# Patient Record
Sex: MALE | Race: WHITE | NOT HISPANIC OR LATINO | URBAN - METROPOLITAN AREA
[De-identification: names, ages, dates, MRNs, and addresses within clinical notes are randomized per-mention and may not be internally consistent; named-entity substitution may affect disease eponyms.]

---

## 2017-11-22 ENCOUNTER — OUTPATIENT (OUTPATIENT)
Dept: OUTPATIENT SERVICES | Facility: HOSPITAL | Age: 72
LOS: 1 days | Discharge: HOME | End: 2017-11-22

## 2017-11-22 DIAGNOSIS — I25.10 ATHEROSCLEROTIC HEART DISEASE OF NATIVE CORONARY ARTERY WITHOUT ANGINA PECTORIS: ICD-10-CM

## 2017-11-22 DIAGNOSIS — J96.00 ACUTE RESPIRATORY FAILURE, UNSPECIFIED WHETHER WITH HYPOXIA OR HYPERCAPNIA: ICD-10-CM

## 2017-11-22 DIAGNOSIS — E11.9 TYPE 2 DIABETES MELLITUS WITHOUT COMPLICATIONS: ICD-10-CM

## 2017-11-22 DIAGNOSIS — R78.89 FINDING OF OTHER SPECIFIED SUBSTANCES, NOT NORMALLY FOUND IN BLOOD: ICD-10-CM

## 2018-01-19 ENCOUNTER — OUTPATIENT (OUTPATIENT)
Dept: OUTPATIENT SERVICES | Facility: HOSPITAL | Age: 73
LOS: 1 days | Discharge: HOME | End: 2018-01-19

## 2018-01-19 DIAGNOSIS — J96.00 ACUTE RESPIRATORY FAILURE, UNSPECIFIED WHETHER WITH HYPOXIA OR HYPERCAPNIA: ICD-10-CM

## 2018-01-19 DIAGNOSIS — I25.10 ATHEROSCLEROTIC HEART DISEASE OF NATIVE CORONARY ARTERY WITHOUT ANGINA PECTORIS: ICD-10-CM

## 2018-01-19 DIAGNOSIS — R40.0 SOMNOLENCE: ICD-10-CM

## 2018-01-31 DIAGNOSIS — E61.1 IRON DEFICIENCY: ICD-10-CM

## 2018-09-12 ENCOUNTER — OUTPATIENT (OUTPATIENT)
Dept: OUTPATIENT SERVICES | Facility: HOSPITAL | Age: 73
LOS: 1 days | Discharge: HOME | End: 2018-09-12

## 2018-09-12 DIAGNOSIS — I25.10 ATHEROSCLEROTIC HEART DISEASE OF NATIVE CORONARY ARTERY WITHOUT ANGINA PECTORIS: ICD-10-CM

## 2018-10-03 ENCOUNTER — APPOINTMENT (OUTPATIENT)
Dept: CARDIOLOGY | Facility: CLINIC | Age: 73
End: 2018-10-03

## 2018-10-03 VITALS
BODY MASS INDEX: 37.8 KG/M2 | HEIGHT: 71 IN | DIASTOLIC BLOOD PRESSURE: 74 MMHG | SYSTOLIC BLOOD PRESSURE: 132 MMHG | OXYGEN SATURATION: 94 % | WEIGHT: 270 LBS | HEART RATE: 61 BPM

## 2018-10-03 DIAGNOSIS — Z82.49 FAMILY HISTORY OF ISCHEMIC HEART DISEASE AND OTHER DISEASES OF THE CIRCULATORY SYSTEM: ICD-10-CM

## 2018-10-03 DIAGNOSIS — Z78.9 OTHER SPECIFIED HEALTH STATUS: ICD-10-CM

## 2018-10-03 RX ORDER — FUROSEMIDE 40 MG/1
40 TABLET ORAL TWICE DAILY
Refills: 0 | Status: DISCONTINUED | COMMUNITY
End: 2018-10-03

## 2018-10-03 RX ORDER — ASPIRIN 81 MG
81 TABLET, DELAYED RELEASE (ENTERIC COATED) ORAL
Refills: 0 | Status: DISCONTINUED | COMMUNITY
End: 2018-10-03

## 2018-10-19 ENCOUNTER — OUTPATIENT (OUTPATIENT)
Dept: OUTPATIENT SERVICES | Facility: HOSPITAL | Age: 73
LOS: 1 days | Discharge: HOME | End: 2018-10-19

## 2018-10-19 VITALS
DIASTOLIC BLOOD PRESSURE: 62 MMHG | WEIGHT: 270.51 LBS | HEART RATE: 71 BPM | TEMPERATURE: 97 F | OXYGEN SATURATION: 98 % | SYSTOLIC BLOOD PRESSURE: 136 MMHG | RESPIRATION RATE: 17 BRPM | HEIGHT: 74 IN

## 2018-10-19 DIAGNOSIS — Z01.818 ENCOUNTER FOR OTHER PREPROCEDURAL EXAMINATION: ICD-10-CM

## 2018-10-19 DIAGNOSIS — Z90.10 ACQUIRED ABSENCE OF UNSPECIFIED BREAST AND NIPPLE: Chronic | ICD-10-CM

## 2018-10-19 DIAGNOSIS — I48.91 UNSPECIFIED ATRIAL FIBRILLATION: ICD-10-CM

## 2018-10-19 DIAGNOSIS — Z95.1 PRESENCE OF AORTOCORONARY BYPASS GRAFT: Chronic | ICD-10-CM

## 2018-10-19 LAB
ALBUMIN SERPL ELPH-MCNC: 4.5 G/DL — SIGNIFICANT CHANGE UP (ref 3.5–5.2)
ALP SERPL-CCNC: 73 U/L — SIGNIFICANT CHANGE UP (ref 30–115)
ALT FLD-CCNC: 15 U/L — SIGNIFICANT CHANGE UP (ref 0–41)
ANION GAP SERPL CALC-SCNC: 17 MMOL/L — HIGH (ref 7–14)
APTT BLD: 33.8 SEC — SIGNIFICANT CHANGE UP (ref 27–39.2)
AST SERPL-CCNC: 16 U/L — SIGNIFICANT CHANGE UP (ref 0–41)
BILIRUB SERPL-MCNC: 0.7 MG/DL — SIGNIFICANT CHANGE UP (ref 0.2–1.2)
BUN SERPL-MCNC: 27 MG/DL — HIGH (ref 10–20)
CALCIUM SERPL-MCNC: 9.1 MG/DL — SIGNIFICANT CHANGE UP (ref 8.5–10.1)
CHLORIDE SERPL-SCNC: 100 MMOL/L — SIGNIFICANT CHANGE UP (ref 98–110)
CO2 SERPL-SCNC: 24 MMOL/L — SIGNIFICANT CHANGE UP (ref 17–32)
CREAT SERPL-MCNC: 1.2 MG/DL — SIGNIFICANT CHANGE UP (ref 0.7–1.5)
GLUCOSE SERPL-MCNC: 116 MG/DL — HIGH (ref 70–99)
HCT VFR BLD CALC: 39.6 % — LOW (ref 42–52)
HGB BLD-MCNC: 13 G/DL — LOW (ref 14–18)
INR BLD: 1.03 RATIO — SIGNIFICANT CHANGE UP (ref 0.65–1.3)
MCHC RBC-ENTMCNC: 28.4 PG — SIGNIFICANT CHANGE UP (ref 27–31)
MCHC RBC-ENTMCNC: 32.8 G/DL — SIGNIFICANT CHANGE UP (ref 32–37)
MCV RBC AUTO: 86.5 FL — SIGNIFICANT CHANGE UP (ref 80–94)
MRSA PCR RESULT.: NEGATIVE — SIGNIFICANT CHANGE UP
NRBC # BLD: 0 /100 WBCS — SIGNIFICANT CHANGE UP (ref 0–0)
PLATELET # BLD AUTO: 256 K/UL — SIGNIFICANT CHANGE UP (ref 130–400)
POTASSIUM SERPL-MCNC: 5 MMOL/L — SIGNIFICANT CHANGE UP (ref 3.5–5)
POTASSIUM SERPL-SCNC: 5 MMOL/L — SIGNIFICANT CHANGE UP (ref 3.5–5)
PROT SERPL-MCNC: 7.8 G/DL — SIGNIFICANT CHANGE UP (ref 6–8)
PROTHROM AB SERPL-ACNC: 11.8 SEC — SIGNIFICANT CHANGE UP (ref 9.95–12.87)
RBC # BLD: 4.58 M/UL — LOW (ref 4.7–6.1)
RBC # FLD: 14 % — SIGNIFICANT CHANGE UP (ref 11.5–14.5)
SODIUM SERPL-SCNC: 141 MMOL/L — SIGNIFICANT CHANGE UP (ref 135–146)
WBC # BLD: 9.02 K/UL — SIGNIFICANT CHANGE UP (ref 4.8–10.8)
WBC # FLD AUTO: 9.02 K/UL — SIGNIFICANT CHANGE UP (ref 4.8–10.8)

## 2018-10-19 NOTE — H&P PST ADULT - FAMILY HISTORY
Father  Still living? Unknown  Family history of heart disease, Age at diagnosis: Age Unknown     Mother  Still living? Unknown  Family history of heart disease, Age at diagnosis: Age Unknown     Sibling  Still living? Unknown  Family history of lung cancer, Age at diagnosis: Age Unknown

## 2018-10-19 NOTE — H&P PST ADULT - PMH
Arrhythmia    Breast cancer  2001  CHF (congestive heart failure)    Heart attack    HTN (hypertension)    Hypercholesteremia    Obesity    Osteoarthritis

## 2018-10-19 NOTE — H&P PST ADULT - HISTORY OF PRESENT ILLNESS
72 year old male here for bi-v pacemaker after having ekg found patient abnormal and needs pacemaker..  fos= 1  denies chest pain sob palp  denies recent uri oe uti

## 2018-10-19 NOTE — H&P PST ADULT - NSANTHOSAYNRD_GEN_A_CORE
No. AUGUST screening performed.  STOP BANG Legend: 0-2 = LOW Risk; 3-4 = INTERMEDIATE Risk; 5-8 = HIGH Risk/see august tool

## 2018-10-22 ENCOUNTER — OUTPATIENT (OUTPATIENT)
Dept: OUTPATIENT SERVICES | Facility: HOSPITAL | Age: 73
LOS: 1 days | Discharge: HOME | End: 2018-10-22

## 2018-10-22 DIAGNOSIS — Z95.1 PRESENCE OF AORTOCORONARY BYPASS GRAFT: Chronic | ICD-10-CM

## 2018-10-22 DIAGNOSIS — Z90.10 ACQUIRED ABSENCE OF UNSPECIFIED BREAST AND NIPPLE: Chronic | ICD-10-CM

## 2018-10-22 DIAGNOSIS — Z01.818 ENCOUNTER FOR OTHER PREPROCEDURAL EXAMINATION: ICD-10-CM

## 2018-10-22 PROBLEM — I21.9 ACUTE MYOCARDIAL INFARCTION, UNSPECIFIED: Chronic | Status: ACTIVE | Noted: 2018-10-19

## 2018-10-22 PROBLEM — C50.919 MALIGNANT NEOPLASM OF UNSPECIFIED SITE OF UNSPECIFIED FEMALE BREAST: Chronic | Status: ACTIVE | Noted: 2018-10-19

## 2018-10-22 PROBLEM — M19.90 UNSPECIFIED OSTEOARTHRITIS, UNSPECIFIED SITE: Chronic | Status: ACTIVE | Noted: 2018-10-19

## 2018-10-22 PROBLEM — I10 ESSENTIAL (PRIMARY) HYPERTENSION: Chronic | Status: ACTIVE | Noted: 2018-10-19

## 2018-10-22 LAB
APPEARANCE UR: CLEAR — SIGNIFICANT CHANGE UP
BILIRUB UR-MCNC: NEGATIVE — SIGNIFICANT CHANGE UP
COLOR SPEC: YELLOW — SIGNIFICANT CHANGE UP
DIFF PNL FLD: NEGATIVE — SIGNIFICANT CHANGE UP
GLUCOSE UR QL: NEGATIVE — SIGNIFICANT CHANGE UP
KETONES UR-MCNC: NEGATIVE — SIGNIFICANT CHANGE UP
LEUKOCYTE ESTERASE UR-ACNC: NEGATIVE — SIGNIFICANT CHANGE UP
NITRITE UR-MCNC: NEGATIVE — SIGNIFICANT CHANGE UP
PH UR: 6 — SIGNIFICANT CHANGE UP (ref 5–8)
PROT UR-MCNC: NEGATIVE — SIGNIFICANT CHANGE UP
SP GR SPEC: 1.01 — SIGNIFICANT CHANGE UP (ref 1.01–1.03)
UROBILINOGEN FLD QL: 0.2 — SIGNIFICANT CHANGE UP (ref 0.2–0.2)

## 2018-10-23 DIAGNOSIS — I48.91 UNSPECIFIED ATRIAL FIBRILLATION: ICD-10-CM

## 2018-10-24 LAB
-  AMIKACIN: SIGNIFICANT CHANGE UP
-  AMOXICILLIN/CLAVULANIC ACID: SIGNIFICANT CHANGE UP
-  AMPICILLIN/SULBACTAM: SIGNIFICANT CHANGE UP
-  AMPICILLIN: SIGNIFICANT CHANGE UP
-  AZTREONAM: SIGNIFICANT CHANGE UP
-  CEFAZOLIN: SIGNIFICANT CHANGE UP
-  CEFEPIME: SIGNIFICANT CHANGE UP
-  CEFOXITIN: SIGNIFICANT CHANGE UP
-  CEFTRIAXONE: SIGNIFICANT CHANGE UP
-  CIPROFLOXACIN: SIGNIFICANT CHANGE UP
-  ERTAPENEM: SIGNIFICANT CHANGE UP
-  GENTAMICIN: SIGNIFICANT CHANGE UP
-  LEVOFLOXACIN: SIGNIFICANT CHANGE UP
-  MEROPENEM: SIGNIFICANT CHANGE UP
-  NITROFURANTOIN: SIGNIFICANT CHANGE UP
-  PIPERACILLIN/TAZOBACTAM: SIGNIFICANT CHANGE UP
-  TOBRAMYCIN: SIGNIFICANT CHANGE UP
-  TRIMETHOPRIM/SULFAMETHOXAZOLE: SIGNIFICANT CHANGE UP
CULTURE RESULTS: SIGNIFICANT CHANGE UP
METHOD TYPE: SIGNIFICANT CHANGE UP
ORGANISM # SPEC MICROSCOPIC CNT: SIGNIFICANT CHANGE UP
ORGANISM # SPEC MICROSCOPIC CNT: SIGNIFICANT CHANGE UP
SPECIMEN SOURCE: SIGNIFICANT CHANGE UP

## 2018-10-25 ENCOUNTER — CLINICAL ADVICE (OUTPATIENT)
Age: 73
End: 2018-10-25

## 2018-10-30 ENCOUNTER — OUTPATIENT (OUTPATIENT)
Dept: OUTPATIENT SERVICES | Facility: HOSPITAL | Age: 73
LOS: 1 days | Discharge: HOME | End: 2018-10-30

## 2018-10-30 DIAGNOSIS — Z90.10 ACQUIRED ABSENCE OF UNSPECIFIED BREAST AND NIPPLE: Chronic | ICD-10-CM

## 2018-10-30 DIAGNOSIS — Z95.1 PRESENCE OF AORTOCORONARY BYPASS GRAFT: Chronic | ICD-10-CM

## 2018-10-30 DIAGNOSIS — Z01.818 ENCOUNTER FOR OTHER PREPROCEDURAL EXAMINATION: ICD-10-CM

## 2018-10-30 LAB
APPEARANCE UR: CLEAR — SIGNIFICANT CHANGE UP
BILIRUB UR-MCNC: NEGATIVE — SIGNIFICANT CHANGE UP
COLOR SPEC: YELLOW — SIGNIFICANT CHANGE UP
DIFF PNL FLD: NEGATIVE — SIGNIFICANT CHANGE UP
GLUCOSE UR QL: NEGATIVE MG/DL — SIGNIFICANT CHANGE UP
KETONES UR-MCNC: NEGATIVE — SIGNIFICANT CHANGE UP
LEUKOCYTE ESTERASE UR-ACNC: NEGATIVE — SIGNIFICANT CHANGE UP
NITRITE UR-MCNC: NEGATIVE — SIGNIFICANT CHANGE UP
PH UR: 6 — SIGNIFICANT CHANGE UP (ref 5–8)
PROT UR-MCNC: NEGATIVE MG/DL — SIGNIFICANT CHANGE UP
SP GR SPEC: 1.01 — SIGNIFICANT CHANGE UP (ref 1.01–1.03)
UROBILINOGEN FLD QL: 0.2 MG/DL — SIGNIFICANT CHANGE UP (ref 0.2–0.2)

## 2018-10-31 DIAGNOSIS — I48.91 UNSPECIFIED ATRIAL FIBRILLATION: ICD-10-CM

## 2018-10-31 LAB
CULTURE RESULTS: NO GROWTH — SIGNIFICANT CHANGE UP
SPECIMEN SOURCE: SIGNIFICANT CHANGE UP

## 2018-11-02 ENCOUNTER — INPATIENT (INPATIENT)
Facility: HOSPITAL | Age: 73
LOS: 0 days | Discharge: HOME | End: 2018-11-03
Attending: STUDENT IN AN ORGANIZED HEALTH CARE EDUCATION/TRAINING PROGRAM | Admitting: STUDENT IN AN ORGANIZED HEALTH CARE EDUCATION/TRAINING PROGRAM

## 2018-11-02 VITALS — WEIGHT: 268.96 LBS

## 2018-11-02 DIAGNOSIS — I48.91 UNSPECIFIED ATRIAL FIBRILLATION: ICD-10-CM

## 2018-11-02 DIAGNOSIS — Z95.1 PRESENCE OF AORTOCORONARY BYPASS GRAFT: Chronic | ICD-10-CM

## 2018-11-02 DIAGNOSIS — I50.9 HEART FAILURE, UNSPECIFIED: ICD-10-CM

## 2018-11-02 DIAGNOSIS — Z90.10 ACQUIRED ABSENCE OF UNSPECIFIED BREAST AND NIPPLE: Chronic | ICD-10-CM

## 2018-11-02 RX ORDER — VANCOMYCIN HCL 1 G
1000 VIAL (EA) INTRAVENOUS ONCE
Qty: 0 | Refills: 0 | Status: COMPLETED | OUTPATIENT
Start: 2018-11-02 | End: 2018-11-02

## 2018-11-02 RX ORDER — LOSARTAN POTASSIUM 100 MG/1
100 TABLET, FILM COATED ORAL DAILY
Qty: 0 | Refills: 0 | Status: DISCONTINUED | OUTPATIENT
Start: 2018-11-02 | End: 2018-11-03

## 2018-11-02 RX ORDER — ASPIRIN/CALCIUM CARB/MAGNESIUM 324 MG
81 TABLET ORAL DAILY
Qty: 0 | Refills: 0 | Status: DISCONTINUED | OUTPATIENT
Start: 2018-11-02 | End: 2018-11-03

## 2018-11-02 RX ORDER — METOPROLOL TARTRATE 50 MG
50 TABLET ORAL DAILY
Qty: 0 | Refills: 0 | Status: DISCONTINUED | OUTPATIENT
Start: 2018-11-02 | End: 2018-11-03

## 2018-11-02 RX ORDER — VANCOMYCIN HCL 1 G
1000 VIAL (EA) INTRAVENOUS EVERY 12 HOURS
Qty: 0 | Refills: 0 | Status: COMPLETED | OUTPATIENT
Start: 2018-11-02 | End: 2018-11-03

## 2018-11-02 RX ORDER — AMLODIPINE BESYLATE 2.5 MG/1
5 TABLET ORAL DAILY
Qty: 0 | Refills: 0 | Status: DISCONTINUED | OUTPATIENT
Start: 2018-11-02 | End: 2018-11-03

## 2018-11-02 RX ORDER — ATORVASTATIN CALCIUM 80 MG/1
20 TABLET, FILM COATED ORAL AT BEDTIME
Qty: 0 | Refills: 0 | Status: DISCONTINUED | OUTPATIENT
Start: 2018-11-02 | End: 2018-11-03

## 2018-11-02 RX ORDER — FUROSEMIDE 40 MG
20 TABLET ORAL DAILY
Qty: 0 | Refills: 0 | Status: DISCONTINUED | OUTPATIENT
Start: 2018-11-02 | End: 2018-11-03

## 2018-11-02 RX ORDER — AMIODARONE HYDROCHLORIDE 400 MG/1
200 TABLET ORAL DAILY
Qty: 0 | Refills: 0 | Status: DISCONTINUED | OUTPATIENT
Start: 2018-11-02 | End: 2018-11-03

## 2018-11-02 RX ORDER — SPIRONOLACTONE 25 MG/1
25 TABLET, FILM COATED ORAL DAILY
Qty: 0 | Refills: 0 | Status: DISCONTINUED | OUTPATIENT
Start: 2018-11-02 | End: 2018-11-03

## 2018-11-02 RX ORDER — LISINOPRIL 2.5 MG/1
40 TABLET ORAL DAILY
Qty: 0 | Refills: 0 | Status: DISCONTINUED | OUTPATIENT
Start: 2018-11-02 | End: 2018-11-03

## 2018-11-02 RX ORDER — VANCOMYCIN HCL 1 G
1750 VIAL (EA) INTRAVENOUS ONCE
Qty: 0 | Refills: 0 | Status: COMPLETED | OUTPATIENT
Start: 2018-11-02 | End: 2018-11-02

## 2018-11-02 RX ADMIN — ATORVASTATIN CALCIUM 20 MILLIGRAM(S): 80 TABLET, FILM COATED ORAL at 21:24

## 2018-11-02 RX ADMIN — Medication 250 MILLIGRAM(S): at 08:05

## 2018-11-02 RX ADMIN — Medication 250 MILLIGRAM(S): at 17:36

## 2018-11-02 RX ADMIN — Medication 250 MILLIGRAM(S): at 08:06

## 2018-11-02 NOTE — CHART NOTE - NSCHARTNOTEFT_GEN_A_CORE
Cardiac Electrophysiology Procedure Report    Date of procedure	2018  EP Attending	Rebeca Ojeda MD  Anesthesiologist	Jt Ron MD  Referring Physician	Georgi Corea MD  Procedure	Dual Chamber Pacemaker Implant      Indication: 73 yo M with history of CAD s/p CABG, 1st deg AV block, LBBB, and SSS referred for BiV pacemaker implant.     EQUIPMENT IMPLANTED AND BASELINE PARAMETERS  Pulse Generator   : [employer] 	   Model Number: [id number]  Serial Number:	[id number]        Atrial Lead  :    [employer] 	  Model Number: [id number]/52  Serial Number:	[id number]  Location: 	Right atrial appendage  Sensin.3 mV  Impedance:	540 Ohms  Threshold:	 1.0V at 0.5ms      Right Ventricular Lead  : [employer] 	  Model Number:	[id number]/58  Serial Number:	[id number]  Location	RV apical septum  Sensin.7 mV  Impedance:	 540 Ohms  Threshold:	0.7 V at 0.5ms    			  DESCRIPTION OF PROCEDURE  The patient was brought to the procedure room in a nonsedated and fasting state, having received preoperative antibiotics. Informed, written consent was obtained prior to the procedure. Conscious sedation was administered by the anesthesiologist. Blood pressure, oxygenation and level of comfort were stable throughout. The left shoulder region was cleaned and prepped with serial applications of Chlorhexidine solution. Patient was then covered from head to toe with sterile drapes in the usual manner.    The left deltopectoral groove region was defined; 20 cc of premixed 50/50 lidocaine/sensorcaine solution was administered. A 3.5 cm incision was made along the left deltopectoral groove / parallel to and 2 cm below the left clavicle. The incision was extended down to the level of the anterior pectoralis fascia using electrocautery and blunt dissection.     In the groove, the cephalic vein was identified, isolated, and controlled with proximal and distal 2.0 TiCron ties. Between the ties, a venotomy incision was made; through the venotomy, two guidewires were advanced through an 18G angiocath to the inferior vena cava under fluoroscopic guidance.      Over the first guidewire, a 7 Fr sheath was advanced into the cephalic vein.  Through this sheath, using a combination of straight and curved stylets, the right ventricular lead was passed across the tricuspid annulus, advanced into the right ventricular outflow tract, and then pulled down against the interventricular septum fluoroscopic guidance. A site was found in the RV apical septum with adequate pacing threshold, sensing, and impedance without diaphragmatic stimulation; the lead's screw was extended. The lead's position and adequate slack were confirmed in [place]ian and ARANGO projections.  The introducer sheath was split and removed. The lead's position and adequate slack were confirmed in [place]ian and ARANGO projections.     Next a 6 Fr sheath was advanced over the second guidewire into the cephalic vein under fluoroscopic guidance.  The dilator and guidewire were removed and, through this sheath, the atrial pacing lead was advanced into the heart under fluoroscopic guidance.  Next, using a J curved stylet the atrial lead was placed in the right atrial appendage.  Once adequate pacing and sensing threshold parameters were obtained, the fixation screw was extended and the sheath was split and removed. The lead position was confirmed in the [place] and ARANGO projections.  There was no evidence of diaphragmatic pacing at maximal output.     Adequate slack was placed into both the atrial and ventricular leads. The sewing sleeves were positioned at the site of entry into the vein, and the leads were secured to the pre-pectoral fascia with two 0 silk sutures tied around each sewing sleeve respectively.     Next, using the modified Seldinger technique, the left axillary vein was accessed once with a micropuncture needle using fluoroscopic guidance.  Fluoroscopy was used to confirm the capability to position the guide wire in the inferior vena cava, thus confirming venous access.  A 10 Fr introducer sheath was placed into the vein. Into this, a [employer] guiding sheath with a Supra CS catheter was inserted. The combination was followed to the right atrium with the catheter leading. Several different guiding sheaths were used but despite this, the CS was unable to be cannulated despite extensive mapping of the right atrium and periannular region.     The plane between the prepectoral fascia and the pectoralis muscle was exposed to create a pocket for the pulse generator.  Next, the pocket was inspected for bleeding, and electrocautery applied where appropriate. The wound was irrigated with copious amounts of antibiotic solution. The lead was wiped clean and then connected to the pulse generator. The leads and pulse generator were coiled into the pocket.      The wound margins were approximated well, without any tension or overlap. The wound was closed using an interrupted layer of 2-0 absorbable Vicryl suture for the deep fascial layer. This was followed by a continuous layer of 3-0 absorbable suture. The skin layer was approximated with Dermabond. Steri-strips were applied over this and a dry, sterile dressing was placed over this. The patient was returned to a hospital room in stable condition. Needle count was performed and found to be consistent at the end of the procedure       COMPLICATIONS:  The patient tolerated the procedure well. There were no immediate complications.       CONCLUSIONS:  Successful implantation of Dual Chamber Implantable Cardioverter Pacemaker.    EBL: 30    FLUORO: 53.5 min/880 mGy      _______________________________  Rebeca Ojeda MD  Cardiac Electrophysiology

## 2018-11-02 NOTE — PROGRESS NOTE ADULT - SUBJECTIVE AND OBJECTIVE BOX
Electrophysiology Brief Post-Op Note    I have personally seen and examined the patient.  I agree with the history and physical which I have reviewed and noted any changes below.  11-02-18 @ 08:00 AM    PRE-OP DIAGNOSIS:  CAD, LBBB, SSS, dCHF    POST-OP DIAGNOSIS: CAD, LBBB, SSS, dCHF    PROCEDURE: Pacemaker Implant    Physician: Rebeca Ojeda MD  Assistant: None    ESTIMATED BLOOD LOSS:   30 mL    ANESTHESIA TYPE:  [  ]General Anesthesia  [ x ] Sedation  [ x ] Local/Regional    CONDITION  [  ] Critical  [  ] Serious  [  ]Fair  [ x ]Good      SPECIMENS REMOVED (IF APPLICABLE): None    IMPLANTS (IF APPLICABLE): DC PPM     FINDINGS  PLAN OF CARE  - Vancomycin 1g IV q12 h  - Chest X-ray PA now and PA/Lat tomorrow am  - Device interrogation tomorrow in am  - ECG in AM  - Labs in AM (CBC, Chem 7)  - DC all heparin produces and lovenox  - No anticoagulation unless recommended by EP attending    Rebeca Ojeda MD   Electrophysiology Attending

## 2018-11-03 ENCOUNTER — TRANSCRIPTION ENCOUNTER (OUTPATIENT)
Age: 73
End: 2018-11-03

## 2018-11-03 VITALS — WEIGHT: 262.79 LBS

## 2018-11-03 RX ORDER — ACETAMINOPHEN 500 MG
650 TABLET ORAL EVERY 6 HOURS
Qty: 0 | Refills: 0 | Status: DISCONTINUED | OUTPATIENT
Start: 2018-11-03 | End: 2018-11-03

## 2018-11-03 RX ORDER — CEPHALEXIN 500 MG
1 CAPSULE ORAL
Qty: 10 | Refills: 0
Start: 2018-11-03 | End: 2018-11-07

## 2018-11-03 RX ADMIN — LOSARTAN POTASSIUM 100 MILLIGRAM(S): 100 TABLET, FILM COATED ORAL at 06:04

## 2018-11-03 RX ADMIN — Medication 650 MILLIGRAM(S): at 16:26

## 2018-11-03 RX ADMIN — AMIODARONE HYDROCHLORIDE 200 MILLIGRAM(S): 400 TABLET ORAL at 06:04

## 2018-11-03 RX ADMIN — SPIRONOLACTONE 25 MILLIGRAM(S): 25 TABLET, FILM COATED ORAL at 06:04

## 2018-11-03 RX ADMIN — Medication 250 MILLIGRAM(S): at 06:03

## 2018-11-03 RX ADMIN — Medication 20 MILLIGRAM(S): at 06:04

## 2018-11-03 RX ADMIN — Medication 50 MILLIGRAM(S): at 06:07

## 2018-11-03 RX ADMIN — AMLODIPINE BESYLATE 5 MILLIGRAM(S): 2.5 TABLET ORAL at 06:04

## 2018-11-03 RX ADMIN — Medication 650 MILLIGRAM(S): at 11:20

## 2018-11-03 RX ADMIN — LISINOPRIL 40 MILLIGRAM(S): 2.5 TABLET ORAL at 06:04

## 2018-11-03 NOTE — PROGRESS NOTE ADULT - SUBJECTIVE AND OBJECTIVE BOX
POST DDD PACE MAKER ST WHITNEY   WELL FUNCTIONING LEADS  PROGRAMMED TO DDD AT 60 /MIN FOR SUNCHRONY POST DDD PACE MAKER ST WHITNEY   WELL FUNCTIONING LEADS  INCISION NO SWELLING OR HEMATOMA OR REDNESS  PROGRAMMED TO DDD AT 60 /MIN FOR SYNCHRONY

## 2018-11-03 NOTE — DISCHARGE NOTE ADULT - CARE PROVIDER_API CALL
Rebeca Ojeda), Medicine  Physicians  [address] [place] [address]  Phone: [phone]  Fax: [phone]

## 2018-11-03 NOTE — DISCHARGE NOTE ADULT - PLAN OF CARE
complete recovery s/p pacemaker please complete antibiotic course and follow up with dr. jung, monitor for symptoms of chest pain, palpitations, shortness of breath and report to ED.

## 2018-11-03 NOTE — DISCHARGE NOTE ADULT - HOSPITAL COURSE
73 yo F presents fo pacemaker placement, hospital course uneventful. 73 yo F presents fo pacemaker placement, hospital course uneventful. BiV PPM attempted but unable to place CS lead, so patient received DC PPM.

## 2018-11-03 NOTE — DISCHARGE NOTE ADULT - PATIENT PORTAL LINK FT
You can access the [facility], offered by [facility], by registering with the following website: [email or web address]

## 2018-11-03 NOTE — DISCHARGE NOTE ADULT - MEDICATION SUMMARY - MEDICATIONS TO TAKE
I will START or STAY ON the medications listed below when I get home from the hospital:    spironolactone 25 mg oral tablet  -- 1 tab(s) by mouth once a day  -- Indication: For Heart failure    Ecotrin  -- 81  orally  -- Indication: For Heart failure    quinapril 40 mg oral tablet  -- 1 tab(s) by mouth once a day  -- Indication: For Heart failure    losartan 100 mg oral tablet  -- 1 tab(s) by mouth once a day  -- Indication: For Heart failure    amiodarone 200 mg oral tablet  -- 1 tab(s) by mouth once a day  -- Indication: For Heart failure    atorvastatin 20 mg oral tablet  -- 1 tab(s) by mouth once a day  -- Indication: For Heart failure    Metoprolol Tartrate 50 mg oral tablet  -- 1 tab(s) by mouth once a day  -- Indication: For Atrial fibrillation    amLODIPine 5 mg oral tablet  -- 1 tab(s) by mouth once a day  -- Indication: For Atrial fibrillation    furosemide 20 mg oral tablet  -- 1 tab(s) by mouth once a day  -- Indication: For Heart failure I will START or STAY ON the medications listed below when I get home from the hospital:    spironolactone 25 mg oral tablet  -- 1 tab(s) by mouth once a day  -- Indication: For Heart failure    Ecotrin  -- 81  orally  -- Indication: For Heart failure    quinapril 40 mg oral tablet  -- 1 tab(s) by mouth once a day  -- Indication: For Heart failure    losartan 100 mg oral tablet  -- 1 tab(s) by mouth once a day  -- Indication: For Heart failure    amiodarone 200 mg oral tablet  -- 1 tab(s) by mouth once a day  -- Indication: For Heart failure    atorvastatin 20 mg oral tablet  -- 1 tab(s) by mouth once a day  -- Indication: For Heart failure    Metoprolol Tartrate 50 mg oral tablet  -- 1 tab(s) by mouth once a day  -- Indication: For Atrial fibrillation    amLODIPine 5 mg oral tablet  -- 1 tab(s) by mouth once a day  -- Indication: For Atrial fibrillation    Keflex 500 mg oral capsule  -- 1 cap(s) by mouth 2 times a day   -- Finish all this medication unless otherwise directed by prescriber.    -- Indication: For s/p pacemaker    furosemide 20 mg oral tablet  -- 1 tab(s) by mouth once a day  -- Indication: For Heart failure

## 2018-11-03 NOTE — DISCHARGE NOTE ADULT - CARE PLAN
Principal Discharge DX:	Diastolic congestive heart failure, unspecified HF chronicity  Goal:	complete recovery s/p pacemaker  Assessment and plan of treatment:	please complete antibiotic course and follow up with dr. jung, monitor for symptoms of chest pain, palpitations, shortness of breath and report to ED.

## 2018-11-05 DIAGNOSIS — Z02.9 ENCOUNTER FOR ADMINISTRATIVE EXAMINATIONS, UNSPECIFIED: ICD-10-CM

## 2018-11-13 DIAGNOSIS — Z95.1 PRESENCE OF AORTOCORONARY BYPASS GRAFT: ICD-10-CM

## 2018-11-13 DIAGNOSIS — E66.01 MORBID (SEVERE) OBESITY DUE TO EXCESS CALORIES: ICD-10-CM

## 2018-11-13 DIAGNOSIS — I44.7 LEFT BUNDLE-BRANCH BLOCK, UNSPECIFIED: ICD-10-CM

## 2018-11-13 DIAGNOSIS — I44.0 ATRIOVENTRICULAR BLOCK, FIRST DEGREE: ICD-10-CM

## 2018-11-13 DIAGNOSIS — I50.32 CHRONIC DIASTOLIC (CONGESTIVE) HEART FAILURE: ICD-10-CM

## 2018-11-13 DIAGNOSIS — I49.5 SICK SINUS SYNDROME: ICD-10-CM

## 2018-11-13 DIAGNOSIS — Z85.3 PERSONAL HISTORY OF MALIGNANT NEOPLASM OF BREAST: ICD-10-CM

## 2018-11-13 DIAGNOSIS — I25.10 ATHEROSCLEROTIC HEART DISEASE OF NATIVE CORONARY ARTERY WITHOUT ANGINA PECTORIS: ICD-10-CM

## 2018-11-13 DIAGNOSIS — I11.0 HYPERTENSIVE HEART DISEASE WITH HEART FAILURE: ICD-10-CM

## 2018-11-13 DIAGNOSIS — E78.00 PURE HYPERCHOLESTEROLEMIA, UNSPECIFIED: ICD-10-CM

## 2018-11-22 ENCOUNTER — EMERGENCY (EMERGENCY)
Facility: HOSPITAL | Age: 73
LOS: 0 days | Discharge: HOME | End: 2018-11-22
Attending: EMERGENCY MEDICINE | Admitting: EMERGENCY MEDICINE

## 2018-11-22 VITALS
SYSTOLIC BLOOD PRESSURE: 173 MMHG | DIASTOLIC BLOOD PRESSURE: 81 MMHG | HEART RATE: 78 BPM | TEMPERATURE: 96 F | RESPIRATION RATE: 20 BRPM | OXYGEN SATURATION: 97 %

## 2018-11-22 DIAGNOSIS — I25.10 ATHEROSCLEROTIC HEART DISEASE OF NATIVE CORONARY ARTERY WITHOUT ANGINA PECTORIS: ICD-10-CM

## 2018-11-22 DIAGNOSIS — I50.9 HEART FAILURE, UNSPECIFIED: ICD-10-CM

## 2018-11-22 DIAGNOSIS — E78.5 HYPERLIPIDEMIA, UNSPECIFIED: ICD-10-CM
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
## 2018-11-22 DIAGNOSIS — S81.812A LACERATION WITHOUT FOREIGN BODY, LEFT LOWER LEG, INITIAL ENCOUNTER: ICD-10-CM

## 2018-11-22 DIAGNOSIS — I11.0 HYPERTENSIVE HEART DISEASE WITH HEART FAILURE: ICD-10-CM

## 2018-11-22 DIAGNOSIS — Z79.899 OTHER LONG TERM (CURRENT) DRUG THERAPY: ICD-10-CM

## 2018-11-22 DIAGNOSIS — E78.00 PURE HYPERCHOLESTEROLEMIA, UNSPECIFIED: ICD-10-CM

## 2018-11-22 DIAGNOSIS — Z95.1 PRESENCE OF AORTOCORONARY BYPASS GRAFT: Chronic | ICD-10-CM
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
## 2018-11-22 DIAGNOSIS — Z90.10 ACQUIRED ABSENCE OF UNSPECIFIED BREAST AND NIPPLE: Chronic | ICD-10-CM

## 2018-11-22 DIAGNOSIS — Z23 ENCOUNTER FOR IMMUNIZATION: ICD-10-CM
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
## 2018-11-22 RX ORDER — TETANUS TOXOID, REDUCED DIPHTHERIA TOXOID AND ACELLULAR PERTUSSIS VACCINE, ADSORBED 5; 2.5; 8; 8; 2.5 [IU]/.5ML; [IU]/.5ML; UG/.5ML; UG/.5ML; UG/.5ML
0.5 SUSPENSION INTRAMUSCULAR ONCE
Qty: 0 | Refills: 0 | Status: COMPLETED | OUTPATIENT
Start: 2018-11-22 | End: 2018-11-22

## 2018-11-22 RX ADMIN — TETANUS TOXOID, REDUCED DIPHTHERIA TOXOID AND ACELLULAR PERTUSSIS VACCINE, ADSORBED 0.5 MILLILITER(S): 5; 2.5; 8; 8; 2.5 SUSPENSION INTRAMUSCULAR at 08:52

## 2018-11-22 NOTE — ED PROVIDER NOTE - PROGRESS NOTE DETAILS
Attendin70 y/o M with PMH of HTN, dyslipidemia on daily aspirin, comes in s/p laceration of the L distal leg on a car door. Pt had bleeding and came in for evaluation. No CP or SOB. No N/V/D. No LOC. Pt is ambulatory. On exam: CONSTITUTIONAL: Well-developed; well-nourished; in no acute distress. Sitting up and providing appropriate history and physical examination. SKIN: skin exam is warm and dry, no acute rash. HEAD: Normocephalic; atraumatic. EXT: Normal ROM. No clubbing, cyanosis or edema. Dp and Pt Pulses intact. Cap refill less than 3 seconds. (+) Distal L lateral leg laceration above the ankle 3 cm non-pulsatile, does not appear to be arterial. Laceration was controlled with direct pressure. Laceration is doing well. NEURO: CN 2-12 intact, normal finger to nose, normal romberg, stable gait, no sensory or motor deficits, Alert, oriented, grossly unremarkable. No Focal deficits. GCS 15. NIH 0. PSYCH: Cooperative, appropriate.

## 2018-11-22 NOTE — ED ADULT NURSE NOTE - ASSOCIATED SYMPTOMS
pt presents with laceration to the left lower ext. bleeding controlled at this time. pt has positive pedal pulse.

## 2018-11-22 NOTE — ED PROVIDER NOTE - PHYSICAL EXAMINATION
CONSTITUTIONAL: Well-developed; well-nourished; in no acute distress.   SKIN: 4 cm laceration over L lateral shin.   HEAD: Normocephalic; atraumatic.  EYES: normal sclera and conjunctiva   ENT: No nasal discharge; airway clear.  NECK: Supple; non tender.  EXT: Normal ROM.  No clubbing, cyanosis or edema. No point tenderness over extremity.   LYMPH: No acute cervical adenopathy.  NEURO: Alert, oriented, grossly unremarkable  PSYCH: Cooperative, appropriate.

## 2018-11-22 NOTE — ED PROVIDER NOTE - NS ED ROS FT
Eyes:  No visual changes, eye pain or discharge.  ENMT:  No hearing changes, pain, discharge or infections. No neck pain or stiffness.  Cardiac:  No chest pain, SOB or edema.  Respiratory:  No cough or respiratory distress.   MS:  No myalgia, muscle weakness, joint pain or back pain.  Neuro:  No headache or weakness.  No LOC.  Skin:  Laceration LLE.   Endocrine: No history of thyroid disease or diabetes.

## 2018-11-22 NOTE — ED ADULT TRIAGE NOTE - CHIEF COMPLAINT QUOTE
laceration on left lower leg, pt was getting out of the car and got his leg cut by the car door today, pt is on aspirin and had a pacemaker placed 2 weeks ago

## 2018-11-22 NOTE — ED PROVIDER NOTE - OBJECTIVE STATEMENT
72 y m pmh arrhythmia, chf, cad, htn, hpl pw laceration. Getting down from truck and scraped his LLE on the corner of the door. Sustained laceration to L lateral shin. Unsure of last time he received tetanus shot. Denies fall, traumatic injury, head trauma, cp, sob, abd pain, n/v, pain to extremity.

## 2018-11-22 NOTE — ED ADULT NURSE NOTE - NSIMPLEMENTINTERV_GEN_ALL_ED
Implemented All Fall with Harm Risk Interventions:  [place] to call system. Call bell, personal items and telephone within reach. Instruct patient to call for assistance. Room bathroom lighting operational. Non-slip footwear when patient is off stretcher. Physically safe environment: no spills, clutter or unnecessary equipment. Stretcher in lowest position, wheels locked, appropriate side rails in place. Provide visual cue, wrist band, yellow gown, etc. Monitor gait and stability. Monitor for mental status changes and reorient to person, place, and time. Review medications for side effects contributing to fall risk. Reinforce activity limits and safety measures with patient and family. Provide visual clues: red socks.

## 2018-11-22 NOTE — ED PROVIDER NOTE - NSFOLLOWUPINSTRUCTIONS_ED_ALL_ED_FT

## 2018-11-22 NOTE — ED PROVIDER NOTE - MEDICAL DECISION MAKING DETAILS
I personally evaluated the patient. I reviewed the Resident’s or Physician Assistant’s note (as assigned above), and agree with the findings and plan except as documented in my note. Laceration repaired. I have fully discussed the medical management and delivery of care with the patient. I have discussed any available labs, imaging and treatment options with the patient. Patient confirms understanding and has been given detailed return precautions. Patient instructed to return to the ED should symptoms persist or worsen. Patient has demonstrated capacity and has verbalized understanding. Patient is well appearing upon discharge.

## 2018-12-05 ENCOUNTER — APPOINTMENT (OUTPATIENT)
Dept: CARDIOLOGY | Facility: CLINIC | Age: 73
End: 2018-12-05

## 2018-12-05 VITALS
HEART RATE: 66 BPM | SYSTOLIC BLOOD PRESSURE: 143 MMHG | BODY MASS INDEX: 38.08 KG/M2 | DIASTOLIC BLOOD PRESSURE: 70 MMHG | WEIGHT: 273 LBS

## 2018-12-05 RX ORDER — SERTRALINE 25 MG/1
25 TABLET, FILM COATED ORAL
Refills: 0 | Status: DISCONTINUED | COMMUNITY
End: 2018-12-05

## 2018-12-05 RX ORDER — AMOXICILLIN AND CLAVULANATE POTASSIUM 500; 125 MG/1; MG/1
500-125 TABLET, FILM COATED ORAL
Qty: 10 | Refills: 0 | Status: COMPLETED | COMMUNITY
Start: 2018-10-25 | End: 2018-12-05

## 2018-12-05 RX ORDER — ASPIRIN 325 MG/1
325 TABLET, FILM COATED ORAL
Refills: 0 | Status: COMPLETED | COMMUNITY
End: 2018-12-05

## 2018-12-05 RX ORDER — METOLAZONE 5 MG/1
5 TABLET ORAL
Refills: 0 | Status: COMPLETED | COMMUNITY
End: 2018-12-05

## 2018-12-05 RX ORDER — QUINAPRIL HYDROCHLORIDE 40 MG/1
40 TABLET, FILM COATED ORAL DAILY
Refills: 0 | Status: COMPLETED | COMMUNITY
End: 2018-12-05

## 2018-12-06 NOTE — PHYSICAL EXAM
[General Appearance - Well Developed] : well developed [Normal Appearance] : normal appearance [Well Groomed] : well groomed [General Appearance - Well Nourished] : well nourished [No Deformities] : no deformities [General Appearance - In No Acute Distress] : no acute distress [FreeTextEntry1] : Overweight [Heart Rate And Rhythm] : heart rate and rhythm were normal [Heart Sounds] : normal S1 and S2 [Murmurs] : no murmurs present [Edema] : no peripheral edema present [] : no respiratory distress [Respiration, Rhythm And Depth] : normal respiratory rhythm and effort [Exaggerated Use Of Accessory Muscles For Inspiration] : no accessory muscle use [Auscultation Breath Sounds / Voice Sounds] : lungs were clear to auscultation bilaterally [Left Infraclavicular] : left infraclavicular area [Clean] : clean [Dry] : dry [Healing Well] : healing well [Well-Healed] : well-healed [Bleeding] : no active bleeding [Erythema] : not erythematous [Warm] : not warm [Tender] : not tender [Bowel Sounds] : normal bowel sounds [Abdomen Soft] : soft [Abdomen Tenderness] : non-tender [Nail Clubbing] : no clubbing of the fingernails [Cyanosis, Localized] : no localized cyanosis

## 2018-12-06 NOTE — PROCEDURE
[No] : not [NSR] : normal sinus rhythm [Pacemaker] : pacemaker [DDD] : DDD [Voltage: ___ volts] : Voltage was [unfilled] volts [Magnet Rate: ___ Ppm] : magnet rate was [unfilled] Ppm [Longevity: ___ months] : The estimated remaining battery life is [unfilled] months [Threshold Testing Performed] : Threshold testing was performed [Lead Imp:  ___ohms] : lead impedance was [unfilled] ohms [Sensing Amplitude ___mv] : sensing amplitude was [unfilled] mv [___V @] : [unfilled] V [___ ms] : [unfilled] ms [Programmed for Longevity] : output reprogrammed for improved battery longevity [de-identified] : [employer]  [de-identified] : [id number] [de-identified] : [id number] [de-identified] : 11/2/2018 [de-identified] : 60 [de-identified] : Atrial output decreased to 2.0V @0.40ms,  [de-identified] : \par A-paced 70%\par V- paced 67%\par

## 2018-12-06 NOTE — ASSESSMENT
[FreeTextEntry1] : 73 yo M with a history of AFib on aspirin, SSS s/p DC PPM (unable to cannulate CS). He is V-pacing 67% of the time but denies any signs or symptoms of heart failure. If he continues to pace and develops heart failure symptoms, he may require an epicardial LV lead. We will re-evaluate him in the future. \par \par I have spoken with Dr. Corea personally and the patient is not a good candidate for anticoagulation due to bleeding. In the meantime, he should continue his present medications and have routine follow up of TSH, LFTs and PFTs while on amiodarone. \par \par I have also advised the patient to go to the nearest emergency room if he experiences any chest pain, dyspnea, syncope, or has any other compelling symptoms.\par

## 2018-12-07 ENCOUNTER — TRANSCRIPTION ENCOUNTER (OUTPATIENT)
Age: 73
End: 2018-12-07

## 2019-02-27 ENCOUNTER — EMERGENCY (EMERGENCY)
Facility: HOSPITAL | Age: 74
LOS: 0 days | Discharge: HOME | End: 2019-02-28
Attending: EMERGENCY MEDICINE | Admitting: INTERNAL MEDICINE

## 2019-02-27 VITALS
OXYGEN SATURATION: 99 % | RESPIRATION RATE: 18 BRPM | WEIGHT: 259.93 LBS | HEART RATE: 79 BPM | DIASTOLIC BLOOD PRESSURE: 81 MMHG | SYSTOLIC BLOOD PRESSURE: 185 MMHG | TEMPERATURE: 98 F

## 2019-02-27 DIAGNOSIS — Z90.10 ACQUIRED ABSENCE OF UNSPECIFIED BREAST AND NIPPLE: Chronic | ICD-10-CM

## 2019-02-27 DIAGNOSIS — Z95.1 PRESENCE OF AORTOCORONARY BYPASS GRAFT: ICD-10-CM
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
## 2019-02-27 DIAGNOSIS — I50.9 HEART FAILURE, UNSPECIFIED: ICD-10-CM

## 2019-02-27 DIAGNOSIS — S20.212A CONTUSION OF LEFT FRONT WALL OF THORAX, INITIAL ENCOUNTER: ICD-10-CM

## 2019-02-27 DIAGNOSIS — I11.0 HYPERTENSIVE HEART DISEASE WITH HEART FAILURE: ICD-10-CM
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
## 2019-02-27 DIAGNOSIS — I25.10 ATHEROSCLEROTIC HEART DISEASE OF NATIVE CORONARY ARTERY WITHOUT ANGINA PECTORIS: ICD-10-CM

## 2019-02-27 DIAGNOSIS — Z95.1 PRESENCE OF AORTOCORONARY BYPASS GRAFT: Chronic | ICD-10-CM
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
## 2019-02-27 NOTE — ED ADULT TRIAGE NOTE - CHIEF COMPLAINT QUOTE
pt sts " I checked my blood pressure at home and it was high. For the past couple of days my face has been very red and my neck hurts"

## 2019-02-28 LAB
ALBUMIN SERPL ELPH-MCNC: 4.2 G/DL — SIGNIFICANT CHANGE UP (ref 3.5–5.2)
ALP SERPL-CCNC: 66 U/L — SIGNIFICANT CHANGE UP (ref 30–115)
ALT FLD-CCNC: 16 U/L — SIGNIFICANT CHANGE UP (ref 0–41)
ANION GAP SERPL CALC-SCNC: 15 MMOL/L — HIGH (ref 7–14)
AST SERPL-CCNC: 18 U/L — SIGNIFICANT CHANGE UP (ref 0–41)
BASOPHILS # BLD AUTO: 0.09 K/UL — SIGNIFICANT CHANGE UP (ref 0–0.2)
BASOPHILS NFR BLD AUTO: 0.8 % — SIGNIFICANT CHANGE UP (ref 0–1)
BILIRUB SERPL-MCNC: 0.6 MG/DL — SIGNIFICANT CHANGE UP (ref 0.2–1.2)
BUN SERPL-MCNC: 25 MG/DL — HIGH (ref 10–20)
CALCIUM SERPL-MCNC: 8.9 MG/DL — SIGNIFICANT CHANGE UP (ref 8.5–10.1)
CHLORIDE SERPL-SCNC: 100 MMOL/L — SIGNIFICANT CHANGE UP (ref 98–110)
CO2 SERPL-SCNC: 23 MMOL/L — SIGNIFICANT CHANGE UP (ref 17–32)
CREAT SERPL-MCNC: 1.1 MG/DL — SIGNIFICANT CHANGE UP (ref 0.7–1.5)
EOSINOPHIL # BLD AUTO: 0.27 K/UL — SIGNIFICANT CHANGE UP (ref 0–0.7)
EOSINOPHIL NFR BLD AUTO: 2.3 % — SIGNIFICANT CHANGE UP (ref 0–8)
GLUCOSE SERPL-MCNC: 132 MG/DL — HIGH (ref 70–99)
HCT VFR BLD CALC: 39.5 % — LOW (ref 42–52)
HGB BLD-MCNC: 13.2 G/DL — LOW (ref 14–18)
IMM GRANULOCYTES NFR BLD AUTO: 0.4 % — HIGH (ref 0.1–0.3)
LYMPHOCYTES # BLD AUTO: 1.53 K/UL — SIGNIFICANT CHANGE UP (ref 1.2–3.4)
LYMPHOCYTES # BLD AUTO: 13 % — LOW (ref 20.5–51.1)
MCHC RBC-ENTMCNC: 29.2 PG — SIGNIFICANT CHANGE UP (ref 27–31)
MCHC RBC-ENTMCNC: 33.4 G/DL — SIGNIFICANT CHANGE UP (ref 32–37)
MCV RBC AUTO: 87.4 FL — SIGNIFICANT CHANGE UP (ref 80–94)
MONOCYTES # BLD AUTO: 1 K/UL — HIGH (ref 0.1–0.6)
MONOCYTES NFR BLD AUTO: 8.5 % — SIGNIFICANT CHANGE UP (ref 1.7–9.3)
NEUTROPHILS # BLD AUTO: 8.8 K/UL — HIGH (ref 1.4–6.5)
NEUTROPHILS NFR BLD AUTO: 75 % — SIGNIFICANT CHANGE UP (ref 42.2–75.2)
NRBC # BLD: 0 /100 WBCS — SIGNIFICANT CHANGE UP (ref 0–0)
PLATELET # BLD AUTO: 241 K/UL — SIGNIFICANT CHANGE UP (ref 130–400)
POTASSIUM SERPL-MCNC: 4.7 MMOL/L — SIGNIFICANT CHANGE UP (ref 3.5–5)
POTASSIUM SERPL-SCNC: 4.7 MMOL/L — SIGNIFICANT CHANGE UP (ref 3.5–5)
PROT SERPL-MCNC: 7.1 G/DL — SIGNIFICANT CHANGE UP (ref 6–8)
RBC # BLD: 4.52 M/UL — LOW (ref 4.7–6.1)
RBC # FLD: 13.2 % — SIGNIFICANT CHANGE UP (ref 11.5–14.5)
SODIUM SERPL-SCNC: 138 MMOL/L — SIGNIFICANT CHANGE UP (ref 135–146)
TROPONIN T SERPL-MCNC: <0.01 NG/ML — SIGNIFICANT CHANGE UP
WBC # BLD: 11.74 K/UL — HIGH (ref 4.8–10.8)
WBC # FLD AUTO: 11.74 K/UL — HIGH (ref 4.8–10.8)

## 2019-02-28 NOTE — ED PROVIDER NOTE - NSFOLLOWUPINSTRUCTIONS_ED_ALL_ED_FT
Fall Prevention in the Home, Adult  Falls can cause injuries and can affect people from all age groups. There are many simple things that you can do to make your home safe and to help prevent falls. Ask for help when making these changes, if needed.    What actions can I take to prevent falls?  General instructions     Use good lighting in all rooms. Replace any light bulbs that burn out.  Turn on lights if it is dark. Use night-lights.  Place frequently used items in easy-to-reach places. Lower the shelves around your home if necessary.  Set up furniture so that there are clear paths around it. Avoid moving your furniture around.  Remove throw rugs and other tripping hazards from the floor.  Avoid walking on wet floors.  Fix any uneven floor surfaces.  Add color or contrast paint or tape to grab bars and handrails in your home. Place contrasting color strips on the first and last steps of stairways.  When you use a stepladder, make sure that it is completely opened and that the sides are firmly locked. Have someone hold the ladder while you are using it. Do not climb a closed stepladder.  Be aware of any and all pets.  What can I do in the bathroom?     Keep the floor dry. Immediately clean up any water that spills onto the floor.  Remove soap buildup in the tub or shower on a regular basis.  Use non-skid mats or decals on the floor of the tub or shower.  Attach bath mats securely with double-sided, non-slip rug tape.  If you need to sit down while you are in the shower, use a plastic, non-slip stool.  Image ImageInstall grab bars by the toilet and in the tub and shower. Do not use towel bars as grab bars.  What can I do in the bedroom?     Make sure that a bedside light is easy to reach.  Do not use oversized bedding that drapes onto the floor.  Have a firm chair that has side arms to use for getting dressed.  What can I do in the kitchen?     Clean up any spills right away.  If you need to reach for something above you, use a sturdy step stool that has a grab bar.  Keep electrical cables out of the way.  Do not use floor polish or wax that makes floors slippery. If you must use wax, make sure that it is non-skid floor wax.  What can I do in the stairways?     Do not leave any items on the stairs.  Make sure that you have a light switch at the top of the stairs and the bottom of the stairs. Have them installed if you do not have them.  Make sure that there are handrails on both sides of the stairs. Fix handrails that are broken or loose. Make sure that handrails are as long as the stairways.  Install non-slip stair treads on all stairs in your home.  Avoid having throw rugs at the top or bottom of stairways, or secure the rugs with carpet tape to prevent them from moving.  Choose a carpet design that does not hide the edge of steps on the stairway.  Check any carpeting to make sure that it is firmly attached to the stairs. Fix any carpet that is loose or worn.  What can I do on the outside of my home?     Use bright outdoor lighting.  Regularly repair the edges of walkways and driveways and fix any cracks.  Remove high doorway thresholds.  Trim any shrubbery on the main path into your home.  Regularly check that handrails are securely fastened and in good repair. Both sides of any steps should have handrails.  Install guardrails along the edges of any raised decks or porches.  Clear walkways of debris and clutter, including tools and rocks.  Have leaves, snow, and ice cleared regularly.  Use sand or salt on walkways during winter months.  In the garage, clean up any spills right away, including grease or oil spills.  What other actions can I take?     Wear closed-toe shoes that fit well and support your feet. Wear shoes that have rubber soles or low heels.  Use mobility aids as needed, such as canes, walkers, scooters, and crutches.  Review your medicines with your health care provider. Some medicines can cause dizziness or changes in blood pressure, which increase your risk of falling.  Talk with your health care provider about other ways that you can decrease your risk of falls. This may include working with a physical therapist or  to improve your strength, balance, and endurance.    Where to find more information  Centers for Disease Control and Prevention, [place]: https://www.cdc.gov  National [place] on Aging: https://ty4fjjn.aleshia.nih.gov  Contact a health care provider if:  You are afraid of falling at home.  You feel weak, drowsy, or dizzy at home.  You fall at home.  Summary  There are many simple things that you can do to make your home safe and to help prevent falls.  Ways to make your home safe include removing tripping hazards and installing grab bars in the bathroom.  Ask for help when making these changes in your home.

## 2019-02-28 NOTE — ED PROVIDER NOTE - ATTENDING CONTRIBUTION TO CARE
I personally evaluated the patient. I reviewed the Resident’s or Physician Assistant’s note (as assigned above), and agree with the findings and plan except as documented in my note.  Pt with hx of HTN, CAD s/p CABG, pacemaker placement, breast ca s/p mastectomy presents with complaints of falling. Symptoms are intermittent and pt reports falling 1 wk ago when he slipped on ice and again 2 days ago when he was in his dark basement and his feet caught on something. Normally does not ambulate with walker but started for the past 2 days "to prevent further falls". Denies LOC, CP, SOB. Presented with left upper arm pain since fall and facial flushing. VS reviewed, pt well appearing, NAD and talking in full sentences. Head ncat, neck supple, no JVD, no midline ttp, normal s1s2 without any murmurs, Lungs CTAB with normal work of breathing. abd +BS, s/nd/nt, pelvis stable, extremities with + ttp of left upper arm. Distal pulses intact with normal cap refill, AAO x 3, GCS 15, neuro exam grossly normal. No acute skin wounds or rashes. imaging and reassess.

## 2019-02-28 NOTE — ED PROVIDER NOTE - NS ED ROS FT
Constitutional: No fever, chills, night sweats  Eyes:  No visual changes, eye pain or discharge.  ENMT:  No hearing changes, pain, no sore throat or runny nose, no difficulty swallowing  Cardiac:  No chest pain, SOB or edema. No chest pain with exertion.  Respiratory:  No cough or respiratory distress. No hemoptysis. No history of asthma or RAD.  GI:  No nausea, vomiting, diarrhea or abdominal pain.  :  No dysuria, frequency or burning.  MS:  No myalgia, muscle weakness, joint pain or back pain.  Neuro:  No headache or weakness.  No LOC.  Skin:  No skin rash.   Endocrine: No history of thyroid disease or diabetes.

## 2019-02-28 NOTE — ED PROVIDER NOTE - PROGRESS NOTE DETAILS
Pending CT chest CT chest negative, XR negative, CTH negative. Will d/c w/ f/u to PMD. Pt given strict return precautions including worsening s/s, fever, chills, inability to tolerate po, abdominal pain, n/v/d, syncopeprescynope. Pt understands

## 2019-02-28 NOTE — ED PROVIDER NOTE - CLINICAL SUMMARY MEDICAL DECISION MAKING FREE TEXT BOX
Pt with complaints of falls and hot flashes to the face. No acute findings on lab and images. Will d/c pt with PMD f/up.

## 2019-02-28 NOTE — ED ADULT NURSE NOTE - OBJECTIVE STATEMENT
came in c/o blood pressure elevation at home also c/o edness yo the face and multiple falls in last week . Extensive cardiac history noted ,on blood thinners . Patient Alert,oriented

## 2019-02-28 NOTE — ED PROVIDER NOTE - OBJECTIVE STATEMENT
72 y/o male h/o CAD, CABG, HTN, pacemaker , breast cancer s/p mastectomy presents to the ED s/p 2 falls in the past week and facial plethora. Pt states that he had 2 mechanical falls in the past week and hurt his left shoulder and ribs. he notes bruising on left lateral chest wall. Denies syncope/presyncpe or dizziness, gait instability, weakness, CP, SOB, abdominal pain, fever/chills/night sweats. He states that for the past couple of days his face has been red.

## 2019-02-28 NOTE — ED PROVIDER NOTE - PHYSICAL EXAMINATION
Constitutional: WNWD. NAD.   Head: Atraumatic.  Eyes: PERRLA. EOMI without discomfort.   ENT: No nasal discharge. Mucous membranes moist.  Neck: Supple. Painless ROM.  Cardiovascular: Regular rhythm. Regular rate. Normal S1 and S2. No murmurs. 2+ pulses in all extremities.   Pulmonary: Normal respiratory rate and effort. Lungs clear to auscultation bilaterally. No wheezing, rales, or rhonchi. Bilateral, equal lung expansion.   Abdominal: Soft. Nondistended. Nontender. No rebound or guarding.   Extremities: ROM intact in left shoulder without pain. strength 5/5 in affected extremity.  Skin: Left chest wall ecchymosis.   Neuro: AAOx3. No focal neurological deficits.  Psych: Normal mood. Normal affect.

## 2019-02-28 NOTE — ED PROVIDER NOTE - NS ED MD DISPO DISCHARGE CCDA
left ureteroscopy holmium laser , lithotripsy, stone extraction , stent placement
Patient/Caregiver provided printed discharge information.

## 2019-04-03 ENCOUNTER — APPOINTMENT (OUTPATIENT)
Dept: CARDIOLOGY | Facility: CLINIC | Age: 74
End: 2019-04-03
Payer: MEDICARE

## 2019-04-03 VITALS
SYSTOLIC BLOOD PRESSURE: 126 MMHG | DIASTOLIC BLOOD PRESSURE: 68 MMHG | HEART RATE: 65 BPM | BODY MASS INDEX: 37.38 KG/M2 | WEIGHT: 268 LBS

## 2019-04-03 DIAGNOSIS — N63.0 UNSPECIFIED LUMP IN UNSPECIFIED BREAST: ICD-10-CM

## 2019-04-03 PROCEDURE — 99214 OFFICE O/P EST MOD 30 MIN: CPT

## 2019-04-03 PROCEDURE — 93000 ELECTROCARDIOGRAM COMPLETE: CPT | Mod: 59

## 2019-04-03 PROCEDURE — 93280 PM DEVICE PROGR EVAL DUAL: CPT

## 2019-04-04 PROBLEM — N63.0 BREAST MASS IN MALE: Status: ACTIVE | Noted: 2019-04-04

## 2019-04-04 NOTE — DISCUSSION/SUMMARY
[FreeTextEntry1] : 74 yo M with a history of AFib on aspirin, SSS s/p DC PPM (unable to cannulate CS). He is V-pacing 65% of the time but he denies any signs or symptoms of heart failure. If he continues to pace and develops heart failure symptoms, he may require an epicardial LV lead. We will re-evaluate him in the future. \par \par I have spoken with Dr. Corea in the past and he believes the patient is not a good candidate for anticoagulation due to bleeding. In the meantime, he should continue his present medications and have routine follow up of TSH, LFTs and PFTs while on amiodarone. I have provided the patient with a referral for pulmonary for PFTs and sleep apnea. I have also given him a script for TSH. In addition, the patient has an ophthalmologist and I have advised him to follow up with the ophthalmologist since he is on Amiodarone. AFib burden has been 0% since Dec 5th, 2018. I have discussed all of these recommendations with the patient's son.\par \par I have also advised the patient to go to the nearest emergency room if he experiences any chest pain, dyspnea, syncope, or has any other compelling symptoms.\par  \par Follow up in 9 months since the patient is enrolled in [employer] (remote monitoring).

## 2019-04-04 NOTE — PHYSICAL EXAM
[General Appearance - Well Developed] : well developed [Normal Appearance] : normal appearance [Well Groomed] : well groomed [General Appearance - Well Nourished] : well nourished [No Deformities] : no deformities [General Appearance - In No Acute Distress] : no acute distress [Heart Rate And Rhythm] : heart rate and rhythm were normal [Heart Sounds] : normal S1 and S2 [Murmurs] : no murmurs present [Edema] : no peripheral edema present [] : no respiratory distress [Exaggerated Use Of Accessory Muscles For Inspiration] : no accessory muscle use [Auscultation Breath Sounds / Voice Sounds] : lungs were clear to auscultation bilaterally [Left Infraclavicular] : left infraclavicular area [Clean] : clean [Dry] : dry [Well-Healed] : well-healed [Bowel Sounds] : normal bowel sounds [Abdomen Soft] : soft [Abdomen Tenderness] : non-tender [Nail Clubbing] : no clubbing of the fingernails [Cyanosis, Localized] : no localized cyanosis [Erythema] : not erythematous [Warm] : not warm [Tender] : not tender

## 2019-04-04 NOTE — HISTORY OF PRESENT ILLNESS
[de-identified] : Dr. Corea\par \par 72 yo M with history of SSS s/p DC PPM, CAD s/p CABG, LBBB. A BiV PPM was attempted but despite multiple catheters and sheaths, the CS was unable to be cannulated. The patient denies any cardiovascular complaints including chest pain, dyspnea, dizziness, lightheadedness, presyncope or syncope. He denies any dyspnea with exertion and states he is able to ambulate and get around without any difficulty. \par \par Of note, son informed me that the patient was recently diagnosed with a mass on his left breast. He is being worked up for this now.

## 2019-04-04 NOTE — PROCEDURE
[No] : not [NSR] : normal sinus rhythm [Pacemaker] : pacemaker [DDD] : DDD [Voltage: ___ volts] : Voltage was [unfilled] volts [Magnet Rate: ___ Ppm] : magnet rate was [unfilled] Ppm [Longevity: ___ months] : The estimated remaining battery life is [unfilled] months [Threshold Testing Performed] : Threshold testing was performed [Lead Imp:  ___ohms] : lead impedance was [unfilled] ohms [Sensing Amplitude ___mv] : sensing amplitude was [unfilled] mv [___V @] : [unfilled] V [___ ms] : [unfilled] ms [Auto Capture "On"] : auto capture was switched "On" [Programmed for Longevity] : output reprogrammed for improved battery longevity [Counters Reset] : the counters were reset [Apace-Vpace ___ %] : Apace-Vpace [unfilled]% [de-identified] : [facility] [de-identified] : [id number] [de-identified] : [id number] [de-identified] : 11/2/2018 [de-identified] :  [de-identified] : no episodes\par 69% A paced\par 65% V paced

## 2019-04-05 ENCOUNTER — OUTPATIENT (OUTPATIENT)
Dept: OUTPATIENT SERVICES | Facility: HOSPITAL | Age: 74
LOS: 1 days | Discharge: HOME | End: 2019-04-05

## 2019-04-05 DIAGNOSIS — C50.921 MALIGNANT NEOPLASM OF UNSPECIFIED SITE OF RIGHT MALE BREAST: ICD-10-CM

## 2019-04-05 DIAGNOSIS — Z95.1 PRESENCE OF AORTOCORONARY BYPASS GRAFT: Chronic | ICD-10-CM

## 2019-04-05 DIAGNOSIS — Z90.10 ACQUIRED ABSENCE OF UNSPECIFIED BREAST AND NIPPLE: Chronic | ICD-10-CM

## 2019-05-30 ENCOUNTER — APPOINTMENT (OUTPATIENT)
Dept: CARDIOLOGY | Facility: CLINIC | Age: 74
End: 2019-05-30
Payer: MEDICARE

## 2019-05-30 PROCEDURE — 93294 REM INTERROG EVL PM/LDLS PM: CPT

## 2019-05-30 PROCEDURE — 93296 REM INTERROG EVL PM/IDS: CPT

## 2019-06-03 ENCOUNTER — OUTPATIENT (OUTPATIENT)
Dept: OUTPATIENT SERVICES | Facility: HOSPITAL | Age: 74
LOS: 1 days | Discharge: HOME | End: 2019-06-03

## 2019-06-03 DIAGNOSIS — Z90.10 ACQUIRED ABSENCE OF UNSPECIFIED BREAST AND NIPPLE: Chronic | ICD-10-CM

## 2019-06-03 DIAGNOSIS — C50.921 MALIGNANT NEOPLASM OF UNSPECIFIED SITE OF RIGHT MALE BREAST: ICD-10-CM

## 2019-06-03 DIAGNOSIS — Z95.1 PRESENCE OF AORTOCORONARY BYPASS GRAFT: Chronic | ICD-10-CM

## 2019-08-30 ENCOUNTER — APPOINTMENT (OUTPATIENT)
Dept: CARDIOLOGY | Facility: CLINIC | Age: 74
End: 2019-08-30
Payer: MEDICARE

## 2019-08-30 PROCEDURE — 93296 REM INTERROG EVL PM/IDS: CPT

## 2019-08-30 PROCEDURE — 93294 REM INTERROG EVL PM/LDLS PM: CPT

## 2019-09-02 ENCOUNTER — TRANSCRIPTION ENCOUNTER (OUTPATIENT)
Age: 74
End: 2019-09-02

## 2019-09-12 ENCOUNTER — EMERGENCY (EMERGENCY)
Facility: HOSPITAL | Age: 74
LOS: 0 days | Discharge: HOME | End: 2019-09-12
Attending: EMERGENCY MEDICINE | Admitting: EMERGENCY MEDICINE
Payer: MEDICARE

## 2019-09-12 VITALS
TEMPERATURE: 97 F | SYSTOLIC BLOOD PRESSURE: 182 MMHG | HEART RATE: 66 BPM | DIASTOLIC BLOOD PRESSURE: 77 MMHG | RESPIRATION RATE: 18 BRPM | OXYGEN SATURATION: 96 %

## 2019-09-12 DIAGNOSIS — Z90.10 ACQUIRED ABSENCE OF UNSPECIFIED BREAST AND NIPPLE: Chronic | ICD-10-CM

## 2019-09-12 DIAGNOSIS — M25.552 PAIN IN LEFT HIP: ICD-10-CM
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
## 2019-09-12 DIAGNOSIS — Z95.1 PRESENCE OF AORTOCORONARY BYPASS GRAFT: Chronic | ICD-10-CM
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
## 2019-09-12 DIAGNOSIS — R60.0 LOCALIZED EDEMA: ICD-10-CM

## 2019-09-12 DIAGNOSIS — M25.559 PAIN IN UNSPECIFIED HIP: ICD-10-CM
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
## 2019-09-12 PROCEDURE — 99284 EMERGENCY DEPT VISIT MOD MDM: CPT

## 2019-09-12 PROCEDURE — 73502 X-RAY EXAM HIP UNI 2-3 VIEWS: CPT | Mod: 26,LT

## 2019-09-12 PROCEDURE — 93970 EXTREMITY STUDY: CPT | Mod: 26

## 2019-09-12 RX ORDER — METHOCARBAMOL 500 MG/1
1500 TABLET, FILM COATED ORAL ONCE
Refills: 0 | Status: COMPLETED | OUTPATIENT
Start: 2019-09-12 | End: 2019-09-12

## 2019-09-12 RX ORDER — METHOCARBAMOL 500 MG/1
2 TABLET, FILM COATED ORAL
Qty: 12 | Refills: 0
Start: 2019-09-12 | End: 2019-09-13

## 2019-09-12 RX ORDER — DEXAMETHASONE 0.5 MG/5ML
10 ELIXIR ORAL ONCE
Refills: 0 | Status: COMPLETED | OUTPATIENT
Start: 2019-09-12 | End: 2019-09-12

## 2019-09-12 RX ORDER — ACETAMINOPHEN 500 MG
975 TABLET ORAL ONCE
Refills: 0 | Status: COMPLETED | OUTPATIENT
Start: 2019-09-12 | End: 2019-09-12

## 2019-09-12 RX ADMIN — Medication 10 MILLIGRAM(S): at 16:43

## 2019-09-12 RX ADMIN — METHOCARBAMOL 1500 MILLIGRAM(S): 500 TABLET, FILM COATED ORAL at 16:49

## 2019-09-12 RX ADMIN — Medication 975 MILLIGRAM(S): at 16:42

## 2019-09-12 NOTE — ED PROVIDER NOTE - PROVIDER TOKENS
PROVIDER:[TOKEN:[[id number]],FOLLOWUP:[1-3 Days]],PROVIDER:[TOKEN:[[id number]],FOLLOWUP:[1-3 Days]] PROVIDER:[TOKEN:[[id number]],FOLLOWUP:[1-3 Days]],PROVIDER:[TOKEN:[[id number]],FOLLOWUP:[1-3 Days]],PROVIDER:[TOKEN:[[id number]],FOLLOWUP:[1-3 Days]]

## 2019-09-12 NOTE — ED PROVIDER NOTE - PROGRESS NOTE DETAILS
Patient refusing duplex at this time states he has an appointment on Monday with vascular surgery Dr. Winkler. ALINA Hanna: Pt examined.  ambulatory in Ed.  good distal pulses.  pt comfortable with d/c.  f/u with vascular, ortho and pmd.  Discussed results with pt.  All questions were answered and return precautions discussed.  Pt is asx and comfortable at this time.  Unremarkable re-exam.  No further concerns at this time from pt.  Will follow up with PMD.  Pt understands and agrees with tx plan.

## 2019-09-12 NOTE — ED ADULT NURSE NOTE - OBJECTIVE STATEMENT
pt presents to ER with left hip pain. pt states he fell in february. denies any other symptoms. alert and in no distress.

## 2019-09-12 NOTE — ED ADULT TRIAGE NOTE - CHIEF COMPLAINT QUOTE
left leg and hip pain since 2 weeks on and off patient states he fell in February states its painful to walk

## 2019-09-12 NOTE — ED PROVIDER NOTE - ATTENDING CONTRIBUTION TO CARE
I personally evaluated the patient. I reviewed the Resident’s or Physician Assistant’s note (as assigned above), and agree with the findings and plan except as documented in my note.    73 male here for left hip pain after a recent fall, minimal relief with weak opiate Rx by PMD.  Exacerbated by weather changes and came to ED, admits to limited ambulation due to pain as well as leg swelling which is chronic for him.    PMH: multiple medical problems noted    PE: male in no distress. EXT: LLE swelling noted with NVI distally. Tenderness at left hip worse with ROM.  CV: pulses intact. SKIN: normal. CHEST: CTA bilateral    Impression: hip pain    Plan: imaging supportive care and reevaluation

## 2019-09-12 NOTE — ED PROVIDER NOTE - NSFOLLOWUPINSTRUCTIONS_ED_ALL_ED_FT
Hip Pain    WHAT YOU NEED TO KNOW:    What causes hip pain? Hip pain can be caused by a number of conditions, such as bursitis, arthritis, or muscle or tendon strain. You may have swelling in the fluid-filled sacs that protect your muscles and tendons. Hip pain can also be caused by a lower back problem. Hip pain may be caused by trauma, playing sports, or running. Pain may start in your hip and go to your thigh, buttock, or groin.Hip and Pelvis         How can I manage hip pain? You may need x-rays to make sure there are no broken bones that need to be treated.     Rest your injured hip so that it can heal. You may need to avoid putting any weight on your hip for at least 48 hours. Return to normal activities as directed.      Ice the injury for 20 minutes every 4 hours, or as directed. Use an ice pack, or put crushed ice in a plastic bag. Cover it with a towel to protect your skin. Ice helps prevent tissue damage and decreases swelling and pain.      Elevate your injured hip above the level of your heart as often as you can. This will help decrease swelling and pain. If possible, prop your hip and leg on pillows or blankets to keep the area elevated comfortably.       NSAIDs, such as ibuprofen, help decrease swelling, pain, and fever. This medicine is available with or without a doctor's order. NSAIDs can cause stomach bleeding or kidney problems in certain people. If you take blood thinner medicine, always ask your healthcare provider if NSAIDs are safe for you. Always read the medicine label and follow directions.      Maintain a healthy weight. Extra body weight can cause pressure and pain in your hip, knee, and ankle joints. Ask your healthcare provider how much you should weigh. Ask him or her to help you create a weight loss plan if you are overweight.      Use assistive devices as directed. You may need to use a cane or crutches. Assistive devices help decrease pain and pressure on your hip when you walk. Ask your healthcare provider for more information about assistive devices and how to use them correctly.    When should I seek immediate care?     Your pain gets worse.      You have numbness in your leg or toes.      You cannot put any weight on or move your hip.    When should I contact my healthcare provider?     You have a fever.      Your pain does not decrease, even after treatment.      You have questions or concerns about your condition or care.    CARE AGREEMENT:    You have the right to help plan your care. Learn about your health condition and how it may be treated. Discuss treatment options with your healthcare providers to decide what care you want to receive. You always have the right to refuse treatment. Hip Pain    WHAT YOU NEED TO KNOW:    What causes hip pain? Hip pain can be caused by a number of conditions, such as bursitis, arthritis, or muscle or tendon strain. You may have swelling in the fluid-filled sacs that protect your muscles and tendons. Hip pain can also be caused by a lower back problem. Hip pain may be caused by trauma, playing sports, or running. Pain may start in your hip and go to your thigh, buttock, or groin.Hip and Pelvis         How can I manage hip pain? You may need x-rays to make sure there are no broken bones that need to be treated.     Rest your injured hip so that it can heal. You may need to avoid putting any weight on your hip for at least 48 hours. Return to normal activities as directed.      Ice the injury for 20 minutes every 4 hours, or as directed. Use an ice pack, or put crushed ice in a plastic bag. Cover it with a towel to protect your skin. Ice helps prevent tissue damage and decreases swelling and pain.      Elevate your injured hip above the level of your heart as often as you can. This will help decrease swelling and pain. If possible, prop your hip and leg on pillows or blankets to keep the area elevated comfortably.       NSAIDs, such as ibuprofen, help decrease swelling, pain, and fever. This medicine is available with or without a doctor's order. NSAIDs can cause stomach bleeding or kidney problems in certain people. If you take blood thinner medicine, always ask your healthcare provider if NSAIDs are safe for you. Always read the medicine label and follow directions.      Maintain a healthy weight. Extra body weight can cause pressure and pain in your hip, knee, and ankle joints. Ask your healthcare provider how much you should weigh. Ask him or her to help you create a weight loss plan if you are overweight.      Use assistive devices as directed. You may need to use a cane or crutches. Assistive devices help decrease pain and pressure on your hip when you walk. Ask your healthcare provider for more information about assistive devices and how to use them correctly.    When should I seek immediate care?     Your pain gets worse.      You have numbness in your leg or toes.      You cannot put any weight on or move your hip.    When should I contact my healthcare provider?     You have a fever.      Your pain does not decrease, even after treatment.      You have questions or concerns about your condition or care.    CARE AGREEMENT:    You have the right to help plan your care. Learn about your health condition and how it may be treated. Discuss treatment options with your healthcare providers to decide what care you want to receive. You always have the right to refuse treatment.    Follow up with your primary medical doctor in 1-2 days

## 2019-09-12 NOTE — ED PROVIDER NOTE - CARE PROVIDERS DIRECT ADDRESSES
,DirectAddress_Unknown,[email or web address] ,DirectAddress_Unknown,[email or web address],DirectAddress_Unknown

## 2019-09-12 NOTE — ED PROVIDER NOTE - CLINICAL SUMMARY MEDICAL DECISION MAKING FREE TEXT BOX
73 male here for acute on chronic hip pain, had imaging and reevaluation, will discharge with return and follow up instructions.

## 2019-09-12 NOTE — ED PROVIDER NOTE - PATIENT PORTAL LINK FT
You can access the FollowMyHealth Patient Portal offered by [facility] by registering at the following website: [email or web address]. By joining [employer]’s FollowMyHealth portal, you will also be able to view your health information using other applications (apps) compatible with our system. You can access the FollowMyHealth Patient Portal offered by [facility] by registering at the following website: [email or web address]. By joining [employer]’s FollowMyHealth portal, you will also be able to view your health information using other applications (apps) compatible with our system.

## 2019-09-12 NOTE — ED PROVIDER NOTE - CARE PROVIDER_API CALL
Hoang Winkler)  Vascular Surgery  [address] [place] [address]  Phone: [phone]  Fax: [phone]  Follow Up Time: 1-3 Days    Jt Flores)  Orthopaedic Surgery  [address] [place] [address]  Phone: [phone]  Fax: [phone]  Follow Up Time: 1-3 Days Hoang Winkler)  Vascular Surgery  [address] [place] [address]  Phone: [phone]  Fax: [phone]  Follow Up Time: 1-3 Days    Jt Flores)  Orthopaedic Surgery  [address] [place] [address]  Phone: [phone]  Fax: [phone]  Follow Up Time: 1-3 Days    Trevon Champagne)  Medicine  [address] [place] [id number]  Phone: [phone]  Fax: [phone]  Follow Up Time: 1-3 Days

## 2019-09-12 NOTE — ED PROVIDER NOTE - NS ED ROS FT
Review of Systems:  	•	CONSTITUTIONAL - no fever, no diaphoresis, no chills  	•	SKIN - no rash  	•	HEMATOLOGIC - no bleeding, no bruising  	•	EYES - no eye pain, no blurry vision  	•	ENT - no congestion  	•	RESPIRATORY - no shortness of breath, no cough  	•	CARDIAC - no chest pain, no palpitations  	•	GI - no abd pain, no nausea, no vomiting, no diarrhea, no constipation  	•	GENITO-URINARY - no dysuria; no hematuria, no increased urinary frequency  	•	MUSCULOSKELETAL - +hip pain, +left leg lump, no redness  	•	NEUROLOGIC - no weakness, no headache, no paresthesias, no LOC  	All other ROS are negative except as documented in HPI.

## 2019-12-02 ENCOUNTER — APPOINTMENT (OUTPATIENT)
Dept: CARDIOLOGY | Facility: CLINIC | Age: 74
End: 2019-12-02
Payer: MEDICARE

## 2019-12-02 PROCEDURE — 93296 REM INTERROG EVL PM/IDS: CPT

## 2019-12-02 PROCEDURE — 93294 REM INTERROG EVL PM/LDLS PM: CPT

## 2019-12-30 ENCOUNTER — TRANSCRIPTION ENCOUNTER (OUTPATIENT)
Age: 74
End: 2019-12-30

## 2020-01-08 ENCOUNTER — APPOINTMENT (OUTPATIENT)
Dept: CARDIOLOGY | Facility: CLINIC | Age: 75
End: 2020-01-08
Payer: MEDICARE

## 2020-01-08 VITALS
HEIGHT: 74 IN | SYSTOLIC BLOOD PRESSURE: 170 MMHG | BODY MASS INDEX: 34.65 KG/M2 | WEIGHT: 270 LBS | HEART RATE: 62 BPM | DIASTOLIC BLOOD PRESSURE: 71 MMHG

## 2020-01-08 VITALS — DIASTOLIC BLOOD PRESSURE: 66 MMHG | SYSTOLIC BLOOD PRESSURE: 150 MMHG

## 2020-01-08 PROCEDURE — 93000 ELECTROCARDIOGRAM COMPLETE: CPT | Mod: 59

## 2020-01-08 PROCEDURE — 93280 PM DEVICE PROGR EVAL DUAL: CPT

## 2020-01-08 PROCEDURE — 99213 OFFICE O/P EST LOW 20 MIN: CPT

## 2020-01-08 NOTE — HISTORY OF PRESENT ILLNESS
[de-identified] : \par Cardiologist: Dr. Corea\par \par 75 yo M with history of SSS s/p DC PPM, CAD s/p CABG, LBBB. BiV PPM was attempted but despite multiple catheters and sheaths, CS was unable to be cannulated. The patient denies any cardiovascular complaints including chest pain, dyspnea, dizziness, lightheadedness, presyncope or syncope. He denies any dyspnea with exertion and states he is able to ambulate and get around without any difficulty. He walks around the supermarket and states he has no symptoms when ambulating. \par

## 2020-01-08 NOTE — PHYSICAL EXAM
[General Appearance - Well Developed] : well developed [Normal Appearance] : normal appearance [Well Groomed] : well groomed [No Deformities] : no deformities [General Appearance - Well Nourished] : well nourished [General Appearance - In No Acute Distress] : no acute distress [Heart Rate And Rhythm] : heart rate and rhythm were normal [Heart Sounds] : normal S1 and S2 [Murmurs] : no murmurs present [FreeTextEntry1] : LLE>RLE [] : no respiratory distress [Respiration, Rhythm And Depth] : normal respiratory rhythm and effort [Exaggerated Use Of Accessory Muscles For Inspiration] : no accessory muscle use [Auscultation Breath Sounds / Voice Sounds] : lungs were clear to auscultation bilaterally [Clean] : clean [Left Infraclavicular] : left infraclavicular area [Dry] : dry [Erythema] : not erythematous [Well-Healed] : well-healed [Tender] : not tender [Bowel Sounds] : normal bowel sounds [Abdomen Soft] : soft [Nail Clubbing] : no clubbing of the fingernails [Cyanosis, Localized] : no localized cyanosis

## 2020-01-08 NOTE — ASSESSMENT
[FreeTextEntry1] : 75 yo M with history of AFib on aspirin (not felt to be a good candidate for anticoagulation by his cardiologist due to bleeding) and SSS s/p DC PPM (unable to cannulate CS). He is V-pacing 72% of the time but denies any signs or symptoms of heart failure and is quite active. If he continues to pace and develops heart failure symptoms, he may require an epicardial LV lead. I have ordered a 2D echo and I have asked him to let me know if he develops any clinical signs/symptoms of heart failure. \par \par Of note, patient's BP was elevated today even upon recheck. I have advised him to adhere to a 2g Na diet and to be more careful with consuming salty meals. He states he had pizza just two days ago, which may contribute to his higher BP in the office today. \par \par In the meantime, he is on Amio for paroxysmal AFib. AF burden since Dec 5th 2018 is 0%. I have ordered TSH, LFTs and PFTs (provided a pulm referral) and asked him to be evaluated for sleep apnea. He has an appointment with ophthalmology Mon. I have discussed all of these recommendations with the patient's son.\par \par I have also advised the patient to go to the nearest emergency room if he experiences any chest pain, dyspnea, syncope, or has any other compelling symptoms.\par  \par Follow up in 6-9 months since the patient is enrolled in remote monitoring and transmitting appropriately. \par

## 2020-01-08 NOTE — PROCEDURE
[No] : not [2nd Degree Block] : second degree block [Pacemaker] : pacemaker [DDD] : DDD [Voltage: ___ volts] : Voltage was [unfilled] volts [Longevity: ___ months] : The estimated remaining battery life is [unfilled] months [Normal] : The battery status is normal. [Threshold Testing Performed] : Threshold testing was performed [Lead Imp:  ___ohms] : lead impedance was [unfilled] ohms [Sensing Amplitude ___mv] : sensing amplitude was [unfilled] mv [___V @] : [unfilled] V [___ ms] : [unfilled] ms [Counters Reset] : the counters were reset [Asense-Vsense ___ %] : Asense-Vsense [unfilled]% [Asense-Vpace ___ %] : Asense-Vpace [unfilled]% [Apace-Vsense ___ %] : Apace-Vsense [unfilled]% [Apace-Vpace ___ %] : Apace-Vpace [unfilled]% [Programmed for Longevity] : output reprogrammed for improved battery longevity [de-identified] : [employer] [de-identified] : A-sensed, V-paced [de-identified] : [id number] [de-identified] : [id number] [de-identified] : 11/2/2018 [de-identified] : 60/130 [de-identified] : No episodes recorded. Normal function of device.

## 2020-01-08 NOTE — REASON FOR VISIT
[Follow-up Device Check] : follow-up device check visit [___ Device Check] : [unfilled] device check [Other ___] : [unfilled] [Other: _____] : [unfilled]

## 2020-01-09 LAB
ALBUMIN SERPL ELPH-MCNC: 4.6 G/DL
ALP BLD-CCNC: 69 U/L
ALT SERPL-CCNC: 23 U/L
ANION GAP SERPL CALC-SCNC: 16 MMOL/L
AST SERPL-CCNC: 23 U/L
BASOPHILS # BLD AUTO: 0.08 K/UL
BASOPHILS NFR BLD AUTO: 0.7 %
BILIRUB SERPL-MCNC: 0.5 MG/DL
BUN SERPL-MCNC: 19 MG/DL
CALCIUM SERPL-MCNC: 9.2 MG/DL
CHLORIDE SERPL-SCNC: 99 MMOL/L
CO2 SERPL-SCNC: 22 MMOL/L
CREAT SERPL-MCNC: 1 MG/DL
EOSINOPHIL # BLD AUTO: 0.32 K/UL
EOSINOPHIL NFR BLD AUTO: 3 %
GLUCOSE SERPL-MCNC: 98 MG/DL
HCT VFR BLD CALC: 43.9 %
HGB BLD-MCNC: 13.8 G/DL
IMM GRANULOCYTES NFR BLD AUTO: 0.4 %
LYMPHOCYTES # BLD AUTO: 2.12 K/UL
LYMPHOCYTES NFR BLD AUTO: 19.8 %
MAN DIFF?: NORMAL
MCHC RBC-ENTMCNC: 29.5 PG
MCHC RBC-ENTMCNC: 31.4 G/DL
MCV RBC AUTO: 93.8 FL
MONOCYTES # BLD AUTO: 1.03 K/UL
MONOCYTES NFR BLD AUTO: 9.6 %
NEUTROPHILS # BLD AUTO: 7.13 K/UL
NEUTROPHILS NFR BLD AUTO: 66.5 %
PLATELET # BLD AUTO: 272 K/UL
POTASSIUM SERPL-SCNC: 4.8 MMOL/L
PROT SERPL-MCNC: 7.6 G/DL
RBC # BLD: 4.68 M/UL
RBC # FLD: 13.6 %
SODIUM SERPL-SCNC: 137 MMOL/L
TSH SERPL-ACNC: 1.63 UIU/ML
WBC # FLD AUTO: 10.72 K/UL

## 2020-03-23 NOTE — ED PROVIDER NOTE - REFUSAL OF SERVICE
patient/guardian displays adequate decision making... as evidenced by abnormal swallow evaluation, dysphagia

## 2020-04-08 ENCOUNTER — APPOINTMENT (OUTPATIENT)
Dept: CARDIOLOGY | Facility: CLINIC | Age: 75
End: 2020-04-08
Payer: MEDICARE

## 2020-04-08 PROCEDURE — 93296 REM INTERROG EVL PM/IDS: CPT

## 2020-04-08 PROCEDURE — 93294 REM INTERROG EVL PM/LDLS PM: CPT

## 2020-07-08 ENCOUNTER — APPOINTMENT (OUTPATIENT)
Dept: CARDIOLOGY | Facility: CLINIC | Age: 75
End: 2020-07-08
Payer: MEDICARE

## 2020-07-08 PROCEDURE — 93294 REM INTERROG EVL PM/LDLS PM: CPT

## 2020-07-08 PROCEDURE — 93296 REM INTERROG EVL PM/IDS: CPT

## 2020-07-13 ENCOUNTER — EMERGENCY (EMERGENCY)
Facility: HOSPITAL | Age: 75
LOS: 0 days | Discharge: HOME | End: 2020-07-13
Attending: EMERGENCY MEDICINE | Admitting: EMERGENCY MEDICINE
Payer: MEDICARE

## 2020-07-13 VITALS
HEART RATE: 79 BPM | DIASTOLIC BLOOD PRESSURE: 88 MMHG | RESPIRATION RATE: 18 BRPM | SYSTOLIC BLOOD PRESSURE: 182 MMHG | OXYGEN SATURATION: 97 % | TEMPERATURE: 98 F

## 2020-07-13 DIAGNOSIS — Z90.10 ACQUIRED ABSENCE OF UNSPECIFIED BREAST AND NIPPLE: Chronic | ICD-10-CM

## 2020-07-13 DIAGNOSIS — R51 HEADACHE: ICD-10-CM

## 2020-07-13 DIAGNOSIS — Z95.1 PRESENCE OF AORTOCORONARY BYPASS GRAFT: ICD-10-CM

## 2020-07-13 DIAGNOSIS — I25.10 ATHEROSCLEROTIC HEART DISEASE OF NATIVE CORONARY ARTERY WITHOUT ANGINA PECTORIS: ICD-10-CM

## 2020-07-13 DIAGNOSIS — I50.9 HEART FAILURE, UNSPECIFIED: ICD-10-CM

## 2020-07-13 DIAGNOSIS — I11.0 HYPERTENSIVE HEART DISEASE WITH HEART FAILURE: ICD-10-CM

## 2020-07-13 DIAGNOSIS — E78.5 HYPERLIPIDEMIA, UNSPECIFIED: ICD-10-CM

## 2020-07-13 DIAGNOSIS — Z87.891 PERSONAL HISTORY OF NICOTINE DEPENDENCE: ICD-10-CM

## 2020-07-13 DIAGNOSIS — Z95.1 PRESENCE OF AORTOCORONARY BYPASS GRAFT: Chronic | ICD-10-CM

## 2020-07-13 LAB
ALBUMIN SERPL ELPH-MCNC: 4.5 G/DL — SIGNIFICANT CHANGE UP (ref 3.5–5.2)
ALP SERPL-CCNC: 54 U/L — SIGNIFICANT CHANGE UP (ref 30–115)
ALT FLD-CCNC: 23 U/L — SIGNIFICANT CHANGE UP (ref 0–41)
ANION GAP SERPL CALC-SCNC: 9 MMOL/L — SIGNIFICANT CHANGE UP (ref 7–14)
AST SERPL-CCNC: 24 U/L — SIGNIFICANT CHANGE UP (ref 0–41)
BASOPHILS # BLD AUTO: 0.09 K/UL — SIGNIFICANT CHANGE UP (ref 0–0.2)
BASOPHILS NFR BLD AUTO: 1 % — SIGNIFICANT CHANGE UP (ref 0–1)
BILIRUB SERPL-MCNC: 0.6 MG/DL — SIGNIFICANT CHANGE UP (ref 0.2–1.2)
BUN SERPL-MCNC: 19 MG/DL — SIGNIFICANT CHANGE UP (ref 10–20)
CALCIUM SERPL-MCNC: 9.2 MG/DL — SIGNIFICANT CHANGE UP (ref 8.5–10.1)
CHLORIDE SERPL-SCNC: 102 MMOL/L — SIGNIFICANT CHANGE UP (ref 98–110)
CO2 SERPL-SCNC: 28 MMOL/L — SIGNIFICANT CHANGE UP (ref 17–32)
CREAT SERPL-MCNC: 0.9 MG/DL — SIGNIFICANT CHANGE UP (ref 0.7–1.5)
EOSINOPHIL # BLD AUTO: 0.19 K/UL — SIGNIFICANT CHANGE UP (ref 0–0.7)
EOSINOPHIL NFR BLD AUTO: 2.1 % — SIGNIFICANT CHANGE UP (ref 0–8)
GLUCOSE SERPL-MCNC: 120 MG/DL — HIGH (ref 70–99)
HCT VFR BLD CALC: 41.4 % — LOW (ref 42–52)
HGB BLD-MCNC: 13.7 G/DL — LOW (ref 14–18)
IMM GRANULOCYTES NFR BLD AUTO: 0.2 % — SIGNIFICANT CHANGE UP (ref 0.1–0.3)
LYMPHOCYTES # BLD AUTO: 1.1 K/UL — LOW (ref 1.2–3.4)
LYMPHOCYTES # BLD AUTO: 12.1 % — LOW (ref 20.5–51.1)
MCHC RBC-ENTMCNC: 30.3 PG — SIGNIFICANT CHANGE UP (ref 27–31)
MCHC RBC-ENTMCNC: 33.1 G/DL — SIGNIFICANT CHANGE UP (ref 32–37)
MCV RBC AUTO: 91.6 FL — SIGNIFICANT CHANGE UP (ref 80–94)
MONOCYTES # BLD AUTO: 0.86 K/UL — HIGH (ref 0.1–0.6)
MONOCYTES NFR BLD AUTO: 9.5 % — HIGH (ref 1.7–9.3)
NEUTROPHILS # BLD AUTO: 6.82 K/UL — HIGH (ref 1.4–6.5)
NEUTROPHILS NFR BLD AUTO: 75.1 % — SIGNIFICANT CHANGE UP (ref 42.2–75.2)
NRBC # BLD: 0 /100 WBCS — SIGNIFICANT CHANGE UP (ref 0–0)
PLATELET # BLD AUTO: 232 K/UL — SIGNIFICANT CHANGE UP (ref 130–400)
POTASSIUM SERPL-MCNC: 4.8 MMOL/L — SIGNIFICANT CHANGE UP (ref 3.5–5)
POTASSIUM SERPL-SCNC: 4.8 MMOL/L — SIGNIFICANT CHANGE UP (ref 3.5–5)
PROT SERPL-MCNC: 7.1 G/DL — SIGNIFICANT CHANGE UP (ref 6–8)
RBC # BLD: 4.52 M/UL — LOW (ref 4.7–6.1)
RBC # FLD: 13.3 % — SIGNIFICANT CHANGE UP (ref 11.5–14.5)
SODIUM SERPL-SCNC: 139 MMOL/L — SIGNIFICANT CHANGE UP (ref 135–146)
WBC # BLD: 9.08 K/UL — SIGNIFICANT CHANGE UP (ref 4.8–10.8)
WBC # FLD AUTO: 9.08 K/UL — SIGNIFICANT CHANGE UP (ref 4.8–10.8)

## 2020-07-13 PROCEDURE — 70450 CT HEAD/BRAIN W/O DYE: CPT | Mod: 26,59

## 2020-07-13 PROCEDURE — 99284 EMERGENCY DEPT VISIT MOD MDM: CPT

## 2020-07-13 PROCEDURE — 70496 CT ANGIOGRAPHY HEAD: CPT | Mod: 26

## 2020-07-13 PROCEDURE — 70498 CT ANGIOGRAPHY NECK: CPT | Mod: 26

## 2020-07-13 RX ORDER — METOCLOPRAMIDE HCL 10 MG
10 TABLET ORAL ONCE
Refills: 0 | Status: COMPLETED | OUTPATIENT
Start: 2020-07-13 | End: 2020-07-13

## 2020-07-13 RX ORDER — ACETAMINOPHEN 500 MG
650 TABLET ORAL ONCE
Refills: 0 | Status: COMPLETED | OUTPATIENT
Start: 2020-07-13 | End: 2020-07-13

## 2020-07-13 RX ADMIN — Medication 650 MILLIGRAM(S): at 15:18

## 2020-07-13 RX ADMIN — Medication 10 MILLIGRAM(S): at 15:18

## 2020-07-13 NOTE — ED PROVIDER NOTE - PROGRESS NOTE DETAILS
JOVANY: Patient reports improvement in headache after reglan and zofran. Results discussed with patient, printed copies given. Patient agreeable and verbalizes understanding of plan of care, f/u, and return precautions. BH: chronic h/a, no acute findings on head CT, no neuro deficits, well appearing, CTA shows 50-70% stenosis on left, will refer to vascular, will refer to neuro for h/a, results d/w pt, amenable to plan. The patient was given detailed return precautions and advised to return to the emergency department if any new symptoms developed, symptoms worsened or for any concerns. The patient was offered the opportunity to ask questions and verbalized that they understand the diagnosis and discharge instructions.

## 2020-07-13 NOTE — ED ADULT TRIAGE NOTE - CHIEF COMPLAINT QUOTE
Patient sent in by PMD for concerning CT findings, patient not aware of result. c/o intermittent head aches x 1 week

## 2020-07-13 NOTE — ED PROVIDER NOTE - PATIENT PORTAL LINK FT
You can access the FollowMyHealth Patient Portal offered by [facility] by registering at the following website: [email or web address]. By joining [facility]’s FollowMyHealth portal, you will also be able to view your health information using other applications (apps) compatible with our system.

## 2020-07-13 NOTE — ED PROVIDER NOTE - NS ED ROS FT
CONSTITUTIONAL: (-) fevers, (-) chills  EYES: (-) vision changes, (-) blurry vision, (-) double vision, (-) photophobia, (-) eye pain  ENT: (-) congestion, (-) rhinorrhea  NECK: (-) neck pain, (-) neck stiffness  CARDIO: (-) chest pain, (-) palpitations  PULM: (-) cough, (-) sputum, (-) chest tightness, (-) shortness of breath  GI: (-) nausea, (-) vomiting, (-) abdominal pain  : (-) dysuria, (-) hematuria, (-) frequency, (-) flank pain  HEME: see HPI  MSK: (-) back pain, (-) myalgias, (-) gait difficulty  SKIN: (-) rashes, (-) pallor  NEURO: (+) headache, (-) head injury, (-) LOC, (-) dizziness, (-) lightheadedness,  (-) weakness, (-) paresthesias, (-) numbness, (-) syncope, (-) speech changes, (-) facial droop, (-) confusion, (-) seizures    *all other systems negative except as documented above and in the HPI*

## 2020-07-13 NOTE — ED PROVIDER NOTE - OBJECTIVE STATEMENT
74 year old male w hx of HTN, CAD s/p CABG, CHF w PPM, on ASA and plavix presents to the ED for evaluation of intermittent, left-sided, non-radiating sharp headaches x 2 months. 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 Patient's wife called his PMD for persistent headaches, prompting ED eval today. Denies head injury/trauma, vision changes, speech/gait difficulty, numbness paresthesias, weakness, neck pain/stiffness, photophobia, dizziness, n/v, chest pain, shortness of breath.

## 2020-07-13 NOTE — ED ADULT NURSE NOTE - NSIMPLEMENTINTERV_GEN_ALL_ED
Implemented All Fall Risk Interventions:  [place] to call system. Call bell, personal items and telephone within reach. Instruct patient to call for assistance. Room bathroom lighting operational. Non-slip footwear when patient is off stretcher. Physically safe environment: no spills, clutter or unnecessary equipment. Stretcher in lowest position, wheels locked, appropriate side rails in place. Provide visual cue, wrist band, yellow gown, etc. Monitor gait and stability. Monitor for mental status changes and reorient to person, place, and time. Review medications for side effects contributing to fall risk. Reinforce activity limits and safety measures with patient and family.

## 2020-07-13 NOTE — ED PROVIDER NOTE - PROVIDER TOKENS
PROVIDER:[TOKEN:[[id number]],FOLLOWUP:[1-3 Days]],PROVIDER:[TOKEN:[[id number]],FOLLOWUP:[1-3 Days]]

## 2020-07-13 NOTE — ED PROVIDER NOTE - ATTENDING CONTRIBUTION TO CARE
75 y/o male h/o HTN, CHF s/p PPM, HLD, CAD s/p CABG, now c/o h/a x approx 2 months, sx are located on the left side of his head, denies radiation, intermittent, lasting minutes, denies modifying factors, denies recent trauma, paresthesias, focal weakness, fevers, chills, neck stiffness, visual disturbances or pain, facial swelling, dental pain or other associated complaints at present. The pt had a recent non-con CT which showed ? infarct and was referred to the ED.    Old chart reviewed.  I have reviewed and agree with the nursing note, except as documented in my note.    VSS, awake, alert, non-toxic appearing, in NAD, no scalp tenderness or pulsatile vasculature, PERRL / EOMI, no conjunctival injection, oropharynx clear and w/o signs dental space infection, no JVD or bruit, no skin rash or lesions, chest CTAB, no w/r/r, +S1/S2, RRR, no m/r/g, abdomen soft, NT, ND, +BS, no peripheral edema, AO x 3, CN II-XII intact, coordination and gait normal, clear speech.

## 2020-07-13 NOTE — ED PROVIDER NOTE - CARE PROVIDER_API CALL
Bailey Mclean  NEUROLOGY  [address] [place] [address]  Phone: [phone]  Fax: [phone]  Follow Up Time: 1-3 Days    Jett Chavez  SURGERY  [address] [place] [address]  Phone: [phone]  Fax: [phone]  Follow Up Time: 1-3 Days

## 2020-07-13 NOTE — ED PROVIDER NOTE - CARE PROVIDERS DIRECT ADDRESSES
[email or web address]

## 2020-07-13 NOTE — ED PROVIDER NOTE - PHYSICAL EXAMINATION
VITALS:  I have reviewed the initial vital signs.  GENERAL: Well-developed, well-nourished, in no acute distress. Nontoxic.  HEENT: Sclera clear. No conjunctival pallor or injection. EOMI, PERRLA. Mucous membranes moist.  NECK: supple w FROM. No nuchal rigidity or meningismus.  CARDIO: RRR, nl S1 and S2. No murmurs, rubs, or gallops.  PULM: Normal effort. CTA b/l without wheezes, rales, or rhonchi.  MSK: Normal, steady gait.   SKIN: Warm, dry. Capillary refill <2 seconds. No pallor. No rash.  NEURO: A&Ox3. Speech clear. CN II-XII intact. 5/5 strength to upper and lower extremities b/l. Sensation intact and equal throughout. No pronator drift. Finger to nose intact. Normal heel to shin.   PSYCH: Calm and cooperative.

## 2020-10-07 ENCOUNTER — NON-APPOINTMENT (OUTPATIENT)
Age: 75
End: 2020-10-07

## 2020-10-07 ENCOUNTER — APPOINTMENT (OUTPATIENT)
Dept: CARDIOLOGY | Facility: CLINIC | Age: 75
End: 2020-10-07
Payer: MEDICARE

## 2020-10-07 VITALS — DIASTOLIC BLOOD PRESSURE: 66 MMHG | SYSTOLIC BLOOD PRESSURE: 140 MMHG

## 2020-10-07 VITALS
HEART RATE: 66 BPM | DIASTOLIC BLOOD PRESSURE: 74 MMHG | BODY MASS INDEX: 34.65 KG/M2 | HEIGHT: 74 IN | TEMPERATURE: 98.3 F | SYSTOLIC BLOOD PRESSURE: 166 MMHG | WEIGHT: 270 LBS

## 2020-10-07 PROCEDURE — 93280 PM DEVICE PROGR EVAL DUAL: CPT

## 2020-10-07 PROCEDURE — 99214 OFFICE O/P EST MOD 30 MIN: CPT

## 2020-10-07 PROCEDURE — 93000 ELECTROCARDIOGRAM COMPLETE: CPT | Mod: 59

## 2020-10-07 NOTE — PHYSICAL EXAM
[General Appearance - Well Developed] : well developed [Normal Appearance] : normal appearance [Well Groomed] : well groomed [General Appearance - Well Nourished] : well nourished [No Deformities] : no deformities [General Appearance - In No Acute Distress] : no acute distress [Heart Rate And Rhythm] : heart rate and rhythm were normal [Heart Sounds] : normal S1 and S2 [Murmurs] : no murmurs present [Edema] : no peripheral edema present [] : no respiratory distress [Respiration, Rhythm And Depth] : normal respiratory rhythm and effort [Exaggerated Use Of Accessory Muscles For Inspiration] : no accessory muscle use [Auscultation Breath Sounds / Voice Sounds] : lungs were clear to auscultation bilaterally [Left Infraclavicular] : left infraclavicular area [Well-Healed] : well-healed [Abdomen Soft] : soft [Nail Clubbing] : no clubbing of the fingernails [Cyanosis, Localized] : no localized cyanosis

## 2020-10-07 NOTE — HISTORY OF PRESENT ILLNESS
[de-identified] : \par Cardiologist: Dr. Corea\par \par 73 yo M with history of SSS s/p DC PPM, CAD s/p CABG, LBBB. BiV PPM was attempted but despite multiple catheters and sheaths, CS was unable to be cannulated. The patient denies any cardiovascular complaints including chest pain, dyspnea, dizziness, lightheadedness, presyncope or syncope. \par \par Only complaint is arthritic pain. He walks around the supermarket and states he has no symptoms when ambulating. \par

## 2020-10-07 NOTE — PROCEDURE
[No] : not [2nd Degree Block] : second degree block [Pacemaker] : pacemaker [DDD] : DDD [Voltage: ___ volts] : Voltage was [unfilled] volts [Longevity: ___ months] : The estimated remaining battery life is [unfilled] months [Normal] : The battery status is normal. [Threshold Testing Performed] : Threshold testing was performed [Lead Imp:  ___ohms] : lead impedance was [unfilled] ohms [Sensing Amplitude ___mv] : sensing amplitude was [unfilled] mv [___V @] : [unfilled] V [___ ms] : [unfilled] ms [Programmed for Longevity] : output reprogrammed for improved battery longevity [Counters Reset] : the counters were reset [Asense-Vsense ___ %] : Asense-Vsense [unfilled]% [Asense-Vpace ___ %] : Asense-Vpace [unfilled]% [Apace-Vsense ___ %] : Apace-Vsense [unfilled]% [Apace-Vpace ___ %] : Apace-Vpace [unfilled]% [See Device Printout] : See device printout [de-identified] : [facility] [de-identified] : [id number] [de-identified] : [id number] [de-identified] : 11/2/2018 [de-identified] : 60/130 [de-identified] : No episodes recorded. \par Normal function of device.

## 2020-10-08 ENCOUNTER — APPOINTMENT (OUTPATIENT)
Dept: CARDIOLOGY | Facility: CLINIC | Age: 75
End: 2020-10-08
Payer: MEDICARE

## 2020-10-08 PROCEDURE — 93296 REM INTERROG EVL PM/IDS: CPT

## 2020-10-08 PROCEDURE — 93294 REM INTERROG EVL PM/LDLS PM: CPT

## 2020-11-20 ENCOUNTER — TRANSCRIPTION ENCOUNTER (OUTPATIENT)
Age: 75
End: 2020-11-20

## 2021-01-05 NOTE — ED PROCEDURE NOTE - CPROC ED LACERATION CLEANSED1
Subjective:      Kasia Vaughan is a 20 y.o. female who presents with Headache (Fatigue, headache, nausea ongoing 1 week)            Congestion, fatigue, headache, nausea for 1 week.  No known exposure to Covid.  Denies cough, shortness of breath, chest pain.  Patient denies possible pregnancy.  No loss of taste or smell.  No history of asthma or pneumonia.  Eating and drinking normal.    URI   This is a new problem. The current episode started in the past 7 days. The problem has been unchanged. There has been no fever. The fever has been present for less than 1 day. Associated symptoms include congestion, headaches, nausea and rhinorrhea. Pertinent negatives include no abdominal pain, chest pain, coughing, diarrhea, dysuria, ear pain, joint pain, sinus pain, sore throat, swollen glands, vomiting or wheezing. She has tried nothing for the symptoms. The treatment provided no relief.       PMH:  has no past medical history on file.  MEDS:   Current Outpatient Medications:   •  ondansetron (ZOFRAN) 4 MG Tab tablet, Take 1 Tab by mouth every four hours as needed for Nausea/Vomiting for up to 5 days., Disp: 20 Tab, Rfl: 0  ALLERGIES: No Known Allergies  SURGHX: No past surgical history on file.  SOCHX:    FH: family history is not on file.    Review of Systems   Constitutional: Positive for chills and malaise/fatigue. Negative for fever.   HENT: Positive for congestion and rhinorrhea. Negative for ear pain, sinus pain and sore throat.    Respiratory: Negative for cough, shortness of breath and wheezing.    Cardiovascular: Negative.  Negative for chest pain.   Gastrointestinal: Positive for nausea. Negative for abdominal pain, diarrhea and vomiting.   Genitourinary: Negative for dysuria.   Musculoskeletal: Positive for myalgias. Negative for joint pain.   Neurological: Positive for headaches. Negative for dizziness.     Medications, Allergies, and current problem list reviewed today in Epic     Objective:     /58    "Pulse 68   Temp 36.6 °C (97.9 °F)   Resp 12   Ht 1.638 m (5' 4.5\")   Wt 60.8 kg (134 lb)   SpO2 98%   BMI 22.65 kg/m²      Physical Exam  Vitals signs and nursing note reviewed.   Constitutional:       General: She is not in acute distress.     Appearance: She is well-developed. She is not ill-appearing, toxic-appearing or diaphoretic.   HENT:      Head: Normocephalic and atraumatic.      Right Ear: Tympanic membrane, ear canal and external ear normal.      Left Ear: Tympanic membrane, ear canal and external ear normal.      Nose: Nose normal. No congestion or rhinorrhea.      Mouth/Throat:      Mouth: Mucous membranes are moist.      Pharynx: No oropharyngeal exudate or posterior oropharyngeal erythema.   Eyes:      General:         Right eye: No discharge.         Left eye: No discharge.      Conjunctiva/sclera: Conjunctivae normal.   Neck:      Musculoskeletal: Normal range of motion and neck supple.   Cardiovascular:      Rate and Rhythm: Normal rate and regular rhythm.      Pulses: Normal pulses.      Heart sounds: Normal heart sounds.   Pulmonary:      Effort: Pulmonary effort is normal. No respiratory distress.      Breath sounds: Normal breath sounds. No wheezing, rhonchi or rales.   Musculoskeletal: Normal range of motion.         General: No swelling or tenderness.      Right lower leg: No edema.      Left lower leg: No edema.   Lymphadenopathy:      Cervical: No cervical adenopathy.   Skin:     General: Skin is warm and dry.   Neurological:      Mental Status: She is alert and oriented to person, place, and time.   Psychiatric:         Mood and Affect: Mood normal.         Behavior: Behavior normal.         Thought Content: Thought content normal.         Judgment: Judgment normal.                 Assessment/Plan:         1. Suspected COVID-19 virus infection  COVID/SARS COV-2 PCR    ondansetron (ZOFRAN) 4 MG Tab tablet   2. Nausea  ondansetron (ZOFRAN) 4 MG Tab tablet     1 week history of " congestion, fatigue, headache, nausea.  No fever, sore throat, chest pain, leg swelling, shortness of breath.  No known exposure to Covid.  Some nausea but eating and drinking normal without vomiting or diarrhea.  PO2 98% vital signs normal.  Lungs clear bilaterally without wheezes rhonchi or rales.  Patient declines potential pregnancy and pregnancy testing today.  Covid testing initiated.  Quarantine per CDC guidelines.  OTC meds and conservative measures as discussed    Return to clinic or go to ED if symptoms worsen or persist. Indications for ED discussed at length. Patient/Parent/Guardian voices understanding. Follow-up with your primary care provider in 3-5 days. Red flag symptoms discussed. All side effects of medication discussed including allergic response, GI upset, tendon injury, rash, sedation etc.    Please note that this dictation was created using voice recognition software. I have made every reasonable attempt to correct obvious errors, but I expect that there are errors of grammar and possibly content that I did not discover before finalizing the note.     cleansed

## 2021-01-08 ENCOUNTER — APPOINTMENT (OUTPATIENT)
Dept: CARDIOLOGY | Facility: CLINIC | Age: 76
End: 2021-01-08
Payer: MEDICARE

## 2021-01-08 PROCEDURE — 93296 REM INTERROG EVL PM/IDS: CPT

## 2021-01-08 PROCEDURE — 93294 REM INTERROG EVL PM/LDLS PM: CPT

## 2021-04-01 ENCOUNTER — APPOINTMENT (OUTPATIENT)
Dept: CARDIOLOGY | Facility: CLINIC | Age: 76
End: 2021-04-01
Payer: MEDICARE

## 2021-04-01 PROCEDURE — 93306 TTE W/DOPPLER COMPLETE: CPT

## 2021-04-07 ENCOUNTER — APPOINTMENT (OUTPATIENT)
Dept: CARDIOLOGY | Facility: CLINIC | Age: 76
End: 2021-04-07
Payer: MEDICARE

## 2021-04-07 VITALS
HEART RATE: 64 BPM | TEMPERATURE: 97.9 F | BODY MASS INDEX: 34.65 KG/M2 | DIASTOLIC BLOOD PRESSURE: 70 MMHG | RESPIRATION RATE: 20 BRPM | OXYGEN SATURATION: 96 % | WEIGHT: 270 LBS | HEIGHT: 74 IN | SYSTOLIC BLOOD PRESSURE: 147 MMHG

## 2021-04-07 DIAGNOSIS — I25.10 ATHEROSCLEROTIC HEART DISEASE OF NATIVE CORONARY ARTERY W/OUT ANGINA PECTORIS: ICD-10-CM

## 2021-04-07 PROCEDURE — 99214 OFFICE O/P EST MOD 30 MIN: CPT

## 2021-04-07 PROCEDURE — 93280 PM DEVICE PROGR EVAL DUAL: CPT

## 2021-04-07 PROCEDURE — 93000 ELECTROCARDIOGRAM COMPLETE: CPT | Mod: 59

## 2021-04-07 RX ORDER — AMIODARONE HYDROCHLORIDE 200 MG/1
200 TABLET ORAL
Refills: 0 | Status: DISCONTINUED | COMMUNITY
End: 2021-04-07

## 2021-04-07 RX ORDER — METOPROLOL TARTRATE 50 MG/1
50 TABLET ORAL TWICE DAILY
Refills: 0 | Status: DISCONTINUED | COMMUNITY
End: 2021-04-07

## 2021-04-07 NOTE — PROCEDURE
Subjective:    Pt seen and examined at bedside.  Received Haldol/Risperdol/Ativan as per team.  Sleeping when entered room, awakes to verbal and tactile stimuli.  Wife reports he is more calm this morning.    MEDICATIONS  (STANDING):  aspirin  chewable 81 milliGRAM(s) Oral daily  atorvastatin 80 milliGRAM(s) Oral at bedtime  chlorhexidine 0.12% Liquid 15 milliLiter(s) Swish and Spit two times a day  dexamethasone  Injectable 4 milliGRAM(s) IV Push two times a day  dextrose 5%. 1000 milliLiter(s) (50 mL/Hr) IV Continuous <Continuous>  dextrose 50% Injectable 12.5 Gram(s) IV Push once  dextrose 50% Injectable 25 Gram(s) IV Push once  dextrose 50% Injectable 25 Gram(s) IV Push once    DOBUTamine Infusion 5 MICROgram(s)/kG/Min (13.965 mL/Hr) IV Continuous <Continuous>  heparin  Infusion. 1500 Unit(s)/Hr (15 mL/Hr) IV Continuous <Continuous>  influenza   Vaccine 0.5 milliLiter(s) IntraMuscular once  insulin lispro (HumaLOG) corrective regimen sliding scale   SubCutaneous every 6 hours  lactobacillus acidophilus and bulgaricus Chewable 1 Tablet(s) Chew daily  pantoprazole  Injectable 40 milliGRAM(s) IV Push daily  piperacillin/tazobactam IVPB. 3.375 Gram(s) IV Intermittent every 8 hours  ticagrelor 90 milliGRAM(s) Oral two times a day  vancomycin  IVPB 750 milliGRAM(s) IV Intermittent every 12 hours    MEDICATIONS  (PRN):  ALBUTerol/ipratropium for Nebulization 3 milliLiter(s) Nebulizer every 6 hours PRN Shortness of Breath and/or Wheezing  dextrose Gel 1 Dose(s) Oral once PRN Blood Glucose LESS THAN 70 milliGRAM(s)/deciliter  glucagon  Injectable 1 milliGRAM(s) IntraMuscular once PRN Glucose LESS THAN 70 milligrams/deciliter  heparin  Injectable 7500 Unit(s) IV Push every 6 hours PRN For aPTT less than 40  heparin  Injectable 3500 Unit(s) IV Push every 6 hours PRN For aPTT between 40 - 57      Allergies  No Known Allergies      Objective:   Vital Signs Last 24 Hrs  T(C): 36.3 (26 Oct 2017 08:00), Max: 36.7 (26 Oct 2017 00:00)  T(F): 97.4 (26 Oct 2017 08:00), Max: 98.1 (26 Oct 2017 00:00)  HR: 108 (26 Oct 2017 10:00) (80 - 110)  BP: 118/62 (26 Oct 2017 10:00) (86/53 - 123/53)  BP(mean): 67 (26 Oct 2017 10:00) (58 - 112)  RR: 22 (26 Oct 2017 10:00) (20 - 35)  SpO2: 100% (26 Oct 2017 10:00) (85% - 100%)    Exam:   awake, alert, oriented to year, speech fluent, impaired attention, does not follow simple commands  R ptosis, EOMI, L pupil 2mm and R 3mm minimally reactive,  moves all 4 limbs symmetrically anti-gravity.  +action myoclonus    Other:  CBC Full  -  ( 26 Oct 2017 04:20 )  WBC Count : 21.9 K/uL  Hemoglobin : 8.4 g/dL  Hematocrit : 24.2 %  Platelet Count - Automated : 267 K/uL  Mean Cell Volume : 101.0 fl  Mean Cell Hemoglobin : 34.9 pg  Mean Cell Hemoglobin Concentration : 34.5 gm/dL  Auto Neutrophil # : 19.5 K/uL  Auto Lymphocyte # : 0.8 K/uL  Auto Monocyte # : 1.6 K/uL  Auto Eosinophil # : 0.0 K/uL  Auto Basophil # : 0.0 K/uL  Auto Neutrophil % : 79.0 %  Auto Lymphocyte % : 5.0 %  Auto Monocyte % : 11.0 %  Auto Eosinophil % : 1.0 %  Auto Basophil % : x    10-26    138  |  100  |  43<H>  ----------------------------<  124<H>  5.0   |  23  |  2.45<H>    Ca    8.6      26 Oct 2017 04:20  Phos  2.9     10-26  Mg     2.6     10-26    TPro  6.2  /  Alb  2.8<L>  /  TBili  1.4<H>  /  DBili  x   /  AST  44<H>  /  ALT  64<H>  /  AlkPhos  86  10-26    10-26    138  |  100  |  43<H>  ----------------------------<  124<H>  5.0   |  23  |  2.45<H>    Ca    8.6      26 Oct 2017 04:20  Phos  2.9     10-26  Mg     2.6     10-26    TPro  6.2  /  Alb  2.8<L>  /  TBili  1.4<H>  /  DBili  x   /  AST  44<H>  /  ALT  64<H>  /  AlkPhos  86  10-26    LIVER FUNCTIONS - ( 26 Oct 2017 04:20 )  Alb: 2.8 g/dL / Pro: 6.2 g/dL / ALK PHOS: 86 U/L / ALT: 64 U/L RC / AST: 44 U/L / GGT: x             Imaging:     Routine EEG:   Intermittent generalized slowing [No] : not [Pacemaker] : pacemaker [DDD] : DDD [Voltage: ___ volts] : Voltage was [unfilled] volts [Magnet Rate: ___ Ppm] : magnet rate was [unfilled] Ppm [Longevity: ___ months] : The estimated remaining battery life is [unfilled] months [Normal] : The battery status is normal. [Threshold Testing Performed] : Threshold testing was performed [Lead Imp:  ___ohms] : lead impedance was [unfilled] ohms [Sensing Amplitude ___mv] : sensing amplitude was [unfilled] mv [___V @] : [unfilled] V [___ ms] : [unfilled] ms [Counters Reset] : the counters were reset [See Device Printout] : See device printout [2nd Degree Block] : second degree block [Programmed for Longevity] : output reprogrammed for improved battery longevity [de-identified] : HUANG [de-identified] : [id number] [de-identified] : [id number] [de-identified] : 11- [de-identified] : 60 [de-identified] : AP: 78%\par : 78%\par AT/AF 0%\par Pt being remotely monitored and is transmitting.\par Normal DDD Pacemaker function\par

## 2021-04-07 NOTE — ASSESSMENT
[FreeTextEntry1] : 74 yo M with history of AFib on aspirin and Amio, SSS s/p DC PPM (unable to cannulate CS). \par \par SSS s/p DC PPM\par - Unable to cannulate CS at time of device implant. \par - Normal functioning PPM. Patient is RV pacing 78% of the time but denies any signs or symptoms of heart failure and is quite active. We discussed the option of epicardial LV lead if he continues to pace > 40% and develops heart failure symptoms. At this time, he feels well and is very active. No current indication for epicardial LV lead\par - Remote monitor is set up and patient is transmitting.\par \par Paroxysmal AFib \par - Cont aspirin (pt not felt to be a good candidate for anticoagulation by his cardiologist due to bleeding)\par - Decrease Amio to 100mg. Pulm referral provided again and emphasized since pt is on Amio. He sees an ophtho regularly. Recent labs with normal TFTs and LFTs. \par \par Mild cardiomyopathy\par - No signs of clinical heart failure. Cont GDMT\par \par HTN\par - BP was elevated today but he admits to dietary noncompliance.\par - I have advised him to adhere to a 2g Na diet and to be more careful with consuming salty meals. \par \par I have also advised the patient to go to the nearest emergency room if he experiences any chest pain, dyspnea, syncope, or has any other compelling symptoms.\par  \par Follow up in 6 months.\par

## 2021-04-07 NOTE — HISTORY OF PRESENT ILLNESS
[de-identified] : \par Cardiologist: Dr. Corea\par Ophtho: pt saw recently\par Pulm: pt does not have one\par \par 74 yo M with history of SSS s/p DC PPM, CAD s/p CABG, LBBB. BiV PPM was attempted but despite multiple catheters and sheaths, CS was unable to be cannulated. He denies any cardiovascular complaints including chest pain, dyspnea, dizziness, lightheadedness, presyncope or syncope. \par \par Only complaint is arthritic pain. Patient denies dyspnea with exertion. He can walk on flat ground and up the stairs without dyspnea.

## 2021-04-07 NOTE — PHYSICAL EXAM
[General Appearance - Well Developed] : well developed [Normal Appearance] : normal appearance [Well Groomed] : well groomed [General Appearance - Well Nourished] : well nourished [No Deformities] : no deformities [General Appearance - In No Acute Distress] : no acute distress [Heart Rate And Rhythm] : heart rate and rhythm were normal [Heart Sounds] : normal S1 and S2 [Murmurs] : no murmurs present [Edema] : no peripheral edema present [] : no respiratory distress [Respiration, Rhythm And Depth] : normal respiratory rhythm and effort [Exaggerated Use Of Accessory Muscles For Inspiration] : no accessory muscle use [Auscultation Breath Sounds / Voice Sounds] : lungs were clear to auscultation bilaterally [Left Infraclavicular] : left infraclavicular area [Clean] : clean [Dry] : dry [Well-Healed] : well-healed [Erythema] : not erythematous [Warm] : not warm [Tender] : not tender [Abdomen Soft] : soft [Nail Clubbing] : no clubbing of the fingernails

## 2021-04-21 ENCOUNTER — TRANSCRIPTION ENCOUNTER (OUTPATIENT)
Age: 76
End: 2021-04-21

## 2021-07-08 ENCOUNTER — NON-APPOINTMENT (OUTPATIENT)
Age: 76
End: 2021-07-08

## 2021-07-08 ENCOUNTER — APPOINTMENT (OUTPATIENT)
Dept: CARDIOLOGY | Facility: CLINIC | Age: 76
End: 2021-07-08
Payer: MEDICARE

## 2021-07-08 ENCOUNTER — INPATIENT (INPATIENT)
Facility: HOSPITAL | Age: 76
LOS: 5 days | Discharge: HOME | End: 2021-07-14
Attending: INTERNAL MEDICINE | Admitting: INTERNAL MEDICINE
Payer: MEDICARE

## 2021-07-08 VITALS
OXYGEN SATURATION: 98 % | DIASTOLIC BLOOD PRESSURE: 76 MMHG | HEART RATE: 97 BPM | HEIGHT: 74 IN | RESPIRATION RATE: 18 BRPM | TEMPERATURE: 102 F | SYSTOLIC BLOOD PRESSURE: 159 MMHG

## 2021-07-08 DIAGNOSIS — Z95.1 PRESENCE OF AORTOCORONARY BYPASS GRAFT: Chronic | ICD-10-CM

## 2021-07-08 DIAGNOSIS — Z90.10 ACQUIRED ABSENCE OF UNSPECIFIED BREAST AND NIPPLE: Chronic | ICD-10-CM

## 2021-07-08 LAB
ALBUMIN SERPL ELPH-MCNC: 4.1 G/DL — SIGNIFICANT CHANGE UP (ref 3.5–5.2)
ALP SERPL-CCNC: 138 U/L — HIGH (ref 30–115)
ALT FLD-CCNC: 157 U/L — HIGH (ref 0–41)
ANION GAP SERPL CALC-SCNC: 7 MMOL/L — SIGNIFICANT CHANGE UP (ref 7–14)
AST SERPL-CCNC: 271 U/L — HIGH (ref 0–41)
BASOPHILS # BLD AUTO: 0.11 K/UL — SIGNIFICANT CHANGE UP (ref 0–0.2)
BASOPHILS NFR BLD AUTO: 0.9 % — SIGNIFICANT CHANGE UP (ref 0–1)
BILIRUB SERPL-MCNC: 2.1 MG/DL — HIGH (ref 0.2–1.2)
BUN SERPL-MCNC: 14 MG/DL — SIGNIFICANT CHANGE UP (ref 10–20)
CALCIUM SERPL-MCNC: 8.8 MG/DL — SIGNIFICANT CHANGE UP (ref 8.5–10.1)
CHLORIDE SERPL-SCNC: 99 MMOL/L — SIGNIFICANT CHANGE UP (ref 98–110)
CO2 SERPL-SCNC: 27 MMOL/L — SIGNIFICANT CHANGE UP (ref 17–32)
CREAT SERPL-MCNC: 0.9 MG/DL — SIGNIFICANT CHANGE UP (ref 0.7–1.5)
EOSINOPHIL # BLD AUTO: 0.1 K/UL — SIGNIFICANT CHANGE UP (ref 0–0.7)
EOSINOPHIL NFR BLD AUTO: 0.8 % — SIGNIFICANT CHANGE UP (ref 0–8)
GIANT PLATELETS BLD QL SMEAR: PRESENT — SIGNIFICANT CHANGE UP
GLUCOSE SERPL-MCNC: 134 MG/DL — HIGH (ref 70–99)
HCT VFR BLD CALC: 39.2 % — LOW (ref 42–52)
HGB BLD-MCNC: 12.8 G/DL — LOW (ref 14–18)
LACTATE SERPL-SCNC: 2.7 MMOL/L — HIGH (ref 0.7–2)
LYMPHOCYTES # BLD AUTO: 0.11 K/UL — LOW (ref 1.2–3.4)
LYMPHOCYTES # BLD AUTO: 0.9 % — LOW (ref 20.5–51.1)
MANUAL SMEAR VERIFICATION: SIGNIFICANT CHANGE UP
MCHC RBC-ENTMCNC: 29.2 PG — SIGNIFICANT CHANGE UP (ref 27–31)
MCHC RBC-ENTMCNC: 32.7 G/DL — SIGNIFICANT CHANGE UP (ref 32–37)
MCV RBC AUTO: 89.5 FL — SIGNIFICANT CHANGE UP (ref 80–94)
MONOCYTES # BLD AUTO: 0.75 K/UL — HIGH (ref 0.1–0.6)
MONOCYTES NFR BLD AUTO: 6.1 % — SIGNIFICANT CHANGE UP (ref 1.7–9.3)
NEUTROPHILS # BLD AUTO: 11.28 K/UL — HIGH (ref 1.4–6.5)
NEUTROPHILS NFR BLD AUTO: 91.3 % — HIGH (ref 42.2–75.2)
PLAT MORPH BLD: NORMAL — SIGNIFICANT CHANGE UP
PLATELET # BLD AUTO: 203 K/UL — SIGNIFICANT CHANGE UP (ref 130–400)
POIKILOCYTOSIS BLD QL AUTO: SLIGHT — SIGNIFICANT CHANGE UP
POLYCHROMASIA BLD QL SMEAR: SLIGHT — SIGNIFICANT CHANGE UP
POTASSIUM SERPL-MCNC: 4 MMOL/L — SIGNIFICANT CHANGE UP (ref 3.5–5)
POTASSIUM SERPL-SCNC: 4 MMOL/L — SIGNIFICANT CHANGE UP (ref 3.5–5)
PROT SERPL-MCNC: 6.5 G/DL — SIGNIFICANT CHANGE UP (ref 6–8)
RBC # BLD: 4.38 M/UL — LOW (ref 4.7–6.1)
RBC # FLD: 14.1 % — SIGNIFICANT CHANGE UP (ref 11.5–14.5)
RBC BLD AUTO: ABNORMAL
SODIUM SERPL-SCNC: 133 MMOL/L — LOW (ref 135–146)
TROPONIN T SERPL-MCNC: <0.01 NG/ML — SIGNIFICANT CHANGE UP
WBC # BLD: 12.35 K/UL — HIGH (ref 4.8–10.8)
WBC # FLD AUTO: 12.35 K/UL — HIGH (ref 4.8–10.8)

## 2021-07-08 PROCEDURE — 71045 X-RAY EXAM CHEST 1 VIEW: CPT | Mod: 26

## 2021-07-08 PROCEDURE — 74177 CT ABD & PELVIS W/CONTRAST: CPT | Mod: 26,MA

## 2021-07-08 PROCEDURE — 99283 EMERGENCY DEPT VISIT LOW MDM: CPT

## 2021-07-08 PROCEDURE — 99285 EMERGENCY DEPT VISIT HI MDM: CPT

## 2021-07-08 PROCEDURE — 93294 REM INTERROG EVL PM/LDLS PM: CPT

## 2021-07-08 PROCEDURE — 93010 ELECTROCARDIOGRAM REPORT: CPT

## 2021-07-08 PROCEDURE — 76705 ECHO EXAM OF ABDOMEN: CPT | Mod: 26

## 2021-07-08 PROCEDURE — 93296 REM INTERROG EVL PM/IDS: CPT

## 2021-07-08 RX ORDER — VANCOMYCIN HCL 1 G
1000 VIAL (EA) INTRAVENOUS ONCE
Refills: 0 | Status: COMPLETED | OUTPATIENT
Start: 2021-07-08 | End: 2021-07-08

## 2021-07-08 RX ORDER — SODIUM CHLORIDE 9 MG/ML
500 INJECTION, SOLUTION INTRAVENOUS ONCE
Refills: 0 | Status: COMPLETED | OUTPATIENT
Start: 2021-07-08 | End: 2021-07-08

## 2021-07-08 RX ORDER — ACETAMINOPHEN 500 MG
975 TABLET ORAL ONCE
Refills: 0 | Status: COMPLETED | OUTPATIENT
Start: 2021-07-08 | End: 2021-07-08

## 2021-07-08 RX ORDER — CEFEPIME 1 G/1
2000 INJECTION, POWDER, FOR SOLUTION INTRAMUSCULAR; INTRAVENOUS ONCE
Refills: 0 | Status: COMPLETED | OUTPATIENT
Start: 2021-07-08 | End: 2021-07-08

## 2021-07-08 RX ADMIN — SODIUM CHLORIDE 500 MILLILITER(S): 9 INJECTION, SOLUTION INTRAVENOUS at 20:18

## 2021-07-08 RX ADMIN — Medication 250 MILLIGRAM(S): at 20:17

## 2021-07-08 RX ADMIN — CEFEPIME 100 MILLIGRAM(S): 1 INJECTION, POWDER, FOR SOLUTION INTRAMUSCULAR; INTRAVENOUS at 20:18

## 2021-07-08 RX ADMIN — Medication 975 MILLIGRAM(S): at 18:19

## 2021-07-08 NOTE — ED ADULT NURSE NOTE - OBJECTIVE STATEMENT
Pt c/o fever associated with chills. Pt states fever begun today. Denies any N/V/D or coughing at this time

## 2021-07-08 NOTE — ED ADULT TRIAGE NOTE - ARRIVAL FROM
· HPI Objective Statement: 75 yo m hx dementia here for wandering. pt lives in [place] and somehow made his way up. pt states "I took the bus or a train." no other complaints. daughters state they don't contact father often and was concerned he was homeless. they did not expect him and were not sure what to do with him at their home. family brought him to ed for eval and placement.
Home

## 2021-07-08 NOTE — ED PROVIDER NOTE - PHYSICAL EXAMINATION
CONST: Well appearing in NAD  EYES: PERRL, EOMI, Sclera and conjunctiva clear.   ENT: No nasal discharge. Pharyngeal erythema without exudates. Uvula midline.  NECK: Non-tender  CARD: Normal S1 S2; Normal rate and rhythm  RESP: Equal BS B/L, No wheezes, rhonchi or rales. No distress  GI: Soft, non-tender, non-distended. Soft, reducible, umbilical hernia  MS: Normal ROM in all extremities. No midline spinal tenderness. Tortuous varicosities overlying R knee. No knee tenderness bilaterally. No joint swelling or erythema.  SKIN: Warm, dry, no acute rashes.   NEURO: A&Ox3, No focal deficits. Strength 5/5 with no sensory deficits.

## 2021-07-08 NOTE — ED PROVIDER NOTE - NS ED ROS FT
CONST: see HPI  EYES: No pain, redness, drainage or visual changes.  ENT: No ear pain or discharge, nasal discharge or congestion. No sore throat  CARD: No chest pain, palpitations  RESP: No SOB, cough  GI: No abdominal pain, N/V/D  MS: chronic knee/hip pain  SKIN: No rashes  NEURO: No headache, dizziness, paresthesias or LOC

## 2021-07-08 NOTE — ED PROVIDER NOTE - CLINICAL SUMMARY MEDICAL DECISION MAKING FREE TEXT BOX
pt evaluated for fever and abdominal cramping, suspected cholecystitis, possible cholangitis, started on IV abx and evaluated by surgery who recommended admission to medicine for further workup and management

## 2021-07-08 NOTE — ED PROVIDER NOTE - PROGRESS NOTE DETAILS
Signed out by ALINA Mcintyre, followed ct and ultrasound.. Surgery evaluated pt, due to medical history will admit to medicine and continue ix axbx.

## 2021-07-08 NOTE — CONSULT NOTE ADULT - SUBJECTIVE AND OBJECTIVE BOX
GENERAL SURGERY CONSULT NOTE    Patient: WAGNER SEGURA , 75y ([birth year])Male   MRN: [id number]  Location: [facility] ED  Visit: 07-08-21 Emergency  Date: 07-08-21 @ 23:59    HPI: Patient is a 75M w/ PMH CABG 4 vessel, CHF, Pace Maker, OA, HTN, DLD presented to the ED with abdominal pain. Patient has had right upper quadrant pain with associated nausea/vomitting/chills. Patient had bowel movement in the past 24 hours which was normal and was able to tolerate PO intake in the AM.     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  2014    H/O mastectomy  right 2001        Home Medications:  amiodarone 200 mg oral tablet: 1 tab(s) orally once a day (02 Nov 2018 12:41)  amLODIPine 5 mg oral tablet: 1 tab(s) orally once a day (02 Nov 2018 12:41)  atorvastatin 20 mg oral tablet: 1 tab(s) orally once a day (02 Nov 2018 12:41)  Ecotrin: 81  orally (02 Nov 2018 12:41)  furosemide 20 mg oral tablet: 1 tab(s) orally once a day (02 Nov 2018 12:41)  losartan 100 mg oral tablet: 1 tab(s) orally once a day (02 Nov 2018 12:41)  Metoprolol Tartrate 50 mg oral tablet: 1 tab(s) orally once a day (02 Nov 2018 12:41)  quinapril 40 mg oral tablet: 1 tab(s) orally once a day (02 Nov 2018 12:41)  spironolactone 25 mg oral tablet: 1 tab(s) orally once a day (02 Nov 2018 12:41)        VITALS:  T(F): 98.4 (07-08-21 @ 22:55), Max: 101.8 (07-08-21 @ 16:47)  HR: 76 (07-08-21 @ 22:55) (76 - 97)  BP: 110/76 (07-08-21 @ 22:55) (110/76 - 159/76)  RR: 18 (07-08-21 @ 22:55) (18 - 18)  SpO2: 95% (07-08-21 @ 22:55) (95% - 98%)    PHYSICAL EXAM:  General: NAD, AAOx3, calm and cooperative  HEENT: NCAT, CHERI, EOMI, Trachea ML, Neck supple  Cardiac: RRR S1, S2, no Murmurs, rubs or gallops  Respiratory: CTAB, normal respiratory effort, breath sounds equal BL, no wheeze, rhonchi or crackles  Abdomen: Soft, non-distended, non-tender, no rebound, no guarding. +BS.  Rectal: Good tone, +stool, no blood, no sharri-anal masses/lesions, no fistulas, fissures, hemorrhoids  Musculoskeletal: Strength 5/5 BL UE/LE, ROM intact, compartments soft  Neuro: Sensation grossly intact and equal throughout, no focal deficits  Vascular: Pulses 2+ throughout, extremities well perfused  Skin: Warm/dry, normal color, no jaundice  Incision/wound: healing well, dressings in place, clean, dry and intact    MEDICATIONS  (STANDING):    MEDICATIONS  (PRN):      LAB/STUDIES:                        12.8   12.35 )-----------( 203      ( 08 Jul 2021 18:22 )             39.2     07-08    133<L>  |  99  |  14  ----------------------------<  134<H>  4.0   |  27  |  0.9    Ca    8.8      08 Jul 2021 18:22    TPro  6.5  /  Alb  4.1  /  TBili  2.1<H>  /  DBili  x   /  AST  271<H>  /  ALT  157<H>  /  AlkPhos  138<H>  07-08      LIVER FUNCTIONS - ( 08 Jul 2021 18:22 )  Alb: 4.1 g/dL / Pro: 6.5 g/dL / ALK PHOS: 138 U/L / ALT: 157 U/L / AST: 271 U/L / GGT: x             CARDIAC MARKERS ( 08 Jul 2021 18:22 )  x     / <0.01 ng/mL / x     / x     / x        IMAGING:  < from: CT Abdomen and Pelvis w/ IV Cont (07.08.21 @ 20:10) >  No CT evidence for acute intra-abdominal or pelvic pathology.  Cardiomegaly.  Cholelithiasis.  Nonobstructing left renal calculus.  Additional incidentalfindings as above.    < from: US Abdomen Upper Quadrant Right (07.08.21 @ 20:02) >  Liver: Within normal limits.  Bile ducts: Normal caliber. Common bile duct measures 7 mm.  Gallbladder: Cholelithiasis and gallbladder sludge. Increased gallbladder wall thickness, 6 mm. Negative sonographic Onofre's sign  Pancreas: Visualized portions are within normal limits.  Right kidney: 11.8 cm. No hydronephrosis.  Ascites: None.  IVC: Visualized portions are within normal limits.  Cholelithiasis and gallbladder sludge with increased gallbladder wall thickness. Negative sonographic Onofre's sign. If there is clinical concern for cholecystitis, consider HIDA scan for further  evaluation.    < end of copied text >     GENERAL SURGERY CONSULT NOTE    Patient: WAGNER SEGURA , 75y ([birth year])Male   MRN: [id number]  Location: [facility] ED  Visit: 07-08-21 Emergency  Date: 07-08-21 @ 23:59    HPI: Patient is a 75M w/ PMH CABG 4 vessel, CHF, Pace Maker, OA, HTN, DLD presented to the ED with abdominal pain x1 day. Patient has had right upper quadrant pain with associated chills. Never experienced before, no exacerbating/relieving fx. Last BM yesterday, reported as normal. Pt able to tolerate PO intake this AM. Pt denies CP, SOB, n/v/d, urinary symptoms.    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  2014    H/O mastectomy  right 2001        Home Medications:  amiodarone 200 mg oral tablet: 1 tab(s) orally once a day (02 Nov 2018 12:41)  amLODIPine 5 mg oral tablet: 1 tab(s) orally once a day (02 Nov 2018 12:41)  atorvastatin 20 mg oral tablet: 1 tab(s) orally once a day (02 Nov 2018 12:41)  Ecotrin: 81  orally (02 Nov 2018 12:41)  furosemide 20 mg oral tablet: 1 tab(s) orally once a day (02 Nov 2018 12:41)  losartan 100 mg oral tablet: 1 tab(s) orally once a day (02 Nov 2018 12:41)  Metoprolol Tartrate 50 mg oral tablet: 1 tab(s) orally once a day (02 Nov 2018 12:41)  quinapril 40 mg oral tablet: 1 tab(s) orally once a day (02 Nov 2018 12:41)  spironolactone 25 mg oral tablet: 1 tab(s) orally once a day (02 Nov 2018 12:41)        VITALS:  T(F): 98.4 (07-08-21 @ 22:55), Max: 101.8 (07-08-21 @ 16:47)  HR: 76 (07-08-21 @ 22:55) (76 - 97)  BP: 110/76 (07-08-21 @ 22:55) (110/76 - 159/76)  RR: 18 (07-08-21 @ 22:55) (18 - 18)  SpO2: 95% (07-08-21 @ 22:55) (95% - 98%)    PHYSICAL EXAM:  General: NAD, AAOx3, calm and cooperative  HEENT: NCAT, CHERI, EOMI, Trachea ML, Neck supple  Cardiac: RRR S1, S2, no Murmurs, rubs or gallops  Respiratory: CTAB, normal respiratory effort, breath sounds equal BL, no wheeze, rhonchi or crackles  Abdomen: Soft, non-distended, non-tender, no rebound, no guarding. +BS.  Musculoskeletal: Strength 5/5 BL UE/LE, ROM intact, compartments soft  Neuro: Sensation grossly intact and equal throughout, no focal deficits  Vascular: Pulses 2+ throughout, extremities well perfused  Skin: Warm/dry, normal color, no jaundice      MEDICATIONS  (STANDING):    MEDICATIONS  (PRN):      LAB/STUDIES:                        12.8   12.35 )-----------( 203      ( 08 Jul 2021 18:22 )             39.2     07-08    133<L>  |  99  |  14  ----------------------------<  134<H>  4.0   |  27  |  0.9    Ca    8.8      08 Jul 2021 18:22    TPro  6.5  /  Alb  4.1  /  TBili  2.1<H>  /  DBili  x   /  AST  271<H>  /  ALT  157<H>  /  AlkPhos  138<H>  07-08      LIVER FUNCTIONS - ( 08 Jul 2021 18:22 )  Alb: 4.1 g/dL / Pro: 6.5 g/dL / ALK PHOS: 138 U/L / ALT: 157 U/L / AST: 271 U/L / GGT: x             CARDIAC MARKERS ( 08 Jul 2021 18:22 )  x     / <0.01 ng/mL / x     / x     / x        IMAGING:  < from: CT Abdomen and Pelvis w/ IV Cont (07.08.21 @ 20:10) >  No CT evidence for acute intra-abdominal or pelvic pathology.  Cardiomegaly.  Cholelithiasis.  Nonobstructing left renal calculus.  Additional incidentalfindings as above.    < from: US Abdomen Upper Quadrant Right (07.08.21 @ 20:02) >  Liver: Within normal limits.  Bile ducts: Normal caliber. Common bile duct measures 7 mm.  Gallbladder: Cholelithiasis and gallbladder sludge. Increased gallbladder wall thickness, 6 mm. Negative sonographic Onofre's sign  Pancreas: Visualized portions are within normal limits.  Right kidney: 11.8 cm. No hydronephrosis.  Ascites: None.  IVC: Visualized portions are within normal limits.  Cholelithiasis and gallbladder sludge with increased gallbladder wall thickness. Negative sonographic Onofre's sign. If there is clinical concern for cholecystitis, consider HIDA scan for further  evaluation.    < end of copied text >

## 2021-07-08 NOTE — CONSULT NOTE ADULT - ATTENDING COMMENTS
75M w/ PMH CABG 4 vessel, CHF, Pace Maker, OA, HTN, DLD presented to the ED with abdominal pain. Patient has had right upper quadrant pain with associated nausea/vomitting/chills. Patient had bowel movement in the past 24 hours which was normal and was able to tolerate PO intake in the AM.     PLAN:  - Admit to medicine for management of CHF, CAD, cholangitis  - monitored setting  - Given fever, white count, Tbili 1.5, Nontender Exam: suspect ascending cholangitis  - Trend LFTs, Cautious IVF, Rehydration, continue with c/w broad spectrum abx, trend fevers,   - GI for ERCP

## 2021-07-08 NOTE — CONSULT NOTE ADULT - ASSESSMENT
ASSESSMENT:   Patient is a 75M w/ PMH CABG 4 vessel, CHF, Pace Maker, OA, HTN, DLD presented to the ED with abdominal pain. Patient has had right upper quadrant pain with associated nausea/vomitting/chills. Patient had bowel movement in the past 24 hours which was normal and was able to tolerate PO intake in the AM.       PLAN:  - Admit to medicine for management of CHF, CAD  - Given fever, white count, Tbili 1.5, Nontender Exam: suspect ascending cholangitis  - Trend LFTs, Cautious IVF, Rehydration, continue with c/w broad spectrum abx, trend fevers,   - GI for ERCP    Above plan discussed with Attending Surgeon Dr. Irizarry and Dr. Culver, patient, patient family, and Primary team  07-08-21 @ 23:59    CONSULT SPECTRA: [id number] ASSESSMENT:   Patient is a 75M w/ PMH CABG 4 vessel, CHF, Pace Maker, OA, HTN, DLD presented to the ED with abdominal pain. Patient has had right upper quadrant pain with associated nausea/vomitting/chills. Patient had bowel movement in the past 24 hours which was normal and was able to tolerate PO intake in the AM.     PLAN:  - Admit to medicine for management of CHF, CAD  - Given fever, white count, Tbili 1.5, Nontender Exam: suspect ascending cholangitis  - Trend LFTs, Cautious IVF, Rehydration, continue with c/w broad spectrum abx, trend fevers,   - GI for ERCP    Above plan discussed with Attending Surgeon Dr. Irizarry and Dr. Culver, patient, patient family, and Primary team  07-08-21 @ 23:59    CONSULT SPECTRA: [id number]    Senior Note  I have personally examined and evaluated the patient  I agree with the above plan and note, and I have edited where appropriate  CHF can likely account for GB wall thickening. RUQ nontender, negative Onofre, inconsistent w/ acute cholecystitis  Given RUQ pain on hx, fever, T. bili 1.5, consider cholangitis  GI c/s, rpt LFTs  Surgical Attending aware and agrees with plan

## 2021-07-08 NOTE — ED PROVIDER NOTE - OBJECTIVE STATEMENT
74yo male with CABG x4, CHF, PPM, breast CA s/p mastectomy (2001) umbilical hernia, OA of knees/hips, presents c/o rigors and fever PTA. Pt reports was out shopping earlier today and upon arriving home, experienced chills and fever, with upper abdominal discomfort. He reports wife gave him a "pill" for his stomach which completely relieved discomfort, but fever and chills persisted so she called EMS. Pt reports chronic dry cough and chronic knee pain but nothing new acutely. Denies SOB, CP, HA, dizziness, or any rashes. Denies urinary symptoms. Denies sick contacts. Received covid vaccine this year

## 2021-07-09 LAB
ALBUMIN SERPL ELPH-MCNC: 3.6 G/DL — SIGNIFICANT CHANGE UP (ref 3.5–5.2)
ALBUMIN SERPL ELPH-MCNC: 3.8 G/DL — SIGNIFICANT CHANGE UP (ref 3.5–5.2)
ALP SERPL-CCNC: 116 U/L — HIGH (ref 30–115)
ALP SERPL-CCNC: 116 U/L — HIGH (ref 30–115)
ALT FLD-CCNC: 199 U/L — HIGH (ref 0–41)
ALT FLD-CCNC: 200 U/L — HIGH (ref 0–41)
ANION GAP SERPL CALC-SCNC: 10 MMOL/L — SIGNIFICANT CHANGE UP (ref 7–14)
ANION GAP SERPL CALC-SCNC: 7 MMOL/L — SIGNIFICANT CHANGE UP (ref 7–14)
APPEARANCE UR: CLEAR — SIGNIFICANT CHANGE UP
AST SERPL-CCNC: 189 U/L — HIGH (ref 0–41)
AST SERPL-CCNC: 190 U/L — HIGH (ref 0–41)
BACTERIA # UR AUTO: NEGATIVE — SIGNIFICANT CHANGE UP
BASOPHILS # BLD AUTO: 0.04 K/UL — SIGNIFICANT CHANGE UP (ref 0–0.2)
BASOPHILS NFR BLD AUTO: 0.2 % — SIGNIFICANT CHANGE UP (ref 0–1)
BILIRUB DIRECT SERPL-MCNC: 3.1 MG/DL — HIGH (ref 0–0.2)
BILIRUB INDIRECT FLD-MCNC: 0.8 MG/DL — SIGNIFICANT CHANGE UP (ref 0.2–1.2)
BILIRUB SERPL-MCNC: 3.9 MG/DL — HIGH (ref 0.2–1.2)
BILIRUB SERPL-MCNC: 3.9 MG/DL — HIGH (ref 0.2–1.2)
BILIRUB UR-MCNC: ABNORMAL
BUN SERPL-MCNC: 14 MG/DL — SIGNIFICANT CHANGE UP (ref 10–20)
BUN SERPL-MCNC: 14 MG/DL — SIGNIFICANT CHANGE UP (ref 10–20)
CALCIUM SERPL-MCNC: 8.5 MG/DL — SIGNIFICANT CHANGE UP (ref 8.5–10.1)
CALCIUM SERPL-MCNC: 8.8 MG/DL — SIGNIFICANT CHANGE UP (ref 8.5–10.1)
CHLORIDE SERPL-SCNC: 102 MMOL/L — SIGNIFICANT CHANGE UP (ref 98–110)
CHLORIDE SERPL-SCNC: 103 MMOL/L — SIGNIFICANT CHANGE UP (ref 98–110)
CO2 SERPL-SCNC: 26 MMOL/L — SIGNIFICANT CHANGE UP (ref 17–32)
CO2 SERPL-SCNC: 26 MMOL/L — SIGNIFICANT CHANGE UP (ref 17–32)
COLOR SPEC: ABNORMAL
CREAT SERPL-MCNC: 0.9 MG/DL — SIGNIFICANT CHANGE UP (ref 0.7–1.5)
CREAT SERPL-MCNC: 0.9 MG/DL — SIGNIFICANT CHANGE UP (ref 0.7–1.5)
D DIMER BLD IA.RAPID-MCNC: 349 NG/ML DDU — HIGH (ref 0–230)
DIFF PNL FLD: NEGATIVE — SIGNIFICANT CHANGE UP
EOSINOPHIL # BLD AUTO: 0.01 K/UL — SIGNIFICANT CHANGE UP (ref 0–0.7)
EOSINOPHIL NFR BLD AUTO: 0.1 % — SIGNIFICANT CHANGE UP (ref 0–8)
EPI CELLS # UR: 2 /HPF — SIGNIFICANT CHANGE UP (ref 0–5)
GLUCOSE SERPL-MCNC: 106 MG/DL — HIGH (ref 70–99)
GLUCOSE SERPL-MCNC: 107 MG/DL — HIGH (ref 70–99)
GLUCOSE UR QL: NEGATIVE — SIGNIFICANT CHANGE UP
HCT VFR BLD CALC: 36.2 % — LOW (ref 42–52)
HGB BLD-MCNC: 12 G/DL — LOW (ref 14–18)
HYALINE CASTS # UR AUTO: 1 /LPF — SIGNIFICANT CHANGE UP (ref 0–7)
IMM GRANULOCYTES NFR BLD AUTO: 0.5 % — HIGH (ref 0.1–0.3)
KETONES UR-MCNC: NEGATIVE — SIGNIFICANT CHANGE UP
LACTATE SERPL-SCNC: 1 MMOL/L — SIGNIFICANT CHANGE UP (ref 0.7–2)
LACTATE SERPL-SCNC: 2.5 MMOL/L — HIGH (ref 0.7–2)
LEUKOCYTE ESTERASE UR-ACNC: NEGATIVE — SIGNIFICANT CHANGE UP
LYMPHOCYTES # BLD AUTO: 0.36 K/UL — LOW (ref 1.2–3.4)
LYMPHOCYTES # BLD AUTO: 2.1 % — LOW (ref 20.5–51.1)
MAGNESIUM SERPL-MCNC: 2.2 MG/DL — SIGNIFICANT CHANGE UP (ref 1.8–2.4)
MCHC RBC-ENTMCNC: 28.9 PG — SIGNIFICANT CHANGE UP (ref 27–31)
MCHC RBC-ENTMCNC: 33.1 G/DL — SIGNIFICANT CHANGE UP (ref 32–37)
MCV RBC AUTO: 87.2 FL — SIGNIFICANT CHANGE UP (ref 80–94)
MONOCYTES # BLD AUTO: 1.01 K/UL — HIGH (ref 0.1–0.6)
MONOCYTES NFR BLD AUTO: 5.9 % — SIGNIFICANT CHANGE UP (ref 1.7–9.3)
NEUTROPHILS # BLD AUTO: 15.51 K/UL — HIGH (ref 1.4–6.5)
NEUTROPHILS NFR BLD AUTO: 91.2 % — HIGH (ref 42.2–75.2)
NITRITE UR-MCNC: NEGATIVE — SIGNIFICANT CHANGE UP
NRBC # BLD: 0 /100 WBCS — SIGNIFICANT CHANGE UP (ref 0–0)
NT-PROBNP SERPL-SCNC: 3216 PG/ML — HIGH (ref 0–300)
PH UR: 7 — SIGNIFICANT CHANGE UP (ref 5–8)
PHOSPHATE SERPL-MCNC: 3.1 MG/DL — SIGNIFICANT CHANGE UP (ref 2.1–4.9)
PLATELET # BLD AUTO: 192 K/UL — SIGNIFICANT CHANGE UP (ref 130–400)
POTASSIUM SERPL-MCNC: 3.7 MMOL/L — SIGNIFICANT CHANGE UP (ref 3.5–5)
POTASSIUM SERPL-MCNC: 3.8 MMOL/L — SIGNIFICANT CHANGE UP (ref 3.5–5)
POTASSIUM SERPL-SCNC: 3.7 MMOL/L — SIGNIFICANT CHANGE UP (ref 3.5–5)
POTASSIUM SERPL-SCNC: 3.8 MMOL/L — SIGNIFICANT CHANGE UP (ref 3.5–5)
PROT SERPL-MCNC: 6 G/DL — SIGNIFICANT CHANGE UP (ref 6–8)
PROT SERPL-MCNC: 6 G/DL — SIGNIFICANT CHANGE UP (ref 6–8)
PROT UR-MCNC: ABNORMAL
RBC # BLD: 4.15 M/UL — LOW (ref 4.7–6.1)
RBC # FLD: 14.4 % — SIGNIFICANT CHANGE UP (ref 11.5–14.5)
RBC CASTS # UR COMP ASSIST: 2 /HPF — SIGNIFICANT CHANGE UP (ref 0–4)
SODIUM SERPL-SCNC: 136 MMOL/L — SIGNIFICANT CHANGE UP (ref 135–146)
SODIUM SERPL-SCNC: 138 MMOL/L — SIGNIFICANT CHANGE UP (ref 135–146)
SP GR SPEC: >1.05 (ref 1.01–1.03)
UROBILINOGEN FLD QL: ABNORMAL
WBC # BLD: 17.02 K/UL — HIGH (ref 4.8–10.8)
WBC # FLD AUTO: 17.02 K/UL — HIGH (ref 4.8–10.8)
WBC UR QL: 2 /HPF — SIGNIFICANT CHANGE UP (ref 0–5)

## 2021-07-09 PROCEDURE — 99222 1ST HOSP IP/OBS MODERATE 55: CPT

## 2021-07-09 PROCEDURE — 93306 TTE W/DOPPLER COMPLETE: CPT | Mod: 26

## 2021-07-09 PROCEDURE — 71045 X-RAY EXAM CHEST 1 VIEW: CPT | Mod: 26

## 2021-07-09 PROCEDURE — 93970 EXTREMITY STUDY: CPT | Mod: 26

## 2021-07-09 PROCEDURE — 93010 ELECTROCARDIOGRAM REPORT: CPT

## 2021-07-09 PROCEDURE — 99223 1ST HOSP IP/OBS HIGH 75: CPT

## 2021-07-09 RX ORDER — CEFAZOLIN SODIUM 1 G
1000 VIAL (EA) INJECTION EVERY 8 HOURS
Refills: 0 | Status: DISCONTINUED | OUTPATIENT
Start: 2021-07-09 | End: 2021-07-09

## 2021-07-09 RX ORDER — CEFEPIME 1 G/1
1000 INJECTION, POWDER, FOR SOLUTION INTRAMUSCULAR; INTRAVENOUS EVERY 12 HOURS
Refills: 0 | Status: DISCONTINUED | OUTPATIENT
Start: 2021-07-09 | End: 2021-07-10

## 2021-07-09 RX ORDER — METRONIDAZOLE 500 MG
500 TABLET ORAL EVERY 8 HOURS
Refills: 0 | Status: DISCONTINUED | OUTPATIENT
Start: 2021-07-09 | End: 2021-07-14

## 2021-07-09 RX ORDER — ENOXAPARIN SODIUM 100 MG/ML
40 INJECTION SUBCUTANEOUS DAILY
Refills: 0 | Status: DISCONTINUED | OUTPATIENT
Start: 2021-07-09 | End: 2021-07-14

## 2021-07-09 RX ORDER — AMLODIPINE BESYLATE 2.5 MG/1
5 TABLET ORAL DAILY
Refills: 0 | Status: DISCONTINUED | OUTPATIENT
Start: 2021-07-09 | End: 2021-07-10

## 2021-07-09 RX ORDER — METOPROLOL TARTRATE 50 MG
50 TABLET ORAL DAILY
Refills: 0 | Status: DISCONTINUED | OUTPATIENT
Start: 2021-07-09 | End: 2021-07-14

## 2021-07-09 RX ORDER — QUINAPRIL HYDROCHLORIDE 40 MG/1
1 TABLET, FILM COATED ORAL
Qty: 0 | Refills: 0 | DISCHARGE

## 2021-07-09 RX ORDER — AMIODARONE HYDROCHLORIDE 400 MG/1
200 TABLET ORAL DAILY
Refills: 0 | Status: DISCONTINUED | OUTPATIENT
Start: 2021-07-09 | End: 2021-07-14

## 2021-07-09 RX ADMIN — AMIODARONE HYDROCHLORIDE 200 MILLIGRAM(S): 400 TABLET ORAL at 06:04

## 2021-07-09 RX ADMIN — Medication 100 MILLIGRAM(S): at 17:25

## 2021-07-09 RX ADMIN — Medication 100 MILLIGRAM(S): at 06:04

## 2021-07-09 RX ADMIN — ENOXAPARIN SODIUM 40 MILLIGRAM(S): 100 INJECTION SUBCUTANEOUS at 12:03

## 2021-07-09 RX ADMIN — Medication 100 MILLIGRAM(S): at 06:03

## 2021-07-09 RX ADMIN — AMLODIPINE BESYLATE 5 MILLIGRAM(S): 2.5 TABLET ORAL at 06:04

## 2021-07-09 RX ADMIN — Medication 50 MILLIGRAM(S): at 04:34

## 2021-07-09 RX ADMIN — Medication 100 MILLIGRAM(S): at 21:35

## 2021-07-09 RX ADMIN — CEFEPIME 100 MILLIGRAM(S): 1 INJECTION, POWDER, FOR SOLUTION INTRAMUSCULAR; INTRAVENOUS at 17:25

## 2021-07-09 NOTE — CONSULT NOTE ADULT - ASSESSMENT
Patient is a 76 y/o gentleman with PMHx of CAD s/p CABG, 1st degree AV block, LBBB, SSS s/p BiV PPM in 2018, HTN, Breast Ca s/p mastectomy and presbycusis whom presented with complaints of epigastric abdominal pain. Patient notes pain occurred day prior to admission. Patient notes pain was sharri-umbilical, sharp, and sudden. Pain was improved fully by Zantac that his wife gave him. Patient since has not had any additional episodes of pain. Abdominal exam is unremarkable and no longer having pain, requesting to eat. Patient with a T jose of 3.9 with a CBD of 7mm. Patient has intact Gallbladder with some wall thickening. Patient with stable hemodynamics.    Abdominal Pain/ Cholelithiasis/ Elevated LFT  - Abdominal exam unremarkable  - On Cefazolin and Flagyl  - Obtain MRCP  - T bili 3.9, CBD 7mm, Gallbladder intact with Stones and some thickening- Surgery onboard  - Hemodynamically stable   - Will follow    Patient is a 76 y/o gentleman with PMHx of CAD s/p CABG, 1st degree AV block, LBBB, SSS s/p BiV PPM in 2018, HTN, Breast Ca s/p mastectomy and presbycusis whom presented with complaints of epigastric abdominal pain. Patient notes pain occurred day prior to admission. Patient notes pain was sharri-umbilical, sharp, and sudden. Pain was improved fully by Zantac that his wife gave him. Patient since has not had any additional episodes of pain. Abdominal exam is unremarkable and no longer having pain, requesting to eat. Patient with a T jose of 3.9 with a CBD of 7mm. Patient has intact Gallbladder with some wall thickening. Patient with stable hemodynamics.    Abdominal Pain/ Cholelithiasis/ Elevated LFT  - Abdominal exam unremarkable  - On Cefazolin and Flagyl  - Obtain MRCP (HAS A PPM), check if compatible w MRI, if not will perform EUS r/o CBD stone   - T bili 3.9, CBD 7mm, Gallbladder intact with Stones and some thickening- Surgery onboard  - Hemodynamically stable   - Will follow

## 2021-07-09 NOTE — CONSULT NOTE ADULT - SUBJECTIVE AND OBJECTIVE BOX
Patient is a 75y old  Male who presents with a chief complaint of Abdominal Pain (09 Jul 2021 01:55)      HPI:  HPI: 76 YO M w/PMHx of CAD s/p CABG, 1 deg AV block, LBBB, SSS s/p BiV PPM in 2018, HTN, Breast Ca s/p mastectomy and presbycusis presenting with complaints of epigastric abdominal pain, sharp in nature of several hours duration, pain onset suddenly, endorses a hx of intermittent abdominal pain after eating fatty and/or fried foods.     ED COURSE: Labs remarkable for WBC 12.3, bili 2.1, , ,  RUQ US remarkable for Cholelithiasis and gallbladder sludge with increased gallbladder wall thickness. Negative sonographic Onofre's sign. CT A/P reveals:  cholelithiasis. Trace gallbladder wall edema may be on the basis of periportal edema. No definite CT evidence for pericholecystic fat stranding. No biliary ductal dilatation. and a nonobstructing R renal calculus. Pt endorses darker urine of late, denies changes in stool color, denies noticing any yellowing of skin or eyes, denies any easy bruising or excessive bleeding of the gums when brushing his teeth.  (09 Jul 2021 01:55)      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  2014    H/O mastectomy  right 2001        SOCIAL HX:   Smoking                         ETOH                            Other    FAMILY HISTORY:  Family history of heart disease (Father, Mother)    Family history of lung cancer (Sibling)    :  No known cardiovacular family hisotry     ROS:  See HPI     Allergies    No Known Allergies    Intolerances          PHYSICAL EXAM    ICU Vital Signs Last 24 Hrs  T(C): 35.8 (09 Jul 2021 08:00), Max: 38.8 (08 Jul 2021 16:47)  T(F): 96.4 (09 Jul 2021 08:00), Max: 101.8 (08 Jul 2021 16:47)  HR: 60 (09 Jul 2021 08:00) (60 - 115)  BP: 128/59 (09 Jul 2021 08:00) (110/76 - 159/76)  BP(mean): 85 (09 Jul 2021 08:00) (80 - 85)  ABP: --  ABP(mean): --  RR: 18 (09 Jul 2021 08:00) (16 - 18)  SpO2: 98% (09 Jul 2021 05:24) (95% - 98%)      General: In NAD   HEENT:  CHERI              Lymphatic system: No cervical LN   Lungs: Bilateral BS  Cardiovascular: Regular  Gastrointestinal: Soft, Positive BS  Musculoskeletal: No clubbing.  Moves all extremities.  Full range of motion   Skin: Warm.  Intact  Neurological: No motor or sensory deficit         LABS:                          12.0   17.02 )-----------( 192      ( 09 Jul 2021 08:12 )             36.2                                               07-09    138  |  102  |  14  ----------------------------<  106<H>  3.8   |  26  |  0.9    Ca    8.5      09 Jul 2021 08:12  Phos  3.1     07-09  Mg     2.2     07-09    TPro  6.0  /  Alb  3.8  /  TBili  3.9<H>  /  DBili  3.1<H>  /  AST  189<H>  /  ALT  200<H>  /  AlkPhos  116<H>  07-09                                             Urinalysis Basic - ( 09 Jul 2021 00:00 )    Color: Silva / Appearance: Clear / SG: >1.050 / pH: x  Gluc: x / Ketone: Negative  / Bili: Small / Urobili: 6 mg/dL   Blood: x / Protein: 30 mg/dL / Nitrite: Negative   Leuk Esterase: Negative / RBC: 2 /HPF / WBC 2 /HPF   Sq Epi: x / Non Sq Epi: 2 /HPF / Bacteria: Negative        CARDIAC MARKERS ( 08 Jul 2021 18:22 )  x     / <0.01 ng/mL / x     / x     / x                                                LIVER FUNCTIONS - ( 09 Jul 2021 08:12 )  Alb: 3.8 g/dL / Pro: 6.0 g/dL / ALK PHOS: 116 U/L / ALT: 200 U/L / AST: 189 U/L / GGT: x                                                                                                                                       X-Rays                                                                                     ECHO    MEDICATIONS  (STANDING):  aMIOdarone    Tablet 200 milliGRAM(s) Oral daily  amLODIPine   Tablet 5 milliGRAM(s) Oral daily  ceFAZolin   IVPB 1000 milliGRAM(s) IV Intermittent every 8 hours  enoxaparin Injectable 40 milliGRAM(s) SubCutaneous daily  metoprolol tartrate 50 milliGRAM(s) Oral daily  metroNIDAZOLE  IVPB 500 milliGRAM(s) IV Intermittent every 8 hours    MEDICATIONS  (PRN):         Patient is a 75y old  Male who presents with a chief complaint of Abdominal Pain (09 Jul 2021 01:55)      HPI:  HPI: 76 YO M w/PMHx of CAD s/p CABG, 1 deg AV block, LBBB, SSS s/p BiV PPM in 2018, HTN, Breast Ca s/p mastectomy and presbycusis presenting with complaints of epigastric abdominal pain, sharp in nature of several hours duration, pain onset suddenly, endorses a hx of intermittent abdominal pain after eating fatty and/or fried foods.     ED COURSE: Labs remarkable for WBC 12.3, bili 2.1, , ,  RUQ US remarkable for Cholelithiasis and gallbladder sludge with increased gallbladder wall thickness. Negative sonographic Onofre's sign. CT A/P reveals:  cholelithiasis. Trace gallbladder wall edema may be on the basis of periportal edema. No definite CT evidence for pericholecystic fat stranding. No biliary ductal dilatation. and a nonobstructing R renal calculus. Pt endorses darker urine of late, denies changes in stool color, denies noticing any yellowing of skin or eyes, denies any easy bruising or excessive bleeding of the gums when brushing his teeth.      admitted to SDU, feels better no pain, want to eat, seen by SX      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  2014    H/O mastectomy  right 2001        SOCIAL HX:   Smoking -    FAMILY HISTORY:  Family history of heart disease (Father, Mother)    Family history of lung cancer (Sibling)    :  No known cardiovacular family hisotry     ROS:  See HPI     Allergies    No Known Allergies    Intolerances          PHYSICAL EXAM    ICU Vital Signs Last 24 Hrs  T(C): 35.8 (09 Jul 2021 08:00), Max: 38.8 (08 Jul 2021 16:47)  T(F): 96.4 (09 Jul 2021 08:00), Max: 101.8 (08 Jul 2021 16:47)  HR: 60 (09 Jul 2021 08:00) (60 - 115)  BP: 128/59 (09 Jul 2021 08:00) (110/76 - 159/76)  BP(mean): 85 (09 Jul 2021 08:00) (80 - 85)  RR: 18 (09 Jul 2021 08:00) (16 - 18)  SpO2: 98% (09 Jul 2021 05:24) (95% - 98%)      General: comfortable   HEENT:  CHERI              Lymphatic system: No cervical LN   Lungs: dec bs both bases  Cardiovascular: STELLA 3/6  Gastrointestinal: Soft, Positive BS  Musculoskeletal: No clubbing.  Moves all extremities.  Full range of motion   Skin: Warm.  Intact  Neurological: No motor or sensory deficit         LABS:                          12.0   17.02 )-----------( 192      ( 09 Jul 2021 08:12 )             36.2                                               07-09    138  |  102  |  14  ----------------------------<  106<H>  3.8   |  26  |  0.9    Ca    8.5      09 Jul 2021 08:12  Phos  3.1     07-09  Mg     2.2     07-09    TPro  6.0  /  Alb  3.8  /  TBili  3.9<H>  /  DBili  3.1<H>  /  AST  189<H>  /  ALT  200<H>  /  AlkPhos  116<H>  07-09                                             Urinalysis Basic - ( 09 Jul 2021 00:00 )    Color: Silva / Appearance: Clear / SG: >1.050 / pH: x  Gluc: x / Ketone: Negative  / Bili: Small / Urobili: 6 mg/dL   Blood: x / Protein: 30 mg/dL / Nitrite: Negative   Leuk Esterase: Negative / RBC: 2 /HPF / WBC 2 /HPF   Sq Epi: x / Non Sq Epi: 2 /HPF / Bacteria: Negative        CARDIAC MARKERS ( 08 Jul 2021 18:22 )  x     / <0.01 ng/mL / x     / x     / x                                                LIVER FUNCTIONS - ( 09 Jul 2021 08:12 )  Alb: 3.8 g/dL / Pro: 6.0 g/dL / ALK PHOS: 116 U/L / ALT: 200 U/L / AST: 189 U/L / GGT: x                                                                                                                                         MEDICATIONS  (STANDING):  aMIOdarone    Tablet 200 milliGRAM(s) Oral daily  amLODIPine   Tablet 5 milliGRAM(s) Oral daily  ceFAZolin   IVPB 1000 milliGRAM(s) IV Intermittent every 8 hours  enoxaparin Injectable 40 milliGRAM(s) SubCutaneous daily  metoprolol tartrate 50 milliGRAM(s) Oral daily  metroNIDAZOLE  IVPB 500 milliGRAM(s) IV Intermittent every 8 hours    MEDICATIONS  (PRN):

## 2021-07-09 NOTE — H&P ADULT - NSHPLABSRESULTS_GEN_ALL_CORE
Vital Signs Last 24 Hrs  T(C): 37.2 (09 Jul 2021 01:36), Max: 38.8 (08 Jul 2021 16:47)  T(F): 98.9 (09 Jul 2021 01:36), Max: 101.8 (08 Jul 2021 16:47)  HR: 63 (09 Jul 2021 01:36) (63 - 97)  BP: 117/56 (09 Jul 2021 01:36) (110/76 - 159/76)  BP(mean): 80 (09 Jul 2021 01:36) (80 - 80)  RR: 16 (09 Jul 2021 01:36) (16 - 18)  SpO2: 98% (09 Jul 2021 01:36) (95% - 98%)    LABS/RADIOLOGY RESULTS:                          12.8   12.35 )-----------( 203      ( 08 Jul 2021 18:22 )             39.2   07-08    133<L>  |  99  |  14  ----------------------------<  134<H>  4.0   |  27  |  0.9    Ca    8.8      08 Jul 2021 18:22    TPro  6.5  /  Alb  4.1  /  TBili  2.1<H>  /  DBili  x   /  AST  271<H>  /  ALT  157<H>  /  AlkPhos  138<H>  07-08    Lactate, Blood: 2.5 mmol/L (07-09 @ 01:00)  Lactate, Blood: 2.7 mmol/L (07-08 @ 18:22)    Troponin T, Serum: <0.01 ng/mL (07-08-21 @ 18:22)    Blood Cultures    Urinalysis Basic - ( 09 Jul 2021 00:00 )    Color: Silva / Appearance: Clear / SG: >1.050 / pH:   Gluc:  / Ketone: Negative  / Bili: Small / Urobili: 6 mg/dL   Blood:  / Protein: 30 mg/dL / Nitrite: Negative   Leuk Esterase: Negative / RBC: 2 /HPF / WBC 2 /HPF   Sq Epi:  / Non Sq Epi: 2 /HPF / Bacteria: Negative    < from: US Abdomen Upper Quadrant Right (07.08.21 @ 20:02) >    IMPRESSION:    Cholelithiasis and gallbladder sludge with increased gallbladder wall thickness. Negative sonographic Onofre's sign. If there is clinical concern for cholecystitis, consider HIDA scan for further  evaluation.    < end of copied text >    < from: CT Abdomen and Pelvis w/ IV Cont (07.08.21 @ 20:10) >    IMPRESSION:    No CT evidence for acute intra-abdominal or pelvic pathology.  Cardiomegaly.  Cholelithiasis.  Nonobstructing left renal calculus.  Additional incidentalfindings as above.    < end of copied text >

## 2021-07-09 NOTE — PHYSICAL THERAPY INITIAL EVALUATION ADULT - GENERAL OBSERVATIONS, REHAB EVAL
Pt received supine in bed with HOB >30. Pt awake, pleasant, and cooperative. Pt tolerated session well with no signs or symptoms. Pt left as received in No apparent distress.

## 2021-07-09 NOTE — H&P ADULT - HISTORY OF PRESENT ILLNESS
[facility]  Psychiatric Progress Note      Impression:   This is a 31 year old yo male with a history of schizoaffective disorder, TBI, and polysubstance abuse.    Steven is initially sitting propped up in bed looking out the door, though quickly lays back down when I enter the room. He does not open his eyes or acknowledge me when I speak with him. Discussed that I would be filing a petition for commitment given his minimal participation in assessment and his refusal of medications. He has not been eating much off of his meal trays, though does eat other snacks and fluids if they are left on his bedside table. Will add I&O to track his intake until he is eating more. Labs had been unremarkable upon admission and he has had some fluid and food intake and is voiding. Will schedule Ativan 1 mg three times a day and see if there is any improvement in symptoms after he takes this. He refused to take the Prolixin last night. May need to consider Sarah petition as well unless he becomes compliant with taking it, then would likely only need a substitute decision maker. He is moving around in bed, though has not spoken with any staff since his admission.       Educated regarding medication indications, risks, benefits, side effects, contraindications and possible interactions. Verbally expressed understanding.        Diagnoses:   Schizoaffective disorder- depressed type (by history)  R/o substance induced psychosis  ADHD, unspecified  History of TBI  History of polysubstance abuse (amphetamines most recent)      Attestation:  Patient has been seen and evaluated by me,  Christelle Contreras, SONIDO          Interim History:   The patient's care was discussed with the treatment team and chart notes were reviewed.    BEHAVIORAL TEAM DISCUSSION    Progress: Limited. Has had some intake today, I&O added. Minimally responsive to staff. Refused Prolixin last night.    Continued Stay Criteria/Rationale: Catatonic symptoms,  adding scheduled Ativan. Petition for commitment filed.    Medical/Physical: no acute, poor intake- monitoring I&O  Precautions:   Falls precaution?: No  Behavioral Orders   Procedures     Code 1 - Restrict to Unit     Elopement precautions     Routine Programming     As clinically indicated     Status 15     Every 15 minutes.       Plan:   Schedule Ativan 1 mg TID for catatonic symptoms  Continue Prolixin 5 mg at bedtime  Continue Gabapentin 300 mg TID  Petition for commitment filed  Will likely need Sarah or substitute decision maker depending on his med compliance    Rationale for change in precautions or plan:   Catatonic symptoms, not speaking with staff and intake poor      Participants: Christelle Contreras CNP, nursing, OT, SW        Medications:     Current Facility-Administered Medications Ordered in Epic   Medication Dose Route Frequency Last Rate Last Admin     acetaminophen (TYLENOL) tablet 650 mg  650 mg Oral Q4H PRN         alum & mag hydroxide-simethicone (MAALOX) suspension 30 mL  30 mL Oral Q4H PRN         benztropine (COGENTIN) tablet 0.5 mg  0.5 mg Oral BID PRN         fluPHENAZine (PROLIXIN) tablet 5 mg  5 mg Oral At Bedtime         gabapentin (NEURONTIN) capsule 300 mg  300 mg Oral TID         hydrOXYzine (ATARAX) tablet 25-50 mg  25-50 mg Oral Q4H PRN         LORazepam (ATIVAN) tablet 1 mg  1 mg Oral Q6H PRN        Or     LORazepam (ATIVAN) injection 1 mg  1 mg Intramuscular Q6H PRN         LORazepam (ATIVAN) tablet 1 mg  1 mg Oral TID         melatonin tablet 3 mg  3 mg Oral At Bedtime PRN         nicotine (NICORETTE) gum 2 mg  2 mg Buccal Q1H PRN         OLANZapine zydis (zyPREXA) ODT tab 10 mg  10 mg Oral TID PRN        Or     OLANZapine (zyPREXA) injection 10 mg  10 mg Intramuscular TID PRN         propranolol (INDERAL) tablet 10 mg  10 mg Oral BID PRN         No current [facility]-ordered outpatient medications on file.            10 point ROS- uncooperative with assessment       Allergies:   No  "Known Allergies         Psychiatric Examination:   /59   Pulse 57   Temp 98.1  F (36.7  C) (Temporal)   Resp 16   Ht 1.905 m (6' 3\")   Wt 96.5 kg (212 lb 12.8 oz)   SpO2 96%   BMI 26.60 kg/m    Weight is 212 lbs 12.8 oz  Body mass index is 26.6 kg/m .    Appearance:  dressed in hospital scrubs, appeared as age stated and slightly unkempt  Attitude:  uncooperative  Eye Contact:  None, keeps eyes closed  Mood:  Unable to assess. Pt not participating in assessment  Affect:  Unable to assess. Pt not participating in assessment  Speech:  nonverbal  Psychomotor Behavior:  no evidence of tardive dyskinesia, dystonia, or tics  Thought Process:  Unable to assess. Pt not participating in assessment  Associations:  Unable to assess. Pt not participating in assessment  Thought Content:  Unable to assess. Pt not participating in assessment  Insight:  Unable to assess. Pt not participating in assessment  Judgment:  Unable to assess. Pt not participating in assessment  Oriented to:  Unable to assess. Pt not participating in assessment  Attention Span and Concentration:  Unable to assess. Pt not participating in assessment  Recent and Remote Memory:  Unable to assess. Pt not participating in assessment  Fund of Knowledge: Unable to assess. Pt not participating in assessment  Muscle Strength and Tone: normal  Gait and Station: Normal           Labs:     Results for orders placed or performed during the hospital encounter of 03/07/21 (from the past 24 hour(s))   Glucose by meter   Result Value Ref Range    Glucose 76 70 - 99 mg/dL   Glucose by meter   Result Value Ref Range    Glucose 80 70 - 99 mg/dL     " 74 YO M w/PMHx of CAD s/p CABG, 1 deg AV block, LBBB, SSS s/p BiV PPM in 2018, HTN, Breast Ca s/p mastectomy presenting with complaints of epigastric abdominal pain, sharp in nature of several hours duration, pain onset suddenly,  HPI: 76 YO M w/PMHx of CAD s/p CABG, 1 deg AV block, LBBB, SSS s/p BiV PPM in 2018, HTN, Breast Ca s/p mastectomy and presbycusis presenting with complaints of epigastric abdominal pain, sharp in nature of several hours duration, pain onset suddenly, endorses a hx of intermittent abdominal pain after eating fatty and/or fried foods.     ED COURSE: Labs remarkable for WBC 12.3, bili 2.1, , ,  RUQ US remarkable for Cholelithiasis and gallbladder sludge with increased gallbladder wall thickness. Negative sonographic Onofre's sign. CT A/P reveals:  cholelithiasis. Trace gallbladder wall edema may be on the basis of periportal edema. No definite CT evidence for pericholecystic fat stranding. No biliary ductal dilatation. and a nonobstructing R renal calculus. Pt endorses darker urine of late, denies changes in stool color, denies noticing any yellowing of skin or eyes, denies any easy bruising or excessive bleeding of the gums when brushing his teeth.

## 2021-07-09 NOTE — H&P ADULT - ASSESSMENT
ASSESSMENT:    IMPRESSION:  # Possible Cholangitis   # Cholecystitis     PLAN:     ASSESSMENT:  76 YO M w/PMHx of CAD s/p CABG, 1 deg AV block, LBBB, SSS s/p BiV PPM in 2018, HTN, Breast Ca s/p mastectomy and presbycusis admitted to SDU for management of acute cholangitis, surgery following, advanced GI eval pending.     IMPRESSION:  # Moderate Acute Cholangitis   # Cholecystitis   # HFrEF  # HTN  # CAD s/p CABG  # h/o LBBB, 1degAVB, SSS s/p PPM  # HLD    PLAN:  # Moderate Acute Cholangitis vs. Cholecystitis vs. Both  - CT A/P + RUQ US as above, s/p surgery eval  - Trend LFT's  - Cefazolin + Flagyl  - Holding statin for transaminitis   - Advanced GI Consulted for ERCP    # CAD s/p CABG  # h/o LBBB, 1degAVB, SSS s/p PPM  # HLD  # HFpEF  - c/w metoprolol + ASA  - hold statin  - 2D Echo  - pro-BNP  - c/w Amiodarone  - Keep I=O    # Chronic Essential HTN  - c/w Amlodipine    # LLE swelling and Varicosities  - VA Duplex LE Bilat    # CODE: Full   # DVT PPX: Lovenox  # GI PPX not indicated  # DIET: NPO  # DISPO: SDU

## 2021-07-09 NOTE — CONSULT NOTE ADULT - ASSESSMENT
IMPRESSION:    Sepsis present on admission  Probable acute cholangitis  HO HFrEF s/p AICD      PLAN:    CNS: no sedation    HEENT: oral care    PULMONARY: HOB >45.     CARDIOVASCULAR: keep i=o. avoid volume overlaod    GI: GI prophylaxis.  NPO for now. fu GI. surgery; no intervention. trend LFTS    RENAL: fu lytes,     INFECTIOUS DISEASE: continuw antibx. follow up cultures.    HEMATOLOGICAL:  DVT prophylaxis.    ENDOCRINE:  Follow up FS.  Insulin protocol if needed.    MUSCULOSKELETAL: bedrest         IMPRESSION:    Sepsis present on admission  Doubt acute cholangitis  HO HFrEF s/p AICD      PLAN:    CNS: no sedation    HEENT: oral care    PULMONARY: HOB >45.     CARDIOVASCULAR: keep i=o. avoid volume overlaod    GI: GI prophylaxis.  NPO for now. fu GI. surgery; no intervention. trend LFTS    RENAL: fu lytes,     INFECTIOUS DISEASE: continue abx, pancx ( cefepime/ flagyl)    HEMATOLOGICAL:  DVT prophylaxis.    ENDOCRINE:  Follow up FS.  Insulin protocol if needed.    MUSCULOSKELETAL: bedrest    OOB to chair

## 2021-07-09 NOTE — CONSULT NOTE ADULT - ATTENDING COMMENTS
Patient seen ans examined during rounds 7/9/21 with GI PA, Advanced fellow and GI fellow , appeared overall stable, no cholangitis, assessment and plan above

## 2021-07-09 NOTE — CONSULT NOTE ADULT - SUBJECTIVE AND OBJECTIVE BOX
Patient is a 76 y/o gentleman with PMHx of CAD s/p CABG, 1st degree AV block, LBBB, SSS s/p BiV PPM in 2018, HTN, Breast Ca s/p mastectomy and presbycusis whom presented with complaints of epigastric abdominal pain.        PAST MEDICAL & SURGICAL HISTORY:  Arrhythmia  Hypercholesteremia  HTN (hypertension)  Obesity  CHF (congestive heart failure)  Heart attack  Breast cancer 2001  Osteoarthritis  S/P CABG x 5 -2014          MEDICATIONS  (STANDING):  aMIOdarone    Tablet 200 milliGRAM(s) Oral daily  amLODIPine   Tablet 5 milliGRAM(s) Oral daily  ceFAZolin   IVPB 1000 milliGRAM(s) IV Intermittent every 8 hours  enoxaparin Injectable 40 milliGRAM(s) SubCutaneous daily  metoprolol tartrate 50 milliGRAM(s) Oral daily  metroNIDAZOLE  IVPB 500 milliGRAM(s) IV Intermittent every 8 hours        Allergies  No Known Allergies      Review of Systems  General:  Denies Fatigue, Denies Fever, Denies Weakness ,Denies Weight Loss   HEENT: Denies Trouble Swallowing ,Denies  Sore Throat , Denies Change in hearing/vision/speech ,Denies Dizziness    Cardio: Denies  Chest Pain , Palpitations    Respiratory: Denies worsening of SOB, Denies Cough  Abdomen: See detailed HPI  Neuro: Denies Headache Denies Dizziness, Denies Paresthesias  MSK: Denies pain in Bones/Joints/Muscles   Psych: Patient denies depression, denies suicidal or homicidal ideations  Integ: Patient Denies rash, or new skin lesions         Vital Signs  T(F): 96.4 (09 Jul 2021 08:00), Max: 101.8 (08 Jul 2021 16:47)  HR: 60 (09 Jul 2021 08:00) (60 - 115)  BP: 128/59 (09 Jul 2021 08:00) (110/76 - 159/76)  BP(mean): 85 (09 Jul 2021 08:00) (80 - 85)  RR: 18 (09 Jul 2021 08:00) (16 - 18)  SpO2: 98% (09 Jul 2021 05:24) (95% - 98%)  Physical Exam  Gen: NAD  HEENT: NC/AT, Mucosal Membranes  Cardio: S1/S2 No S3/S4, Regular  Resp: CTA B/L  Abdomen: Soft, ND/NT  Neuro: AAOx3, Cranial Nerve II-XII intact   Extremities: FROM x 4      LABS:                        12.0   17.02 )-----------( 192      ( 09 Jul 2021 08:12 )             36.2     07-09    138  |  102  |  14  ----------------------------<  106<H>  3.8   |  26  |  0.9    Ca    8.5      09 Jul 2021 08:12  Phos  3.1     07-09  Mg     2.2     07-09    TPro  6.0  /  Alb  3.8  /  TBili  3.9<H>  /  DBili  3.1<H>  /  AST  189<H>  /  ALT  200<H>  /  AlkPhos  116<H>  07-09      Lactate, Blood: 1.0 mmol/L (07-09-21 @ 08:12)  Lactate, Blood: 2.5 mmol/L (07-09-21 @ 01:00)  Lactate, Blood: 2.7 mmol/L (07-08-21 @ 18:22)        RADIOLOGY & ADDITIONAL STUDIES:  CT Abdomen and Pelvis w/ IV Cont 07.08.21   IMPRESSION:    No CT evidence for acute intra-abdominal or pelvic pathology.  Cardiomegaly.  Cholelithiasis.  Nonobstructing left renal calculus.  Additional incidentalfindings as above.    US Abdomen Upper Quadrant Right 07.08.21   IMPRESSION:    Cholelithiasis and gallbladder sludge with increased gallbladder wall thickness. Negative sonographic Onofre's sign. If there is clinical concern for cholecystitis, consider HIDA scan for further  evaluation.  CBD 7mm          Patient is a 76 y/o gentleman with PMHx of CAD s/p CABG, 1st degree AV block, LBBB, SSS s/p BiV PPM in 2018, HTN, Breast Ca s/p mastectomy and presbycusis whom presented with complaints of epigastric abdominal pain. Patient notes pain occurred day prior to admission. Patient notes pain was sharri-umbilical, sharp, and sudden. Pain was improved fully by Zantac that his wife gave him. Patient since has not had any additional episodes of pain.         PAST MEDICAL & SURGICAL HISTORY:  Arrhythmia  Hypercholesteremia  HTN (hypertension)  Obesity  CHF (congestive heart failure)  Heart attack  Breast cancer 2001  Osteoarthritis  S/P CABG x 5 -2014          MEDICATIONS  (STANDING):  aMIOdarone    Tablet 200 milliGRAM(s) Oral daily  amLODIPine   Tablet 5 milliGRAM(s) Oral daily  ceFAZolin   IVPB 1000 milliGRAM(s) IV Intermittent every 8 hours  enoxaparin Injectable 40 milliGRAM(s) SubCutaneous daily  metoprolol tartrate 50 milliGRAM(s) Oral daily  metroNIDAZOLE  IVPB 500 milliGRAM(s) IV Intermittent every 8 hours        Allergies  No Known Allergies      Review of Systems  General:  Denies Fatigue, Denies Fever, Denies Weakness ,Denies Weight Loss   HEENT: Denies Trouble Swallowing ,Denies  Sore Throat , Denies Change in hearing/vision/speech ,Denies Dizziness    Cardio: Denies  Chest Pain , Palpitations    Respiratory: Denies worsening of SOB, Denies Cough  Abdomen: See detailed HPI  Neuro: Denies Headache Denies Dizziness, Denies Paresthesias  MSK: Denies pain in Bones/Joints/Muscles   Psych: Patient denies depression, denies suicidal or homicidal ideations  Integ: Patient Denies rash, or new skin lesions         Vital Signs  T(F): 96.4 (09 Jul 2021 08:00), Max: 101.8 (08 Jul 2021 16:47)  HR: 60 (09 Jul 2021 08:00) (60 - 115)  BP: 128/59 (09 Jul 2021 08:00) (110/76 - 159/76)  BP(mean): 85 (09 Jul 2021 08:00) (80 - 85)  RR: 18 (09 Jul 2021 08:00) (16 - 18)  SpO2: 98% (09 Jul 2021 05:24) (95% - 98%)  Physical Exam  Gen: NAD  HEENT: NC/AT, Mucosal Membranes  Cardio: S1/S2 No S3/S4, Regular  Resp: CTA B/L  Abdomen: Soft, ND/NT  Neuro: AAOx3, Cranial Nerve II-XII intact   Extremities: FROM x 4      LABS:                        12.0   17.02 )-----------( 192      ( 09 Jul 2021 08:12 )             36.2     07-09    138  |  102  |  14  ----------------------------<  106<H>  3.8   |  26  |  0.9    Ca    8.5      09 Jul 2021 08:12  Phos  3.1     07-09  Mg     2.2     07-09    TPro  6.0  /  Alb  3.8  /  TBili  3.9<H>  /  DBili  3.1<H>  /  AST  189<H>  /  ALT  200<H>  /  AlkPhos  116<H>  07-09      Lactate, Blood: 1.0 mmol/L (07-09-21 @ 08:12)  Lactate, Blood: 2.5 mmol/L (07-09-21 @ 01:00)  Lactate, Blood: 2.7 mmol/L (07-08-21 @ 18:22)        RADIOLOGY & ADDITIONAL STUDIES:  CT Abdomen and Pelvis w/ IV Cont 07.08.21   IMPRESSION:    No CT evidence for acute intra-abdominal or pelvic pathology.  Cardiomegaly.  Cholelithiasis.  Nonobstructing left renal calculus.  Additional incidentalfindings as above.    US Abdomen Upper Quadrant Right 07.08.21   IMPRESSION:    Cholelithiasis and gallbladder sludge with increased gallbladder wall thickness. Negative sonographic Onofre's sign. If there is clinical concern for cholecystitis, consider HIDA scan for further  evaluation.  CBD 7mm

## 2021-07-09 NOTE — ED ADULT NURSE REASSESSMENT NOTE - NS ED NURSE REASSESS COMMENT FT1
pt noted to be tachycardic and in an arrythmia on cardiac monitor. pt denies CP/SOB. VSS. MD [id number] called and came ot bedside. EKG done. Per MD metoprolol 50mg given early. will continue to monitor pt.

## 2021-07-09 NOTE — PROGRESS NOTE ADULT - SUBJECTIVE AND OBJECTIVE BOX
`GENERAL SURGERY PROGRESS NOTE    Patient: WAGNER SEGURA , 75y ([birth year])Male   MRN: [id number]  Location: [facility] 0[facility] A  Visit: 07-09-21 Inpatient  Date: 07-09-21 @ 16:58    Hospital Day #:2    Procedure/Dx/Injuries: Cholangitis    Events of past 24 hours: CTAP yesterday which was unremarkable for acute pathology, US  showing stones and sludge. LFTs increasing.  Seen by GI who recommend MRI which is planned for today.  Patient reports pain resolved, is hungry and asking for food, no complaints at this time.    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  2014    H/O mastectomy  right 2001        Vitals:   T(F): 96.4 (07-09-21 @ 08:00), Max: 100 (07-08-21 @ 20:20)  HR: 60 (07-09-21 @ 08:00)  BP: 128/59 (07-09-21 @ 08:00)  RR: 18 (07-09-21 @ 08:00)  SpO2: 98% (07-09-21 @ 05:24)      Diet, DASH/TLC:   Sodium & Cholesterol Restricted      Fluids:     I & O's:    PHYSICAL EXAM:  General: NAD    Cardiac: RRR  Respiratory: Unlabored breathing at rest  Abdomen: Soft, non-distended, non-tender  Skin: Warm/dry, normal color, no jaundice    MEDICATIONS  (STANDING):  aMIOdarone    Tablet 200 milliGRAM(s) Oral daily  amLODIPine   Tablet 5 milliGRAM(s) Oral daily  cefepime   IVPB 1000 milliGRAM(s) IV Intermittent every 12 hours  enoxaparin Injectable 40 milliGRAM(s) SubCutaneous daily  metoprolol tartrate 50 milliGRAM(s) Oral daily  metroNIDAZOLE  IVPB 500 milliGRAM(s) IV Intermittent every 8 hours    MEDICATIONS  (PRN):      DVT PROPHYLAXIS: enoxaparin Injectable 40 milliGRAM(s) SubCutaneous daily    GI PROPHYLAXIS:   ANTICOAGULATION:   ANTIBIOTICS:  cefepime   IVPB 1000 milliGRAM(s)  metroNIDAZOLE  IVPB 500 milliGRAM(s)            LAB/STUDIES:  Labs:  CAPILLARY BLOOD GLUCOSE                              12.0   17.02 )-----------( 192      ( 09 Jul 2021 08:12 )             36.2       Auto Neutrophil %: 91.2 % (07-09-21 @ 08:12)  Auto Immature Granulocyte %: 0.5 % (07-09-21 @ 08:12)  Auto Neutrophil %: 91.3 % (07-08-21 @ 18:22)    07-09    138  |  102  |  14  ----------------------------<  106<H>  3.8   |  26  |  0.9      Calcium, Total Serum: 8.5 mg/dL (07-09-21 @ 08:12)      LFTs:             6.0  | 3.9  | 189      ------------------[116     ( 09 Jul 2021 08:12 )  3.8  | 3.1  | 200         Lipase:x      Amylase:x         Lactate, Blood: 1.0 mmol/L (07-09-21 @ 08:12)  Lactate, Blood: 2.5 mmol/L (07-09-21 @ 01:00)  Lactate, Blood: 2.7 mmol/L (07-08-21 @ 18:22)      Coags:    CARDIAC MARKERS ( 08 Jul 2021 18:22 )  x     / <0.01 ng/mL / x     / x     / x          Serum Pro-Brain Natriuretic Peptide: 3216 pg/mL (07-09-21 @ 08:12)      Urinalysis Basic - ( 09 Jul 2021 00:00 )    Color: Silva / Appearance: Clear / SG: >1.050 / pH: x  Gluc: x / Ketone: Negative  / Bili: Small / Urobili: 6 mg/dL   Blood: x / Protein: 30 mg/dL / Nitrite: Negative   Leuk Esterase: Negative / RBC: 2 /HPF / WBC 2 /HPF   Sq Epi: x / Non Sq Epi: 2 /HPF / Bacteria: Negative    IMAGING:  < from: US Abdomen Upper Quadrant Right (07.08.21 @ 20:02) >    IMPRESSION:    Cholelithiasis and gallbladder sludge with increased gallbladder wall thickness. Negative sonographic Onofre's sign. If there is clinical concern for cholecystitis, consider HIDA scan for further  evaluation.    < end of copied text >  < from: CT Abdomen and Pelvis w/ IV Cont (07.08.21 @ 20:10) >    IMPRESSION:    No CT evidence for acute intra-abdominal or pelvic pathology.  Cardiomegaly.  Cholelithiasis.  Nonobstructing left renal calculus.  Additional incidentalfindings as above.    < end of copied text >  < from: VA Duplex Lower Ext Vein Scan, Bilat (07.09.21 @ 09:04) >    IMPRESSION:  No evidence of deep venous thrombosis in either lower extremity.      < end of copied text >        ASSESSMENT:  75y M w/ PMHx of CAD s/p CABG, 1 deg AV block, LBBB, SSS s/p BiV PPM in 2018, HTN, Breast Ca s/p mastectomy, currently being evaluated for acute ascending cholangitis.    PLAN:  -MRCP  -FU GI recs  -NPO, IVF  -Trend LFT  -IV abx (cefepime, flagyl)  -Rest of care per medicine, surgery to follow      TAP Team Spectra: [phone]

## 2021-07-09 NOTE — CONSULT NOTE ADULT - ATTENDING COMMENTS
events noted, sepsis present on admission, increase LFT, CT AP reviewed, IV ABX, pancx, GI eval, SDU

## 2021-07-09 NOTE — PHYSICAL THERAPY INITIAL EVALUATION ADULT - ASSISTIVE DEVICE FOR TRANSFER: SIT/STAND, REHAB EVAL
BP looked great in vascular clinic last month.  Recheck in 6 months.    Haim Galaviz III, MD, FAAFP  [facility]  10/22/20 4:20 PM     rolling walker

## 2021-07-09 NOTE — H&P ADULT - NSHPPHYSICALEXAM_GEN_ALL_CORE
GEN: NAD  HEENT: NCAT, PERRLA, EOMI, +hard of hearing, No icterus, no pallor  CV/CHEST: L chest PPM noted, midline sternotomy scar noted, RRR  PULM: CTAB  ABD: Obese, non tender to palpation x 4 Q's, + freely reducible umbilical hernia noted, neg christy's sign.   EXT: bilateral LE +2 pitting edema and stasis dermatitis, + varicosities b/l  NEURO: axOx3 , hard of hearing

## 2021-07-10 LAB
ALBUMIN SERPL ELPH-MCNC: 3.7 G/DL — SIGNIFICANT CHANGE UP (ref 3.5–5.2)
ALP SERPL-CCNC: 122 U/L — HIGH (ref 30–115)
ALT FLD-CCNC: 134 U/L — HIGH (ref 0–41)
ANION GAP SERPL CALC-SCNC: 10 MMOL/L — SIGNIFICANT CHANGE UP (ref 7–14)
AST SERPL-CCNC: 101 U/L — HIGH (ref 0–41)
BASOPHILS # BLD AUTO: 0.04 K/UL — SIGNIFICANT CHANGE UP (ref 0–0.2)
BASOPHILS NFR BLD AUTO: 0.4 % — SIGNIFICANT CHANGE UP (ref 0–1)
BILIRUB DIRECT SERPL-MCNC: 2.2 MG/DL — HIGH (ref 0–0.2)
BILIRUB INDIRECT FLD-MCNC: 0.7 MG/DL — SIGNIFICANT CHANGE UP (ref 0.2–1.2)
BILIRUB SERPL-MCNC: 2.9 MG/DL — HIGH (ref 0.2–1.2)
BUN SERPL-MCNC: 13 MG/DL — SIGNIFICANT CHANGE UP (ref 10–20)
CALCIUM SERPL-MCNC: 8.5 MG/DL — SIGNIFICANT CHANGE UP (ref 8.5–10.1)
CHLORIDE SERPL-SCNC: 101 MMOL/L — SIGNIFICANT CHANGE UP (ref 98–110)
CO2 SERPL-SCNC: 25 MMOL/L — SIGNIFICANT CHANGE UP (ref 17–32)
COVID-19 SPIKE DOMAIN AB INTERP: POSITIVE
COVID-19 SPIKE DOMAIN ANTIBODY RESULT: >250 U/ML — HIGH
CREAT SERPL-MCNC: 0.8 MG/DL — SIGNIFICANT CHANGE UP (ref 0.7–1.5)
CULTURE RESULTS: NO GROWTH — SIGNIFICANT CHANGE UP
EOSINOPHIL # BLD AUTO: 0.12 K/UL — SIGNIFICANT CHANGE UP (ref 0–0.7)
EOSINOPHIL NFR BLD AUTO: 1.2 % — SIGNIFICANT CHANGE UP (ref 0–8)
GLUCOSE SERPL-MCNC: 106 MG/DL — HIGH (ref 70–99)
HCT VFR BLD CALC: 37.4 % — LOW (ref 42–52)
HGB BLD-MCNC: 12.3 G/DL — LOW (ref 14–18)
IMM GRANULOCYTES NFR BLD AUTO: 0.3 % — SIGNIFICANT CHANGE UP (ref 0.1–0.3)
LYMPHOCYTES # BLD AUTO: 0.7 K/UL — LOW (ref 1.2–3.4)
LYMPHOCYTES # BLD AUTO: 7.2 % — LOW (ref 20.5–51.1)
MAGNESIUM SERPL-MCNC: 2.4 MG/DL — SIGNIFICANT CHANGE UP (ref 1.8–2.4)
MCHC RBC-ENTMCNC: 29.3 PG — SIGNIFICANT CHANGE UP (ref 27–31)
MCHC RBC-ENTMCNC: 32.9 G/DL — SIGNIFICANT CHANGE UP (ref 32–37)
MCV RBC AUTO: 89 FL — SIGNIFICANT CHANGE UP (ref 80–94)
MONOCYTES # BLD AUTO: 0.79 K/UL — HIGH (ref 0.1–0.6)
MONOCYTES NFR BLD AUTO: 8.2 % — SIGNIFICANT CHANGE UP (ref 1.7–9.3)
NEUTROPHILS # BLD AUTO: 7.99 K/UL — HIGH (ref 1.4–6.5)
NEUTROPHILS NFR BLD AUTO: 82.7 % — HIGH (ref 42.2–75.2)
NRBC # BLD: 0 /100 WBCS — SIGNIFICANT CHANGE UP (ref 0–0)
PLATELET # BLD AUTO: 189 K/UL — SIGNIFICANT CHANGE UP (ref 130–400)
POTASSIUM SERPL-MCNC: 3.7 MMOL/L — SIGNIFICANT CHANGE UP (ref 3.5–5)
POTASSIUM SERPL-SCNC: 3.7 MMOL/L — SIGNIFICANT CHANGE UP (ref 3.5–5)
PROT SERPL-MCNC: 5.9 G/DL — LOW (ref 6–8)
RBC # BLD: 4.2 M/UL — LOW (ref 4.7–6.1)
RBC # FLD: 14.5 % — SIGNIFICANT CHANGE UP (ref 11.5–14.5)
SARS-COV-2 IGG+IGM SERPL QL IA: >250 U/ML — HIGH
SARS-COV-2 IGG+IGM SERPL QL IA: POSITIVE
SODIUM SERPL-SCNC: 136 MMOL/L — SIGNIFICANT CHANGE UP (ref 135–146)
SPECIMEN SOURCE: SIGNIFICANT CHANGE UP
WBC # BLD: 9.67 K/UL — SIGNIFICANT CHANGE UP (ref 4.8–10.8)
WBC # FLD AUTO: 9.67 K/UL — SIGNIFICANT CHANGE UP (ref 4.8–10.8)

## 2021-07-10 PROCEDURE — 99231 SBSQ HOSP IP/OBS SF/LOW 25: CPT

## 2021-07-10 PROCEDURE — 99232 SBSQ HOSP IP/OBS MODERATE 35: CPT

## 2021-07-10 RX ORDER — CEFEPIME 1 G/1
INJECTION, POWDER, FOR SOLUTION INTRAMUSCULAR; INTRAVENOUS
Refills: 0 | Status: DISCONTINUED | OUTPATIENT
Start: 2021-07-10 | End: 2021-07-14

## 2021-07-10 RX ORDER — CEFEPIME 1 G/1
2000 INJECTION, POWDER, FOR SOLUTION INTRAMUSCULAR; INTRAVENOUS ONCE
Refills: 0 | Status: COMPLETED | OUTPATIENT
Start: 2021-07-10 | End: 2021-07-10

## 2021-07-10 RX ORDER — CEFEPIME 1 G/1
2000 INJECTION, POWDER, FOR SOLUTION INTRAMUSCULAR; INTRAVENOUS EVERY 8 HOURS
Refills: 0 | Status: DISCONTINUED | OUTPATIENT
Start: 2021-07-10 | End: 2021-07-14

## 2021-07-10 RX ADMIN — Medication 100 MILLIGRAM(S): at 05:03

## 2021-07-10 RX ADMIN — Medication 100 MILLIGRAM(S): at 22:11

## 2021-07-10 RX ADMIN — AMIODARONE HYDROCHLORIDE 200 MILLIGRAM(S): 400 TABLET ORAL at 05:02

## 2021-07-10 RX ADMIN — CEFEPIME 100 MILLIGRAM(S): 1 INJECTION, POWDER, FOR SOLUTION INTRAMUSCULAR; INTRAVENOUS at 05:02

## 2021-07-10 RX ADMIN — AMLODIPINE BESYLATE 5 MILLIGRAM(S): 2.5 TABLET ORAL at 05:02

## 2021-07-10 RX ADMIN — CEFEPIME 100 MILLIGRAM(S): 1 INJECTION, POWDER, FOR SOLUTION INTRAMUSCULAR; INTRAVENOUS at 22:12

## 2021-07-10 RX ADMIN — CEFEPIME 100 MILLIGRAM(S): 1 INJECTION, POWDER, FOR SOLUTION INTRAMUSCULAR; INTRAVENOUS at 14:54

## 2021-07-10 RX ADMIN — Medication 50 MILLIGRAM(S): at 05:02

## 2021-07-10 RX ADMIN — Medication 100 MILLIGRAM(S): at 14:42

## 2021-07-10 RX ADMIN — ENOXAPARIN SODIUM 40 MILLIGRAM(S): 100 INJECTION SUBCUTANEOUS at 11:17

## 2021-07-10 NOTE — PROGRESS NOTE ADULT - SUBJECTIVE AND OBJECTIVE BOX
OVERNIGHT EVENTS: events noted, no abd pain, want to eat, GI./ Sx reviewed for MRCP    Vital Signs Last 24 Hrs  T(C): 36.9 (10 Jul 2021 04:57), Max: 36.9 (10 Jul 2021 04:57)  T(F): 98.4 (10 Jul 2021 04:57), Max: 98.4 (10 Jul 2021 04:57)  HR: 64 (10 Jul 2021 04:57) (60 - 95)  BP: 138/63 (10 Jul 2021 04:57) (128/59 - 139/63)  BP(mean): 91 (10 Jul 2021 04:57) (85 - 91)  RR: 18 (10 Jul 2021 04:57) (18 - 18)  SpO2: 97% (10 Jul 2021 04:57) (97% - 97%)    PHYSICAL EXAMINATION:    GENERAL: comfortable    HEENT: Head is normocephalic and atraumatic.     NECK: Supple.    LUNGS: dec bs both abes    HEART: Regular rate and rhythm without murmur.    ABDOMEN: Soft, nontender, and nondistended.      EXTREMITIES: Without any cyanosis, clubbing, rash, lesions or edema.    NEUROLOGIC: Grossly intact.    SKIN: No ulceration or induration present.      LABS:                        12.0   17.02 )-----------( 192      ( 09 Jul 2021 08:12 )             36.2     07-09    138  |  102  |  14  ----------------------------<  106<H>  3.8   |  26  |  0.9    Ca    8.5      09 Jul 2021 08:12  Phos  3.1     07-09  Mg     2.2     07-09    TPro  6.0  /  Alb  3.8  /  TBili  3.9<H>  /  DBili  3.1<H>  /  AST  189<H>  /  ALT  200<H>  /  AlkPhos  116<H>  07-09      Urinalysis Basic - ( 09 Jul 2021 00:00 )    Color: Silva / Appearance: Clear / SG: >1.050 / pH: x  Gluc: x / Ketone: Negative  / Bili: Small / Urobili: 6 mg/dL   Blood: x / Protein: 30 mg/dL / Nitrite: Negative   Leuk Esterase: Negative / RBC: 2 /HPF / WBC 2 /HPF   Sq Epi: x / Non Sq Epi: 2 /HPF / Bacteria: Negative        CARDIAC MARKERS ( 08 Jul 2021 18:22 )  x     / <0.01 ng/mL / x     / x     / x          D-Dimer Assay, Quantitative: 349 ng/mL DDU (07-09-21 @ 08:12)    Serum Pro-Brain Natriuretic Peptide: 3216 pg/mL (07-09-21 @ 08:12)    Lactate, Blood: 1.0 mmol/L (07-09-21 @ 08:12)          07-09-21 @ 07:01  -  07-10-21 @ 07:00  --------------------------------------------------------  IN: 250 mL / OUT: 0 mL / NET: 250 mL        MICROBIOLOGY:      MEDICATIONS  (STANDING):  aMIOdarone    Tablet 200 milliGRAM(s) Oral daily  amLODIPine   Tablet 5 milliGRAM(s) Oral daily  cefepime   IVPB 1000 milliGRAM(s) IV Intermittent every 12 hours  enoxaparin Injectable 40 milliGRAM(s) SubCutaneous daily  metoprolol tartrate 50 milliGRAM(s) Oral daily  metroNIDAZOLE  IVPB 500 milliGRAM(s) IV Intermittent every 8 hours    MEDICATIONS  (PRN):      RADIOLOGY & ADDITIONAL STUDIES:

## 2021-07-10 NOTE — PROGRESS NOTE ADULT - ASSESSMENT
IMPRESSION:    Sepsis present on admission  Doubt acute cholangitis  HO HFrEF s/p AICD      PLAN:    CNS: no sedation    HEENT: oral care    PULMONARY: HOB >45.     CARDIOVASCULAR: keep i=o. avoid volume overlaod    GI: GI prophylaxis.  po if cleared by GI, trend LFT    RENAL: fu lytes,     INFECTIOUS DISEASE: continue abx, pancx     HEMATOLOGICAL:  DVT prophylaxis.    ENDOCRINE:  Follow up FS.  Insulin protocol if needed.    MUSCULOSKELETAL: bedrest    OOB to chair    FLOOR

## 2021-07-10 NOTE — PROGRESS NOTE ADULT - SUBJECTIVE AND OBJECTIVE BOX
TRAUMA SURGERY PROGRESS NOTE    Patient: WAGNER SEGURA , 75y ([birth year])Male   MRN: [id number]  Location: [facility] 0[address] A  Visit: 07-09-21 Inpatient  Date: 07-10-21 @ 16:36    Hospital Day #: 3      Procedure/Dx/Injuries: Ascending Cholangitis     Events of past 24 hours: MRCP to be done. No acute events overnight.     PAST MEDICAL & SURGICAL HISTORY: Ascending Cholangitis   Arrhythmia    Hypercholesteremia    HTN (hypertension)    Obesity    CHF (congestive heart failure)    Heart attack    Breast cancer  2001    Osteoarthritis    S/P CABG x 5  2014    H/O mastectomy  right 2001        Vitals:   T(F): 98.4 (07-10-21 @ 08:40), Max: 98.4 (07-10-21 @ 04:57)  HR: 60 (07-10-21 @ 08:40)  BP: 139/65 (07-10-21 @ 08:40)  RR: 18 (07-10-21 @ 08:40)  SpO2: 94% (07-10-21 @ 08:40)      Diet, DASH/TLC:   Sodium & Cholesterol Restricted      Fluids:     I & O's:    07-09-21 @ 07:01  -  07-10-21 @ 07:00  --------------------------------------------------------  IN:    IV PiggyBack: 250 mL  Total IN: 250 mL    OUT:  Total OUT: 0 mL    Total NET: 250 mL        Bowel Movement: : [] YES [] NO  Flatus: : [] YES [] NO    PHYSICAL EXAM:  General: NAD  HEENT: Normocephalic, atraumatic, EOMI, PEERLA. no scalp lacerations   Neck: Soft, midline trachea. no c-spine tenderness  Chest: No chest wall tenderness, no subcutaneous emphysema   Cardiac: S1, S2, RRR  Respiratory: Bilateral breath sounds, clear and equal bilaterally  Abdomen: Soft, non-distended, non-tender, no rebound, no guarding.  Groin: Normal appearing, pelvis stable   Ext:  Moving b/l upper and lower extremities. Palpable Radial b/l UE, b/l DP palpable in LE.   Back: No T/L/S spine tenderness, No palpable runoff/stepoff/deformity  Rectal: No matthew blood, JACKY with good tone    MEDICATIONS  (STANDING):  aMIOdarone    Tablet 200 milliGRAM(s) Oral daily  cefepime   IVPB      cefepime   IVPB 2000 milliGRAM(s) IV Intermittent every 8 hours  enoxaparin Injectable 40 milliGRAM(s) SubCutaneous daily  metoprolol tartrate 50 milliGRAM(s) Oral daily  metroNIDAZOLE  IVPB 500 milliGRAM(s) IV Intermittent every 8 hours    MEDICATIONS  (PRN):      DVT PROPHYLAXIS: SCDs, enoxaparin Injectable 40 milliGRAM(s) SubCutaneous daily    GI PROPHYLAXIS:   ANTICOAGULATION:   ANTIBIOTICS: cefepime   IVPB    cefepime   IVPB 2000 milliGRAM(s)  metroNIDAZOLE  IVPB 500 milliGRAM(s)            LAB/STUDIES:  Labs:  CAPILLARY BLOOD GLUCOSE                              12.3   9.67  )-----------( 189      ( 10 Jul 2021 08:29 )             37.4       Auto Neutrophil %: 82.7 % (07-10-21 @ 08:29)  Auto Immature Granulocyte %: 0.3 % (07-10-21 @ 08:29)    07-10    136  |  101  |  13  ----------------------------<  106<H>  3.7   |  25  |  0.8      Calcium, Total Serum: 8.5 mg/dL (07-10-21 @ 08:29)      LFTs:             5.9  | 2.9  | 101      ------------------[122     ( 10 Jul 2021 08:29 )  3.7  | 2.2  | 134         Lipase:x      Amylase:x         Lactate, Blood: 1.0 mmol/L (07-09-21 @ 08:12)  Lactate, Blood: 2.5 mmol/L (07-09-21 @ 01:00)  Lactate, Blood: 2.7 mmol/L (07-08-21 @ 18:22)      Coags:    CARDIAC MARKERS ( 08 Jul 2021 18:22 )  x     / <0.01 ng/mL / x     / x     / x          Serum Pro-Brain Natriuretic Peptide: 3216 pg/mL (07-09-21 @ 08:12)      Urinalysis Basic - ( 09 Jul 2021 00:00 )    Color: Silva / Appearance: Clear / SG: >1.050 / pH: x  Gluc: x / Ketone: Negative  / Bili: Small / Urobili: 6 mg/dL   Blood: x / Protein: 30 mg/dL / Nitrite: Negative   Leuk Esterase: Negative / RBC: 2 /HPF / WBC 2 /HPF   Sq Epi: x / Non Sq Epi: 2 /HPF / Bacteria: Negative        Culture - Blood (collected 09 Jul 2021 08:12)  Source: .Blood None  Preliminary Report (10 Jul 2021 12:01):    No growth to date.    Culture - Urine (collected 09 Jul 2021 00:00)  Source: .Urine Clean Catch (Midstream)  Final Report (10 Jul 2021 08:11):    No growth      ACCESS DEVICES:  [x ] Peripheral IV

## 2021-07-10 NOTE — PROGRESS NOTE ADULT - ASSESSMENT
ASSESSMENT:  75y Male  admitted for Ascending Cholangitis. Patient is stable and awaiting MRCP.     PLAN:   - MRCP pending- will discuss with medicine about calling rep to put pacemaker in MRI mode.   - monitor LFT's   - Follow GI recs      Lines/Tubes: PIV    TRAUMA SPECTRA: [id number]

## 2021-07-10 NOTE — CHART NOTE - NSCHARTNOTEFT_GEN_A_CORE
75 yr M w CAD s/p CABG, 1 deg AV block, LBBB, SSS s/p BiV PPM in 2018, HTN, Breast Ca s/p mastectomy and presbycusis presenting with complaints of epigastric abdominal pain,   admitted with diagnosis of sepsis, r/u ascending cholangitis    in ER he had fever 101.8F, juandice TB 3.7, leukocytosis 12, elevated AST, ALT and ALP  on US +ve Cholelithiasis and gallbladder wall thickness, Negative Onofre's sign.   CT abd: Trace gallbladder wall edema, CBD 7mm, No biliary ductal dilatation.     pt was admitted to SDU, started IVF, cefepime and flagyl, advance GI requested MRCP, pt fever resolved, WBC resolved, LFTs trended down,   pt refused to go for MRCP.    Assessments and plans:    #sepsis presents on admission (resolved) due to GS disease likely cholecystitis, need to r/u cholangitis  - c/w cefepime and flagyl, IVF, Po diet as tolerated  - pt needs MRCP to r/u stones in biliary ducts   - will need CCY prior to discharge   - if become unstable, broaden abx to Meropenem, call IR/advance GI  - pt refused to go to MRCP    # 75 yr M w CAD s/p CABG, 1 deg AV block, LBBB, SSS s/p BiV PPM in 2018, HTN, Breast Ca s/p mastectomy and presbycusis presenting with complaints of epigastric abdominal pain,   admitted with diagnosis of sepsis, r/u ascending cholangitis    in ER he had fever 101.8F, juandice TB 3.7, leukocytosis 12, elevated AST, ALT and ALP  on US +ve Cholelithiasis and gallbladder wall thickness, Negative Onofre's sign.   CT abd: Trace gallbladder wall edema, CBD 7mm, No biliary ductal dilatation.     pt was admitted to SDU, started IVF, cefepime and flagyl, advance GI requested MRCP, pt fever resolved, WBC resolved, LFTs trended down,   pt refused to go for MRCP.    Assessments and plans:    #sepsis presents on admission (resolved) due to GS disease likely cholecystitis, need to r/u cholangitis  - c/w cefepime and flagyl, IVF, Po diet as tolerated  - pt needs MRCP to r/u stones in biliary ducts   - will need CCY prior to discharge   - if become unstable, broaden abx to Meropenem, call IR/advance GI  - pt refused to go to MRCP    ## CAD s/p CABG  # h/o LBBB, 1degAVB, SSS s/p PPM  # HLD  # HFpEF  - c/w metoprolol + ASA  - hold statin for elevated LFTs  - 2D Echo  - c/w Amiodarone  - Keep I=O    # Chronic Essential HTN  - hold Amlodipine given sepsis    # LLE swelling and Varicosities  - VA Duplex LE Bilat    # CODE: Full   # DVT PPX: Lovenox  # GI PPX not indicated  # DIET: DASH

## 2021-07-11 LAB
ALBUMIN SERPL ELPH-MCNC: 3.8 G/DL — SIGNIFICANT CHANGE UP (ref 3.5–5.2)
ALP SERPL-CCNC: 119 U/L — HIGH (ref 30–115)
ALT FLD-CCNC: 92 U/L — HIGH (ref 0–41)
ANION GAP SERPL CALC-SCNC: 13 MMOL/L — SIGNIFICANT CHANGE UP (ref 7–14)
APPEARANCE UR: CLEAR — SIGNIFICANT CHANGE UP
AST SERPL-CCNC: 52 U/L — HIGH (ref 0–41)
BACTERIA # UR AUTO: NEGATIVE — SIGNIFICANT CHANGE UP
BASOPHILS # BLD AUTO: 0.03 K/UL — SIGNIFICANT CHANGE UP (ref 0–0.2)
BASOPHILS NFR BLD AUTO: 0.4 % — SIGNIFICANT CHANGE UP (ref 0–1)
BILIRUB SERPL-MCNC: 1.4 MG/DL — HIGH (ref 0.2–1.2)
BILIRUB UR-MCNC: NEGATIVE — SIGNIFICANT CHANGE UP
BUN SERPL-MCNC: 10 MG/DL — SIGNIFICANT CHANGE UP (ref 10–20)
CALCIUM SERPL-MCNC: 8.4 MG/DL — LOW (ref 8.5–10.1)
CHLORIDE SERPL-SCNC: 101 MMOL/L — SIGNIFICANT CHANGE UP (ref 98–110)
CO2 SERPL-SCNC: 22 MMOL/L — SIGNIFICANT CHANGE UP (ref 17–32)
COLOR SPEC: YELLOW — SIGNIFICANT CHANGE UP
CREAT SERPL-MCNC: 0.7 MG/DL — SIGNIFICANT CHANGE UP (ref 0.7–1.5)
DIFF PNL FLD: NEGATIVE — SIGNIFICANT CHANGE UP
EOSINOPHIL # BLD AUTO: 0.14 K/UL — SIGNIFICANT CHANGE UP (ref 0–0.7)
EOSINOPHIL NFR BLD AUTO: 1.7 % — SIGNIFICANT CHANGE UP (ref 0–8)
EPI CELLS # UR: 0 /HPF — SIGNIFICANT CHANGE UP (ref 0–5)
GLUCOSE SERPL-MCNC: 104 MG/DL — HIGH (ref 70–99)
GLUCOSE UR QL: NEGATIVE — SIGNIFICANT CHANGE UP
HAV IGM SER-ACNC: SIGNIFICANT CHANGE UP
HBV CORE IGM SER-ACNC: SIGNIFICANT CHANGE UP
HBV SURFACE AG SER-ACNC: SIGNIFICANT CHANGE UP
HCT VFR BLD CALC: 37.5 % — LOW (ref 42–52)
HCV AB S/CO SERPL IA: 0.19 S/CO — SIGNIFICANT CHANGE UP (ref 0–0.99)
HCV AB SERPL-IMP: SIGNIFICANT CHANGE UP
HGB BLD-MCNC: 12.2 G/DL — LOW (ref 14–18)
HYALINE CASTS # UR AUTO: 1 /LPF — SIGNIFICANT CHANGE UP (ref 0–7)
IMM GRANULOCYTES NFR BLD AUTO: 0.5 % — HIGH (ref 0.1–0.3)
KETONES UR-MCNC: SIGNIFICANT CHANGE UP
LEUKOCYTE ESTERASE UR-ACNC: NEGATIVE — SIGNIFICANT CHANGE UP
LYMPHOCYTES # BLD AUTO: 1.15 K/UL — LOW (ref 1.2–3.4)
LYMPHOCYTES # BLD AUTO: 13.8 % — LOW (ref 20.5–51.1)
MAGNESIUM SERPL-MCNC: 2.4 MG/DL — SIGNIFICANT CHANGE UP (ref 1.8–2.4)
MCHC RBC-ENTMCNC: 28.9 PG — SIGNIFICANT CHANGE UP (ref 27–31)
MCHC RBC-ENTMCNC: 32.5 G/DL — SIGNIFICANT CHANGE UP (ref 32–37)
MCV RBC AUTO: 88.9 FL — SIGNIFICANT CHANGE UP (ref 80–94)
MONOCYTES # BLD AUTO: 0.81 K/UL — HIGH (ref 0.1–0.6)
MONOCYTES NFR BLD AUTO: 9.7 % — HIGH (ref 1.7–9.3)
NEUTROPHILS # BLD AUTO: 6.16 K/UL — SIGNIFICANT CHANGE UP (ref 1.4–6.5)
NEUTROPHILS NFR BLD AUTO: 73.9 % — SIGNIFICANT CHANGE UP (ref 42.2–75.2)
NITRITE UR-MCNC: NEGATIVE — SIGNIFICANT CHANGE UP
NRBC # BLD: 0 /100 WBCS — SIGNIFICANT CHANGE UP (ref 0–0)
PH UR: 6 — SIGNIFICANT CHANGE UP (ref 5–8)
PLATELET # BLD AUTO: 194 K/UL — SIGNIFICANT CHANGE UP (ref 130–400)
POTASSIUM SERPL-MCNC: 4 MMOL/L — SIGNIFICANT CHANGE UP (ref 3.5–5)
POTASSIUM SERPL-SCNC: 4 MMOL/L — SIGNIFICANT CHANGE UP (ref 3.5–5)
PROT SERPL-MCNC: 6 G/DL — SIGNIFICANT CHANGE UP (ref 6–8)
PROT UR-MCNC: ABNORMAL
RBC # BLD: 4.22 M/UL — LOW (ref 4.7–6.1)
RBC # FLD: 14.3 % — SIGNIFICANT CHANGE UP (ref 11.5–14.5)
RBC CASTS # UR COMP ASSIST: 1 /HPF — SIGNIFICANT CHANGE UP (ref 0–4)
SODIUM SERPL-SCNC: 136 MMOL/L — SIGNIFICANT CHANGE UP (ref 135–146)
SP GR SPEC: 1.02 — SIGNIFICANT CHANGE UP (ref 1.01–1.03)
UROBILINOGEN FLD QL: ABNORMAL
WBC # BLD: 8.33 K/UL — SIGNIFICANT CHANGE UP (ref 4.8–10.8)
WBC # FLD AUTO: 8.33 K/UL — SIGNIFICANT CHANGE UP (ref 4.8–10.8)
WBC UR QL: 1 /HPF — SIGNIFICANT CHANGE UP (ref 0–5)

## 2021-07-11 PROCEDURE — 99233 SBSQ HOSP IP/OBS HIGH 50: CPT

## 2021-07-11 PROCEDURE — 99232 SBSQ HOSP IP/OBS MODERATE 35: CPT

## 2021-07-11 RX ADMIN — AMIODARONE HYDROCHLORIDE 200 MILLIGRAM(S): 400 TABLET ORAL at 05:17

## 2021-07-11 RX ADMIN — Medication 100 MILLIGRAM(S): at 05:17

## 2021-07-11 RX ADMIN — CEFEPIME 100 MILLIGRAM(S): 1 INJECTION, POWDER, FOR SOLUTION INTRAMUSCULAR; INTRAVENOUS at 22:37

## 2021-07-11 RX ADMIN — ENOXAPARIN SODIUM 40 MILLIGRAM(S): 100 INJECTION SUBCUTANEOUS at 12:04

## 2021-07-11 RX ADMIN — Medication 100 MILLIGRAM(S): at 21:38

## 2021-07-11 RX ADMIN — Medication 50 MILLIGRAM(S): at 05:17

## 2021-07-11 RX ADMIN — CEFEPIME 100 MILLIGRAM(S): 1 INJECTION, POWDER, FOR SOLUTION INTRAMUSCULAR; INTRAVENOUS at 15:09

## 2021-07-11 RX ADMIN — Medication 100 MILLIGRAM(S): at 15:28

## 2021-07-11 RX ADMIN — CEFEPIME 100 MILLIGRAM(S): 1 INJECTION, POWDER, FOR SOLUTION INTRAMUSCULAR; INTRAVENOUS at 05:17

## 2021-07-11 NOTE — PROGRESS NOTE ADULT - SUBJECTIVE AND OBJECTIVE BOX
TRAUMA SURGERY PROGRESS NOTE    Patient: WAGNER SEGURA , 75y ([birth year])Male   MRN: [id number]  Location: [address]  Visit: 07-09-21 Inpatient  Date: 07-11-21 @ 03:14    Hospital Day #: 4    Procedure/Dx/Injuries: Ascending cholangitis     Events of past 24 hours: Patient due for MRCP. Were waiting on rep to see if pacemaker is compatible with MRI. Patient is now refusing a MRCP. Monitoring LFT's. Downgraded to floor from Vent unit.     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  2014    H/O mastectomy  right 2001        Vitals:   T(F): 98.8 (07-11-21 @ 00:00), Max: 98.8 (07-11-21 @ 00:00)  HR: 83 (07-11-21 @ 00:00)  BP: 149/65 (07-11-21 @ 00:00)  RR: 19 (07-11-21 @ 00:00)  SpO2: 94% (07-11-21 @ 01:08)      Diet, DASH/TLC:   Sodium & Cholesterol Restricted      Fluids:     I & O's:    07-09-21 @ 07:01  -  07-10-21 @ 07:00  --------------------------------------------------------  IN:    IV PiggyBack: 250 mL  Total IN: 250 mL    OUT:  Total OUT: 0 mL    Total NET: 250 mL    PHYSICAL EXAM:  General: NAD  HEENT: Normocephalic, atraumatic, EOMI, PEERLA. no scalp lacerations   Neck: Soft, midline trachea. no c-spine tenderness  Chest: No chest wall tenderness, no subcutaneous emphysema   Cardiac: S1, S2, RRR  Respiratory: Bilateral breath sounds, clear and equal bilaterally  Abdomen: Soft, non-distended, non-tender, no rebound, no guarding.  Groin: Normal appearing, pelvis stable   Ext:  Moving b/l upper and lower extremities. Palpable Radial b/l UE, b/l DP palpable in LE.       MEDICATIONS  (STANDING):  aMIOdarone    Tablet 200 milliGRAM(s) Oral daily  cefepime   IVPB      cefepime   IVPB 2000 milliGRAM(s) IV Intermittent every 8 hours  enoxaparin Injectable 40 milliGRAM(s) SubCutaneous daily  metoprolol tartrate 50 milliGRAM(s) Oral daily  metroNIDAZOLE  IVPB 500 milliGRAM(s) IV Intermittent every 8 hours    MEDICATIONS  (PRN):      DVT PROPHYLAXIS: SCDs, enoxaparin Injectable 40 milliGRAM(s) SubCutaneous daily    GI PROPHYLAXIS:   ANTICOAGULATION:   ANTIBIOTICS: cefepime   IVPB    cefepime   IVPB 2000 milliGRAM(s)  metroNIDAZOLE  IVPB 500 milliGRAM(s)            LAB/STUDIES:  Labs:  CAPILLARY BLOOD GLUCOSE                              12.3   9.67  )-----------( 189      ( 10 Jul 2021 08:29 )             37.4       Auto Neutrophil %: 82.7 % (07-10-21 @ 08:29)  Auto Immature Granulocyte %: 0.3 % (07-10-21 @ 08:29)    07-10    136  |  101  |  13  ----------------------------<  106<H>  3.7   |  25  |  0.8      Calcium, Total Serum: 8.5 mg/dL (07-10-21 @ 08:29)      LFTs:             5.9  | 2.9  | 101      ------------------[122     ( 10 Jul 2021 08:29 )  3.7  | 2.2  | 134         Lipase:x      Amylase:x         Lactate, Blood: 1.0 mmol/L (07-09-21 @ 08:12)  Lactate, Blood: 2.5 mmol/L (07-09-21 @ 01:00)  Lactate, Blood: 2.7 mmol/L (07-08-21 @ 18:22)      Coags:        Serum Pro-Brain Natriuretic Peptide: 3216 pg/mL (07-09-21 @ 08:12)          Culture - Blood (collected 09 Jul 2021 08:12)  Source: .Blood None  Preliminary Report (10 Jul 2021 12:01):    No growth to date.    Culture - Urine (collected 09 Jul 2021 00:00)  Source: .Urine Clean Catch (Midstream)  Final Report (10 Jul 2021 08:11):    No growth        ACCESS DEVICES:  [x ] Peripheral IV

## 2021-07-11 NOTE — PROGRESS NOTE ADULT - ASSESSMENT
74 YO M w/PMHx of CAD s/p CABG, 1 deg AV block, LBBB, SSS s/p BiV PPM in 2018, HTN, Breast Ca s/p mastectomy and presbycusis presenting with complaints of epigastric abdominal pain, sharp in nature of several hours duration, pain onset suddenly, endorses a hx of intermittent abdominal pain after eating fatty and/or fried foods.     #  Acute Cholangitis -- improving LFTS-- patient could not get MRCP due to PPM-- has cholelithiasis  USG gall bladder< from: US Abdomen Upper Quadrant Right (07.08.21 @ 20:02) >  Cholelithiasis and gallbladder sludge with increased gallbladder wall thickness. Negative sonographic Onofre's sign. If there is clinical concern for cholecystitis, consider HIDA scan for further  evaluation.    on cefepime and flagyl  statin is held  GI to suggest duration of ABX treatment    # CAD s/p CABG on aspirin, metoprolol and Statin which is held currently    #  chr diastolic and systolic CHF-- grade 2 diastolic and EF 49%  -  c/w metoprolol + ASA  -  c/w Amiodarone  -takes lasix 20mg at home    # Chronic Essential HTN  - c/w Amlodipine    # LLE swelling and Varicosities  - VA Duplex LE Bilat-- no DVT

## 2021-07-11 NOTE — PROGRESS NOTE ADULT - ASSESSMENT
ASSESSMENT:    75y M w/ PMHx of CAD s/p CABG, 1 deg AV block, LBBB, SSS s/p BiV PPM in 2018, HTN, Breast Ca s/p mastectomy, currently being evaluated for acute ascending cholangitis.    PLAN:   - MRCP  - Trend LFT's   - NPO   - IV ABx     Lines/Tubes: PIV    TRAUMA SPECTRA: [id number]

## 2021-07-11 NOTE — PROGRESS NOTE ADULT - SUBJECTIVE AND OBJECTIVE BOX
SUBJECTIVE:    Patient is a 75y old Male who presents with a chief complaint of Abdominal Pain (11 Jul 2021 03:14)    Currently admitted to medicine with the primary diagnosis of Cholangitis       Today is hospital day 2d.     PAST MEDICAL & SURGICAL HISTORY  Arrhythmia    Hypercholesteremia    HTN (hypertension)    Obesity    CHF (congestive heart failure)    Heart attack    Breast cancer  2001    Osteoarthritis    S/P CABG x 5  2014    H/O mastectomy  right 2001      ALLERGIES:  No Known Allergies    MEDICATIONS:  STANDING MEDICATIONS  aMIOdarone    Tablet 200 milliGRAM(s) Oral daily  cefepime   IVPB      cefepime   IVPB 2000 milliGRAM(s) IV Intermittent every 8 hours  enoxaparin Injectable 40 milliGRAM(s) SubCutaneous daily  metoprolol tartrate 50 milliGRAM(s) Oral daily  metroNIDAZOLE  IVPB 500 milliGRAM(s) IV Intermittent every 8 hours    PRN MEDICATIONS    VITALS:   T(F): 98.2  HR: 60  BP: 156/72  RR: 18  SpO2: 95%    LABS:                        12.2   8.33  )-----------( 194      ( 11 Jul 2021 05:43 )             37.5     07-11    136  |  101  |  10  ----------------------------<  104<H>  4.0   |  22  |  0.7    Ca    8.4<L>      11 Jul 2021 05:43  Mg     2.4     07-11    TPro  6.0  /  Alb  3.8  /  TBili  1.4<H>  /  DBili  x   /  AST  52<H>  /  ALT  92<H>  /  AlkPhos  119<H>  07-11              Culture - Blood (collected 09 Jul 2021 08:12)  Source: .Blood None  Preliminary Report (10 Jul 2021 12:01):    No growth to date.    Culture - Urine (collected 09 Jul 2021 00:00)  Source: .Urine Clean Catch (Midstream)  Final Report (10 Jul 2021 08:11):    No growth          RADIOLOGY:    PHYSICAL EXAM:  GEN: No acute distress  LUNGS: Clear to auscultation bilaterally   HEART: S1/S2 present. RRR.   ABD/ GI: Soft, non-tender, non-distended. Bowel sounds present  EXT: NC/NC/NE/2+PP/MILLS  NEURO: AAOX3

## 2021-07-12 LAB
ALBUMIN SERPL ELPH-MCNC: 3.6 G/DL — SIGNIFICANT CHANGE UP (ref 3.5–5.2)
ALP SERPL-CCNC: 142 U/L — HIGH (ref 30–115)
ALT FLD-CCNC: 79 U/L — HIGH (ref 0–41)
ANION GAP SERPL CALC-SCNC: 9 MMOL/L — SIGNIFICANT CHANGE UP (ref 7–14)
AST SERPL-CCNC: 53 U/L — HIGH (ref 0–41)
BASOPHILS # BLD AUTO: 0.05 K/UL — SIGNIFICANT CHANGE UP (ref 0–0.2)
BASOPHILS NFR BLD AUTO: 0.8 % — SIGNIFICANT CHANGE UP (ref 0–1)
BILIRUB SERPL-MCNC: 1.6 MG/DL — HIGH (ref 0.2–1.2)
BUN SERPL-MCNC: 7 MG/DL — LOW (ref 10–20)
CALCIUM SERPL-MCNC: 8.4 MG/DL — LOW (ref 8.5–10.1)
CHLORIDE SERPL-SCNC: 103 MMOL/L — SIGNIFICANT CHANGE UP (ref 98–110)
CO2 SERPL-SCNC: 23 MMOL/L — SIGNIFICANT CHANGE UP (ref 17–32)
CREAT SERPL-MCNC: 0.6 MG/DL — LOW (ref 0.7–1.5)
EOSINOPHIL # BLD AUTO: 0.21 K/UL — SIGNIFICANT CHANGE UP (ref 0–0.7)
EOSINOPHIL NFR BLD AUTO: 3.4 % — SIGNIFICANT CHANGE UP (ref 0–8)
GLUCOSE SERPL-MCNC: 107 MG/DL — HIGH (ref 70–99)
HCT VFR BLD CALC: 37.7 % — LOW (ref 42–52)
HGB BLD-MCNC: 12.5 G/DL — LOW (ref 14–18)
IMM GRANULOCYTES NFR BLD AUTO: 0.3 % — SIGNIFICANT CHANGE UP (ref 0.1–0.3)
LYMPHOCYTES # BLD AUTO: 1.21 K/UL — SIGNIFICANT CHANGE UP (ref 1.2–3.4)
LYMPHOCYTES # BLD AUTO: 19.9 % — LOW (ref 20.5–51.1)
MAGNESIUM SERPL-MCNC: 2.3 MG/DL — SIGNIFICANT CHANGE UP (ref 1.8–2.4)
MCHC RBC-ENTMCNC: 29 PG — SIGNIFICANT CHANGE UP (ref 27–31)
MCHC RBC-ENTMCNC: 33.2 G/DL — SIGNIFICANT CHANGE UP (ref 32–37)
MCV RBC AUTO: 87.5 FL — SIGNIFICANT CHANGE UP (ref 80–94)
MONOCYTES # BLD AUTO: 0.56 K/UL — SIGNIFICANT CHANGE UP (ref 0.1–0.6)
MONOCYTES NFR BLD AUTO: 9.2 % — SIGNIFICANT CHANGE UP (ref 1.7–9.3)
NEUTROPHILS # BLD AUTO: 4.04 K/UL — SIGNIFICANT CHANGE UP (ref 1.4–6.5)
NEUTROPHILS NFR BLD AUTO: 66.4 % — SIGNIFICANT CHANGE UP (ref 42.2–75.2)
NRBC # BLD: 0 /100 WBCS — SIGNIFICANT CHANGE UP (ref 0–0)
PLATELET # BLD AUTO: 212 K/UL — SIGNIFICANT CHANGE UP (ref 130–400)
POTASSIUM SERPL-MCNC: 3.9 MMOL/L — SIGNIFICANT CHANGE UP (ref 3.5–5)
POTASSIUM SERPL-SCNC: 3.9 MMOL/L — SIGNIFICANT CHANGE UP (ref 3.5–5)
PROT SERPL-MCNC: 6.1 G/DL — SIGNIFICANT CHANGE UP (ref 6–8)
RBC # BLD: 4.31 M/UL — LOW (ref 4.7–6.1)
RBC # FLD: 14.3 % — SIGNIFICANT CHANGE UP (ref 11.5–14.5)
SODIUM SERPL-SCNC: 135 MMOL/L — SIGNIFICANT CHANGE UP (ref 135–146)
WBC # BLD: 6.09 K/UL — SIGNIFICANT CHANGE UP (ref 4.8–10.8)
WBC # FLD AUTO: 6.09 K/UL — SIGNIFICANT CHANGE UP (ref 4.8–10.8)

## 2021-07-12 PROCEDURE — 99233 SBSQ HOSP IP/OBS HIGH 50: CPT

## 2021-07-12 PROCEDURE — 99232 SBSQ HOSP IP/OBS MODERATE 35: CPT

## 2021-07-12 RX ORDER — AMLODIPINE BESYLATE 2.5 MG/1
5 TABLET ORAL DAILY
Refills: 0 | Status: DISCONTINUED | OUTPATIENT
Start: 2021-07-12 | End: 2021-07-14

## 2021-07-12 RX ADMIN — Medication 50 MILLIGRAM(S): at 05:34

## 2021-07-12 RX ADMIN — Medication 100 MILLIGRAM(S): at 22:19

## 2021-07-12 RX ADMIN — AMIODARONE HYDROCHLORIDE 200 MILLIGRAM(S): 400 TABLET ORAL at 05:34

## 2021-07-12 RX ADMIN — ENOXAPARIN SODIUM 40 MILLIGRAM(S): 100 INJECTION SUBCUTANEOUS at 13:00

## 2021-07-12 RX ADMIN — CEFEPIME 100 MILLIGRAM(S): 1 INJECTION, POWDER, FOR SOLUTION INTRAMUSCULAR; INTRAVENOUS at 22:19

## 2021-07-12 RX ADMIN — CEFEPIME 100 MILLIGRAM(S): 1 INJECTION, POWDER, FOR SOLUTION INTRAMUSCULAR; INTRAVENOUS at 13:00

## 2021-07-12 RX ADMIN — AMLODIPINE BESYLATE 5 MILLIGRAM(S): 2.5 TABLET ORAL at 16:40

## 2021-07-12 RX ADMIN — Medication 100 MILLIGRAM(S): at 13:00

## 2021-07-12 RX ADMIN — CEFEPIME 100 MILLIGRAM(S): 1 INJECTION, POWDER, FOR SOLUTION INTRAMUSCULAR; INTRAVENOUS at 05:34

## 2021-07-12 RX ADMIN — Medication 100 MILLIGRAM(S): at 05:34

## 2021-07-12 NOTE — PROGRESS NOTE ADULT - SUBJECTIVE AND OBJECTIVE BOX
SUBJECTIVE:    Patient is a 75y old Male who presents with a chief complaint of Abdominal Pain (2021 11:59)    Currently admitted to medicine with the primary diagnosis of Cholangitis       Today is hospital day 3d.     PAST MEDICAL & SURGICAL HISTORY  Arrhythmia    Hypercholesteremia    HTN (hypertension)    Obesity    CHF (congestive heart failure)    Heart attack    Breast cancer      Osteoarthritis    S/P CABG x 5      H/O mastectomy  right 2001      ALLERGIES:  No Known Allergies    MEDICATIONS:  STANDING MEDICATIONS  aMIOdarone    Tablet 200 milliGRAM(s) Oral daily  cefepime   IVPB      cefepime   IVPB 2000 milliGRAM(s) IV Intermittent every 8 hours  enoxaparin Injectable 40 milliGRAM(s) SubCutaneous daily  metoprolol tartrate 50 milliGRAM(s) Oral daily  metroNIDAZOLE  IVPB 500 milliGRAM(s) IV Intermittent every 8 hours    PRN MEDICATIONS    VITALS:   T(F): 97.6  HR: 60  BP: 168/78  RR: 18  SpO2: --    LABS:                        12.5   6.09  )-----------( 212      ( 2021 04:30 )             37.7     07    135  |  103  |  7<L>  ----------------------------<  107<H>  3.9   |  23  |  0.6<L>    Ca    8.4<L>      2021 04:30  Mg     2.3         TPro  6.1  /  Alb  3.6  /  TBili  1.6<H>  /  DBili  x   /  AST  53<H>  /  ALT  79<H>  /  AlkPhos  142<H>        Urinalysis Basic - ( 2021 16:23 )    Color: Yellow / Appearance: Clear / S.024 / pH: x  Gluc: x / Ketone: Trace  / Bili: Negative / Urobili: 3 mg/dL   Blood: x / Protein: 30 mg/dL / Nitrite: Negative   Leuk Esterase: Negative / RBC: 1 /HPF / WBC 1 /HPF   Sq Epi: x / Non Sq Epi: 0 /HPF / Bacteria: Negative                RADIOLOGY:    PHYSICAL EXAM:  GEN: No acute distress  LUNGS: Clear to auscultation bilaterally   HEART: S1/S2 present. RRR.   ABD/ GI: Soft, non-tender, non-distended. Bowel sounds present  EXT: NC/NC/NE/2+PP/MILLS  NEURO: AAOX3

## 2021-07-12 NOTE — PROGRESS NOTE ADULT - SUBJECTIVE AND OBJECTIVE BOX
`TRAUMA SURGERY PROGRESS NOTE    Patient: WAGNER SEGURA , 75y ([birth year])Male   MRN: [id number]  Location: [address] 019 [address]  Visit: 21 Inpatient  Date: 21 @ 07:52    Hospital Day #:4    Dx: Ascending Cholangitis.    Events of past 24 hours: No acute events.    PAST MEDICAL & SURGICAL HISTORY:  Arrhythmia    Hypercholesteremia    HTN (hypertension)    Obesity    CHF (congestive heart failure)    Heart attack    Breast cancer      Osteoarthritis    S/P CABG x 5      H/O mastectomy  right         Vitals:   T(F): 97.6 (21 @ 23:58), Max: 98.2 (21 @ 08:02)  HR: 67 (21 @ 04:00)  BP: 141/65 (21 @ 04:00)  RR: 18 (21 @ 23:58)  SpO2: --      Diet, DASH/TLC:   Sodium & Cholesterol Restricted,       Fluids:     I & O's:    21 @ 07:01  -  21 @ 07:00  --------------------------------------------------------  IN:  Total IN: 0 mL    OUT:    Voided (mL): 750 mL  Total OUT: 750 mL    Total NET: -750 mL        Bowel Movement: : [] YES [x] NO  Flatus: : [x] YES [] NO    PHYSICAL EXAM:  General: NAD  HEENT: Normocephalic, atraumatic, EOMI,  no scalp lacerations   Neck: Soft, midline trachea. no c-spine tenderness  Chest: No chest wall tenderness, no subcutaneous emphysema   Cardiac: S1, S2, RRR  Respiratory: Equal chest movements  Abdomen: Soft, non-distended, non-tender, no rebound, no guarding.  Groin: Normal appearing, pelvis stable   Ext:  Moving b/l upper and lower extremities. Palpable Radial b/l UE, b/l DP palpable in LE.   Back: No T/L/S spine tenderness, No palpable runoff/stepoff/deformity      MEDICATIONS  (STANDING):  aMIOdarone    Tablet 200 milliGRAM(s) Oral daily  cefepime   IVPB      cefepime   IVPB 2000 milliGRAM(s) IV Intermittent every 8 hours  enoxaparin Injectable 40 milliGRAM(s) SubCutaneous daily  metoprolol tartrate 50 milliGRAM(s) Oral daily  metroNIDAZOLE  IVPB 500 milliGRAM(s) IV Intermittent every 8 hours    MEDICATIONS  (PRN):      DVT PROPHYLAXIS: SCDs, enoxaparin Injectable 40 milliGRAM(s) SubCutaneous daily    GI PROPHYLAXIS:   ANTICOAGULATION:   ANTIBIOTICS: cefepime   IVPB    cefepime   IVPB 2000 milliGRAM(s)  metroNIDAZOLE  IVPB 500 milliGRAM(s)            LAB/STUDIES:  Labs:  CAPILLARY BLOOD GLUCOSE                              12.2   8.33  )-----------( 194      ( 2021 05:43 )             37.5             136  |  101  |  10  ----------------------------<  104<H>  4.0   |  22  |  0.7          LFTs:             6.0  | 1.4  | 52       ------------------[119     ( 2021 05:43 )  3.8  | x    | 92          Lipase:x      Amylase:x         Lactate, Blood: 1.0 mmol/L (21 @ 08:12)      Coags:        Serum Pro-Brain Natriuretic Peptide: 3216 pg/mL (21 @ 08:12)      Urinalysis Basic - ( 2021 16:23 )    Color: Yellow / Appearance: Clear / S.024 / pH: x  Gluc: x / Ketone: Trace  / Bili: Negative / Urobili: 3 mg/dL   Blood: x / Protein: 30 mg/dL / Nitrite: Negative   Leuk Esterase: Negative / RBC: 1 /HPF / WBC 1 /HPF   Sq Epi: x / Non Sq Epi: 0 /HPF / Bacteria: Negative        Culture - Blood (collected 2021 08:12)  Source: .Blood None  Preliminary Report (10 Jul 2021 12:01):    No growth to date.              IMAGING:      ACCESS DEVICES:  [ ] Peripheral IV  [ ] Central Venous Line	[ ] R	[ ] L	[ ] IJ	[ ] Fem	[ ] SC	Placed:   [ ] Arterial Line		[ ] R	[ ] L	[ ] Fem	[ ] Rad	[ ] Ax	Placed:   [ ] PICC:					[ ] Mediport  [ ] Urinary Catheter,  Date Placed:   [ ] Chest tube: [ ] Right, [ ] Left  [ ] TAMIR/Omar Drains    ASSESSMENT:  75y Male  w/ PMHx of *** seen as (Code Trauma / Trauma Alert / Trauma Consult) s/p ****** with complaint of *** , external signs of trauma include *** . Trauma assessment in ED with the following injuries identifed: *******     PLAN:   -   -  -  -    Lines/Tubes: PIV, Midline, Central Line, A-Line, Chest tubes, Omar/TAMIR drains, Jenkins Catheter.    TRAUMA SPECTRA: [id number] `TRAUMA SURGERY PROGRESS NOTE    Patient: WAGNER SEGURA , 75y ([birth year])Male   MRN: [id number]  Location: [address]19 [address]  Visit: 21 Inpatient  Date: 21 @ 07:52    Hospital Day #:4    Dx: Ascending Cholangitis.    Events of past 24 hours: No acute events.    PAST MEDICAL & SURGICAL HISTORY:  Arrhythmia    Hypercholesteremia    HTN (hypertension)    Obesity    CHF (congestive heart failure)    Heart attack    Breast cancer      Osteoarthritis    S/P CABG x 5      H/O mastectomy  right         Vitals:   T(F): 97.6 (21 @ 23:58), Max: 98.2 (21 @ 08:02)  HR: 67 (21 @ 04:00)  BP: 141/65 (21 @ 04:00)  RR: 18 (21 @ 23:58)  SpO2: --      Diet, DASH/TLC:   Sodium & Cholesterol Restricted,       Fluids:     I & O's:    21 @ 07:01  -  21 @ 07:00  --------------------------------------------------------  IN:  Total IN: 0 mL    OUT:    Voided (mL): 750 mL  Total OUT: 750 mL    Total NET: -750 mL        Bowel Movement: : [] YES [x] NO  Flatus: : [x] YES [] NO    PHYSICAL EXAM:  General: NAD  HEENT: Normocephalic, atraumatic, EOMI,  no scalp lacerations   Neck: Soft, midline trachea. no c-spine tenderness  Chest: No chest wall tenderness, no subcutaneous emphysema   Cardiac: S1, S2, RRR  Respiratory: Equal chest movements  Abdomen: Soft, non-distended, non-tender, no rebound, no guarding.  Groin: Normal appearing, pelvis stable   Ext:  Moving b/l upper and lower extremities. Palpable Radial b/l UE, b/l DP palpable in LE.   Back: No T/L/S spine tenderness, No palpable runoff/stepoff/deformity      MEDICATIONS  (STANDING):  aMIOdarone    Tablet 200 milliGRAM(s) Oral daily  cefepime   IVPB      cefepime   IVPB 2000 milliGRAM(s) IV Intermittent every 8 hours  enoxaparin Injectable 40 milliGRAM(s) SubCutaneous daily  metoprolol tartrate 50 milliGRAM(s) Oral daily  metroNIDAZOLE  IVPB 500 milliGRAM(s) IV Intermittent every 8 hours    MEDICATIONS  (PRN):      DVT PROPHYLAXIS: SCDs, enoxaparin Injectable 40 milliGRAM(s) SubCutaneous daily    GI PROPHYLAXIS:   ANTICOAGULATION:   ANTIBIOTICS: cefepime   IVPB    cefepime   IVPB 2000 milliGRAM(s)  metroNIDAZOLE  IVPB 500 milliGRAM(s)            LAB/STUDIES:  Labs:  CAPILLARY BLOOD GLUCOSE                              12.2   8.33  )-----------( 194      ( 2021 05:43 )             37.5             136  |  101  |  10  ----------------------------<  104<H>  4.0   |  22  |  0.7          LFTs:             6.0  | 1.4  | 52       ------------------[119     ( 2021 05:43 )  3.8  | x    | 92          Lipase:x      Amylase:x         Lactate, Blood: 1.0 mmol/L (21 @ 08:12)      Coags:        Serum Pro-Brain Natriuretic Peptide: 3216 pg/mL (21 @ 08:12)      Urinalysis Basic - ( 2021 16:23 )    Color: Yellow / Appearance: Clear / S.024 / pH: x  Gluc: x / Ketone: Trace  / Bili: Negative / Urobili: 3 mg/dL   Blood: x / Protein: 30 mg/dL / Nitrite: Negative   Leuk Esterase: Negative / RBC: 1 /HPF / WBC 1 /HPF   Sq Epi: x / Non Sq Epi: 0 /HPF / Bacteria: Negative        Culture - Blood (collected 2021 08:12)  Source: .Blood None  Preliminary Report (10 Jul 2021 12:01):    No growth to date.        ACCESS DEVICES:  [x] Peripheral IV  [ ] Central Venous Line	[ ] R	[ ] L	[ ] IJ	[ ] Fem	[ ] SC	Placed:   [ ] Arterial Line		[ ] R	[ ] L	[ ] Fem	[ ] Rad	[ ] Ax	Placed:   [ ] PICC:					[ ] Mediport  [ ] Urinary Catheter,  Date Placed:   [ ] Chest tube: [ ] Right, [ ] Left  [ ] TAMIR/Omar Drains    ASSESSMENT:  HPI: 76 YO M w/PMHx of CAD s/p CABG, 1 deg AV block, LBBB, SSS s/p BiV PPM in 2018, HTN, Breast Ca s/p mastectomy and presbycusis presenting with complaints of epigastric abdominal pain, sharp in nature of several hours duration, pain onset suddenly, endorses a hx of intermittent abdominal pain after eating fatty and/or fried foods.     ED COURSE: Labs remarkable for WBC 12.3, bili 2.1, , ,  RUQ US remarkable for Cholelithiasis and gallbladder sludge with increased gallbladder wall thickness. Negative sonographic Onofre's sign. CT A/P reveals:  cholelithiasis. Trace gallbladder wall edema may be on the basis of periportal edema. No definite CT evidence for pericholecystic fat stranding. No biliary ductal dilatation. and a nonobstructing R renal calculus. Pt endorses darker urine of late, denies changes in stool color, denies noticing any yellowing of skin or eyes, denies any easy bruising or excessive bleeding of the gums when brushing his teeth. PT is stable. Tolerated diet. NPO after midnight. GI should perform ERCP.      PLAN:   - FU GI for ERCP.  - FU diet  -DVT Prophilaxis  -    Lines/Tubes: PIV.    TRAUMA SPECTRA: [id number]

## 2021-07-12 NOTE — PROGRESS NOTE ADULT - ASSESSMENT
74 y/o male with PMHx of CAD s/p CABG, 1 deg AV block, LBBB, SSS s/p BiV PPM in 2018, HTN, Breast Ca s/p mastectomy and presbycusis presenting with complaints of epigastric abdominal pain, sharp in nature of several hours duration, pain onset suddenly, endorses a hx of intermittent abdominal pain after eating fatty and/or fried foods.     #  Acute Cholangitis and Cholecystitis  - USG gall bladder< from: US Abdomen Upper Quadrant Right (07.08.21 @ 20:02) >    Cholelithiasis and gallbladder sludge with increased gallbladder wall thickness. Negative sonographic Onofre's sign. If there is clinical concern for cholecystitis, consider HIDA scan for further    evaluation.  - C/w cefepime (Day 2) and flagyl (Day 3)  - Continue holding statin  - Contacted [facility] for pacemaker compatibility with MRI; pacemaker is compatible  - MRCP was reordered - pt now NPO for MRCP today 7/12/2021  - Per [facility] - pending MRCP results, will determine duration of ABX treatment    # CAD s/p CABG on aspirin, metoprolol and Statin which is held currently  # CHF diastolic and systolic - grade 2 diastolic and EF 49%  -  C/w metoprolol + ASA  -  C/w Amiodarone    # Chronic Essential HTN  - C/w Amlodipine    # HLD  - Statin is currently held     # LLE swelling and Varicosities  - VA Duplex LE Bilat showed no DVT    # CODE: Full   # DVT PPX: Lovenox  # GI PPX not indicated  # DIET: DASH.     76 y/o male with PMHx of CAD s/p CABG, 1 deg AV block, LBBB, SSS s/p BiV PPM in 2018, HTN, Breast Ca s/p mastectomy and presbycusis presenting with complaints of epigastric abdominal pain, sharp in nature of several hours duration, pain onset suddenly, endorses a hx of intermittent abdominal pain after eating fatty and/or fried foods.     #  Acute Cholangitis and Cholecystitis - Improving   - Pt presented with sharp epigastric pain, worse after meals with fatty foods/fried foods; sepsis present on admission  - US URQ showed cholelithiasis, gallbladder sludge with increase gallbladder wall thickness - concerning for acute cholecystitis    - C/w cefepime (Day 2) and flagyl (Day 3)  - Continue holding statin  - Contacted [facility] for pacemaker compatibility with MRI; pacemaker is compatible  - MRCP was reordered     --> MRCP will be done at 2PM, 7/13/2021, with [facility] representative present   - Per [facility] - pending MRCP results, will determine duration of ABX treatment    # CAD s/p CABG on aspirin, metoprolol and Statin which is held currently  # CHF diastolic and systolic - grade 2 diastolic and EF 49%  -  C/w metoprolol + ASA  -  C/w Amiodarone    # Chronic Essential HTN  - C/w Amlodipine    # HLD  - Statin is currently held     # LLE swelling and Varicosities  - VA Duplex LE Bilat showed no DVT    # CODE: Full   # DVT PPX: Lovenox  # GI PPX not indicated  # DIET: DASH.

## 2021-07-12 NOTE — PROGRESS NOTE ADULT - SUBJECTIVE AND OBJECTIVE BOX
Patient is a 76 y/o gentleman with PMHx of CAD s/p CABG, 1st degree AV block, LBBB, SSS s/p BiV PPM in 2018, HTN, Breast Ca s/p mastectomy and presbycusis whom presented with complaints of epigastric abdominal pain. Patient has remained without pain, is tolerating diet, and has been downgraded to medicine floor. He is awaiting MRCP today.         PAST MEDICAL & SURGICAL HISTORY:  Arrhythmia  Hypercholesteremia  HTN (hypertension)  Obesity  CHF (congestive heart failure)  Heart attack  Breast cancer 2001  Osteoarthritis  S/P CABG x 5 -2014        MEDICATIONS  (STANDING):  aMIOdarone    Tablet 200 milliGRAM(s) Oral daily  amLODIPine   Tablet 5 milliGRAM(s) Oral daily  ceFAZolin   IVPB 1000 milliGRAM(s) IV Intermittent every 8 hours  enoxaparin Injectable 40 milliGRAM(s) SubCutaneous daily  metoprolol tartrate 50 milliGRAM(s) Oral daily  metroNIDAZOLE  IVPB 500 milliGRAM(s) IV Intermittent every 8 hours        Allergies  No Known Allergies      Review of Systems  General:  Denies Fatigue, Denies Fever, Denies Weakness ,Denies Weight Loss   HEENT: Denies Trouble Swallowing ,Denies  Sore Throat , Denies Change in hearing/vision/speech ,Denies Dizziness    Cardio: Denies  Chest Pain , Palpitations    Respiratory: Denies worsening of SOB, Denies Cough  Abdomen: See detailed HPI  Neuro: Denies Headache Denies Dizziness, Denies Paresthesias  MSK: Denies pain in Bones/Joints/Muscles   Psych: Patient denies depression, denies suicidal or homicidal ideations  Integ: Patient Denies rash, or new skin lesions         Vital Signs   T(F): 97.6 (12 Jul 2021 08:00), Max: 97.6 (11 Jul 2021 23:58)  HR: 60 (12 Jul 2021 08:00) (60 - 67)  BP: 168/78 (12 Jul 2021 08:00) (136/61 - 168/78)  RR: 18 (12 Jul 2021 08:00) (18 - 18)  Physical Exam  Gen: NAD  HEENT: NC/AT, Mucosal Membranes  Cardio: S1/S2 No S3/S4, Regular  Resp: CTA B/L  Abdomen: Soft, ND/NT  Neuro: AAOx3  Extremities: FROM x 4      LABS:                        12.5   6.09  )-----------( 212      ( 12 Jul 2021 04:30 )             37.7     07-12    135  |  103  |  7<L>  ----------------------------<  107<H>  3.9   |  23  |  0.6<L>    Ca    8.4<L>      12 Jul 2021 04:30  Mg     2.3     07-12    TPro  6.1  /  Alb  3.6  /  TBili  1.6<H>  /  DBili  x   /  AST  53<H>  /  ALT  79<H>  /  AlkPhos  142<H>  07-12      RADIOLOGY & ADDITIONAL STUDIES:  CT Abdomen and Pelvis w/ IV Cont 07.08.21   IMPRESSION:    No CT evidence for acute intra-abdominal or pelvic pathology.  Cardiomegaly.  Cholelithiasis.  Nonobstructing left renal calculus.  Additional incidentalfindings as above.    US Abdomen Upper Quadrant Right 07.08.21   IMPRESSION:    Cholelithiasis and gallbladder sludge with increased gallbladder wall thickness. Negative sonographic Onofre's sign. If there is clinical concern for cholecystitis, consider HIDA scan for further  evaluation.  CBD 7mm

## 2021-07-12 NOTE — PROGRESS NOTE ADULT - ASSESSMENT
ASSESSMENT:  HPI: 74 YO M w/PMHx of CAD s/p CABG, 1 deg AV block, LBBB, SSS s/p BiV PPM in 2018, HTN, Breast Ca s/p mastectomy and presbycusis presenting with complaints of epigastric abdominal pain, sharp in nature of several hours duration, pain onset suddenly, endorses a hx of intermittent abdominal pain after eating fatty and/or fried foods.     ED COURSE: Labs remarkable for WBC 12.3, bili 2.1, , ,  RUQ US remarkable for Cholelithiasis and gallbladder sludge with increased gallbladder wall thickness. Negative sonographic Onofre's sign. CT A/P reveals:  cholelithiasis. Trace gallbladder wall edema may be on the basis of periportal edema. No definite CT evidence for pericholecystic fat stranding. No biliary ductal dilatation. and a nonobstructing R renal calculus. Pt endorses darker urine of late, denies changes in stool color, denies noticing any yellowing of skin or eyes, denies any easy bruising or excessive bleeding of the gums when brushing his teeth. PT is stable. Tolerated diet. NPO after midnight. GI should perform ERCP.      PLAN:   - FU GI for ERCP.  - FU diet  -DVT Prophilaxis  -    Lines/Tubes: PIV.    TRAUMA SPECTRA: [id number]

## 2021-07-12 NOTE — PROGRESS NOTE ADULT - ASSESSMENT
Patient is a 76 y/o gentleman with PMHx of CAD s/p CABG, 1st degree AV block, LBBB, SSS s/p BiV PPM in 2018, HTN, Breast Ca s/p mastectomy and presbycusis whom presented with complaints of epigastric abdominal pain. Patient notes pain occurred day prior to admission. Patient notes pain was sharri-umbilical, sharp, and sudden. Pain was improved fully by Zantac that his wife gave him. Patient since has not had any additional episodes of pain. Abdominal exam is unremarkable and no longer having pain. Patient tolerating diet and with improvement of LFT. MRCP pending today.     Abdominal Pain/ Cholelithiasis/ Elevated LFT  - Abdominal exam unremarkable  - On Cefazolin and Flagyl  - LFT improved  - MRCP today  - Will follow

## 2021-07-12 NOTE — PROGRESS NOTE ADULT - ASSESSMENT
76 YO M w/PMHx of CAD s/p CABG, 1 deg AV block, LBBB, SSS s/p BiV PPM in 2018, HTN, Breast Ca s/p mastectomy and presbycusis presenting with complaints of epigastric abdominal pain, sharp in nature of several hours duration, pain onset suddenly, endorses a hx of intermittent abdominal pain after eating fatty and/or fried foods.     #  Acute Cholangitis -- improving LFTS-- patient could not get MRCP due to PPM-- has cholelithiasis  PPM compatiblity discussed with MRI and paln is to get it in AM.    USG gall bladder< from: US Abdomen Upper Quadrant Right (07.08.21 @ 20:02) >  Cholelithiasis and gallbladder sludge with increased gallbladder wall thickness. Negative sonographic Onofre's sign. If there is clinical concern for cholecystitis, consider HIDA scan for further  evaluation.    on cefepime and flagyl  statin is held  GI to suggest duration of ABX treatment    # CAD s/p CABG on aspirin, metoprolol and Statin which is held currently    #  chr diastolic and systolic CHF-- grade 2 diastolic and EF 49%  -  c/w metoprolol + ASA  -  c/w Amiodarone  -takes lasix 20mg at home    # Chronic Essential HTN  - c/w Amlodipine 5mg is added    # LLE swelling and Varicosities  - VA Duplex LE Bilat-- no DVT      will get MRCP in AM.

## 2021-07-13 LAB
ALBUMIN SERPL ELPH-MCNC: 3.6 G/DL — SIGNIFICANT CHANGE UP (ref 3.5–5.2)
ALP SERPL-CCNC: 114 U/L — SIGNIFICANT CHANGE UP (ref 30–115)
ALT FLD-CCNC: 74 U/L — HIGH (ref 0–41)
ANION GAP SERPL CALC-SCNC: 8 MMOL/L — SIGNIFICANT CHANGE UP (ref 7–14)
AST SERPL-CCNC: 69 U/L — HIGH (ref 0–41)
BASOPHILS # BLD AUTO: 0.05 K/UL — SIGNIFICANT CHANGE UP (ref 0–0.2)
BASOPHILS NFR BLD AUTO: 0.8 % — SIGNIFICANT CHANGE UP (ref 0–1)
BILIRUB SERPL-MCNC: 1 MG/DL — SIGNIFICANT CHANGE UP (ref 0.2–1.2)
BUN SERPL-MCNC: 7 MG/DL — LOW (ref 10–20)
CALCIUM SERPL-MCNC: 8.4 MG/DL — LOW (ref 8.5–10.1)
CHLORIDE SERPL-SCNC: 103 MMOL/L — SIGNIFICANT CHANGE UP (ref 98–110)
CO2 SERPL-SCNC: 26 MMOL/L — SIGNIFICANT CHANGE UP (ref 17–32)
CREAT SERPL-MCNC: 0.6 MG/DL — LOW (ref 0.7–1.5)
CULTURE RESULTS: NO GROWTH — SIGNIFICANT CHANGE UP
EOSINOPHIL # BLD AUTO: 0.24 K/UL — SIGNIFICANT CHANGE UP (ref 0–0.7)
EOSINOPHIL NFR BLD AUTO: 3.7 % — SIGNIFICANT CHANGE UP (ref 0–8)
GLUCOSE SERPL-MCNC: 118 MG/DL — HIGH (ref 70–99)
HCT VFR BLD CALC: 37.2 % — LOW (ref 42–52)
HGB BLD-MCNC: 12.2 G/DL — LOW (ref 14–18)
IMM GRANULOCYTES NFR BLD AUTO: 0.5 % — HIGH (ref 0.1–0.3)
LYMPHOCYTES # BLD AUTO: 1.32 K/UL — SIGNIFICANT CHANGE UP (ref 1.2–3.4)
LYMPHOCYTES # BLD AUTO: 20.2 % — LOW (ref 20.5–51.1)
MAGNESIUM SERPL-MCNC: 2.3 MG/DL — SIGNIFICANT CHANGE UP (ref 1.8–2.4)
MCHC RBC-ENTMCNC: 28.8 PG — SIGNIFICANT CHANGE UP (ref 27–31)
MCHC RBC-ENTMCNC: 32.8 G/DL — SIGNIFICANT CHANGE UP (ref 32–37)
MCV RBC AUTO: 87.9 FL — SIGNIFICANT CHANGE UP (ref 80–94)
MONOCYTES # BLD AUTO: 0.66 K/UL — HIGH (ref 0.1–0.6)
MONOCYTES NFR BLD AUTO: 10.1 % — HIGH (ref 1.7–9.3)
NEUTROPHILS # BLD AUTO: 4.22 K/UL — SIGNIFICANT CHANGE UP (ref 1.4–6.5)
NEUTROPHILS NFR BLD AUTO: 64.7 % — SIGNIFICANT CHANGE UP (ref 42.2–75.2)
NRBC # BLD: 0 /100 WBCS — SIGNIFICANT CHANGE UP (ref 0–0)
PLATELET # BLD AUTO: 212 K/UL — SIGNIFICANT CHANGE UP (ref 130–400)
POTASSIUM SERPL-MCNC: 4 MMOL/L — SIGNIFICANT CHANGE UP (ref 3.5–5)
POTASSIUM SERPL-SCNC: 4 MMOL/L — SIGNIFICANT CHANGE UP (ref 3.5–5)
PROT SERPL-MCNC: 5.7 G/DL — LOW (ref 6–8)
RBC # BLD: 4.23 M/UL — LOW (ref 4.7–6.1)
RBC # FLD: 14.3 % — SIGNIFICANT CHANGE UP (ref 11.5–14.5)
SODIUM SERPL-SCNC: 137 MMOL/L — SIGNIFICANT CHANGE UP (ref 135–146)
SPECIMEN SOURCE: SIGNIFICANT CHANGE UP
WBC # BLD: 6.52 K/UL — SIGNIFICANT CHANGE UP (ref 4.8–10.8)
WBC # FLD AUTO: 6.52 K/UL — SIGNIFICANT CHANGE UP (ref 4.8–10.8)

## 2021-07-13 PROCEDURE — 99232 SBSQ HOSP IP/OBS MODERATE 35: CPT

## 2021-07-13 PROCEDURE — 99233 SBSQ HOSP IP/OBS HIGH 50: CPT

## 2021-07-13 RX ORDER — POLYETHYLENE GLYCOL 3350 17 G/17G
17 POWDER, FOR SOLUTION ORAL ONCE
Refills: 0 | Status: COMPLETED | OUTPATIENT
Start: 2021-07-13 | End: 2021-07-13

## 2021-07-13 RX ORDER — FUROSEMIDE 40 MG
20 TABLET ORAL DAILY
Refills: 0 | Status: DISCONTINUED | OUTPATIENT
Start: 2021-07-13 | End: 2021-07-14

## 2021-07-13 RX ADMIN — AMLODIPINE BESYLATE 5 MILLIGRAM(S): 2.5 TABLET ORAL at 05:27

## 2021-07-13 RX ADMIN — Medication 20 MILLIGRAM(S): at 17:35

## 2021-07-13 RX ADMIN — POLYETHYLENE GLYCOL 3350 17 GRAM(S): 17 POWDER, FOR SOLUTION ORAL at 11:47

## 2021-07-13 RX ADMIN — Medication 100 MILLIGRAM(S): at 15:08

## 2021-07-13 RX ADMIN — Medication 100 MILLIGRAM(S): at 21:14

## 2021-07-13 RX ADMIN — CEFEPIME 100 MILLIGRAM(S): 1 INJECTION, POWDER, FOR SOLUTION INTRAMUSCULAR; INTRAVENOUS at 21:14

## 2021-07-13 RX ADMIN — CEFEPIME 100 MILLIGRAM(S): 1 INJECTION, POWDER, FOR SOLUTION INTRAMUSCULAR; INTRAVENOUS at 05:28

## 2021-07-13 RX ADMIN — CEFEPIME 100 MILLIGRAM(S): 1 INJECTION, POWDER, FOR SOLUTION INTRAMUSCULAR; INTRAVENOUS at 15:08

## 2021-07-13 RX ADMIN — Medication 50 MILLIGRAM(S): at 05:27

## 2021-07-13 RX ADMIN — ENOXAPARIN SODIUM 40 MILLIGRAM(S): 100 INJECTION SUBCUTANEOUS at 11:47

## 2021-07-13 RX ADMIN — Medication 100 MILLIGRAM(S): at 05:28

## 2021-07-13 RX ADMIN — AMIODARONE HYDROCHLORIDE 200 MILLIGRAM(S): 400 TABLET ORAL at 05:27

## 2021-07-13 NOTE — PROGRESS NOTE ADULT - ASSESSMENT
Patient is a 76 y/o gentleman with PMHx of CAD s/p CABG, 1st degree AV block, LBBB, SSS s/p BiV PPM in 2018, HTN, Breast Ca s/p mastectomy and presbycusis whom presented with complaints of epigastric abdominal pain. Patient has remained without pain, is tolerating diet, and has been downgraded to medicine floor. He went for MRCP but could not tolerate being in machine so procedure canceled. He feels well and at this point would like to go home. Likely passed stone.       Abdominal Pain/ Cholelithiasis/ Elevated LFT  - Abdominal exam unremarkable  - On Cefazolin and Flagyl, would discharge on Cipro and flagyl for two week course in total   - LFT improved  - Could not tolerate MRCP  - Offered EUS but wanted to go home  - Follow up at [facility] ( Tuesday Afternoons) within the next 2-3 weeks

## 2021-07-13 NOTE — PROGRESS NOTE ADULT - ASSESSMENT
76 y/o male with PMHx of CAD s/p CABG, 1 deg AV block, LBBB, SSS s/p BiV PPM in 2018, HTN, Breast Ca s/p mastectomy and presbycusis presenting with complaints of epigastric abdominal pain, sharp in nature of several hours duration, pain onset suddenly, endorses a hx of intermittent abdominal pain after eating fatty and/or fried foods.     #  Acute Cholangitis and Cholecystitis - Improving   - Pt presented with sharp epigastric pain, worse after meals with fatty foods/fried foods; sepsis present on admission  - US URQ showed cholelithiasis, gallbladder sludge with increase gallbladder wall thickness - concerning for acute cholecystitis    - C/w cefepime (Day 3) and flagyl (Day 4)  - Continue holding statin  - Per [facility] - pending MRCP results, will determine duration of ABX treatment  - MRCP to be done at 2PM today, 7/13, with [facility]  present     --> f/u results   - F/u with GI after MRCP results    # CAD s/p CABG on aspirin, metoprolol and Statin (which is held currently)  # CHF diastolic and systolic - grade 2 diastolic and EF 49%  -  C/w metoprolol + ASA  -  C/w Amiodarone    # Chronic Essential HTN  - C/w Amlodipine    # HLD  - Statin is currently held     # LLE swelling and Varicosities  - VA Duplex LE Bilat showed no DVT    # CODE: Full   # DVT PPX: Lovenox  # GI PPX not indicated  # DIET: NPO since midnight for MRCP   76 y/o male with PMHx of CAD s/p CABG, 1 deg AV block, LBBB, SSS s/p BiV PPM in 2018, HTN, Breast Ca s/p mastectomy and presbycusis presenting with complaints of epigastric abdominal pain, sharp in nature of several hours duration, pain onset suddenly, endorses a hx of intermittent abdominal pain after eating fatty and/or fried foods.     #  Acute Cholangitis and Cholecystitis - Improving   - Pt presented with sharp epigastric pain, worse after meals with fatty foods/fried foods; sepsis present on admission  - US URQ showed cholelithiasis, gallbladder sludge with increase gallbladder wall thickness - concerning for acute cholecystitis    - C/w cefepime (Day 3) and flagyl (Day 4)  - Continue holding statin  - Per [facility] - pending MRCP results, will determine duration of ABX treatment  - MRCP to be done at 2PM today, 7/13, with [facility]  present     --> f/u results   - F/u with GI after MRCP results    # CAD s/p CABG on aspirin, metoprolol and Statin (which is held currently)  # CHF diastolic and systolic - grade 2 diastolic and EF 49%  -  C/w metoprolol + ASA  -  C/w Amiodarone    # Chronic Essential HTN  - C/w Amlodipine    # HLD  - Statin is currently held     # LLE swelling and Varicosities  - VA Duplex LE Bilat showed no DVT    # CODE: Full   # DVT PPX: Lovenox  # GI PPX not indicated  # DIET: NPO since midnight for MRCP    Senior resident addendum:  - Patient was brought to MRI for MRCP, but refused imaging after being placed in tube stating he "doesn't fit". Patient know to be claustrophobic but refused medication to proceed with imaging  - Notified [facility] fellow ([phone])  - Will need to determine if EUS/ERCP is still warranted at this time    Edited and signed by Stuart Sweet DO, PGY-3

## 2021-07-13 NOTE — PROGRESS NOTE ADULT - SUBJECTIVE AND OBJECTIVE BOX
Patient is a 76 y/o gentleman with PMHx of CAD s/p CABG, 1st degree AV block, LBBB, SSS s/p BiV PPM in 2018, HTN, Breast Ca s/p mastectomy and presbycusis whom presented with complaints of epigastric abdominal pain. Patient has remained without pain, is tolerating diet, and has been downgraded to medicine floor. He went for MRCP but could not tolerate being in machine so procedure canceled. He feels well and at this point would like to go home.         PAST MEDICAL & SURGICAL HISTORY:  Arrhythmia  Hypercholesteremia  HTN (hypertension)  Obesity  CHF (congestive heart failure)  Heart attack  Breast cancer 2001  Osteoarthritis  S/P CABG x 5 -2014        MEDICATIONS  (STANDING):  aMIOdarone    Tablet 200 milliGRAM(s) Oral daily  amLODIPine   Tablet 5 milliGRAM(s) Oral daily  ceFAZolin   IVPB 1000 milliGRAM(s) IV Intermittent every 8 hours  enoxaparin Injectable 40 milliGRAM(s) SubCutaneous daily  metoprolol tartrate 50 milliGRAM(s) Oral daily  metroNIDAZOLE  IVPB 500 milliGRAM(s) IV Intermittent every 8 hours        Allergies  No Known Allergies      Review of Systems  General:  Denies Fatigue, Denies Fever, Denies Weakness ,Denies Weight Loss   HEENT: Denies Trouble Swallowing ,Denies  Sore Throat , Denies Change in hearing/vision/speech ,Denies Dizziness    Cardio: Denies  Chest Pain , Palpitations    Respiratory: Denies worsening of SOB, Denies Cough  Abdomen: See detailed HPI  Neuro: Denies Headache Denies Dizziness, Denies Paresthesias  MSK: Denies pain in Bones/Joints/Muscles   Psych: Patient denies depression, denies suicidal or homicidal ideations  Integ: Patient Denies rash, or new skin lesions         Vital Signs   T(F): 97.6 (13 Jul 2021 16:05), Max: 98 (13 Jul 2021 00:00)  HR: 66 (13 Jul 2021 16:05) (60 - 66)  BP: 133/64 (13 Jul 2021 16:05) (133/64 - 170/75)  RR: 18 (13 Jul 2021 16:05) (18 - 18)  Physical Exam  Gen: NAD  HEENT: NC/AT, Mucosal Membranes  Cardio: S1/S2, Regular  Resp: CTA B/L  Abdomen: Soft, ND/NT  Neuro: AAOx3  Extremities: FROM x 4      LABS:                        12.2   6.52  )-----------( 212      ( 13 Jul 2021 06:46 )             37.2     07-13    137  |  103  |  7<L>  ----------------------------<  118<H>  4.0   |  26  |  0.6<L>    Ca    8.4<L>      13 Jul 2021 06:46  Mg     2.3     07-13    TPro  5.7<L>  /  Alb  3.6  /  TBili  1.0  /  DBili  x   /  AST  69<H>  /  ALT  74<H>  /  AlkPhos  114  07-13      RADIOLOGY & ADDITIONAL STUDIES:  CT Abdomen and Pelvis w/ IV Cont 07.08.21   IMPRESSION:    No CT evidence for acute intra-abdominal or pelvic pathology.  Cardiomegaly.  Cholelithiasis.  Nonobstructing left renal calculus.  Additional incidentalfindings as above.    US Abdomen Upper Quadrant Right 07.08.21   IMPRESSION:    Cholelithiasis and gallbladder sludge with increased gallbladder wall thickness. Negative sonographic Onofre's sign. If there is clinical concern for cholecystitis, consider HIDA scan for further  evaluation.  CBD 7mm

## 2021-07-13 NOTE — PROGRESS NOTE ADULT - SUBJECTIVE AND OBJECTIVE BOX
SUBJECTIVE:    Patient is a 75y old Male who presents with a chief complaint of Abdominal Pain (2021 10:09)    Currently admitted to medicine with the primary diagnosis of Cholangitis    Today is hospital day 4. This morning he is resting comfortably in bed and reports no new issues or overnight events.     PAST MEDICAL & SURGICAL HISTORY  Arrhythmia    Hypercholesteremia    HTN (hypertension)    Obesity    CHF (congestive heart failure)    Heart attack    Breast cancer      Osteoarthritis    S/P CABG x 5      H/O mastectomy  right 2001      SOCIAL HISTORY:  Negative for smoking/alcohol/drug use.     ALLERGIES:  No Known Allergies    MEDICATIONS:  STANDING MEDICATIONS  aMIOdarone    Tablet 200 milliGRAM(s) Oral daily  amLODIPine   Tablet 5 milliGRAM(s) Oral daily  cefepime   IVPB      cefepime   IVPB 2000 milliGRAM(s) IV Intermittent every 8 hours  enoxaparin Injectable 40 milliGRAM(s) SubCutaneous daily  metoprolol tartrate 50 milliGRAM(s) Oral daily  metroNIDAZOLE  IVPB 500 milliGRAM(s) IV Intermittent every 8 hours    PRN MEDICATIONS    VITALS:   T(F): 97.1  HR: 60  BP: 163/74  RR: 18  SpO2: --    LABS:                        12.2   6.52  )-----------( 212      ( 2021 06:46 )             37.2     07-13    137  |  103  |  7<L>  ----------------------------<  118<H>  4.0   |  26  |  0.6<L>    Ca    8.4<L>      2021 06:46  Mg     2.3     07-13    TPro  5.7<L>  /  Alb  3.6  /  TBili  1.0  /  DBili  x   /  AST  69<H>  /  ALT  74<H>  /  AlkPhos  114  07-13      Urinalysis Basic - ( 2021 16:23 )    Color: Yellow / Appearance: Clear / S.024 / pH: x  Gluc: x / Ketone: Trace  / Bili: Negative / Urobili: 3 mg/dL   Blood: x / Protein: 30 mg/dL / Nitrite: Negative   Leuk Esterase: Negative / RBC: 1 /HPF / WBC 1 /HPF   Sq Epi: x / Non Sq Epi: 0 /HPF / Bacteria: Negative            Culture - Urine (collected 2021 16:23)  Source: .Urine Clean Catch (Midstream)  Final Report (2021 07:59):    No growth    RADIOLOGY:  < from: CT Abdomen and Pelvis w/ IV Cont (21 @ 20:10) >  IMPRESSION:    No CT evidence for acute intra-abdominal or pelvic pathology.  Cardiomegaly.  Cholelithiasis.  Nonobstructing left renal calculus.  Additional incidentalfindings as above.    --- End of Report ---    < end of copied text >  < from: US Abdomen Upper Quadrant Right (21 @ 20:02) >  IMPRESSION:    Cholelithiasis and gallbladder sludge with increased gallbladder wall thickness. Negative sonographic Onofre's sign. If there is clinical concern for cholecystitis, consider HIDA scan for further  evaluation    < end of copied text >    PHYSICAL EXAM:  GENERAL: Patient is not in acute distress   HEAD:  Atraumatic, Normocephalic  EYES: EOMI, PERRLA, conjunctiva and sclera clear  ENMT: No tonsillar erythema, exudates, or enlargement; Moist mucous membranes, Good dentition, No lesions  NECK: Supple, No JVD, Normal thyroid, no lymphadenopathy   NERVOUS SYSTEM:  Alert & Oriented X3, Good concentration; Motor Strength 5/5 B/L upper and lower extremities; DTRs 2+ intact and symmetric  CHEST/LUNG: Clear to auscultation bilaterally; No rales, rhonchi, wheezing, or rubs  HEART: Regular rate and rhythm; No murmurs, rubs, or gallops  ABDOMEN: Soft, Nontender, Nondistended; Bowel sounds present; negative Onofre sign  EXTREMITIES:  Lower extremity edema: +2 on the right and +4 on the left; mild-moderate hyperpigmentation and discoloration on the distal, lower extremities     IV Access: YES  NG tube present: NO  PEG tube present: NO  Jenkins catheter present: NO

## 2021-07-13 NOTE — PROGRESS NOTE ADULT - ATTENDING COMMENTS
75y M w/ PMHx of CAD s/p CABG, 1 deg AV block, LBBB, SSS s/p BiV PPM in 2018, HTN, Breast Ca s/p mastectomy, currently being evaluated for acute ascending cholangitis.  abd soft nt nd   PLAN:  -MRCP  -FU GI recs  -NPO, IVF  -Trend LFT tbili decreasing  -IV abx (cefepime, flagyl)  -Rest of care per medicine, surgery to follow
Agree with the above, awaiting MRCP
Patient seen and examined on rounds 6/13/21, assessment and plan above

## 2021-07-13 NOTE — PROGRESS NOTE ADULT - ASSESSMENT
ASSESSMENT:  75y Male  74 YO M w/PMHx of CAD s/p CABG, 1 deg AV block, LBBB, SSS s/p BiV PPM in 2018, HTN, Breast Ca s/p mastectomy and presbycusis presenting with complaints of epigastric abdominal pain, sharp in nature of several hours duration, pain onset suddenly, endorses a hx of intermittent abdominal pain after eating fatty and/or fried foods.     PLAN:   - FU MRCP after 2 pm.  - FU GI  -FU LFT's  -DVT prophylaxis  - Ambulate  - Incentive spirometry     Lines/Tubes: PIV.    TRAUMA SPECTRA: [id number]

## 2021-07-13 NOTE — PROGRESS NOTE ADULT - SUBJECTIVE AND OBJECTIVE BOX
SUBJECTIVE:    Patient is a 75y old Male who presents with a chief complaint of Abdominal Pain (2021 13:37)    Currently admitted to medicine with the primary diagnosis of Cholangitis       Today is hospital day 4d.     PAST MEDICAL & SURGICAL HISTORY  Arrhythmia    Hypercholesteremia    HTN (hypertension)    Obesity    CHF (congestive heart failure)    Heart attack    Breast cancer      Osteoarthritis    S/P CABG x 5      H/O mastectomy  right 2001      ALLERGIES:  No Known Allergies    MEDICATIONS:  STANDING MEDICATIONS  aMIOdarone    Tablet 200 milliGRAM(s) Oral daily  amLODIPine   Tablet 5 milliGRAM(s) Oral daily  cefepime   IVPB      cefepime   IVPB 2000 milliGRAM(s) IV Intermittent every 8 hours  enoxaparin Injectable 40 milliGRAM(s) SubCutaneous daily  metoprolol tartrate 50 milliGRAM(s) Oral daily  metroNIDAZOLE  IVPB 500 milliGRAM(s) IV Intermittent every 8 hours    PRN MEDICATIONS    VITALS:   T(F): 97.1  HR: 60  BP: 163/74  RR: 18  SpO2: --    LABS:                        12.2   6.52  )-----------( 212      ( 2021 06:46 )             37.2     07-13    137  |  103  |  7<L>  ----------------------------<  118<H>  4.0   |  26  |  0.6<L>    Ca    8.4<L>      2021 06:46  Mg     2.3     07-13    TPro  5.7<L>  /  Alb  3.6  /  TBili  1.0  /  DBili  x   /  AST  69<H>  /  ALT  74<H>  /  AlkPhos  114  07-13      Urinalysis Basic - ( 2021 16:23 )    Color: Yellow / Appearance: Clear / S.024 / pH: x  Gluc: x / Ketone: Trace  / Bili: Negative / Urobili: 3 mg/dL   Blood: x / Protein: 30 mg/dL / Nitrite: Negative   Leuk Esterase: Negative / RBC: 1 /HPF / WBC 1 /HPF   Sq Epi: x / Non Sq Epi: 0 /HPF / Bacteria: Negative            Culture - Urine (collected 2021 16:23)  Source: .Urine Clean Catch (Midstream)  Final Report (2021 07:59):    No growth          RADIOLOGY:    PHYSICAL EXAM:  GEN: No acute distress  LUNGS: Clear to auscultation bilaterally   HEART: S1/S2 present. RRR.   ABD/ GI: Soft, non-tender, -distended. Bowel sounds present  EXT: NC/NC/NE/2+PP/MILLS  NEURO: AAOX3

## 2021-07-13 NOTE — PROGRESS NOTE ADULT - SUBJECTIVE AND OBJECTIVE BOX
`TRAUMA SURGERY PROGRESS NOTE    Patient: WAGNER SEGURA , 75y ([birth year])Male   MRN: [id number]  Location: [address]  Visit: 21 Inpatient  Date: 21 @ 10:09    Hospital Day #:4      Dx: Ascending Cholangitis    Events of past 24 hours: No acute events.    PAST MEDICAL & SURGICAL HISTORY:  Arrhythmia    Hypercholesteremia    HTN (hypertension)    Obesity    CHF (congestive heart failure)    Heart attack    Breast cancer      Osteoarthritis    S/P CABG x 5      H/O mastectomy  right         Vitals:   T(F): 97.1 (21 @ 08:00), Max: 98 (21 @ 00:00)  HR: 60 (21 @ 08:00)  BP: 163/74 (21 @ 08:00)  RR: 18 (21 @ 08:00)  SpO2: --      Diet, NPO after Midnight:      NPO Start Date: 2021,   NPO Start Time: 23:59  Except Medications  Diet, DASH/TLC:   Sodium & Cholesterol Restricted      Fluids:     I & O's:      Bowel Movement: : [] YES [x] NO  Flatus: : [x] YES [] NO    PHYSICAL EXAM:  General: NAD  HEENT: Normocephalic, atraumatic, EOMI, no scalp lacerations   Neck: Soft, midline trachea. no c-spine tenderness  Chest: Equal chest motions  Cardiac: S1, S2, RRR  Respiratory: Bilateral breath sounds, clear and equal bilaterally  Abdomen: Soft, non-distended, non-tender, no rebound, no guarding.  Groin: Normal appearing, pelvis stable   Ext:  Moving b/l upper and lower extremities. Palpable Radial b/l UE, b/l DP palpable in LE.     Rectal: No matthew blood, JACKY with good tone    MEDICATIONS  (STANDING):  aMIOdarone    Tablet 200 milliGRAM(s) Oral daily  amLODIPine   Tablet 5 milliGRAM(s) Oral daily  cefepime   IVPB      cefepime   IVPB 2000 milliGRAM(s) IV Intermittent every 8 hours  enoxaparin Injectable 40 milliGRAM(s) SubCutaneous daily  metoprolol tartrate 50 milliGRAM(s) Oral daily  metroNIDAZOLE  IVPB 500 milliGRAM(s) IV Intermittent every 8 hours  polyethylene glycol 3350 17 Gram(s) Oral once    MEDICATIONS  (PRN):      DVT PROPHYLAXIS: SCDs, enoxaparin Injectable 40 milliGRAM(s) SubCutaneous daily    GI PROPHYLAXIS:   ANTICOAGULATION:   ANTIBIOTICS: cefepime   IVPB    cefepime   IVPB 2000 milliGRAM(s)  metroNIDAZOLE  IVPB 500 milliGRAM(s)            LAB/STUDIES:  Labs:  CAPILLARY BLOOD GLUCOSE                              12.2   6.52  )-----------( 212      ( 2021 06:46 )             37.2       Auto Immature Granulocyte %: 0.5 % (21 @ 06:46)  Auto Neutrophil %: 64.7 % (21 @ 06:46)        137  |  103  |  7<L>  ----------------------------<  118<H>  4.0   |  26  |  0.6<L>      Calcium, Total Serum: 8.4 mg/dL (21 @ 06:46)      LFTs:             5.7  | 1.0  | 69       ------------------[114     ( 2021 06:46 )  3.6  | x    | 74          Lipase:x      Amylase:x             Coags:        Serum Pro-Brain Natriuretic Peptide: 3216 pg/mL (21 @ 08:12)      Urinalysis Basic - ( 2021 16:23 )    Color: Yellow / Appearance: Clear / S.024 / pH: x  Gluc: x / Ketone: Trace  / Bili: Negative / Urobili: 3 mg/dL   Blood: x / Protein: 30 mg/dL / Nitrite: Negative   Leuk Esterase: Negative / RBC: 1 /HPF / WBC 1 /HPF   Sq Epi: x / Non Sq Epi: 0 /HPF / Bacteria: Negative        Culture - Urine (collected 2021 16:23)  Source: .Urine Clean Catch (Midstream)  Final Report (2021 07:59):    No growth              IMAGING:      ACCESS DEVICES:  [x] Peripheral IV  [ ] Central Venous Line	[ ] R	[ ] L	[ ] IJ	[ ] Fem	[ ] SC	Placed:   [ ] Arterial Line		[ ] R	[ ] L	[ ] Fem	[ ] Rad	[ ] Ax	Placed:   [ ] PICC:					[ ] Mediport  [ ] Urinary Catheter,  Date Placed:   [ ] Chest tube: [ ] Right, [ ] Left  [ ] TAMIR/Omar Drains    ASSESSMENT:  75y Male  76 YO M w/PMHx of CAD s/p CABG, 1 deg AV block, LBBB, SSS s/p BiV PPM in 2018, HTN, Breast Ca s/p mastectomy and presbycusis presenting with complaints of epigastric abdominal pain, sharp in nature of several hours duration, pain onset suddenly, endorses a hx of intermittent abdominal pain after eating fatty and/or fried foods.     PLAN:   - FU MRCP after 2 pm.  - FU GI  -FU LFT's  -DVT prophylaxis  - Ambulate  - Incentive spirometry     Lines/Tubes: PIV.    TRAUMA SPECTRA: [id number]

## 2021-07-13 NOTE — PROGRESS NOTE ADULT - ASSESSMENT
76 YO M w/PMHx of CAD s/p CABG, 1 deg AV block, LBBB, SSS s/p BiV PPM in 2018, HTN, Breast Ca s/p mastectomy and presbycusis presenting with complaints of epigastric abdominal pain, sharp in nature of several hours duration, pain onset suddenly, endorses a hx of intermittent abdominal pain after eating fatty and/or fried foods.     #  Acute Cholangitis -- improving LFTS-- patient could not get MRCP due to PPM but then attempted today but patient became anxious and claustrophobic-- has cholelithiasis    USG gall bladder< from: US Abdomen Upper Quadrant Right (07.08.21 @ 20:02) >  Cholelithiasis and gallbladder sludge with increased gallbladder wall thickness. Negative sonographic Onofre's sign. If there is clinical concern for cholecystitis, consider HIDA scan for further  evaluation.    on cefepime and flagyl  statin is held  GI to suggest duration of ABX treatment    # CAD s/p CABG on aspirin, metoprolol and Statin which is held currently    #  chr diastolic and systolic CHF-- grade 2 diastolic and EF 49%  -  c/w metoprolol + ASA  -  c/w Amiodarone  -takes lasix 20mg at home-- can restart here    # Chronic Essential HTN  - c/w Amlodipine 5mg is added  laxis restarted  he is very anxious after MRI as he got claustrophobic     # LLE swelling and Varicosities  - VA Duplex LE Bilat-- no DVT      DC plan after discussion with GI likely AM as MRCP cannot be attempted again.

## 2021-07-14 ENCOUNTER — TRANSCRIPTION ENCOUNTER (OUTPATIENT)
Age: 76
End: 2021-07-14

## 2021-07-14 VITALS
RESPIRATION RATE: 18 BRPM | DIASTOLIC BLOOD PRESSURE: 76 MMHG | HEART RATE: 60 BPM | TEMPERATURE: 98 F | SYSTOLIC BLOOD PRESSURE: 175 MMHG

## 2021-07-14 LAB
ALBUMIN SERPL ELPH-MCNC: 3.7 G/DL — SIGNIFICANT CHANGE UP (ref 3.5–5.2)
ALP SERPL-CCNC: 114 U/L — SIGNIFICANT CHANGE UP (ref 30–115)
ALT FLD-CCNC: 102 U/L — HIGH (ref 0–41)
ANION GAP SERPL CALC-SCNC: 8 MMOL/L — SIGNIFICANT CHANGE UP (ref 7–14)
APTT BLD: 31.5 SEC — SIGNIFICANT CHANGE UP (ref 27–39.2)
AST SERPL-CCNC: 121 U/L — HIGH (ref 0–41)
BASOPHILS # BLD AUTO: 0.08 K/UL — SIGNIFICANT CHANGE UP (ref 0–0.2)
BASOPHILS NFR BLD AUTO: 1 % — SIGNIFICANT CHANGE UP (ref 0–1)
BILIRUB DIRECT SERPL-MCNC: 0.4 MG/DL — HIGH (ref 0–0.2)
BILIRUB INDIRECT FLD-MCNC: 0.6 MG/DL — SIGNIFICANT CHANGE UP (ref 0.2–1.2)
BILIRUB SERPL-MCNC: 1 MG/DL — SIGNIFICANT CHANGE UP (ref 0.2–1.2)
BUN SERPL-MCNC: 8 MG/DL — LOW (ref 10–20)
CALCIUM SERPL-MCNC: 8.5 MG/DL — SIGNIFICANT CHANGE UP (ref 8.5–10.1)
CHLORIDE SERPL-SCNC: 101 MMOL/L — SIGNIFICANT CHANGE UP (ref 98–110)
CO2 SERPL-SCNC: 27 MMOL/L — SIGNIFICANT CHANGE UP (ref 17–32)
CREAT SERPL-MCNC: 0.7 MG/DL — SIGNIFICANT CHANGE UP (ref 0.7–1.5)
CULTURE RESULTS: SIGNIFICANT CHANGE UP
EOSINOPHIL # BLD AUTO: 0.26 K/UL — SIGNIFICANT CHANGE UP (ref 0–0.7)
EOSINOPHIL NFR BLD AUTO: 3.2 % — SIGNIFICANT CHANGE UP (ref 0–8)
GLUCOSE SERPL-MCNC: 105 MG/DL — HIGH (ref 70–99)
HCT VFR BLD CALC: 39.2 % — LOW (ref 42–52)
HGB BLD-MCNC: 13 G/DL — LOW (ref 14–18)
IMM GRANULOCYTES NFR BLD AUTO: 0.9 % — HIGH (ref 0.1–0.3)
INR BLD: 1.11 RATIO — SIGNIFICANT CHANGE UP (ref 0.65–1.3)
LYMPHOCYTES # BLD AUTO: 1.66 K/UL — SIGNIFICANT CHANGE UP (ref 1.2–3.4)
LYMPHOCYTES # BLD AUTO: 20.5 % — SIGNIFICANT CHANGE UP (ref 20.5–51.1)
MCHC RBC-ENTMCNC: 29.3 PG — SIGNIFICANT CHANGE UP (ref 27–31)
MCHC RBC-ENTMCNC: 33.2 G/DL — SIGNIFICANT CHANGE UP (ref 32–37)
MCV RBC AUTO: 88.3 FL — SIGNIFICANT CHANGE UP (ref 80–94)
MONOCYTES # BLD AUTO: 0.75 K/UL — HIGH (ref 0.1–0.6)
MONOCYTES NFR BLD AUTO: 9.3 % — SIGNIFICANT CHANGE UP (ref 1.7–9.3)
NEUTROPHILS # BLD AUTO: 5.28 K/UL — SIGNIFICANT CHANGE UP (ref 1.4–6.5)
NEUTROPHILS NFR BLD AUTO: 65.1 % — SIGNIFICANT CHANGE UP (ref 42.2–75.2)
NRBC # BLD: 0 /100 WBCS — SIGNIFICANT CHANGE UP (ref 0–0)
PLATELET # BLD AUTO: 232 K/UL — SIGNIFICANT CHANGE UP (ref 130–400)
POTASSIUM SERPL-MCNC: 4.5 MMOL/L — SIGNIFICANT CHANGE UP (ref 3.5–5)
POTASSIUM SERPL-SCNC: 4.5 MMOL/L — SIGNIFICANT CHANGE UP (ref 3.5–5)
PROT SERPL-MCNC: 6.1 G/DL — SIGNIFICANT CHANGE UP (ref 6–8)
PROTHROM AB SERPL-ACNC: 12.8 SEC — SIGNIFICANT CHANGE UP (ref 9.95–12.87)
RBC # BLD: 4.44 M/UL — LOW (ref 4.7–6.1)
RBC # FLD: 14.3 % — SIGNIFICANT CHANGE UP (ref 11.5–14.5)
SODIUM SERPL-SCNC: 136 MMOL/L — SIGNIFICANT CHANGE UP (ref 135–146)
SPECIMEN SOURCE: SIGNIFICANT CHANGE UP
WBC # BLD: 8.1 K/UL — SIGNIFICANT CHANGE UP (ref 4.8–10.8)
WBC # FLD AUTO: 8.1 K/UL — SIGNIFICANT CHANGE UP (ref 4.8–10.8)

## 2021-07-14 RX ORDER — CIPROFLOXACIN LACTATE 400MG/40ML
1 VIAL (ML) INTRAVENOUS
Qty: 14 | Refills: 0
Start: 2021-07-14 | End: 2021-07-20

## 2021-07-14 RX ORDER — ATORVASTATIN CALCIUM 80 MG/1
1 TABLET, FILM COATED ORAL
Qty: 0 | Refills: 0 | DISCHARGE

## 2021-07-14 RX ORDER — METRONIDAZOLE 500 MG
1 TABLET ORAL
Qty: 21 | Refills: 0
Start: 2021-07-14 | End: 2021-07-20

## 2021-07-14 RX ADMIN — CEFEPIME 100 MILLIGRAM(S): 1 INJECTION, POWDER, FOR SOLUTION INTRAMUSCULAR; INTRAVENOUS at 05:26

## 2021-07-14 RX ADMIN — Medication 50 MILLIGRAM(S): at 05:26

## 2021-07-14 RX ADMIN — AMLODIPINE BESYLATE 5 MILLIGRAM(S): 2.5 TABLET ORAL at 05:26

## 2021-07-14 RX ADMIN — AMIODARONE HYDROCHLORIDE 200 MILLIGRAM(S): 400 TABLET ORAL at 05:26

## 2021-07-14 RX ADMIN — Medication 100 MILLIGRAM(S): at 05:25

## 2021-07-14 RX ADMIN — Medication 20 MILLIGRAM(S): at 05:26

## 2021-07-14 NOTE — PROGRESS NOTE ADULT - ASSESSMENT
74 y/o male with PMHx of CAD s/p CABG, 1 deg AV block, LBBB, SSS s/p BiV PPM in 2018, HTN, Breast Ca s/p mastectomy and presbycusis presenting with complaints of epigastric abdominal pain, sharp in nature of several hours duration, pain onset suddenly, endorses a hx of intermittent abdominal pain after eating fatty and/or fried foods.     #  Acute Cholangitis and Cholecystitis - Improving   - Pt presented with sharp epigastric pain, worse after meals with fatty foods/fried foods; sepsis present on admission  - US URQ showed cholelithiasis, gallbladder sludge with increase gallbladder wall thickness - concerning for acute cholecystitis    - Patient refused MRCP yesterday (7/13) due to claustrophobia. [facility] offered EUS, however, patient refused and wants to go home  - Per [facility]:    --> Patient stable for discharge    --> Discontinue cefepime    --> Continue with flagyl and add Ciprofloxacin for 14 days     --> F/u with [facility] in [facility]      # CAD s/p CABG on aspirin, metoprolol and Statin (which is held currently)  # CHF diastolic and systolic - grade 2 diastolic and EF 49%  -  C/w metoprolol + ASA  -  C/w Amiodarone    # Chronic Essential HTN  - C/w Amlodipine    # HLD  - Statin is currently held due to transaminitis    - Discussed with patient to follow up with PCP post discharge to possibly restart statin following resolution of transaminitis    # LLE swelling and Varicosities  - VA Duplex LE Bilat showed no DVT    # CODE: Full   # DVT PPX: Lovenox  # GI PPX not indicated  # DIET: NPO since midnight for MRCP

## 2021-07-14 NOTE — PROGRESS NOTE ADULT - ASSESSMENT
ASSESSMENT:  76 YO M w/PMHx of CAD s/p CABG, 1 deg AV block, LBBB, SSS s/p BiV PPM in 2018, HTN, Breast Ca s/p mastectomy and presbycusis presenting with complaints of epigastric abdominal pain, sharp in nature of several hours duration, pain onset suddenly, endorses a hx of intermittent abdominal pain after eating fatty and/or fried foods.   PLAN:   - FU MRCP results  - DVT prophilaxis  - Cont. Incentive spirometry  -FU labs    Lines/Tubes: PIV    TRAUMA SPECTRA: [id number]   ASSESSMENT:  74 YO M w/PMHx of CAD s/p CABG, 1 deg AV block, LBBB, SSS s/p BiV PPM in 2018, HTN, Breast Ca s/p mastectomy and presbycusis presenting with complaints of epigastric abdominal pain, sharp in nature of several hours duration, pain onset suddenly, endorses a hx of intermittent abdominal pain after eating fatty and/or fried foods. Patient does not need any acute surgical intervention at this point. Please recall surgery if needed.     PLAN:   - Pt refused MRCP on 7/14.   - Possible EUS outpatient per GI  - No surgical intervention at this point. Please Recall surgery if needed.     Lines/Tubes: PIV    TRAUMA SPECTRA: [id number]

## 2021-07-14 NOTE — DISCHARGE NOTE NURSING/CASE MANAGEMENT/SOCIAL WORK - PATIENT PORTAL LINK FT
You can access the FollowMyHealth Patient Portal offered by [facility] by registering at the following website: [email or web address]. By joining [employer]’s FollowMyHealth portal, you will also be able to view your health information using other applications (apps) compatible with our system.

## 2021-07-14 NOTE — PROGRESS NOTE ADULT - SUBJECTIVE AND OBJECTIVE BOX
SUBJECTIVE:    Patient is a 75y old Male who presents with a chief complaint of Abdominal Pain (14 Jul 2021 02:13)    Currently admitted to medicine with the primary diagnosis of Cholangitis    Today is hospital day 5d. This morning he is resting comfortably in bed and reports no new issues or overnight events.     PAST MEDICAL & SURGICAL HISTORY  Arrhythmia    Hypercholesteremia    HTN (hypertension)    Obesity    CHF (congestive heart failure)    Heart attack    Breast cancer  2001    Osteoarthritis    S/P CABG x 5  2014    H/O mastectomy  right 2001      SOCIAL HISTORY:  Negative for smoking/alcohol/drug use.     ALLERGIES:  No Known Allergies    MEDICATIONS:  STANDING MEDICATIONS  aMIOdarone    Tablet 200 milliGRAM(s) Oral daily  amLODIPine   Tablet 5 milliGRAM(s) Oral daily  cefepime   IVPB      cefepime   IVPB 2000 milliGRAM(s) IV Intermittent every 8 hours  enoxaparin Injectable 40 milliGRAM(s) SubCutaneous daily  furosemide    Tablet 20 milliGRAM(s) Oral daily  metoprolol tartrate 50 milliGRAM(s) Oral daily  metroNIDAZOLE  IVPB 500 milliGRAM(s) IV Intermittent every 8 hours    PRN MEDICATIONS    VITALS:   T(F): 97.5  HR: 60  BP: 175/76  RR: 18  SpO2: --    LABS:                        13.0   8.10  )-----------( 232      ( 14 Jul 2021 07:50 )             39.2     07-14    136  |  101  |  8<L>  ----------------------------<  105<H>  4.5   |  27  |  0.7    Ca    8.5      14 Jul 2021 07:50  Mg     2.3     07-13    TPro  6.1  /  Alb  3.7  /  TBili  1.0  /  DBili  0.4<H>  /  AST  121<H>  /  ALT  102<H>  /  AlkPhos  114  07-14    PT/INR - ( 14 Jul 2021 07:50 )   PT: 12.80 sec;   INR: 1.11 ratio         PTT - ( 14 Jul 2021 07:50 )  PTT:31.5 sec    Culture - Urine (collected 11 Jul 2021 16:23)  Source: .Urine Clean Catch (Midstream)  Final Report (13 Jul 2021 07:59):    No growth    RADIOLOGY:  < from: CT Abdomen and Pelvis w/ IV Cont (07.08.21 @ 20:10) >  IMPRESSION:    No CT evidence for acute intra-abdominal or pelvic pathology.  Cardiomegaly.  Cholelithiasis.  Nonobstructing left renal calculus.  Additional incidentalfindings as above.    --- End of Report ---    < end of copied text >    PHYSICAL EXAM:  GENERAL: Patient is not in acute distress.   HEAD:  Atraumatic, Normocephalic  EYES: EOMI, PERRLA, conjunctiva and sclera clear  ENMT: No tonsillar erythema, exudates, or enlargement; Moist mucous membranes, No lesions  NECK: Supple, No JVD, Normal thyroid  NERVOUS SYSTEM:  Alert & Oriented X3, Good concentration  CHEST/LUNG: Clear to percussion bilaterally; No rales, rhonchi, wheezing, or rubs  HEART: Regular rate and rhythm; No murmurs, rubs, or gallops  ABDOMEN: Soft, Nontender, Nondistended; Bowel sounds present  EXTREMITIES:  Lower extremity edema: +2 on the right and +4 on the left; mild-moderate hyperpigmentation and discoloration on the distal, lower extremities       IV Access: YES  NG tube present: NO  PEG tube present: NO  Jenkins catheter present: NO

## 2021-07-14 NOTE — DISCHARGE NOTE PROVIDER - INSTRUCTIONS
Please follow a low fat diet until evaluated by your primary doctor.  Eat more vegetables and fruits.  Swap refined grains for whole grains.  Choose fat-free or low-fat dairy products.  Choose lean protein sources like fish, poultry and beans.  Cook with vegetable oils.  Limit your intake of foods high in added sugars, like soda and candy.

## 2021-07-14 NOTE — DISCHARGE NOTE PROVIDER - PROVIDER TOKENS
PROVIDER:[TOKEN:[[id number]],FOLLOWUP:[1-3 days],ESTABLISHEDPATIENT:[T]],PROVIDER:[TOKEN:[[id number]],FOLLOWUP:[2 weeks]]

## 2021-07-14 NOTE — PROGRESS NOTE ADULT - SUBJECTIVE AND OBJECTIVE BOX
`TRAUMA SURGERY PROGRESS NOTE    Patient: WAGNER SEGURA , 75y ([birth year])Male   MRN: [id number]  Location: [address]  Visit: 07-09-21 Inpatient  Date: 07-14-21 @ 02:14    Hospital Day #:4    Dx: Ascending cholangitis    Events of past 24 hours: no acute events.    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  2014    H/O mastectomy  right 2001        Vitals:   T(F): 98 (07-14-21 @ 00:47), Max: 98 (07-14-21 @ 00:47)  HR: 62 (07-14-21 @ 00:47)  BP: 165/77 (07-14-21 @ 00:47)  RR: 18 (07-14-21 @ 00:47)  SpO2: --      Diet, NPO after Midnight:      NPO Start Date: 12-Jul-2021,   NPO Start Time: 23:59  Except Medications  Diet, DASH/TLC:   Sodium & Cholesterol Restricted      Fluids:     I & O's:      Bowel Movement: : [x] YES [] NO  Flatus: : [x] YES [] NO    PHYSICAL EXAM:  General: NAD  HEENT: Normocephalic, atraumatic, EOMI, no scalp lacerations   Neck: Soft, midline trachea. no c-spine tenderness  Chest: Eqaul chest movements Cardiac: S1, S2, RRR  Respiratory: Bilateral breath sounds, clear and equal bilaterally  Abdomen: Soft, non-distended, non-tender, no rebound, no guarding.  Groin: Normal appearing, pelvis stable   Ext:  Moving b/l upper and lower extremities. Palpable Radial b/l UE, b/l DP palpable in LE.   Back: No T/L/S spine tenderness, No palpable runoff/stepoff/deformity      MEDICATIONS  (STANDING):  aMIOdarone    Tablet 200 milliGRAM(s) Oral daily  amLODIPine   Tablet 5 milliGRAM(s) Oral daily  cefepime   IVPB      cefepime   IVPB 2000 milliGRAM(s) IV Intermittent every 8 hours  enoxaparin Injectable 40 milliGRAM(s) SubCutaneous daily  furosemide    Tablet 20 milliGRAM(s) Oral daily  metoprolol tartrate 50 milliGRAM(s) Oral daily  metroNIDAZOLE  IVPB 500 milliGRAM(s) IV Intermittent every 8 hours    MEDICATIONS  (PRN):      DVT PROPHYLAXIS: SCDs, enoxaparin Injectable 40 milliGRAM(s) SubCutaneous daily    GI PROPHYLAXIS:   ANTICOAGULATION:   ANTIBIOTICS: cefepime   IVPB    cefepime   IVPB 2000 milliGRAM(s)  metroNIDAZOLE  IVPB 500 milliGRAM(s)            LAB/STUDIES:  Labs:  CAPILLARY BLOOD GLUCOSE                              12.2   6.52  )-----------( 212      ( 13 Jul 2021 06:46 )             37.2       Auto Immature Granulocyte %: 0.5 % (07-13-21 @ 06:46)  Auto Neutrophil %: 64.7 % (07-13-21 @ 06:46)    07-13    137  |  103  |  7<L>  ----------------------------<  118<H>  4.0   |  26  |  0.6<L>      Calcium, Total Serum: 8.4 mg/dL (07-13-21 @ 06:46)      LFTs:             5.7  | 1.0  | 69       ------------------[114     ( 13 Jul 2021 06:46 )  3.6  | x    | 74          Lipase:x      Amylase:x             Coags:        Serum Pro-Brain Natriuretic Peptide: 3216 pg/mL (07-09-21 @ 08:12)          Culture - Urine (collected 11 Jul 2021 16:23)  Source: .Urine Clean Catch (Midstream)  Final Report (13 Jul 2021 07:59):    No growth            ACCESS DEVICES:  [x] Peripheral IV  [ ] Central Venous Line	[ ] R	[ ] L	[ ] IJ	[ ] Fem	[ ] SC	Placed:   [ ] Arterial Line		[ ] R	[ ] L	[ ] Fem	[ ] Rad	[ ] Ax	Placed:   [ ] PICC:					[ ] Mediport  [ ] Urinary Catheter,  Date Placed:   [ ] Chest tube: [ ] Right, [ ] Left  [ ] TAMIR/Omar Drains    ASSESSMENT:  76 YO M w/PMHx of CAD s/p CABG, 1 deg AV block, LBBB, SSS s/p BiV PPM in 2018, HTN, Breast Ca s/p mastectomy and presbycusis presenting with complaints of epigastric abdominal pain, sharp in nature of several hours duration, pain onset suddenly, endorses a hx of intermittent abdominal pain after eating fatty and/or fried foods.   PLAN:   - FU MRCP results  - DVT prophilaxis  - Cont. Incentive spirometry  -FU labs    Lines/Tubes: PIV    TRAUMA SPECTRA: [id number] `TRAUMA SURGERY PROGRESS NOTE    Patient: WAGNER SEGURA , 75y ([birth year])Male   MRN: [id number]  Location: [address]  Visit: 07-09-21 Inpatient  Date: 07-14-21 @ 02:14    Hospital Day #:4    Dx: Ascending cholangitis    Events of past 24 hours: no acute events.    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  2014    H/O mastectomy  right 2001        Vitals:   T(F): 98 (07-14-21 @ 00:47), Max: 98 (07-14-21 @ 00:47)  HR: 62 (07-14-21 @ 00:47)  BP: 165/77 (07-14-21 @ 00:47)  RR: 18 (07-14-21 @ 00:47)  SpO2: --      Diet, NPO after Midnight:      NPO Start Date: 12-Jul-2021,   NPO Start Time: 23:59  Except Medications  Diet, DASH/TLC:   Sodium & Cholesterol Restricted      Fluids:     I & O's:      Bowel Movement: : [x] YES [] NO  Flatus: : [x] YES [] NO    PHYSICAL EXAM:  General: NAD  HEENT: Normocephalic, atraumatic, EOMI, no scalp lacerations   Neck: Soft, midline trachea. no c-spine tenderness  Chest: Eqaul chest movements Cardiac: S1, S2, RRR  Respiratory: Bilateral breath sounds, clear and equal bilaterally  Abdomen: Soft, non-distended, non-tender, no rebound, no guarding.  Groin: Normal appearing, pelvis stable   Ext:  Moving b/l upper and lower extremities. Palpable Radial b/l UE, b/l DP palpable in LE.   Back: No T/L/S spine tenderness, No palpable runoff/stepoff/deformity      MEDICATIONS  (STANDING):  aMIOdarone    Tablet 200 milliGRAM(s) Oral daily  amLODIPine   Tablet 5 milliGRAM(s) Oral daily  cefepime   IVPB      cefepime   IVPB 2000 milliGRAM(s) IV Intermittent every 8 hours  enoxaparin Injectable 40 milliGRAM(s) SubCutaneous daily  furosemide    Tablet 20 milliGRAM(s) Oral daily  metoprolol tartrate 50 milliGRAM(s) Oral daily  metroNIDAZOLE  IVPB 500 milliGRAM(s) IV Intermittent every 8 hours    MEDICATIONS  (PRN):      DVT PROPHYLAXIS: SCDs, enoxaparin Injectable 40 milliGRAM(s) SubCutaneous daily    GI PROPHYLAXIS:   ANTICOAGULATION:   ANTIBIOTICS: cefepime   IVPB    cefepime   IVPB 2000 milliGRAM(s)  metroNIDAZOLE  IVPB 500 milliGRAM(s)            LAB/STUDIES:  Labs:  CAPILLARY BLOOD GLUCOSE                              12.2   6.52  )-----------( 212      ( 13 Jul 2021 06:46 )             37.2       Auto Immature Granulocyte %: 0.5 % (07-13-21 @ 06:46)  Auto Neutrophil %: 64.7 % (07-13-21 @ 06:46)    07-13    137  |  103  |  7<L>  ----------------------------<  118<H>  4.0   |  26  |  0.6<L>      Calcium, Total Serum: 8.4 mg/dL (07-13-21 @ 06:46)      LFTs:             5.7  | 1.0  | 69       ------------------[114     ( 13 Jul 2021 06:46 )  3.6  | x    | 74          Lipase:x      Amylase:x             Coags:        Serum Pro-Brain Natriuretic Peptide: 3216 pg/mL (07-09-21 @ 08:12)          Culture - Urine (collected 11 Jul 2021 16:23)  Source: .Urine Clean Catch (Midstream)  Final Report (13 Jul 2021 07:59):    No growth            ACCESS DEVICES:  [x] Peripheral IV  [ ] Central Venous Line	[ ] R	[ ] L	[ ] IJ	[ ] Fem	[ ] SC	Placed:   [ ] Arterial Line		[ ] R	[ ] L	[ ] Fem	[ ] Rad	[ ] Ax	Placed:   [ ] PICC:					[ ] Mediport  [ ] Urinary Catheter,  Date Placed:   [ ] Chest tube: [ ] Right, [ ] Left  [ ] TAMIR/Omar Drains

## 2021-07-14 NOTE — PROGRESS NOTE ADULT - NSICDXPILOT_GEN_ALL_CORE
[place]

## 2021-07-14 NOTE — DISCHARGE NOTE NURSING/CASE MANAGEMENT/SOCIAL WORK - NSDCVIVACCINE_GEN_ALL_CORE_FT
Tdap; 22-Nov-2018 08:52; Benito Lovell); [employer]; [id number] (Exp. Date: 23-Oct-2020); IntraMuscular; Deltoid Right.; 0.5 milliLiter(s); VIS (VIS Published: 09-May-2013, VIS Presented: 22-Nov-2018);

## 2021-07-14 NOTE — DISCHARGE NOTE PROVIDER - HOSPITAL COURSE
Patient is a 74yo male with PMH of CAD s/p CABG, first degree AV block, LBBB, SSS s/p BiV PPM in 2018, HTN, breast cancer s/p mastectomy and presbycusis who presented to the hospital complaining of sharp epigastric pain. CT abdomen/pelvis and CMP showed evidence of moderate acute cholangitis. The patient was admitted to step down unit for closer monitoring. Patient was started on IV antibiotics. He was evaluated by the advanced GI team that recommended MRCP with possible ERCP for treatment. However, patient refused MRCP due to claustrophobia and refused ERCP when he felt better and decided he did not want any other procedures. Bilirubin has since returned to normal, LFTs are trending down, and the patient is asymptomatic with a benign exam. Per advanced GI, patient can be discharged home with close follow up.    Patient is medically stable and ready for discharge. He will be discharged on PO ciprofloxacin and metronidazole to complete a 14-day course. He will follow up with his PCP to repeat LFTs in 1-2 weeks to confirm resolution. His statin was held this admission and will continue to be held until his transaminitis is fully resolved. Patient is a 74yo male with PMH of CAD s/p CABG, first degree AV block, LBBB, SSS s/p BiV PPM in 2018, HTN, breast cancer s/p mastectomy and presbycusis who presented to the hospital complaining of sharp epigastric pain. CT abdomen/pelvis and CMP showed evidence of moderate acute cholangitis. The patient was admitted to step down unit for closer monitoring. Patient was started on IV antibiotics. He was evaluated by the advanced GI team that recommended MRCP with possible ERCP for treatment. However, patient refused MRCP due to claustrophobia and refused ERCP when he felt better and decided he did not want any other procedures. Bilirubin has since returned to normal, LFTs are trending down, and the patient is asymptomatic with a benign exam. Per advanced GI, patient can be discharged home with close follow up.    Patient is medically stable and ready for discharge. He will be discharged on PO ciprofloxacin and metronidazole to complete a 14-day course. He will follow up with his PCP to repeat LFTs in 1-2 weeks to confirm resolution. His statin was held this admission and will continue to be held until his transaminitis is fully resolved.    Attending Addendum:  patient seen and examined earlier today and discussed with resident.  patient has no pain.  could not have MRI secondary to claustrophobia.  refused sedation.  no further pain. no fever. no jaundice. on p/e slcera clear.  abd. soft nontender.  seen by GI who recommended ERCP.  patietn declined.  patient medically stable for DC on oral antibiotics.  f/u with PMD and repeat LFT's in 2 weeks.  medication reconciliation performed with resident.

## 2021-07-14 NOTE — DISCHARGE NOTE PROVIDER - NSDCMRMEDTOKEN_GEN_ALL_CORE_FT
amiodarone 200 mg oral tablet: 1 tab(s) orally once a day  amLODIPine 5 mg oral tablet: 1 tab(s) orally once a day  ciprofloxacin 500 mg oral tablet: 1 tab(s) orally 2 times a day   Ecotrin: 81  orally  Flagyl 500 mg oral tablet: 1 tab(s) orally 3 times a day  furosemide 20 mg oral tablet: 1 tab(s) orally once a day  losartan 100 mg oral tablet: 1 tab(s) orally once a day  Metoprolol Tartrate 50 mg oral tablet: 1 tab(s) orally once a day  spironolactone 25 mg oral tablet: 1 tab(s) orally once a day

## 2021-07-14 NOTE — DISCHARGE NOTE PROVIDER - NSDCCPCAREPLAN_GEN_ALL_CORE_FT
PRINCIPAL DISCHARGE DIAGNOSIS  Diagnosis: Cholangitis  Assessment and Plan of Treatment: Cholangitis is inflammation of the group of tubes (ducts) that carry digestive juices from the liver, gallbladder, and pancreas to the small intestine. This group of ducts is called the biliary tract. Cholangitis can cause fever, abdominal pain, and yellowish discoloration of the skin, the whites of the eyes, and mucous membranes (jaundice). Cholangitis can get worse very quickly and cause infection throughout the body (sepsis). It is important to diagnose and treat cholangitis as soon as possible.  This condition is usually caused by a blockage (obstruction) in the biliary tract. The most common causes of obstruction are stone formation in the biliary tract or damage to the biliary tract from a previous surgical or diagnostic procedure. Other causes include cysts or tumors in the biliary tract, as well as being born with a narrow biliary tract. When the flow of digestive juices is blocked, bacteria that normally live in the intestine can grow and spread inside the biliary tract.  Signs and symptoms of cholangitis include: chills, fevers, tiredness, nausea, dark-colored urine, sarah-colored stools, confusion, itchiness, and yellow eyes.  Cholangitis is diagnosed based on your symptoms, physical exam, medical history, blood tests, and imaging (ultrasound, CT scan, or MRI). A procedure called ERCP is also done to check the biliary tract for possible causes of cholangitis.   Cholangitis is usually treated at the hospital by receiving fluids, nutrition, and IV antibiotics. An ERCP or other surgical procedure can be used to open and drain the biliary duct. You refused MRCP and ERCP during this admission. Please take your antibiotics as prescribed. Please follow up with advanced GI in 2-3 weeks at the [facility] clinic. Please follow up with your primary doctor in 1-3 days after being discharged to repeat liver function tests.      SECONDARY DISCHARGE DIAGNOSES  Diagnosis: High cholesterol  Assessment and Plan of Treatment: Cholangitis causes your liver function to worsen. Your home medication atorvastatin for cholesterol was held during this hospital stay, because it is cleared by the liver. Please follow up with your primary doctor to confirm that your liver function has returned to normal before restarting this medication.

## 2021-07-14 NOTE — PROGRESS NOTE ADULT - REASON FOR ADMISSION
Abdominal Pain

## 2021-07-14 NOTE — DISCHARGE NOTE PROVIDER - CARE PROVIDER_API CALL
Trevon Champagne [facility]  [address]  [place] [address]  Phone: [phone]  Fax: [phone]  Established Patient  Follow Up Time: 1-3 days    Luis Manuel Cortez)  Gastroenterology; Internal Medicine  [address] [place] [id number]  Phone: [phone]  Fax: [phone]  Follow Up Time: 2 weeks

## 2021-07-14 NOTE — PROGRESS NOTE ADULT - PROVIDER SPECIALTY LIST ADULT
Pulmonology
Surgery
Internal Medicine
Trauma Surgery
Gastroenterology
Hospitalist
Hospitalist
Internal Medicine
Internal Medicine
Trauma Surgery
Gastroenterology
Hospitalist
Trauma Surgery

## 2021-07-21 DIAGNOSIS — Z90.10 ACQUIRED ABSENCE OF UNSPECIFIED BREAST AND NIPPLE: ICD-10-CM

## 2021-07-21 DIAGNOSIS — Z95.0 PRESENCE OF CARDIAC PACEMAKER: ICD-10-CM

## 2021-07-21 DIAGNOSIS — Z85.3 PERSONAL HISTORY OF MALIGNANT NEOPLASM OF BREAST: ICD-10-CM

## 2021-07-21 DIAGNOSIS — M17.0 BILATERAL PRIMARY OSTEOARTHRITIS OF KNEE: ICD-10-CM

## 2021-07-21 DIAGNOSIS — I83.90 ASYMPTOMATIC VARICOSE VEINS OF UNSPECIFIED LOWER EXTREMITY: ICD-10-CM

## 2021-07-21 DIAGNOSIS — E66.9 OBESITY, UNSPECIFIED: ICD-10-CM

## 2021-07-21 DIAGNOSIS — E78.00 PURE HYPERCHOLESTEROLEMIA, UNSPECIFIED: ICD-10-CM

## 2021-07-21 DIAGNOSIS — I11.0 HYPERTENSIVE HEART DISEASE WITH HEART FAILURE: ICD-10-CM

## 2021-07-21 DIAGNOSIS — K83.09 OTHER CHOLANGITIS: ICD-10-CM

## 2021-07-21 DIAGNOSIS — F40.240 CLAUSTROPHOBIA: ICD-10-CM

## 2021-07-21 DIAGNOSIS — N20.0 CALCULUS OF KIDNEY: ICD-10-CM

## 2021-07-21 DIAGNOSIS — I25.2 OLD MYOCARDIAL INFARCTION: ICD-10-CM

## 2021-07-21 DIAGNOSIS — A41.9 SEPSIS, UNSPECIFIED ORGANISM: ICD-10-CM

## 2021-07-21 DIAGNOSIS — K80.00 CALCULUS OF GALLBLADDER WITH ACUTE CHOLECYSTITIS WITHOUT OBSTRUCTION: ICD-10-CM

## 2021-07-21 DIAGNOSIS — I50.42 CHRONIC COMBINED SYSTOLIC (CONGESTIVE) AND DIASTOLIC (CONGESTIVE) HEART FAILURE: ICD-10-CM

## 2021-07-21 DIAGNOSIS — Z95.1 PRESENCE OF AORTOCORONARY BYPASS GRAFT: ICD-10-CM

## 2021-07-21 DIAGNOSIS — I25.10 ATHEROSCLEROTIC HEART DISEASE OF NATIVE CORONARY ARTERY WITHOUT ANGINA PECTORIS: ICD-10-CM

## 2021-07-21 DIAGNOSIS — K42.9 UMBILICAL HERNIA WITHOUT OBSTRUCTION OR GANGRENE: ICD-10-CM

## 2021-07-21 DIAGNOSIS — M16.0 BILATERAL PRIMARY OSTEOARTHRITIS OF HIP: ICD-10-CM

## 2021-07-21 DIAGNOSIS — Z53.20 PROCEDURE AND TREATMENT NOT CARRIED OUT BECAUSE OF PATIENT'S DECISION FOR UNSPECIFIED REASONS: ICD-10-CM

## 2021-09-07 ENCOUNTER — TRANSCRIPTION ENCOUNTER (OUTPATIENT)
Age: 76
End: 2021-09-07

## 2021-10-07 ENCOUNTER — APPOINTMENT (OUTPATIENT)
Dept: CARDIOLOGY | Facility: CLINIC | Age: 76
End: 2021-10-07
Payer: MEDICARE

## 2021-10-07 ENCOUNTER — NON-APPOINTMENT (OUTPATIENT)
Age: 76
End: 2021-10-07

## 2021-10-07 PROCEDURE — 93294 REM INTERROG EVL PM/LDLS PM: CPT

## 2021-10-07 PROCEDURE — 93296 REM INTERROG EVL PM/IDS: CPT

## 2021-10-23 ENCOUNTER — EMERGENCY (EMERGENCY)
Facility: HOSPITAL | Age: 76
LOS: 0 days | Discharge: HOME | End: 2021-10-23
Attending: EMERGENCY MEDICINE | Admitting: EMERGENCY MEDICINE
Payer: MEDICARE

## 2021-10-23 ENCOUNTER — TRANSCRIPTION ENCOUNTER (OUTPATIENT)
Age: 76
End: 2021-10-23

## 2021-10-23 VITALS
TEMPERATURE: 98 F | SYSTOLIC BLOOD PRESSURE: 156 MMHG | RESPIRATION RATE: 16 BRPM | WEIGHT: 229.94 LBS | OXYGEN SATURATION: 97 % | HEART RATE: 70 BPM | DIASTOLIC BLOOD PRESSURE: 70 MMHG | HEIGHT: 74 IN

## 2021-10-23 DIAGNOSIS — Z90.10 ACQUIRED ABSENCE OF UNSPECIFIED BREAST AND NIPPLE: Chronic | ICD-10-CM

## 2021-10-23 DIAGNOSIS — I50.9 HEART FAILURE, UNSPECIFIED: ICD-10-CM

## 2021-10-23 DIAGNOSIS — I25.10 ATHEROSCLEROTIC HEART DISEASE OF NATIVE CORONARY ARTERY WITHOUT ANGINA PECTORIS: ICD-10-CM

## 2021-10-23 DIAGNOSIS — M79.605 PAIN IN LEFT LEG: ICD-10-CM

## 2021-10-23 DIAGNOSIS — R60.0 LOCALIZED EDEMA: ICD-10-CM

## 2021-10-23 DIAGNOSIS — Z85.3 PERSONAL HISTORY OF MALIGNANT NEOPLASM OF BREAST: ICD-10-CM

## 2021-10-23 DIAGNOSIS — M79.89 OTHER SPECIFIED SOFT TISSUE DISORDERS: ICD-10-CM

## 2021-10-23 DIAGNOSIS — Z87.39 PERSONAL HISTORY OF OTHER DISEASES OF THE MUSCULOSKELETAL SYSTEM AND CONNECTIVE TISSUE: ICD-10-CM

## 2021-10-23 DIAGNOSIS — Z95.5 PRESENCE OF CORONARY ANGIOPLASTY IMPLANT AND GRAFT: ICD-10-CM

## 2021-10-23 DIAGNOSIS — E78.00 PURE HYPERCHOLESTEROLEMIA, UNSPECIFIED: ICD-10-CM

## 2021-10-23 DIAGNOSIS — Z95.1 PRESENCE OF AORTOCORONARY BYPASS GRAFT: Chronic | ICD-10-CM

## 2021-10-23 DIAGNOSIS — E66.9 OBESITY, UNSPECIFIED: ICD-10-CM

## 2021-10-23 DIAGNOSIS — I11.0 HYPERTENSIVE HEART DISEASE WITH HEART FAILURE: ICD-10-CM

## 2021-10-23 PROCEDURE — 93970 EXTREMITY STUDY: CPT | Mod: 26

## 2021-10-23 PROCEDURE — 99284 EMERGENCY DEPT VISIT MOD MDM: CPT

## 2021-10-23 NOTE — ED PROVIDER NOTE - NSICDXFAMILYHX_GEN_ALL_CORE_FT
FAMILY HISTORY:  Father  Still living? Unknown  Family history of heart disease, Age at diagnosis: Age Unknown    Mother  Still living? Unknown  Family history of heart disease, Age at diagnosis: Age Unknown    Sibling  Still living? Unknown  Family history of lung cancer, Age at diagnosis: Age Unknown

## 2021-10-23 NOTE — ED PROVIDER NOTE - OBJECTIVE STATEMENT
Pt is a 75 male with PMH HTN, HLD, CHF, OA, Breast CA, CAD presents to ED with complaints of Leg swelling. Pt states over past few days noted to have L leg swelling and pain. Pt states pain is mild, located in calf, non radiating with no alleviating factors worse with walking. Pt denies any trauma. Denies hx of DVT. Pt denies any fever, chills, bodyaches, chest pain, sob, abdominal pain.

## 2021-10-23 NOTE — ED ADULT NURSE NOTE - NSICDXPASTMEDICALHX_GEN_ALL_CORE_FT
PAST MEDICAL HISTORY:  Arrhythmia     Breast cancer 2001    CHF (congestive heart failure)     Heart attack     HTN (hypertension)     Hypercholesteremia     Obesity     Osteoarthritis

## 2021-10-23 NOTE — ED PROVIDER NOTE - CARE PROVIDERS DIRECT ADDRESSES
[email or web address]

## 2021-10-23 NOTE — ED PROVIDER NOTE - CLINICAL SUMMARY MEDICAL DECISION MAKING FREE TEXT BOX
doppler prelim neg, advised low salt diet, take flomax daily, compression socks, elevate legs, f/u pmd/cards 1 week strict return precautions provide

## 2021-10-23 NOTE — ED PROVIDER NOTE - PATIENT PORTAL LINK FT
You can access the FollowMyHealth Patient Portal offered by [facility] by registering at the following website: [email or web address]. By joining [employer]’s FollowMyHealth portal, you will also be able to view your health information using other applications (apps) compatible with our system.

## 2021-10-23 NOTE — ED ADULT NURSE NOTE - NSICDXFAMILYHX_GEN_ALL_CORE_FT
FAMILY HISTORY:  Father  Still living? Unknown  Family history of heart disease, Age at diagnosis: Age Unknown    Mother  Still living? Unknown  Family history of heart disease, Age at diagnosis: Age Unknown    Sibling  Still living? Unknown  Family history of lung cancer, Age at diagnosis: Age Unknown    
normal...

## 2021-10-23 NOTE — ED PROVIDER NOTE - PHYSICAL EXAMINATION
Physical Exam    Vital Signs: I have reviewed the initial vital signs.  Constitutional: well-nourished, appears stated age, no acute distress  Eyes: Conjunctiva pink, Sclera clear, PERRLA, EOMI.  Musculoskeletal: supple neck, no lower extremity edema, no midline tenderness. L lower extremity swelling, Brawny pigmentation, no ulcer noted, TTP of L calf. no dyana tenderness. distal pulses present. no sensory def   Integumentary: warm, dry, no rash  Neurologic: awake, alert, cranial nerves II-XII grossly intact, extremities’ motor and sensory functions grossly intact  Psychiatric: appropriate mood, appropriate affect

## 2021-10-23 NOTE — ED PROVIDER NOTE - NSFOLLOWUPINSTRUCTIONS_ED_ALL_ED_FT
Follow up with your pmd, vascular surgeon and cardiology    WHAT YOU NEED TO KNOW:    Leg edema is swelling caused by fluid buildup. Your legs may swell if you sit or stand for long periods of time, are pregnant, or are injured. Swelling may also occur if you have heart failure or circulation problems. This means that your heart does not pump blood through your body as it should.    DISCHARGE INSTRUCTIONS:    Self-care:     Elevate your legs: Raise your legs above the level of your heart as often as you can. This will help decrease swelling and pain. Prop your legs on pillows or blankets to keep them elevated comfortably.      Wear pressure stockings: These tight stockings put pressure on your legs to promote blood flow and prevent blood clots. Wear the stockings during the day. Do not wear them while you sleep.      Apply heat: Heat helps decrease pain and swelling. Apply heat on the area for 20 to 30 minutes every 2 hours for as many days as directed.       Stay active: Do not stand or sit for long periods of time. Ask your healthcare provider about the best exercise plan for you.      Eat healthy foods: Healthy foods include fruits, vegetables, whole-grain breads, low-fat dairy products, beans, lean meats, and fish. Ask if you need to be on a special diet. Limit salt. Salt will make your body hold even more fluid.    Follow up with your healthcare provider as directed: Write down your questions so you remember to ask them during your visits.     Contact your healthcare provider if:     You have a fever or feel more tired than usual.      The veins in your legs look larger than usual. They may look full or bulging.      Your legs itch or feel heavy.      You have red or white areas or sores on your legs. The skin may also appear dimpled or have indentations.      You are gaining weight.      You have trouble moving your ankles.      The swelling does not go away, or other parts of your body swell.      You have questions or concerns about your condition or care.    Return to the emergency department if:     You cannot walk.      You feel faint or confused.       Your skin turns blue or gray.      Your leg feels warm, tender, and painful. It may be swollen and red.      You have chest pain or trouble breathing that is worse when you lie down.      You suddenly feel lightheaded and have trouble breathing.      You have new and sudden chest pain. You may have more pain when you take deep breaths or cough. You may also cough up blood.

## 2021-10-23 NOTE — ED PROVIDER NOTE - CARE PROVIDER_API CALL
Luciano Shell)  Surgery; Vascular Surgery  [address] [place] [address]  Phone: [phone]  Fax: [phone]  Follow Up Time: 1-3 Days    Natalio Benjamin)  Cardiovascular Disease; Internal Medicine; Interventional Cardiology  [address] [place] [address]  Phone: [phone]  Fax: [phone]  Follow Up Time: 1-3 Days

## 2021-10-23 NOTE — ED PROVIDER NOTE - ATTENDING CONTRIBUTION TO CARE
75M PMH CAD CABG CHF PCM breast ca, htn hl, p/w 2 days of LLE edema. no trauma. no numbness, weakness. no redness, pain, warmth. no cp, sob, georges. pt seen at [facility] and sent to ED r/o DVT.     on exam, AFVSS, well pb nad, ncat, eomi, perrla, mmm, LCTAB, RRR nl s1s2, aaox3, no focal deficits, +LLE pitting edema, soft compartments, no erythema/warmth, FROM, 5/5 motor, SILT, 2+ dp pulse, no calf ttp,     a/p; LLE edema, will get LE doppler r/o DVT.  no sign of cellulitis, CHF. advised close f/u w cards/pmd and diet modification

## 2021-10-23 NOTE — ED PROVIDER NOTE - PROVIDER TOKENS
PROVIDER:[TOKEN:[[id number]],FOLLOWUP:[1-3 Days]],PROVIDER:[TOKEN:[[id number]],FOLLOWUP:[1-3 Days]]

## 2021-10-23 NOTE — ED ADULT TRIAGE NOTE - CHIEF COMPLAINT QUOTE
Pt sent in from urgent care for left calf swelling & slight tenderness to touch since yesterday, r/o DVT.

## 2021-10-27 ENCOUNTER — APPOINTMENT (OUTPATIENT)
Dept: CARDIOLOGY | Facility: CLINIC | Age: 76
End: 2021-10-27
Payer: MEDICARE

## 2021-10-27 VITALS
SYSTOLIC BLOOD PRESSURE: 118 MMHG | DIASTOLIC BLOOD PRESSURE: 65 MMHG | BODY MASS INDEX: 34.65 KG/M2 | HEIGHT: 74 IN | TEMPERATURE: 97.9 F | HEART RATE: 60 BPM | WEIGHT: 270 LBS

## 2021-10-27 PROCEDURE — 99215 OFFICE O/P EST HI 40 MIN: CPT

## 2021-10-27 PROCEDURE — 93280 PM DEVICE PROGR EVAL DUAL: CPT

## 2021-10-27 PROCEDURE — 93000 ELECTROCARDIOGRAM COMPLETE: CPT | Mod: 59

## 2021-10-27 NOTE — DISCUSSION/SUMMARY
[Pacemaker Function Normal] : normal pacemaker function [FreeTextEntry1] : We discussed the risks/benefits/alternatives, nature of procedure, and follow up care after Watchman is implanted. We discussed the risks including but not limited to bleeding, hematoma, injury to vessels and heart, perforation, tamponade, pneumothorax, infection, embolization, malfunction, and rare risks of stroke/heart attack/death. Patient expressed understanding of the discussion. \par \par Patient understands that they will need to take ASA and Warfarin or ASA and Eliquis for 6 weeks. They will then need a OTIS at 45 days post op to assess for leaks. I answered all questions in detail and patient wishes to discuss Watchman with his cardiologist before deciding.

## 2021-10-27 NOTE — PHYSICAL EXAM
[General Appearance - Well Developed] : well developed [Normal Appearance] : normal appearance [Well Groomed] : well groomed [General Appearance - Well Nourished] : well nourished [No Deformities] : no deformities [General Appearance - In No Acute Distress] : no acute distress [Heart Rate And Rhythm] : heart rate and rhythm were normal [Heart Sounds] : normal S1 and S2 [Murmurs] : no murmurs present [Edema] : no peripheral edema present [] : no respiratory distress [Respiration, Rhythm And Depth] : normal respiratory rhythm and effort [Exaggerated Use Of Accessory Muscles For Inspiration] : no accessory muscle use [Auscultation Breath Sounds / Voice Sounds] : lungs were clear to auscultation bilaterally [Left Infraclavicular] : left infraclavicular area [Clean] : clean [Dry] : dry [Well-Healed] : well-healed [Abdomen Soft] : soft [Nail Clubbing] : no clubbing of the fingernails [Erythema] : not erythematous [Warm] : not warm [Tender] : not tender

## 2021-10-27 NOTE — ASSESSMENT
[FreeTextEntry1] : 76 yo M with history of AFib on aspirin and Amio, SSS s/p DC PPM (unable to cannulate CS). \par \par # SSS s/p DC PPM\par - Unable to cannulate CS at time of device implant. \par - Normal functioning PPM. Patient is RV pacing 77% of the time and had recent heart failure. We discussed the option of epicardial LV lead but pt does not want any procedures at this time and he states he will be more mindful of salt intake. \par - Remote monitor is set up and patient is transmitting.\par \par # Paroxysmal AFib \par - Cont aspirin (pt not felt to be a good candidate for anticoagulation by his cardiologist due to bleeding). Discussed option of Watchman device. I have reached out to his cardiologist to see if pt may be able to be anticoagulated briefly for Watchman device. \par - Cont Amio to 100mg. Recent labs from hospital in July showed elevated LFTs but pt had cholelithiasis. Repeat labs from Sept show Hg 13.7 and normal Cr and LFTs. Check TFTs. Pt is seeing ophtho regularly. Advised pt to see pulm for PFTs. Referral provided.\par \par # Mild cardiomyopathy\par - Cont BB and Losartan\par - Consider Entresto\par \par # HTN\par - BP well controlled\par - I have advised him to adhere to a 2g Na diet and to be more careful with consuming salty meals. \par \par I have also advised the patient to go to the nearest emergency room if he experiences any chest pain, dyspnea, syncope, or has any other compelling symptoms.\par  \par Follow up in 3 months.\par

## 2021-10-27 NOTE — PROCEDURE
[No] : not [Pacemaker] : pacemaker [DDD] : DDD [Voltage: ___ volts] : Voltage was [unfilled] volts [Magnet Rate: ___ Ppm] : magnet rate was [unfilled] Ppm [Normal] : The battery status is normal. [Lead Imp:  ___ohms] : lead impedance was [unfilled] ohms [Sensing Amplitude ___mv] : sensing amplitude was [unfilled] mv [___V @] : [unfilled] V [___ ms] : [unfilled] ms [See Device Printout] : See device printout [Programmed for Longevity] : output reprogrammed for improved battery longevity [de-identified] : [employer] [de-identified] : [id number] [de-identified] : [id number] [de-identified] : 11/2/2018 [de-identified] : 60/130 [de-identified] : 10.0 years [de-identified] : A-paced 75%; V-paced 77%\par No new episodes.

## 2021-10-27 NOTE — HISTORY OF PRESENT ILLNESS
[de-identified] : \par Cardiologist: Dr. Corea\par Ophtho: Dr. GOMEZ ([facility])\par Pulm:won't see one\par \par 74 yo M with history of SSS s/p DC PPM, CAD s/p CABG, LBBB. BiV PPM was attempted but despite multiple catheters and sheaths, CS was unable to be cannulated. He denies any cardiovascular complaints including chest pain, dyspnea, dizziness, lightheadedness, presyncope or syncope. \par \par Pt admitted from 7/9-7/14 for abdominal pain. He was recommended MRCP with possible ERCP for treatment. However, patient refused MRCP due to claustrophobia and refused ERCP when he felt better and decided he did not want any other procedures. \par \par Pt was recently in the ER last week 10/23 (no admission) for lower extremity edema due to excess salt intake (ate pickles and ham).  Patient denies dyspnea with exertion. He can walk on flat ground and up the stairs without dyspnea.

## 2021-11-10 NOTE — PATIENT PROFILE ADULT - LIVING ENVIRONMENT
Labs.   Xray today.    If you feel chest pain or shortness of breath or pain down left arm reoccurs or develops or worsens, needs to go to ER or UC but also follow up with PCP.    We will be in touch after I review your results.    In the meatntime, stay well hydrated. 3liters of water a day.  Limit/avoid dairy & sugar.     Please keep us posted on how you are.  It has been a pleasure caring for you. If you have questions, please feel free to contact us.   Take care,   Lisa Clark, NP Gastroenterology   #[phone]  With Dr. Vaughn Castellanos  I am typically in the office on Mondays, Wednesdays & Thursdays.   Although, someone from our staff is always available to help you.        no

## 2022-01-06 ENCOUNTER — APPOINTMENT (OUTPATIENT)
Dept: CARDIOLOGY | Facility: CLINIC | Age: 77
End: 2022-01-06
Payer: MEDICARE

## 2022-01-06 ENCOUNTER — NON-APPOINTMENT (OUTPATIENT)
Age: 77
End: 2022-01-06

## 2022-01-06 PROCEDURE — 93296 REM INTERROG EVL PM/IDS: CPT

## 2022-01-06 PROCEDURE — 93294 REM INTERROG EVL PM/LDLS PM: CPT

## 2022-04-07 ENCOUNTER — NON-APPOINTMENT (OUTPATIENT)
Age: 77
End: 2022-04-07

## 2022-04-07 ENCOUNTER — APPOINTMENT (OUTPATIENT)
Dept: CARDIOLOGY | Facility: CLINIC | Age: 77
End: 2022-04-07
Payer: MEDICARE

## 2022-04-07 PROCEDURE — 93294 REM INTERROG EVL PM/LDLS PM: CPT

## 2022-04-07 PROCEDURE — 93296 REM INTERROG EVL PM/IDS: CPT

## 2022-05-25 ENCOUNTER — APPOINTMENT (OUTPATIENT)
Dept: CARDIOLOGY | Facility: CLINIC | Age: 77
End: 2022-05-25
Payer: MEDICARE

## 2022-05-25 VITALS
BODY MASS INDEX: 30.8 KG/M2 | SYSTOLIC BLOOD PRESSURE: 168 MMHG | DIASTOLIC BLOOD PRESSURE: 70 MMHG | HEIGHT: 74 IN | HEART RATE: 72 BPM | WEIGHT: 240 LBS | TEMPERATURE: 98 F | RESPIRATION RATE: 16 BRPM

## 2022-05-25 PROCEDURE — 93280 PM DEVICE PROGR EVAL DUAL: CPT

## 2022-05-25 PROCEDURE — 99215 OFFICE O/P EST HI 40 MIN: CPT

## 2022-05-25 PROCEDURE — 93000 ELECTROCARDIOGRAM COMPLETE: CPT | Mod: 59

## 2022-06-08 ENCOUNTER — APPOINTMENT (OUTPATIENT)
Dept: CARDIOTHORACIC SURGERY | Facility: CLINIC | Age: 77
End: 2022-06-08
Payer: MEDICARE

## 2022-06-08 VITALS
WEIGHT: 240 LBS | DIASTOLIC BLOOD PRESSURE: 72 MMHG | RESPIRATION RATE: 16 BRPM | BODY MASS INDEX: 30.8 KG/M2 | SYSTOLIC BLOOD PRESSURE: 171 MMHG | HEART RATE: 64 BPM | OXYGEN SATURATION: 95 % | TEMPERATURE: 98.7 F | HEIGHT: 74 IN

## 2022-06-08 PROCEDURE — 99204 OFFICE O/P NEW MOD 45 MIN: CPT

## 2022-06-08 RX ORDER — SPIRONOLACTONE 25 MG/1
25 TABLET ORAL DAILY
Qty: 30 | Refills: 3 | Status: COMPLETED | COMMUNITY
End: 2022-06-08

## 2022-06-08 NOTE — ASSESSMENT
[FreeTextEntry1] : Mr. Blanton is a 75 y/o male that arrives today for evaluation for Epicardial wire placement. PMH of CAD s/p CABG, first degree AV block, LBBB, SSS s/p BiV PPM in 2018, HTN, breast cancer s/p mastectomy and presbycusis . He arrives today for evaluation. Denies any symptoms.\par \par Breast Cancer being monitored by Dr. Hannon, seen 5/22 with no change in current status.\par Plan for upgrade to Bi-V \par I Valarie Polanco, [facility] am acting as the scribe for Dr. Danielle\par \par CTS Attending\par pt referred for upgrade of DDD PPM to CRT-P, LVEF 41%\par pt has SSS aith 80% RV pacing, and meets indication for biventricular pacing\par LV lead placement at implant in 2018 failed, and pt now referred for epicardial lead.\par pt and wife made aware of the need for surgery, with attempt at trans-venous implant, possible left thoracotomy, which is re-op, since Dr. Aranda performed MICSx5 through the left breast.\par \par I explained the procedure in detail, including risks, benefits and alternatives, and the patient agreed to proceed with surgery by signing informed consent forms.\par Surgery will be scheduled and will proceed as soon as presurgical testing is completed.\par Stop plavix one week prior to surgery\par \par -FMR\par I personally performed the services described in the documentation, reviewed the documentation recorded by the scribe in my presence and it accurately and completely records my words and actions.\par

## 2022-06-08 NOTE — ASSESSMENT
[FreeTextEntry1] : Mr. Blanton is a 77 y/o male that arrives today for evaluation for Epicardial wire placement. PMH of CAD s/p CABG, first degree AV block, LBBB, SSS s/p BiV PPM in 2018, HTN, breast cancer s/p mastectomy and presbycusis . He arrives today for evaluation. Denies any symptoms.\par \par Breast Cancer being monitored by Dr. Hannon, seen 5/22 with no change in current status.\par Plan for upgrade to Bi-V \par I Valarie Polanco, [facility] am acting as the scribe for Dr. Danielle\par \par CTS Attending\par pt referred for upgrade of DDD PPM to CRT-P, LVEF 41%\par pt has SSS aith 80% RV pacing, and meets indication for biventricular pacing\par LV lead placement at implant in 2018 failed, and pt now referred for epicardial lead.\par pt and wife made aware of the need for surgery, with attempt at trans-venous implant, possible left thoracotomy, which is re-op, since Dr. Aranda performed MICSx5 through the left breast.\par \par I explained the procedure in detail, including risks, benefits and alternatives, and the patient agreed to proceed with surgery by signing informed consent forms.\par Surgery will be scheduled and will proceed as soon as presurgical testing is completed.\par Stop plavix one week prior to surgery\par \par -FMR\par I personally performed the services described in the documentation, reviewed the documentation recorded by the scribe in my presence and it accurately and completely records my words and actions.\par

## 2022-06-08 NOTE — HISTORY OF PRESENT ILLNESS
[FreeTextEntry1] : Mr. Blanton is a 75 y/o male that arrives today for evaluation for Epicardial wire placement. PMH of CAD s/p CABG, first degree AV block, LBBB, SSS s/p BiV PPM in 2018, HTN, breast cancer s/p mastectomy and presbycusis . He arrives today for evaluation. Denies any symptoms.

## 2022-06-08 NOTE — PHYSICAL EXAM
[General Appearance - Alert] : alert [General Appearance - In No Acute Distress] : in no acute distress [Sclera] : the sclera and conjunctiva were normal [PERRL With Normal Accommodation] : pupils were equal in size, round, and reactive to light [Extraocular Movements] : extraocular movements were intact [Outer Ear] : the ears and nose were normal in appearance [Oropharynx] : the oropharynx was normal [Neck Appearance] : the appearance of the neck was normal [Neck Cervical Mass (___cm)] : no neck mass was observed [Jugular Venous Distention Increased] : there was no jugular-venous distention [Thyroid Diffuse Enlargement] : the thyroid was not enlarged [Thyroid Nodule] : there were no palpable thyroid nodules [Bowel Sounds] : normal bowel sounds [Abdomen Soft] : soft [Abdomen Tenderness] : non-tender [Abdomen Mass (___ Cm)] : no abdominal mass palpated [Abnormal Walk] : normal gait [Nail Clubbing] : no clubbing  or cyanosis of the fingernails [Musculoskeletal - Swelling] : no joint swelling seen [Motor Tone] : muscle strength and tone were normal [Skin Color & Pigmentation] : normal skin color and pigmentation [Skin Turgor] : normal skin turgor [] : no rash [Deep Tendon Reflexes (DTR)] : deep tendon reflexes were 2+ and symmetric [Sensation] : the sensory exam was normal to light touch and pinprick [No Focal Deficits] : no focal deficits [Oriented To Time, Place, And Person] : oriented to person, place, and time [Impaired Insight] : insight and judgment were intact [Affect] : the affect was normal

## 2022-06-08 NOTE — HISTORY OF PRESENT ILLNESS
[FreeTextEntry1] : Mr. Blanton is a 77 y/o male that arrives today for evaluation for Epicardial wire placement. PMH of CAD s/p CABG, first degree AV block, LBBB, SSS s/p BiV PPM in 2018, HTN, breast cancer s/p mastectomy and presbycusis . He arrives today for evaluation. Denies any symptoms.

## 2022-06-17 ENCOUNTER — OUTPATIENT (OUTPATIENT)
Dept: OUTPATIENT SERVICES | Facility: HOSPITAL | Age: 77
LOS: 1 days | Discharge: HOME | End: 2022-06-17
Payer: MEDICARE

## 2022-06-17 ENCOUNTER — RESULT REVIEW (OUTPATIENT)
Age: 77
End: 2022-06-17

## 2022-06-17 VITALS
WEIGHT: 240.08 LBS | OXYGEN SATURATION: 98 % | HEART RATE: 64 BPM | TEMPERATURE: 98 F | RESPIRATION RATE: 16 BRPM | SYSTOLIC BLOOD PRESSURE: 170 MMHG | DIASTOLIC BLOOD PRESSURE: 76 MMHG | HEIGHT: 75 IN

## 2022-06-17 DIAGNOSIS — Z90.10 ACQUIRED ABSENCE OF UNSPECIFIED BREAST AND NIPPLE: Chronic | ICD-10-CM

## 2022-06-17 DIAGNOSIS — I49.5 SICK SINUS SYNDROME: ICD-10-CM

## 2022-06-17 DIAGNOSIS — Z95.0 PRESENCE OF CARDIAC PACEMAKER: Chronic | ICD-10-CM

## 2022-06-17 DIAGNOSIS — Z01.818 ENCOUNTER FOR OTHER PREPROCEDURAL EXAMINATION: ICD-10-CM

## 2022-06-17 DIAGNOSIS — Z95.1 PRESENCE OF AORTOCORONARY BYPASS GRAFT: Chronic | ICD-10-CM

## 2022-06-17 LAB
ALBUMIN SERPL ELPH-MCNC: 4.7 G/DL — SIGNIFICANT CHANGE UP (ref 3.5–5.2)
ALP SERPL-CCNC: 72 U/L — SIGNIFICANT CHANGE UP (ref 30–115)
ALT FLD-CCNC: 12 U/L — SIGNIFICANT CHANGE UP (ref 0–41)
ANION GAP SERPL CALC-SCNC: 11 MMOL/L — SIGNIFICANT CHANGE UP (ref 7–14)
APPEARANCE UR: CLEAR — SIGNIFICANT CHANGE UP
APTT BLD: 32.2 SEC — SIGNIFICANT CHANGE UP (ref 27–39.2)
AST SERPL-CCNC: 17 U/L — SIGNIFICANT CHANGE UP (ref 0–41)
BASOPHILS # BLD AUTO: 0.09 K/UL — SIGNIFICANT CHANGE UP (ref 0–0.2)
BASOPHILS NFR BLD AUTO: 1.2 % — HIGH (ref 0–1)
BILIRUB SERPL-MCNC: 0.8 MG/DL — SIGNIFICANT CHANGE UP (ref 0.2–1.2)
BILIRUB UR-MCNC: NEGATIVE — SIGNIFICANT CHANGE UP
BLD GP AB SCN SERPL QL: SIGNIFICANT CHANGE UP
BUN SERPL-MCNC: 17 MG/DL — SIGNIFICANT CHANGE UP (ref 10–20)
CALCIUM SERPL-MCNC: 9.2 MG/DL — SIGNIFICANT CHANGE UP (ref 8.5–10.1)
CHLORIDE SERPL-SCNC: 101 MMOL/L — SIGNIFICANT CHANGE UP (ref 98–110)
CO2 SERPL-SCNC: 25 MMOL/L — SIGNIFICANT CHANGE UP (ref 17–32)
COLOR SPEC: YELLOW — SIGNIFICANT CHANGE UP
CREAT SERPL-MCNC: 0.8 MG/DL — SIGNIFICANT CHANGE UP (ref 0.7–1.5)
DIFF PNL FLD: NEGATIVE — SIGNIFICANT CHANGE UP
EGFR: 91 ML/MIN/1.73M2 — SIGNIFICANT CHANGE UP
EOSINOPHIL # BLD AUTO: 0.4 K/UL — SIGNIFICANT CHANGE UP (ref 0–0.7)
EOSINOPHIL NFR BLD AUTO: 5.4 % — SIGNIFICANT CHANGE UP (ref 0–8)
GLUCOSE SERPL-MCNC: 106 MG/DL — HIGH (ref 70–99)
GLUCOSE UR QL: NEGATIVE — SIGNIFICANT CHANGE UP
HCT VFR BLD CALC: 42.5 % — SIGNIFICANT CHANGE UP (ref 42–52)
HGB BLD-MCNC: 13.4 G/DL — LOW (ref 14–18)
IMM GRANULOCYTES NFR BLD AUTO: 0.4 % — HIGH (ref 0.1–0.3)
INR BLD: 1.1 RATIO — SIGNIFICANT CHANGE UP (ref 0.65–1.3)
KETONES UR-MCNC: NEGATIVE — SIGNIFICANT CHANGE UP
LEUKOCYTE ESTERASE UR-ACNC: NEGATIVE — SIGNIFICANT CHANGE UP
LYMPHOCYTES # BLD AUTO: 1.53 K/UL — SIGNIFICANT CHANGE UP (ref 1.2–3.4)
LYMPHOCYTES # BLD AUTO: 20.6 % — SIGNIFICANT CHANGE UP (ref 20.5–51.1)
MCHC RBC-ENTMCNC: 28.9 PG — SIGNIFICANT CHANGE UP (ref 27–31)
MCHC RBC-ENTMCNC: 31.5 G/DL — LOW (ref 32–37)
MCV RBC AUTO: 91.6 FL — SIGNIFICANT CHANGE UP (ref 80–94)
MONOCYTES # BLD AUTO: 0.65 K/UL — HIGH (ref 0.1–0.6)
MONOCYTES NFR BLD AUTO: 8.8 % — SIGNIFICANT CHANGE UP (ref 1.7–9.3)
MRSA PCR RESULT.: NEGATIVE — SIGNIFICANT CHANGE UP
NEUTROPHILS # BLD AUTO: 4.72 K/UL — SIGNIFICANT CHANGE UP (ref 1.4–6.5)
NEUTROPHILS NFR BLD AUTO: 63.6 % — SIGNIFICANT CHANGE UP (ref 42.2–75.2)
NITRITE UR-MCNC: NEGATIVE — SIGNIFICANT CHANGE UP
NRBC # BLD: 0 /100 WBCS — SIGNIFICANT CHANGE UP (ref 0–0)
PH UR: 6 — SIGNIFICANT CHANGE UP (ref 5–8)
PLATELET # BLD AUTO: 232 K/UL — SIGNIFICANT CHANGE UP (ref 130–400)
POTASSIUM SERPL-MCNC: 4.8 MMOL/L — SIGNIFICANT CHANGE UP (ref 3.5–5)
POTASSIUM SERPL-SCNC: 4.8 MMOL/L — SIGNIFICANT CHANGE UP (ref 3.5–5)
PROT SERPL-MCNC: 7.3 G/DL — SIGNIFICANT CHANGE UP (ref 6–8)
PROT UR-MCNC: SIGNIFICANT CHANGE UP
PROTHROM AB SERPL-ACNC: 12.6 SEC — SIGNIFICANT CHANGE UP (ref 9.95–12.87)
RBC # BLD: 4.64 M/UL — LOW (ref 4.7–6.1)
RBC # FLD: 13.3 % — SIGNIFICANT CHANGE UP (ref 11.5–14.5)
SODIUM SERPL-SCNC: 137 MMOL/L — SIGNIFICANT CHANGE UP (ref 135–146)
SP GR SPEC: 1.02 — SIGNIFICANT CHANGE UP (ref 1.01–1.03)
UROBILINOGEN FLD QL: SIGNIFICANT CHANGE UP
WBC # BLD: 7.42 K/UL — SIGNIFICANT CHANGE UP (ref 4.8–10.8)
WBC # FLD AUTO: 7.42 K/UL — SIGNIFICANT CHANGE UP (ref 4.8–10.8)

## 2022-06-17 PROCEDURE — 71046 X-RAY EXAM CHEST 2 VIEWS: CPT | Mod: 26

## 2022-06-17 PROCEDURE — 93010 ELECTROCARDIOGRAM REPORT: CPT

## 2022-06-17 RX ORDER — FUROSEMIDE 40 MG
1 TABLET ORAL
Qty: 0 | Refills: 0 | DISCHARGE

## 2022-06-17 RX ORDER — METOPROLOL TARTRATE 50 MG
1 TABLET ORAL
Qty: 0 | Refills: 0 | DISCHARGE

## 2022-06-17 RX ORDER — SPIRONOLACTONE 25 MG/1
1 TABLET, FILM COATED ORAL
Qty: 0 | Refills: 0 | DISCHARGE

## 2022-06-17 NOTE — H&P PST ADULT - ADDITIONAL PE
B/L LE severe B/L pedal oedema -LT worse than RT, with olinda/bluish discoloration-multiple tortuous veins, kyphosis

## 2022-06-17 NOTE — H&P PST ADULT - NSICDXPASTMEDICALHX_GEN_ALL_CORE_FT
PAST MEDICAL HISTORY:  Arrhythmia     Breast cancer 2001    CAD (coronary artery disease)     CHF (congestive heart failure)     Heart attack     HTN (hypertension)     Hypercholesteremia     Obesity     Osteoarthritis

## 2022-06-17 NOTE — H&P PST ADULT - HISTORY OF PRESENT ILLNESS
76YR old male with PPM for SSS per EMR 2018 BiV PPM was attempted but despite multiple catheters and sheaths but was unsuccessful now scheduled for UPGRADE OF PACEMAKER TO BIVENTRICULAR DEVICE POSSIBLE LEFT THORACOTOMY. Denies COVID S/S. Recd 2 doses vaccine. Verbalized understanding of COVID prevention measures. Exercise aundrea 1-2 flat blocks slowly with cane.  Anesthesia Alert  YES--Difficult Airway  NO--History of neck surgery or radiation  YES--Limited ROM of neck  NO--History of Malignant hyperthermia  NO--Personal or family history of Pseudocholinesterase deficiency  NO--Prior Anesthesia Complication  NO--Latex Allergy  NO--Loose teeth  NO--History of Rheumatoid Arthritis  NO--AUGUST  No Bleeding risk  NO--Other_____

## 2022-06-21 ENCOUNTER — LABORATORY RESULT (OUTPATIENT)
Age: 77
End: 2022-06-21

## 2022-06-23 VITALS — WEIGHT: 240.08 LBS | HEIGHT: 75 IN

## 2022-06-23 VITALS — HEIGHT: 74.8 IN | WEIGHT: 238.1 LBS
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
## 2022-06-23 NOTE — PRE-ANESTHESIA EVALUATION ADULT - NSRADCARDRESULTSFT_GEN_ALL_CORE
TTE 7/9/21  Summary:   1. Normal global left ventricular systolic function.   2. Mildly increased LV wall thickness.   3. Spectral Doppler shows pseudonormal pattern of left ventricular myocardial filling (Grade II diastolic dysfunction).   4. Mildly enlarged left atrium.   5. Normal right atrial size.   6. Mild mitral valve regurgitation.   7. Severe mitral annular calcification.   8. Mild tricuspid regurgitation.

## 2022-06-24 ENCOUNTER — APPOINTMENT (OUTPATIENT)
Dept: CARDIOTHORACIC SURGERY | Facility: HOSPITAL | Age: 77
End: 2022-06-24

## 2022-06-24 ENCOUNTER — INPATIENT (INPATIENT)
Facility: HOSPITAL | Age: 77
LOS: 0 days | Discharge: HOME | End: 2022-06-25
Attending: THORACIC SURGERY (CARDIOTHORACIC VASCULAR SURGERY) | Admitting: THORACIC SURGERY (CARDIOTHORACIC VASCULAR SURGERY)
Payer: MEDICARE

## 2022-06-24 ENCOUNTER — TRANSCRIPTION ENCOUNTER (OUTPATIENT)
Age: 77
End: 2022-06-24

## 2022-06-24 DIAGNOSIS — Z95.0 PRESENCE OF CARDIAC PACEMAKER: Chronic | ICD-10-CM

## 2022-06-24 DIAGNOSIS — Z95.1 PRESENCE OF AORTOCORONARY BYPASS GRAFT: Chronic | ICD-10-CM

## 2022-06-24 DIAGNOSIS — Z90.10 ACQUIRED ABSENCE OF UNSPECIFIED BREAST AND NIPPLE: Chronic | ICD-10-CM

## 2022-06-24 PROCEDURE — 33229 REMV&REPLC PM GEN MULT LEADS: CPT

## 2022-06-24 PROCEDURE — 33225 L VENTRIC PACING LEAD ADD-ON: CPT

## 2022-06-24 PROCEDURE — 71045 X-RAY EXAM CHEST 1 VIEW: CPT | Mod: 26

## 2022-06-24 PROCEDURE — 99222 1ST HOSP IP/OBS MODERATE 55: CPT

## 2022-06-24 RX ORDER — CHLORHEXIDINE GLUCONATE 213 G/1000ML
5 SOLUTION TOPICAL
Refills: 0 | Status: DISCONTINUED | OUTPATIENT
Start: 2022-06-24 | End: 2022-06-25

## 2022-06-24 RX ORDER — ATORVASTATIN CALCIUM 80 MG/1
20 TABLET, FILM COATED ORAL AT BEDTIME
Refills: 0 | Status: DISCONTINUED | OUTPATIENT
Start: 2022-06-24 | End: 2022-06-25

## 2022-06-24 RX ORDER — METOPROLOL TARTRATE 50 MG
50 TABLET ORAL
Refills: 0 | Status: DISCONTINUED | OUTPATIENT
Start: 2022-06-24 | End: 2022-06-25

## 2022-06-24 RX ORDER — CHLORHEXIDINE GLUCONATE 213 G/1000ML
1 SOLUTION TOPICAL
Refills: 0 | Status: DISCONTINUED | OUTPATIENT
Start: 2022-06-24 | End: 2022-06-25

## 2022-06-24 RX ORDER — ASPIRIN/CALCIUM CARB/MAGNESIUM 324 MG
81 TABLET ORAL DAILY
Refills: 0 | Status: DISCONTINUED | OUTPATIENT
Start: 2022-06-25 | End: 2022-06-25

## 2022-06-24 RX ORDER — ACETAMINOPHEN 500 MG
650 TABLET ORAL EVERY 6 HOURS
Refills: 0 | Status: DISCONTINUED | OUTPATIENT
Start: 2022-06-24 | End: 2022-06-25

## 2022-06-24 RX ORDER — ACETAMINOPHEN 500 MG
1000 TABLET ORAL ONCE
Refills: 0 | Status: DISCONTINUED | OUTPATIENT
Start: 2022-06-24 | End: 2022-06-25

## 2022-06-24 RX ORDER — MEPERIDINE HYDROCHLORIDE 50 MG/ML
25 INJECTION INTRAMUSCULAR; INTRAVENOUS; SUBCUTANEOUS ONCE
Refills: 0 | Status: DISCONTINUED | OUTPATIENT
Start: 2022-06-24 | End: 2022-06-25

## 2022-06-24 RX ORDER — INFLUENZA VIRUS VACCINE 15; 15; 15; 15 UG/.5ML; UG/.5ML; UG/.5ML; UG/.5ML
0.7 SUSPENSION INTRAMUSCULAR ONCE
Refills: 0 | Status: DISCONTINUED | OUTPATIENT
Start: 2022-06-24 | End: 2022-06-25

## 2022-06-24 RX ORDER — CLOPIDOGREL BISULFATE 75 MG/1
75 TABLET, FILM COATED ORAL DAILY
Refills: 0 | Status: DISCONTINUED | OUTPATIENT
Start: 2022-06-25 | End: 2022-06-25

## 2022-06-24 RX ORDER — SODIUM CHLORIDE 9 MG/ML
1000 INJECTION INTRAMUSCULAR; INTRAVENOUS; SUBCUTANEOUS
Refills: 0 | Status: DISCONTINUED | OUTPATIENT
Start: 2022-06-24 | End: 2022-06-25

## 2022-06-24 RX ORDER — CEFAZOLIN SODIUM 1 G
1000 VIAL (EA) INJECTION EVERY 8 HOURS
Refills: 0 | Status: COMPLETED | OUTPATIENT
Start: 2022-06-24 | End: 2022-06-25

## 2022-06-24 RX ORDER — AMIODARONE HYDROCHLORIDE 400 MG/1
200 TABLET ORAL DAILY
Refills: 0 | Status: DISCONTINUED | OUTPATIENT
Start: 2022-06-25 | End: 2022-06-25

## 2022-06-24 RX ORDER — LOSARTAN POTASSIUM 100 MG/1
100 TABLET, FILM COATED ORAL DAILY
Refills: 0 | Status: DISCONTINUED | OUTPATIENT
Start: 2022-06-25 | End: 2022-06-25

## 2022-06-24 RX ORDER — FUROSEMIDE 40 MG
20 TABLET ORAL
Refills: 0 | Status: DISCONTINUED | OUTPATIENT
Start: 2022-06-24 | End: 2022-06-25

## 2022-06-24 RX ORDER — OXYCODONE HYDROCHLORIDE 5 MG/1
5 TABLET ORAL EVERY 4 HOURS
Refills: 0 | Status: DISCONTINUED | OUTPATIENT
Start: 2022-06-24 | End: 2022-06-25

## 2022-06-24 RX ORDER — AMLODIPINE BESYLATE 2.5 MG/1
5 TABLET ORAL DAILY
Refills: 0 | Status: DISCONTINUED | OUTPATIENT
Start: 2022-06-24 | End: 2022-06-25

## 2022-06-24 RX ADMIN — Medication 100 MILLIGRAM(S): at 15:52

## 2022-06-24 RX ADMIN — Medication 50 MILLIGRAM(S): at 18:01

## 2022-06-24 RX ADMIN — Medication 100 MILLIGRAM(S): at 23:55

## 2022-06-24 RX ADMIN — AMLODIPINE BESYLATE 5 MILLIGRAM(S): 2.5 TABLET ORAL at 12:12

## 2022-06-24 RX ADMIN — Medication 20 MILLIGRAM(S): at 15:52

## 2022-06-24 RX ADMIN — ATORVASTATIN CALCIUM 20 MILLIGRAM(S): 80 TABLET, FILM COATED ORAL at 22:38

## 2022-06-24 RX ADMIN — Medication 20 MILLIGRAM(S): at 12:13

## 2022-06-24 RX ADMIN — CHLORHEXIDINE GLUCONATE 5 MILLILITER(S): 213 SOLUTION TOPICAL at 18:01

## 2022-06-24 NOTE — ADDENDUM
[FreeTextEntry1] : 6/1 Pt and wife calling to say they are interested in upgrading device as pt is feeling fatigued all the time. Will set up appointment with Dr. Danielle to discuss LV lead addition.

## 2022-06-24 NOTE — CONSULT NOTE ADULT - ASSESSMENT
EP: Dr Ojeda    Assessment: 75 yo M PMH significant for MI, CAD, s/p CABG, breast CA s/p mastectomy, paroxsymal afib, SSS, and diastolic heart failure admitted for upgrade of DC PPM to BiV PPM for LBBB. Patient POD#1 and feeling well    Impression:  SSS sp DC PPM  LBBB sp Upgrade to BiV PPM ([facility] [employer])  PAF  CAD sp CABG  HFpEF  Breast CA sp Mastectomy    Plan:  - - CXR completed  - Interrogation completed, numbers within appropriate range  - No anticoagulation 48 hours postop  - Continue all other home medications  - Start Keflex 500mg BID x 5 days  - Do not lift L arm above shoulder height or lift > 5 lbs for 6 weeks  - Wound care as per CT surgery  - Follow up with Dr Ojeda in one month.

## 2022-06-24 NOTE — DISCHARGE NOTE PROVIDER - PROVIDER TOKENS
PROVIDER:[TOKEN:[[id number]],FOLLOWUP:[1 week]],PROVIDER:[TOKEN:[[id number]],FOLLOWUP:[1 month]],PROVIDER:[TOKEN:[[id number]],FOLLOWUP:[1 month]] PROVIDER:[TOKEN:[[id number]],FOLLOWUP:[1 week]],PROVIDER:[TOKEN:[[id number]],FOLLOWUP:[1 month]],PROVIDER:[TOKEN:[[id number]],FOLLOWUP:[1 month]],PROVIDER:[TOKEN:[[id number]]]

## 2022-06-24 NOTE — BRIEF OPERATIVE NOTE - NSICDXBRIEFPOSTOP_GEN_ALL_CORE_FT
POST-OP DIAGNOSIS:  Complete left bundle branch block (LBBB) 24-Jun-2022 09:58:55  Michael Danielle

## 2022-06-24 NOTE — DISCHARGE NOTE PROVIDER - NSDCFUADDAPPT_GEN_ALL_CORE_FT
please call for appointment with dr durant next wednesday, 7/29 and please follow up with dr jung in one month    please follow up with pmd and with cardiology as scheduled

## 2022-06-24 NOTE — PROCEDURE
[No] : not [Pacemaker] : pacemaker [DDD] : DDD [Voltage: ___ volts] : Voltage was [unfilled] volts [Magnet Rate: ___ Ppm] : magnet rate was [unfilled] Ppm [Longevity: ___ months] : The estimated remaining battery life is [unfilled] months [Lead Imp:  ___ohms] : lead impedance was [unfilled] ohms [Sensing Amplitude ___mv] : sensing amplitude was [unfilled] mv [___V @] : [unfilled] V [___ ms] : [unfilled] ms [Programmed for Longevity] : output reprogrammed for improved battery longevity [See Device Printout] : See device printout [de-identified] : [employer] [de-identified] : [id number] [de-identified] : [id number] [de-identified] : 11/02/2018 [de-identified] : 62 [de-identified] : A-paced 76%\par V-paced 80% \par No new episodes to report.\par Patient is transmitting to [facility].

## 2022-06-24 NOTE — CONSULT NOTE ADULT - SUBJECTIVE AND OBJECTIVE BOX
Patient is a 76y old  Male who presents with a chief complaint of Complete left bundle branch block (24 Jun 2022 10:52)    HPI: Patient is a 76 YR old male with PPM for SSS. In 2018 BiV PPM was attempted but despite multiple catheters and sheaths was unsuccessful. now scheduled for upgrade of DC PPM to BiV PPM for complete LBBB. PMH significant for MI, CAD, s/p CABG, breast CA s/p mastectomy, paroxsymal afib, SSS, and diastolic heart failure. On 6/25, he electively underwent Replacement, biventricular pulse generator and left ventricular electrode lead, Implantation of biventricular permanent pacemaker as upgrade to existing pacemaker. Patient feeling well, denies any CP, palpitations, dizziness or SOB.    PAST MEDICAL & SURGICAL HISTORY:  Arrhythmia      Hypercholesteremia      HTN (hypertension)      Obesity      CHF (congestive heart failure)      Heart attack      Breast cancer  2001      Osteoarthritis      CAD (coronary artery disease)      S/P CABG x 5  2014      H/O mastectomy  right 2001      Pacemaker      PREVIOUS DIAGNOSTIC TESTING:      ECHO  FINDINGS:  < from: TTE Echo Complete w/ Contrast w/ Doppler (07.09.21 @ 08:14) >  Summary:   1. Normal global left ventricular systolic function.   2. Mildly increased LV wall thickness.   3. Spectral Doppler shows pseudonormal pattern of left ventricular myocardial filling (Grade II diastolic dysfunction).   4. Mildly enlarged left atrium.   5. Normal right atrial size.   6. Mild mitral valve regurgitation.   7. Severe mitral annular calcification.   8. Mild tricuspid regurgitation.    < end of copied text >    STRESS  FINDINGS:    CATHETERIZATION  FINDINGS:    ELECTROPHYSIOLOGY STUDY  FINDINGS:    CAROTID ULTRASOUND:  FINDINGS    VENOUS DUPLEX SCAN:  FINDINGS:    CHEST CT PULMONARY ANGIO with IV Contrast:  FINDINGS:    MEDICATIONS  (STANDING):  acetaminophen   IVPB .. 1000 milliGRAM(s) IV Intermittent once  amLODIPine   Tablet 5 milliGRAM(s) Oral daily  atorvastatin 20 milliGRAM(s) Oral at bedtime  ceFAZolin   IVPB 1000 milliGRAM(s) IV Intermittent every 8 hours  chlorhexidine 0.12% Liquid 5 milliLiter(s) Oral Mucosa two times a day  chlorhexidine 4% Liquid 1 Application(s) Topical <User Schedule>  furosemide    Tablet 20 milliGRAM(s) Oral two times a day  influenza  Vaccine (HIGH DOSE) 0.7 milliLiter(s) IntraMuscular once  meperidine     Injectable 25 milliGRAM(s) IV Push once  metoprolol tartrate 50 milliGRAM(s) Oral two times a day  sodium chloride 0.9%. 1000 milliLiter(s) (10 mL/Hr) IV Continuous <Continuous>    MEDICATIONS  (PRN):  acetaminophen     Tablet .. 650 milliGRAM(s) Oral every 6 hours PRN Temp greater or equal to 38C (100.4F), Mild Pain (1 - 3)  oxyCODONE    IR 5 milliGRAM(s) Oral every 4 hours PRN Severe Pain (7 - 10)      FAMILY HISTORY:  Family history of heart disease (Father, Mother)    Family history of lung cancer (Sibling)    SOCIAL HISTORY: No smoking, ETOH or illicit drug use    Past Surgical History:  S/P CABG x 5  2014  H/O mastectomy  right 2001    Allergies:  No Known Allergies      REVIEW OF SYSTEMS:  CONSTITUTIONAL: No fever, weight loss, chills, shakes, or fatigue  RESPIRATORY: No cough, wheezing, hemoptysis, or shortness of breath  CARDIOVASCULAR: No chest pain, dyspnea, palpitations, dizziness, syncope, paroxysmal nocturnal dyspnea, orthopnea, or arm or leg swelling  GASTROINTESTINAL: No abdominal  or epigastric pain, nausea, vomiting, hematemesis, diarrhea, constipation, melena or bright red blood.  NEUROLOGICAL: No headaches, memory loss, slurred speech, limb weakness, loss of strength, numbness, or tremors  MUSCULOSKELETAL: No joint pain or swelling, muscle, back, or extremity pain      Vital Signs Last 24 Hrs  T(C): 35.6 (24 Jun 2022 09:50), Max: 36.1 (24 Jun 2022 07:00)  T(F): 96 (24 Jun 2022 09:50), Max: 96.9 (24 Jun 2022 07:00)  HR: 67 (24 Jun 2022 11:00) (60 - 67)  BP: 163/77 (24 Jun 2022 11:00) (146/67 - 178/84)  BP(mean): 110 (24 Jun 2022 11:00) (96 - 110)  RR: 17 (24 Jun 2022 11:00) (7 - 20)  SpO2: 98% (24 Jun 2022 11:00) (96% - 100%)    PHYSICAL EXAM:  GENERAL: In no apparent distress, well nourished, and hydrated.  NECK: Supple, No JVD   HEART: Regular rate and rhythm; No murmurs, rubs, or gallops.  PULMONARY: Clear to auscultation and perfusion.  No rales, wheezing, or rhonchi bilaterally.  EXTREMITIES:  2+ Peripheral Pulses, no LE edema BL  SKIN: Dressing over L chest wall, no hematoma  NEUROLOGICAL: Grossly nonfocal      INTERPRETATION OF TELEMETRY:    ECG:  < from: 12 Lead ECG (06.17.22 @ 07:58) >  Ventricular Rate 64 BPM    Atrial Rate 64 BPM    P-R Interval 416 ms    QRS Duration 180 ms    Q-T Interval 486 ms    QTC Calculation(Bazett) 501 ms    R Axis -50 degrees    T Axis 175 degrees    Diagnosis Line Sinus rhythm with 1st degree A-V block  Left axis deviation  Left bundle branch block  Abnormal ECG    Confirmed by YVONNE TIRADO MD ([id number]) on 6/17/2022 1:09:33 PM    < end of copied text >      I&O's Detail      LABS:                  BNP  I&O's Detail    Daily Height in cm: 190.5 (24 Jun 2022 07:00)    Daily     RADIOLOGY & ADDITIONAL STUDIES:

## 2022-06-24 NOTE — DISCHARGE NOTE PROVIDER - NSDCCPTREATMENT_GEN_ALL_CORE_FT
PRINCIPAL PROCEDURE  Procedure: Implantation of biventricular permanent pacemaker as upgrade to existing pacemaker  Findings and Treatment:       SECONDARY PROCEDURE  Procedure: Replacement, biventricular pulse generator and left ventricular electrode lead  Findings and Treatment:

## 2022-06-24 NOTE — DISCHARGE NOTE PROVIDER - NSDCFUADDINST_GEN_ALL_CORE_FT
Activities/Restrictions  1.	Do not – drive, lift, pull or push anything over 10 pounds for 4 weeks.  2.	May shower, keep clear dressing over wound on, if water gets under clear dressing you may remove it. Leave tapes in place. No baths or swimming for one months.   3.	Do not lift left arm above shoulder for 4 weeks  4.	Do progressive walking exercises every day, gradually increasing to 15 to 20 minutes/day, five days a week and incentive spirometer 10 times every hour while awake  5.	DO NOT DRIVE OR CONSUME ALCOHOL WHILE TAKING PAIN MEDICATION.  6.         Complete your dose of antibiotics  Contact your Physician promptly if:  1.	 Signs of wound infection, such as increasing redness, swelling, pain or drainage from incision.  2.	Progressive shortness of breath or increasing difficulty with breathing when lying down.  3.	Excessive nausea, vomiting, diarrhea or coughing.  4.	Increase swelling of arms   5.	Chest pain that spreads to arms, jaw or back or sudden development of numbness, weakness, difficulty speaking or facial droop – Call 911.  6.	Diabetics who are unable to keep finger stick glucose under 150 for three consecutive readings.  Instructions:

## 2022-06-24 NOTE — BRIEF OPERATIVE NOTE - NSICDXBRIEFPREOP_GEN_ALL_CORE_FT
PRE-OP DIAGNOSIS:  Complete left bundle branch block (LBBB) 24-Jun-2022 09:55:07  Michael Danielle

## 2022-06-24 NOTE — BRIEF OPERATIVE NOTE - NSICDXBRIEFPROCEDURE_GEN_ALL_CORE_FT
PROCEDURES:  Replacement, biventricular pulse generator and left ventricular electrode lead 24-Jun-2022 09:53:27  Michael Danielle  Implantation of biventricular permanent pacemaker as upgrade to existing pacemaker 24-Jun-2022 09:54:22  Michael Danielle

## 2022-06-24 NOTE — DISCHARGE NOTE PROVIDER - NSDCCPCAREPLAN_GEN_ALL_CORE_FT
PRINCIPAL DISCHARGE DIAGNOSIS  Diagnosis: Complete left bundle branch block  Assessment and Plan of Treatment:

## 2022-06-24 NOTE — ASSESSMENT
[FreeTextEntry1] : 77 yo M with history of AFib on aspirin and Amio, SSS s/p DC PPM (unable to cannulate CS). \par \par # SSS s/p DC PPM (unable to cannulate CS at time of device implant) \par - Normal functioning PPM. RV pacing 80% of the time. No recent heart failure. We discussed the option of epicardial LV lead but pt does not want any procedures at this time and he states he will be more mindful of salt intake but loves salt. \par - Remote monitor is set up and patient is transmitting.\par \par # Paroxysmal AFib \par - 0% AT/AF burden\par - Cont aspirin (pt not felt to be a good candidate for anticoagulation by his cardiologist due to bleeding). Discussed option of Watchman device. Not interested in procedures at this time. \par - Cont Amio to 100mg. \par - Need to obtain recent labs. \par - Pulm referral provided. Pt sees ophtho\par \par # Mild cardiomyopathy\par - Cont BB and Losartan\par - Consider Entresto. Advised pt to ask cardiologist.\par \par # HTN\par - BP elevated. Pt admits to loving salt. \par - Cont Amlodipine, Losartan, Spironolactone and Metoprolol\par - I have advised him to adhere to a 2g Na diet and to be more careful with consuming salty meals. \par \par I have also advised the patient to go to the nearest emergency room if he experiences any chest pain, dyspnea, syncope, or has any other compelling symptoms.\par  \par Follow up in 4-6 months.

## 2022-06-24 NOTE — DISCHARGE NOTE PROVIDER - NSDCFUSCHEDAPPT_GEN_ALL_CORE_FT
Guzman Duque  [facility]  PULMMED [address]  Scheduled Appointment: 07/01/2022    [facility]  Cardio [address]  Scheduled Appointment: 08/26/2022     Guzman Duque  [facility]  PULMMED [address]  Scheduled Appointment: 07/01/2022    Rebeca Ojeda  [facility]  CARDIOLOGY [address]  Scheduled Appointment: 07/27/2022    [facility]  Cardio [address]  Scheduled Appointment: 08/26/2022

## 2022-06-24 NOTE — PHYSICAL EXAM
[General Appearance - Well Developed] : well developed [Normal Appearance] : normal appearance [Well Groomed] : well groomed [General Appearance - Well Nourished] : well nourished [No Deformities] : no deformities [General Appearance - In No Acute Distress] : no acute distress [Heart Rate And Rhythm] : heart rate and rhythm were normal [Heart Sounds] : normal S1 and S2 [Murmurs] : no murmurs present [] : no respiratory distress [Respiration, Rhythm And Depth] : normal respiratory rhythm and effort [Exaggerated Use Of Accessory Muscles For Inspiration] : no accessory muscle use [Auscultation Breath Sounds / Voice Sounds] : lungs were clear to auscultation bilaterally [Left Infraclavicular] : left infraclavicular area [Clean] : clean [Dry] : dry [Well-Healed] : well-healed [Abdomen Soft] : soft [Nail Clubbing] : no clubbing of the fingernails [Erythema] : not erythematous [Warm] : not warm [Tender] : not tender [FreeTextEntry1] : + LE edema

## 2022-06-24 NOTE — DISCHARGE NOTE PROVIDER - NSDCMRMEDTOKEN_GEN_ALL_CORE_FT
amiodarone 200 mg oral tablet: 1 tab(s) orally once a day  amLODIPine 5 mg oral tablet: 1 tab(s) orally once a day  atorvastatin 20 mg oral tablet: 1 tab(s) orally once a day  clopidogrel 75 mg oral tablet: 1 tab(s) orally once a day  Ecotrin: 81  orally  furosemide 20 mg oral tablet: 1 tab(s) orally 2 times a day  losartan 100 mg oral tablet: 1 tab(s) orally once a day  Metoprolol Tartrate 50 mg oral tablet: 1 tab(s) orally 2 times a day   acetaminophen 325 mg oral tablet: 2 tab(s) orally every 6 hours, As needed, Temp greater or equal to 38C (100.4F), Mild Pain (1 - 3)  amiodarone 200 mg oral tablet: 1 tab(s) orally once a day  amLODIPine 5 mg oral tablet: 1 tab(s) orally once a day  aspirin 81 mg oral delayed release tablet: 1 tab(s) orally once a day  atorvastatin 20 mg oral tablet: 1 tab(s) orally once a day  cephalexin 500 mg oral tablet: 1 tab(s) orally 2 times a day   clopidogrel 75 mg oral tablet: 1 tab(s) orally once a day  furosemide 20 mg oral tablet: 1 tab(s) orally 2 times a day  losartan 100 mg oral tablet: 1 tab(s) orally once a day  metoprolol tartrate 50 mg oral tablet: 1 tab(s) orally 2 times a day

## 2022-06-24 NOTE — DISCHARGE NOTE PROVIDER - CARE PROVIDERS DIRECT ADDRESSES
,[email or web address]

## 2022-06-24 NOTE — DISCHARGE NOTE PROVIDER - CARE PROVIDER_API CALL
Michael Danielle)  Surgery; Thoracic and Cardiac Surgery  [address], [address]  [place] [address]  Phone: [phone]  Fax: [phone]  Follow Up Time: 1 week    Rebeca Ojeda)  Cardiovascular Disease; Internal Medicine  [address]  [place], [place] [address]  Phone: [phone]  Fax: [phone]  Follow Up Time: 1 month    Trevon Champagne [address]  [place] [address]  Phone: [phone]  Fax: [phone]  Follow Up Time: 1 month   Michael Danielle)  Surgery; Thoracic and Cardiac Surgery  [address], [address]  [place] [address]  Phone: [phone]  Fax: [phone]  Follow Up Time: 1 week    Rebeca Ojeda)  Cardiovascular Disease; Internal Medicine  [address]  [place], [place] [address]  Phone: [phone]  Fax: [phone]  Follow Up Time: 1 month    Trevon Champagne   MEDICINE  [address]  [place] [address]  Phone: [phone]  Fax: [phone]  Follow Up Time: 1 month    Carlos Handy)  Cardiology; Interventional Cardiology  [address], [address]  [place] [address]  Phone: [phone]  Fax: [phone]  Follow Up Time:

## 2022-06-24 NOTE — PACU DISCHARGE NOTE - COMMENTS
S/p BI-V. Transported to CTU on monitor with O2. HD stable. Sign out given.    /72  R 16  SPO2 100  P 60  T 98.4

## 2022-06-24 NOTE — PATIENT PROFILE ADULT - FALL HARM RISK - HARM RISK INTERVENTIONS
Assistance with ambulation/Assistance OOB with selected safe patient handling equipment/Communicate Risk of Fall with Harm to all staff/Discuss with provider need for PT consult/Monitor gait and stability/Provide patient with walking aids - walker, cane, crutches/Reinforce activity limits and safety measures with patient and family/Sit up slowly, dangle for a short time, stand at bedside before walking/Tailored Fall Risk Interventions/Use of alarms - bed, chair and/or voice tab/Visual Cue: Yellow wristband and red socks/Bed in lowest position, wheels locked, appropriate side rails in place/Call bell, personal items and telephone in reach/Instruct patient to call for assistance before getting out of bed or chair/Non-slip footwear when patient is out of bed/[place] to call system/Physically safe environment - no spills, clutter or unnecessary equipment/Purposeful Proactive Rounding/Room/bathroom lighting operational, light cord in reach

## 2022-06-24 NOTE — HISTORY OF PRESENT ILLNESS
[de-identified] : \par Cardiologist: Dr. Handy\par Ophtho: Dr. GOMEZ ([facility])\par Pulm: referral provided\par \par 75 yo M with history of SSS s/p DC PPM, CAD s/p CABG, LBBB. BiV PPM was attempted but despite multiple catheters and sheaths, CS was unable to be cannulated. He denies any cardiovascular complaints including chest pain, dyspnea, dizziness, lightheadedness, presyncope or syncope. \par \par Pt admitted 7/9-7/14 for abdominal pain --> recommended MRCP with possible ERCP but patient refused due to claustrophobia and refused ERCP bc he felt better and did not want any other procedures. \par \par Pt was in the ER 10/23 (no admission) for lower extremity edema due to excess salt intake (ate pickles and ham).  Patient denies dyspnea with exertion. He can walk on flat ground and up the stairs without dyspnea.  He presents for routine follow up of his device.

## 2022-06-24 NOTE — CARDIOLOGY SUMMARY
[de-identified] : 5/25/2022 A-sensed, V-Paced (HR 72 bpm [de-identified] : 9/1/21 EF 46%. Severe LAE. \par 4/1/21 EF 49%. Mild cLVH. Mild-mod MR. Mild TR.\par 1/14/20: EF 45-54% Mod Grade 2 DD. Mod eccentric MR. Mild TR.\par 8/20/18, Mod thickened mitral valve with mod restricted opening. Mild MAC. Mod MR. Mod LAE. Grade 1 DD. LVEF 55-75%.

## 2022-06-25 ENCOUNTER — TRANSCRIPTION ENCOUNTER (OUTPATIENT)
Age: 77
End: 2022-06-25

## 2022-06-25 VITALS
HEART RATE: 60 BPM | OXYGEN SATURATION: 98 % | SYSTOLIC BLOOD PRESSURE: 127 MMHG | DIASTOLIC BLOOD PRESSURE: 61 MMHG | RESPIRATION RATE: 18 BRPM

## 2022-06-25 LAB
ANION GAP SERPL CALC-SCNC: 10 MMOL/L — SIGNIFICANT CHANGE UP (ref 7–14)
BUN SERPL-MCNC: 9 MG/DL — LOW (ref 10–20)
CALCIUM SERPL-MCNC: 8.2 MG/DL — LOW (ref 8.5–10.1)
CHLORIDE SERPL-SCNC: 103 MMOL/L — SIGNIFICANT CHANGE UP (ref 98–110)
CO2 SERPL-SCNC: 22 MMOL/L — SIGNIFICANT CHANGE UP (ref 17–32)
CREAT SERPL-MCNC: 0.7 MG/DL — SIGNIFICANT CHANGE UP (ref 0.7–1.5)
EGFR: 95 ML/MIN/1.73M2 — SIGNIFICANT CHANGE UP
GLUCOSE SERPL-MCNC: 99 MG/DL — SIGNIFICANT CHANGE UP (ref 70–99)
POTASSIUM SERPL-MCNC: 4.5 MMOL/L — SIGNIFICANT CHANGE UP (ref 3.5–5)
POTASSIUM SERPL-SCNC: 4.5 MMOL/L — SIGNIFICANT CHANGE UP (ref 3.5–5)
SODIUM SERPL-SCNC: 135 MMOL/L — SIGNIFICANT CHANGE UP (ref 135–146)

## 2022-06-25 PROCEDURE — 71046 X-RAY EXAM CHEST 2 VIEWS: CPT | Mod: 26

## 2022-06-25 PROCEDURE — 93010 ELECTROCARDIOGRAM REPORT: CPT

## 2022-06-25 PROCEDURE — 99233 SBSQ HOSP IP/OBS HIGH 50: CPT

## 2022-06-25 PROCEDURE — 93281 PM DEVICE PROGR EVAL MULTI: CPT | Mod: 26

## 2022-06-25 RX ORDER — METOPROLOL TARTRATE 50 MG
1 TABLET ORAL
Qty: 0 | Refills: 0 | DISCHARGE

## 2022-06-25 RX ORDER — AMIODARONE HYDROCHLORIDE 400 MG/1
1 TABLET ORAL
Qty: 0 | Refills: 0 | DISCHARGE
Start: 2022-06-25

## 2022-06-25 RX ORDER — AMLODIPINE BESYLATE 2.5 MG/1
1 TABLET ORAL
Qty: 0 | Refills: 0 | DISCHARGE

## 2022-06-25 RX ORDER — ASPIRIN/CALCIUM CARB/MAGNESIUM 324 MG
1 TABLET ORAL
Qty: 0 | Refills: 0 | DISCHARGE
Start: 2022-06-25

## 2022-06-25 RX ORDER — AMIODARONE HYDROCHLORIDE 400 MG/1
1 TABLET ORAL
Qty: 0 | Refills: 0 | DISCHARGE

## 2022-06-25 RX ORDER — METOPROLOL TARTRATE 50 MG
1 TABLET ORAL
Qty: 0 | Refills: 0 | DISCHARGE
Start: 2022-06-25

## 2022-06-25 RX ORDER — FUROSEMIDE 40 MG
1 TABLET ORAL
Qty: 0 | Refills: 0 | DISCHARGE

## 2022-06-25 RX ORDER — FUROSEMIDE 40 MG
1 TABLET ORAL
Qty: 0 | Refills: 0 | DISCHARGE
Start: 2022-06-25

## 2022-06-25 RX ORDER — CEPHALEXIN 500 MG
1 CAPSULE ORAL
Qty: 10 | Refills: 0
Start: 2022-06-25 | End: 2022-07-04

## 2022-06-25 RX ORDER — AMLODIPINE BESYLATE 2.5 MG/1
1 TABLET ORAL
Qty: 0 | Refills: 0 | DISCHARGE
Start: 2022-06-25

## 2022-06-25 RX ORDER — ASPIRIN/CALCIUM CARB/MAGNESIUM 324 MG
81 TABLET ORAL
Qty: 0 | Refills: 0 | DISCHARGE

## 2022-06-25 RX ORDER — CLOPIDOGREL BISULFATE 75 MG/1
1 TABLET, FILM COATED ORAL
Qty: 0 | Refills: 0 | DISCHARGE
Start: 2022-06-25

## 2022-06-25 RX ORDER — CLOPIDOGREL BISULFATE 75 MG/1
1 TABLET, FILM COATED ORAL
Qty: 0 | Refills: 0 | DISCHARGE

## 2022-06-25 RX ORDER — ACETAMINOPHEN 500 MG
2 TABLET ORAL
Qty: 0 | Refills: 0 | DISCHARGE
Start: 2022-06-25

## 2022-06-25 RX ADMIN — CLOPIDOGREL BISULFATE 75 MILLIGRAM(S): 75 TABLET, FILM COATED ORAL at 11:19

## 2022-06-25 RX ADMIN — CHLORHEXIDINE GLUCONATE 1 APPLICATION(S): 213 SOLUTION TOPICAL at 05:13

## 2022-06-25 RX ADMIN — CHLORHEXIDINE GLUCONATE 5 MILLILITER(S): 213 SOLUTION TOPICAL at 05:13

## 2022-06-25 RX ADMIN — Medication 100 MILLIGRAM(S): at 11:19

## 2022-06-25 RX ADMIN — Medication 50 MILLIGRAM(S): at 05:11

## 2022-06-25 RX ADMIN — Medication 81 MILLIGRAM(S): at 11:19

## 2022-06-25 RX ADMIN — Medication 20 MILLIGRAM(S): at 13:20

## 2022-06-25 RX ADMIN — LOSARTAN POTASSIUM 100 MILLIGRAM(S): 100 TABLET, FILM COATED ORAL at 05:30

## 2022-06-25 RX ADMIN — AMIODARONE HYDROCHLORIDE 200 MILLIGRAM(S): 400 TABLET ORAL at 05:11

## 2022-06-25 RX ADMIN — Medication 20 MILLIGRAM(S): at 05:11

## 2022-06-25 RX ADMIN — AMLODIPINE BESYLATE 5 MILLIGRAM(S): 2.5 TABLET ORAL at 05:11

## 2022-06-25 NOTE — PHYSICAL THERAPY INITIAL EVALUATION ADULT - PLANNED THERAPY INTERVENTIONS, PT EVAL
Stair Training: Goal: Pt will ascend/descend 7 steps with supervision using one rail/balance training/bed mobility training/gait training/transfer training

## 2022-06-25 NOTE — PROGRESS NOTE ADULT - SUBJECTIVE AND OBJECTIVE BOX
INTERVAL HPI/OVERNIGHT EVENTS:    Patient s/p upgrade to  BiV- PPM implant  No event over night. Pt without complains    MEDICATIONS  (STANDING):  acetaminophen   IVPB .. 1000 milliGRAM(s) IV Intermittent once  aMIOdarone    Tablet 200 milliGRAM(s) Oral daily  amLODIPine   Tablet 5 milliGRAM(s) Oral daily  aspirin enteric coated 81 milliGRAM(s) Oral daily  atorvastatin 20 milliGRAM(s) Oral at bedtime  ceFAZolin   IVPB 1000 milliGRAM(s) IV Intermittent every 8 hours  chlorhexidine 0.12% Liquid 5 milliLiter(s) Oral Mucosa two times a day  chlorhexidine 4% Liquid 1 Application(s) Topical <User Schedule>  clopidogrel Tablet 75 milliGRAM(s) Oral daily  furosemide    Tablet 20 milliGRAM(s) Oral two times a day  influenza  Vaccine (HIGH DOSE) 0.7 milliLiter(s) IntraMuscular once  losartan 100 milliGRAM(s) Oral daily  meperidine     Injectable 25 milliGRAM(s) IV Push once  metoprolol tartrate 50 milliGRAM(s) Oral two times a day    MEDICATIONS  (PRN):  acetaminophen     Tablet .. 650 milliGRAM(s) Oral every 6 hours PRN Temp greater or equal to 38C (100.4F), Mild Pain (1 - 3)  oxyCODONE    IR 5 milliGRAM(s) Oral every 4 hours PRN Severe Pain (7 - 10)      Allergies    No Known Allergies    Intolerances          Vital Signs Last 24 Hrs  T(C): 36.4 (25 Jun 2022 08:00), Max: 36.5 (24 Jun 2022 16:00)  T(F): 97.5 (25 Jun 2022 08:00), Max: 97.7 (24 Jun 2022 16:00)  HR: 60 (25 Jun 2022 09:00) (59 - 72)  BP: 138/66 (25 Jun 2022 09:00) (108/55 - 162/77)  BP(mean): 95 (25 Jun 2022 09:00) (78 - 110)  RR: 18 (25 Jun 2022 09:00) (9 - 20)  SpO2: 97% (25 Jun 2022 09:00) (94% - 99%)    GENERAL: In no apparent distress, well nourished, and hydrated.  HEART: Regular rate and rhythm; No murmurs, rubs, or gallops.  	Wound healing well; No hematoma; no bleeding  PULMONARY: Clear to auscultation and perfusion.  No rales, wheezing, or rhonchi bilaterally.  ABDOMEN: Soft, Nontender, Nondistended; Bowel sounds present  EXTREMITIES:  2+ Peripheral Pulses, No clubbing, cyanosis, or edema  NEUROLOGICAL: Grossly nonfocal    12 Lead ECG:   Ventricular Rate 60 BPM    Atrial Rate 28 BPM    P-R Interval 142 ms    QRS Duration 158 ms    Q-T Interval 490 ms    QTC Calculation(Bazett) 490 ms    R Axis -65 degrees    T Axis 77 degrees    Diagnosis Line AV dual-paced rhythm  Abnormal ECG    Confirmed by Yony Barrera ([id number]) on 6/25/2022 9:08:52 AM (06-25-22 @ 07:13)      RADIOLOGY & ADDITIONAL TESTS:  < from: Xray Chest 2 Views PA/Lat (06.25.22 @ 09:14) >  Findings:    Support devices: Left ICD and coronary sinus/lateral cardiac vein pacing   lead in place..    Cardiac/mediastinum/hilum: Cardiomegaly, thoracic aortic calcification..    Lung parenchyma/Pleura: Within normal limits.    Skeleton/soft tissues: Stable.    Impression:    No evidence of acute cardiopulmonary disease    < end of copied text >      
OPERATIVE PROCEDURE(s):      upgrade to dual chamber ppm          POD # 1    SURGEON(s): bhargav    SUBJECTIVE ASSESSMENT: no complaints    Vital Signs Last 24 Hrs  T(C): 36.4 (25 Jun 2022 08:00), Max: 36.5 (24 Jun 2022 16:00)  T(F): 97.5 (25 Jun 2022 08:00), Max: 97.7 (24 Jun 2022 16:00)  HR: 60 (25 Jun 2022 09:00) (59 - 72)  BP: 138/66 (25 Jun 2022 09:00) (108/55 - 162/77)  BP(mean): 95 (25 Jun 2022 09:00) (78 - 110)  RR: 18 (25 Jun 2022 09:00) (9 - 20)  SpO2: 97% (25 Jun 2022 09:00) (94% - 99%)  06-24-22 @ 07:01  -  06-25-22 @ 07:00  --------------------------------------------------------  IN: 720 mL / OUT: 1450 mL / NET: -730 mL        Physical Exam  General: alert and oriented x 3  Chest: cta bl  CVS: s1s2  Abd: pos bs soft nt  GI/ :  Ext: no sig edema  left anterior chest incision- dressed with occlusive dressing in place; no hematoma    LABS:    COUMADIN:   [ ] YES [x ] NO      06-25    135  |  103  |  9<L>  ----------------------------<  99  4.5   |  22  |  0.7    Ca    8.2<L>      25 Jun 2022 04:34    MEDICATIONS  (STANDING):  acetaminophen   IVPB .. 1000 milliGRAM(s) IV Intermittent once  aMIOdarone    Tablet 200 milliGRAM(s) Oral daily  amLODIPine   Tablet 5 milliGRAM(s) Oral daily  aspirin enteric coated 81 milliGRAM(s) Oral daily  atorvastatin 20 milliGRAM(s) Oral at bedtime  ceFAZolin   IVPB 1000 milliGRAM(s) IV Intermittent every 8 hours  chlorhexidine 0.12% Liquid 5 milliLiter(s) Oral Mucosa two times a day  chlorhexidine 4% Liquid 1 Application(s) Topical <User Schedule>  clopidogrel Tablet 75 milliGRAM(s) Oral daily  furosemide    Tablet 20 milliGRAM(s) Oral two times a day  influenza  Vaccine (HIGH DOSE) 0.7 milliLiter(s) IntraMuscular once  losartan 100 milliGRAM(s) Oral daily  meperidine     Injectable 25 milliGRAM(s) IV Push once  metoprolol tartrate 50 milliGRAM(s) Oral two times a day    MEDICATIONS  (PRN):  acetaminophen     Tablet .. 650 milliGRAM(s) Oral every 6 hours PRN Temp greater or equal to 38C (100.4F), Mild Pain (1 - 3)  oxyCODONE    IR 5 milliGRAM(s) Oral every 4 hours PRN Severe Pain (7 - 10)      Allergies    No Known Allergies    Intolerances        Ambulation/Activity Status:  amb well with pt    RADIOLOGY & ADDITIONAL TESTS:  ACC: [id number] EXAM:  XR CHEST PA LAT 2V                          PROCEDURE DATE:  06/25/2022      INTERPRETATION:  Clinical History / Reason for exam: Postop    Comparison : Chest radiograph June 24, 2022.    Technique/Positioning: PA and lateral.    Findings:    Support devices: Left ICD and coronary sinus/lateral cardiac vein pacing   lead in place..    Cardiac/mediastinum/hilum: Cardiomegaly, thoracic aortic calcification..    Lung parenchyma/Pleura: Within normal limits.    Skeleton/soft tissues: Stable.    Impression:    No evidence of acute cardiopulmonary disease    --- End of Report ---    Assessment/Plan: Patient is a 75 yo male s/p ppm upgrade to dual chamber ppm pod # 1 whose device has been successfully interrogated   cont present tx as per ct surgeon  pt was advised to refrain from any strenuous activity to prevent lead fracture  dvt/ gi ppx  htn- cont norvasc, losartan, and lopressor  cont asa and plavix for stroke prevention and for cad  pt can be d/c'ed home today and follow up with dr durant next week on Wed       Social Service Disposition:

## 2022-06-25 NOTE — PHYSICAL THERAPY INITIAL EVALUATION ADULT - GAIT DEVIATIONS NOTED, PT EVAL
trunk flexion/decreased carly/decreased velocity of limb motion/decreased step length/decreased stride length

## 2022-06-25 NOTE — PROGRESS NOTE ADULT - ASSESSMENT
A/P   Patient s/p upgrade BiV- PPM implant    - CXR as above   - Device interrogation done, reviewed by attending  - Keflex 500 mg PO q12 h x 5 days    - FU in the EP office for wound check with __Dr Ojeda _7/27 @[address] [place] [address], [place] [address]  [phone]     No Heavy lifting >5 lbs. Do not raise your arm above shoulder level for 4-6 weeks.   No driving for 2weeks    Wound and dressing instructions per Dr Danielle team

## 2022-06-25 NOTE — PHYSICAL THERAPY INITIAL EVALUATION ADULT - GENERAL OBSERVATIONS, REHAB EVAL
pt. encountered OOB in chair in NAD,  + tele,  +IV lock, off tele order  in chart for stairs. [phone]

## 2022-06-25 NOTE — DISCHARGE NOTE NURSING/CASE MANAGEMENT/SOCIAL WORK - NSDCVIVACCINE_GEN_ALL_CORE_FT
Tdap; 22-Nov-2018 08:52; Benito Lovell); [employer]; [id number] (Exp. Date: 23-Oct-2020); IntraMuscular; Deltoid Right.; 0.5 milliLiter(s); VIS (VIS Published: 09-May-2013, VIS Presented: 22-Nov-2018);

## 2022-06-25 NOTE — PHYSICAL THERAPY INITIAL EVALUATION ADULT - DID THE PATIENT HAVE SURGERY?
Replacement, biventricular pulse generator and left ventricular electrode lead. / Implantation of biventricular permanent pacemaker as upgrade to existing pacemaker/yes

## 2022-06-25 NOTE — PHYSICAL THERAPY INITIAL EVALUATION ADULT - ADDITIONAL COMMENTS
pt reports using a cane at home. He requested to use walker for ambulation during evaluation. He has a rolling walker at home

## 2022-06-25 NOTE — PHYSICAL THERAPY INITIAL EVALUATION ADULT - LIVES WITH, PROFILE
in a house with 7 steps to enter and one flight to bedrooms. pt reports he can stay on one level in home/spouse

## 2022-06-25 NOTE — DISCHARGE NOTE NURSING/CASE MANAGEMENT/SOCIAL WORK - NSDCPEFALRISK_GEN_ALL_CORE
For information on Fall & Injury Prevention, visit: [email or web address] OR  [email or web address] OR  https://www.cdc.gov/steadi/patient.html

## 2022-06-25 NOTE — PHYSICAL THERAPY INITIAL EVALUATION ADULT - GAIT TRAINING, PT EVAL
Goal: pt will ambulate with least restrictive assistive device 150 feet independent by discharge to facilitate return to PLOF.

## 2022-06-25 NOTE — DISCHARGE NOTE NURSING/CASE MANAGEMENT/SOCIAL WORK - PATIENT PORTAL LINK FT
You can access the FollowMyHealth Patient Portal offered by [facility] by registering at the following website: [email or web address]. By joining [facility]’s FollowMyHealth portal, you will also be able to view your health information using other applications (apps) compatible with our system.

## 2022-06-28 ENCOUNTER — NON-APPOINTMENT (OUTPATIENT)
Age: 77
End: 2022-06-28

## 2022-07-01 ENCOUNTER — APPOINTMENT (OUTPATIENT)
Age: 77
End: 2022-07-01

## 2022-07-01 VITALS
RESPIRATION RATE: 14 BRPM | BODY MASS INDEX: 31.57 KG/M2 | DIASTOLIC BLOOD PRESSURE: 68 MMHG | SYSTOLIC BLOOD PRESSURE: 124 MMHG | HEIGHT: 74 IN | WEIGHT: 246 LBS | OXYGEN SATURATION: 96 % | HEART RATE: 61 BPM

## 2022-07-01 DIAGNOSIS — Z85.3 PERSONAL HISTORY OF MALIGNANT NEOPLASM OF BREAST: ICD-10-CM

## 2022-07-01 DIAGNOSIS — Z87.440 PERSONAL HISTORY OF URINARY (TRACT) INFECTIONS: ICD-10-CM

## 2022-07-01 DIAGNOSIS — I25.2 OLD MYOCARDIAL INFARCTION: ICD-10-CM

## 2022-07-01 PROBLEM — I25.10 ATHEROSCLEROTIC HEART DISEASE OF NATIVE CORONARY ARTERY WITHOUT ANGINA PECTORIS: Chronic | Status: ACTIVE | Noted: 2022-06-17

## 2022-07-01 PROCEDURE — 99204 OFFICE O/P NEW MOD 45 MIN: CPT

## 2022-07-01 PROCEDURE — 99214 OFFICE O/P EST MOD 30 MIN: CPT

## 2022-07-01 NOTE — ASSESSMENT
[FreeTextEntry1] : 76 y.o. male w/ PMHx of Afib, on amiodarone, presents for initial evaluation for sleep apnea, as well as for PFT's due to being on amiodarone.Patient admits to daytime sleepiness and snoring when sleeping in bed, nowadays is sleeping in recliner with no more snoring. \par \par #) Sleep concern\par -home sleep apnea study to be planned\par \par #) On amiodarone therapy\par -PFT's to be planned

## 2022-07-01 NOTE — REASON FOR VISIT
[Initial] : an initial visit [Sleep Apnea] : sleep apnea [Cough] : cough [Shortness of Breath] : shortness of breath

## 2022-07-01 NOTE — HISTORY OF PRESENT ILLNESS
[TextBox_4] : 76 y.o. male w/ PMHx of Afib, on amiodarone, presents for initial evaluation for sleep apnea, as well as for PFT's due to being on amiodarone.Patient admits to daytime sleepiness and snoring when sleeping in bed, nowadays is sleeping in recliner with no more snoring. \par wife at bedside, intermittent cough, SOB on exertion

## 2022-07-06 ENCOUNTER — APPOINTMENT (OUTPATIENT)
Dept: CARDIOTHORACIC SURGERY | Facility: CLINIC | Age: 77
End: 2022-07-06

## 2022-07-06 VITALS
TEMPERATURE: 98.2 F | DIASTOLIC BLOOD PRESSURE: 71 MMHG | HEIGHT: 74 IN | OXYGEN SATURATION: 94 % | RESPIRATION RATE: 14 BRPM | SYSTOLIC BLOOD PRESSURE: 152 MMHG | HEART RATE: 61 BPM | BODY MASS INDEX: 30.8 KG/M2 | WEIGHT: 240 LBS

## 2022-07-06 PROCEDURE — 99024 POSTOP FOLLOW-UP VISIT: CPT

## 2022-07-07 DIAGNOSIS — Z45.018 ENCOUNTER FOR ADJUSTMENT AND MANAGEMENT OF OTHER PART OF CARDIAC PACEMAKER: ICD-10-CM

## 2022-07-07 DIAGNOSIS — E66.9 OBESITY, UNSPECIFIED: ICD-10-CM

## 2022-07-07 DIAGNOSIS — I25.2 OLD MYOCARDIAL INFARCTION: ICD-10-CM

## 2022-07-07 DIAGNOSIS — I44.7 LEFT BUNDLE-BRANCH BLOCK, UNSPECIFIED: ICD-10-CM

## 2022-07-07 DIAGNOSIS — I25.10 ATHEROSCLEROTIC HEART DISEASE OF NATIVE CORONARY ARTERY WITHOUT ANGINA PECTORIS: ICD-10-CM

## 2022-07-07 DIAGNOSIS — Z95.1 PRESENCE OF AORTOCORONARY BYPASS GRAFT: ICD-10-CM

## 2022-07-07 DIAGNOSIS — I50.32 CHRONIC DIASTOLIC (CONGESTIVE) HEART FAILURE: ICD-10-CM

## 2022-07-07 DIAGNOSIS — Z90.11 ACQUIRED ABSENCE OF RIGHT BREAST AND NIPPLE: ICD-10-CM

## 2022-07-07 DIAGNOSIS — I49.5 SICK SINUS SYNDROME: ICD-10-CM

## 2022-07-07 DIAGNOSIS — I48.0 PAROXYSMAL ATRIAL FIBRILLATION: ICD-10-CM

## 2022-07-07 DIAGNOSIS — I11.0 HYPERTENSIVE HEART DISEASE WITH HEART FAILURE: ICD-10-CM

## 2022-07-07 DIAGNOSIS — M19.90 UNSPECIFIED OSTEOARTHRITIS, UNSPECIFIED SITE: ICD-10-CM

## 2022-07-07 DIAGNOSIS — Z85.3 PERSONAL HISTORY OF MALIGNANT NEOPLASM OF BREAST: ICD-10-CM

## 2022-07-27 ENCOUNTER — APPOINTMENT (OUTPATIENT)
Dept: CARDIOLOGY | Facility: CLINIC | Age: 77
End: 2022-07-27

## 2022-07-27 VITALS
HEART RATE: 60 BPM | WEIGHT: 242.25 LBS | BODY MASS INDEX: 31.09 KG/M2 | HEIGHT: 74 IN | DIASTOLIC BLOOD PRESSURE: 72 MMHG | TEMPERATURE: 97.3 F | SYSTOLIC BLOOD PRESSURE: 119 MMHG

## 2022-07-27 DIAGNOSIS — Z45.018 ENCOUNTER FOR ADJUSTMENT AND MANAGEMENT OF OTHER PART OF CARDIAC PACEMAKER: ICD-10-CM

## 2022-07-27 PROCEDURE — 93000 ELECTROCARDIOGRAM COMPLETE: CPT | Mod: 59

## 2022-07-27 PROCEDURE — 93281 PM DEVICE PROGR EVAL MULTI: CPT

## 2022-07-27 PROCEDURE — 99214 OFFICE O/P EST MOD 30 MIN: CPT

## 2022-07-27 RX ORDER — CEPHALEXIN 500 MG/1
500 CAPSULE ORAL
Refills: 0 | Status: COMPLETED | COMMUNITY
End: 2022-07-27

## 2022-07-27 RX ORDER — GLUCOSAMINE SULFATE 500 MG
CAPSULE ORAL 3 TIMES DAILY
Refills: 0 | Status: COMPLETED | COMMUNITY
End: 2022-07-27

## 2022-07-27 NOTE — ASSESSMENT
[FreeTextEntry1] : 75 yo M with history of AFib on aspirin and Amio, SSS s/p DC PPM with recent BiV PPM upgrade. \par \par # SSS s/p DC PPM with cardiomyopathy s/p BiV PPM upgrade\par - I interrogated and reprogrammed his device as described in procedure. His wound is healing properly, with no signs of inflammation or bleeding. I discussed with patient post-operative care in great details. I answered all questions to their satisfaction. Patient was pleased with the result of their procedure. \par - Remote monitor is set up and patient is transmitting.\par \par # Paroxysmal AFib \par - 0% AT/AF burden\par - Cont aspirin (pt not felt to be a good candidate for anticoagulation by his cardiologist due to bleeding). Discussed option of Watchman device. Not interested in procedures at this time. \par - Cont Amio to 100mg. Patient is taking 1/2 of 200mg tablets. Recent LFTs from June reviewed and WNL.\par - Pulm referral provided at last visit. Pt sees ophtho\par \par # Mild cardiomyopathy\par - Cont BB and Losartan\par - Consider Entresto. \par - Repeat echo in 2 mo (script provided and advised pt to schedule with Dr. Handy)\par \par # HTN\par - BP well controlled\par - Cont Amlodipine, Losartan, and Metoprolol\par - I have advised him to adhere to a 2g Na diet and to be careful with consuming salty meals. \par \par I have also advised the patient to go to the nearest emergency room if he experiences any chest pain, dyspnea, syncope, or has any other compelling symptoms.\par  \par Follow up in 6-9 months with NP.

## 2022-07-27 NOTE — CARDIOLOGY SUMMARY
[de-identified] : 7/27/2022 A-paced, BiV paced (HR 60 bpm),  msec [de-identified] : 9/1/21 EF 46%. Severe LAE. \par 4/1/21 EF 49%. Mild cLVH. Mild-mod MR. Mild TR.\par 1/14/20: EF 45-54% Mod Grade 2 DD. Mod eccentric MR. Mild TR.\par 8/20/18, Mod thickened mitral valve with mod restricted opening. Mild MAC. Mod MR. Mod LAE. Grade 1 DD. LVEF 55-75%.  [de-identified] : CRT-P (Dee) 7/27/2022

## 2022-07-27 NOTE — HISTORY OF PRESENT ILLNESS
[de-identified] : \par Cardiologist: Dr. Handy\par Ophtho: Dr. GOMEZ ([facility])\par Pulm: referral provided\par \par 75 yo M with history of SSS s/p DC PPM, CAD s/p CABG, LBBB. BiV PPM was attempted but despite multiple catheters and sheaths, CS was unable to be cannulated at that time. Pt admitted 7/9-7/14 for abdominal pain --> recommended MRCP with possible ERCP but patient refused due to claustrophobia and refused ERCP bc he felt better and did not want any other procedures. \par \par Pt was in the ER 10/23 (no admission) for lower extremity edema due to excess salt intake (ate pickles and ham).  He can walk on flat ground and up the stairs without dyspnea.\par \par He is here for routine follow up after recent BiV PPM upgrade with Dr. Danielle (6/24/22). He states he feels much better since device upgrade. Dyspnea improved. Denies chest pain, palpitations, dizziness, lightheadedness, presyncope or syncope.

## 2022-07-27 NOTE — PROCEDURE
[Sinus Bradycardia] : sinus bradycardia [CRT-P] : Cardiac resynchronization therapy pacemaker [DDD] : DDD [Voltage: ___ volts] : Voltage was [unfilled] volts [Magnet Rate: ___ Ppm] : magnet rate was [unfilled] Ppm [Longevity: ___ months] : The estimated remaining battery life is [unfilled] months [Sensing Amplitude ___mv] : sensing amplitude was [unfilled] mv [Lead Imp:  ___ohms] : lead impedance was [unfilled] ohms [___V @] : [unfilled] V [___ ms] : [unfilled] ms [Programmed for Longevity] : output reprogrammed for improved battery longevity [Apace-Vpace ___ %] : Apace-Vpace [unfilled]% [See Device Printout] : See device printout [de-identified] : [facility] [de-identified] : 5120 [de-identified] : [id number] [de-identified] : 06/24/2022 [de-identified] : 60 [de-identified] : Rate response turned on [de-identified] : No new episodes to report.\par [employer] stable.\par Patient is transmitting to [facility].

## 2022-07-27 NOTE — PHYSICAL EXAM
[General Appearance - Well Developed] : well developed [Normal Appearance] : normal appearance [Well Groomed] : well groomed [General Appearance - Well Nourished] : well nourished [No Deformities] : no deformities [General Appearance - In No Acute Distress] : no acute distress [Heart Rate And Rhythm] : heart rate and rhythm were normal [Heart Sounds] : normal S1 and S2 [Murmurs] : no murmurs present [] : no respiratory distress [Respiration, Rhythm And Depth] : normal respiratory rhythm and effort [Exaggerated Use Of Accessory Muscles For Inspiration] : no accessory muscle use [Auscultation Breath Sounds / Voice Sounds] : lungs were clear to auscultation bilaterally [Left Infraclavicular] : left infraclavicular area [Clean] : clean [Dry] : dry [Healing Well] : healing well [Erythema] : not erythematous [Warm] : not warm [Tender] : not tender [FreeTextEntry2] : steri-strips c/d/i; removed.  [Abdomen Soft] : soft [Nail Clubbing] : no clubbing of the fingernails

## 2022-08-16 ENCOUNTER — APPOINTMENT (OUTPATIENT)
Dept: SLEEP CENTER | Facility: HOSPITAL | Age: 77
End: 2022-08-16

## 2022-08-16 ENCOUNTER — OUTPATIENT (OUTPATIENT)
Dept: OUTPATIENT SERVICES | Facility: HOSPITAL | Age: 77
LOS: 1 days | Discharge: HOME | End: 2022-08-16

## 2022-08-16 DIAGNOSIS — Z95.0 PRESENCE OF CARDIAC PACEMAKER: Chronic | ICD-10-CM

## 2022-08-16 DIAGNOSIS — Z95.1 PRESENCE OF AORTOCORONARY BYPASS GRAFT: Chronic | ICD-10-CM

## 2022-08-16 DIAGNOSIS — Z90.10 ACQUIRED ABSENCE OF UNSPECIFIED BREAST AND NIPPLE: Chronic | ICD-10-CM

## 2022-08-16 PROCEDURE — 95800 SLP STDY UNATTENDED: CPT | Mod: 26

## 2022-08-17 DIAGNOSIS — G47.33 OBSTRUCTIVE SLEEP APNEA (ADULT) (PEDIATRIC): ICD-10-CM

## 2022-08-31 ENCOUNTER — APPOINTMENT (OUTPATIENT)
Age: 77
End: 2022-08-31

## 2022-08-31 DIAGNOSIS — Z76.89 PERSONS ENCOUNTERING HEALTH SERVICES IN OTHER SPECIFIED CIRCUMSTANCES: ICD-10-CM

## 2022-08-31 PROCEDURE — 99442: CPT | Mod: 95

## 2022-08-31 NOTE — REASON FOR VISIT
[Home] : at home, [unfilled] , at the time of the visit. [Medical Office: ([place] [facility])___] : at the medical office located in  [Verbal consent obtained from patient] : the patient, [unfilled]

## 2022-09-28 ENCOUNTER — OUTPATIENT (OUTPATIENT)
Dept: OUTPATIENT SERVICES | Facility: HOSPITAL | Age: 77
LOS: 1 days | Discharge: HOME | End: 2022-09-28

## 2022-09-28 DIAGNOSIS — R06.9 UNSPECIFIED ABNORMALITIES OF BREATHING: ICD-10-CM

## 2022-09-28 DIAGNOSIS — Z95.0 PRESENCE OF CARDIAC PACEMAKER: Chronic | ICD-10-CM

## 2022-09-28 DIAGNOSIS — Z95.1 PRESENCE OF AORTOCORONARY BYPASS GRAFT: Chronic | ICD-10-CM

## 2022-09-28 DIAGNOSIS — Z90.10 ACQUIRED ABSENCE OF UNSPECIFIED BREAST AND NIPPLE: Chronic | ICD-10-CM

## 2022-09-28 PROCEDURE — 94729 DIFFUSING CAPACITY: CPT | Mod: 26

## 2022-09-28 PROCEDURE — 94060 EVALUATION OF WHEEZING: CPT | Mod: 26

## 2022-09-28 PROCEDURE — 94727 GAS DIL/WSHOT DETER LNG VOL: CPT | Mod: 26

## 2022-10-16 ENCOUNTER — NON-APPOINTMENT (OUTPATIENT)
Age: 77
End: 2022-10-16

## 2022-10-27 ENCOUNTER — NON-APPOINTMENT (OUTPATIENT)
Age: 77
End: 2022-10-27

## 2022-10-27 ENCOUNTER — APPOINTMENT (OUTPATIENT)
Dept: CARDIOLOGY | Facility: CLINIC | Age: 77
End: 2022-10-27

## 2022-10-27 PROCEDURE — 93296 REM INTERROG EVL PM/IDS: CPT

## 2022-10-27 PROCEDURE — 93294 REM INTERROG EVL PM/LDLS PM: CPT

## 2022-11-01 ENCOUNTER — APPOINTMENT (OUTPATIENT)
Dept: ORTHOPEDIC SURGERY | Facility: CLINIC | Age: 77
End: 2022-11-01

## 2022-11-01 PROCEDURE — 20610 DRAIN/INJ JOINT/BURSA W/O US: CPT | Mod: 50

## 2022-11-01 PROCEDURE — 99213 OFFICE O/P EST LOW 20 MIN: CPT | Mod: 25

## 2022-11-01 NOTE — PHYSICAL EXAM
[Bilateral] : knee bilaterally [NL (0)] : extension 0 degrees [] : ambulation with cane [FreeTextEntry3] :  There are variscosities noted on the anterior aspect of the right knee.  No erythema or ecchymosis of either knee. [FreeTextEntry8] :   Right knee medial joint tenderness to palpation, mild medial joint tenderness to palpation of the left knee.  No tenderness to palpation over the lateral joint line of either knee.  Mild tenderness to palpation over the anterior aspect of the right knee.  No tenderness to palpation over the anterior aspect of the left knee. [TWNoteComboBox7] : flexion 120 degrees

## 2022-11-01 NOTE — DISCUSSION/SUMMARY
[de-identified] :   Today I recommend a cortisone injection for each knee.  The patient agreed.  Both knees were injected with 2 cc lidocaine, 1 cc dexamethasone.  He tolerated the procedure well.  The puncture sites were covered with a Band-Aid.  He will call me in a month to let me know how he is feeling, if he is still having pain in each knee I would recommend viscous injections.\par \par Supervising physician:  Dr. Spaulding

## 2022-11-01 NOTE — HISTORY OF PRESENT ILLNESS
[de-identified] : The patient is a 76-year-old male here for subsequent re-evaluation of bilateral knee osteoarthritis.  The right knee troubles more than the left knee.  He had a Synvisc-One injection in each knee in September 2021 which provided him with some relief.  The pain in each knee has returned recently.

## 2022-11-01 NOTE — PROCEDURE
[Large Joint Injection] : Large joint injection [Bilateral] : bilaterally of the [Knee] : knee [___ cc    1%] : Lidocaine ~Vcc of 1%  [___ cc    4mg] : Dexamethasone (Decadron) ~Vcc of 4 mg  [Risks, benefits, alternatives discussed / Verbal consent obtained] : the risks benefits, and alternatives have been discussed, and verbal consent was obtained

## 2022-11-12 NOTE — ED ADULT NURSE NOTE - PMH
76 year old female found laying on stretcher complaining of weakness x1.  pt is currently under care of oncologist, awaiting diagnosis.  pt's daughter states she had a temperature T .  pt is acting normal to self as per daughter.
Arrhythmia    Breast cancer  2001  CHF (congestive heart failure)    Heart attack    HTN (hypertension)    Hypercholesteremia    Obesity    Osteoarthritis

## 2022-11-29 ENCOUNTER — NON-APPOINTMENT (OUTPATIENT)
Age: 77
End: 2022-11-29

## 2022-12-12 ENCOUNTER — NON-APPOINTMENT (OUTPATIENT)
Age: 77
End: 2022-12-12

## 2022-12-16 ENCOUNTER — INPATIENT (INPATIENT)
Facility: HOSPITAL | Age: 77
LOS: 2 days | Discharge: HOME | End: 2022-12-19
Attending: INTERNAL MEDICINE | Admitting: INTERNAL MEDICINE
Payer: MEDICARE

## 2022-12-16 VITALS
HEART RATE: 64 BPM | SYSTOLIC BLOOD PRESSURE: 168 MMHG | OXYGEN SATURATION: 98 % | TEMPERATURE: 98 F | DIASTOLIC BLOOD PRESSURE: 72 MMHG | RESPIRATION RATE: 20 BRPM

## 2022-12-16 DIAGNOSIS — Z95.1 PRESENCE OF AORTOCORONARY BYPASS GRAFT: Chronic | ICD-10-CM

## 2022-12-16 DIAGNOSIS — Z95.0 PRESENCE OF CARDIAC PACEMAKER: Chronic | ICD-10-CM

## 2022-12-16 DIAGNOSIS — Z90.10 ACQUIRED ABSENCE OF UNSPECIFIED BREAST AND NIPPLE: Chronic | ICD-10-CM

## 2022-12-16 LAB
ALBUMIN SERPL ELPH-MCNC: 4.4 G/DL — SIGNIFICANT CHANGE UP (ref 3.5–5.2)
ALP SERPL-CCNC: 111 U/L — SIGNIFICANT CHANGE UP (ref 30–115)
ALT FLD-CCNC: 110 U/L — HIGH (ref 0–41)
ANION GAP SERPL CALC-SCNC: 9 MMOL/L — SIGNIFICANT CHANGE UP (ref 7–14)
APPEARANCE UR: CLEAR — SIGNIFICANT CHANGE UP
AST SERPL-CCNC: 136 U/L — HIGH (ref 0–41)
BACTERIA # UR AUTO: NEGATIVE — SIGNIFICANT CHANGE UP
BASOPHILS # BLD AUTO: 0 K/UL — SIGNIFICANT CHANGE UP (ref 0–0.2)
BASOPHILS NFR BLD AUTO: 0 % — SIGNIFICANT CHANGE UP (ref 0–1)
BILIRUB SERPL-MCNC: 3.6 MG/DL — HIGH (ref 0.2–1.2)
BILIRUB UR-MCNC: ABNORMAL
BUN SERPL-MCNC: 15 MG/DL — SIGNIFICANT CHANGE UP (ref 10–20)
CALCIUM SERPL-MCNC: 8.5 MG/DL — SIGNIFICANT CHANGE UP (ref 8.4–10.4)
CHLORIDE SERPL-SCNC: 98 MMOL/L — SIGNIFICANT CHANGE UP (ref 98–110)
CO2 SERPL-SCNC: 26 MMOL/L — SIGNIFICANT CHANGE UP (ref 17–32)
COLOR SPEC: ABNORMAL
CREAT SERPL-MCNC: 0.8 MG/DL — SIGNIFICANT CHANGE UP (ref 0.7–1.5)
DIFF PNL FLD: NEGATIVE — SIGNIFICANT CHANGE UP
EGFR: 91 ML/MIN/1.73M2 — SIGNIFICANT CHANGE UP
EOSINOPHIL # BLD AUTO: 0 K/UL — SIGNIFICANT CHANGE UP (ref 0–0.7)
EOSINOPHIL NFR BLD AUTO: 0 % — SIGNIFICANT CHANGE UP (ref 0–8)
EPI CELLS # UR: 1 /HPF — SIGNIFICANT CHANGE UP (ref 0–5)
GIANT PLATELETS BLD QL SMEAR: PRESENT — SIGNIFICANT CHANGE UP
GLUCOSE SERPL-MCNC: 115 MG/DL — HIGH (ref 70–99)
GLUCOSE UR QL: NEGATIVE — SIGNIFICANT CHANGE UP
HCT VFR BLD CALC: 39.7 % — LOW (ref 42–52)
HGB BLD-MCNC: 13 G/DL — LOW (ref 14–18)
HYALINE CASTS # UR AUTO: 1 /LPF — SIGNIFICANT CHANGE UP (ref 0–7)
KETONES UR-MCNC: ABNORMAL
LACTATE SERPL-SCNC: 1.3 MMOL/L — SIGNIFICANT CHANGE UP (ref 0.7–2)
LEUKOCYTE ESTERASE UR-ACNC: NEGATIVE — SIGNIFICANT CHANGE UP
LIDOCAIN IGE QN: 2265 U/L — HIGH (ref 7–60)
LYMPHOCYTES # BLD AUTO: 0.11 K/UL — LOW (ref 1.2–3.4)
LYMPHOCYTES # BLD AUTO: 0.8 % — LOW (ref 20.5–51.1)
MANUAL SMEAR VERIFICATION: SIGNIFICANT CHANGE UP
MCHC RBC-ENTMCNC: 29.2 PG — SIGNIFICANT CHANGE UP (ref 27–31)
MCHC RBC-ENTMCNC: 32.7 G/DL — SIGNIFICANT CHANGE UP (ref 32–37)
MCV RBC AUTO: 89.2 FL — SIGNIFICANT CHANGE UP (ref 80–94)
MONOCYTES # BLD AUTO: 0.85 K/UL — HIGH (ref 0.1–0.6)
MONOCYTES NFR BLD AUTO: 6.1 % — SIGNIFICANT CHANGE UP (ref 1.7–9.3)
NEUTROPHILS # BLD AUTO: 12.91 K/UL — HIGH (ref 1.4–6.5)
NEUTROPHILS NFR BLD AUTO: 92.2 % — HIGH (ref 42.2–75.2)
NITRITE UR-MCNC: NEGATIVE — SIGNIFICANT CHANGE UP
PH UR: 6 — SIGNIFICANT CHANGE UP (ref 5–8)
PLAT MORPH BLD: NORMAL — SIGNIFICANT CHANGE UP
PLATELET # BLD AUTO: 186 K/UL — SIGNIFICANT CHANGE UP (ref 130–400)
POIKILOCYTOSIS BLD QL AUTO: SIGNIFICANT CHANGE UP
POLYCHROMASIA BLD QL SMEAR: SLIGHT — SIGNIFICANT CHANGE UP
POTASSIUM SERPL-MCNC: 4 MMOL/L — SIGNIFICANT CHANGE UP (ref 3.5–5)
POTASSIUM SERPL-SCNC: 4 MMOL/L — SIGNIFICANT CHANGE UP (ref 3.5–5)
PROT SERPL-MCNC: 6.7 G/DL — SIGNIFICANT CHANGE UP (ref 6–8)
PROT UR-MCNC: SIGNIFICANT CHANGE UP
RBC # BLD: 4.45 M/UL — LOW (ref 4.7–6.1)
RBC # FLD: 14.1 % — SIGNIFICANT CHANGE UP (ref 11.5–14.5)
RBC BLD AUTO: ABNORMAL
RBC CASTS # UR COMP ASSIST: 4 /HPF — SIGNIFICANT CHANGE UP (ref 0–4)
SODIUM SERPL-SCNC: 133 MMOL/L — LOW (ref 135–146)
SP GR SPEC: >1.05 (ref 1.01–1.03)
TROPONIN T SERPL-MCNC: <0.01 NG/ML — SIGNIFICANT CHANGE UP
UROBILINOGEN FLD QL: ABNORMAL
VARIANT LYMPHS # BLD: 0.9 % — SIGNIFICANT CHANGE UP (ref 0–5)
WBC # BLD: 14 K/UL — HIGH (ref 4.8–10.8)
WBC # FLD AUTO: 14 K/UL — HIGH (ref 4.8–10.8)
WBC UR QL: 2 /HPF — SIGNIFICANT CHANGE UP (ref 0–5)

## 2022-12-16 PROCEDURE — 71045 X-RAY EXAM CHEST 1 VIEW: CPT | Mod: 26

## 2022-12-16 PROCEDURE — 76705 ECHO EXAM OF ABDOMEN: CPT | Mod: 26

## 2022-12-16 PROCEDURE — 99285 EMERGENCY DEPT VISIT HI MDM: CPT | Mod: FS,CS

## 2022-12-16 PROCEDURE — 74177 CT ABD & PELVIS W/CONTRAST: CPT | Mod: 26,MA

## 2022-12-16 PROCEDURE — 93010 ELECTROCARDIOGRAM REPORT: CPT | Mod: 76

## 2022-12-16 PROCEDURE — 93010 ELECTROCARDIOGRAM REPORT: CPT

## 2022-12-16 RX ORDER — PIPERACILLIN AND TAZOBACTAM 4; .5 G/20ML; G/20ML
3.38 INJECTION, POWDER, LYOPHILIZED, FOR SOLUTION INTRAVENOUS ONCE
Refills: 0 | Status: COMPLETED | OUTPATIENT
Start: 2022-12-16 | End: 2022-12-16

## 2022-12-16 RX ORDER — SODIUM CHLORIDE 9 MG/ML
1000 INJECTION INTRAMUSCULAR; INTRAVENOUS; SUBCUTANEOUS ONCE
Refills: 0 | Status: COMPLETED | OUTPATIENT
Start: 2022-12-16 | End: 2022-12-16

## 2022-12-16 RX ORDER — MORPHINE SULFATE 50 MG/1
4 CAPSULE, EXTENDED RELEASE ORAL ONCE
Refills: 0 | Status: DISCONTINUED | OUTPATIENT
Start: 2022-12-16 | End: 2022-12-16

## 2022-12-16 RX ORDER — PIPERACILLIN AND TAZOBACTAM 4; .5 G/20ML; G/20ML
3.38 INJECTION, POWDER, LYOPHILIZED, FOR SOLUTION INTRAVENOUS ONCE
Refills: 0 | Status: DISCONTINUED | OUTPATIENT
Start: 2022-12-16 | End: 2022-12-16

## 2022-12-16 RX ADMIN — SODIUM CHLORIDE 1000 MILLILITER(S): 9 INJECTION INTRAMUSCULAR; INTRAVENOUS; SUBCUTANEOUS at 22:17

## 2022-12-16 RX ADMIN — PIPERACILLIN AND TAZOBACTAM 25 GRAM(S): 4; .5 INJECTION, POWDER, LYOPHILIZED, FOR SOLUTION INTRAVENOUS at 22:16

## 2022-12-16 RX ADMIN — MORPHINE SULFATE 4 MILLIGRAM(S): 50 CAPSULE, EXTENDED RELEASE ORAL at 20:40

## 2022-12-16 RX ADMIN — SODIUM CHLORIDE 1000 MILLILITER(S): 9 INJECTION INTRAMUSCULAR; INTRAVENOUS; SUBCUTANEOUS at 22:47

## 2022-12-16 RX ADMIN — SODIUM CHLORIDE 1000 MILLILITER(S): 9 INJECTION INTRAMUSCULAR; INTRAVENOUS; SUBCUTANEOUS at 19:49

## 2022-12-16 RX ADMIN — MORPHINE SULFATE 4 MILLIGRAM(S): 50 CAPSULE, EXTENDED RELEASE ORAL at 20:10

## 2022-12-16 NOTE — ED PROVIDER NOTE - NS ED ATTENDING STATEMENT MOD
This was a shared visit with the PAULINE. I reviewed and verified the documentation and independently performed the documented:

## 2022-12-16 NOTE — ED PROVIDER NOTE - OBJECTIVE STATEMENT
77 yo male with a pmh of 75 yo male with a pmh of HTN, HLD, CAD s/p CABG on plavix, CHF, and breast CA s/p R mastectomy presents c/o upper abdominal pain that started this afternoon. pt describes the pain as sharp in nature with no radiation and has noted chills. pt denies any other symptoms including n/v/d, headache, recent illness/travel, cough, chest pain, or SOB.

## 2022-12-16 NOTE — ED PROVIDER NOTE - CLINICAL SUMMARY MEDICAL DECISION MAKING FREE TEXT BOX
77-year-old male presents to the ED for epigastric discomfort.  Vitals reviewed by me noted to be wnl.  Prior history of acute cholangitis.  Concern for biliary pathology.  So we obtained labs as well as CT imaging.  Labs reveal leukocytosis–14,000, elevated total bilirubin, elevated LFTs.  Lipase at 2265.  Moderate ketones noted in the urine.  CT abdomen pelvis consistent with edematous pancreatitis.  Right upper quadrant ultrasound is limited.  ED Chest X-Ray reviewed by me which did not reveal a ptx, infiltrate, or effusion.  EKG noted to have a wide QRS rhythm.  History of pacemaker, previous EKGs noted to have paced rhythm.  GI consulted.  Currently no acute intervention, n.p.o., fluids.  Pain control.  Admitted to medicine.

## 2022-12-16 NOTE — ED ADULT NURSE NOTE - CHIEF COMPLAINT QUOTE
Patient bibems a+ox3 co abdominal pain today with SOB- denies n/v/d, fever. As per pt same pain he experienced in august when he had gallbladder infection

## 2022-12-16 NOTE — ED PROVIDER NOTE - NS ED ROS FT
Constitutional: (+) chills  Eyes/ENT: (-) visual changes   Cardiovascular: (-) chest pain, (-) syncope  Respiratory: (-) cough, (-) shortness of breath  Gastrointestinal: (-) vomiting, (-) diarrhea  Genitourinary: (-) dysuria, (-) hesitancy, (-) frequency   Musculoskeletal: (-) neck pain, (-) back pain, (-) joint pain  Integumentary: (-) rash, (-) edema  Neurological: (-) headache, (-) altered mental status  Allergic/Immunologic: (-) pruritus

## 2022-12-16 NOTE — ED ADULT NURSE NOTE - OBJECTIVE STATEMENT
pt presents co abdominal/epigastric pain radiating to L lower back x 1 week. Pt endorses feeling like gallbladder pain that he had over the summer. Pt aaox4, speaking in clear and complete sentences. PIV #20 L ac placed.

## 2022-12-16 NOTE — ED ADULT TRIAGE NOTE - CHIEF COMPLAINT QUOTE
Patient bibems a+ox3 co abdominal pain today with SOB- denies n/v/d, fever. As per pt same pain he experienced in august when e had gallbladder infection Patient bibems a+ox3 co abdominal pain today with SOB- denies n/v/d, fever. As per pt same pain he experienced in august when he had gallbladder infection

## 2022-12-17 LAB
ALBUMIN SERPL ELPH-MCNC: 3.7 G/DL — SIGNIFICANT CHANGE UP (ref 3.5–5.2)
ALP SERPL-CCNC: 100 U/L — SIGNIFICANT CHANGE UP (ref 30–115)
ALT FLD-CCNC: 99 U/L — HIGH (ref 0–41)
ANION GAP SERPL CALC-SCNC: 14 MMOL/L — SIGNIFICANT CHANGE UP (ref 7–14)
AST SERPL-CCNC: 95 U/L — HIGH (ref 0–41)
BASOPHILS # BLD AUTO: 0.03 K/UL — SIGNIFICANT CHANGE UP (ref 0–0.2)
BASOPHILS NFR BLD AUTO: 0.3 % — SIGNIFICANT CHANGE UP (ref 0–1)
BILIRUB SERPL-MCNC: 2.6 MG/DL — HIGH (ref 0.2–1.2)
BUN SERPL-MCNC: 13 MG/DL — SIGNIFICANT CHANGE UP (ref 10–20)
CALCIUM SERPL-MCNC: 7.7 MG/DL — LOW (ref 8.4–10.5)
CHLORIDE SERPL-SCNC: 104 MMOL/L — SIGNIFICANT CHANGE UP (ref 98–110)
CO2 SERPL-SCNC: 21 MMOL/L — SIGNIFICANT CHANGE UP (ref 17–32)
CREAT SERPL-MCNC: 0.7 MG/DL — SIGNIFICANT CHANGE UP (ref 0.7–1.5)
EGFR: 95 ML/MIN/1.73M2 — SIGNIFICANT CHANGE UP
EOSINOPHIL # BLD AUTO: 0.01 K/UL — SIGNIFICANT CHANGE UP (ref 0–0.7)
EOSINOPHIL NFR BLD AUTO: 0.1 % — SIGNIFICANT CHANGE UP (ref 0–8)
ETHANOL SERPL-MCNC: <10 MG/DL — SIGNIFICANT CHANGE UP
GLUCOSE SERPL-MCNC: 104 MG/DL — HIGH (ref 70–99)
HCT VFR BLD CALC: 35.8 % — LOW (ref 42–52)
HGB BLD-MCNC: 12.1 G/DL — LOW (ref 14–18)
IMM GRANULOCYTES NFR BLD AUTO: 0.4 % — HIGH (ref 0.1–0.3)
LYMPHOCYTES # BLD AUTO: 0.79 K/UL — LOW (ref 1.2–3.4)
LYMPHOCYTES # BLD AUTO: 7 % — LOW (ref 20.5–51.1)
MAGNESIUM SERPL-MCNC: 1.9 MG/DL — SIGNIFICANT CHANGE UP (ref 1.8–2.4)
MCHC RBC-ENTMCNC: 29.9 PG — SIGNIFICANT CHANGE UP (ref 27–31)
MCHC RBC-ENTMCNC: 33.8 G/DL — SIGNIFICANT CHANGE UP (ref 32–37)
MCV RBC AUTO: 88.4 FL — SIGNIFICANT CHANGE UP (ref 80–94)
MONOCYTES # BLD AUTO: 1.17 K/UL — HIGH (ref 0.1–0.6)
MONOCYTES NFR BLD AUTO: 10.3 % — HIGH (ref 1.7–9.3)
NEUTROPHILS # BLD AUTO: 9.29 K/UL — HIGH (ref 1.4–6.5)
NEUTROPHILS NFR BLD AUTO: 81.9 % — HIGH (ref 42.2–75.2)
NRBC # BLD: 0 /100 WBCS — SIGNIFICANT CHANGE UP (ref 0–0)
PLATELET # BLD AUTO: 150 K/UL — SIGNIFICANT CHANGE UP (ref 130–400)
POTASSIUM SERPL-MCNC: 3.6 MMOL/L — SIGNIFICANT CHANGE UP (ref 3.5–5)
POTASSIUM SERPL-SCNC: 3.6 MMOL/L — SIGNIFICANT CHANGE UP (ref 3.5–5)
PROT SERPL-MCNC: 5.7 G/DL — LOW (ref 6–8)
RBC # BLD: 4.05 M/UL — LOW (ref 4.7–6.1)
RBC # FLD: 14.3 % — SIGNIFICANT CHANGE UP (ref 11.5–14.5)
SARS-COV-2 RNA SPEC QL NAA+PROBE: DETECTED
SARS-COV-2 RNA SPEC QL NAA+PROBE: SIGNIFICANT CHANGE UP
SODIUM SERPL-SCNC: 139 MMOL/L — SIGNIFICANT CHANGE UP (ref 135–146)
TRIGL SERPL-MCNC: 45 MG/DL — SIGNIFICANT CHANGE UP
WBC # BLD: 11.34 K/UL — HIGH (ref 4.8–10.8)
WBC # FLD AUTO: 11.34 K/UL — HIGH (ref 4.8–10.8)

## 2022-12-17 PROCEDURE — 99223 1ST HOSP IP/OBS HIGH 75: CPT

## 2022-12-17 RX ORDER — LOSARTAN POTASSIUM 100 MG/1
100 TABLET, FILM COATED ORAL DAILY
Refills: 0 | Status: DISCONTINUED | OUTPATIENT
Start: 2022-12-17 | End: 2022-12-19

## 2022-12-17 RX ORDER — SODIUM CHLORIDE 9 MG/ML
1000 INJECTION, SOLUTION INTRAVENOUS
Refills: 0 | Status: DISCONTINUED | OUTPATIENT
Start: 2022-12-17 | End: 2022-12-19

## 2022-12-17 RX ORDER — MORPHINE SULFATE 50 MG/1
2 CAPSULE, EXTENDED RELEASE ORAL EVERY 4 HOURS
Refills: 0 | Status: DISCONTINUED | OUTPATIENT
Start: 2022-12-17 | End: 2022-12-19

## 2022-12-17 RX ORDER — ATORVASTATIN CALCIUM 80 MG/1
20 TABLET, FILM COATED ORAL AT BEDTIME
Refills: 0 | Status: DISCONTINUED | OUTPATIENT
Start: 2022-12-17 | End: 2022-12-17

## 2022-12-17 RX ORDER — AMLODIPINE BESYLATE 2.5 MG/1
5 TABLET ORAL DAILY
Refills: 0 | Status: DISCONTINUED | OUTPATIENT
Start: 2022-12-17 | End: 2022-12-19

## 2022-12-17 RX ORDER — AMIODARONE HYDROCHLORIDE 400 MG/1
200 TABLET ORAL DAILY
Refills: 0 | Status: DISCONTINUED | OUTPATIENT
Start: 2022-12-17 | End: 2022-12-19

## 2022-12-17 RX ORDER — ENOXAPARIN SODIUM 100 MG/ML
40 INJECTION SUBCUTANEOUS EVERY 24 HOURS
Refills: 0 | Status: DISCONTINUED | OUTPATIENT
Start: 2022-12-17 | End: 2022-12-19

## 2022-12-17 RX ORDER — CLOPIDOGREL BISULFATE 75 MG/1
75 TABLET, FILM COATED ORAL DAILY
Refills: 0 | Status: DISCONTINUED | OUTPATIENT
Start: 2022-12-17 | End: 2022-12-19

## 2022-12-17 RX ORDER — SODIUM CHLORIDE 9 MG/ML
1000 INJECTION, SOLUTION INTRAVENOUS
Refills: 0 | Status: DISCONTINUED | OUTPATIENT
Start: 2022-12-17 | End: 2022-12-17

## 2022-12-17 RX ORDER — METOPROLOL TARTRATE 50 MG
50 TABLET ORAL
Refills: 0 | Status: DISCONTINUED | OUTPATIENT
Start: 2022-12-17 | End: 2022-12-19

## 2022-12-17 RX ORDER — PANTOPRAZOLE SODIUM 20 MG/1
40 TABLET, DELAYED RELEASE ORAL
Refills: 0 | Status: DISCONTINUED | OUTPATIENT
Start: 2022-12-17 | End: 2022-12-19

## 2022-12-17 RX ORDER — ONDANSETRON 8 MG/1
4 TABLET, FILM COATED ORAL EVERY 8 HOURS
Refills: 0 | Status: DISCONTINUED | OUTPATIENT
Start: 2022-12-17 | End: 2022-12-19

## 2022-12-17 RX ADMIN — Medication 50 MILLIGRAM(S): at 19:07

## 2022-12-17 RX ADMIN — SODIUM CHLORIDE 150 MILLILITER(S): 9 INJECTION, SOLUTION INTRAVENOUS at 03:53

## 2022-12-17 RX ADMIN — Medication 50 MILLIGRAM(S): at 06:10

## 2022-12-17 RX ADMIN — AMLODIPINE BESYLATE 5 MILLIGRAM(S): 2.5 TABLET ORAL at 06:10

## 2022-12-17 RX ADMIN — LOSARTAN POTASSIUM 100 MILLIGRAM(S): 100 TABLET, FILM COATED ORAL at 06:11

## 2022-12-17 RX ADMIN — CLOPIDOGREL BISULFATE 75 MILLIGRAM(S): 75 TABLET, FILM COATED ORAL at 12:07

## 2022-12-17 RX ADMIN — AMIODARONE HYDROCHLORIDE 200 MILLIGRAM(S): 400 TABLET ORAL at 06:10

## 2022-12-17 NOTE — H&P ADULT - NSHPPHYSICALEXAM_GEN_ALL_CORE
T(C): 36.6 (12-16-22 @ 20:56), Max: 36.7 (12-16-22 @ 17:47)  HR: 60 (12-16-22 @ 22:13) (60 - 70)  BP: 152/67 (12-16-22 @ 22:13) (152/67 - 168/72)  RR: 22 (12-16-22 @ 22:13) (19 - 22)  SpO2: 99% (12-16-22 @ 22:13) (98% - 99%)    PHYSICAL EXAM:  GENERAL: patient appears well, no acute distress, appropriate, pleasant  EYES: sclera clear, no exudates  ENMT: oropharynx clear without erythema, no exudates, moist mucous membranes  NECK: supple, soft, no thyromegaly noted  LUNGS: good air entry bilaterally, clear to auscultation, symmetric breath sounds, no wheezing or rhonchi appreciated  HEART: soft S1/S2, regular rate and rhythm, no murmurs noted, trace bilateral LE edema (L>R)  GASTROINTESTINAL: mild tenderness to palpation, nondistended, normoactive bowel sounds, no palpable masses  INTEGUMENT: good skin turgor, no lesions noted  MUSCULOSKELETAL: no clubbing or cyanosis, no obvious deformity  NEUROLOGIC: awake, alert, oriented x3, good muscle tone in 4 extremities, no obvious sensory deficits  PSYCHIATRIC: mood is good, affect is congruent, linear and logical thought process  HEME/LYMPH: no palpable supraclavicular nodules, no obvious ecchymosis or petechiae

## 2022-12-17 NOTE — CONSULT NOTE ADULT - ASSESSMENT
ASSESSMENT  Pt is a 77 yo male with a PMHx of HTN, HLD, CAD s/p CABG on plavix, CHF, and breast CA s/p R mastectomy presents c/o upper abdominal pain that started this afternoon.      IMPRESSION  #Acute Gallstone Pancreatitis  #Elevated LFTs   - US Abdomen Upper Quadrant Right (12.16.22 @ 21:03): IMPRESSION: Limited exam. Gallstones are present however evaluation for  gallbladder inflammation is very limited. If cholecystitis is clinically  suspected a nuclear medicine HIDA scan is suggested  -  CT Abdomen and Pelvis w/ IV Cont (12.16.22 @ 21:39):  Findings compatible with acute interstitial edematous pancreatitis; no  evidence of acute fluid collection. Cholelithiasis.    #COVID, asymptomatic  #CAD s/p CABG  #Breast cancer s/p right mastectomy   #Abx allergy: NKDA    RECOMMENDATIONS  - appears to be asymptomatic from COVID standpoint -- monitor for symptoms   - no clear evidence of cholecystitis -- off antibiotics for now -- if spikes fevers or worsening WBC, can start Zosyn 3.375g q 8 hours   - supportive care for pancreatitis     Please call or message on [employer] Teams if with any questions.  Spectra [phone]

## 2022-12-17 NOTE — CONSULT NOTE ADULT - SUBJECTIVE AND OBJECTIVE BOX
Gastroenterology Consultation:    Patient is a 76y old  Male who presents with a chief complaint of acute pancreatitis (17 Dec 2022 01:41)        Admitted on: 12-16-22      HPI:  Pt is a 77 yo male with a PMHx of HTN, HLD, CAD s/p CABG on plavix, CHF, and breast CA s/p R mastectomy presents c/o upper abdominal pain that started this afternoon. Pt describes pain as achy, sudden in onset, in epigastric region, no alleviating or improving factors, not related with food, nonradiating, 8/10 at its worse. Denied having a similar pain like this in the past. He denies any fevers, chills, nausea, vomiting, ches pain, palpiations, SOB, diarrhea, constipation, dysuria, urinary frequency, numbness, tingling or weakness. He drinks 2 beers a day and occasionally drinks hard liquor socially, never more than 4-5 drinks in one sitting. He smokes cigars, otherwise no other substance use.      In the ED:  · BP Systolic	168 mm Hg  · BP Diastolic	72 mm Hg  · Heart Rate	64 /min  · Respiration Rate (breaths/min)	20 /min  · Temp (F)	98 Degrees F  · Temp (C)	36.7 Degrees C  · Temp site	oral  · SpO2 (%)	98 %  · O2 Delivery/Oxygen Delivery Method	nasal cannula  · Oxygen Therapy Flow (L/min)	3 L/min    Labs notable for: WBC 14k, bilirubin 3.6, AST//110, lipase 2265, troponin negative  RUQ U/S: Gallstones are present however evaluation for gallbladder inflammation is very limited. If cholecystitis is clinically suspected a nuclear medicine HIDA scan is suggested.  CT abdomen/pelvis: 1. Findings compatible with acute interstitial edematous pancreatitis; no evidence of acute fluid collection. 2. Cholelithiasis. (17 Dec 2022 01:41)        Prior EGD:    Prior Colonoscopy:      PAST MEDICAL & SURGICAL HISTORY:  Arrhythmia      Hypercholesteremia      HTN (hypertension)      Obesity      CHF (congestive heart failure)      Heart attack      Breast cancer  2001      Osteoarthritis      CAD (coronary artery disease)      S/P CABG x 5  2014      H/O mastectomy  right 2001      Pacemaker            FAMILY HISTORY:  Family history of heart disease (Father, Mother)    Family history of lung cancer (Sibling)        Social History:  Tobacco:  Alcohol:  Drugs:    Home Medications:  amiodarone 200 mg oral tablet: 1 tab(s) orally once a day (17 Dec 2022 02:44)  amLODIPine 5 mg oral tablet: 1 tab(s) orally once a day (17 Dec 2022 02:44)  atorvastatin 20 mg oral tablet: 1 tab(s) orally once a day (17 Dec 2022 02:44)  clopidogrel 75 mg oral tablet: 1 tab(s) orally once a day (17 Dec 2022 02:44)  furosemide 20 mg oral tablet: 1 tab(s) orally 2 times a day (17 Dec 2022 02:44)  losartan 100 mg oral tablet: 1 tab(s) orally once a day (17 Dec 2022 02:44)  metoprolol tartrate 50 mg oral tablet: 1 tab(s) orally 2 times a day (17 Dec 2022 02:44)        MEDICATIONS  (STANDING):  aMIOdarone    Tablet 200 milliGRAM(s) Oral daily  amLODIPine   Tablet 5 milliGRAM(s) Oral daily  clopidogrel Tablet 75 milliGRAM(s) Oral daily  enoxaparin Injectable 40 milliGRAM(s) SubCutaneous every 24 hours  lactated ringers. 1000 milliLiter(s) (150 mL/Hr) IV Continuous <Continuous>  losartan 100 milliGRAM(s) Oral daily  metoprolol tartrate 50 milliGRAM(s) Oral two times a day  pantoprazole    Tablet 40 milliGRAM(s) Oral before breakfast    MEDICATIONS  (PRN):  morphine  - Injectable 2 milliGRAM(s) IV Push every 4 hours PRN Severe Pain (7 - 10)  ondansetron  IVPB 4 milliGRAM(s) IV Intermittent every 8 hours PRN Nausea and/or Vomiting      Allergies  No Known Allergies      Review of Systems:   Constitutional:  No Fever, No Chills  ENT/Mouth:  No Hearing Changes,  No Difficulty Swallowing  Eyes:  No Eye Pain, No Vision Changes  Cardiovascular:  No Chest Pain, No Palpitations  Respiratory:  No Cough, No Dyspnea  Gastrointestinal:  As described in HPI  Musculoskeletal:  No Joint Swelling, No Back Pain  Skin:  No Skin Lesions, No Jaundice  Neuro:  No Syncope, No Dizziness  Heme/Lymph:  No Bruising, No Bleeding.          Physical Examination:  T(C): 36.6 (12-16-22 @ 20:56), Max: 36.7 (12-16-22 @ 17:47)  HR: 60 (12-17-22 @ 06:12) (60 - 70)  BP: 155/67 (12-17-22 @ 06:12) (152/67 - 168/72)  RR: 18 (12-17-22 @ 06:12) (18 - 22)  SpO2: 99% (12-17-22 @ 06:12) (98% - 99%)          GENERAL: AAOx3, no acute distress.  HEAD:  Atraumatic, Normocephalic  EYES: conjunctiva and sclera clear  NECK: Supple, no JVD or thyromegaly  CHEST/LUNG: Clear to auscultation bilaterally; No wheeze, rhonchi, or rales  HEART: Regular rate and rhythm; normal S1, S2, No murmurs.  ABDOMEN: Soft, nontender, nondistended; Bowel sounds present  NEUROLOGY: No asterixis or tremor.   SKIN: Intact, no jaundice        Data:                        12.1   11.34 )-----------( 150      ( 17 Dec 2022 05:40 )             35.8     Hgb Trend:  12.1  12-17-22 @ 05:40  13.0  12-16-22 @ 19:49      12-17    139  |  104  |  13  ----------------------------<  104<H>  3.6   |  21  |  0.7    Ca    7.7<L>      17 Dec 2022 05:40  Mg     1.9     12-17    TPro  5.7<L>  /  Alb  3.7  /  TBili  2.6<H>  /  DBili  x   /  AST  95<H>  /  ALT  99<H>  /  AlkPhos  100  12-17    Liver panel trend:  TBili 2.6   /   AST 95   /   ALT 99   /   AlkP 100   /   Tptn 5.7   /   Alb 3.7    /   DBili --      12-17  TBili 3.6   /      /      /   AlkP 111   /   Tptn 6.7   /   Alb 4.4    /   DBili --      12-16              Radiology:  CT Abdomen and Pelvis w/ IV Cont:   ACC: [id number] EXAM:  CT ABDOMEN AND PELVIS IC                          PROCEDURE DATE:  12/16/2022          INTERPRETATION:  CLINICAL STATEMENT: Abdominal pain.    TECHNIQUE: Contiguous axial CT images were obtained from the lower chest   to the pubic symphysis with IV contrast administration..  Oral contrast   was not administered.  Reformatted images in the coronal and sagittal   planes were acquired.    Comparison made with CT abdomen and pelvis 7/8/2021.    FINDINGS:    LOWER CHEST: Cardiomegaly. Cardiac pacing device.    HEPATOBILIARY: Cholelithiasis. Liver unremarkable. No biliary dilation.    SPLEEN: Unremarkable.    PANCREAS: Diffuse peripancreatic inflammatory stranding, without evidence   of acute fluid collection. Homogeneous pancreatic parenchymal   enhancement. Patent splenic vein.    ADRENAL GLANDS: Unremarkable.    KIDNEYS: No hydronephrosis. Bilateral renal cysts and subcentimeter   hypodensities, too small to characterize. Nonobstructing left renal   calculus.    ABDOMINOPELVIC NODES: Unremarkable.    PELVIC ORGANS: Unremarkable.    PERITONEUM/MESENTERY/BOWEL: Unremarkable.    BONES/SOFT TISSUES: Degenerative change of spine. Unchanged gluteal   subcutaneous probable sebaceous cyst measuring up to 6 cm. Right lateral   thigh intramuscular lipoma, unchanged.    OTHER: Vascular calcifications. Fat-containing umbilical hernia. Small   bilateral fat-containing inguinal hernias.      IMPRESSION:    1. Findings compatible with acute interstitial edematous pancreatitis; no   evidence of acute fluid collection.    2. Cholelithiasis.    --- End of Report ---            RANCHO SNIDER MD; Attending Radiologist  This document has been electronically signed. Dec 16 2022 10:12PM (12-16-22 @ 21:39)    US Abdomen Upper Quadrant Right:   ACC: [id number] EXAM:  US ABDOMEN RT UPR QUADRANT                          PROCEDURE DATE:  12/16/2022          INTERPRETATION:  CLINICAL INFORMATION: Right upper quadrant abdominal   tenderness    COMPARISON: 7/8/2021    TECHNIQUE: Sonography of theright upper quadrant.    FINDINGS:  This is a limited exam because of the patient's body habitus.  The liver pancreas and right kidney is not well visualized.  There are gallstones within the gallbladder with a minimally thickened   gallbladder wall and a negative sonographic Onofre sign. The common bile   duct is normal in size.  Grossly no ascites is seen.    IMPRESSION: Limited exam. Gallstones are present however evaluation for   gallbladder inflammation is very limited. If cholecystitis is clinically   suspected a nuclear medicine HIDA scan is suggested      --- End of Report ---            STEPHAN MOTLEY MD; Attending Radiologist  This document has been electronically signed. Dec 16 2022  9:23PM (12-16-22 @ 21:03)     Gastroenterology Consultation:    Patient is a 76y old  Male who presents with a chief complaint of acute pancreatitis (17 Dec 2022 01:41)        Admitted on: 12-16-22      HPI:  Pt is a 75 yo male with a PMHx of HTN, HLD, CAD s/p CABG on plavix, CHF, and breast CA s/p R mastectomy presents c/o upper abdominal pain. Pt describes pain as achy, sudden in onset, in epigastric region, no alleviating or improving factors, not related with food, nonradiating, 8/10 at its worse. Denied having a similar pain like this in the past. He denies any fevers, chills, nausea, vomiting, ches pain, palpiations, SOB, diarrhea, constipation, dysuria, urinary frequency, numbness, tingling or weakness. He drinks 2 beers a day and occasionally drinks hard liquor socially, never more than 4-5 drinks in one sitting. He smokes cigars, otherwise no other substance use.      Labs notable for: WBC 14k, bilirubin 3.6, AST//110, lipase 2265, troponin negative  RUQ U/S: Gallstones are present however evaluation for gallbladder inflammation is very limited. If cholecystitis is clinically suspected a nuclear medicine HIDA scan is suggested.  CT abdomen/pelvis: 1. Findings compatible with acute interstitial edematous pancreatitis; no evidence of acute fluid collection. 2. Cholelithiasis.    GI is called for evaluation         Prior EGD: none on chart    Prior Colonoscopy: none on chart       PAST MEDICAL & SURGICAL HISTORY:  Arrhythmia      Hypercholesteremia      HTN (hypertension)      Obesity      CHF (congestive heart failure)      Heart attack      Breast cancer  2001      Osteoarthritis      CAD (coronary artery disease)      S/P CABG x 5  2014      H/O mastectomy  right 2001      Pacemaker            FAMILY HISTORY:  Family history of heart disease (Father, Mother)    Family history of lung cancer (Sibling)        Social History:  Tobacco: -ve  Alcohol: -ve  Drugs: -ve    Home Medications:  amiodarone 200 mg oral tablet: 1 tab(s) orally once a day (17 Dec 2022 02:44)  amLODIPine 5 mg oral tablet: 1 tab(s) orally once a day (17 Dec 2022 02:44)  atorvastatin 20 mg oral tablet: 1 tab(s) orally once a day (17 Dec 2022 02:44)  clopidogrel 75 mg oral tablet: 1 tab(s) orally once a day (17 Dec 2022 02:44)  furosemide 20 mg oral tablet: 1 tab(s) orally 2 times a day (17 Dec 2022 02:44)  losartan 100 mg oral tablet: 1 tab(s) orally once a day (17 Dec 2022 02:44)  metoprolol tartrate 50 mg oral tablet: 1 tab(s) orally 2 times a day (17 Dec 2022 02:44)        MEDICATIONS  (STANDING):  aMIOdarone    Tablet 200 milliGRAM(s) Oral daily  amLODIPine   Tablet 5 milliGRAM(s) Oral daily  clopidogrel Tablet 75 milliGRAM(s) Oral daily  enoxaparin Injectable 40 milliGRAM(s) SubCutaneous every 24 hours  lactated ringers. 1000 milliLiter(s) (150 mL/Hr) IV Continuous <Continuous>  losartan 100 milliGRAM(s) Oral daily  metoprolol tartrate 50 milliGRAM(s) Oral two times a day  pantoprazole    Tablet 40 milliGRAM(s) Oral before breakfast    MEDICATIONS  (PRN):  morphine  - Injectable 2 milliGRAM(s) IV Push every 4 hours PRN Severe Pain (7 - 10)  ondansetron  IVPB 4 milliGRAM(s) IV Intermittent every 8 hours PRN Nausea and/or Vomiting      Allergies  No Known Allergies      Review of Systems:   Constitutional:  No Fever, No Chills  ENT/Mouth:  No Hearing Changes,  No Difficulty Swallowing  Eyes:  No Eye Pain, No Vision Changes  Cardiovascular:  No Chest Pain, No Palpitations  Respiratory:  No Cough, No Dyspnea  Gastrointestinal:  As described in HPI  Musculoskeletal:  No Joint Swelling, No Back Pain  Skin:  No Skin Lesions, No Jaundice  Neuro:  No Syncope, No Dizziness  Heme/Lymph:  No Bruising, No Bleeding.          Physical Examination:  T(C): 36.6 (12-16-22 @ 20:56), Max: 36.7 (12-16-22 @ 17:47)  HR: 60 (12-17-22 @ 06:12) (60 - 70)  BP: 155/67 (12-17-22 @ 06:12) (152/67 - 168/72)  RR: 18 (12-17-22 @ 06:12) (18 - 22)  SpO2: 99% (12-17-22 @ 06:12) (98% - 99%)          GENERAL: AAOx3, no acute distress.  HEAD:  Atraumatic, Normocephalic  EYES: conjunctiva and sclera clear  NECK: Supple, no JVD or thyromegaly  CHEST/LUNG: Clear to auscultation bilaterally; No wheeze, rhonchi, or rales  HEART: Regular rate and rhythm; normal S1, S2, No murmurs.  ABDOMEN: Soft, nontender, nondistended; Bowel sounds present  NEUROLOGY: No asterixis or tremor.   SKIN: Intact, no jaundice        Data:                        12.1   11.34 )-----------( 150      ( 17 Dec 2022 05:40 )             35.8     Hgb Trend:  12.1  12-17-22 @ 05:40  13.0  12-16-22 @ 19:49      12-17    139  |  104  |  13  ----------------------------<  104<H>  3.6   |  21  |  0.7    Ca    7.7<L>      17 Dec 2022 05:40  Mg     1.9     12-17    TPro  5.7<L>  /  Alb  3.7  /  TBili  2.6<H>  /  DBili  x   /  AST  95<H>  /  ALT  99<H>  /  AlkPhos  100  12-17    Liver panel trend:  TBili 2.6   /   AST 95   /   ALT 99   /   AlkP 100   /   Tptn 5.7   /   Alb 3.7    /   DBili --      12-17  TBili 3.6   /      /      /   AlkP 111   /   Tptn 6.7   /   Alb 4.4    /   DBili --      12-16              Radiology:  CT Abdomen and Pelvis w/ IV Cont:   ACC: [id number] EXAM:  CT ABDOMEN AND PELVIS IC                          PROCEDURE DATE:  12/16/2022          INTERPRETATION:  CLINICAL STATEMENT: Abdominal pain.    TECHNIQUE: Contiguous axial CT images were obtained from the lower chest   to the pubic symphysis with IV contrast administration..  Oral contrast   was not administered.  Reformatted images in the coronal and sagittal   planes were acquired.    Comparison made with CT abdomen and pelvis 7/8/2021.    FINDINGS:    LOWER CHEST: Cardiomegaly. Cardiac pacing device.    HEPATOBILIARY: Cholelithiasis. Liver unremarkable. No biliary dilation.    SPLEEN: Unremarkable.    PANCREAS: Diffuse peripancreatic inflammatory stranding, without evidence   of acute fluid collection. Homogeneous pancreatic parenchymal   enhancement. Patent splenic vein.    ADRENAL GLANDS: Unremarkable.    KIDNEYS: No hydronephrosis. Bilateral renal cysts and subcentimeter   hypodensities, too small to characterize. Nonobstructing left renal   calculus.    ABDOMINOPELVIC NODES: Unremarkable.    PELVIC ORGANS: Unremarkable.    PERITONEUM/MESENTERY/BOWEL: Unremarkable.    BONES/SOFT TISSUES: Degenerative change of spine. Unchanged gluteal   subcutaneous probable sebaceous cyst measuring up to 6 cm. Right lateral   thigh intramuscular lipoma, unchanged.    OTHER: Vascular calcifications. Fat-containing umbilical hernia. Small   bilateral fat-containing inguinal hernias.      IMPRESSION:    1. Findings compatible with acute interstitial edematous pancreatitis; no   evidence of acute fluid collection.    2. Cholelithiasis.    --- End of Report ---            RANCHO SNIDER MD; Attending Radiologist  This document has been electronically signed. Dec 16 2022 10:12PM (12-16-22 @ 21:39)    US Abdomen Upper Quadrant Right:   ACC: [id number] EXAM:  US ABDOMEN RT UPR QUADRANT                          PROCEDURE DATE:  12/16/2022          INTERPRETATION:  CLINICAL INFORMATION: Right upper quadrant abdominal   tenderness    COMPARISON: 7/8/2021    TECHNIQUE: Sonography of theright upper quadrant.    FINDINGS:  This is a limited exam because of the patient's body habitus.  The liver pancreas and right kidney is not well visualized.  There are gallstones within the gallbladder with a minimally thickened   gallbladder wall and a negative sonographic Onofre sign. The common bile   duct is normal in size.  Grossly no ascites is seen.    IMPRESSION: Limited exam. Gallstones are present however evaluation for   gallbladder inflammation is very limited. If cholecystitis is clinically   suspected a nuclear medicine HIDA scan is suggested      --- End of Report ---            STEPHAN MOTLEY MD; Attending Radiologist  This document has been electronically signed. Dec 16 2022  9:23PM (12-16-22 @ 21:03)

## 2022-12-17 NOTE — H&P ADULT - NSHPLABSRESULTS_GEN_ALL_CORE
13.0   14.00 )-----------( 186      ( 16 Dec 2022 19:49 )             39.7       12-16    133<L>  |  98  |  15  ----------------------------<  115<H>  4.0   |  26  |  0.8    Ca    8.5      16 Dec 2022 19:49    TPro  6.7  /  Alb  4.4  /  TBili  3.6<H>  /  DBili  x   /  AST  136<H>  /  ALT  110<H>  /  AlkPhos  111  12-16              Urinalysis Basic - ( 16 Dec 2022 22:16 )    Color: Silva / Appearance: Clear / SG: >1.050 / pH: x  Gluc: x / Ketone: Moderate  / Bili: Small / Urobili: 6 mg/dL   Blood: x / Protein: Trace / Nitrite: Negative   Leuk Esterase: Negative / RBC: 4 /HPF / WBC 2 /HPF   Sq Epi: x / Non Sq Epi: 1 /HPF / Bacteria: Negative            Lactate Trend  12-16 @ 19:49 Lactate:1.3       CARDIAC MARKERS ( 16 Dec 2022 19:49 )  x     / <0.01 ng/mL / x     / x     / x            CAPILLARY BLOOD GLUCOSE            < from: CT Abdomen and Pelvis w/ IV Cont (12.16.22 @ 21:39) >      IMPRESSION:    1. Findings compatible with acute interstitial edematous pancreatitis; no   evidence of acute fluid collection.    2. Cholelithiasis.    --- End of Report ---      < end of copied text >    < from: US Abdomen Upper Quadrant Right (12.16.22 @ 21:03) >    IMPRESSION: Limited exam. Gallstones are present however evaluation for   gallbladder inflammation is very limited. If cholecystitis is clinically   suspected a nuclear medicine HIDA scan is suggested      --- End of Report ---    < end of copied text >

## 2022-12-17 NOTE — CONSULT NOTE ADULT - SUBJECTIVE AND OBJECTIVE BOX
WAGNER SEGURA  76y, Male  Allergy: No Known Allergies      CHIEF COMPLAINT: acute pancreatitis (17 Dec 2022 09:34)      LOS  1d    HPI:  Pt is a 75 yo male with a PMHx of HTN, HLD, CAD s/p CABG on plavix, CHF, and breast CA s/p R mastectomy presents c/o upper abdominal pain that started this afternoon. Pt describes pain as achy, sudden in onset, in epigastric region, no alleviating or improving factors, not related with food, nonradiating, 8/10 at its worse. Denied having a similar pain like this in the past. He denies any fevers, chills, nausea, vomiting, ches pain, palpiations, SOB, diarrhea, constipation, dysuria, urinary frequency, numbness, tingling or weakness. He drinks 2 beers a day and occasionally drinks hard liquor socially, never more than 4-5 drinks in one sitting. He smokes cigars, otherwise no other substance use.      In the ED:  · BP Systolic	168 mm Hg  · BP Diastolic	72 mm Hg  · Heart Rate	64 /min  · Respiration Rate (breaths/min)	20 /min  · Temp (F)	98 Degrees F  · Temp (C)	36.7 Degrees C  · Temp site	oral  · SpO2 (%)	98 %  · O2 Delivery/Oxygen Delivery Method	nasal cannula  · Oxygen Therapy Flow (L/min)	3 L/min    Labs notable for: WBC 14k, bilirubin 3.6, AST//110, lipase 2265, troponin negative  RUQ U/S: Gallstones are present however evaluation for gallbladder inflammation is very limited. If cholecystitis is clinically suspected a nuclear medicine HIDA scan is suggested.  CT abdomen/pelvis: 1. Findings compatible with acute interstitial edematous pancreatitis; no evidence of acute fluid collection. 2. Cholelithiasis. (17 Dec 2022 01:41)      INFECTIOUS DISEASE HISTORY:    PAST MEDICAL & SURGICAL HISTORY:  Arrhythmia      Hypercholesteremia      HTN (hypertension)      Obesity      CHF (congestive heart failure)      Heart attack      Breast cancer  2001      Osteoarthritis      CAD (coronary artery disease)      S/P CABG x 5  2014      H/O mastectomy  right 2001      Pacemaker          FAMILY HISTORY  Family history of heart disease (Father, Mother)    Family history of lung cancer (Sibling)        SOCIAL HISTORY  Social History:        ROS  General: Denies rigors, nightsweats  HEENT: Denies headache, rhinorrhea, sore throat, eye pain  CV: Denies CP, palpitations  PULM: Denies wheezing, hemoptysis  GI: Denies hematemesis, hematochezia, melena  : Denies discharge, hematuria  MSK: Denies arthralgias, myalgias  SKIN: Denies rash, lesions  NEURO: Denies paresthesias, weakness  PSYCH: Denies depression, anxiety    VITALS:  T(F): 97.8, Max: 98 (12-16-22 @ 17:47)  HR: 60  BP: 155/67  RR: 18Vital Signs Last 24 Hrs  T(C): 36.6 (16 Dec 2022 20:56), Max: 36.7 (16 Dec 2022 17:47)  T(F): 97.8 (16 Dec 2022 20:56), Max: 98 (16 Dec 2022 17:47)  HR: 60 (17 Dec 2022 06:12) (60 - 70)  BP: 155/67 (17 Dec 2022 06:12) (152/67 - 168/72)  BP(mean): --  RR: 18 (17 Dec 2022 06:12) (18 - 22)  SpO2: 99% (17 Dec 2022 06:12) (98% - 99%)    Parameters below as of 17 Dec 2022 06:12  Patient On (Oxygen Delivery Method): nasal cannula  O2 Flow (L/min): 2      PHYSICAL EXAM:  Gen: NAD, resting in bed  HEENT: Normocephalic, atraumatic  Neck: supple, no lymphadenopathy  CV: Regular rate & regular rhythm  Lungs: decreased BS at bases, no fremitus  Abdomen: Soft, BS present  Ext: Warm, well perfused  Neuro: non focal, awake  Skin: no rash, no erythema  Lines: no phlebitis    TESTS & MEASUREMENTS:                        12.1   11.34 )-----------( 150      ( 17 Dec 2022 05:40 )             35.8     12-17    139  |  104  |  13  ----------------------------<  104<H>  3.6   |  21  |  0.7    Ca    7.7<L>      17 Dec 2022 05:40  Mg     1.9     12-17    TPro  5.7<L>  /  Alb  3.7  /  TBili  2.6<H>  /  DBili  x   /  AST  95<H>  /  ALT  99<H>  /  AlkPhos  100  12-17      LIVER FUNCTIONS - ( 17 Dec 2022 05:40 )  Alb: 3.7 g/dL / Pro: 5.7 g/dL / ALK PHOS: 100 U/L / ALT: 99 U/L / AST: 95 U/L / GGT: x           Urinalysis Basic - ( 16 Dec 2022 22:16 )    Color: Silva / Appearance: Clear / SG: >1.050 / pH: x  Gluc: x / Ketone: Moderate  / Bili: Small / Urobili: 6 mg/dL   Blood: x / Protein: Trace / Nitrite: Negative   Leuk Esterase: Negative / RBC: 4 /HPF / WBC 2 /HPF   Sq Epi: x / Non Sq Epi: 1 /HPF / Bacteria: Negative        Culture - Urine (collected 06-17-22 @ 00:00)  Source: Clean Catch Clean Catch (Midstream)  Final Report (06-19-22 @ 17:37):    50,000 - 99,000 CFU/mL Proteus mirabilis    <10,000 CFU/ml Normal Urogenital james present  Organism: Proteus mirabilis (06-19-22 @ 17:37)  Organism: Proteus mirabilis (06-19-22 @ 17:37)      -  Amikacin: S <=16      -  Amoxicillin/Clavulanic Acid: S <=8/4      -  Ampicillin: S <=8 These ampicillin results predict results for amoxicillin      -  Ampicillin/Sulbactam: S <=4/2 Enterobacter, Klebsiella aerogenes, Citrobacter, and Serratia may develop resistance during prolonged therapy (3-4 days)      -  Aztreonam: S <=4      -  Cefazolin: S <=2 (MIC_CL_COM_ENTERIC_CEFAZU) For uncomplicated UTI with K. pneumoniae, E. coli, or P. mirablis: ANGELINE <=16 is sensitive and ANGELINE >=32 is resistant. This also predicts results for oral agents cefaclor, cefdinir, cefpodoxime, cefprozil, cefuroxime axetil, cephalexin and locarbef for uncomplicated UTI. Note that some isolates may be susceptible to these agents while testing resistant to cefazolin.      -  Cefepime: S <=2      -  Cefoxitin: S <=8      -  Ceftriaxone: S <=1 Enterobacter, Klebsiella aerogenes, Citrobacter, and Serratia may develop resistance during prolonged therapy      -  Ciprofloxacin: S <=0.25      -  Ertapenem: S <=0.5      -  Gentamicin: S <=2      -  Levofloxacin: S <=0.5      -  Meropenem: S <=1      -  Nitrofurantoin: R >64 Should not be used to treat pyelonephritis      -  Piperacillin/Tazobactam: S <=8      -  Tobramycin: S <=2      -  Trimethoprim/Sulfamethoxazole: S <=0.5/9.5      Method Type: ANGELINE        Lactate, Blood: 1.3 mmol/L (12-16-22 @ 19:49)      INFECTIOUS DISEASES TESTING  COVID-19 PCR: Detected (12-17-22 @ 00:33)  MRSA PCR Result.: Negative (06-17-22 @ 00:00)      RADIOLOGY & ADDITIONAL TESTS:  I have personally reviewed the last Chest xray  CXR  Xray Chest 1 View- PORTABLE-Urgent:   ACC: [id number] EXAM:  XR CHEST PORTABLE URGENT 1V                          PROCEDURE DATE:  12/16/2022          INTERPRETATION:  Clinical History / Reason for exam: Shortness of breath.    Comparison : Chest radiograph 6/25/2022.    Technique/Positioning: Single frontal chest x-ray obtained.    Findings:    Support devices: Left pacemaker.    Cardiac/mediastinum/hilum: Unchanged enlarged cardiac silhouette.    Lung parenchyma/Pleura: Within normal limits.    Skeleton/soft tissues: Unchanged.    Impression:    No radiographic evidence of acute cardiopulmonary disease.        --- End of Report ---            RANCHO SNIDER MD; Attending Radiologist  This document has been electronically signed. Dec 17 2022  4:41AM (12-16-22 @ 20:10)      CT  CT Abdomen and Pelvis w/ IV Cont:   ACC: [id number] EXAM:  CT ABDOMEN AND PELVIS IC                          PROCEDURE DATE:  12/16/2022          INTERPRETATION:  CLINICAL STATEMENT: Abdominal pain.    TECHNIQUE: Contiguous axial CT images were obtained from the lower chest   to the pubic symphysis with IV contrast administration..  Oral contrast   was not administered.  Reformatted images in the coronal and sagittal   planes were acquired.    Comparison made with CT abdomen and pelvis 7/8/2021.    FINDINGS:    LOWER CHEST: Cardiomegaly. Cardiac pacing device.    HEPATOBILIARY: Cholelithiasis. Liver unremarkable. No biliary dilation.    SPLEEN: Unremarkable.    PANCREAS: Diffuse peripancreatic inflammatory stranding, without evidence   of acute fluid collection. Homogeneous pancreatic parenchymal   enhancement. Patent splenic vein.    ADRENAL GLANDS: Unremarkable.    KIDNEYS: No hydronephrosis. Bilateral renal cysts and subcentimeter   hypodensities, too small to characterize. Nonobstructing left renal   calculus.    ABDOMINOPELVIC NODES: Unremarkable.    PELVIC ORGANS: Unremarkable.    PERITONEUM/MESENTERY/BOWEL: Unremarkable.    BONES/SOFT TISSUES: Degenerative change of spine. Unchanged gluteal   subcutaneous probable sebaceous cyst measuring up to 6 cm. Right lateral   thigh intramuscular lipoma, unchanged.    OTHER: Vascular calcifications. Fat-containing umbilical hernia. Small   bilateral fat-containing inguinal hernias.      IMPRESSION:    1. Findings compatible with acute interstitial edematous pancreatitis; no   evidence of acute fluid collection.    2. Cholelithiasis.    --- End of Report ---            RANCHO SNIDER MD; Attending Radiologist  This document has been electronically signed. Dec 16 2022 10:12PM (12-16-22 @ 21:39)      CARDIOLOGY TESTING  12 Lead ECG:   Ventricular Rate 65 BPM    QRS Duration 158 ms    Q-T Interval 456 ms    QTC Calculation(Bazett) 474 ms    R Axis 132 degrees    T Axis -58 degrees    Diagnosis Line Wide QRS rhythm , probable ventricular pacing  Non-specific intra-ventricular conduction block  Abnormal ECG    Confirmed by Yanick Altamirano ([id number]) on 12/16/2022 6:32:35 PM (12-16-22 @ 17:58)      MEDICATIONS  aMIOdarone    Tablet 200 Oral daily  amLODIPine   Tablet 5 Oral daily  clopidogrel Tablet 75 Oral daily  enoxaparin Injectable 40 SubCutaneous every 24 hours  lactated ringers. 1000 IV Continuous <Continuous>  losartan 100 Oral daily  metoprolol tartrate 50 Oral two times a day  pantoprazole    Tablet 40 Oral before breakfast      Weight  Weight (kg): 108.9 (06-24-22 @ 07:00)    ANTIBIOTICS:      ALLERGIES:  No Known Allergies      ASSESSMENT  76y M admitted with ACUTE PANCREATITIS      Arrhythmia    Hypercholesteremia    HTN (hypertension)    Obesity    CHF (congestive heart failure)    Heart attack    Breast cancer    Osteoarthritis    CAD (coronary artery disease)        IMPRESSION  #  #Severe Sepsis on admission T<96.8F, T>101F, Pulse>90, Resp Rate>20, WBC>12, wbc<4, Bands>10%, lactic acidosis, metabolic encephalopathy, metabolic acidosis, metabolic alkalosis, LONI due to suspected Gram negative pneumonia, aspiration pneumonia, pyelonephritis, cystitis, cellulitis, bacteremia  Pt has an acute illness which poses threat to bodily function   #Lactic acidosis  #Hyponatremia   #Obesity   #DM   #Abx allergy:       RECOMMENDATIONS  - F/u blood cultures, urine culture, wound culture  - Continue  - Continue  - Please check vanc trough 30 min prior to the 4th dose     Spectra [id number]     WAGNER SEGURA  76y, Male  Allergy: No Known Allergies      CHIEF COMPLAINT: acute pancreatitis (17 Dec 2022 09:34)      LOS  1d    HPI:  Pt is a 77 yo male with a PMHx of HTN, HLD, CAD s/p CABG on plavix, CHF, and breast CA s/p R mastectomy presents c/o upper abdominal pain that started this afternoon. Pt describes pain as achy, sudden in onset, in epigastric region, no alleviating or improving factors, not related with food, nonradiating, 8/10 at its worse. Denied having a similar pain like this in the past. He denies any fevers, chills, nausea, vomiting, ches pain, palpiations, SOB, diarrhea, constipation, dysuria, urinary frequency, numbness, tingling or weakness. He drinks 2 beers a day and occasionally drinks hard liquor socially, never more than 4-5 drinks in one sitting. He smokes cigars, otherwise no other substance use.      In the ED:  · BP Systolic	168 mm Hg  · BP Diastolic	72 mm Hg  · Heart Rate	64 /min  · Respiration Rate (breaths/min)	20 /min  · Temp (F)	98 Degrees F  · Temp (C)	36.7 Degrees C  · Temp site	oral  · SpO2 (%)	98 %  · O2 Delivery/Oxygen Delivery Method	nasal cannula  · Oxygen Therapy Flow (L/min)	3 L/min    Labs notable for: WBC 14k, bilirubin 3.6, AST//110, lipase 2265, troponin negative  RUQ U/S: Gallstones are present however evaluation for gallbladder inflammation is very limited. If cholecystitis is clinically suspected a nuclear medicine HIDA scan is suggested.  CT abdomen/pelvis: 1. Findings compatible with acute interstitial edematous pancreatitis; no evidence of acute fluid collection. 2. Cholelithiasis. (17 Dec 2022 01:41)      INFECTIOUS DISEASE HISTORY:  History as above.  Presents with abdominal pain.   Found to have pancreatitis.   RUQ US with possible cholecystitis with HIDA scan recommended.   CT Abd/pelvis with pancreatitis and cholelithiasis   Found to be COVID positive   No sick contacts. Denies any rhinorrhea, coughing, mylagias.     PAST MEDICAL & SURGICAL HISTORY:  Arrhythmia      Hypercholesteremia      HTN (hypertension)      Obesity      CHF (congestive heart failure)      Heart attack      Breast cancer  2001      Osteoarthritis      CAD (coronary artery disease)      S/P CABG x 5  2014      H/O mastectomy  right 2001      Pacemaker          FAMILY HISTORY  Family history of heart disease (Father, Mother)    Family history of lung cancer (Sibling)        SOCIAL HISTORY  Social History:  Denies eoth use since 1990s      ROS  General: Denies rigors, nightsweats  HEENT: Denies headache, rhinorrhea, sore throat, eye pain  CV: Denies CP, palpitations  PULM: Denies wheezing, hemoptysis  GI: Denies hematemesis, hematochezia, melena  : Denies discharge, hematuria  MSK: Denies arthralgias, myalgias  SKIN: Denies rash, lesions  NEURO: Denies paresthesias, weakness  PSYCH: Denies depression, anxiety    VITALS:  T(F): 97.8, Max: 98 (12-16-22 @ 17:47)  HR: 60  BP: 155/67  RR: 18Vital Signs Last 24 Hrs  T(C): 36.6 (16 Dec 2022 20:56), Max: 36.7 (16 Dec 2022 17:47)  T(F): 97.8 (16 Dec 2022 20:56), Max: 98 (16 Dec 2022 17:47)  HR: 60 (17 Dec 2022 06:12) (60 - 70)  BP: 155/67 (17 Dec 2022 06:12) (152/67 - 168/72)  BP(mean): --  RR: 18 (17 Dec 2022 06:12) (18 - 22)  SpO2: 99% (17 Dec 2022 06:12) (98% - 99%)    Parameters below as of 17 Dec 2022 06:12  Patient On (Oxygen Delivery Method): nasal cannula  O2 Flow (L/min): 2      PHYSICAL EXAM:  Gen: NAD, resting in bed  HEENT: Normocephalic, atraumatic  Neck: supple, no lymphadenopathy  CV: Regular rate & regular rhythm  Lungs: decreased BS at bases, no fremitus  Abdomen: Soft, BS present  Ext: Warm, well perfused  Neuro: non focal, awake  Skin: no rash, no erythema  Lines: no phlebitis    TESTS & MEASUREMENTS:                        12.1   11.34 )-----------( 150      ( 17 Dec 2022 05:40 )             35.8     12-17    139  |  104  |  13  ----------------------------<  104<H>  3.6   |  21  |  0.7    Ca    7.7<L>      17 Dec 2022 05:40  Mg     1.9     12-17    TPro  5.7<L>  /  Alb  3.7  /  TBili  2.6<H>  /  DBili  x   /  AST  95<H>  /  ALT  99<H>  /  AlkPhos  100  12-17      LIVER FUNCTIONS - ( 17 Dec 2022 05:40 )  Alb: 3.7 g/dL / Pro: 5.7 g/dL / ALK PHOS: 100 U/L / ALT: 99 U/L / AST: 95 U/L / GGT: x           Urinalysis Basic - ( 16 Dec 2022 22:16 )    Color: Silva / Appearance: Clear / SG: >1.050 / pH: x  Gluc: x / Ketone: Moderate  / Bili: Small / Urobili: 6 mg/dL   Blood: x / Protein: Trace / Nitrite: Negative   Leuk Esterase: Negative / RBC: 4 /HPF / WBC 2 /HPF   Sq Epi: x / Non Sq Epi: 1 /HPF / Bacteria: Negative        Culture - Urine (collected 06-17-22 @ 00:00)  Source: Clean Catch Clean Catch (Midstream)  Final Report (06-19-22 @ 17:37):    50,000 - 99,000 CFU/mL Proteus mirabilis    <10,000 CFU/ml Normal Urogenital james present  Organism: Proteus mirabilis (06-19-22 @ 17:37)  Organism: Proteus mirabilis (06-19-22 @ 17:37)      -  Amikacin: S <=16      -  Amoxicillin/Clavulanic Acid: S <=8/4      -  Ampicillin: S <=8 These ampicillin results predict results for amoxicillin      -  Ampicillin/Sulbactam: S <=4/2 Enterobacter, Klebsiella aerogenes, Citrobacter, and Serratia may develop resistance during prolonged therapy (3-4 days)      -  Aztreonam: S <=4      -  Cefazolin: S <=2 (MIC_CL_COM_ENTERIC_CEFAZU) For uncomplicated UTI with K. pneumoniae, E. coli, or P. mirablis: ANGELINE <=16 is sensitive and ANGELINE >=32 is resistant. This also predicts results for oral agents cefaclor, cefdinir, cefpodoxime, cefprozil, cefuroxime axetil, cephalexin and locarbef for uncomplicated UTI. Note that some isolates may be susceptible to these agents while testing resistant to cefazolin.      -  Cefepime: S <=2      -  Cefoxitin: S <=8      -  Ceftriaxone: S <=1 Enterobacter, Klebsiella aerogenes, Citrobacter, and Serratia may develop resistance during prolonged therapy      -  Ciprofloxacin: S <=0.25      -  Ertapenem: S <=0.5      -  Gentamicin: S <=2      -  Levofloxacin: S <=0.5      -  Meropenem: S <=1      -  Nitrofurantoin: R >64 Should not be used to treat pyelonephritis      -  Piperacillin/Tazobactam: S <=8      -  Tobramycin: S <=2      -  Trimethoprim/Sulfamethoxazole: S <=0.5/9.5      Method Type: ANGELINE        Lactate, Blood: 1.3 mmol/L (12-16-22 @ 19:49)      INFECTIOUS DISEASES TESTING  COVID-19 PCR: Detected (12-17-22 @ 00:33)  MRSA PCR Result.: Negative (06-17-22 @ 00:00)      RADIOLOGY & ADDITIONAL TESTS:  I have personally reviewed the last Chest xray  CXR  Xray Chest 1 View- PORTABLE-Urgent:   ACC: [id number] EXAM:  XR CHEST PORTABLE URGENT 1V                          PROCEDURE DATE:  12/16/2022          INTERPRETATION:  Clinical History / Reason for exam: Shortness of breath.    Comparison : Chest radiograph 6/25/2022.    Technique/Positioning: Single frontal chest x-ray obtained.    Findings:    Support devices: Left pacemaker.    Cardiac/mediastinum/hilum: Unchanged enlarged cardiac silhouette.    Lung parenchyma/Pleura: Within normal limits.    Skeleton/soft tissues: Unchanged.    Impression:    No radiographic evidence of acute cardiopulmonary disease.        --- End of Report ---            RANCHO SNIDER MD; Attending Radiologist  This document has been electronically signed. Dec 17 2022  4:41AM (12-16-22 @ 20:10)      CT  CT Abdomen and Pelvis w/ IV Cont:   ACC: [id number] EXAM:  CT ABDOMEN AND PELVIS IC                          PROCEDURE DATE:  12/16/2022          INTERPRETATION:  CLINICAL STATEMENT: Abdominal pain.    TECHNIQUE: Contiguous axial CT images were obtained from the lower chest   to the pubic symphysis with IV contrast administration..  Oral contrast   was not administered.  Reformatted images in the coronal and sagittal   planes were acquired.    Comparison made with CT abdomen and pelvis 7/8/2021.    FINDINGS:    LOWER CHEST: Cardiomegaly. Cardiac pacing device.    HEPATOBILIARY: Cholelithiasis. Liver unremarkable. No biliary dilation.    SPLEEN: Unremarkable.    PANCREAS: Diffuse peripancreatic inflammatory stranding, without evidence   of acute fluid collection. Homogeneous pancreatic parenchymal   enhancement. Patent splenic vein.    ADRENAL GLANDS: Unremarkable.    KIDNEYS: No hydronephrosis. Bilateral renal cysts and subcentimeter   hypodensities, too small to characterize. Nonobstructing left renal   calculus.    ABDOMINOPELVIC NODES: Unremarkable.    PELVIC ORGANS: Unremarkable.    PERITONEUM/MESENTERY/BOWEL: Unremarkable.    BONES/SOFT TISSUES: Degenerative change of spine. Unchanged gluteal   subcutaneous probable sebaceous cyst measuring up to 6 cm. Right lateral   thigh intramuscular lipoma, unchanged.    OTHER: Vascular calcifications. Fat-containing umbilical hernia. Small   bilateral fat-containing inguinal hernias.      IMPRESSION:    1. Findings compatible with acute interstitial edematous pancreatitis; no   evidence of acute fluid collection.    2. Cholelithiasis.    --- End of Report ---            RANCHO SNIDER MD; Attending Radiologist  This document has been electronically signed. Dec 16 2022 10:12PM (12-16-22 @ 21:39)      CARDIOLOGY TESTING  12 Lead ECG:   Ventricular Rate 65 BPM    QRS Duration 158 ms    Q-T Interval 456 ms    QTC Calculation(Bazett) 474 ms    R Axis 132 degrees    T Axis -58 degrees    Diagnosis Line Wide QRS rhythm , probable ventricular pacing  Non-specific intra-ventricular conduction block  Abnormal ECG    Confirmed by Yanick Altamirano ([id number]) on 12/16/2022 6:32:35 PM (12-16-22 @ 17:58)      MEDICATIONS  aMIOdarone    Tablet 200 Oral daily  amLODIPine   Tablet 5 Oral daily  clopidogrel Tablet 75 Oral daily  enoxaparin Injectable 40 SubCutaneous every 24 hours  lactated ringers. 1000 IV Continuous <Continuous>  losartan 100 Oral daily  metoprolol tartrate 50 Oral two times a day  pantoprazole    Tablet 40 Oral before breakfast      Weight  Weight (kg): 108.9 (06-24-22 @ 07:00)    ANTIBIOTICS:      ALLERGIES:  No Known Allergies

## 2022-12-17 NOTE — H&P ADULT - ATTENDING COMMENTS
HPI:  Pt is a 75 yo male with a PMHx of HTN, HLD, CAD s/p CABG on plavix, CHF, and breast CA s/p R mastectomy presents c/o upper abdominal pain that started this afternoon. pt describes the pain as sharp in nature with no radiation and has noted chills. pt denies any other symptoms including n/v/d, headache, recent illness/travel, cough, chest pain, or SOB.    In the ED:  · BP Systolic	168 mm Hg  · BP Diastolic	72 mm Hg  · Heart Rate	64 /min  · Respiration Rate (breaths/min)	20 /min  · Temp (F)	98 Degrees F  · Temp (C)	36.7 Degrees C  · Temp site	oral  · SpO2 (%)	98 %  · O2 Delivery/Oxygen Delivery Method	nasal cannula  · Oxygen Therapy Flow (L/min)	3 L/min    Labs notable for: WBC 14k, bilirubin 3.6, AST//110, lipase 2265, troponin negative  RUQ U/S: Gallstones are present however evaluation for gallbladder inflammation is very limited. If cholecystitis is clinically suspected a nuclear medicine HIDA scan is suggested.  CT abdomen/pelvis: 1. Findings compatible with acute interstitial edematous pancreatitis; no evidence of acute fluid collection. 2. Cholelithiasis. (17 Dec 2022 01:41)    REVIEW OF SYSTEMS: see cc/HPI   CONSTITUTIONAL: No weakness, fevers or chills  EYES/ENT: No visual changes;  No vertigo or throat pain   NECK: No pain or stiffness  RESPIRATORY: No cough, wheezing, hemoptysis; No shortness of breath  CARDIOVASCULAR: No chest pain or palpitations  GASTROINTESTINAL: No abdominal or epigastric pain. No nausea, vomiting, or hematemesis; No diarrhea or constipation. No melena or hematochezia.  GENITOURINARY: No dysuria, frequency or hematuria  NEUROLOGICAL: No numbness or weakness  SKIN: No itching, rashes    Physical Exam:   General: WN/WD NAD  Neurology: A&Ox3, nonfocal, follows commands  Eyes: PERRLA/ EOMI  ENT/Neck: Neck supple, trachea midline, No JVD  Respiratory: CTA B/L, No wheezing, rales, rhonchi  CV: Normal rate regular rhythm, S1S2, no murmurs, rubs or gallops  Abdominal: Soft, NT, ND +BS,   Extremities: No edema, + peripheral pulses  Skin: No Rashes, Hematoma, Ecchymosis  Incisions:   Tubes:    A/p    NOTE INCOMPLETE HPI:  Pt is a 75 yo male with a PMHx of HTN, HLD, CAD s/p CABG on plavix, CHF, and breast CA s/p R mastectomy presents c/o upper abdominal pain that started this afternoon. pt describes the pain as sharp in nature with no radiation and has noted chills. pt denies any other symptoms including n/v/d, headache, recent illness/travel, cough, chest pain, or SOB.    In the ED:  · BP Systolic	168 mm Hg  · BP Diastolic	72 mm Hg  · Heart Rate	64 /min  · Respiration Rate (breaths/min)	20 /min  · Temp (F)	98 Degrees F  · Temp (C)	36.7 Degrees C  · Temp site	oral  · SpO2 (%)	98 %  · O2 Delivery/Oxygen Delivery Method	nasal cannula  · Oxygen Therapy Flow (L/min)	3 L/min    Labs notable for: WBC 14k, bilirubin 3.6, AST//110, lipase 2265, troponin negative  RUQ U/S: Gallstones are present however evaluation for gallbladder inflammation is very limited. If cholecystitis is clinically suspected a nuclear medicine HIDA scan is suggested.  CT abdomen/pelvis: 1. Findings compatible with acute interstitial edematous pancreatitis; no evidence of acute fluid collection. 2. Cholelithiasis. (17 Dec 2022 01:41)    REVIEW OF SYSTEMS: see cc/HPI   CONSTITUTIONAL: No weakness, fevers or chills  EYES/ENT: No visual changes;  No vertigo or throat pain   NECK: No pain or stiffness  RESPIRATORY: No cough, wheezing, hemoptysis; No shortness of breath  CARDIOVASCULAR: No chest pain or palpitations  GASTROINTESTINAL: No abdominal or epigastric pain. No nausea, vomiting, or hematemesis; No diarrhea or constipation. No melena or hematochezia.  GENITOURINARY: No dysuria, frequency or hematuria  NEUROLOGICAL: No numbness or weakness  SKIN: No itching, rashes    Physical Exam:   General: WN/WD NAD  Neurology: A&Ox3, nonfocal, follows commands  Eyes: PERRLA/ EOMI  ENT/Neck: Neck supple, trachea midline, No JVD  Respiratory: CTA B/L, No wheezing, rales, rhonchi  CV: Normal rate regular rhythm, S1S2, no murmurs, rubs or gallops  Abdominal: Soft, ND +BS, tender in the epigastric area   Extremities: No edema, + peripheral pulses  Skin: No Rashes, Hematoma, Ecchymosis  MSK- tender on the R chest wall -s/p mastectomy    A/p  Acute pancreatitis   Cholelithiasis   -NPO and IV fluids   -serial Lipase / CBC / CMP  -PRN pain Rx   -GI eval     COVID-19 infection - incidental/ asymptomatic   -isolation   -ID recs    H/o CAD/ CABG   H/o HFpEF   HTN/Dyslipidemia   -c/w home Rx     H/o R breast cancer s/p mastectomy   -PRN pain Rx   -Outpatient Oncology follow up    DVT prophylaxis

## 2022-12-17 NOTE — CONSULT NOTE ADULT - ASSESSMENT
77 yo male with a PMHx of HTN, HLD, CAD s/p CABG on plavix, CHF, and breast CA s/p R mastectomy presents c/o upper abdominal pain. gi is called for evaluation    # Abdominal pain most likely 2/2 acute gall stone pancreatitis:  - Hemodynamically stable and no evidence of Sepsis  - lipase 2265  - LFTs: bilirubin 3.6, AST//110  - CT A/P IV contrast: Findings compatible with acute interstitial edematous pancreatitis; no evidence of acute fluid collection. 2. Cholelithiasis.  - Triglyceride level 45  - Calcium 8.5    Rec:  - Keep NPO  - Start lactated ringers at 250-300cc/hr  - monitor for signs of over load given history of heart failure   - Monitor urine output daily - Target Urine output: 0.5cc/kg/hr  - Monitor BUN and HCT BID - Target BUN <20 and HCT <44 and adjust fluids accordingly  - Monitor pain and adjust pain medication accordingly  - Please obtain CTAP w/ IV contrast if clinical deterioration in 48-72 hours  - Please obtain surgery eval for CCY   75 yo male with a PMHx of HTN, HLD, CAD s/p CABG on plavix, CHF, and breast CA s/p R mastectomy presents c/o upper abdominal pain. gi is called for evaluation    # Abdominal pain most likely 2/2 acute gall stone pancreatitis:  - Hemodynamically stable and no evidence of Sepsis  - lipase 2265  - LFTs: bilirubin 3.6, AST//110  - CT A/P IV contrast: Findings compatible with acute interstitial edematous pancreatitis; no evidence of acute fluid collection. 2. Cholelithiasis.  - Triglyceride level 45  - Calcium 8.5    Rec:  - start clear liquid diet - low fat -   - Start lactated ringers at 250-300cc/hr  - monitor for signs of over load given history of heart failure   - Monitor urine output daily - Target Urine output: 0.5cc/kg/hr  - Monitor BUN and HCT BID - Target BUN <20 and HCT <44 and adjust fluids accordingly  - Monitor pain and adjust pain medication accordingly  - Please obtain CTAP w/ IV contrast if clinical deterioration in 48-72 hours  - Please obtain surgery eval for CCY    discussed with patient and wife at the bedside    75 yo male with a PMHx of HTN, HLD, CAD s/p CABG on plavix, CHF, and breast CA s/p R mastectomy presents c/o upper abdominal pain. gi is called for evaluation    # Abdominal pain most likely 2/2 acute gallstone pancreatitis:  - Hemodynamically stable and no evidence of Sepsis  - lipase 2265  #transaminitis  bilirubin 3.6, AST//110, trending down  #Cholelithiasis   - CT A/P IV contrast: Findings compatible with acute interstitial edematous pancreatitis; no evidence of acute fluid collection. 2. Cholelithiasis.  - Triglyceride level 45  - Calcium 8.5    Rec:  - start clear liquid diet   - Start lactated ringers at 250-300cc/hr  - monitor for signs of over load given history of heart failure   - Monitor urine output daily - Target Urine output: 0.5cc/kg/hr  - Monitor BUN and HCT BID - Target BUN <20 and HCT <44 and adjust fluids accordingly  - Monitor pain and adjust pain medication accordingly  - Please obtain surgery eval for CCY  - Trend LFTs     discussed with patient and wife at the bedside

## 2022-12-17 NOTE — H&P ADULT - ASSESSMENT
Pt is a 77 yo male with a PMHx of HTN, HLD, CAD s/p CABG on plavix, CHF, and breast CA s/p R mastectomy presents c/o upper abdominal pain that started this afternoon. pt describes the pain as sharp in nature with no radiation and has noted chills. pt denies any other symptoms including n/v/d, headache, recent illness/travel, cough, chest pain, or SOB.    Labs notable for: WBC 14k, bilirubin 3.6, AST//110, lipase 2265, troponin negative  RUQ U/S: Gallstones are present however evaluation for gallbladder inflammation is very limited. If cholecystitis is clinically suspected a nuclear medicine HIDA scan is suggested.  CT abdomen/pelvis: 1. Findings compatible with acute interstitial edematous pancreatitis; no evidence of acute fluid collection. 2. Cholelithiasis.    #acute pancreatitis      #CAD s/p CABG on plavix  #HFpEF  #HTN  #HLD  #breast CA s/p R mastectomy   Pt is a 77 yo male with a PMHx of HTN, HLD, CAD s/p CABG on plavix, CHF, and breast CA s/p R mastectomy presents c/o upper abdominal pain that started this afternoon.     #acute pancreatitis  * likely has gallstone pancreatitis given elevated LFTs/bilirubin, gallstones on imaging. no signs of acute cholecystitis or cholangitis at the moment  -CT A/P with IV contrast: CT abdomen/pelvis: 1. Findings compatible with acute interstitial edematous pancreatitis; no evidence of acute fluid collection. 2. Cholelithiasis.  -RUQ U/S: Gallstones are present however evaluation for gallbladder inflammation is very limited. If cholecystitis is clinically suspected a nuclear medicine HIDA scan is suggested.  -Lipase 2k  -check TG level, alcohol level  -LR @150cc/hr  -continue Zofran for Nausea  -Monitor LFTs   -Pain control PRN  -NPO, advance as tolerated to Low fat, dairy free regular diet, resume feeds once N/V controlled   -Monitor urine output daily - Target Urine output: 0.5cc/kg/hr and adjust fluids accordingly  -Please obtain CTAP w/ IV contrast if clinical deterioration in 48-72 hours  -no signs of cholecystitis or cholangitis at the moment, would monitor off abx  -GI consult for possible MRCP    #COVID  - airborne isolation  - document O2 sat on RA, start decadron if desats  - albuterol prn  - ID consult    #CAD s/p CABG on plavix  #HFpEF  #HTN  #HLD  - continue with home meds except lasix    #breast CA s/p R mastectomy  - OP Follow up    #Misc:  #DVT PPX: lovenox  #GI PPX: protonix  #Diet: NPO  #Activity: aat  #Dispo: admit to medicine

## 2022-12-17 NOTE — ASSESSMENT
[FreeTextEntry1] : 73 yo M with history of AFib on aspirin (not felt to be a good candidate for anticoagulation by his cardiologist due to bleeding) and Amio, SSS s/p DC PPM (unable to cannulate CS). He is RV-pacing 75% of the time but denies any signs or symptoms of heart failure and is quite active. We discussed the option of epicardial LV lead if he continues to pace > 40% and develops heart failure symptoms. At this time, he feels well and is very active. \par \par BP was elevated today but he admits to dietary noncompliance. I have advised him to adhere to a 2g Na diet and to be more careful with consuming salty meals. \par \par In the meantime, he is on Amio for paroxysmal AFib. AF burden since Dec 5th 2018 is 0%. He had normal labs in Jan. I have ordered repeat labs for him to do prior to his next visit. I also provided a pulm referral again. \par \par I have also advised the patient to go to the nearest emergency room if he experiences any chest pain, dyspnea, syncope, or has any other compelling symptoms.\par  \par Follow up in 6 months since the patient is enrolled in remote monitoring and transmitting appropriately. \par 
Initial (On Arrival)

## 2022-12-17 NOTE — H&P ADULT - NSHPREVIEWOFSYSTEMS_GEN_ALL_CORE

## 2022-12-17 NOTE — H&P ADULT - HISTORY OF PRESENT ILLNESS
Pt is a 75 yo male with a PMHx of HTN, HLD, CAD s/p CABG on plavix, CHF, and breast CA s/p R mastectomy presents c/o upper abdominal pain that started this afternoon. pt describes the pain as sharp in nature with no radiation and has noted chills. pt denies any other symptoms including n/v/d, headache, recent illness/travel, cough, chest pain, or SOB.    In the ED:  · BP Systolic	168 mm Hg  · BP Diastolic	72 mm Hg  · Heart Rate	64 /min  · Respiration Rate (breaths/min)	20 /min  · Temp (F)	98 Degrees F  · Temp (C)	36.7 Degrees C  · Temp site	oral  · SpO2 (%)	98 %  · O2 Delivery/Oxygen Delivery Method	nasal cannula  · Oxygen Therapy Flow (L/min)	3 L/min    Labs notable for: WBC 14k, bilirubin 3.6, AST//110, lipase 2265, troponin negative  RUQ U/S: Gallstones are present however evaluation for gallbladder inflammation is very limited. If cholecystitis is clinically suspected a nuclear medicine HIDA scan is suggested.  CT abdomen/pelvis: 1. Findings compatible with acute interstitial edematous pancreatitis; no evidence of acute fluid collection. 2. Cholelithiasis. Pt is a 75 yo male with a PMHx of HTN, HLD, CAD s/p CABG on plavix, CHF, and breast CA s/p R mastectomy presents c/o upper abdominal pain that started this afternoon. Pt describes pain as achy, sudden in onset, in epigastric region, no alleviating or improving factors, not related with food, nonradiating, 8/10 at its worse. Denied having a similar pain like this in the past. He denies any fevers, chills, nausea, vomiting, ches pain, palpiations, SOB, diarrhea, constipation, dysuria, urinary frequency, numbness, tingling or weakness. He drinks 2 beers a day and occasionally drinks hard liquor socially, never more than 4-5 drinks in one sitting. He smokes cigars, otherwise no other substance use.      In the ED:  · BP Systolic	168 mm Hg  · BP Diastolic	72 mm Hg  · Heart Rate	64 /min  · Respiration Rate (breaths/min)	20 /min  · Temp (F)	98 Degrees F  · Temp (C)	36.7 Degrees C  · Temp site	oral  · SpO2 (%)	98 %  · O2 Delivery/Oxygen Delivery Method	nasal cannula  · Oxygen Therapy Flow (L/min)	3 L/min    Labs notable for: WBC 14k, bilirubin 3.6, AST//110, lipase 2265, troponin negative  RUQ U/S: Gallstones are present however evaluation for gallbladder inflammation is very limited. If cholecystitis is clinically suspected a nuclear medicine HIDA scan is suggested.  CT abdomen/pelvis: 1. Findings compatible with acute interstitial edematous pancreatitis; no evidence of acute fluid collection. 2. Cholelithiasis.

## 2022-12-18 LAB
ALBUMIN SERPL ELPH-MCNC: 3.6 G/DL — SIGNIFICANT CHANGE UP (ref 3.5–5.2)
ALP SERPL-CCNC: 99 U/L — SIGNIFICANT CHANGE UP (ref 30–115)
ALT FLD-CCNC: 67 U/L — HIGH (ref 0–41)
ANION GAP SERPL CALC-SCNC: 11 MMOL/L — SIGNIFICANT CHANGE UP (ref 7–14)
AST SERPL-CCNC: 46 U/L — HIGH (ref 0–41)
BASOPHILS # BLD AUTO: 0.03 K/UL — SIGNIFICANT CHANGE UP (ref 0–0.2)
BASOPHILS NFR BLD AUTO: 0.3 % — SIGNIFICANT CHANGE UP (ref 0–1)
BILIRUB DIRECT SERPL-MCNC: 0.6 MG/DL — HIGH (ref 0–0.3)
BILIRUB INDIRECT FLD-MCNC: 0.8 MG/DL — SIGNIFICANT CHANGE UP (ref 0.2–1.2)
BILIRUB SERPL-MCNC: 1.4 MG/DL — HIGH (ref 0.2–1.2)
BUN SERPL-MCNC: 11 MG/DL — SIGNIFICANT CHANGE UP (ref 10–20)
CALCIUM SERPL-MCNC: 8.1 MG/DL — LOW (ref 8.4–10.5)
CHLORIDE SERPL-SCNC: 103 MMOL/L — SIGNIFICANT CHANGE UP (ref 98–110)
CO2 SERPL-SCNC: 22 MMOL/L — SIGNIFICANT CHANGE UP (ref 17–32)
CREAT SERPL-MCNC: 0.6 MG/DL — LOW (ref 0.7–1.5)
EGFR: 99 ML/MIN/1.73M2 — SIGNIFICANT CHANGE UP
EOSINOPHIL # BLD AUTO: 0.08 K/UL — SIGNIFICANT CHANGE UP (ref 0–0.7)
EOSINOPHIL NFR BLD AUTO: 0.9 % — SIGNIFICANT CHANGE UP (ref 0–8)
GLUCOSE SERPL-MCNC: 78 MG/DL — SIGNIFICANT CHANGE UP (ref 70–99)
HCT VFR BLD CALC: 34.6 % — LOW (ref 42–52)
HGB BLD-MCNC: 12.2 G/DL — LOW (ref 14–18)
IMM GRANULOCYTES NFR BLD AUTO: 0.3 % — SIGNIFICANT CHANGE UP (ref 0.1–0.3)
LYMPHOCYTES # BLD AUTO: 1.03 K/UL — LOW (ref 1.2–3.4)
LYMPHOCYTES # BLD AUTO: 11.8 % — LOW (ref 20.5–51.1)
MAGNESIUM SERPL-MCNC: 2.2 MG/DL — SIGNIFICANT CHANGE UP (ref 1.8–2.4)
MCHC RBC-ENTMCNC: 30.6 PG — SIGNIFICANT CHANGE UP (ref 27–31)
MCHC RBC-ENTMCNC: 35.3 G/DL — SIGNIFICANT CHANGE UP (ref 32–37)
MCV RBC AUTO: 86.7 FL — SIGNIFICANT CHANGE UP (ref 80–94)
MONOCYTES # BLD AUTO: 0.7 K/UL — HIGH (ref 0.1–0.6)
MONOCYTES NFR BLD AUTO: 8 % — SIGNIFICANT CHANGE UP (ref 1.7–9.3)
NEUTROPHILS # BLD AUTO: 6.88 K/UL — HIGH (ref 1.4–6.5)
NEUTROPHILS NFR BLD AUTO: 78.7 % — HIGH (ref 42.2–75.2)
NRBC # BLD: 0 /100 WBCS — SIGNIFICANT CHANGE UP (ref 0–0)
PLATELET # BLD AUTO: 142 K/UL — SIGNIFICANT CHANGE UP (ref 130–400)
POTASSIUM SERPL-MCNC: 3.5 MMOL/L — SIGNIFICANT CHANGE UP (ref 3.5–5)
POTASSIUM SERPL-SCNC: 3.5 MMOL/L — SIGNIFICANT CHANGE UP (ref 3.5–5)
PROT SERPL-MCNC: 5.7 G/DL — LOW (ref 6–8)
RBC # BLD: 3.99 M/UL — LOW (ref 4.7–6.1)
RBC # FLD: 14.2 % — SIGNIFICANT CHANGE UP (ref 11.5–14.5)
SODIUM SERPL-SCNC: 136 MMOL/L — SIGNIFICANT CHANGE UP (ref 135–146)
WBC # BLD: 8.75 K/UL — SIGNIFICANT CHANGE UP (ref 4.8–10.8)
WBC # FLD AUTO: 8.75 K/UL — SIGNIFICANT CHANGE UP (ref 4.8–10.8)

## 2022-12-18 PROCEDURE — 99233 SBSQ HOSP IP/OBS HIGH 50: CPT

## 2022-12-18 PROCEDURE — 99221 1ST HOSP IP/OBS SF/LOW 40: CPT

## 2022-12-18 RX ORDER — POLYETHYLENE GLYCOL 3350 17 G/17G
17 POWDER, FOR SOLUTION ORAL
Refills: 0 | Status: DISCONTINUED | OUTPATIENT
Start: 2022-12-18 | End: 2022-12-19

## 2022-12-18 RX ORDER — INFLUENZA VIRUS VACCINE 15; 15; 15; 15 UG/.5ML; UG/.5ML; UG/.5ML; UG/.5ML
0.7 SUSPENSION INTRAMUSCULAR ONCE
Refills: 0 | Status: DISCONTINUED | OUTPATIENT
Start: 2022-12-18 | End: 2022-12-19

## 2022-12-18 RX ADMIN — SODIUM CHLORIDE 200 MILLILITER(S): 9 INJECTION, SOLUTION INTRAVENOUS at 05:42

## 2022-12-18 RX ADMIN — ENOXAPARIN SODIUM 40 MILLIGRAM(S): 100 INJECTION SUBCUTANEOUS at 05:42

## 2022-12-18 RX ADMIN — Medication 50 MILLIGRAM(S): at 18:26

## 2022-12-18 RX ADMIN — AMLODIPINE BESYLATE 5 MILLIGRAM(S): 2.5 TABLET ORAL at 05:43

## 2022-12-18 RX ADMIN — CLOPIDOGREL BISULFATE 75 MILLIGRAM(S): 75 TABLET, FILM COATED ORAL at 12:58

## 2022-12-18 RX ADMIN — LOSARTAN POTASSIUM 100 MILLIGRAM(S): 100 TABLET, FILM COATED ORAL at 05:43

## 2022-12-18 RX ADMIN — PANTOPRAZOLE SODIUM 40 MILLIGRAM(S): 20 TABLET, DELAYED RELEASE ORAL at 05:43

## 2022-12-18 RX ADMIN — Medication 50 MILLIGRAM(S): at 05:43

## 2022-12-18 RX ADMIN — AMIODARONE HYDROCHLORIDE 200 MILLIGRAM(S): 400 TABLET ORAL at 05:42

## 2022-12-18 NOTE — PROGRESS NOTE ADULT - ASSESSMENT
77 yo male with a PMHx of HTN, HLD, CAD s/p CABG on plavix, CHF, and breast CA s/p R mastectomy presents c/o upper abdominal pain. gi is called for evaluation    # Abdominal pain most likely 2/2 acute gallstone pancreatitis: improving   #transaminitis  bilirubin 3.6, AST//110, trending down  #Cholelithiasis   - Hemodynamically stable and no evidence of Sepsis  - lipase 2265  - CT A/P IV contrast: Findings compatible with acute interstitial edematous pancreatitis; no evidence of acute fluid collection. 2. Cholelithiasis.  - Triglyceride level 45  - Calcium 8.5    Rec:  - can advance to low fat diet   - Monitor pain and adjust pain medication accordingly  - surgery is following for CCY  - Trend LFTs   - if worsening of clinical status or fever repeat CT A/P and recall GI    recall PRN  77 yo male with a PMHx of HTN, HLD, CAD s/p CABG on plavix, CHF, and breast CA s/p R mastectomy presents c/o upper abdominal pain. gi is called for evaluation    # Abdominal pain most likely 2/2 acute gallstone pancreatitis: improving   #transaminitis  bilirubin 3.6, AST//110, trending down  #Cholelithiasis   - Hemodynamically stable and no evidence of Sepsis  - lipase 2265  - CT A/P IV contrast: Findings compatible with acute interstitial edematous pancreatitis; no evidence of acute fluid collection. 2. Cholelithiasis.  - Triglyceride level 45  - Calcium 8.5    Rec:  - can advance to low fat diet   - Monitor pain and adjust pain medication accordingly  - surgery is following for CCY  - Trend LFTs   - if worsening of clinical status or fever repeat CT A/P and recall GI  -outpatient MRI pancreatic protocol    recall PRN

## 2022-12-18 NOTE — PROGRESS NOTE ADULT - SUBJECTIVE AND OBJECTIVE BOX
Patient is a 77y old  Male who presents with a chief complaint of acute pancreatitis (18 Dec 2022 16:07)      Patient seen and examined at bedside.  Patient denies any chest pain, shortness of breath, denies any abd pain    ALLERGIES:  No Known Allergies    MEDICATIONS:  aMIOdarone    Tablet 200 milliGRAM(s) Oral daily  amLODIPine   Tablet 5 milliGRAM(s) Oral daily  clopidogrel Tablet 75 milliGRAM(s) Oral daily  enoxaparin Injectable 40 milliGRAM(s) SubCutaneous every 24 hours  influenza  Vaccine (HIGH DOSE) 0.7 milliLiter(s) IntraMuscular once  lactated ringers. 1000 milliLiter(s) IV Continuous <Continuous>  losartan 100 milliGRAM(s) Oral daily  metoprolol tartrate 50 milliGRAM(s) Oral two times a day  morphine  - Injectable 2 milliGRAM(s) IV Push every 4 hours PRN  ondansetron  IVPB 4 milliGRAM(s) IV Intermittent every 8 hours PRN  pantoprazole    Tablet 40 milliGRAM(s) Oral before breakfast  polyethylene glycol 3350 17 Gram(s) Oral two times a day    Vital Signs Last 24 Hrs  T(F): 97.8 (18 Dec 2022 13:30), Max: 98.9 (17 Dec 2022 17:51)  HR: 68 (18 Dec 2022 13:30) (60 - 81)  BP: 159/70 (18 Dec 2022 13:30) (141/67 - 159/70)  RR: 20 (18 Dec 2022 13:30) (18 - 20)  SpO2: 95% (18 Dec 2022 13:30) (95% - 100%)  I&O's Summary      PHYSICAL EXAM:  General: NAD, A/O x 3  ENT: MMM  Neck: Supple, No JVD  Lungs: diminished air entry, no crackles   Cardio: RRR, S1/S2, 2/6 systolic murmur   Abdomen: Soft, Nontender, Nondistended; Bowel sounds present  Extremities: No cyanosis, No edema    LABS:                        12.2   8.75  )-----------( 142      ( 18 Dec 2022 07:27 )             34.6     12-18    136  |  103  |  11  ----------------------------<  78  3.5   |  22  |  0.6    Ca    8.1      18 Dec 2022 07:27  Mg     2.2     12-18    TPro  5.7  /  Alb  3.6  /  TBili  1.4  /  DBili  0.6  /  AST  46  /  ALT  67  /  AlkPhos  99  12-18        Lactate, Blood: 1.3 mmol/L (12-16 @ 19:49)        12-17 Chol -- LDL -- HDL -- Trig 45 mg/dL                  Urinalysis Basic - ( 16 Dec 2022 22:16 )    Color: Silva / Appearance: Clear / SG: >1.050 / pH: x  Gluc: x / Ketone: Moderate  / Bili: Small / Urobili: 6 mg/dL   Blood: x / Protein: Trace / Nitrite: Negative   Leuk Esterase: Negative / RBC: 4 /HPF / WBC 2 /HPF   Sq Epi: x / Non Sq Epi: 1 /HPF / Bacteria: Negative        COVID-19 PCR: NotDetec (12-17-22 @ 15:11)  COVID-19 PCR: Detected (12-17-22 @ 00:33)      RADIOLOGY & ADDITIONAL TESTS:    Care Discussed with Consultants/Other Providers: 
Gastroenterology progress note:     Patient is a 77y old  Male who presents with a chief complaint of acute pancreatitis (18 Dec 2022 10:24)    Admitted on: 12-16-22    We are following the patient for: pancreatitis      Interval History:    No acute events overnight.   - Diet - tolerating clear liquid   denies abdominal pain       PAST MEDICAL & SURGICAL HISTORY:  Arrhythmia      Hypercholesteremia      HTN (hypertension)      Obesity      CHF (congestive heart failure)      Heart attack      Breast cancer  2001      Osteoarthritis      CAD (coronary artery disease)      S/P CABG x 5  2014      H/O mastectomy  right 2001      Pacemaker          MEDICATIONS  (STANDING):  aMIOdarone    Tablet 200 milliGRAM(s) Oral daily  amLODIPine   Tablet 5 milliGRAM(s) Oral daily  clopidogrel Tablet 75 milliGRAM(s) Oral daily  enoxaparin Injectable 40 milliGRAM(s) SubCutaneous every 24 hours  influenza  Vaccine (HIGH DOSE) 0.7 milliLiter(s) IntraMuscular once  lactated ringers. 1000 milliLiter(s) (125 mL/Hr) IV Continuous <Continuous>  losartan 100 milliGRAM(s) Oral daily  metoprolol tartrate 50 milliGRAM(s) Oral two times a day  pantoprazole    Tablet 40 milliGRAM(s) Oral before breakfast  polyethylene glycol 3350 17 Gram(s) Oral two times a day    MEDICATIONS  (PRN):  morphine  - Injectable 2 milliGRAM(s) IV Push every 4 hours PRN Severe Pain (7 - 10)  ondansetron  IVPB 4 milliGRAM(s) IV Intermittent every 8 hours PRN Nausea and/or Vomiting      Allergies  No Known Allergies      Review of Systems:   Cardiovascular:  No Chest Pain, No Palpitations  Respiratory:  No Cough, No Dyspnea  Gastrointestinal:  As described in HPI  Skin:  No Skin Lesions, No Jaundice  Neuro:  No Syncope, No Dizziness    Physical Examination:  T(C): 36.6 (12-18-22 @ 13:30), Max: 37.2 (12-17-22 @ 17:51)  HR: 68 (12-18-22 @ 13:30) (60 - 81)  BP: 159/70 (12-18-22 @ 13:30) (141/67 - 163/71)  RR: 20 (12-18-22 @ 13:30) (18 - 20)  SpO2: 95% (12-18-22 @ 13:30) (95% - 100%)        GENERAL: AAOx3, no acute distress.  HEAD:  Atraumatic, Normocephalic  EYES: conjunctiva and sclera clear  NECK: Supple, no JVD or thyromegaly  CHEST/LUNG: Clear to auscultation bilaterally; No wheeze, rhonchi, or rales  HEART: Regular rate and rhythm; normal S1, S2, No murmurs.  ABDOMEN: Soft, nontender, nondistended; Bowel sounds present  NEUROLOGY: No asterixis or tremor.   SKIN: Intact, no jaundice     Data:                        12.2   8.75  )-----------( 142      ( 18 Dec 2022 07:27 )             34.6     Hgb trend:  12.2  12-18-22 @ 07:27  12.1  12-17-22 @ 05:40  13.0  12-16-22 @ 19:49      12-18    136  |  103  |  11  ----------------------------<  78  3.5   |  22  |  0.6<L>    Ca    8.1<L>      18 Dec 2022 07:27  Mg     2.2     12-18    TPro  5.7<L>  /  Alb  3.6  /  TBili  1.4<H>  /  DBili  0.6<H>  /  AST  46<H>  /  ALT  67<H>  /  AlkPhos  99  12-18    Liver panel trend:  TBili 1.4   /   AST 46   /   ALT 67   /   AlkP 99   /   Tptn 5.7   /   Alb 3.6    /   DBili 0.6      12-18  TBili 2.6   /   AST 95   /   ALT 99   /   AlkP 100   /   Tptn 5.7   /   Alb 3.7    /   DBili --      12-17  TBili 3.6   /      /      /   AlkP 111   /   Tptn 6.7   /   Alb 4.4    /   DBili --      12-16             Radiology:    US Abdomen Upper Quadrant Right:   ACC: [id number] EXAM:  US ABDOMEN RT UPR QUADRANT                          PROCEDURE DATE:  12/16/2022          INTERPRETATION:  CLINICAL INFORMATION: Right upper quadrant abdominal   tenderness    COMPARISON: 7/8/2021    TECHNIQUE: Sonography of theright upper quadrant.    FINDINGS:  This is a limited exam because of the patient's body habitus.  The liver pancreas and right kidney is not well visualized.  There are gallstones within the gallbladder with a minimally thickened   gallbladder wall and a negative sonographic Onofre sign. The common bile   duct is normal in size.  Grossly no ascites is seen.    IMPRESSION: Limited exam. Gallstones are present however evaluation for   gallbladder inflammation is very limited. If cholecystitis is clinically   suspected a nuclear medicine HIDA scan is suggested      --- End of Report ---            STEPHAN MOTLEY MD; Attending Radiologist  This document has been electronically signed. Dec 16 2022  9:23PM (12-16-22 @ 21:03)

## 2022-12-18 NOTE — PROGRESS NOTE ADULT - ASSESSMENT
Pt is a 77 yo male with a PMHx of HTN, HLD, CAD s/p CABG on plavix, CHF, and breast CA s/p R mastectomy presents c/o upper abdominal pain that started this afternoon.     #acute pancreatitis 2/2 gallstone  -LFTs trending down  -pt tolerating clear liquids with no pain  -IVF reduced to 125cc/hr from 200cc  -GI ok with trial of low fat meal  -surgery consult appreciated   -f/u as an outpatient with Dr. Yeager, [phone], [address] [place] [address] [address] [place] [address]      #COVID  - airborne isolation  - pt feels first covid swab was an error, 2nd was negative, 3rd in progress  - family wants pt out of covid room, 12/18 discussed with ES Schmitz - no single rm avaible  - document O2 sat on RA, start decadron if desats  - albuterol prn  - ID consult appreciated     #CAD s/p CABG on plavix  #HFpEF  #HTN  #HLD  - continue with home meds except lasix    #breast CA s/p R mastectomy  - OP Follow up    #Misc:  #DVT PPX: lovenox  #GI PPX: protonix  #Diet: NPO  #Activity: aat  #Dispo: acute  #Pending: 3rd covid swab, trend LFTs, tolerance of food

## 2022-12-18 NOTE — CONSULT NOTE ADULT - ATTENDING COMMENTS
ACS Attending Note Attestation    Patient is examined and evaluated at the bedside with the residents/PAs. Treatment plan discussed with the team, nurses, and consulting physicians and consulting teams. Medications, radiological studies and all other relevant studies reviewed. I reviewed the resident/PA note and agreed with above assessment and plan with following additions and corrections.    WAGNER SEGURA Patient is a 77y old  Male who presents with a chief complaint of acute pancreatitis. At present time patient declining surgery      Allergies  No Known Allergies  Intolerances    PAST MEDICAL & SURGICAL HISTORY:  Arrhythmia  Hypercholesteremia  HTN (hypertension)  Obesity  CHF (congestive heart failure)  Heart attack  Breast cancer 2001  Osteoarthritis  CAD (coronary artery disease)  S/P CABG x 5 2014  H/O mastectomy right 2001  Pacemaker    Vital Signs Last 24 Hrs  T(C): 36.5 (18 Dec 2022 19:27), Max: 36.8 (18 Dec 2022 04:53)  T(F): 97.7 (18 Dec 2022 19:27), Max: 98.2 (18 Dec 2022 04:53)  HR: 65 (18 Dec 2022 19:27) (65 - 81)  BP: 148/68 (18 Dec 2022 19:27) (141/67 - 159/70)  BP(mean): --  RR: 18 (18 Dec 2022 19:27) (18 - 20)  SpO2: 98% (18 Dec 2022 19:27) (95% - 98%)    Parameters below as of 18 Dec 2022 04:53  Patient On (Oxygen Delivery Method): room air                        12.2   8.75  )-----------( 142      ( 18 Dec 2022 07:27 )             34.6     12-18    136  |  103  |  11  ----------------------------<  78  3.5   |  22  |  0.6<L>    Ca    8.1<L>      18 Dec 2022 07:27  Mg     2.2     12-18    TPro  5.7<L>  /  Alb  3.6  /  TBili  1.4<H>  /  DBili  0.6<H>  /  AST  46<H>  /  ALT  67<H>  /  AlkPhos  99  12-18      Diagnosis:  acute pancreatitis    Plan:	  - patient can follow as outpatient   - supportive care  - GI/DVT prophylaxis  - pain management  - follow up consults  - repeat studies as needed    Kamryn Yeager MD, FACS  Trauma/ACS/Surcical Critical care Attending
I edited the note
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
## 2022-12-18 NOTE — CONSULT NOTE ADULT - SUBJECTIVE AND OBJECTIVE BOX
GENERAL SURGERY CONSULT NOTE    Patient: WAGNER SEGURA , 77y ([birth year])Male   MRN: [id number]  Location: [address]  Visit: 12-16-22 Inpatient  Date: 12-18-22 @ 10:26    HPI:  Pt is a 75 yo male with a PMHx of HTN, HLD, CAD s/p CABG on plavix, CHF, and breast CA s/p R mastectomy presented 2 days ago to [facility] ED c/o epigastric abdominal pain. Pain started the day of arrival, was achy, 9/10, non-radiating, with no worsening or alleviating factors. Pt was found to have gallstone pancreatitis with a bilirubin of 3.6 and lipase of 2232; he was admitted to the medical service and managed conservatively with fluid resuscitation. His labs have since been downtrending. GI was consulted and recommended a surgical evaluation. Pt seen and examined. Of note, he was seen by the surgical service back in July of 2021 for cholangitis; at that time he refused MRCP and surgery. Pt seen and examined. Currently without complaint. Tolerating clear liquids. Denies fevers, chills, CP, SOB, nausea, vomiting, abdominal pain, dysuria, or hematuria. Pt states he doesn't want surgery at this time, prefers to follow up with his doctors for his extensive cardiac hx prior to proceeding.     In the ED:  · BP Systolic	168 mm Hg  · BP Diastolic	72 mm Hg  · Heart Rate	64 /min  · Respiration Rate (breaths/min)	20 /min  · Temp (F)	98 Degrees F  · Temp (C)	36.7 Degrees C  · Temp site	oral  · SpO2 (%)	98 %  · O2 Delivery/Oxygen Delivery Method	nasal cannula  · Oxygen Therapy Flow (L/min)	3 L/min    Labs notable for: WBC 14k, bilirubin 3.6, AST//110, lipase 2265, troponin negative  RUQ U/S: Gallstones are present however evaluation for gallbladder inflammation is very limited. If cholecystitis is clinically suspected a nuclear medicine HIDA scan is suggested.  CT abdomen/pelvis: 1. Findings compatible with acute interstitial edematous pancreatitis; no evidence of acute fluid collection. 2. Cholelithiasis. (17 Dec 2022 01:41)    ROS: 10 systems reviewed. All negative except those noted in the HPI    PAST MEDICAL & SURGICAL HISTORY:  Arrhythmia  Hypercholesteremia  HTN (hypertension)  Obesity  CHF (congestive heart failure)  Heart attack  Breast cancer- 2001  Osteoarthritis  CAD (coronary artery disease)  S/P CABG x 5- 2014  H/O mastectomy- right 2001  Pacemaker    Home Medications:  amiodarone 200 mg oral tablet: 1 tab(s) orally once a day (17 Dec 2022 02:44)  amLODIPine 5 mg oral tablet: 1 tab(s) orally once a day (17 Dec 2022 02:44)  atorvastatin 20 mg oral tablet: 1 tab(s) orally once a day (17 Dec 2022 02:44)  clopidogrel 75 mg oral tablet: 1 tab(s) orally once a day (17 Dec 2022 02:44)  furosemide 20 mg oral tablet: 1 tab(s) orally 2 times a day (17 Dec 2022 02:44)  losartan 100 mg oral tablet: 1 tab(s) orally once a day (17 Dec 2022 02:44)  metoprolol tartrate 50 mg oral tablet: 1 tab(s) orally 2 times a day (17 Dec 2022 02:44)    VITALS:  T(F): 98.2 (12-18-22 @ 04:53), Max: 98.9 (12-17-22 @ 17:51)  HR: 81 (12-18-22 @ 04:53) (60 - 81)  BP: 141/67 (12-18-22 @ 04:53) (141/67 - 163/71)  RR: 19 (12-18-22 @ 04:53) (18 - 19)  SpO2: 95% (12-18-22 @ 04:53) (95% - 100%)    PHYSICAL EXAM:  General: NAD, AAOx3, calm and cooperative  HEENT: NCAT, Trachea ML, Neck supple  Cardiac: RRR  Respiratory: normal respiratory effort, breath sounds equal BL, no wheeze, rhonchi or crackles  Abdomen: Soft, non-distended, non-tender, no rebound, no guarding. +BS. Reducible and non-tender umbilical hernia.   Musculoskeletal: Strength 5/5 BL UE/LE, ROM intact, compartments soft  Neuro: Sensation grossly intact and equal throughout, no focal deficits  Vascular: Pulses 2+ throughout, extremities well perfused  Skin: Warm/dry, normal color, no jaundice    MEDICATIONS  (STANDING):  aMIOdarone    Tablet 200 milliGRAM(s) Oral daily  amLODIPine   Tablet 5 milliGRAM(s) Oral daily  clopidogrel Tablet 75 milliGRAM(s) Oral daily  enoxaparin Injectable 40 milliGRAM(s) SubCutaneous every 24 hours  influenza  Vaccine (HIGH DOSE) 0.7 milliLiter(s) IntraMuscular once  lactated ringers. 1000 milliLiter(s) (200 mL/Hr) IV Continuous <Continuous>  losartan 100 milliGRAM(s) Oral daily  metoprolol tartrate 50 milliGRAM(s) Oral two times a day  pantoprazole    Tablet 40 milliGRAM(s) Oral before breakfast  polyethylene glycol 3350 17 Gram(s) Oral two times a day    MEDICATIONS  (PRN):  morphine  - Injectable 2 milliGRAM(s) IV Push every 4 hours PRN Severe Pain (7 - 10)  ondansetron  IVPB 4 milliGRAM(s) IV Intermittent every 8 hours PRN Nausea and/or Vomiting    LAB/STUDIES:                        12.2   8.75  )-----------( 142      ( 18 Dec 2022 07:27 )             34.6     12-18    136  |  103  |  11  ----------------------------<  78  3.5   |  22  |  0.6<L>    Ca    8.1<L>      18 Dec 2022 07:27  Mg     2.2     12-18    TPro  5.7<L>  /  Alb  3.6  /  TBili  1.4<H>  /  DBili  0.6<H>  /  AST  46<H>  /  ALT  67<H>  /  AlkPhos  99  12-18    LIVER FUNCTIONS - ( 18 Dec 2022 07:27 )  Alb: 3.6 g/dL / Pro: 5.7 g/dL / ALK PHOS: 99 U/L / ALT: 67 U/L / AST: 46 U/L / GGT: x           Urinalysis Basic - ( 16 Dec 2022 22:16 )    Color: Silva / Appearance: Clear / SG: >1.050 / pH: x  Gluc: x / Ketone: Moderate  / Bili: Small / Urobili: 6 mg/dL   Blood: x / Protein: Trace / Nitrite: Negative   Leuk Esterase: Negative / RBC: 4 /HPF / WBC 2 /HPF   Sq Epi: x / Non Sq Epi: 1 /HPF / Bacteria: Negative      CARDIAC MARKERS ( 16 Dec 2022 19:49 )  x     / <0.01 ng/mL / x     / x     / x        IMAGING:  RUQ U/S 12/16/22:  < from: US Abdomen Upper Quadrant Right (12.16.22 @ 21:03) >  IMPRESSION: Limited exam. Gallstones are present however evaluation for   gallbladder inflammation is very limited. If cholecystitis is clinically   suspected a nuclear medicine HIDA scan is suggested    --- End of Report ---    STEHPAN MOTLEY MD; Attending Radiologist  This document has been electronically signed. Dec 16 2022  9:23PM    < end of copied text >    CT A/P w/IV contrast 12/16/22:  < from: CT Abdomen and Pelvis w/ IV Cont (12.16.22 @ 21:39) >  IMPRESSION:    1. Findings compatible with acute interstitial edematous pancreatitis; no   evidence of acute fluid collection.    2. Cholelithiasis.    --- End of Report ---    RANCHO SNIDER MD; Attending Radiologist  This document has been electronically signed. Dec 16 2022 10:12PM    < end of copied text >

## 2022-12-18 NOTE — PATIENT PROFILE ADULT - FALL HARM RISK - UNIVERSAL INTERVENTIONS
Bed in lowest position, wheels locked, appropriate side rails in place/Call bell, personal items and telephone in reach/Instruct patient to call for assistance before getting out of bed or chair/Non-slip footwear when patient is out of bed/[place] to call system/Physically safe environment - no spills, clutter or unnecessary equipment/Purposeful Proactive Rounding/Room/bathroom lighting operational, light cord in reach

## 2022-12-19 ENCOUNTER — TRANSCRIPTION ENCOUNTER (OUTPATIENT)
Age: 77
End: 2022-12-19

## 2022-12-19 VITALS
DIASTOLIC BLOOD PRESSURE: 66 MMHG | OXYGEN SATURATION: 97 % | HEART RATE: 84 BPM | SYSTOLIC BLOOD PRESSURE: 133 MMHG | TEMPERATURE: 97 F | RESPIRATION RATE: 18 BRPM

## 2022-12-19 LAB
ALBUMIN SERPL ELPH-MCNC: 3.8 G/DL — SIGNIFICANT CHANGE UP (ref 3.5–5.2)
ALP SERPL-CCNC: 94 U/L — SIGNIFICANT CHANGE UP (ref 30–115)
ALT FLD-CCNC: 49 U/L — HIGH (ref 0–41)
ANION GAP SERPL CALC-SCNC: 11 MMOL/L — SIGNIFICANT CHANGE UP (ref 7–14)
AST SERPL-CCNC: 26 U/L — SIGNIFICANT CHANGE UP (ref 0–41)
BASOPHILS # BLD AUTO: 0.05 K/UL — SIGNIFICANT CHANGE UP (ref 0–0.2)
BASOPHILS NFR BLD AUTO: 0.6 % — SIGNIFICANT CHANGE UP (ref 0–1)
BILIRUB DIRECT SERPL-MCNC: 0.5 MG/DL — HIGH (ref 0–0.3)
BILIRUB INDIRECT FLD-MCNC: 0.8 MG/DL — SIGNIFICANT CHANGE UP (ref 0.2–1.2)
BILIRUB SERPL-MCNC: 1.3 MG/DL — HIGH (ref 0.2–1.2)
BUN SERPL-MCNC: 10 MG/DL — SIGNIFICANT CHANGE UP (ref 10–20)
CALCIUM SERPL-MCNC: 8.4 MG/DL — SIGNIFICANT CHANGE UP (ref 8.4–10.5)
CHLORIDE SERPL-SCNC: 104 MMOL/L — SIGNIFICANT CHANGE UP (ref 98–110)
CO2 SERPL-SCNC: 23 MMOL/L — SIGNIFICANT CHANGE UP (ref 17–32)
CREAT SERPL-MCNC: 0.6 MG/DL — LOW (ref 0.7–1.5)
CULTURE RESULTS: SIGNIFICANT CHANGE UP
EGFR: 99 ML/MIN/1.73M2 — SIGNIFICANT CHANGE UP
EOSINOPHIL # BLD AUTO: 0.12 K/UL — SIGNIFICANT CHANGE UP (ref 0–0.7)
EOSINOPHIL NFR BLD AUTO: 1.5 % — SIGNIFICANT CHANGE UP (ref 0–8)
GLUCOSE SERPL-MCNC: 87 MG/DL — SIGNIFICANT CHANGE UP (ref 70–99)
HCT VFR BLD CALC: 35.9 % — LOW (ref 42–52)
HGB BLD-MCNC: 12.1 G/DL — LOW (ref 14–18)
IMM GRANULOCYTES NFR BLD AUTO: 0.3 % — SIGNIFICANT CHANGE UP (ref 0.1–0.3)
LYMPHOCYTES # BLD AUTO: 1.17 K/UL — LOW (ref 1.2–3.4)
LYMPHOCYTES # BLD AUTO: 14.8 % — LOW (ref 20.5–51.1)
MAGNESIUM SERPL-MCNC: 2.3 MG/DL — SIGNIFICANT CHANGE UP (ref 1.8–2.4)
MCHC RBC-ENTMCNC: 29.8 PG — SIGNIFICANT CHANGE UP (ref 27–31)
MCHC RBC-ENTMCNC: 33.7 G/DL — SIGNIFICANT CHANGE UP (ref 32–37)
MCV RBC AUTO: 88.4 FL — SIGNIFICANT CHANGE UP (ref 80–94)
MONOCYTES # BLD AUTO: 0.62 K/UL — HIGH (ref 0.1–0.6)
MONOCYTES NFR BLD AUTO: 7.8 % — SIGNIFICANT CHANGE UP (ref 1.7–9.3)
NEUTROPHILS # BLD AUTO: 5.93 K/UL — SIGNIFICANT CHANGE UP (ref 1.4–6.5)
NEUTROPHILS NFR BLD AUTO: 75 % — SIGNIFICANT CHANGE UP (ref 42.2–75.2)
NRBC # BLD: 0 /100 WBCS — SIGNIFICANT CHANGE UP (ref 0–0)
PLATELET # BLD AUTO: 159 K/UL — SIGNIFICANT CHANGE UP (ref 130–400)
POTASSIUM SERPL-MCNC: 3.3 MMOL/L — LOW (ref 3.5–5)
POTASSIUM SERPL-SCNC: 3.3 MMOL/L — LOW (ref 3.5–5)
PROT SERPL-MCNC: 5.8 G/DL — LOW (ref 6–8)
RBC # BLD: 4.06 M/UL — LOW (ref 4.7–6.1)
RBC # FLD: 13.8 % — SIGNIFICANT CHANGE UP (ref 11.5–14.5)
SARS-COV-2 RNA SPEC QL NAA+PROBE: SIGNIFICANT CHANGE UP
SODIUM SERPL-SCNC: 138 MMOL/L — SIGNIFICANT CHANGE UP (ref 135–146)
SPECIMEN SOURCE: SIGNIFICANT CHANGE UP
WBC # BLD: 7.91 K/UL — SIGNIFICANT CHANGE UP (ref 4.8–10.8)
WBC # FLD AUTO: 7.91 K/UL — SIGNIFICANT CHANGE UP (ref 4.8–10.8)

## 2022-12-19 PROCEDURE — 99239 HOSP IP/OBS DSCHRG MGMT >30: CPT

## 2022-12-19 RX ORDER — AMLODIPINE BESYLATE 2.5 MG/1
5 TABLET ORAL ONCE
Refills: 0 | Status: DISCONTINUED | OUTPATIENT
Start: 2022-12-19 | End: 2022-12-19

## 2022-12-19 RX ORDER — POTASSIUM CHLORIDE 20 MEQ
40 PACKET (EA) ORAL ONCE
Refills: 0 | Status: COMPLETED | OUTPATIENT
Start: 2022-12-19 | End: 2022-12-19

## 2022-12-19 RX ORDER — AMLODIPINE BESYLATE 2.5 MG/1
10 TABLET ORAL DAILY
Refills: 0 | Status: DISCONTINUED | OUTPATIENT
Start: 2022-12-20 | End: 2022-12-19

## 2022-12-19 RX ADMIN — PANTOPRAZOLE SODIUM 40 MILLIGRAM(S): 20 TABLET, DELAYED RELEASE ORAL at 05:37

## 2022-12-19 RX ADMIN — AMIODARONE HYDROCHLORIDE 200 MILLIGRAM(S): 400 TABLET ORAL at 05:38

## 2022-12-19 RX ADMIN — CLOPIDOGREL BISULFATE 75 MILLIGRAM(S): 75 TABLET, FILM COATED ORAL at 11:06

## 2022-12-19 RX ADMIN — LOSARTAN POTASSIUM 100 MILLIGRAM(S): 100 TABLET, FILM COATED ORAL at 05:37

## 2022-12-19 RX ADMIN — POLYETHYLENE GLYCOL 3350 17 GRAM(S): 17 POWDER, FOR SOLUTION ORAL at 05:38

## 2022-12-19 RX ADMIN — AMLODIPINE BESYLATE 5 MILLIGRAM(S): 2.5 TABLET ORAL at 05:37

## 2022-12-19 RX ADMIN — Medication 50 MILLIGRAM(S): at 05:37

## 2022-12-19 RX ADMIN — ENOXAPARIN SODIUM 40 MILLIGRAM(S): 100 INJECTION SUBCUTANEOUS at 05:38

## 2022-12-19 RX ADMIN — Medication 40 MILLIEQUIVALENT(S): at 14:32

## 2022-12-19 NOTE — DISCHARGE NOTE PROVIDER - NSDCQMPCI_CARD_ALL_CORE
DATE: 09/26/19    SURGEON: Delta Wheat MD     ASSISTANT: BRANDY Serrano     PREOPERATIVE DIAGNOSIS: Morbid obesity      POSTOPERATIVE DIAGNOSIS: Morbid obesity      PROCEDURE PERFORMED   1. Laparoscopic sleeve gastrectomy   2. Intraoperative endoscopy     ESTIMATED BLOOD LOSS: 5 cc    Specimens: Stomach    Complications: None     INDICATION FOR OPERATION: 22 year old female who presented to clinic for evaluation for a longstanding history of morbid obesity. She has exhausted multiple attempts of medical weight loss and is interested in durable control of her excess weight loss and associated medical problems. After undergoing dietary and psychological eval as well as preop counseling, she was deemed an appropriate candidate for laparoscopic sleeve gastrectomy. After explaining the risks and benefits of the operation including but not limited to bleeding, infection, anastomotic leak, DVT, PE and death, she understood the risks and alternatives to surgery and signed the informed consent and wished to proceed.      DESCRIPTION OF OPERATION: She was taken to the operating room and placed in the supine position. A timeout was called to identify the patient and she  was given 2 g of Ancef within 30 minutes of incision. SCDs were placed bilaterally and continued throughout the case. After induction of general anesthesia, the abdomen was prepped and draped widely. A 5 mm left upper quadrant incision was created using a knife. Veress needle inserted, confirmed position in peritoneal cavity using saline drop test. Abdomen insufflated CO2 pneumoperitoneum of 15 psi. A 5 trocar and the laparoscope were then introduced. Next, one 5mm port, one 10mm port and a 15 were placed in the upper abdomen. A Judy liver retractor was inserted to retract the left lateral segment of the liver. Dissection began by dividing the gastrocolic ligament 6 cm proximal to the pylorus. The omentum was divided along the greater  curve of the stomach until we reached the short gastrics. The short gastrics were taken with the Ligasure. The angle of His was then mobilized off the diaphragm. We then divided posterior attachments of the stomach to the pancreas with the Ligasure. A 40-Bulgarian bougie was then inserted in the mouth and advanced into the distal stomach. The gastric sleeve was created starting 6 cm proximal to the pylorus, taking the first bite obliquely using an EndoGIA black. The next several staple lines were taken with EndoGIA black and purple loads with seam guard, approximating the bougie along the lesser curvature of the stomach. We then completed the division of the stomach at the angle of His with an EndoGIA purple load. Next, intraoperative endoscopy was performed. The gastric tube was patent. There was no evidence of intraluminal bleeding. There was no bubbling under saline irrigation to suggest a leak. Powdered surgicis was placed on the staple line. The Judy was then removed. The specimen was removed through the 15 trocar and passed from the field. The 15 trocar fascia was then closed with interrupted #1 Vicryl in a Ayden-Ivis. The wounds widely irrigated, closed with 4-0 Vicryl. Sterile dressings were applied. Sponge, needle counts were correct. No immediate complications. The patient was awakened from general anesthesia, taken to recovery room in stable condition.      I certify that the assistant surgeon services were medically necessary. A qualified resident was not available to perform the services. The assistant was present during the entire case and helped with bedside procedure.     No

## 2022-12-19 NOTE — DISCHARGE NOTE NURSING/CASE MANAGEMENT/SOCIAL WORK - PATIENT PORTAL LINK FT
You can access the FollowMyHealth Patient Portal offered by [facility] by registering at the following website: [email or web address]. By joining [facility]’s FollowMyHealth portal, you will also be able to view your health information using other applications (apps) compatible with our system.

## 2022-12-19 NOTE — DISCHARGE NOTE PROVIDER - CARE PROVIDER_API CALL
multiple gestation Trevon Champagne [facility]  [address] [address]  Phone: [phone]  Fax: [phone]  Follow Up Time:    Trevon Champagne [facility]  [address] [facility] [address]  [place] [address]  Phone: [phone]  Fax: [phone]  Follow Up Time:     Kamryn Yeager)  Surgery; Surgical Critical Care  [address] [place] [address]  Phone: [phone]  Fax: [phone]  Follow Up Time:     Enrique Zaldivar)  Residency  Medicine  [address] [place] [address]  Phone: [phone]  Fax: [phone]  Follow Up Time:

## 2022-12-19 NOTE — DISCHARGE NOTE NURSING/CASE MANAGEMENT/SOCIAL WORK - NSDCVIVACCINE_GEN_ALL_CORE_FT
Tdap; 22-Nov-2018 08:52; Benito Lovell); [employer] Paste[employer]; [id number] (Exp. Date: 23-Oct-2020); IntraMuscular; Deltoid Right.; 0.5 milliLiter(s); VIS (VIS Published: 09-May-2013, VIS Presented: 22-Nov-2018);

## 2022-12-19 NOTE — DISCHARGE NOTE PROVIDER - NSDCMRMEDTOKEN_GEN_ALL_CORE_FT
amiodarone 200 mg oral tablet: 1 tab(s) orally once a day  amLODIPine 5 mg oral tablet: 1 tab(s) orally once a day  atorvastatin 20 mg oral tablet: 1 tab(s) orally once a day  clopidogrel 75 mg oral tablet: 1 tab(s) orally once a day  furosemide 20 mg oral tablet: 1 tab(s) orally 2 times a day  losartan 100 mg oral tablet: 1 tab(s) orally once a day  metoprolol tartrate 50 mg oral tablet: 1 tab(s) orally 2 times a day

## 2022-12-19 NOTE — DISCHARGE NOTE PROVIDER - ATTENDING DISCHARGE PHYSICAL EXAMINATION:
Vital Signs Last 24 Hrs  T(F): 98.7 (19 Dec 2022 12:55), Max: 98.7 (19 Dec 2022 12:55)  HR: 60 (19 Dec 2022 12:55) (60 - 65)  BP: 162/76 (19 Dec 2022 12:55) (148/68 - 170/73)  RR: 18 (19 Dec 2022 12:55) (18 - 18)  SpO2: 97% (19 Dec 2022 05:00) (97% - 98%)    PHYSICAL EXAM:  GENERAL: NAD, well-groomed, well-developed  HEAD:  Atraumatic, Normocephalic  EYES: EOMI, conjunctiva and sclera clear  ENMT: Moist mucous membranes, Good dentition, no thrush  NECK: Supple, No JVD  CHEST/LUNG: Clear to auscultation bilaterally, good air entry, non-labored breathing  HEART: RRR; S1/S2, 2/6 systolic murmur   ABDOMEN: Soft, Nontender, Nondistended; Bowel sounds present  VASCULAR: Normal pulses, Normal capillary refill  EXTREMITIES: No calf tenderness, No cyanosis, No edema  LYMPH: Normal; No lymphadenopathy noted  SKIN: Warm, Intact  PSYCH: Normal mood, Normal affect  NERVOUS SYSTEM:  A/O x3, Good concentration; CN 2-12 intact, No focal deficits

## 2022-12-19 NOTE — DISCHARGE NOTE PROVIDER - NSDCFUSCHEDAPPT_GEN_ALL_CORE_FT
Ela Amezcua  [facility]  CARDIOLOGY [address] [facility]  Scheduled Appointment: 01/05/2023    [facility]  CARDIOLOGY [address] [facility]  Scheduled Appointment: 01/27/2023

## 2022-12-19 NOTE — DISCHARGE NOTE NURSING/CASE MANAGEMENT/SOCIAL WORK - NSDCPEFALRISK_GEN_ALL_CORE
For information on Fall & Injury Prevention, visit: [email or web address] OR  [email or web address] OR  https://www.cdc.gov/steadi/patient.html

## 2022-12-19 NOTE — DISCHARGE NOTE PROVIDER - HOSPITAL COURSE
Pt is a 75 yo male with a PMHx of HTN, HLD, CAD s/p CABG on plavix, CHF, and breast CA s/p R mastectomy presents c/o upper abdominal pain that started this afternoon. Pt describes pain as achy, sudden in onset, in epigastric region, no alleviating or improving factors, not related with food, nonradiating, 8/10 at its worse. Denied having a similar pain like this in the past. He denies any fevers, chills, nausea, vomiting, ches pain, palpiations, SOB, diarrhea, constipation, dysuria, urinary frequency, numbness, tingling or weakness. He drinks 2 beers a day and occasionally drinks hard liquor socially, never more than 4-5 drinks in one sitting. He smokes cigars, otherwise no other substance use.      ... Pt is a 75 yo male with a PMHx of HTN, HLD, CAD s/p CABG on plavix, CHF, and breast CA s/p R mastectomy presents  to hospital on December 16 2022 with c/o upper abdominal pain that in the afternoon. Pt described pain as achy, sudden in onset, in epigastric region, no alleviating or improving factors, not related with food, nonradiating, 8/10 at its worse. Denied having a similar pain like this in the past. He denied any fevers, chills, nausea, vomiting, ches pain, palpiations, SOB, diarrhea, constipation, dysuria, urinary frequency, numbness, tingling or weakness. He drinks 2 beers a day and occasionally drinks hard liquor socially, never more than 4-5 drinks in one sitting. He smokes cigars, otherwise no other substance use. On admission, Labs with Lipase 2265, T bili 3.6, indirect 0.8, rest of LFTs wnl, TG 45. Alcohol level <10. CT A/P with findings compatible with acute interstitial edematous pancreatitis; no evidence of acute fluid collection and cholelithiasis. RUQ US limited due to body habitus. Patient treated with IV fluids and kept NPO for acute pancreatitis likely 2/2 to gallstone. Evaluated by surgery, plan for outpatient CCY. Evaluated by mayelin RODRIGUEZ to advance diet. Patient currently tolerating PO diet with no pain.     Stable for discharge medically.        Pt is a 75 yo male with a PMHx of HTN, HLD, CAD s/p CABG on plavix, CHF, and breast CA s/p R mastectomy presents  to hospital on December 16 2022 with c/o upper abdominal pain that in the afternoon. Pt described pain as achy, sudden in onset, in epigastric region, no alleviating or improving factors, not related with food, nonradiating, 8/10 at its worse. Denied having a similar pain like this in the past. He denied any fevers, chills, nausea, vomiting, ches pain, palpiations, SOB, diarrhea, constipation, dysuria, urinary frequency, numbness, tingling or weakness. He drinks 2 beers a day and occasionally drinks hard liquor socially, never more than 4-5 drinks in one sitting. He smokes cigars, otherwise no other substance use. On admission, Labs with Lipase 2265, T bili 3.6, indirect 0.8, rest of LFTs wnl, TG 45. Alcohol level <10. CT A/P with findings compatible with acute interstitial edematous pancreatitis; no evidence of acute fluid collection and cholelithiasis. RUQ US limited due to body habitus. Patient treated with IV fluids and kept NPO for acute pancreatitis likely 2/2 to gallstone. Evaluated by surgery, plan for outpatient CCY. Evaluated by mayelin RODRIGUEZ to advance diet. Patient currently tolerating PO diet with no pain. Patient was incidentally Covid positive, asymptomatic on RA with negative chest Xray. Repeat test was negative.     Stable for discharge medically.

## 2022-12-19 NOTE — DISCHARGE NOTE PROVIDER - NSDCCPCAREPLAN_GEN_ALL_CORE_FT
PRINCIPAL DISCHARGE DIAGNOSIS  Diagnosis: Acute pancreatitis  Assessment and Plan of Treatment: Pancreatitis  Pancreatitis is a condition in which the pancreas becomes irritated and swollen (inflammation). The pancreas is a gland that is located behind the stomach. It produces enzymes that help to digest food. Most acute attacks last a couple of days and can cause serious problems and even be life threatening. The most common causes are alcohol abuse and gallstones. Symptoms include pain in the upper abdomen that may radiate to the back, nausea, and vomiting. Diagnosis is made with a medical history and physical exam as well as additional diagnostic testing. At home, eat smaller, more frequent meals and avoid alcohol and fatty foods. Drink enough fluid to keep your urine clear or pale yellow.   SEEK IMMEDIATE MEDICAL CARE IF YOU HAVE ANY OF THE FOLLOWING SYMPTOMS: inability to keep fluids down, increasing pain, yellowing of your skin or eyes, or lightheadedness/dizziness.  Please follow up with the surgery team as you will eventually need your gallbladder removed. You have gallstones that are likely the cause of the pancreatitis.   Please follow up with the gastroentereologist and your primary care doctor.  We recommend to continue to follow a low fat diet.

## 2022-12-20 ENCOUNTER — NON-APPOINTMENT (OUTPATIENT)
Age: 77
End: 2022-12-20

## 2022-12-25 DIAGNOSIS — K85.90 ACUTE PANCREATITIS WITHOUT NECROSIS OR INFECTION, UNSPECIFIED: ICD-10-CM

## 2022-12-25 DIAGNOSIS — I50.30 UNSPECIFIED DIASTOLIC (CONGESTIVE) HEART FAILURE: ICD-10-CM

## 2022-12-25 DIAGNOSIS — K85.10 BILIARY ACUTE PANCREATITIS WITHOUT NECROSIS OR INFECTION: ICD-10-CM

## 2022-12-25 DIAGNOSIS — E78.5 HYPERLIPIDEMIA, UNSPECIFIED: ICD-10-CM

## 2022-12-25 DIAGNOSIS — I25.10 ATHEROSCLEROTIC HEART DISEASE OF NATIVE CORONARY ARTERY WITHOUT ANGINA PECTORIS: ICD-10-CM

## 2022-12-25 DIAGNOSIS — Z95.1 PRESENCE OF AORTOCORONARY BYPASS GRAFT: ICD-10-CM

## 2022-12-25 DIAGNOSIS — I11.0 HYPERTENSIVE HEART DISEASE WITH HEART FAILURE: ICD-10-CM

## 2023-01-05 ENCOUNTER — APPOINTMENT (OUTPATIENT)
Dept: ELECTROPHYSIOLOGY | Facility: CLINIC | Age: 78
End: 2023-01-05
Payer: MEDICARE

## 2023-01-05 VITALS
TEMPERATURE: 97.1 F | WEIGHT: 250 LBS | SYSTOLIC BLOOD PRESSURE: 140 MMHG | HEIGHT: 74 IN | RESPIRATION RATE: 16 BRPM | BODY MASS INDEX: 32.08 KG/M2 | HEART RATE: 60 BPM | DIASTOLIC BLOOD PRESSURE: 86 MMHG

## 2023-01-05 PROCEDURE — 93290 INTERROG DEV EVAL ICPMS IP: CPT

## 2023-01-05 PROCEDURE — 99214 OFFICE O/P EST MOD 30 MIN: CPT

## 2023-01-05 PROCEDURE — 93000 ELECTROCARDIOGRAM COMPLETE: CPT | Mod: 59

## 2023-01-05 PROCEDURE — 93281 PM DEVICE PROGR EVAL MULTI: CPT

## 2023-01-05 RX ORDER — SULFAMETHOXAZOLE AND TRIMETHOPRIM 800; 160 MG/1; MG/1
800-160 TABLET ORAL
Qty: 14 | Refills: 0 | Status: COMPLETED | COMMUNITY
Start: 2022-10-17 | End: 2023-01-05

## 2023-01-05 RX ORDER — METOPROLOL TARTRATE 50 MG/1
50 TABLET, FILM COATED ORAL
Qty: 270 | Refills: 2 | Status: COMPLETED | COMMUNITY
Start: 2021-04-07 | End: 2023-01-05

## 2023-01-05 RX ORDER — AMIODARONE HYDROCHLORIDE 100 MG/1
100 TABLET ORAL DAILY
Qty: 90 | Refills: 3 | Status: DISCONTINUED | COMMUNITY
Start: 2021-04-07 | End: 2023-01-05

## 2023-01-05 NOTE — ASSESSMENT
[FreeTextEntry1] : 78 yo M with history of AFib on aspirin and Amio, SSS s/p DC PPM with recent BiV PPM upgrade. \par \par # SSS s/p DC PPM with cardiomyopathy s/p BiV PPM upgrade\par - I interrogated and reprogrammed his device as described in procedure. His wound is healing properly, with no signs of inflammation or bleeding. I discussed with patient post-operative care in great details. I answered all questions to their satisfaction. Patient was pleased with the result of their procedure. \par - Remote monitor is set up and patient is transmitting.\par - Persistent AF since 12/20/2022 with no symptoms per patient. 1 brief NSVT\par \par # Paroxysmal AFib \par - 37% AT/AF burden, not on OAC. No h/o of bleeding, but patient is prone to being clumsy and falling. \par - Cont aspirin, plavix. Discussed option of Watchman or Amulet device. Discusseed procedure and post operative care. Informational pamphlets given to patient and son. Will follow up in a week after they discuss with family.\par - Discontinue amiodarone since patient in AF. Was taking 100mg daily. \par \par # Mild cardiomyopathy\par - Consider Entresto. \par - Repeat echo with cardio\par \par # HTN\par - BP acceptable\par - Patient admits to poor diet\par - Cont Amlodipine, Losartan, and Metoprolol\par \par I have also advised the patient to go to the nearest emergency room if he experiences any chest pain, dyspnea, syncope, or has any other compelling symptoms.\par  \par Follow up with Dr Ojeda/cyril COMBS pending LAAO decision

## 2023-01-05 NOTE — PROCEDURE
[Sinus Bradycardia] : sinus bradycardia [See Device Printout] : See device printout [CRT-P] : Cardiac resynchronization therapy pacemaker [DDD] : DDD [Voltage: ___ volts] : Voltage was [unfilled] volts [Magnet Rate: ___ Ppm] : magnet rate was [unfilled] Ppm [Longevity: ___ months] : The estimated remaining battery life is [unfilled] months [Sensing Amplitude ___mv] : sensing amplitude was [unfilled] mv [Lead Imp:  ___ohms] : lead impedance was [unfilled] ohms [___V @] : [unfilled] V [___ ms] : [unfilled] ms [None] : none [Apace-Vpace ___ %] : Apace-Vpace [unfilled]% [de-identified] : [facility] [de-identified] : 5062 [de-identified] : [id number] [de-identified] : 06/24/2022 [de-identified] : 59 [de-identified] : 7.6 years [de-identified] : AF burden alert turned OFF [de-identified] : In AF since 12/20\par 1 brief NSVT 6 beats @ 178 bpm\par CorVue stable.\par Patient is transmitting to [facility].

## 2023-01-05 NOTE — HISTORY OF PRESENT ILLNESS
[de-identified] : \par Cardiologist: Dr. Handy\par Ophtho: Dr. GOMEZ ([facility])\par Cardio: Dr. Mondragon\par \par 76 yo M with history of SSS s/p DC PPM, CAD s/p CABG, LBBB. DC PPM with upgrade to BiV PPM.\par \par He is here for follow up after transmission of AF on remote. He has no cardiac complaints. Was in the hospital 12/2022 with pancreatitis. Is supposed to follow up for cholecystectomy.

## 2023-01-05 NOTE — CARDIOLOGY SUMMARY
[de-identified] : 1/5/2023: AF, BIV-paced 60 bpm\par 7/27/2022 A-paced, BiV paced (HR 60 bpm),  msec [de-identified] : 9/1/21 EF 46%. Severe LAE. \par 4/1/21 EF 49%. Mild cLVH. Mild-mod MR. Mild TR.\par 1/14/20: EF 45-54% Mod Grade 2 DD. Mod eccentric MR. Mild TR.\par 8/20/18, Mod thickened mitral valve with mod restricted opening. Mild MAC. Mod MR. Mod LAE. Grade 1 DD. LVEF 55-75%.  [de-identified] : CRT-P (Dee) 7/27/2022

## 2023-01-05 NOTE — PHYSICAL EXAM
[General Appearance - Well Developed] : well developed [Normal Appearance] : normal appearance [Well Groomed] : well groomed [General Appearance - Well Nourished] : well nourished [No Deformities] : no deformities [General Appearance - In No Acute Distress] : no acute distress [Heart Rate And Rhythm] : heart rate and rhythm were normal [Heart Sounds] : normal S1 and S2 [Murmurs] : no murmurs present [] : no respiratory distress [Respiration, Rhythm And Depth] : normal respiratory rhythm and effort [Exaggerated Use Of Accessory Muscles For Inspiration] : no accessory muscle use [Auscultation Breath Sounds / Voice Sounds] : lungs were clear to auscultation bilaterally [Left Infraclavicular] : left infraclavicular area [Clean] : clean [Dry] : dry [Abdomen Soft] : soft [Well-Healed] : well-healed [Nail Clubbing] : no clubbing of the fingernails [Cyanosis, Localized] : no localized cyanosis [Erythema] : not erythematous [Warm] : not warm [Tender] : not tender

## 2023-01-20 ENCOUNTER — APPOINTMENT (OUTPATIENT)
Dept: ELECTROPHYSIOLOGY | Facility: CLINIC | Age: 78
End: 2023-01-20
Payer: MEDICARE

## 2023-01-20 VITALS
WEIGHT: 234 LBS | BODY MASS INDEX: 30.03 KG/M2 | RESPIRATION RATE: 16 BRPM | TEMPERATURE: 97.1 F | HEART RATE: 60 BPM | DIASTOLIC BLOOD PRESSURE: 76 MMHG | HEIGHT: 74 IN | SYSTOLIC BLOOD PRESSURE: 150 MMHG

## 2023-01-20 PROCEDURE — 93284 PRGRMG EVAL IMPLANTABLE DFB: CPT

## 2023-01-20 PROCEDURE — 99215 OFFICE O/P EST HI 40 MIN: CPT | Mod: 57

## 2023-02-15 ENCOUNTER — RESULT REVIEW (OUTPATIENT)
Age: 78
End: 2023-02-15

## 2023-02-15 ENCOUNTER — OUTPATIENT (OUTPATIENT)
Dept: OUTPATIENT SERVICES | Facility: HOSPITAL | Age: 78
LOS: 1 days | End: 2023-02-15
Payer: MEDICARE

## 2023-02-15 DIAGNOSIS — Z95.0 PRESENCE OF CARDIAC PACEMAKER: Chronic | ICD-10-CM

## 2023-02-15 DIAGNOSIS — Z95.1 PRESENCE OF AORTOCORONARY BYPASS GRAFT: Chronic | ICD-10-CM

## 2023-02-15 DIAGNOSIS — Z90.10 ACQUIRED ABSENCE OF UNSPECIFIED BREAST AND NIPPLE: Chronic | ICD-10-CM

## 2023-02-15 DIAGNOSIS — Z00.8 ENCOUNTER FOR OTHER GENERAL EXAMINATION: ICD-10-CM

## 2023-02-15 DIAGNOSIS — I48.91 UNSPECIFIED ATRIAL FIBRILLATION: ICD-10-CM

## 2023-02-15 PROCEDURE — 75572 CT HRT W/3D IMAGE: CPT

## 2023-02-15 PROCEDURE — 75572 CT HRT W/3D IMAGE: CPT | Mod: 26,MH

## 2023-02-16 DIAGNOSIS — I48.91 UNSPECIFIED ATRIAL FIBRILLATION: ICD-10-CM

## 2023-02-19 NOTE — ASSESSMENT
[FreeTextEntry1] : Persistent AF\par - Patient is now in persistent atrial fibrillation for the past few months. \par - CHADSVASC of 4 with history of significant bleeding in the past. \par - I have spoken to patient about different options which included starting anticoagulation or proceeding with left atrial appendage closure device. Patent does not want to take anticoagulation at this time as he is afraid bleeding again. \par - I discussed the risks and benefits of implanting a left atrial appendage closure device, specifically Watchman. Patient would like to proceed. \par - I have discussed different treatment options with the patient including other anticoagulation medication. I have explained the risks and benefits of the procedure to the patient. I have explained to the patient the patient will require to be on warfarin for 45 days after implant and OTIS will be repeated. If there is no leak patient will remain on aspirin and Plavix for next month, and then ASA only. There is approximately 1-2% chance of any major cardiovascular complication to occur. Complications include, but are not limited to infection, bleeding, damage to the vessels, hole in the heart, stroke, death and heart attack. The patient understands the risk and would like to proceed with the procedure.  Materials were provided to the patient. Patient indicated that all of his questions were answered to his satisfaction and verbalized understanding.\par Patients CHADVASC Score is 4.   \par Patients HASBLED score is 3.\par (Hypertension, Abnormal Renal/Liver Function, Stroke, Bleeding History or Predisposition, Labile INR, >65, Antiplatelet agents, NSAID, Drugs/Alcohol)\par Dr. Handy              recommends and agrees with implant of watchman\par \par \par - Continue Metoprolol 15 mg Q12 for rate control. \par - Continue Amiodarone 200 mg daily, will most likely consider discontinuing Amiodarone after appendage closure device. \par \par Sick Sinus Syndrome \par - Normal functioning dual chamber pacemaker.\par \par HTN \par - Patient's pressure is well-controlled. \par - Continue Amlodipine 5 mg QD. \par \par

## 2023-02-19 NOTE — HISTORY OF PRESENT ILLNESS
[de-identified] : \par Cardiologist: Dr. Handy\par Ophtho: Dr. GOMEZ ([facility])\par Pulm: referral provided\par \par 75 yo M with history of SSS s/p DC PPM, CAD s/p CABG, LBBB. BiV PPM was attempted but despite multiple catheters and sheaths, CS was unable to be cannulated at that time. Pt admitted 7/9-7/14 for abdominal pain --> recommended MRCP with possible ERCP but patient refused due to claustrophobia and refused ERCP bc he felt better and did not want any other procedures. \par \par Pt was in the ER 10/23 (no admission) for lower extremity edema due to excess salt intake (ate pickles and ham).  He can walk on flat ground and up the stairs without dyspnea.\par \par He is here for routine follow up after recent BiV PPM upgrade with Dr. Danielle (6/24/22). He states he feels much better since device upgrade. Dyspnea improved. Denies chest pain, palpitations, dizziness, lightheadedness, presyncope or syncope. \par \par \par Patient returns to the office today after persistent atrial fibrillation was found on his loop recorder. Patient comes for further evaluation and discussion concerning anticoagulation or left appendage closure device. Patient had significant bleeding in the past and is unable to take anticoagulation at this time.\par \par Patient previously had a discussion with Dr. Ojeda about the Watchman procedure. However, patient wanted to think about it. \par \par

## 2023-02-19 NOTE — CARDIOLOGY SUMMARY
[de-identified] : 7/27/2022 A-paced, BiV paced (HR 60 bpm),  msec [de-identified] : 9/1/21 EF 46%. Severe LAE. \par 4/1/21 EF 49%. Mild cLVH. Mild-mod MR. Mild TR.\par 1/14/20: EF 45-54% Mod Grade 2 DD. Mod eccentric MR. Mild TR.\par 8/20/18, Mod thickened mitral valve with mod restricted opening. Mild MAC. Mod MR. Mod LAE. Grade 1 DD. LVEF 55-75%.  [de-identified] : CRT-P (Dee) 7/27/2022

## 2023-02-19 NOTE — ADDENDUM
[FreeTextEntry1] : IChelsea assisted in documentation on 01/23/2023   acting as a scribe for Dr. Leonid Montiel.\par

## 2023-02-19 NOTE — PHYSICAL EXAM
[General Appearance - Well Developed] : well developed [Normal Appearance] : normal appearance [Well Groomed] : well groomed [General Appearance - Well Nourished] : well nourished [No Deformities] : no deformities [General Appearance - In No Acute Distress] : no acute distress [Heart Rate And Rhythm] : heart rate and rhythm were normal [Heart Sounds] : normal S1 and S2 [Murmurs] : no murmurs present [] : no respiratory distress [Respiration, Rhythm And Depth] : normal respiratory rhythm and effort [Exaggerated Use Of Accessory Muscles For Inspiration] : no accessory muscle use [Auscultation Breath Sounds / Voice Sounds] : lungs were clear to auscultation bilaterally [Left Infraclavicular] : left infraclavicular area [Clean] : clean [Dry] : dry [Healing Well] : healing well [Abdomen Soft] : soft [Erythema] : not erythematous [Warm] : not warm [Tender] : not tender [FreeTextEntry2] : steri-strips c/d/i; removed.

## 2023-02-19 NOTE — PROCEDURE
[Sinus Bradycardia] : sinus bradycardia [See Device Printout] : See device printout [CRT-P] : Cardiac resynchronization therapy pacemaker [DDDR] : DDDR [Voltage: ___ volts] : Voltage was [unfilled] volts [Magnet Rate: ___ Ppm] : magnet rate was [unfilled] Ppm [Longevity: ___ months] : The estimated remaining battery life is [unfilled] months [Sensing Amplitude ___mv] : sensing amplitude was [unfilled] mv [Lead Imp:  ___ohms] : lead impedance was [unfilled] ohms [___V @] : [unfilled] V [___ ms] : [unfilled] ms [None] : none [See Scanned Paceart Report] : See scanned paceart report [Programmed for Longevity] : output reprogrammed for improved battery longevity [de-identified] : [employer] [de-identified] : [id number] [de-identified] : [id number] [de-identified] : 06/24/2022 [de-identified] : 60/130 [de-identified] : AP:3.1\par BP:>99%\par CorVue stable.\par Patient is transmitting to [facility].

## 2023-03-01 ENCOUNTER — OUTPATIENT (OUTPATIENT)
Dept: OUTPATIENT SERVICES | Facility: HOSPITAL | Age: 78
LOS: 1 days | End: 2023-03-01
Payer: MEDICARE

## 2023-03-01 VITALS
HEART RATE: 84 BPM | OXYGEN SATURATION: 96 % | RESPIRATION RATE: 14 BRPM | SYSTOLIC BLOOD PRESSURE: 121 MMHG | DIASTOLIC BLOOD PRESSURE: 65 MMHG | WEIGHT: 239.86 LBS | TEMPERATURE: 98 F | HEIGHT: 74 IN

## 2023-03-01 DIAGNOSIS — Z95.1 PRESENCE OF AORTOCORONARY BYPASS GRAFT: Chronic | ICD-10-CM

## 2023-03-01 DIAGNOSIS — Z01.818 ENCOUNTER FOR OTHER PREPROCEDURAL EXAMINATION: ICD-10-CM

## 2023-03-01 DIAGNOSIS — I48.0 PAROXYSMAL ATRIAL FIBRILLATION: ICD-10-CM

## 2023-03-01 DIAGNOSIS — Z95.0 PRESENCE OF CARDIAC PACEMAKER: Chronic | ICD-10-CM

## 2023-03-01 DIAGNOSIS — Z90.10 ACQUIRED ABSENCE OF UNSPECIFIED BREAST AND NIPPLE: Chronic | ICD-10-CM

## 2023-03-01 LAB
ALBUMIN SERPL ELPH-MCNC: 4.4 G/DL — SIGNIFICANT CHANGE UP (ref 3.5–5.2)
ALP SERPL-CCNC: 87 U/L — SIGNIFICANT CHANGE UP (ref 30–115)
ALT FLD-CCNC: 9 U/L — SIGNIFICANT CHANGE UP (ref 0–41)
ANION GAP SERPL CALC-SCNC: 14 MMOL/L — SIGNIFICANT CHANGE UP (ref 7–14)
APTT BLD: 35.4 SEC — SIGNIFICANT CHANGE UP (ref 27–39.2)
AST SERPL-CCNC: 16 U/L — SIGNIFICANT CHANGE UP (ref 0–41)
BASOPHILS # BLD AUTO: 0.08 K/UL — SIGNIFICANT CHANGE UP (ref 0–0.2)
BASOPHILS NFR BLD AUTO: 1 % — SIGNIFICANT CHANGE UP (ref 0–1)
BILIRUB SERPL-MCNC: 1 MG/DL — SIGNIFICANT CHANGE UP (ref 0.2–1.2)
BUN SERPL-MCNC: 13 MG/DL — SIGNIFICANT CHANGE UP (ref 10–20)
CALCIUM SERPL-MCNC: 9.2 MG/DL — SIGNIFICANT CHANGE UP (ref 8.4–10.5)
CHLORIDE SERPL-SCNC: 102 MMOL/L — SIGNIFICANT CHANGE UP (ref 98–110)
CO2 SERPL-SCNC: 21 MMOL/L — SIGNIFICANT CHANGE UP (ref 17–32)
CREAT SERPL-MCNC: 0.7 MG/DL — SIGNIFICANT CHANGE UP (ref 0.7–1.5)
EGFR: 94 ML/MIN/1.73M2 — SIGNIFICANT CHANGE UP
EOSINOPHIL # BLD AUTO: 0.19 K/UL — SIGNIFICANT CHANGE UP (ref 0–0.7)
EOSINOPHIL NFR BLD AUTO: 2.3 % — SIGNIFICANT CHANGE UP (ref 0–8)
GLUCOSE SERPL-MCNC: 110 MG/DL — HIGH (ref 70–99)
HCT VFR BLD CALC: 41.8 % — LOW (ref 42–52)
HGB BLD-MCNC: 13.8 G/DL — LOW (ref 14–18)
IMM GRANULOCYTES NFR BLD AUTO: 0.2 % — SIGNIFICANT CHANGE UP (ref 0.1–0.3)
INR BLD: 1.15 RATIO — SIGNIFICANT CHANGE UP (ref 0.65–1.3)
LYMPHOCYTES # BLD AUTO: 1.53 K/UL — SIGNIFICANT CHANGE UP (ref 1.2–3.4)
LYMPHOCYTES # BLD AUTO: 18.7 % — LOW (ref 20.5–51.1)
MCHC RBC-ENTMCNC: 28.9 PG — SIGNIFICANT CHANGE UP (ref 27–31)
MCHC RBC-ENTMCNC: 33 G/DL — SIGNIFICANT CHANGE UP (ref 32–37)
MCV RBC AUTO: 87.4 FL — SIGNIFICANT CHANGE UP (ref 80–94)
MONOCYTES # BLD AUTO: 0.62 K/UL — HIGH (ref 0.1–0.6)
MONOCYTES NFR BLD AUTO: 7.6 % — SIGNIFICANT CHANGE UP (ref 1.7–9.3)
NEUTROPHILS # BLD AUTO: 5.75 K/UL — SIGNIFICANT CHANGE UP (ref 1.4–6.5)
NEUTROPHILS NFR BLD AUTO: 70.2 % — SIGNIFICANT CHANGE UP (ref 42.2–75.2)
NRBC # BLD: 0 /100 WBCS — SIGNIFICANT CHANGE UP (ref 0–0)
PLATELET # BLD AUTO: 252 K/UL — SIGNIFICANT CHANGE UP (ref 130–400)
POTASSIUM SERPL-MCNC: 4.6 MMOL/L — SIGNIFICANT CHANGE UP (ref 3.5–5)
POTASSIUM SERPL-SCNC: 4.6 MMOL/L — SIGNIFICANT CHANGE UP (ref 3.5–5)
PROT SERPL-MCNC: 6.8 G/DL — SIGNIFICANT CHANGE UP (ref 6–8)
PROTHROM AB SERPL-ACNC: 13.2 SEC — HIGH (ref 9.95–12.87)
RBC # BLD: 4.78 M/UL — SIGNIFICANT CHANGE UP (ref 4.7–6.1)
RBC # FLD: 14.7 % — HIGH (ref 11.5–14.5)
SODIUM SERPL-SCNC: 137 MMOL/L — SIGNIFICANT CHANGE UP (ref 135–146)
WBC # BLD: 8.19 K/UL — SIGNIFICANT CHANGE UP (ref 4.8–10.8)
WBC # FLD AUTO: 8.19 K/UL — SIGNIFICANT CHANGE UP (ref 4.8–10.8)

## 2023-03-01 PROCEDURE — 80053 COMPREHEN METABOLIC PANEL: CPT

## 2023-03-01 PROCEDURE — 85610 PROTHROMBIN TIME: CPT

## 2023-03-01 PROCEDURE — 85730 THROMBOPLASTIN TIME PARTIAL: CPT

## 2023-03-01 PROCEDURE — 93010 ELECTROCARDIOGRAM REPORT: CPT

## 2023-03-01 PROCEDURE — 85025 COMPLETE CBC W/AUTO DIFF WBC: CPT

## 2023-03-01 PROCEDURE — 36415 COLL VENOUS BLD VENIPUNCTURE: CPT

## 2023-03-01 PROCEDURE — 99214 OFFICE O/P EST MOD 30 MIN: CPT | Mod: 25

## 2023-03-01 PROCEDURE — 93005 ELECTROCARDIOGRAM TRACING: CPT

## 2023-03-01 NOTE — H&P PST ADULT - HISTORY OF PRESENT ILLNESS
PT PRESENTS TO PAST WITH NO SOB, CP, PALPITATIONS, DYSURIA, UTI OR URI AT PRESENT.   PT ABLE TO WALK UP 2-3 FLIGHTS OF STEPS WITH NO SOB.  AS PER THE PT, THIS IS HIS/HER COMPLETE MEDICAL AND SURGICAL HX, INCLUDING MEDICATIONS PRESCRIBED AND OVER THE COUNTER  pt denies any covid s/s, YES 2020--  12/2022   tested positive in the past--PT IS AWARE OF DATE AND TIME OF COVID TESTING PRIOR TO DOP.  pt advised self quarantine till day of procedure  denies travel outside the [place] in the past 30 days  Anesthesia Alert  NO--Difficult Airway  NO--History of neck surgery or radiation  NO--Limited ROM of neck  NO--History of Malignant hyperthermia  NO--Personal or family history of Pseudocholinesterase deficiency  NO--Prior Anesthesia Complication  NO--Latex Allergy  NO--Loose teeth  NO--History of Rheumatoid Arthritis  NO--AUGUST  NO BLEEDING RISK  NO--Other_____

## 2023-03-01 NOTE — H&P PST ADULT - ADDITIONAL PE
AIRWAY CLASS--3  UPPER DENTURES-  PT ADVISED TO REMOVE DOP  BOTTOM TEETH- PT DENIES ANY LOOSE TEETH- MULTIPLE MISSING TEETH

## 2023-03-01 NOTE — H&P PST ADULT - NSICDXPASTMEDICALHX_GEN_ALL_CORE_FT
PAST MEDICAL HISTORY:  Arrhythmia     Atrial fibrillation     Breast cancer 2001    CAD (coronary artery disease)     CHF (congestive heart failure)     Heart attack     [place] (hard of hearing)     HTN (hypertension)     Hypercholesteremia     Obesity     Osteoarthritis

## 2023-03-02 DIAGNOSIS — Z01.818 ENCOUNTER FOR OTHER PREPROCEDURAL EXAMINATION: ICD-10-CM

## 2023-03-02 DIAGNOSIS — I48.0 PAROXYSMAL ATRIAL FIBRILLATION: ICD-10-CM

## 2023-03-03 ENCOUNTER — OUTPATIENT (OUTPATIENT)
Dept: OUTPATIENT SERVICES | Facility: HOSPITAL | Age: 78
LOS: 1 days | End: 2023-03-03
Payer: MEDICARE

## 2023-03-03 DIAGNOSIS — Z95.1 PRESENCE OF AORTOCORONARY BYPASS GRAFT: Chronic | ICD-10-CM

## 2023-03-03 DIAGNOSIS — Z02.9 ENCOUNTER FOR ADMINISTRATIVE EXAMINATIONS, UNSPECIFIED: ICD-10-CM

## 2023-03-03 DIAGNOSIS — Z95.0 PRESENCE OF CARDIAC PACEMAKER: Chronic | ICD-10-CM

## 2023-03-03 DIAGNOSIS — Z90.10 ACQUIRED ABSENCE OF UNSPECIFIED BREAST AND NIPPLE: Chronic | ICD-10-CM

## 2023-03-03 PROBLEM — H91.90 UNSPECIFIED HEARING LOSS, UNSPECIFIED EAR: Chronic | Status: ACTIVE | Noted: 2023-03-01

## 2023-03-03 LAB — MRSA PCR RESULT.: NEGATIVE — SIGNIFICANT CHANGE UP

## 2023-03-03 PROCEDURE — 87640 STAPH A DNA AMP PROBE: CPT

## 2023-03-03 PROCEDURE — 87641 MR-STAPH DNA AMP PROBE: CPT

## 2023-03-04 DIAGNOSIS — Z02.9 ENCOUNTER FOR ADMINISTRATIVE EXAMINATIONS, UNSPECIFIED: ICD-10-CM

## 2023-03-06 ENCOUNTER — OUTPATIENT (OUTPATIENT)
Dept: OUTPATIENT SERVICES | Facility: HOSPITAL | Age: 78
LOS: 1 days | End: 2023-03-06
Payer: MEDICARE

## 2023-03-06 DIAGNOSIS — Z95.0 PRESENCE OF CARDIAC PACEMAKER: Chronic | ICD-10-CM

## 2023-03-06 DIAGNOSIS — Z95.1 PRESENCE OF AORTOCORONARY BYPASS GRAFT: Chronic | ICD-10-CM

## 2023-03-06 DIAGNOSIS — Z90.10 ACQUIRED ABSENCE OF UNSPECIFIED BREAST AND NIPPLE: Chronic | ICD-10-CM

## 2023-03-06 DIAGNOSIS — Z02.9 ENCOUNTER FOR ADMINISTRATIVE EXAMINATIONS, UNSPECIFIED: ICD-10-CM

## 2023-03-06 LAB
APPEARANCE UR: CLEAR — SIGNIFICANT CHANGE UP
BILIRUB UR-MCNC: NEGATIVE — SIGNIFICANT CHANGE UP
COLOR SPEC: YELLOW — SIGNIFICANT CHANGE UP
DIFF PNL FLD: NEGATIVE — SIGNIFICANT CHANGE UP
GLUCOSE UR QL: NEGATIVE — SIGNIFICANT CHANGE UP
KETONES UR-MCNC: NEGATIVE — SIGNIFICANT CHANGE UP
LEUKOCYTE ESTERASE UR-ACNC: NEGATIVE — SIGNIFICANT CHANGE UP
NITRITE UR-MCNC: NEGATIVE — SIGNIFICANT CHANGE UP
PH UR: 6 — SIGNIFICANT CHANGE UP (ref 5–8)
PROT UR-MCNC: SIGNIFICANT CHANGE UP
SP GR SPEC: 1.02 — SIGNIFICANT CHANGE UP (ref 1.01–1.03)
UROBILINOGEN FLD QL: ABNORMAL

## 2023-03-06 PROCEDURE — 87186 SC STD MICRODIL/AGAR DIL: CPT

## 2023-03-06 PROCEDURE — 87086 URINE CULTURE/COLONY COUNT: CPT

## 2023-03-06 PROCEDURE — 81003 URINALYSIS AUTO W/O SCOPE: CPT

## 2023-03-06 PROCEDURE — 87077 CULTURE AEROBIC IDENTIFY: CPT

## 2023-03-07 DIAGNOSIS — Z02.9 ENCOUNTER FOR ADMINISTRATIVE EXAMINATIONS, UNSPECIFIED: ICD-10-CM

## 2023-03-08 DIAGNOSIS — N39.0 URINARY TRACT INFECTION, SITE NOT SPECIFIED: ICD-10-CM

## 2023-03-08 LAB
-  AMIKACIN: SIGNIFICANT CHANGE UP
-  AMOXICILLIN/CLAVULANIC ACID: SIGNIFICANT CHANGE UP
-  AMPICILLIN/SULBACTAM: SIGNIFICANT CHANGE UP
-  AMPICILLIN: SIGNIFICANT CHANGE UP
-  AZTREONAM: SIGNIFICANT CHANGE UP
-  CEFAZOLIN: SIGNIFICANT CHANGE UP
-  CEFEPIME: SIGNIFICANT CHANGE UP
-  CEFOXITIN: SIGNIFICANT CHANGE UP
-  CEFTRIAXONE: SIGNIFICANT CHANGE UP
-  CEFUROXIME: SIGNIFICANT CHANGE UP
-  CIPROFLOXACIN: SIGNIFICANT CHANGE UP
-  ERTAPENEM: SIGNIFICANT CHANGE UP
-  GENTAMICIN: SIGNIFICANT CHANGE UP
-  LEVOFLOXACIN: SIGNIFICANT CHANGE UP
-  MEROPENEM: SIGNIFICANT CHANGE UP
-  NITROFURANTOIN: SIGNIFICANT CHANGE UP
-  PIPERACILLIN/TAZOBACTAM: SIGNIFICANT CHANGE UP
-  TOBRAMYCIN: SIGNIFICANT CHANGE UP
-  TRIMETHOPRIM/SULFAMETHOXAZOLE: SIGNIFICANT CHANGE UP
CULTURE RESULTS: SIGNIFICANT CHANGE UP
METHOD TYPE: SIGNIFICANT CHANGE UP
ORGANISM # SPEC MICROSCOPIC CNT: SIGNIFICANT CHANGE UP
ORGANISM # SPEC MICROSCOPIC CNT: SIGNIFICANT CHANGE UP
SPECIMEN SOURCE: SIGNIFICANT CHANGE UP

## 2023-03-14 ENCOUNTER — OUTPATIENT (OUTPATIENT)
Dept: OUTPATIENT SERVICES | Facility: HOSPITAL | Age: 78
LOS: 1 days | End: 2023-03-14
Payer: MEDICARE

## 2023-03-14 ENCOUNTER — APPOINTMENT (OUTPATIENT)
Dept: ELECTROPHYSIOLOGY | Facility: HOSPITAL | Age: 78
End: 2023-03-14

## 2023-03-14 DIAGNOSIS — Z90.10 ACQUIRED ABSENCE OF UNSPECIFIED BREAST AND NIPPLE: Chronic | ICD-10-CM

## 2023-03-14 DIAGNOSIS — Z95.0 PRESENCE OF CARDIAC PACEMAKER: Chronic | ICD-10-CM

## 2023-03-14 DIAGNOSIS — Z95.1 PRESENCE OF AORTOCORONARY BYPASS GRAFT: Chronic | ICD-10-CM

## 2023-03-14 PROCEDURE — C9399: CPT

## 2023-03-14 PROCEDURE — 93318 ECHO TRANSESOPHAGEAL INTRAOP: CPT

## 2023-03-16 ENCOUNTER — APPOINTMENT (OUTPATIENT)
Dept: ELECTROPHYSIOLOGY | Facility: HOSPITAL | Age: 78
End: 2023-03-16

## 2023-03-22 ENCOUNTER — APPOINTMENT (OUTPATIENT)
Dept: CARDIOLOGY | Facility: CLINIC | Age: 78
End: 2023-03-22

## 2023-03-24 DIAGNOSIS — I48.0 PAROXYSMAL ATRIAL FIBRILLATION: ICD-10-CM

## 2023-03-25 DIAGNOSIS — I48.0 PAROXYSMAL ATRIAL FIBRILLATION: ICD-10-CM

## 2023-03-26 ENCOUNTER — INPATIENT (INPATIENT)
Facility: HOSPITAL | Age: 78
LOS: 9 days | Discharge: HOME | DRG: 444 | End: 2023-04-05
Attending: INTERNAL MEDICINE | Admitting: INTERNAL MEDICINE
Payer: MEDICARE

## 2023-03-26 VITALS
SYSTOLIC BLOOD PRESSURE: 113 MMHG | DIASTOLIC BLOOD PRESSURE: 56 MMHG | TEMPERATURE: 98 F | RESPIRATION RATE: 20 BRPM | HEART RATE: 64 BPM | HEIGHT: 71 IN | OXYGEN SATURATION: 99 %

## 2023-03-26 DIAGNOSIS — Z95.0 PRESENCE OF CARDIAC PACEMAKER: Chronic | ICD-10-CM

## 2023-03-26 DIAGNOSIS — Z90.10 ACQUIRED ABSENCE OF UNSPECIFIED BREAST AND NIPPLE: Chronic | ICD-10-CM

## 2023-03-26 DIAGNOSIS — Z95.1 PRESENCE OF AORTOCORONARY BYPASS GRAFT: Chronic | ICD-10-CM

## 2023-03-26 LAB
ALBUMIN SERPL ELPH-MCNC: 4.1 G/DL — SIGNIFICANT CHANGE UP (ref 3.5–5.2)
ALP SERPL-CCNC: 120 U/L — HIGH (ref 30–115)
ALT FLD-CCNC: 64 U/L — HIGH (ref 0–41)
ANION GAP SERPL CALC-SCNC: 10 MMOL/L — SIGNIFICANT CHANGE UP (ref 7–14)
APPEARANCE UR: CLEAR — SIGNIFICANT CHANGE UP
APTT BLD: 37.3 SEC — SIGNIFICANT CHANGE UP (ref 27–39.2)
AST SERPL-CCNC: 116 U/L — HIGH (ref 0–41)
BACTERIA # UR AUTO: NEGATIVE — SIGNIFICANT CHANGE UP
BASE EXCESS BLDV CALC-SCNC: -0.1 MMOL/L — SIGNIFICANT CHANGE UP (ref -2–3)
BASOPHILS # BLD AUTO: 0.05 K/UL — SIGNIFICANT CHANGE UP (ref 0–0.2)
BASOPHILS NFR BLD AUTO: 0.4 % — SIGNIFICANT CHANGE UP (ref 0–1)
BILIRUB SERPL-MCNC: 2 MG/DL — HIGH (ref 0.2–1.2)
BILIRUB UR-MCNC: ABNORMAL
BUN SERPL-MCNC: 15 MG/DL — SIGNIFICANT CHANGE UP (ref 10–20)
CA-I SERPL-SCNC: 1.21 MMOL/L — SIGNIFICANT CHANGE UP (ref 1.15–1.33)
CALCIUM SERPL-MCNC: 9 MG/DL — SIGNIFICANT CHANGE UP (ref 8.4–10.4)
CHLORIDE SERPL-SCNC: 101 MMOL/L — SIGNIFICANT CHANGE UP (ref 98–110)
CO2 SERPL-SCNC: 23 MMOL/L — SIGNIFICANT CHANGE UP (ref 17–32)
COLOR SPEC: ABNORMAL
CREAT SERPL-MCNC: 0.6 MG/DL — LOW (ref 0.7–1.5)
DIFF PNL FLD: NEGATIVE — SIGNIFICANT CHANGE UP
EGFR: 99 ML/MIN/1.73M2 — SIGNIFICANT CHANGE UP
EOSINOPHIL # BLD AUTO: 0.05 K/UL — SIGNIFICANT CHANGE UP (ref 0–0.7)
EOSINOPHIL NFR BLD AUTO: 0.4 % — SIGNIFICANT CHANGE UP (ref 0–8)
EPI CELLS # UR: 1 /HPF — SIGNIFICANT CHANGE UP (ref 0–5)
GAS PNL BLDV: 133 MMOL/L — LOW (ref 136–145)
GAS PNL BLDV: SIGNIFICANT CHANGE UP
GAS PNL BLDV: SIGNIFICANT CHANGE UP
GLUCOSE SERPL-MCNC: 127 MG/DL — HIGH (ref 70–99)
GLUCOSE UR QL: NEGATIVE — SIGNIFICANT CHANGE UP
HCO3 BLDV-SCNC: 27 MMOL/L — SIGNIFICANT CHANGE UP (ref 22–29)
HCT VFR BLD CALC: 40.8 % — LOW (ref 42–52)
HCT VFR BLDA CALC: 41 % — SIGNIFICANT CHANGE UP (ref 39–51)
HGB BLD CALC-MCNC: 13.7 G/DL — SIGNIFICANT CHANGE UP (ref 12.6–17.4)
HGB BLD-MCNC: 13.2 G/DL — LOW (ref 14–18)
HYALINE CASTS # UR AUTO: 1 /LPF — SIGNIFICANT CHANGE UP (ref 0–7)
IMM GRANULOCYTES NFR BLD AUTO: 0.3 % — SIGNIFICANT CHANGE UP (ref 0.1–0.3)
INR BLD: 1.35 RATIO — HIGH (ref 0.65–1.3)
KETONES UR-MCNC: SIGNIFICANT CHANGE UP
LACTATE BLDV-MCNC: 2.7 MMOL/L — HIGH (ref 0.5–2)
LACTATE SERPL-SCNC: 1.3 MMOL/L — SIGNIFICANT CHANGE UP (ref 0.7–2)
LEUKOCYTE ESTERASE UR-ACNC: NEGATIVE — SIGNIFICANT CHANGE UP
LIDOCAIN IGE QN: 40 U/L — SIGNIFICANT CHANGE UP (ref 7–60)
LYMPHOCYTES # BLD AUTO: 0.52 K/UL — LOW (ref 1.2–3.4)
LYMPHOCYTES # BLD AUTO: 4 % — LOW (ref 20.5–51.1)
MCHC RBC-ENTMCNC: 29.2 PG — SIGNIFICANT CHANGE UP (ref 27–31)
MCHC RBC-ENTMCNC: 32.4 G/DL — SIGNIFICANT CHANGE UP (ref 32–37)
MCV RBC AUTO: 90.3 FL — SIGNIFICANT CHANGE UP (ref 80–94)
MONOCYTES # BLD AUTO: 0.42 K/UL — SIGNIFICANT CHANGE UP (ref 0.1–0.6)
MONOCYTES NFR BLD AUTO: 3.3 % — SIGNIFICANT CHANGE UP (ref 1.7–9.3)
NEUTROPHILS # BLD AUTO: 11.79 K/UL — HIGH (ref 1.4–6.5)
NEUTROPHILS NFR BLD AUTO: 91.6 % — HIGH (ref 42.2–75.2)
NITRITE UR-MCNC: NEGATIVE — SIGNIFICANT CHANGE UP
NRBC # BLD: 0 /100 WBCS — SIGNIFICANT CHANGE UP (ref 0–0)
NT-PROBNP SERPL-SCNC: 881 PG/ML — HIGH (ref 0–300)
PCO2 BLDV: 52 MMHG — SIGNIFICANT CHANGE UP (ref 42–55)
PH BLDV: 7.32 — SIGNIFICANT CHANGE UP (ref 7.32–7.43)
PH UR: 6 — SIGNIFICANT CHANGE UP (ref 5–8)
PLATELET # BLD AUTO: 192 K/UL — SIGNIFICANT CHANGE UP (ref 130–400)
PO2 BLDV: 14 MMHG — SIGNIFICANT CHANGE UP
POTASSIUM BLDV-SCNC: 5.5 MMOL/L — HIGH (ref 3.5–5.1)
POTASSIUM SERPL-MCNC: 4.8 MMOL/L — SIGNIFICANT CHANGE UP (ref 3.5–5)
POTASSIUM SERPL-SCNC: 4.8 MMOL/L — SIGNIFICANT CHANGE UP (ref 3.5–5)
PROT SERPL-MCNC: 6.5 G/DL — SIGNIFICANT CHANGE UP (ref 6–8)
PROT UR-MCNC: ABNORMAL
PROTHROM AB SERPL-ACNC: 15.5 SEC — HIGH (ref 9.95–12.87)
RBC # BLD: 4.52 M/UL — LOW (ref 4.7–6.1)
RBC # FLD: 14.7 % — HIGH (ref 11.5–14.5)
RBC CASTS # UR COMP ASSIST: 13 /HPF — HIGH (ref 0–4)
SAO2 % BLDV: 16.5 % — SIGNIFICANT CHANGE UP
SARS-COV-2 RNA SPEC QL NAA+PROBE: SIGNIFICANT CHANGE UP
SODIUM SERPL-SCNC: 134 MMOL/L — LOW (ref 135–146)
SP GR SPEC: 1.03 — SIGNIFICANT CHANGE UP (ref 1.01–1.03)
TROPONIN T SERPL-MCNC: <0.01 NG/ML — SIGNIFICANT CHANGE UP
UROBILINOGEN FLD QL: ABNORMAL
WBC # BLD: 12.87 K/UL — HIGH (ref 4.8–10.8)
WBC # FLD AUTO: 12.87 K/UL — HIGH (ref 4.8–10.8)
WBC UR QL: 2 /HPF — SIGNIFICANT CHANGE UP (ref 0–5)

## 2023-03-26 PROCEDURE — 76705 ECHO EXAM OF ABDOMEN: CPT | Mod: 26

## 2023-03-26 PROCEDURE — 93010 ELECTROCARDIOGRAM REPORT: CPT | Mod: 77

## 2023-03-26 PROCEDURE — 99291 CRITICAL CARE FIRST HOUR: CPT | Mod: GC

## 2023-03-26 PROCEDURE — 74174 CTA ABD&PLVS W/CONTRAST: CPT | Mod: 26,MA

## 2023-03-26 PROCEDURE — 71045 X-RAY EXAM CHEST 1 VIEW: CPT | Mod: 26

## 2023-03-26 RX ORDER — CEFEPIME 1 G/1
1000 INJECTION, POWDER, FOR SOLUTION INTRAMUSCULAR; INTRAVENOUS ONCE
Refills: 0 | Status: COMPLETED | OUTPATIENT
Start: 2023-03-26 | End: 2023-03-26

## 2023-03-26 RX ORDER — METRONIDAZOLE 500 MG
500 TABLET ORAL ONCE
Refills: 0 | Status: COMPLETED | OUTPATIENT
Start: 2023-03-26 | End: 2023-03-26

## 2023-03-26 RX ORDER — SODIUM CHLORIDE 9 MG/ML
1000 INJECTION INTRAMUSCULAR; INTRAVENOUS; SUBCUTANEOUS ONCE
Refills: 0 | Status: DISCONTINUED | OUTPATIENT
Start: 2023-03-26 | End: 2023-03-26

## 2023-03-26 RX ORDER — ACETAMINOPHEN 500 MG
975 TABLET ORAL ONCE
Refills: 0 | Status: COMPLETED | OUTPATIENT
Start: 2023-03-26 | End: 2023-03-26

## 2023-03-26 RX ADMIN — CEFEPIME 100 MILLIGRAM(S): 1 INJECTION, POWDER, FOR SOLUTION INTRAMUSCULAR; INTRAVENOUS at 19:42

## 2023-03-26 RX ADMIN — Medication 100 MILLIGRAM(S): at 22:03

## 2023-03-26 RX ADMIN — Medication 975 MILLIGRAM(S): at 22:03

## 2023-03-26 NOTE — ED PROVIDER NOTE - PRIOR EKG STATUS
Different from previous ECG, discussed with cardiology fellow. No concern for acute or evolving MI at this time.

## 2023-03-26 NOTE — ED PROVIDER NOTE - ATTENDING CONTRIBUTION TO CARE
Patient is c/o abd pain, chest pain and epigastric abd pain, associated with nausea/vomiting since afternoon. No trauma.   Vitals reviewed.   Patient is awake, alert, answering questions appropriately, appears uncomfortable, but not in distress.  Lungs: CTA,   Abd: +BS, +generalized tenderness, ND, soft,   CNS: Awake, alert, o x 3,   A/P: Chest and abd pain,   labs, CT, US, reevaluation.   symptomatic treatment.

## 2023-03-26 NOTE — ED PROVIDER NOTE - PHYSICAL EXAMINATION
VITAL SIGNS: I have reviewed nursing notes and confirm.  CONSTITUTIONAL: ill-appearing obese male, non-toxic, NAD, shivering  SKIN: Warm dry, normal skin turgor  HEAD: NCAT  EYES: EOMI, PERRLA, no scleral icterus  ENT: Moist mucous membranes, normal pharynx with no erythema or exudates  NECK: Supple; non tender. Full ROM. No cervical LAD  CARD: RRR, no murmurs, rubs or gallops  RESP: clear to ausculation b/l.  No rales, rhonchi, or wheezing.  ABD: soft, + BS, RUQ tenderness, distended, no rebound or guarding. No CVA tenderness  EXT: Limited ROM in lower extremities at baseline, full ROM in bilateral UE, no bony tenderness, bilateral 2+ pitting pedal edema, no calf tenderness  NEURO: normal motor. normal sensory. CN II-XII intact. Cerebellar testing normal. Normal gait.  PSYCH: Cooperative, appropriate. AxOx4.

## 2023-03-26 NOTE — ED PROVIDER NOTE - PROGRESS NOTE DETAILS
Consulted surgery, will evaluate patient. I called radiology for CT/US reports. Discussed with surgery, will evaluate patient.

## 2023-03-26 NOTE — ED ADULT NURSE NOTE - NSIMPLEMENTINTERV_GEN_ALL_ED
Implemented All Fall with Harm Risk Interventions:  [place] to call system. Call bell, personal items and telephone within reach. Instruct patient to call for assistance. Room bathroom lighting operational. Non-slip footwear when patient is off stretcher. Physically safe environment: no spills, clutter or unnecessary equipment. Stretcher in lowest position, wheels locked, appropriate side rails in place. Provide visual cue, wrist band, yellow gown, etc. Monitor gait and stability. Monitor for mental status changes and reorient to person, place, and time. Review medications for side effects contributing to fall risk. Reinforce activity limits and safety measures with patient and family. Provide visual clues: red socks.

## 2023-03-26 NOTE — ED PROVIDER NOTE - WR ORDER NAME 1
OFFICE VISIT      Patient: Ernie Pena Date of Service: 2022   : 1952 MRN: [id number]     SUBJECTIVE:   HISTORY OF PRESENT ILLNESS:  HPI    Follow-up diabetes, coronary artery disease, CHF, A. fib.  Taking Lantus 40 units in the morning and 25 units at dinner.  Also taking metformin 1000 mg twice a day.  Has not been watching his diet or exercising.    Also complains of trigger finger involving little finger of the right hand.  Also complains of dry itchy rash on his shoulder blade on the right side.  PAST MEDICAL HISTORY:  Past Medical History:   Diagnosis Date   • Diabetes mellitus (CMS/HCC)    • Essential (primary) hypertension        MEDICATIONS:  Current Outpatient Medications   Medication Sig   • insulin glargine (Lantus SoloStar) 100 UNIT/ML pen-injector PRIME 2 UNITS BEFORE EACH DOSE. 40 UNITS IN AM, 25 UNITS IN PM   • potassium chloride (K-TAB) 20 MEQ ER tablet TAKE 1 TABLET BY MOUTH DAILY   • atorvastatin (LIPITOR) 80 MG tablet TAKE 1 TABLET BY MOUTH EVERY NIGHT AT BEDTIME   • Aspirin Low Dose 81 MG EC tablet TAKE 1 TABLET BY MOUTH EVERY DAY   • isosorbide mononitrate (IMDUR) 120 MG 24 hr tablet Take 120 mg by mouth daily.   • metoPROLOL succinate (TOPROL-XL) 50 MG 24 hr tablet Take 1 tablet by mouth daily.   • aspirin 81 MG chewable tablet CHEW 1 TABLET BY MOUTH EVERY DAY   • potassium chloride (K-TAB) 20 MEQ ER tablet TAKE 1 TABLET BY MOUTH EVERY DAY   • furosemide (LASIX) 40 MG tablet TAKE 1 TABLET BY MOUTH EVERY DAY   • metformin (GLUCOPHAGE) 1000 MG tablet Take 1 tablet by mouth 2 times daily (with meals).   • furosemide (LASIX) 40 MG tablet Take 1 tablet by mouth daily.   • AMIODarone (PACERONE) 200 MG tablet Take 200 mg by mouth daily.   • nitroGLYcerin (NITROSTAT) 0.4 MG sublingual tablet PLACE 1 TABLET UNDER THE TONGUE EVERY 5 MINUTES AS NEEDED FOR PAIN. MAX 3 DOSES THEN GO TO E.R.   • sacubitril-valsartan (Entresto) 24-26 MG per tablet TAKE 1 TABLET BY MOUTH TWICE A DAY   •  cyclobenzaprine (FLEXERIL) 10 MG tablet Take 1 tablet by mouth 3 times daily as needed for Muscle spasms.   • potassium chloride (KLOR-CON M) 20 MEQ maricel ER tablet Take 1 tablet by mouth daily.   • clindamycin (CLINDAGEL) 1 % gel    • Pradaxa 150 MG Cap    • HYDROcodone-acetaminophen (NORCO) 5-325 MG per tablet Take 1 tablet by mouth.   • insulin degludec (TRESIBA FLEXTOUCH) 200 UNIT/ML pen-injector    • atorvastatin (LIPITOR) 40 MG tablet TAKE 1 TABLET DAILY   • clotrimazole-betamethasone (LOTRISONE) 1-0.05 % cream Apply topically 2 times daily.   • KLOR-CON M20 20 MEQ CR tablet TAKE 1 TABLET DAILY   • ticagrelor (BRILINTA) 90 MG Tab Take by mouth 2 times daily.   • metformin (GLUCOPHAGE) 1000 MG tablet Take 1 tablet by mouth 2 times daily (with meals).   • Magnesium Oxide 400 MG Cap    • isosorbide mononitrate (IMDUR) 60 MG 24 hr tablet Take 1 tablet by mouth daily.   • insulin glargine (LANTUS) 100 UNIT/ML injectable solution    • insulin glargine (LANTUS SOLOSTAR) 100 UNIT/ML pen-injector      No current facility-administered medications for this visit.       ALLERGIES:  ALLERGIES:  No Known Allergies    PAST SURGICAL HISTORY:  Past Surgical History:   Procedure Laterality Date   • Bypass graft         FAMILY HISTORY:  History reviewed. No pertinent family history.    SOCIAL HISTORY:  Social History     Tobacco Use   • Smoking status: Never Smoker   • Smokeless tobacco: Never Used   Vaping Use   • Vaping Use: never used   Substance Use Topics   • Alcohol use: No   • Drug use: No       Review of Systems   Constitutional: Negative.    HENT: Negative.    Eyes: Negative.    Respiratory: Negative.    Cardiovascular: Negative.    Gastrointestinal: Negative.    Endocrine: Negative.    Genitourinary: Negative.    Musculoskeletal: Negative.    Skin: Negative.    Allergic/Immunologic: Negative.    Neurological: Negative.    Hematological: Negative.    Psychiatric/Behavioral: Negative.          OBJECTIVE:     Physical  Exam  Vitals reviewed.   Constitutional:       Appearance: He is well-developed.   HENT:      Head: Normocephalic.      Right Ear: External ear normal.      Left Ear: External ear normal.   Eyes:      Conjunctiva/sclera: Conjunctivae normal.      Pupils: Pupils are equal, round, and reactive to light.   Neck:      Thyroid: No thyromegaly.   Cardiovascular:      Rate and Rhythm: Normal rate and regular rhythm.      Heart sounds: Normal heart sounds. No murmur heard.      Pulmonary:      Breath sounds: Normal breath sounds.   Abdominal:      General: Bowel sounds are normal. There is no distension.      Palpations: Abdomen is soft. There is no mass.      Tenderness: There is no abdominal tenderness.   Musculoskeletal:         General: No tenderness. Normal range of motion.      Cervical back: Normal range of motion and neck supple.   Lymphadenopathy:      Cervical: No cervical adenopathy.   Neurological:      Mental Status: He is alert and oriented to person, place, and time.      Cranial Nerves: No cranial nerve deficit.      Deep Tendon Reflexes: Reflexes are normal and symmetric.   Psychiatric:         Mood and Affect: Mood normal.         Behavior: Behavior normal.         Thought Content: Thought content normal.       Skin–dryness.  Right hand–negative Dupuytren's contracture.  Range of motion normal.  Visit Vitals  BP (!) 140/72   Pulse 67   Temp 98.4 °F (36.9 °C)   Resp 18   Ht 5' 9\" (1.753 m)   Wt 95.1 kg (209 lb 12.3 oz)   SpO2 98%   BMI 30.98 kg/m²         Wt Readings from Last 1 Encounters:   01/06/22 95.1 kg (209 lb 12.3 oz)          DIAGNOSTIC STUDIES:   LAB RESULTS:      Hemoglobin A1c 7.5, was 7.3.      Assessment AND PLAN:   Assessment   Problem List Items Addressed This Visit        Cardiac and Vasculature    Chronic atrial fibrillation (CMS/HCC)    Atherosclerotic heart disease of native coronary artery with other forms of angina pectoris (CMS/HCC)    Chronic systolic (congestive) heart failure  (CMS/[facility]) - Primary       Endocrine and Metabolic    Type 2 diabetes mellitus without complication, with long-term current use of insulin (CMS/[facility])    Relevant Orders    GLYCOHEMOGLOBIN       Musculoskeletal and Injuries    Tenosynovitis       Skin    Eczema          No problem-specific Assessment & Plan notes found for this encounter.        Patient Instructions   Patient Education     4 Steps for Eating Healthier  Changing the way you eat can improve your health. It can lower your cholesterol and blood pressure, and help you stay at a healthy weight. Your diet doesn’t have to be bland and boring to be healthy. Just watch your calories and follow these steps:    Step 1. Eat fewer unhealthy fats  · Choose more fish and lean meats instead of fatty cuts of meat.  · Skip butter and lard, and use less margarine.  · Pass on foods that have palm, coconut, or hydrogenated oils.  · Eat fewer high-fat dairy foods like cheese, ice cream, and whole milk.  · Get a heart-healthy cookbook and try some low-fat recipes.  Step 2. Go light on salt  · Keep the saltshaker off the table.  · Limit high-salt ingredients, such as soy sauce, bouillon, and garlic salt.  · Instead of adding salt when cooking, season your food with herbs and flavorings. Try lemon, garlic, and onion, or salt-free herb seasonings.  · Limit convenience foods, such as boxed or canned foods and restaurant food.  · Read food labels and choose lower-sodium options.  Step 3. Limit sugar  · Pause before you add sugars to pancakes, cereal, coffee, or tea. This includes white and brown table sugar, syrup, honey, and molasses. Cut your usual amount by half.  · Use non-sugar sweeteners. Stevia, aspartame, and sucralose can satisfy a sweet tooth without adding calories.  · Swap out sugar-filled soda and other drinks. Buy sugar-free or low-calorie beverages. Remember water is always the best choice.  · Read labels and choose foods with less added sugar. Keep in mind that  dairy foods and foods with fruit will have some natural sugar.  · Cut the sugar in recipes by 1/3 to 1/2. Boost the flavor with extracts like almond, vanilla, or orange. Or add spices such as cinnamon or nutmeg.  Step 4. Eat more fiber  · Eat fresh fruits and vegetables every day.  · Boost your diet with whole grains. Go for oats, whole-grain rice, and bran.  · Add beans and lentils to your meals.  · Drink more water to match your fiber increase to help prevent constipation.  Date Last Reviewed: 6/1/2017  © 6442-1245 [employer]. [address] [place] [address], [place] [address]. All rights reserved. This information is not intended as a substitute for professional medical care. Always follow your healthcare professional's instructions.         Impression and plan–    1 diabetes– diet and activity emphasized.  Weight reduction.  Continue medications.  Recheck lab prior to next visit.  If still elevated may need to adjust insulin dose.    2 CHF– continue medications, salt restriction.  Follow-up with cardiology.    3 CAD–continue medications.  Follow-up with cardiology.  Call if problems.    4 A. fib–continue medications.  Follow-up with cardiology.  Call for problems.    5 eczema–skin care discussed.  Skin moisturizer.  Okay to use Lotrisone for steroid effect.  Patient already has medication at home.    6 tenosynovitis– stretching.  Call if symptoms worsen.  Will refer to hand surgery.      Return in about 3 months (around 4/6/2022).    Instructions provided as documented in the AVS.      The patient indicated understanding of the diagnosis and agreed with the plan of care.      Ravi Benitez MD  1/6/2022    Xray Chest 1 View-PORTABLE IMMEDIATE

## 2023-03-26 NOTE — ED PROVIDER NOTE - DIFFERENTIAL DIAGNOSIS
Cholecystitis, abscess, Pancreatitis, hepatic failure, obstructive uropathy, diverticulitis, colitis, abscess, bowel obstruction, bowel perforation. Electrolyte abnormalities, LONI, and GI bleeding.  Cardiac arrhythmia and ischemia. Differential Diagnosis

## 2023-03-26 NOTE — ED ADULT NURSE NOTE - OBJECTIVE STATEMENT
Pt from home with C/O lower abdomen pain RUQ pain through the back  from today in AM ,pt with HX- gall blander [id number] from previous admiration,pt denies fever C/O chills ,

## 2023-03-26 NOTE — ED PROVIDER NOTE - CARE PLAN
1 Principal Discharge DX:	Cholecystitis  Secondary Diagnosis:	Elevated brain natriuretic peptide (BNP) level  Secondary Diagnosis:	Abnormal EKG

## 2023-03-26 NOTE — ED PROVIDER NOTE - OBJECTIVE STATEMENT
77-year-old male with past medical history of HTN, HLD, CAD s/p CABG, A-fib on Eliquis, hard of hearing, osteoarthritis, breast cancer with resection in 2001, CHF who presents for chest pain/abdominal pain and right-sided pain for the past 4 hours.  Patient states that he felt a sharp pain on the right side of his abdomen today.  Not associated with food intake.  States he feels febrile, weak.  Denies shortness of breath, nausea, vomiting, diarrhea, numbness, syncope, urinary symptoms.  Patient was evaluated with OTIS by Dr. Montiel on 3/14 for pacemaker placement.

## 2023-03-26 NOTE — ED ADULT NURSE NOTE - HIV OFFER
Denies c/o @ present. Watching TV - will continue to monitor  
S/P ambulating to BR with minimal assist. Tolerated well  
Previously Declined (within the last year)

## 2023-03-26 NOTE — ED PROVIDER NOTE - CLINICAL SUMMARY MEDICAL DECISION MAKING FREE TEXT BOX
Laboratory results reviewed and discussed with patient. CBC shows WBC count of 12.8, Hb/Hct and platelet count are WNL. BMP shows electrolytes WNL and no LONI. Liver Function test are elevated.   EKG reviewed by me and shows pacemaker rhythm, no sign of STEMI noted.   Patient remained hemodynamically stable during the course of ED stay. Discussed with patient about the results of the diagnostic studies. Discussed with admitting physician and MAR, patient is admitted to Medicine for further evaluation and care.

## 2023-03-27 DIAGNOSIS — K81.9 CHOLECYSTITIS, UNSPECIFIED: ICD-10-CM

## 2023-03-27 LAB
ALBUMIN SERPL ELPH-MCNC: 3.7 G/DL — SIGNIFICANT CHANGE UP (ref 3.5–5.2)
ALP SERPL-CCNC: 129 U/L — HIGH (ref 30–115)
ALT FLD-CCNC: 279 U/L — HIGH (ref 0–41)
ANION GAP SERPL CALC-SCNC: 13 MMOL/L — SIGNIFICANT CHANGE UP (ref 7–14)
AST SERPL-CCNC: 341 U/L — HIGH (ref 0–41)
BASE EXCESS BLDV CALC-SCNC: -1.3 MMOL/L — SIGNIFICANT CHANGE UP (ref -2–3)
BILIRUB SERPL-MCNC: 3.9 MG/DL — HIGH (ref 0.2–1.2)
BLD GP AB SCN SERPL QL: SIGNIFICANT CHANGE UP
BUN SERPL-MCNC: 19 MG/DL — SIGNIFICANT CHANGE UP (ref 10–20)
CA-I SERPL-SCNC: 1.16 MMOL/L — SIGNIFICANT CHANGE UP (ref 1.15–1.33)
CALCIUM SERPL-MCNC: 8.3 MG/DL — LOW (ref 8.4–10.5)
CHLORIDE SERPL-SCNC: 107 MMOL/L — SIGNIFICANT CHANGE UP (ref 98–110)
CO2 SERPL-SCNC: 21 MMOL/L — SIGNIFICANT CHANGE UP (ref 17–32)
CREAT SERPL-MCNC: 0.9 MG/DL — SIGNIFICANT CHANGE UP (ref 0.7–1.5)
E COLI DNA BLD POS QL NAA+NON-PROBE: SIGNIFICANT CHANGE UP
EGFR: 87 ML/MIN/1.73M2 — SIGNIFICANT CHANGE UP
ETHANOL SERPL-MCNC: <10 MG/DL — SIGNIFICANT CHANGE UP
GAS PNL BLDV: 134 MMOL/L — LOW (ref 136–145)
GAS PNL BLDV: SIGNIFICANT CHANGE UP
GLUCOSE BLDC GLUCOMTR-MCNC: 100 MG/DL — HIGH (ref 70–99)
GLUCOSE SERPL-MCNC: 110 MG/DL — HIGH (ref 70–99)
GRAM STN FLD: SIGNIFICANT CHANGE UP
HCO3 BLDV-SCNC: 24 MMOL/L — SIGNIFICANT CHANGE UP (ref 22–29)
HCT VFR BLD CALC: 37.1 % — LOW (ref 42–52)
HCT VFR BLDA CALC: 39 % — SIGNIFICANT CHANGE UP (ref 39–51)
HGB BLD CALC-MCNC: 12.9 G/DL — SIGNIFICANT CHANGE UP (ref 12.6–17.4)
HGB BLD-MCNC: 11.8 G/DL — LOW (ref 14–18)
LACTATE BLDV-MCNC: 2.2 MMOL/L — HIGH (ref 0.5–2)
MAGNESIUM SERPL-MCNC: 2.2 MG/DL — SIGNIFICANT CHANGE UP (ref 1.8–2.4)
MCHC RBC-ENTMCNC: 29.2 PG — SIGNIFICANT CHANGE UP (ref 27–31)
MCHC RBC-ENTMCNC: 31.8 G/DL — LOW (ref 32–37)
MCV RBC AUTO: 91.8 FL — SIGNIFICANT CHANGE UP (ref 80–94)
METHOD TYPE: SIGNIFICANT CHANGE UP
NRBC # BLD: 0 /100 WBCS — SIGNIFICANT CHANGE UP (ref 0–0)
PCO2 BLDV: 40 MMHG — LOW (ref 42–55)
PH BLDV: 7.38 — SIGNIFICANT CHANGE UP (ref 7.32–7.43)
PLATELET # BLD AUTO: 158 K/UL — SIGNIFICANT CHANGE UP (ref 130–400)
PO2 BLDV: 32 MMHG — SIGNIFICANT CHANGE UP
POTASSIUM BLDV-SCNC: 3.7 MMOL/L — SIGNIFICANT CHANGE UP (ref 3.5–5.1)
POTASSIUM SERPL-MCNC: 3.9 MMOL/L — SIGNIFICANT CHANGE UP (ref 3.5–5)
POTASSIUM SERPL-SCNC: 3.9 MMOL/L — SIGNIFICANT CHANGE UP (ref 3.5–5)
PROT SERPL-MCNC: 5.6 G/DL — LOW (ref 6–8)
RBC # BLD: 4.04 M/UL — LOW (ref 4.7–6.1)
RBC # FLD: 15.1 % — HIGH (ref 11.5–14.5)
SAO2 % BLDV: 57.4 % — SIGNIFICANT CHANGE UP
SODIUM SERPL-SCNC: 141 MMOL/L — SIGNIFICANT CHANGE UP (ref 135–146)
SPECIMEN SOURCE: SIGNIFICANT CHANGE UP
SPECIMEN SOURCE: SIGNIFICANT CHANGE UP
TRIGL SERPL-MCNC: 53 MG/DL — SIGNIFICANT CHANGE UP
WBC # BLD: 23.38 K/UL — HIGH (ref 4.8–10.8)
WBC # FLD AUTO: 23.38 K/UL — HIGH (ref 4.8–10.8)

## 2023-03-27 PROCEDURE — 99223 1ST HOSP IP/OBS HIGH 75: CPT

## 2023-03-27 PROCEDURE — U0005: CPT

## 2023-03-27 PROCEDURE — 87070 CULTURE OTHR SPECIMN AEROBIC: CPT

## 2023-03-27 PROCEDURE — 78226 HEPATOBILIARY SYSTEM IMAGING: CPT

## 2023-03-27 PROCEDURE — U0003: CPT

## 2023-03-27 PROCEDURE — 99222 1ST HOSP IP/OBS MODERATE 55: CPT

## 2023-03-27 PROCEDURE — C9113: CPT

## 2023-03-27 PROCEDURE — 84484 ASSAY OF TROPONIN QUANT: CPT

## 2023-03-27 PROCEDURE — 85610 PROTHROMBIN TIME: CPT

## 2023-03-27 PROCEDURE — 86901 BLOOD TYPING SEROLOGIC RH(D): CPT

## 2023-03-27 PROCEDURE — 85027 COMPLETE CBC AUTOMATED: CPT

## 2023-03-27 PROCEDURE — 83036 HEMOGLOBIN GLYCOSYLATED A1C: CPT

## 2023-03-27 PROCEDURE — 86850 RBC ANTIBODY SCREEN: CPT

## 2023-03-27 PROCEDURE — 87186 SC STD MICRODIL/AGAR DIL: CPT

## 2023-03-27 PROCEDURE — C1769: CPT

## 2023-03-27 PROCEDURE — 84443 ASSAY THYROID STIM HORMONE: CPT

## 2023-03-27 PROCEDURE — 83605 ASSAY OF LACTIC ACID: CPT

## 2023-03-27 PROCEDURE — 82962 GLUCOSE BLOOD TEST: CPT

## 2023-03-27 PROCEDURE — 36415 COLL VENOUS BLD VENIPUNCTURE: CPT

## 2023-03-27 PROCEDURE — 87205 SMEAR GRAM STAIN: CPT

## 2023-03-27 PROCEDURE — 86900 BLOOD TYPING SEROLOGIC ABO: CPT

## 2023-03-27 PROCEDURE — 84478 ASSAY OF TRIGLYCERIDES: CPT

## 2023-03-27 PROCEDURE — A9537: CPT

## 2023-03-27 PROCEDURE — 83735 ASSAY OF MAGNESIUM: CPT

## 2023-03-27 PROCEDURE — 80053 COMPREHEN METABOLIC PANEL: CPT

## 2023-03-27 PROCEDURE — 87077 CULTURE AEROBIC IDENTIFY: CPT

## 2023-03-27 PROCEDURE — 87040 BLOOD CULTURE FOR BACTERIA: CPT

## 2023-03-27 PROCEDURE — 85025 COMPLETE CBC W/AUTO DIFF WBC: CPT

## 2023-03-27 PROCEDURE — 47490 INCISION OF GALLBLADDER: CPT

## 2023-03-27 PROCEDURE — 80307 DRUG TEST PRSMV CHEM ANLYZR: CPT

## 2023-03-27 PROCEDURE — C1729: CPT

## 2023-03-27 PROCEDURE — 80061 LIPID PANEL: CPT

## 2023-03-27 PROCEDURE — 87075 CULTR BACTERIA EXCEPT BLOOD: CPT

## 2023-03-27 PROCEDURE — 80048 BASIC METABOLIC PNL TOTAL CA: CPT

## 2023-03-27 PROCEDURE — 78226 HEPATOBILIARY SYSTEM IMAGING: CPT | Mod: 26

## 2023-03-27 RX ORDER — AMIODARONE HYDROCHLORIDE 400 MG/1
200 TABLET ORAL DAILY
Refills: 0 | Status: DISCONTINUED | OUTPATIENT
Start: 2023-03-27 | End: 2023-03-27

## 2023-03-27 RX ORDER — ASPIRIN/CALCIUM CARB/MAGNESIUM 324 MG
81 TABLET ORAL DAILY
Refills: 0 | Status: DISCONTINUED | OUTPATIENT
Start: 2023-03-27 | End: 2023-03-27

## 2023-03-27 RX ORDER — POLYETHYLENE GLYCOL 3350 17 G/17G
17 POWDER, FOR SOLUTION ORAL DAILY
Refills: 0 | Status: DISCONTINUED | OUTPATIENT
Start: 2023-03-27 | End: 2023-03-29

## 2023-03-27 RX ORDER — FUROSEMIDE 40 MG
20 TABLET ORAL
Refills: 0 | Status: DISCONTINUED | OUTPATIENT
Start: 2023-03-27 | End: 2023-03-27

## 2023-03-27 RX ORDER — PANTOPRAZOLE SODIUM 20 MG/1
40 TABLET, DELAYED RELEASE ORAL DAILY
Refills: 0 | Status: DISCONTINUED | OUTPATIENT
Start: 2023-03-27 | End: 2023-04-04

## 2023-03-27 RX ORDER — APIXABAN 2.5 MG/1
5 TABLET, FILM COATED ORAL
Refills: 0 | Status: DISCONTINUED | OUTPATIENT
Start: 2023-03-27 | End: 2023-03-27

## 2023-03-27 RX ORDER — ASPIRIN/CALCIUM CARB/MAGNESIUM 324 MG
81 TABLET ORAL DAILY
Refills: 0 | Status: DISCONTINUED | OUTPATIENT
Start: 2023-03-27 | End: 2023-03-28

## 2023-03-27 RX ORDER — ATORVASTATIN CALCIUM 80 MG/1
20 TABLET, FILM COATED ORAL AT BEDTIME
Refills: 0 | Status: DISCONTINUED | OUTPATIENT
Start: 2023-03-27 | End: 2023-04-05

## 2023-03-27 RX ORDER — ENOXAPARIN SODIUM 100 MG/ML
100 INJECTION SUBCUTANEOUS EVERY 12 HOURS
Refills: 0 | Status: DISCONTINUED | OUTPATIENT
Start: 2023-03-27 | End: 2023-03-28

## 2023-03-27 RX ORDER — SODIUM CHLORIDE 9 MG/ML
1000 INJECTION, SOLUTION INTRAVENOUS
Refills: 0 | Status: DISCONTINUED | OUTPATIENT
Start: 2023-03-27 | End: 2023-03-29

## 2023-03-27 RX ORDER — ONDANSETRON 8 MG/1
4 TABLET, FILM COATED ORAL THREE TIMES A DAY
Refills: 0 | Status: DISCONTINUED | OUTPATIENT
Start: 2023-03-27 | End: 2023-04-05

## 2023-03-27 RX ORDER — PIPERACILLIN AND TAZOBACTAM 4; .5 G/20ML; G/20ML
3.38 INJECTION, POWDER, LYOPHILIZED, FOR SOLUTION INTRAVENOUS EVERY 8 HOURS
Refills: 0 | Status: COMPLETED | OUTPATIENT
Start: 2023-03-27 | End: 2023-04-02

## 2023-03-27 RX ORDER — ACETAMINOPHEN 500 MG
650 TABLET ORAL EVERY 6 HOURS
Refills: 0 | Status: DISCONTINUED | OUTPATIENT
Start: 2023-03-27 | End: 2023-04-05

## 2023-03-27 RX ORDER — INFLUENZA VIRUS VACCINE 15; 15; 15; 15 UG/.5ML; UG/.5ML; UG/.5ML; UG/.5ML
0.7 SUSPENSION INTRAMUSCULAR ONCE
Refills: 0 | Status: DISCONTINUED | OUTPATIENT
Start: 2023-03-27 | End: 2023-04-05

## 2023-03-27 RX ORDER — AMLODIPINE BESYLATE 2.5 MG/1
5 TABLET ORAL DAILY
Refills: 0 | Status: DISCONTINUED | OUTPATIENT
Start: 2023-03-27 | End: 2023-04-05

## 2023-03-27 RX ORDER — METOPROLOL TARTRATE 50 MG
50 TABLET ORAL
Refills: 0 | Status: DISCONTINUED | OUTPATIENT
Start: 2023-03-27 | End: 2023-04-05

## 2023-03-27 RX ORDER — MORPHINE SULFATE 50 MG/1
2 CAPSULE, EXTENDED RELEASE ORAL EVERY 4 HOURS
Refills: 0 | Status: DISCONTINUED | OUTPATIENT
Start: 2023-03-27 | End: 2023-03-30

## 2023-03-27 RX ORDER — SENNA PLUS 8.6 MG/1
2 TABLET ORAL AT BEDTIME
Refills: 0 | Status: DISCONTINUED | OUTPATIENT
Start: 2023-03-27 | End: 2023-03-29

## 2023-03-27 RX ORDER — LOSARTAN POTASSIUM 100 MG/1
100 TABLET, FILM COATED ORAL DAILY
Refills: 0 | Status: DISCONTINUED | OUTPATIENT
Start: 2023-03-27 | End: 2023-04-05

## 2023-03-27 RX ORDER — ENOXAPARIN SODIUM 100 MG/ML
100 INJECTION SUBCUTANEOUS EVERY 12 HOURS
Refills: 0 | Status: DISCONTINUED | OUTPATIENT
Start: 2023-03-27 | End: 2023-03-27

## 2023-03-27 RX ADMIN — ATORVASTATIN CALCIUM 20 MILLIGRAM(S): 80 TABLET, FILM COATED ORAL at 21:25

## 2023-03-27 RX ADMIN — Medication 50 MILLIGRAM(S): at 06:52

## 2023-03-27 RX ADMIN — PIPERACILLIN AND TAZOBACTAM 25 GRAM(S): 4; .5 INJECTION, POWDER, LYOPHILIZED, FOR SOLUTION INTRAVENOUS at 06:54

## 2023-03-27 RX ADMIN — LOSARTAN POTASSIUM 100 MILLIGRAM(S): 100 TABLET, FILM COATED ORAL at 06:53

## 2023-03-27 RX ADMIN — AMLODIPINE BESYLATE 5 MILLIGRAM(S): 2.5 TABLET ORAL at 06:52

## 2023-03-27 RX ADMIN — AMIODARONE HYDROCHLORIDE 200 MILLIGRAM(S): 400 TABLET ORAL at 06:55

## 2023-03-27 RX ADMIN — SENNA PLUS 2 TABLET(S): 8.6 TABLET ORAL at 21:25

## 2023-03-27 RX ADMIN — PIPERACILLIN AND TAZOBACTAM 25 GRAM(S): 4; .5 INJECTION, POWDER, LYOPHILIZED, FOR SOLUTION INTRAVENOUS at 21:25

## 2023-03-27 RX ADMIN — SODIUM CHLORIDE 60 MILLILITER(S): 9 INJECTION, SOLUTION INTRAVENOUS at 06:53

## 2023-03-27 RX ADMIN — ENOXAPARIN SODIUM 100 MILLIGRAM(S): 100 INJECTION SUBCUTANEOUS at 06:54

## 2023-03-27 RX ADMIN — PANTOPRAZOLE SODIUM 40 MILLIGRAM(S): 20 TABLET, DELAYED RELEASE ORAL at 11:50

## 2023-03-27 NOTE — CONSULT NOTE ADULT - SUBJECTIVE AND OBJECTIVE BOX
COLTON WAGNER [id number]  77y Male  1d    HPI: 77M HTN, HLD, CAD s/p CABG 7y ago, AFib (eliquis), attempted watchman 3/23 however found to have ADDY thrombus and procedure was performed, Br Ca s/p R mastectomy presenting with periumbilical, RUQ and R breast pain for the past two days with associated F/chills at home, no N/V. Tolerating diet. Patient states that he had a large meal two days prior precipitating this attack of abdominal pain. Patient had EGD 25y ago reportedly normal. Walks around in his own home with no assistive devices. Last attack of this pain 2022. At the time of physical examination patient with no abdominal pain.    PAST MEDICAL & SURGICAL HISTORY:  Arrhythmia  Hypercholesteremia  HTN (hypertension)  Obesity  CHF (congestive heart failure)  Heart attack  Breast cancer    Osteoarthritis  CAD (coronary artery disease)  Atrial fibrillation  [place] (hard of hearing)  S/P CABG x 2014  H/O mastectomy  right   Pacemaker    MEDICATIONS  (STANDING):    MEDICATIONS  (PRN):    Allergies  No Known Allergies  Intolerances    REVIEW OF SYSTEMS  [ x] A ten-point review of systems was otherwise negative except as noted.  [ ] Due to altered mental status/intubation, subjective information were not able to be obtained from the patient. History was obtained, to the extent possible, from review of the chart and collateral sources of information.    Vital Signs Last 24 Hrs  T(C): 39.1 (26 Mar 2023 21:17), Max: 39.1 (26 Mar 2023 21:17)  T(F): 102.3 (26 Mar 2023 21:17), Max: 102.3 (26 Mar 2023 21:17)  HR: 95 (26 Mar 2023 21:17) (64 - 95)  BP: 142/64 (26 Mar 2023 21:17) (113/56 - 142/64)  BP(mean): --  RR: 20 (26 Mar 2023 21:17) (20 - 20)  SpO2: 96% (26 Mar 2023 21:17) (96% - 99%)    Parameters below as of 26 Mar 2023 21:17  Patient On (Oxygen Delivery Method): room air    PHYSICAL EXAM:  GENERAL: NAD, well-appearing  CHEST/LUNG: Clear to auscultation bilaterally  HEART: Regular rate and rhythm  ABDOMEN: Soft, Nontender, Nondistended; No rebound or guarding, umbilical hernia reducible with 2cm defect, no christy's sign  EXTREMITIES:  No clubbing, cyanosis, or edema    Labs:  CAPILLARY BLOOD GLUCOSE                 13.2   12.87 )-----------( 192      ( 26 Mar 2023 19:07 )             40.8       Auto Neutrophil %: 91.6 % (23 @ 19:07)  Auto Immature Granulocyte %: 0.3 % (23 @ 19:07)        134<L>  |  101  |  15  ----------------------------<  127<H>  4.8   |  23  |  0.6<L>      Calcium, Total Serum: 9.0 mg/dL (23 @ 19:07)      LFTs:             6.5  | 2.0  | 116      ------------------[120     ( 26 Mar 2023 19:07 )  4.1  | x    | 64          Lipase:40     Amylase:x         Lactate, Blood: 1.3 mmol/L (23 @ 19:07)  Blood Gas Venous - Lactate: 2.70 mmol/L (23 @ 18:55)      Coags:     15.50  ----< 1.35    ( 26 Mar 2023 19:07 )     37.3        CARDIAC MARKERS ( 26 Mar 2023 19:07 )  x     / <0.01 ng/mL / x     / x     / x              Urinalysis Basic - ( 26 Mar 2023 20:32 )    Color: Silva / Appearance: Clear / S.028 / pH: x  Gluc: x / Ketone: Trace  / Bili: Small / Urobili: 6 mg/dL   Blood: x / Protein: 30 mg/dL / Nitrite: Negative   Leuk Esterase: Negative / RBC: 13 /HPF / WBC 2 /HPF   Sq Epi: x / Non Sq Epi: 1 /HPF / Bacteria: Negative    RADIOLOGY & ADDITIONAL STUDIES:  < from: US Abdomen Upper Quadrant Right (23 @ 20:42) >  IMPRESSION:      Cholelithiasis and gallbladder wall thickening may reflect cholecystitis   in the appropriate clinical setting.    Increased hepatic echogenicity may be secondary to fatty infiltration or   hepatocellular disease.    < end of copied text >  < from: CT Angio Abdomen and Pelvis w/ IV Cont (23 @ 21:22) >    IMPRESSION:      Cholelithiasis with gallbladder wall thickening compatible with acute   cholecystitis in the appropriate clinical setting.    Normal appearance of the pancreas.    < end of copied text >

## 2023-03-27 NOTE — H&P ADULT - NSICDXPASTMEDICALHX_GEN_ALL_CORE_FT
PAST MEDICAL HISTORY:  Arrhythmia     Atrial fibrillation     Breast cancer 2001    CAD (coronary artery disease)     CHF (congestive heart failure)     Heart attack     [place] (hard of hearing)     HTN (hypertension)     Hypercholesteremia     Obesity     Osteoarthritis

## 2023-03-27 NOTE — PATIENT PROFILE ADULT - FALL HARM RISK - HARM RISK INTERVENTIONS

## 2023-03-27 NOTE — H&P ADULT - ASSESSMENT
HPI: Pt is a 76 yo male with a PMHx of HTN, HLD, CAD s/p CABG, CHF (s/p Pacemaker; ABBOTS  P/G [id number]), A-fib and LAAT on Eliquis, hard of hearing, osteoarthritis, and breast CA s/p R mastectomy in  presents for chest pain/abdominal pain and right-sided pain for the past 4 hours.  Patient states that he felt a sharp pain on the right side of his abdomen today.  Not associated with food intake.  States he feels febrile, weak.  Denies shortness of breath, nausea, vomiting, diarrhea, numbness, syncope, urinary symptoms.  Patient was evaluated with OTIS by Dr. Montiel on 3/14 for OTIS and Watchman Procedure for ADDY closure which was aborted due to the presence of a Left Atrial Thrombus. He drinks 2 beers a day and occasionally drinks hard liquor socially, never more than 4-5 drinks in one sitting. He smokes cigars, otherwise no other substance use.      In the ER:   Vitals:  /56, HR 64, RR 20, T 97.7 oral, SpO2 99 % RA  EKG: Line Ventricular-paced rhythm  Sig Labs: WBC 12.87, Hgb 13.2, INR 1.35, Cr 0.6 eGFR 99%, TBili 2.0, ALKP 120,  ALT 64, Lactate 2.20, Lipase 40, Trop<0.01, , VBG WNL, UA -ve, COVID -ve   Imagin) US RUQ: Cholelithiasis and gallbladder wall thickening may reflect cholecystitis in the appropriate clinical setting. Increased hepatic echogenicity may be secondary to fatty infiltration or hepatocellular disease.  2) CT Abdomen: Cholelithiasis with gallbladder wall thickening compatible with acute cholecystitis in the appropriate clinical setting. Normal appearance of the pancreas.  3) CXR (my read): No radiographic evidence of acute cardiopulmonary disease. PPM in place.     Interventions in the ER: Cefepime + Metronidazole, 1L Bolus, Tylenol,     # Acute Cholecystitis   # Elevated LFTs in setting of Cholecystitis   * likely has gallstone cholecystitis, given elevated LFTs/Bilirubin monitor for cholangitis (has RUQ and reported fever at home, no juandice, AMS, or septic at the moment), on imaging finding consistent with cholecystitis without biliary dilatation, lipase normal and no inflammation of pancreas on imaging so no gallstone pancreatitis  - Clinically ill appearing but hemodynamically stable on admission   - Elevated WBC 12.87 and elevated Lactate 2.20 on admission   - Mildly elevated TBili 2.0, ALKP 120,  ALT 64 and Lipase 40 on admission   1) US RUQ: Cholelithiasis and gallbladder wall thickening may reflect cholecystitis in the appropriate clinical setting. Increased hepatic echogenicity may be secondary to fatty infiltration or hepatocellular disease.  2) CT Abdomen: Cholelithiasis with gallbladder wall thickening compatible with acute cholecystitis in the appropriate clinical setting. Normal appearance of the pancreas.  3) CXR (my read): No radiographic evidence of acute cardiopulmonary disease. PPM in place.   - S/P Cefepime + Metronidazole, 1L Bolus, Tylenol in ER  - Surgery on board recommend IR consult for percutaneous cholecystostomy as patient is high risk surgical candidate and GI evaluation for transaminase elevation (NonOperative Candidate given patient high risk features)   - GI consulted for elevation of transaminase, ERCP/MRCP  - IR consulted and informed of case   - ID consulted   - Follow up cultures, Trend Lactate   - Cardiology consulted (need input regarding holding aspirin/eliquis for procedure)  - NPO, Close hemodynamic monitoring, Zosyn, LR at  60cc/hr (given HFmrEF)  - Check TG level, alcohol level  - Continue Zofran for Nausea, Tylenol + Morphine PRN for Pain   - Bowel regimen started   - Monitor LFTs   - Monitor urine output daily - Target Urine output: 0.5cc/kg/hr and adjust fluids accordingly    #CAD s/p CABG on aspirin   #Chronic Afib   #LAAT  #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

## 2023-03-27 NOTE — H&P ADULT - ATTENDING COMMENTS
HPI as above.  Interval history: Pt seen and examined at bedside. No cp or sob.   Vital Signs (24 Hrs):  T(C): 36.4 (03-27-23 @ 07:03), Max: 39.1 (03-26-23 @ 21:17)  HR: 62 (03-27-23 @ 07:03) (61 - 95)  BP: 115/57 (03-27-23 @ 07:03) (113/56 - 142/64)  RR: 18 (03-27-23 @ 07:03) (18 - 20)  SpO2: 96% (03-27-23 @ 07:03) (96% - 99%)  Wt(kg): --  Daily Height in cm: 180.34 (26 Mar 2023 17:42)    Daily     I&O's Summary    PHYSICAL EXAM:  GENERAL: NAD, well-developed  HEAD:  Atraumatic, Normocephalic  EYES: EOMI, PERRLA, conjunctiva and sclera clear  NECK: Supple, No JVD  CHEST/LUNG: Clear to auscultation bilaterally; No wheeze  HEART: Regular rate and rhythm; No murmurs, rubs, or gallops  ABDOMEN: Soft, Nontender, Nondistended; Bowel sounds present  EXTREMITIES:  2+ Peripheral Pulses, No clubbing, cyanosis, or edema  PSYCH: AAOx3  NEUROLOGY: non-focal  SKIN: No rashes or lesions  Labs reviewed  Imaging reviewed independently and reviewed read  EKG reviewed independently and reviewed read    Plan    #Progress Note Handoff  Pending (specify):    Family discussion: house staff updated pt family  Disposition:   Decision to admit the pt is based on acuity as above HPI as above.  Interval history: Pt seen and examined at bedside. No cp or sob.   Vital Signs (24 Hrs):  T(C): 36.4 (03-27-23 @ 07:03), Max: 39.1 (03-26-23 @ 21:17)  HR: 62 (03-27-23 @ 07:03) (61 - 95)  BP: 115/57 (03-27-23 @ 07:03) (113/56 - 142/64)  RR: 18 (03-27-23 @ 07:03) (18 - 20)  SpO2: 96% (03-27-23 @ 07:03) (96% - 99%)  Wt(kg): --  Daily Height in cm: 180.34 (26 Mar 2023 17:42)    Daily     I&O's Summary    PHYSICAL EXAM:  GENERAL: NAD, well-developed  HEAD:  Atraumatic, Normocephalic  EYES: EOMI, PERRLA, conjunctiva and sclera clear  NECK: Supple, No JVD  CHEST/LUNG: Clear to auscultation bilaterally; No wheeze  HEART: Regular rate and rhythm; No murmurs, rubs, or gallops  ABDOMEN: Soft, Nontender, Nondistended; Bowel sounds present  EXTREMITIES:  2+ Peripheral Pulses, No clubbing, cyanosis, or edema  PSYCH: AAOx3  NEUROLOGY: non-focal  SKIN: No rashes or lesions  Labs reviewed  Imaging reviewed independently and reviewed read  EKG reviewed independently and reviewed read    Plan    # Sepsis (not on admission) 2/2 Acute Cholecystitis (fever, wbc, source)    # Elevated LFTs in setting of Cholecystitis   * likely has gallstone cholecystitis, given elevated LFTs/Bilirubin monitor for cholangitis (has RUQ and reported fever at home, no juandice, AMS, or septic at the moment), on imaging finding consistent with cholecystitis without biliary dilatation, lipase normal and no inflammation of pancreas on imaging so no gallstone pancreatitis  -IR consult   - continue zosyn, ID consult appreciated, WBC increase to 23K. if fevers or + cultures, may need wiley follow up wbc   - not hypotensive   -advance GI consult   - LFTs worsening today, possible stone with elevated tbili  - surgery no intervention   - follow up cultures   #rest as above     #Progress Note Handoff  Pending (specify):  follow up IR, GI, ID   Family discussion: house staff updated pt family  Disposition: snf vs home   Decision to admit the pt is based on acuity as above  High risk given above acuity and comorbidities, labs reviewed, antwan Gi

## 2023-03-27 NOTE — ED ADULT NURSE REASSESSMENT NOTE - NS ED NURSE REASSESS COMMENT FT1
Pt reassessed A/O times 4  VS r retaken see   flow sheet  comfort provide ,pt report filling slight better after  treatment is given , ED attending made aware of patient status ,pt care transfer to next shift RN .
patient admitted to [facility] and moved to ED3 bed 7.  Patient remains on cardiac monitor. Patient in stable condition and nad.
patient received from previous RN. Patient offers no complaints. Will continue to monitor.
patient received from previous RN. Patient remains on cardiac monitor. Patient offers no complaints at this time. Will continue to monitor.

## 2023-03-27 NOTE — PATIENT PROFILE ADULT - SURGICAL SITE INCISION
F/U for nausea, vomiting and abdominal pain(Saige for Brenton Mccrary)    S: Ms. Leonidas Russell was seen by me today during rounds. Sitter at bedside. At this time, she is resting +comfortably. The patient has no new complaints today. She awaits transfer to [facility] for 'pain'(D/w RN: apparently for suicidal ideation)    She had a gyn eval at [facility] about a month ago. LNMP about two weeks ago. O: Blood pressure 116/72, pulse 77, temperature 98.9 °F (37.2 °C), resp. rate 18, height 5' 2\" (1.575 m), weight 45.9 kg (101 lb 3.1 oz), last menstrual period 02/16/2019, SpO2 96 %, not currently breastfeeding. Gen: Patient is in no acute distress. There is no jaundice. Lungs: Clear to auscultation bilaterally . Heart:+RRR. Abd: Soft,  Non tender,  bowel sounds present. Extremities: Warm. Cross sectional imaging:  None new    A: Active Problems:    Marijuana abuse (12/29/2015)      Gastroparesis (3/29/2016)      Nausea and vomiting (9/27/2016)      DKA, type 1 ([address] (2/14/2018)         Plan:  No pain today. For recurring pain gyn eval was suggested by Dr Brenton Mccrary (endometriosis, ovulation related pain, etc)    F/u in office in six weeks with Dr Brenton Mccrary for follow up of constipation, abdominal pain, nausea and vomiting    Follow up on C esterase inhibitor. I will see on request.    Jacky her RN.     Janine Mera MD    3/9/2019 no

## 2023-03-27 NOTE — CONSULT NOTE ADULT - SUBJECTIVE AND OBJECTIVE BOX
Patient is a 78 y/o male with HTN, HLD, CAD s/p CABG 7y ago, AFib (eliquis), attempted watchman 3/23 however found to have ADDY thrombus when procedure was performed, Br Ca s/p R mastectomy who presents with abdominal pain. Patient notes pain is located in the periumbilical and RUQ areas, sharp when it occurred with no alleviating and aggravating factors. Patient has had three prior episodes of pain like this in which in the past he was advised to have his Gallbladder removed but he never followed up upon discharge. Patietn since ED presentation notes pain is gone. Wife is present during interview.        PAST MEDICAL & SURGICAL HISTORY:  Hypercholesteremia  HTN (hypertension)  Obesity  CHF (congestive heart failure)  Breast cancer  Osteoarthritis  CAD (coronary artery disease)  Atrial fibrillation  [place] (hard of hearing)  S/P CABG x 5   H/O mastectomy  Pacemaker          MEDICATIONS  (STANDING):  aMIOdarone    Tablet 200 milliGRAM(s) Oral daily  amLODIPine   Tablet 5 milliGRAM(s) Oral daily  atorvastatin 20 milliGRAM(s) Oral at bedtime  enoxaparin Injectable 100 milliGRAM(s) SubCutaneous every 12 hours  lactated ringers. 1000 milliLiter(s) (60 mL/Hr) IV Continuous <Continuous>  losartan 100 milliGRAM(s) Oral daily  metoprolol tartrate 50 milliGRAM(s) Oral two times a day  pantoprazole  Injectable 40 milliGRAM(s) IV Push daily  piperacillin/tazobactam IVPB.. 3.375 Gram(s) IV Intermittent every 8 hours  polyethylene glycol 3350 17 Gram(s) Oral daily  senna 2 Tablet(s) Oral at bedtime    MEDICATIONS  (PRN):  acetaminophen     Tablet .. 650 milliGRAM(s) Oral every 6 hours PRN Temp greater or equal to 38C (100.4F), Mild Pain (1 - 3)  morphine  - Injectable 2 milliGRAM(s) IV Push every 4 hours PRN Severe Pain (7 - 10)  ondansetron Injectable 4 milliGRAM(s) IV Push three times a day PRN Nausea and/or Vomiting      Allergies  No Known Allergies        Review of Systems  General:  Denies Fatigue, Denies Fever, Denies Weakness ,Denies Weight Loss   HEENT: Denies Trouble Swallowing ,Denies  Sore Throat , Denies Change in hearing/vision/speech ,Denies Dizziness    Cardio: Denies  Chest Pain , Palpitations    Respiratory: Denies worsening of SOB, Denies Cough  Abdomen: See detailed HPI  Neuro: Denies Headache Denies Dizziness, Denies Paresthesias  MSK: Denies pain in Bones/Joints/Muscles   Psych: Patient denies depression, denies suicidal or homicidal ideations  Integ: Patient Denies rash, or new skin lesions       Vital Signs Last 24 Hrs  T(C): 36.4 (27 Mar 2023 07:03), Max: 39.1 (26 Mar 2023 21:17)  T(F): 97.6 (27 Mar 2023 07:03), Max: 102.3 (26 Mar 2023 21:17)  HR: 62 (27 Mar 2023 07:03) (61 - 95)  BP: 115/57 (27 Mar 2023 07:03) (113/56 - 142/64)  BP(mean): --  RR: 18 (27 Mar 2023 07:03) (18 - 20)  SpO2: 96% (27 Mar 2023 07:03) (96% - 99%)    Parameters below as of 27 Mar 2023 07:03  Patient On (Oxygen Delivery Method): room air    Physical Exam  Gen: NAD  Head: NC/AT, no visible deformity  ENT: PERRLA, Sclera non icteric   Cardio: S1/S2 No S3/S4, Regular  Resp: CTA B/L  Abdomen: Soft, ND/NT  Neuro: AAOx3, Cranial Nerve II-XII intact   Extremities: FROM x 4  Skin: No jaundice, no excoriation       Labs:                            11.8   23.38 )-----------( 158      ( 27 Mar 2023 06:55 )             37.1       Auto Neutrophil %: 91.6 % (23 @ 19:07)  Auto Immature Granulocyte %: 0.3 % (23 @ 19:07)        141  |  107  |  19  ----------------------------<  110<H>  3.9   |  21  |  0.9      Calcium, Total Serum: 8.3 mg/dL (23 @ 06:55)      LFTs:             5.6  | 3.9  | 341      ------------------[129     ( 27 Mar 2023 06:55 )  3.7  | x    | 279             Blood Gas Venous - Lactate: 2.20 mmol/L (23 @ 21:36)  Lactate, Blood: 1.3 mmol/L (23 @ 19:07)  Blood Gas Venous - Lactate: 2.70 mmol/L (23 @ 18:55)      Coags:     15.50  ----< 1.35    ( 26 Mar 2023 19:07 )     37.3        CARDIAC MARKERS ( 26 Mar 2023 19:07 )  x     / <0.01 ng/mL / x     / x     / x            Alcohol, Blood: <10 mg/dL (23 @ 06:55)    Urinalysis Basic - ( 26 Mar 2023 20:32 )    Color: Silva / Appearance: Clear / S.028 / pH: x  Gluc: x / Ketone: Trace  / Bili: Small / Urobili: 6 mg/dL   Blood: x / Protein: 30 mg/dL / Nitrite: Negative   Leuk Esterase: Negative / RBC: 13 /HPF / WBC 2 /HPF   Sq Epi: x / Non Sq Epi: 1 /HPF / Bacteria: Negative        Culture - Blood (collected 26 Mar 2023 19:27)  Source: .Blood Blood-Peripheral  Gram Stain (27 Mar 2023 10:59):    Growth in anaerobic bottle: Gram Negative Rods    Growth in aerobic bottle: Gram Negative Rods  Preliminary Report (27 Mar 2023 10:59):    Growth in anaerobic bottle: Gram Negative Rods    Growth in aerobic bottle: Gram Negative Rods    Culture - Blood (collected 26 Mar 2023 19:27)  Source: .Blood Blood-Peripheral  Gram Stain (27 Mar 2023 10:59):    Growth in anaerobic bottle: Gram Negative Rods    Growth in aerobic bottle: Gram Negative Rods  Preliminary Report (27 Mar 2023 11:00):    Growth in anaerobic bottle: Gram Negative Rods    Growth in aerobic bottle: Gram Negative Rods    ***Blood Panel PCR results on this specimen are available    approximately 3 hours after the Gram stain result.***    Gram stain, PCR, and/or culture results may not always    correspond due to difference in methodologies.    ************************************************************    This PCR assay was performed by multiplex PCR. This    Assay tests for 66 bacterial and resistance gene targets.    Please refer to the [facility] test directory    at [email or web address] for details.  Organism: Blood Culture PCR (27 Mar 2023 11:27)  Organism: Blood Culture PCR (27 Mar 2023 11:27)      RADIOLOGY & ADDITIONAL STUDIES:  CT Angio Abdomen and Pelvis w/ IV Cont 23   IMPRESSION:      Cholelithiasis with gallbladder wall thickening compatible with acute   cholecystitis in the appropriate clinical setting.    Normal appearance of the pancreas.        US Abdomen Upper Quadrant Right 23   IMPRESSION:  CBD 7mm    Cholelithiasis and gallbladder wall thickening may reflect cholecystitis   in the appropriate clinical setting.    Increased hepatic echogenicity may be secondary to fatty infiltration or   hepatocellular disease.

## 2023-03-27 NOTE — CONSULT NOTE ADULT - NS ATTEND AMEND GEN_ALL_CORE FT
Patient seen and examined during the GI advanced endoscopy around Case discussed with the GI team during rounds, plan communicated to the primary team, assessment and plan as above

## 2023-03-27 NOTE — H&P ADULT - NSHPPHYSICALEXAM_GEN_ALL_CORE
CONSTITUTIONAL: ill-appearing obese male, non-toxic, NAD, shivering  SKIN: Warm dry, normal skin turgor  HEAD: NCAT  EYES: EOMI, PERRLA, no scleral icterus  ENT: Moist mucous membranes, normal pharynx with no erythema or exudates  NECK: Supple; non tender. Full ROM. No cervical LAD  CARD: RRR, no murmurs, rubs or gallops  RESP: clear to ausculation b/l.  No rales, rhonchi, or wheezing.  ABD: soft, + BS, RUQ tenderness, distended, no rebound or guarding. No CVA tenderness  EXT: Limited ROM in lower extremities at baseline, full ROM in bilateral UE, no bony tenderness, bilateral 2+ pitting pedal edema, no calf tenderness  NEURO: normal motor. normal sensory. CN II-XII intact. Cerebellar testing normal. Normal gait.  PSYCH: Cooperative, appropriate. AxOx4.

## 2023-03-27 NOTE — CONSULT NOTE ADULT - ASSESSMENT
Assessment  77M HTN, HLD, CAD s/p CABG 7y ago, AFib (eliquis), attempted watchman 3/23 however found to have ADDY thrombus and procedure was performed, Br Ca s/p R mastectomy presenting with periumbilical, RUQ and R breast pain for the past two days with associated F/chills at home, no N/V. Patient found to have acute cholecystitis however given recent ADDY thrombus on AC, CHF, Afib (eliquis), CAD s/p CABG patient is high risk surgical candidate. Patient may benefit after resolution of ADDY thrombus as cessation of AC for prolonged period of time may impose unnecessary risk to the patient    Plan  - GI evaluation for transaminase elevation  - IR consult for percutaneous cholecystostomy as patient is high risk surgical candidate  - general surgery to follow  - NPO, IVF, IV Abx  - pain control as needed  - hemodynamic monitoring  - d/w Dr. Watkins

## 2023-03-27 NOTE — CONSULT NOTE ADULT - ATTENDING COMMENTS
patient seen and evaluated. mildly tender RUQ. has possible acute cholecystitis. recommend npo, iv fluids, abx. consider percutaneous drainage as patient is high risk for surgery.

## 2023-03-27 NOTE — CONSULT NOTE ADULT - SUBJECTIVE AND OBJECTIVE BOX
INTERVENTIONAL RADIOLOGY CONSULT:     Procedure Requested:     HPI:  HPI: Pt is a 78 yo male with a PMHx of HTN, HLD, CAD s/p CABG, CHF (s/p Pacemaker; ABBOTS  P/G [id number]), A-fib and LAAT on Eliquis, hard of hearing, osteoarthritis, and breast CA s/p R mastectomy in  presents for chest pain/abdominal pain and right-sided pain for the past 4 hours.  Patient states that he felt a sharp pain on the right side of his abdomen today.  Not associated with food intake.  States he feels febrile, weak.  Denies shortness of breath, nausea, vomiting, diarrhea, numbness, syncope, urinary symptoms.  Patient was evaluated with OTIS by Dr. Montiel on 3/14 for OTIS and Watchman Procedure for ADDY closure which was aborted due to the presence of a Left Atrial Thrombus. He drinks 2 beers a day and occasionally drinks hard liquor socially, never more than 4-5 drinks in one sitting. He smokes cigars, otherwise no other substance use.      In the ER:   Vitals:  /56, HR 64, RR 20, T 97.7 oral, SpO2 99 % RA  EKG: Line Ventricular-paced rhythm  Sig Labs: WBC 12.87, Hgb 13.2, INR 1.35, Cr 0.6 eGFR 99%, TBili 2.0, ALKP 120,  ALT 64, Lactate 2.20, Lipase 40, Trop<0.01, , VBG WNL, UA -ve, COVID -ve   Imagin) US RUQ: Cholelithiasis and gallbladder wall thickening may reflect cholecystitis in the appropriate clinical setting. Increased hepatic echogenicity may be secondary to fatty infiltration or hepatocellular disease.  2) CT Abdomen: Cholelithiasis with gallbladder wall thickening compatible with acute cholecystitis in the appropriate clinical setting. Normal appearance of the pancreas.  3) CXR (my read): No radiographic evidence of acute cardiopulmonary disease. PPM in place.     Interventions in the ER: Cefepime + Metronidazole, 1L Bolus, Tylenol,    (27 Mar 2023 02:12)      PAST MEDICAL & SURGICAL HISTORY:  Arrhythmia      Hypercholesteremia      HTN (hypertension)      Obesity      CHF (congestive heart failure)      Heart attack      Breast cancer        Osteoarthritis      CAD (coronary artery disease)      Atrial fibrillation      [place] (hard of hearing)      S/P CABG x 5        H/O mastectomy  right 2001      Pacemaker          MEDICATIONS  (STANDING):  aMIOdarone    Tablet 200 milliGRAM(s) Oral daily  amLODIPine   Tablet 5 milliGRAM(s) Oral daily  atorvastatin 20 milliGRAM(s) Oral at bedtime  enoxaparin Injectable 100 milliGRAM(s) SubCutaneous every 12 hours  lactated ringers. 1000 milliLiter(s) (60 mL/Hr) IV Continuous <Continuous>  losartan 100 milliGRAM(s) Oral daily  metoprolol tartrate 50 milliGRAM(s) Oral two times a day  pantoprazole  Injectable 40 milliGRAM(s) IV Push daily  piperacillin/tazobactam IVPB.. 3.375 Gram(s) IV Intermittent every 8 hours  polyethylene glycol 3350 17 Gram(s) Oral daily  senna 2 Tablet(s) Oral at bedtime    MEDICATIONS  (PRN):  acetaminophen     Tablet .. 650 milliGRAM(s) Oral every 6 hours PRN Temp greater or equal to 38C (100.4F), Mild Pain (1 - 3)  morphine  - Injectable 2 milliGRAM(s) IV Push every 4 hours PRN Severe Pain (7 - 10)  ondansetron Injectable 4 milliGRAM(s) IV Push three times a day PRN Nausea and/or Vomiting      Allergies    No Known Allergies    Intolerances          FAMILY HISTORY:  Family history of heart disease (Father, Mother)    Family history of lung cancer (Sibling)        Physical Exam:   Vital Signs Last 24 Hrs  T(C): 36.4 (27 Mar 2023 07:03), Max: 39.1 (26 Mar 2023 21:17)  T(F): 97.6 (27 Mar 2023 07:03), Max: 102.3 (26 Mar 2023 21:17)  HR: 62 (27 Mar 2023 07:03) (61 - 95)  BP: 115/57 (27 Mar 2023 07:03) (113/56 - 142/64)  BP(mean): --  RR: 18 (27 Mar 2023 07:03) (18 - 20)  SpO2: 96% (27 Mar 2023 07:03) (96% - 99%)    Parameters below as of 27 Mar 2023 07:03  Patient On (Oxygen Delivery Method): room air          Labs:                         11.8   23.38 )-----------( 158      ( 27 Mar 2023 06:55 )             37.1     03-27    141  |  107  |  19  ----------------------------<  110<H>  3.9   |  21  |  0.9    Ca    8.3<L>      27 Mar 2023 06:55  Mg     2.2     03-27    TPro  5.6<L>  /  Alb  3.7  /  TBili  3.9<H>  /  DBili  x   /  AST  341<H>  /  ALT  279<H>  /  AlkPhos  129<H>      PT/INR - ( 26 Mar 2023 19:07 )   PT: 15.50 sec;   INR: 1.35 ratio         PTT - ( 26 Mar 2023 19:07 )  PTT:37.3 sec    Pertinent labs:                      11.8   23.38 )-----------( 158      ( 27 Mar 2023 06:55 )             37.1           141  |  107  |  19  ----------------------------<  110<H>  3.9   |  21  |  0.9    Ca    8.3<L>      27 Mar 2023 06:55  Mg     2.2     -27    TPro  5.6<L>  /  Alb  3.7  /  TBili  3.9<H>  /  DBili  x   /  AST  341<H>  /  ALT  279<H>  /  AlkPhos  129<H>        PT/INR - ( 26 Mar 2023 19:07 )   PT: 15.50 sec;   INR: 1.35 ratio         PTT - ( 26 Mar 2023 19:07 )  PTT:37.3 sec    Radiology & Additional Studies:     Radiology imaging reviewed.       ASSESSMENT AND PLAN:    Pt is a 78 yo male with a PMHx of HTN, HLD, CAD s/p CABG, CHF (s/p Pacemaker; ABBOTS  P/G [id number]), A-fib and LAAT on Eliquis, hard of hearing, osteoarthritis, and breast CA s/p R mastectomy in  presents for chest pain/abdominal pain and right-sided pain for the past 4 hours, found to have acute cholecystitis. IR consulted for percutaneous cholecystostomy.     -Recommend HIDA scan  -At least 3 days off of anticoagulation (last dose yesterday 3/26 per primary team)  -Antibiotic management as per ID  -Discussed with primary team    Thank you for the courtesy of this consult, please call [phone] with any further questions.

## 2023-03-27 NOTE — H&P ADULT - HISTORY OF PRESENT ILLNESS
HPI: Pt is a 76 yo male with a PMHx of HTN, HLD, CAD s/p CABG, CHF (s/p Pacemaker; ABBOTS  P/G [id number]), A-fib and LAAT on Eliquis, hard of hearing, osteoarthritis, and breast CA s/p R mastectomy in  presents for chest pain/abdominal pain and right-sided pain for the past 4 hours.  Patient states that he felt a sharp pain on the right side of his abdomen today.  Not associated with food intake.  States he feels febrile, weak.  Denies shortness of breath, nausea, vomiting, diarrhea, numbness, syncope, urinary symptoms.  Patient was evaluated with OTIS by Dr. Montiel on 3/14 for OTIS and Watchman Procedure for ADDY closure which was aborted due to the presence of a Left Atrial Thrombus. He drinks 2 beers a day and occasionally drinks hard liquor socially, never more than 4-5 drinks in one sitting. He smokes cigars, otherwise no other substance use.      In the ER:   Vitals:  /56, HR 64, RR 20, T 97.7 oral, SpO2 99 % RA  EKG: Line Ventricular-paced rhythm  Sig Labs: WBC 12.87, Hgb 13.2, INR 1.35, Cr 0.6 eGFR 99%, TBili 2.0, ALKP 120,  ALT 64, Lactate 2.20, Lipase 40, Trop<0.01, , VBG WNL, UA -ve, COVID -ve   Imagin) US RUQ: Cholelithiasis and gallbladder wall thickening may reflect cholecystitis in the appropriate clinical setting. Increased hepatic echogenicity may be secondary to fatty infiltration or hepatocellular disease.  2) CT Abdomen: Cholelithiasis with gallbladder wall thickening compatible with acute cholecystitis in the appropriate clinical setting. Normal appearance of the pancreas.  3) CXR (my read): No radiographic evidence of acute cardiopulmonary disease. PPM in place.     Interventions in the ER: Cefepime + Metronidazole, 1L Bolus, Tylenol,

## 2023-03-27 NOTE — CONSULT NOTE ADULT - ASSESSMENT
ASSESSMENT  77y M admitted with Cholecystitis    HPI: Pt is a 78 yo male with a PMHx of HTN, HLD, CAD s/p CABG, CHF (s/p Pacemaker; ABBOTS  P/G [id number]), A-fib and LAAT on Eliquis, hard of hearing, osteoarthritis, and breast CA s/p R mastectomy in 2001 presents for chest pain/abdominal pain and right-sided pain for the past 4 hours.  Patient states that he felt a sharp pain on the right side of his abdomen today.  Not associated with food intake.  States he feels febrile, weak.  Denies shortness of breath, nausea, vomiting, diarrhea, numbness, syncope, urinary symptoms.  Patient was evaluated with OTIS by Dr. Montiel on 3/14 for OTIS and Watchman Procedure for ADDY closure which was aborted due to the presence of a Left Atrial Thrombus. He drinks 2 beers a day and occasionally drinks hard liquor socially, never more than 4-5 drinks in one sitting. He smokes cigars, otherwise no other substance use.    In the ER: WBC 12.87, Hgb 13.2, INR 1.35, Cr 0.6 eGFR 99%, TBili 2.0, ALKP 120,  ALT 64, Lactate 2.20, Lipase 40, Trop<0.01, ,   1) US RUQ: Cholelithiasis and gallbladder wall thickening may reflect cholecystitis in the appropriate clinical setting. Increased hepatic echogenicity may be secondary to fatty infiltration or hepatocellular disease.  2) CT Abdomen: Cholelithiasis with gallbladder wall thickening compatible with acute cholecystitis in the appropriate clinical setting. Normal appearance of the pancreas.      PROBLEMS  Pt with Severe Sepsis (T>101F, Pulse>90, WBC>12), lactic acidosis due to acute cholecystitis  Seen by general surgery    On piperacillin/tazobactam IVPB.. 3.375 Gram(s) IV Intermittent every 8 hours    Hyponatremia      PLAN  - Await blood cultures, urine culture, wound culture  - Continue Zosyn  - Await GI & IR consults  - Repeat sodium, wbc

## 2023-03-27 NOTE — CONSULT NOTE ADULT - SUBJECTIVE AND OBJECTIVE BOX
WAGNER SEGURA  77y, Male  Allergy: No Known Allergies      CHIEF COMPLAINT: Abdominal Pain (27 Mar 2023 02:12)      HPI:  HPI: Pt is a 78 yo male with a PMHx of HTN, HLD, CAD s/p CABG, CHF (s/p Pacemaker; ABBOTS  P/G [id number]), A-fib and LAAT on Eliquis, hard of hearing, osteoarthritis, and breast CA s/p R mastectomy in  presents for chest pain/abdominal pain and right-sided pain for the past 4 hours.  Patient states that he felt a sharp pain on the right side of his abdomen today.  Not associated with food intake.  States he feels febrile, weak.  Denies shortness of breath, nausea, vomiting, diarrhea, numbness, syncope, urinary symptoms.  Patient was evaluated with OTIS by Dr. Montiel on 3/14 for OTIS and Watchman Procedure for ADDY closure which was aborted due to the presence of a Left Atrial Thrombus. He drinks 2 beers a day and occasionally drinks hard liquor socially, never more than 4-5 drinks in one sitting. He smokes cigars, otherwise no other substance use.      In the ER:   Vitals:  /56, HR 64, RR 20, T 97.7 oral, SpO2 99 % RA  EKG: Line Ventricular-paced rhythm  Sig Labs: WBC 12.87, Hgb 13.2, INR 1.35, Cr 0.6 eGFR 99%, TBili 2.0, ALKP 120,  ALT 64, Lactate 2.20, Lipase 40, Trop<0.01, , VBG WNL, UA -ve, COVID -ve   Imagin) US RUQ: Cholelithiasis and gallbladder wall thickening may reflect cholecystitis in the appropriate clinical setting. Increased hepatic echogenicity may be secondary to fatty infiltration or hepatocellular disease.  2) CT Abdomen: Cholelithiasis with gallbladder wall thickening compatible with acute cholecystitis in the appropriate clinical setting. Normal appearance of the pancreas.  3) CXR (my read): No radiographic evidence of acute cardiopulmonary disease. PPM in place.     Interventions in the ER: Cefepime + Metronidazole, 1L Bolus, Tylenol,    (27 Mar 2023 02:12)    FAMILY HISTORY:  Family history of heart disease (Father, Mother)    Family history of lung cancer (Sibling)      PAST MEDICAL & SURGICAL HISTORY:  Arrhythmia      Hypercholesteremia      HTN (hypertension)      Obesity      CHF (congestive heart failure)      Heart attack      Breast cancer        Osteoarthritis      CAD (coronary artery disease)      Atrial fibrillation      [place] (hard of hearing)      S/P CABG x 5        H/O mastectomy  right 2001      Pacemaker          SOCIAL HISTORY  Social History:  Retired (27 Mar 2023 02:12)        ROS  General: Denies fevers, chills, nightsweats, weight loss  HEENT: Denies headache, rhinorrhea, sore throat, eye pain  CV: Denies palpitations  PULM: Denies SOB, cough  : Denies dysuria, hematuria  MSK: Denies arthralgias  SKIN: Denies rash   NEURO: Denies paresthesias, weakness  PSYCH: Denies depression    VITALS:  T(F): 99.1, Max: 102.3 (23 @ 21:17)  HR: 61  BP: 129/60  RR: 18Vital Signs Last 24 Hrs  T(C): 37.3 (27 Mar 2023 02:59), Max: 39.1 (26 Mar 2023 21:17)  T(F): 99.1 (27 Mar 2023 02:59), Max: 102.3 (26 Mar 2023 21:17)  HR: 61 (27 Mar 2023 02:59) (61 - 95)  BP: 129/60 (27 Mar 2023 02:59) (113/56 - 142/64)  BP(mean): --  RR: 18 (27 Mar 2023 02:59) (18 - 20)  SpO2: 98% (27 Mar 2023 02:59) (96% - 99%)    Parameters below as of 27 Mar 2023 02:59  Patient On (Oxygen Delivery Method): room air        PHYSICAL EXAM:  Gen: NAD, resting in bed  HEENT: Normocephalic, atraumatic  Neck: supple, no lymphadenopathy  CV: Regular rate & regular rhythm  Lungs: decreased BS at bases, no fremitus  Abdomen: Soft, BS present  Ext: Warm, well perfused  Neuro: non focal, awake  Skin: no rash, no erythema    TESTS & MEASUREMENTS:                        13.2   12.87 )-----------( 192      ( 26 Mar 2023 19:07 )             40.8         134<L>  |  101  |  15  ----------------------------<  127<H>  4.8   |  23  |  0.6<L>    Ca    9.0      26 Mar 2023 19:07    TPro  6.5  /  Alb  4.1  /  TBili  2.0<H>  /  DBili  x   /  AST  116<H>  /  ALT  64<H>  /  AlkPhos  120<H>        LIVER FUNCTIONS - ( 26 Mar 2023 19:07 )  Alb: 4.1 g/dL / Pro: 6.5 g/dL / ALK PHOS: 120 U/L / ALT: 64 U/L / AST: 116 U/L / GGT: x           Urinalysis Basic - ( 26 Mar 2023 20:32 )    Color: Silva / Appearance: Clear / S.028 / pH: x  Gluc: x / Ketone: Trace  / Bili: Small / Urobili: 6 mg/dL   Blood: x / Protein: 30 mg/dL / Nitrite: Negative   Leuk Esterase: Negative / RBC: 13 /HPF / WBC 2 /HPF   Sq Epi: x / Non Sq Epi: 1 /HPF / Bacteria: Negative          Blood Gas Venous - Lactate: 2.20 mmol/L (23 @ 21:36)  Lactate, Blood: 1.3 mmol/L (23 @ 19:07)  Blood Gas Venous - Lactate: 2.70 mmol/L (23 @ 18:55)      INFECTIOUS DISEASES TESTING  MRSA PCR Result.: Negative (23 @ 11:22)  MRSA PCR Result.: Negative (22 @ 00:00)      RADIOLOGY & ADDITIONAL TESTS:  I have personally reviewed the last Chest xray  CXR      CT      CARDIOLOGY TESTING  12 Lead ECG:   Ventricular Rate 60 BPM    Atrial Rate 58 BPM    QRS Duration 164 ms    Q-T Interval 514 ms    QTC Calculation(Bazett) 514 ms    R Axis -66 degrees    T Axis 90 degrees    Diagnosis Line Ventricular-paced rhythm  Abnormal ECG    Confirmed by Beronica Benjamin MD ([id number]) on 3/26/2023 8:26:20 PM (23 @ 17:48)      MEDICATIONS  aMIOdarone    Tablet 200  amLODIPine   Tablet 5  atorvastatin 20  enoxaparin Injectable 100  lactated ringers. 1000  losartan 100  metoprolol tartrate 50  pantoprazole  Injectable 40  piperacillin/tazobactam IVPB.. 3.375  polyethylene glycol 3350 17  senna 2      ANTIBIOTICS:  piperacillin/tazobactam IVPB.. 3.375 Gram(s) IV Intermittent every 8 hours      All available historical data has been reviewed

## 2023-03-27 NOTE — CONSULT NOTE ADULT - ASSESSMENT
Patient is a 78 y/o male with HTN, HLD, CAD s/p CABG 7y ago, AFib (eliquis), attempted watchman 3/23 however found to have ADDY thrombus when procedure was performed, Br Ca s/p R mastectomy who presents with abdominal pain. Patient notes pain is located in the periumbilical and RUQ areas, sharp when it occurred with no alleviating and aggravating factors. Patient has had three prior episodes of pain like this in which in the past he was advised to have his Gallbladder removed but he never followed up upon discharge. Patietn since ED presentation notes pain is gone.    Patient found to have Gallbladder wall thickening, gallstones and CBD of 7mm.     Cholecystitis/ Cholelithiasis  - Surgery consult noted- Not a candidate for surgery  - IR consult noted  - ID consult noted  - Agree with Zosyn , IV hydration   - Can have clear liquids  - Daily CMP, CBC  - Consider Cardiology consult for risk stratification prior to intervention  - Without definitive intervention of Gallbladder removal this could be repetitive  - No plan for endoscopic intervention right now, will follow

## 2023-03-28 LAB
-  AMIKACIN: SIGNIFICANT CHANGE UP
-  AMPICILLIN/SULBACTAM: SIGNIFICANT CHANGE UP
-  AMPICILLIN: SIGNIFICANT CHANGE UP
-  AZTREONAM: SIGNIFICANT CHANGE UP
-  CEFAZOLIN: SIGNIFICANT CHANGE UP
-  CEFEPIME: SIGNIFICANT CHANGE UP
-  CEFOXITIN: SIGNIFICANT CHANGE UP
-  CEFTRIAXONE: SIGNIFICANT CHANGE UP
-  CIPROFLOXACIN: SIGNIFICANT CHANGE UP
-  ERTAPENEM: SIGNIFICANT CHANGE UP
-  GENTAMICIN: SIGNIFICANT CHANGE UP
-  IMIPENEM: SIGNIFICANT CHANGE UP
-  LEVOFLOXACIN: SIGNIFICANT CHANGE UP
-  MEROPENEM: SIGNIFICANT CHANGE UP
-  PIPERACILLIN/TAZOBACTAM: SIGNIFICANT CHANGE UP
-  TOBRAMYCIN: SIGNIFICANT CHANGE UP
-  TRIMETHOPRIM/SULFAMETHOXAZOLE: SIGNIFICANT CHANGE UP
A1C WITH ESTIMATED AVERAGE GLUCOSE RESULT: 5.7 % — HIGH (ref 4–5.6)
ALBUMIN SERPL ELPH-MCNC: 3.3 G/DL — LOW (ref 3.5–5.2)
ALP SERPL-CCNC: 122 U/L — HIGH (ref 30–115)
ALT FLD-CCNC: 172 U/L — HIGH (ref 0–41)
ANION GAP SERPL CALC-SCNC: 9 MMOL/L — SIGNIFICANT CHANGE UP (ref 7–14)
AST SERPL-CCNC: 139 U/L — HIGH (ref 0–41)
BASOPHILS # BLD AUTO: 0.05 K/UL — SIGNIFICANT CHANGE UP (ref 0–0.2)
BASOPHILS NFR BLD AUTO: 0.4 % — SIGNIFICANT CHANGE UP (ref 0–1)
BILIRUB SERPL-MCNC: 3.1 MG/DL — HIGH (ref 0.2–1.2)
BUN SERPL-MCNC: 17 MG/DL — SIGNIFICANT CHANGE UP (ref 10–20)
CALCIUM SERPL-MCNC: 8.1 MG/DL — LOW (ref 8.4–10.5)
CHLORIDE SERPL-SCNC: 107 MMOL/L — SIGNIFICANT CHANGE UP (ref 98–110)
CHOLEST SERPL-MCNC: 88 MG/DL — SIGNIFICANT CHANGE UP
CO2 SERPL-SCNC: 22 MMOL/L — SIGNIFICANT CHANGE UP (ref 17–32)
CREAT SERPL-MCNC: 0.7 MG/DL — SIGNIFICANT CHANGE UP (ref 0.7–1.5)
CULTURE RESULTS: SIGNIFICANT CHANGE UP
CULTURE RESULTS: SIGNIFICANT CHANGE UP
EGFR: 94 ML/MIN/1.73M2 — SIGNIFICANT CHANGE UP
EOSINOPHIL # BLD AUTO: 0.04 K/UL — SIGNIFICANT CHANGE UP (ref 0–0.7)
EOSINOPHIL NFR BLD AUTO: 0.3 % — SIGNIFICANT CHANGE UP (ref 0–8)
ESTIMATED AVERAGE GLUCOSE: 117 MG/DL — HIGH (ref 68–114)
GLUCOSE SERPL-MCNC: 87 MG/DL — SIGNIFICANT CHANGE UP (ref 70–99)
HCT VFR BLD CALC: 35.8 % — LOW (ref 42–52)
HDLC SERPL-MCNC: 35 MG/DL — LOW
HGB BLD-MCNC: 11.6 G/DL — LOW (ref 14–18)
IMM GRANULOCYTES NFR BLD AUTO: 0.4 % — HIGH (ref 0.1–0.3)
LACTATE SERPL-SCNC: 0.9 MMOL/L — SIGNIFICANT CHANGE UP (ref 0.7–2)
LIPID PNL WITH DIRECT LDL SERPL: 42 MG/DL — SIGNIFICANT CHANGE UP
LYMPHOCYTES # BLD AUTO: 0.54 K/UL — LOW (ref 1.2–3.4)
LYMPHOCYTES # BLD AUTO: 4 % — LOW (ref 20.5–51.1)
MAGNESIUM SERPL-MCNC: 2.2 MG/DL — SIGNIFICANT CHANGE UP (ref 1.8–2.4)
MCHC RBC-ENTMCNC: 28.9 PG — SIGNIFICANT CHANGE UP (ref 27–31)
MCHC RBC-ENTMCNC: 32.4 G/DL — SIGNIFICANT CHANGE UP (ref 32–37)
MCV RBC AUTO: 89.3 FL — SIGNIFICANT CHANGE UP (ref 80–94)
METHOD TYPE: SIGNIFICANT CHANGE UP
MONOCYTES # BLD AUTO: 0.73 K/UL — HIGH (ref 0.1–0.6)
MONOCYTES NFR BLD AUTO: 5.4 % — SIGNIFICANT CHANGE UP (ref 1.7–9.3)
NEUTROPHILS # BLD AUTO: 12.14 K/UL — HIGH (ref 1.4–6.5)
NEUTROPHILS NFR BLD AUTO: 89.5 % — HIGH (ref 42.2–75.2)
NON HDL CHOLESTEROL: 53 MG/DL — SIGNIFICANT CHANGE UP
NRBC # BLD: 0 /100 WBCS — SIGNIFICANT CHANGE UP (ref 0–0)
ORGANISM # SPEC MICROSCOPIC CNT: SIGNIFICANT CHANGE UP
PLATELET # BLD AUTO: 138 K/UL — SIGNIFICANT CHANGE UP (ref 130–400)
POTASSIUM SERPL-MCNC: 3.5 MMOL/L — SIGNIFICANT CHANGE UP (ref 3.5–5)
POTASSIUM SERPL-SCNC: 3.5 MMOL/L — SIGNIFICANT CHANGE UP (ref 3.5–5)
PROT SERPL-MCNC: 5.3 G/DL — LOW (ref 6–8)
RBC # BLD: 4.01 M/UL — LOW (ref 4.7–6.1)
RBC # FLD: 15.3 % — HIGH (ref 11.5–14.5)
SODIUM SERPL-SCNC: 138 MMOL/L — SIGNIFICANT CHANGE UP (ref 135–146)
SPECIMEN SOURCE: SIGNIFICANT CHANGE UP
SPECIMEN SOURCE: SIGNIFICANT CHANGE UP
TRIGL SERPL-MCNC: 58 MG/DL — SIGNIFICANT CHANGE UP
TROPONIN T SERPL-MCNC: <0.01 NG/ML — SIGNIFICANT CHANGE UP
TSH SERPL-MCNC: 1.17 UIU/ML — SIGNIFICANT CHANGE UP (ref 0.27–4.2)
WBC # BLD: 13.55 K/UL — HIGH (ref 4.8–10.8)
WBC # FLD AUTO: 13.55 K/UL — HIGH (ref 4.8–10.8)

## 2023-03-28 PROCEDURE — 99233 SBSQ HOSP IP/OBS HIGH 50: CPT

## 2023-03-28 RX ORDER — PIPERACILLIN AND TAZOBACTAM 4; .5 G/20ML; G/20ML
3.38 INJECTION, POWDER, LYOPHILIZED, FOR SOLUTION INTRAVENOUS EVERY 8 HOURS
Refills: 0 | Status: DISCONTINUED | OUTPATIENT
Start: 2023-03-28 | End: 2023-03-28

## 2023-03-28 RX ORDER — ENOXAPARIN SODIUM 100 MG/ML
100 INJECTION SUBCUTANEOUS EVERY 12 HOURS
Refills: 0 | Status: COMPLETED | OUTPATIENT
Start: 2023-03-28 | End: 2023-04-02

## 2023-03-28 RX ORDER — PIPERACILLIN AND TAZOBACTAM 4; .5 G/20ML; G/20ML
3.38 INJECTION, POWDER, LYOPHILIZED, FOR SOLUTION INTRAVENOUS ONCE
Refills: 0 | Status: DISCONTINUED | OUTPATIENT
Start: 2023-03-28 | End: 2023-03-28

## 2023-03-28 RX ORDER — ENOXAPARIN SODIUM 100 MG/ML
100 INJECTION SUBCUTANEOUS EVERY 12 HOURS
Refills: 0 | Status: DISCONTINUED | OUTPATIENT
Start: 2023-03-28 | End: 2023-03-28

## 2023-03-28 RX ADMIN — Medication 50 MILLIGRAM(S): at 05:25

## 2023-03-28 RX ADMIN — POLYETHYLENE GLYCOL 3350 17 GRAM(S): 17 POWDER, FOR SOLUTION ORAL at 11:50

## 2023-03-28 RX ADMIN — Medication 50 MILLIGRAM(S): at 17:05

## 2023-03-28 RX ADMIN — SODIUM CHLORIDE 60 MILLILITER(S): 9 INJECTION, SOLUTION INTRAVENOUS at 17:03

## 2023-03-28 RX ADMIN — ATORVASTATIN CALCIUM 20 MILLIGRAM(S): 80 TABLET, FILM COATED ORAL at 21:20

## 2023-03-28 RX ADMIN — Medication 20 MILLILITER(S): at 21:20

## 2023-03-28 RX ADMIN — ENOXAPARIN SODIUM 100 MILLIGRAM(S): 100 INJECTION SUBCUTANEOUS at 17:45

## 2023-03-28 RX ADMIN — PIPERACILLIN AND TAZOBACTAM 25 GRAM(S): 4; .5 INJECTION, POWDER, LYOPHILIZED, FOR SOLUTION INTRAVENOUS at 21:18

## 2023-03-28 RX ADMIN — Medication 81 MILLIGRAM(S): at 11:50

## 2023-03-28 RX ADMIN — PANTOPRAZOLE SODIUM 40 MILLIGRAM(S): 20 TABLET, DELAYED RELEASE ORAL at 11:51

## 2023-03-28 RX ADMIN — LOSARTAN POTASSIUM 100 MILLIGRAM(S): 100 TABLET, FILM COATED ORAL at 05:20

## 2023-03-28 RX ADMIN — ENOXAPARIN SODIUM 100 MILLIGRAM(S): 100 INJECTION SUBCUTANEOUS at 05:20

## 2023-03-28 RX ADMIN — AMLODIPINE BESYLATE 5 MILLIGRAM(S): 2.5 TABLET ORAL at 05:25

## 2023-03-28 RX ADMIN — PIPERACILLIN AND TAZOBACTAM 25 GRAM(S): 4; .5 INJECTION, POWDER, LYOPHILIZED, FOR SOLUTION INTRAVENOUS at 05:35

## 2023-03-28 RX ADMIN — PIPERACILLIN AND TAZOBACTAM 25 GRAM(S): 4; .5 INJECTION, POWDER, LYOPHILIZED, FOR SOLUTION INTRAVENOUS at 13:11

## 2023-03-28 NOTE — PROVIDER CONTACT NOTE (OTHER) - SITUATION
Rn rechecked BP it remains elevated at 154/67, pt stated he is just worried
pt's BP elevated at 157/67 60 hr pt denies pain. pt stated he is worried about his roommate who was just a RR

## 2023-03-28 NOTE — PROGRESS NOTE ADULT - NS ATTEND AMEND GEN_ALL_CORE FT
I edited the note.   Time-based billing (NON-critical care).   35 minutes spent on total encounter; more than 50% of the visit was spent counseling and / or coordinating care by the attending physician.  The necessity of the time spent during the encounter on this date of service was due to: Coordination of care.

## 2023-03-28 NOTE — PROGRESS NOTE ADULT - ASSESSMENT
Patient is a 78 y/o male with HTN, HLD, CAD s/p CABG 7y ago, AFib (eliquis), attempted watchman 3/23 however found to have ADDY thrombus when procedure was performed, Br Ca s/p R mastectomy who presents with abdominal pain. Patient notes pain is located in the periumbilical and RUQ areas, sharp when it occurred with no alleviating and aggravating factors. Patient has had three prior episodes of pain like this in which in the past he was advised to have his Gallbladder removed but he never followed up upon discharge. Patient since ED presentation notes pain is gone.    Patient found to have Gallbladder wall thickening, gallstones and CBD of 7mm. HIDA also positive. Responding well to conservative measures and IR planning intervention Thursday.     Cholecystitis/ Cholelithiasis  - Surgery consult noted- Not a candidate for surgery  - IR consult noted- Intervention planned for Thursday  - ID consult noted  - Agree with Zosyn , IV hydration   - No plan for endoscopic intervention right now, T jose downtrending likely from inflammation from Gallbaldder Patient is a 76 y/o male with HTN, HLD, CAD s/p CABG 7y ago, AFib (eliquis), attempted watchman 3/23 however found to have ADDY thrombus when procedure was performed, Br Ca s/p R mastectomy who presents with abdominal pain. Patient notes pain is located in the periumbilical and RUQ areas, sharp when it occurred with no alleviating and aggravating factors. Patient has had three prior episodes of pain like this in which in the past he was advised to have his Gallbladder removed but he never followed up upon discharge. Patient since ED presentation notes pain is gone.    Patient found to have Gallbladder wall thickening, gallstones and CBD of 7mm.   HIDA also positive. Responding well to conservative measures and IR planning intervention Thursday.   downtrending transaminitis     Cholecystitis/ Cholelithiasis  - Surgery consult noted- Not a candidate for surgery  - IR consult noted- Intervention planned for Thursday  - ID consult noted  - IV abx w. Zosyn   - supportive care and IV hydration per primary team  - No plan for endoscopic intervention right now  - T jose downtrending - trend LFTs daily Patient is a 78 y/o male with HTN, HLD, CAD s/p CABG 7y ago, AFib (eliquis), attempted watchman 3/23 however found to have ADDY thrombus when procedure was performed, Br Ca s/p R mastectomy who presents with abdominal pain. Patient notes pain is located in the periumbilical and RUQ areas, sharp when it occurred with no alleviating and aggravating factors. Patient has had three prior episodes of pain like this in which in the past he was advised to have his Gallbladder removed but he never followed up upon discharge. Patient since ED presentation notes pain is gone.    Patient found to have Gallbladder wall thickening, gallstones and CBD of 7mm.   HIDA also positive. Responding well to conservative measures and IR planning intervention Thursday.   downtrending transaminitis     Cholecystitis/ Cholelithiasis  - Surgery consult noted- Not a candidate for surgery  - IR consult noted- Intervention planned for Thursday  - ID consult noted  - IV abx w. Zosyn   - supportive care and IV hydration per primary team  - No plan for endoscopic intervention right now  - T jose downtrending - CBD 7 mm normal for age and HIDA not concerning for biliary obstruction (though not the optimal test) -  trend LFTs daily and if Tbil remains elevated, may consider MRCP if PPM is compatible

## 2023-03-28 NOTE — PROGRESS NOTE ADULT - SUBJECTIVE AND OBJECTIVE BOX
GENERAL SURGERY PROGRESS NOTE    Patient: WAGNER SEGURA , 77y ([birth year])Male   MRN: [id number]  Location: [address] (Back) 026 A  Visit: 23 Inpatient  Date: 23 @ 13:11    Hospital Day #: 2    Procedure/Dx/Injuries: Acute cholelithiasis and associated GNR bloodstream infection     Events of past 24 hours: BCX positive for gram negative rods. Abdominal pain currently well controlled. Afebrile, on the floor, no hypotension. No N/V. Tolerating CLD.     PAST MEDICAL & SURGICAL HISTORY:  Arrhythmia  Hypercholesteremia  HTN (hypertension)  CHF (congestive heart failure)  Heart attack  Breast cancer    Osteoarthritis  CAD (coronary artery disease)  Atrial fibrillation  [place] (hard of hearing)  S/P CABG x 5    H/O mastectomy  right 2001  Pacemaker    Vitals:   T(F): 98 (23 @ 11:50), Max: 98.2 (23 @ 08:40)  HR: 60 (23 @ 11:50)  BP: 157/67 (23 @ 11:50)  RR: 18 (23 @ 11:50)  SpO2: 97% (23 @ 11:50)    Diet, Clear Liquid  Fluids:   I & O's:  23 @ 07:01  -  23 @ 07:00  --------------------------------------------------------  IN:    IV PiggyBack: 300 mL    Lactated Ringers: 60 mL  Total IN: 360 mL    OUT:  Total OUT: 0 mL  Total NET: 360 mL    PHYSICAL EXAM:  General: NAD, AAOx3, calm and cooperative  HEENT: NCAT, , EOMI, Trachea ML, Neck supple  Cardiac: extremities well perfused   Respiratory: CTAB, normal respiratory effort  Abdomen: Soft, non-distended, non-tender, no rebound, +BS. negative murphys   Musculoskeletal: compartments soft  Neuro: no focal deficits  Vascular: extremities well perfused  Skin: Warm/dry, normal color, no jaundice    MEDICATIONS  (STANDING):  amLODIPine   Tablet 5 milliGRAM(s) Oral daily  aspirin  chewable 81 milliGRAM(s) Oral daily  atorvastatin 20 milliGRAM(s) Oral at bedtime  enoxaparin Injectable 100 milliGRAM(s) SubCutaneous every 12 hours  influenza  Vaccine (HIGH DOSE) 0.7 milliLiter(s) IntraMuscular once  lactated ringers. 1000 milliLiter(s) (60 mL/Hr) IV Continuous <Continuous>  losartan 100 milliGRAM(s) Oral daily  metoprolol tartrate 50 milliGRAM(s) Oral two times a day  pantoprazole  Injectable 40 milliGRAM(s) IV Push daily  piperacillin/tazobactam IVPB.. 3.375 Gram(s) IV Intermittent every 8 hours  polyethylene glycol 3350 17 Gram(s) Oral daily  senna 2 Tablet(s) Oral at bedtime    MEDICATIONS  (PRN):  acetaminophen     Tablet .. 650 milliGRAM(s) Oral every 6 hours PRN Temp greater or equal to 38C (100.4F), Mild Pain (1 - 3)  morphine  - Injectable 2 milliGRAM(s) IV Push every 4 hours PRN Severe Pain (7 - 10)  ondansetron Injectable 4 milliGRAM(s) IV Push three times a day PRN Nausea and/or Vomiting    DVT PROPHYLAXIS: enoxaparin Injectable 100 milliGRAM(s) SubCutaneous every 12 hours  GI PROPHYLAXIS: pantoprazole  Injectable 40 milliGRAM(s) IV Push daily    ANTIBIOTICS:  piperacillin/tazobactam IVPB.. 3.375 Gram(s)    LAB/STUDIES:  Labs:             11.6   13.55 )-----------( 138      ( 28 Mar 2023 06:15 )             35.8   Auto Neutrophil %: 89.5 % (23 @ 06:15)  Auto Immature Granulocyte %: 0.4 % (23 @ 06:15)      138  |  107  |  17  ----------------------------<  87  3.5   |  22  |  0.7    Calcium, Total Serum: 8.1 mg/dL (23 @ 06:15)    LFTs:  5.3  | 3.1  | 139      ------------------[122     ( 28 Mar 2023 06:15 )  3.3  | x    | 172          Lactate, Blood: 0.9 mmol/L (23 @ 06:15)  Blood Gas Venous - Lactate: 2.20 mmol/L (23 @ 21:36)  Lactate, Blood: 1.3 mmol/L (23 @ 19:07)  Blood Gas Venous - Lactate: 2.70 mmol/L (23 @ 18:55)    Coags:  15.50  ----< 1.35    ( 26 Mar 2023 19:07 )     37.3      CARDIAC MARKERS ( 28 Mar 2023 06:15 )  x     / <0.01 ng/mL / x     / x     / x      CARDIAC MARKERS ( 26 Mar 2023 19:07 )  x     / <0.01 ng/mL / x     / x     / x        Urinalysis Basic - ( 26 Mar 2023 20:32 )  Color: Silva / Appearance: Clear / S.028 / pH: x  Gluc: x / Ketone: Trace  / Bili: Small / Urobili: 6 mg/dL   Blood: x / Protein: 30 mg/dL / Nitrite: Negative   Leuk Esterase: Negative / RBC: 13 /HPF / WBC 2 /HPF   Sq Epi: x / Non Sq Epi: 1 /HPF / Bacteria: Negative    Culture - Urine (collected 26 Mar 2023 20:32)  Source: Clean Catch Clean Catch (Midstream)  Preliminary Report (28 Mar 2023 00:18):    10,000 - 49,000 CFU/mL Gram Negative Rods    <10,000 CFU/ml Normal Urogenital james present    Culture - Blood (collected 26 Mar 2023 19:27)  Source: .Blood Blood-Peripheral  Gram Stain (27 Mar 2023 10:59):    Growth in anaerobic bottle: Gram Negative Rods    Growth in aerobic bottle: Gram Negative Rods  Preliminary Report (27 Mar 2023 22:32):    Growth in aerobic and anaerobic bottles: Escherichia coli    See previous culture [id number]    Culture - Blood (collected 26 Mar 2023 19:27)  Source: .Blood Blood-Peripheral  Gram Stain (27 Mar 2023 10:59):    Growth in anaerobic bottle: Gram Negative Rods    Growth in aerobic bottle: Gram Negative Rods  Preliminary Report (27 Mar 2023 22:33):    Growth in aerobic and anaerobic bottles: Escherichia coli    ***Blood Panel PCR results on this specimen are available    approximately 3 hours after the Gram stain result.***    Gram stain, PCR, and/or culture results may not always    correspond due to difference in methodologies.    ************************************************************    This PCR assay was performed by multiplex PCR. This    Assay tests for 66 bacterial and resistance gene targets.    Please refer to the [facility] Labs test directory    at [email or web address] for details.  Organism: Blood Culture PCR (27 Mar 2023 11:27)  Organism: Blood Culture PCR (27 Mar 2023 11:27)      IMAGING:  < from: NM Hepatobiliary Imaging (23 @ 20:00) >  IMPRESSION:  NO DEFINITE VISUALIZATION OF THE GALLBLADDER THROUGH 4 HOURS   POST-INJECTION, CONSISTENT WITH CHOLECYSTITIS, AS DESCRIBED ABOVE.   CLINICAL CORRELATION IS SUGGESTED.  NONSPECIFIC DELAYED VISUALIZATION OF BOWEL AT 2 HOURS.    < from: CT Angio Abdomen and Pelvis w/ IV Cont (23 @ 21:22) >  IMPRESSION  Cholelithiasis wit gallbladder wall thickening compatible with acute   cholecystitis in the appropriate clinical setting.  Normal appearance of the pancreas.    < from: US Abdomen Upper Quadrant Right (23 @ 20:42) >  IMPRESSION:  Cholelithiasis and gallbladder wall thickening may reflect cholecystitis   in the appropriate clinical setting.  Increased hepatic echogenicity may be secondary to fatty infiltration or   hepatocellular disease.      ACCESS/ DEVICES:  [X ] Peripheral IV

## 2023-03-28 NOTE — PROGRESS NOTE ADULT - SUBJECTIVE AND OBJECTIVE BOX
Interventional Radiology Follow- Up Note      77y Male consulted for percutaneous cholecystostomy tube placement.       Vitals: T(F): 98 (03-28-23 @ 11:50), Max: 98.2 (03-28-23 @ 08:40)  HR: 60 (03-28-23 @ 11:50) (60 - 72)  BP: 157/67 (03-28-23 @ 11:50) (142/67 - 157/67)  RR: 18 (03-28-23 @ 11:50) (18 - 18)  SpO2: 97% (03-28-23 @ 11:50) (96% - 97%)  Wt(kg): --    LABS:                        11.6   13.55 )-----------( 138      ( 28 Mar 2023 06:15 )             35.8     03-28    138  |  107  |  17  ----------------------------<  87  3.5   |  22  |  0.7    Ca    8.1<L>      28 Mar 2023 06:15  Mg     2.2     03-28    TPro  5.3<L>  /  Alb  3.3<L>  /  TBili  3.1<H>  /  DBili  x   /  AST  139<H>  /  ALT  172<H>  /  AlkPhos  122<H>  03-28    PT/INR - ( 26 Mar 2023 19:07 )   PT: 15.50 sec;   INR: 1.35 ratio         PTT - ( 26 Mar 2023 19:07 )  PTT:37.3 sec        Assessment: Pt is a 78 yo male with a PMHx of HTN, HLD, CAD s/p CABG, CHF (s/p Pacemaker; ABBOTS  P/G [id number]), A-fib and LAAT on Eliquis, hard of hearing, osteoarthritis, and breast CA s/p R mastectomy in 2001 presents for chest pain/abdominal pain and right-sided pain for the past 4 hours, found to have acute cholecystitis. IR consulted for percutaneous cholecystostomy.     Plan:    -HIDA scan reviewed, positive.  -Scheduled perc mary for Thursday (3/30)  -Alert IR if patient becomes hemodynamically unstable  -NPO after midnight prior to procedure  -Draw CBC/CMP/COAGS prior to procedure  -Hold anticoagulation until after procedure  -Discussed with primary team      Please call Interventional Radiology [phone] with any questions, concerns, or issues regarding above.

## 2023-03-28 NOTE — PROGRESS NOTE ADULT - SUBJECTIVE AND OBJECTIVE BOX
WAGNER SEGURA  [facility] (Back) 026 A ([facility] (Back))      Patient is a 77y old  Male who presents with a chief complaint of Abdominal Pain (27 Mar 2023 12:06)        Interval events:  Patient seen and examined at bedside. No acute events overnight. Patient has no complaints. No abd pain. No fevers. No jaundice. Says he's waiting for "surgery". Tolerating clear liquid diet.     -PMHx: Arrhythmia    Hypercholesteremia    HTN (hypertension)    Obesity    CHF (congestive heart failure)    Heart attack    Breast cancer    Osteoarthritis    CAD (coronary artery disease)    Atrial fibrillation    [place] (hard of hearing)      -PSHx:S/P CABG x 5    H/O mastectomy    Pacemaker            REVIEW OF SYSTEMS:  CONSTITUTIONAL: No fever, weight loss, or fatigue  RESPIRATORY: No cough, wheezing, chills or hemoptysis; No shortness of breath  CARDIOVASCULAR: No chest pain, palpitations, dizziness, or leg swelling  GASTROINTESTINAL: No abdominal or epigastric pain. No nausea, vomiting, or hematemesis; No diarrhea or constipation. No melena or hematochezia.  NEUROLOGICAL: No headaches  LYMPH NODES: No enlarged glands  MUSCULOSKELETAL: No joint pain or swelling; No muscle, back, or extremity pain      T(C): , Max: 36.8 (03-28-23 @ 08:40)  HR: 60 (03-28-23 @ 11:50)  BP: 157/67 (03-28-23 @ 11:50)  RR: 18 (03-28-23 @ 11:50)  SpO2: 97% (03-28-23 @ 11:50)  CAPILLARY BLOOD GLUCOSE      PHYSICAL EXAM:  GENERAL: NAD, well-developed  HEAD:  Atraumatic, Normocephalic  EYES: EOMI, PERRLA, conjunctiva and sclera clear  NECK: Supple, No JVD  CHEST/LUNG: Clear to auscultation bilaterally; No wheeze  HEART: Regular rate and rhythm; No murmurs, rubs, or gallops  ABDOMEN: Soft, Nontender, Nondistended; Bowel sounds present  EXTREMITIES:  2+ Peripheral Pulses, b/l LE venous stasis changes, L>R   PSYCH: AAOx3  NEUROLOGY: non-focal  SKIN: No rashes or lesions; no jaundice     Consultant(s) Notes Reviewed:  [x ] YES  [ ] NO  Care Discussed with Consultants/Other Providers [ x] YES  [ ] NO      LABS:          11.6  13.55  )-------(138          35.8  N=89.5  L=4.0  MCV=89.3    138|107|17  ------------------<87  3.5|22|0.7  eGFR:--  Ca:8.1<L>      PT/INR - ( 26 Mar 2023 19:07 )   PT: 15.50 sec;   INR: 1.35 ratio         PTT - ( 26 Mar 2023 19:07 )  PTT:37.3 sec      Microbiology:    Culture - Urine (collected 03-26-23 @ 20:32)  Source: Clean Catch Clean Catch (Midstream)  Preliminary Report (03-28-23 @ 00:18):    10,000 - 49,000 CFU/mL Gram Negative Rods    <10,000 CFU/ml Normal Urogenital james present    Culture - Blood (collected 03-26-23 @ 19:27)  Source: .Blood Blood-Peripheral  Gram Stain (03-27-23 @ 10:59):    Growth in anaerobic bottle: Gram Negative Rods    Growth in aerobic bottle: Gram Negative Rods  Preliminary Report (03-27-23 @ 22:32):    Growth in aerobic and anaerobic bottles: Escherichia coli    See previous culture [id number]    Culture - Blood (collected 03-26-23 @ 19:27)  Source: .Blood Blood-Peripheral  Gram Stain (03-27-23 @ 10:59):    Growth in anaerobic bottle: Gram Negative Rods    Growth in aerobic bottle: Gram Negative Rods  Preliminary Report (03-27-23 @ 22:33):    Growth in aerobic and anaerobic bottles: Escherichia coli    ***Blood Panel PCR results on this specimen are available    approximately 3 hours after the Gram stain result.***    Gram stain, PCR, and/or culture results may not always    correspond due to difference in methodologies.    ************************************************************    This PCR assay was performed by multiplex PCR. This    Assay tests for 66 bacterial and resistance gene targets.    Please refer to the [facility] Labs test directory    at [email or web address] for details.  Organism: Blood Culture PCR (03-27-23 @ 11:27)  Organism: Blood Culture PCR (03-27-23 @ 11:27)      Method Type: PCR      -  Escherichia coli: Detec        RADIOLOGY & ADDITIONAL TESTS:  < from: NM Hepatobiliary Imaging (03.27.23 @ 20:00) >  IMPRESSION:    NO DEFINITE VISUALIZATION OF THE GALLBLADDER THROUGH 4 HOURS   POST-INJECTION, CONSISTENT WITH CHOLECYSTITIS, AS DESCRIBED ABOVE.   CLINICAL CORRELATION IS SUGGESTED.    NONSPECIFIC DELAYED VISUALIZATION OF BOWEL AT 2 HOURS.    < end of copied text >    < from: CT Angio Abdomen and Pelvis w/ IV Cont (03.26.23 @ 21:22) >    IMPRESSION:      Cholelithiasis with gallbladder wall thickening compatible with acute   cholecystitis in the appropriate clinical setting.    Normal appearance of the pancreas.    < end of copied text >        Medications:  acetaminophen     Tablet .. 650 milliGRAM(s) Oral every 6 hours PRN  amLODIPine   Tablet 5 milliGRAM(s) Oral daily  aspirin  chewable 81 milliGRAM(s) Oral daily  atorvastatin 20 milliGRAM(s) Oral at bedtime  enoxaparin Injectable 100 milliGRAM(s) SubCutaneous every 12 hours  influenza  Vaccine (HIGH DOSE) 0.7 milliLiter(s) IntraMuscular once  lactated ringers. 1000 milliLiter(s) IV Continuous <Continuous>  losartan 100 milliGRAM(s) Oral daily  metoprolol tartrate 50 milliGRAM(s) Oral two times a day  morphine  - Injectable 2 milliGRAM(s) IV Push every 4 hours PRN  ondansetron Injectable 4 milliGRAM(s) IV Push three times a day PRN  pantoprazole  Injectable 40 milliGRAM(s) IV Push daily  piperacillin/tazobactam IVPB.. 3.375 Gram(s) IV Intermittent every 8 hours  polyethylene glycol 3350 17 Gram(s) Oral daily  senna 2 Tablet(s) Oral at bedtime

## 2023-03-28 NOTE — PROGRESS NOTE ADULT - SUBJECTIVE AND OBJECTIVE BOX
Patient is a 76 y/o male with HTN, HLD, CAD s/p CABG 7y ago, AFib (eliquis), attempted watchman 3/23 however found to have ADDY thrombus when procedure was performed, [place] s/p R mastectomy who presents with abdominal pain. Patient evaluated upon admission and is feeling better in comparison. He states pain significantly better. HIDA done yesterday.       PAST MEDICAL & SURGICAL HISTORY:  Hypercholesteremia  HTN (hypertension)  Obesity  CHF (congestive heart failure)  Breast cancer  Osteoarthritis  CAD (coronary artery disease)  Atrial fibrillation  [place] (hard of hearing)  S/P CABG x 5 2014  H/O mastectomy  Pacemaker          MEDICATIONS  (STANDING):  aMIOdarone    Tablet 200 milliGRAM(s) Oral daily  amLODIPine   Tablet 5 milliGRAM(s) Oral daily  atorvastatin 20 milliGRAM(s) Oral at bedtime  enoxaparin Injectable 100 milliGRAM(s) SubCutaneous every 12 hours  lactated ringers. 1000 milliLiter(s) (60 mL/Hr) IV Continuous <Continuous>  losartan 100 milliGRAM(s) Oral daily  metoprolol tartrate 50 milliGRAM(s) Oral two times a day  pantoprazole  Injectable 40 milliGRAM(s) IV Push daily  piperacillin/tazobactam IVPB.. 3.375 Gram(s) IV Intermittent every 8 hours  polyethylene glycol 3350 17 Gram(s) Oral daily  senna 2 Tablet(s) Oral at bedtime    MEDICATIONS  (PRN):  acetaminophen     Tablet .. 650 milliGRAM(s) Oral every 6 hours PRN Temp greater or equal to 38C (100.4F), Mild Pain (1 - 3)  morphine  - Injectable 2 milliGRAM(s) IV Push every 4 hours PRN Severe Pain (7 - 10)  ondansetron Injectable 4 milliGRAM(s) IV Push three times a day PRN Nausea and/or Vomiting      Allergies  No Known Allergies        Review of Systems  General:  Denies Fatigue, Denies Fever, Denies Weakness ,Denies Weight Loss   HEENT: Denies Trouble Swallowing ,Denies  Sore Throat , Denies Change in hearing/vision/speech ,Denies Dizziness    Cardio: Denies  Chest Pain , Palpitations    Respiratory: Denies worsening of SOB, Denies Cough  Abdomen: See detailed HPI  Neuro: Denies Headache Denies Dizziness, Denies Paresthesias  MSK: Denies pain in Bones/Joints/Muscles   Psych: Patient denies depression, denies suicidal or homicidal ideations  Integ: Patient Denies rash, or new skin lesions       Vital Signs Last 24 Hrs  T(C): 36.7 (28 Mar 2023 11:50), Max: 36.8 (28 Mar 2023 08:40)  T(F): 98 (28 Mar 2023 11:50), Max: 98.2 (28 Mar 2023 08:40)  HR: 60 (28 Mar 2023 11:50) (60 - 72)  BP: 154/67 (28 Mar 2023 13:43) (142/67 - 157/67)  BP(mean): --  RR: 18 (28 Mar 2023 11:50) (18 - 18)  SpO2: 97% (28 Mar 2023 11:50) (96% - 97%)      Physical Exam  Gen: NAD  Head: NC/AT, no visible deformity  ENT: PERRLA, Sclera non icteric   Cardio: S1/S2 No S3/S4, Regular  Resp: CTA B/L  Abdomen: Soft, ND/NT  Neuro: AAOx3, Cranial Nerve II-XII intact   Extremities: FROM x 4  Skin: No jaundice, no excoriation       Labs:                        11.6   13.55 )-----------( 138      ( 28 Mar 2023 06:15 )             35.8     03-28    138  |  107  |  17  ----------------------------<  87  3.5   |  22  |  0.7    Ca    8.1<L>      28 Mar 2023 06:15  Mg     2.2     03-28    TPro  5.3<L>  /  Alb  3.3<L>  /  TBili  3.1<H>  /  DBili  x   /  AST  139<H>  /  ALT  172<H>  /  AlkPhos  122<H>  03-28        Culture - Blood (collected 26 Mar 2023 19:27)  Source: .Blood Blood-Peripheral  Gram Stain (27 Mar 2023 10:59):    Growth in anaerobic bottle: Gram Negative Rods    Growth in aerobic bottle: Gram Negative Rods  Preliminary Report (27 Mar 2023 10:59):    Growth in anaerobic bottle: Gram Negative Rods    Growth in aerobic bottle: Gram Negative Rods    Culture - Blood (collected 26 Mar 2023 19:27)  Source: .Blood Blood-Peripheral  Gram Stain (27 Mar 2023 10:59):    Growth in anaerobic bottle: Gram Negative Rods    Growth in aerobic bottle: Gram Negative Rods  Preliminary Report (27 Mar 2023 11:00):    Growth in anaerobic bottle: Gram Negative Rods    Growth in aerobic bottle: Gram Negative Rods    ***Blood Panel PCR results on this specimen are available    approximately 3 hours after the Gram stain result.***    Gram stain, PCR, and/or culture results may not always    correspond due to difference in methodologies.    ************************************************************    This PCR assay was performed by multiplex PCR. This    Assay tests for 66 bacterial and resistance gene targets.    Please refer to the [facility] Labs test directory    at [email or web address] for details.  Organism: Blood Culture PCR (27 Mar 2023 11:27)  Organism: Blood Culture PCR (27 Mar 2023 11:27)      RADIOLOGY & ADDITIONAL STUDIES:  CT Angio Abdomen and Pelvis w/ IV Cont 03.26.23   IMPRESSION:      Cholelithiasis with gallbladder wall thickening compatible with acute   cholecystitis in the appropriate clinical setting.    Normal appearance of the pancreas.        US Abdomen Upper Quadrant Right 03.26.23   IMPRESSION:  CBD 7mm    Cholelithiasis and gallbladder wall thickening may reflect cholecystitis   in the appropriate clinical setting.    Increased hepatic echogenicity may be secondary to fatty infiltration or   hepatocellular disease.    NM Hepatobiliary Imaging 03.27.23   IMPRESSION:    NO DEFINITE VISUALIZATION OF THE GALLBLADDER THROUGH 4 HOURS   POST-INJECTION, CONSISTENT WITH CHOLECYSTITIS, AS DESCRIBED ABOVE.   CLINICAL CORRELATION IS SUGGESTED.    NONSPECIFIC DELAYED VISUALIZATION OF BOWEL AT 2 HOURS.

## 2023-03-28 NOTE — PROGRESS NOTE ADULT - ASSESSMENT
ASSESSMENT:  77y M w/ PMHx of HTN, HLD, CAD s/p CABG, CHF s/p PPM, atrial fibrillation s/p left atrial appendage closure on Eliquis, [place], breast CA s/p R mastectomy in 2001, admitted for chest pain and abdominal pain on 3/27/23, which was present for four days prior to admission. On admission was found to have US RUQ demonstrating cholelithiasis and GB wall thickening concerning for cholecystitis, and CT scan showing the same.     #Acute calculous cholelithiasis   -No biliary dilation on imaging. On Zosyn since admission.   -Multiple cardiac RFs, not a surgical candidate (CHF, CAD, A fib, attempted watchman but found to have ADDY thrombus and procedure was aborted).     #GNR bloodstream infection, due to above. PCR studies show E coli    #Fevers, leukocytosis, lactic acidosis on admission - due to above   -WBC count trending down. No hypotension. No signs of lack of organ perfusion     #A fib, on Eliquis at home   -Currently on Lovenox 100 mg sq q 12 hours. Plans to transition to heparin drip prior to perc mary procedure     #HFmrEF, s/p PPM     #CAD s/p CABG - on ASA     #HTN, HLD     PLAN:  -Continue Zosyn. ID involved. E coli DNA noted on PCR. Await sensitivities   -monitor WBC count and temperature curve   -Right now on therapeutic lovenox. Plans to transition to heparin drip on evening of 3/29. Possible plans for percutaneous cholecystostomy tube placement on 3/31  -high risk patient, multiple cardiac RFs. Not a good surgical candidate, no plans for surgical intervention at this point.   -advanced GI following, no plans for ERCP   -will follow   -Cardiology to see tomorrow     Lines/Tubes: PIV

## 2023-03-28 NOTE — PROGRESS NOTE ADULT - ASSESSMENT
76 yo male with a PMHx of HTN, HLD, CAD s/p CABG, CHF (s/p Pacemaker; ABBOTS  P/G [id number]), A-fib and LAAT on Eliquis, hard of hearing, osteoarthritis, and breast CA s/p R mastectomy in 2001 presents for chest pain/abdominal pain and right-sided pain for the past 4 hours. Found to have acute cholecystitis, E. coli bacteremia.     #Severe sepsis (not on admission) 2/2 Acute Cholecystitis (fever, wbc, source, lactic acidosis)    # Elevated LFTs in setting of Cholecystitis   * likely has gallstone cholecystitis, given elevated LFTs/Bilirubin monitor for cholangitis (has RUQ and reported fever at home, no juandice, AMS, or septic at the moment), on imaging finding consistent with cholecystitis without biliary dilatation, lipase normal and no inflammation of pancreas on imaging so no gallstone pancreatitis  -IR consult - plan for possible percutaneous cholecystostomy on Friday  -cardio consult for clearance- spoke with Dr. Handy (private cardiologist), Dr. 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 ABBOTS  P/G [id number]- not in exacerbation   #HTN  #HLD  - Lasix held in setting of infection and need for IV hydration   - Cardiology consulted (input for antiplatelet and eliquis holding given need for procedure and risk of stroke in setting of Afib/LAAT)- as above   - Converted to lovenox 100 BID; spoke with IR, will need to switch to heparin drip on evening of 3/29  - C/w amiodarone, amlodipine, atorvastatin, losartan, metoprolol     #Hyponatremia  -resolved     #Breast CA s/p R mastectomy  - OP Follow up      #Progress Note Handoff  Pending (specify):  follow up cardio clearance, IR for perc cholecystostomy Friday, switch LVX to heparin drip 3/29 evening  Family discussion: Updated patient's wife Bev at [phone]  Disposition: snf vs home   High risk given above acuity and comorbidities, labs reviewed, dw IR and cardiology.    76 yo male with a PMHx of HTN, HLD, CAD s/p CABG, CHF (s/p Pacemaker; ABBOTS  P/G [id number]), A-fib and LAAT on Eliquis, hard of hearing, osteoarthritis, and breast CA s/p R mastectomy in 2001 presents for chest pain/abdominal pain and right-sided pain for the past 4 hours. Found to have acute cholecystitis, E. coli bacteremia.     #Severe sepsis (not on admission) 2/2 Acute Cholecystitis (fever, wbc, source, lactic acidosis)    # Elevated LFTs in setting of Cholecystitis   * likely has gallstone cholecystitis, given elevated LFTs/Bilirubin monitor for cholangitis (has RUQ and reported fever at home, no juandice, AMS, or septic at the moment), on imaging finding consistent with cholecystitis without biliary dilatation, lipase normal and no inflammation of pancreas on imaging so no gallstone pancreatitis  -IR consult - plan for possible percutaneous cholecystostomy on MONDAY NOW DUE TO GETTING ASPIRIN WHICH WAS ALSO GIVEN YESTERDAY; WILL HOLD NOW  -cardio consult for clearance- spoke with Dr. Handy (private cardiologist), Dr. 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 ABBOTS  P/G [id number]- not in exacerbation   #HTN  #HLD  - Lasix held in setting of infection and need for IV hydration   - Cardiology consulted (input for antiplatelet and eliquis holding given need for procedure and risk of stroke in setting of Afib/LAAT)- as above   - Converted to lovenox 100 BID; spoke with IR, will need to switch to heparin drip on evening of 4/1  - C/w amiodarone, amlodipine, atorvastatin, losartan, metoprolol     #Hyponatremia  -resolved     #Breast CA s/p R mastectomy  - OP Follow up      #Progress Note Handoff  Pending (specify):  follow up cardio clearance, IR for perc cholecystostomy Friday, switch LVX to heparin drip 4/1 evening  Family discussion: Updated patient's wife Bev at [phone]  Disposition: snf vs home   High risk given above acuity and comorbidities, labs reviewed, dw IR and cardiology.

## 2023-03-29 LAB
-  AMIKACIN: SIGNIFICANT CHANGE UP
-  AMOXICILLIN/CLAVULANIC ACID: SIGNIFICANT CHANGE UP
-  AMPICILLIN/SULBACTAM: SIGNIFICANT CHANGE UP
-  AMPICILLIN: SIGNIFICANT CHANGE UP
-  AZTREONAM: SIGNIFICANT CHANGE UP
-  CEFAZOLIN: SIGNIFICANT CHANGE UP
-  CEFEPIME: SIGNIFICANT CHANGE UP
-  CEFOXITIN: SIGNIFICANT CHANGE UP
-  CEFTRIAXONE: SIGNIFICANT CHANGE UP
-  CEFUROXIME: SIGNIFICANT CHANGE UP
-  CIPROFLOXACIN: SIGNIFICANT CHANGE UP
-  ERTAPENEM: SIGNIFICANT CHANGE UP
-  GENTAMICIN: SIGNIFICANT CHANGE UP
-  LEVOFLOXACIN: SIGNIFICANT CHANGE UP
-  MEROPENEM: SIGNIFICANT CHANGE UP
-  NITROFURANTOIN: SIGNIFICANT CHANGE UP
-  PIPERACILLIN/TAZOBACTAM: SIGNIFICANT CHANGE UP
-  TOBRAMYCIN: SIGNIFICANT CHANGE UP
-  TRIMETHOPRIM/SULFAMETHOXAZOLE: SIGNIFICANT CHANGE UP
ALBUMIN SERPL ELPH-MCNC: 3.5 G/DL — SIGNIFICANT CHANGE UP (ref 3.5–5.2)
ALP SERPL-CCNC: 116 U/L — HIGH (ref 30–115)
ALT FLD-CCNC: 112 U/L — HIGH (ref 0–41)
ANION GAP SERPL CALC-SCNC: 11 MMOL/L — SIGNIFICANT CHANGE UP (ref 7–14)
AST SERPL-CCNC: 63 U/L — HIGH (ref 0–41)
BASOPHILS # BLD AUTO: 0.04 K/UL — SIGNIFICANT CHANGE UP (ref 0–0.2)
BASOPHILS NFR BLD AUTO: 0.4 % — SIGNIFICANT CHANGE UP (ref 0–1)
BILIRUB SERPL-MCNC: 2.1 MG/DL — HIGH (ref 0.2–1.2)
BUN SERPL-MCNC: 13 MG/DL — SIGNIFICANT CHANGE UP (ref 10–20)
CALCIUM SERPL-MCNC: 7.9 MG/DL — LOW (ref 8.4–10.5)
CHLORIDE SERPL-SCNC: 104 MMOL/L — SIGNIFICANT CHANGE UP (ref 98–110)
CO2 SERPL-SCNC: 23 MMOL/L — SIGNIFICANT CHANGE UP (ref 17–32)
CREAT SERPL-MCNC: 0.6 MG/DL — LOW (ref 0.7–1.5)
CULTURE RESULTS: SIGNIFICANT CHANGE UP
EGFR: 99 ML/MIN/1.73M2 — SIGNIFICANT CHANGE UP
EOSINOPHIL # BLD AUTO: 0.06 K/UL — SIGNIFICANT CHANGE UP (ref 0–0.7)
EOSINOPHIL NFR BLD AUTO: 0.6 % — SIGNIFICANT CHANGE UP (ref 0–8)
GLUCOSE SERPL-MCNC: 95 MG/DL — SIGNIFICANT CHANGE UP (ref 70–99)
HCT VFR BLD CALC: 36.7 % — LOW (ref 42–52)
HGB BLD-MCNC: 12.1 G/DL — LOW (ref 14–18)
IMM GRANULOCYTES NFR BLD AUTO: 0.3 % — SIGNIFICANT CHANGE UP (ref 0.1–0.3)
LYMPHOCYTES # BLD AUTO: 0.78 K/UL — LOW (ref 1.2–3.4)
LYMPHOCYTES # BLD AUTO: 7.6 % — LOW (ref 20.5–51.1)
MAGNESIUM SERPL-MCNC: 2.2 MG/DL — SIGNIFICANT CHANGE UP (ref 1.8–2.4)
MCHC RBC-ENTMCNC: 29.3 PG — SIGNIFICANT CHANGE UP (ref 27–31)
MCHC RBC-ENTMCNC: 33 G/DL — SIGNIFICANT CHANGE UP (ref 32–37)
MCV RBC AUTO: 88.9 FL — SIGNIFICANT CHANGE UP (ref 80–94)
METHOD TYPE: SIGNIFICANT CHANGE UP
MONOCYTES # BLD AUTO: 0.75 K/UL — HIGH (ref 0.1–0.6)
MONOCYTES NFR BLD AUTO: 7.3 % — SIGNIFICANT CHANGE UP (ref 1.7–9.3)
NEUTROPHILS # BLD AUTO: 8.61 K/UL — HIGH (ref 1.4–6.5)
NEUTROPHILS NFR BLD AUTO: 83.8 % — HIGH (ref 42.2–75.2)
NRBC # BLD: 0 /100 WBCS — SIGNIFICANT CHANGE UP (ref 0–0)
ORGANISM # SPEC MICROSCOPIC CNT: SIGNIFICANT CHANGE UP
ORGANISM # SPEC MICROSCOPIC CNT: SIGNIFICANT CHANGE UP
PLATELET # BLD AUTO: 128 K/UL — LOW (ref 130–400)
POTASSIUM SERPL-MCNC: 3.7 MMOL/L — SIGNIFICANT CHANGE UP (ref 3.5–5)
POTASSIUM SERPL-SCNC: 3.7 MMOL/L — SIGNIFICANT CHANGE UP (ref 3.5–5)
PROT SERPL-MCNC: 5.5 G/DL — LOW (ref 6–8)
RBC # BLD: 4.13 M/UL — LOW (ref 4.7–6.1)
RBC # FLD: 14.7 % — HIGH (ref 11.5–14.5)
SODIUM SERPL-SCNC: 138 MMOL/L — SIGNIFICANT CHANGE UP (ref 135–146)
SPECIMEN SOURCE: SIGNIFICANT CHANGE UP
WBC # BLD: 10.27 K/UL — SIGNIFICANT CHANGE UP (ref 4.8–10.8)
WBC # FLD AUTO: 10.27 K/UL — SIGNIFICANT CHANGE UP (ref 4.8–10.8)

## 2023-03-29 PROCEDURE — 99232 SBSQ HOSP IP/OBS MODERATE 35: CPT

## 2023-03-29 RX ADMIN — Medication 50 MILLIGRAM(S): at 17:25

## 2023-03-29 RX ADMIN — LOSARTAN POTASSIUM 100 MILLIGRAM(S): 100 TABLET, FILM COATED ORAL at 05:38

## 2023-03-29 RX ADMIN — PIPERACILLIN AND TAZOBACTAM 25 GRAM(S): 4; .5 INJECTION, POWDER, LYOPHILIZED, FOR SOLUTION INTRAVENOUS at 05:38

## 2023-03-29 RX ADMIN — ATORVASTATIN CALCIUM 20 MILLIGRAM(S): 80 TABLET, FILM COATED ORAL at 21:25

## 2023-03-29 RX ADMIN — PANTOPRAZOLE SODIUM 40 MILLIGRAM(S): 20 TABLET, DELAYED RELEASE ORAL at 11:30

## 2023-03-29 RX ADMIN — PIPERACILLIN AND TAZOBACTAM 25 GRAM(S): 4; .5 INJECTION, POWDER, LYOPHILIZED, FOR SOLUTION INTRAVENOUS at 21:25

## 2023-03-29 RX ADMIN — AMLODIPINE BESYLATE 5 MILLIGRAM(S): 2.5 TABLET ORAL at 05:39

## 2023-03-29 RX ADMIN — ENOXAPARIN SODIUM 100 MILLIGRAM(S): 100 INJECTION SUBCUTANEOUS at 17:26

## 2023-03-29 RX ADMIN — PIPERACILLIN AND TAZOBACTAM 25 GRAM(S): 4; .5 INJECTION, POWDER, LYOPHILIZED, FOR SOLUTION INTRAVENOUS at 13:27

## 2023-03-29 RX ADMIN — ENOXAPARIN SODIUM 100 MILLIGRAM(S): 100 INJECTION SUBCUTANEOUS at 06:34

## 2023-03-29 RX ADMIN — Medication 50 MILLIGRAM(S): at 05:38

## 2023-03-29 NOTE — PROGRESS NOTE ADULT - ASSESSMENT
PROBLEMS  Pt with Severe Sepsis (T>101F, Pulse>90, WBC>12), lactic acidosis due to E coli bacteremia (S Zosyn; R cipor & bactrim) & acute cholecystitis  Repeat B/C x1 negative    On piperacillin/tazobactam IVPB.. 3.375 Gram(s) IV Intermittent every 8 hours  wbc normalized    PLAN  - Continue Zosyn  - GI & Surgical F/U

## 2023-03-29 NOTE — PROGRESS NOTE ADULT - ATTENDING COMMENTS
sepsis, w/E coli bacteremia (S Zosyn; R cipor & bactrim), acute cholecystitis, VSS, on Zosyn Per ID, IR consulted  Repeated Bcx x1 negative => final pending  Hx AFib on chronic Eliquis => f/u cardio for recurrent clot  risk and benefit of off AC for surgical intervention discussed with pt.

## 2023-03-29 NOTE — PROGRESS NOTE ADULT - SUBJECTIVE AND OBJECTIVE BOX
HOSPITALIST PROGRESS NOTE     Interval Events:    Patient reports no pain at this time.   He has been tolerating clear liquids without any nausea/vomiting.   Had soft bowel movements last night.   He reports appetite and is requesting diet to be advanced.       REVIEW OF SYSTEMS:  Negative except as noted above.     VITALS:  T(F): 96.7 (03-29 @ 12:49), Max: 99 (03-29 @ 04:50)  HR: 60 (03-29 @ 12:49) (59 - 60)  BP: 159/72 (03-29 @ 12:49) (156/70 - 159/74)  RR: 18 (03-29 @ 12:49) (18 - 18)  SpO2: 97% (03-29 @ 12:49) (94% - 97%)    HEIGHT/WEIGHT/BMI:   Height (cm): 180.3 (03-26), 180.3 (03-14), 188 (03-01), 190.5 (06-24)  Weight (kg): 103.1 (03-28), 108.9 (03-14), 108.8 (03-01), 108.9 (06-24)  BMI (kg/m2): 31.7 (03-28), 33.5 (03-26), 33.5 (03-14), 30.8 (03-01)    PHYSICAL EXAM:   GENERAL: NAD, lying in bed comfortably  NERVOUS SYSTEM:  Alert & Oriented X3, speech clear. No deficits   HEAD:  Atraumatic, Normocephalic  EYES: EOMI, conjunctiva and sclera clear  ENT: Moist mucous membranes  CHEST/LUNG: Clear to auscultation bilaterally; Unlabored respirations  HEART: Irregularly irregular   ABDOMEN: Bowel sounds present; Soft, Nontender, Nondistended, umbilical hernia reducible and non-tender   EXTREMITIES: No clubbing, cyanosis, or edema  MSK: FROM all 4 extremities, full and equal strength  SKIN: No rashes or lesions    I/Os:     03-28-23 @ 07:01  -  03-29-23 @ 07:00  --------------------------------------------------------  IN:    IV PiggyBack: 100 mL    Lactated Ringers: 660 mL    Oral Fluid: 850 mL  Total IN: 1610 mL    OUT:    Voided (mL): 900 mL  Total OUT: 900 mL    Total NET: 710 mL        MEDICATIONS:   acetaminophen     Tablet .. 650 milliGRAM(s) Oral every 6 hours PRN  amLODIPine   Tablet 5 milliGRAM(s) Oral daily  atorvastatin 20 milliGRAM(s) Oral at bedtime  enoxaparin Injectable 100 milliGRAM(s) SubCutaneous every 12 hours  influenza  Vaccine (HIGH DOSE) 0.7 milliLiter(s) IntraMuscular once  losartan 100 milliGRAM(s) Oral daily  metoprolol tartrate 50 milliGRAM(s) Oral two times a day  morphine  - Injectable 2 milliGRAM(s) IV Push every 4 hours PRN  ondansetron Injectable 4 milliGRAM(s) IV Push three times a day PRN  pantoprazole  Injectable 40 milliGRAM(s) IV Push daily  piperacillin/tazobactam IVPB.. 3.375 Gram(s) IV Intermittent every 8 hours  polyethylene glycol 3350 17 Gram(s) Oral daily  senna 2 Tablet(s) Oral at bedtime    LABS:                         12.1   10.27 )-----------( 128      ( 29 Mar 2023 06:35 )             36.7     138  |  104  |  13  ----------------------------<  95          (03-29-23 @ 06:35)  3.7   |  23  |  0.6<L>    Ca    7.9<L>          (03-29-23 @ 06:35)  Mg     2.2         (03-29-23 @ 06:35)    TPro  5.5<L>  /  Alb  3.5  /  TBili  2.1<H>  /  DBili  x   /  AST  63<H>  /  ALT  112<H>  /  AlkPhos  116<H>       03-29-23 @ 06:35    Triglycerides, Serum: 58 mg/dL (03-28 @ 06:15)    DIET:   Diet, DASH/TLC:   Sodium & Cholesterol Restricted  Low Fat (LOWFAT) (03-29-23 @ 09:18) [Active] HOSPITALIST PROGRESS NOTE       Interval Events:    Patient reports no pain at this time.   He has been tolerating clear liquids without any nausea/vomiting.   Had soft bowel movements last night.   He reports appetite and is requesting diet to be advanced.       REVIEW OF SYSTEMS:  Negative except as noted above.     VITALS:  T(F): 96.7 (03-29 @ 12:49), Max: 99 (03-29 @ 04:50)  HR: 60 (03-29 @ 12:49) (59 - 60)  BP: 159/72 (03-29 @ 12:49) (156/70 - 159/74)  RR: 18 (03-29 @ 12:49) (18 - 18)  SpO2: 97% (03-29 @ 12:49) (94% - 97%)    HEIGHT/WEIGHT/BMI:   Height (cm): 180.3 (03-26), 180.3 (03-14), 188 (03-01), 190.5 (06-24)  Weight (kg): 103.1 (03-28), 108.9 (03-14), 108.8 (03-01), 108.9 (06-24)  BMI (kg/m2): 31.7 (03-28), 33.5 (03-26), 33.5 (03-14), 30.8 (03-01)    PHYSICAL EXAM:   GENERAL: NAD, lying in bed comfortably  NERVOUS SYSTEM:  Alert & Oriented X3, speech clear. No deficits   HEAD:  Atraumatic, Normocephalic  EYES: EOMI, conjunctiva and sclera clear  ENT: Moist mucous membranes  CHEST/LUNG: Clear to auscultation bilaterally; Unlabored respirations  HEART: Irregularly irregular   ABDOMEN: Bowel sounds present; Soft, Nontender, Nondistended, umbilical hernia reducible and non-tender   EXTREMITIES: No clubbing, cyanosis, or edema  MSK: FROM all 4 extremities, full and equal strength  SKIN: No rashes or lesions    I/Os:     03-28-23 @ 07:01  -  03-29-23 @ 07:00  --------------------------------------------------------  IN:    IV PiggyBack: 100 mL    Lactated Ringers: 660 mL    Oral Fluid: 850 mL  Total IN: 1610 mL    OUT:    Voided (mL): 900 mL  Total OUT: 900 mL    Total NET: 710 mL        MEDICATIONS:   acetaminophen     Tablet .. 650 milliGRAM(s) Oral every 6 hours PRN  amLODIPine   Tablet 5 milliGRAM(s) Oral daily  atorvastatin 20 milliGRAM(s) Oral at bedtime  enoxaparin Injectable 100 milliGRAM(s) SubCutaneous every 12 hours  influenza  Vaccine (HIGH DOSE) 0.7 milliLiter(s) IntraMuscular once  losartan 100 milliGRAM(s) Oral daily  metoprolol tartrate 50 milliGRAM(s) Oral two times a day  morphine  - Injectable 2 milliGRAM(s) IV Push every 4 hours PRN  ondansetron Injectable 4 milliGRAM(s) IV Push three times a day PRN  pantoprazole  Injectable 40 milliGRAM(s) IV Push daily  piperacillin/tazobactam IVPB.. 3.375 Gram(s) IV Intermittent every 8 hours  polyethylene glycol 3350 17 Gram(s) Oral daily  senna 2 Tablet(s) Oral at bedtime    LABS:                         12.1   10.27 )-----------( 128      ( 29 Mar 2023 06:35 )             36.7     138  |  104  |  13  ----------------------------<  95          (03-29-23 @ 06:35)  3.7   |  23  |  0.6<L>    Ca    7.9<L>          (03-29-23 @ 06:35)  Mg     2.2         (03-29-23 @ 06:35)    TPro  5.5<L>  /  Alb  3.5  /  TBili  2.1<H>  /  DBili  x   /  AST  63<H>  /  ALT  112<H>  /  AlkPhos  116<H>       03-29-23 @ 06:35    Triglycerides, Serum: 58 mg/dL (03-28 @ 06:15)    DIET:   Diet, DASH/TLC:   Sodium & Cholesterol Restricted  Low Fat (LOWFAT) (03-29-23 @ 09:18) [Active]

## 2023-03-29 NOTE — PROGRESS NOTE ADULT - SUBJECTIVE AND OBJECTIVE BOX
WAGNER SEGURA  77y, Male  Allergy: No Known Allergies      CHIEF COMPLAINT: Abdominal Pain (28 Mar 2023 14:24)      INTERVAL EVENTS/HPI  - No acute events overnight  - T(F): , Max: 99 (03-29-23 @ 04:50)  - Denies any worsening symptoms  - Tolerating medication  - WBC Count: 10.27 K/uL (03-29-23 @ 06:35)      ROS  General: Denies fevers, chills, nightsweats, weight loss  HEENT: Denies headache, rhinorrhea, sore throat, eye pain  CV: Denies CP, palpitations  PULM: Denies SOB, cough  GI: Denies abdominal pain, diarrhea  : Denies dysuria, hematuria  MSK: Denies arthralgias  SKIN: Denies rash   NEURO: Denies paresthesias, weakness  PSYCH: Denies depression    FH non-contributory   Social Hx non-contributory    VITALS:  T(F): 98.8, Max: 99 (03-29-23 @ 04:50)  HR: 60  BP: 159/74  RR: 18Vital Signs Last 24 Hrs  T(C): 37.1 (29 Mar 2023 08:21), Max: 37.2 (29 Mar 2023 04:50)  T(F): 98.8 (29 Mar 2023 08:21), Max: 99 (29 Mar 2023 04:50)  HR: 60 (29 Mar 2023 08:21) (59 - 62)  BP: 159/74 (29 Mar 2023 08:21) (143/67 - 162/74)  BP(mean): --  RR: 18 (29 Mar 2023 08:21) (18 - 18)  SpO2: 96% (29 Mar 2023 08:21) (94% - 98%)    Parameters below as of 28 Mar 2023 21:05  Patient On (Oxygen Delivery Method): room air        PHYSICAL EXAM:  Gen: NAD, resting in bed  HEENT: Normocephalic, atraumatic  Neck: supple, no lymphadenopathy  CV: Regular rate & regular rhythm  Lungs: decreased BS at bases, no fremitus  Abdomen: Soft, BS present  Ext: Warm, well perfused  Neuro: non focal, awake  Skin: no rash, no erythema      TESTS & MEASUREMENTS:                        12.1   10.27 )-----------( 128      ( 29 Mar 2023 06:35 )             36.7     03-29    138  |  104  |  13  ----------------------------<  95  3.7   |  23  |  0.6<L>    Ca    7.9<L>      29 Mar 2023 06:35  Mg     2.2     03-29    TPro  5.5<L>  /  Alb  3.5  /  TBili  2.1<H>  /  DBili  x   /  AST  63<H>  /  ALT  112<H>  /  AlkPhos  116<H>  03-29      LIVER FUNCTIONS - ( 29 Mar 2023 06:35 )  Alb: 3.5 g/dL / Pro: 5.5 g/dL / ALK PHOS: 116 U/L / ALT: 112 U/L / AST: 63 U/L / GGT: x               Culture - Blood (collected 03-27-23 @ 12:08)  Source: .Blood None  Preliminary Report (03-28-23 @ 23:01):    No growth to date.    Culture - Urine (collected 03-26-23 @ 20:32)  Source: Clean Catch Clean Catch (Midstream)  Final Report (03-29-23 @ 09:02):    10,000 - 49,000 CFU/mL Proteus mirabilis    <10,000 CFU/ml Normal Urogenital james present  Organism: Proteus mirabilis (03-29-23 @ 09:02)  Organism: Proteus mirabilis (03-29-23 @ 09:02)      Method Type: ANGELINE      -  Amikacin: S <=16      -  Amoxicillin/Clavulanic Acid: S <=8/4      -  Ampicillin: S <=8 These ampicillin results predict results for amoxicillin      -  Ampicillin/Sulbactam: S <=4/2 Enterobacter, Klebsiella aerogenes, Citrobacter, and Serratia may develop resistance during prolonged therapy (3-4 days)      -  Aztreonam: S <=4      -  Cefazolin: S <=2 For uncomplicated UTI with K. pneumoniae, E. coli, or P. mirablis: ANGELINE <=16 is sensitive and ANGELINE >=32 is resistant. This also predicts results for oral agents cefaclor, cefdinir, cefpodoxime, cefprozil, cefuroxime axetil, cephalexin and locarbef for uncomplicated UTI. Note that some isolates may be susceptible to these agents while testing resistant to cefazolin.      -  Cefepime: S <=2      -  Cefoxitin: S <=8      -  Ceftriaxone: S <=1 Enterobacter, Klebsiella aerogenes, Citrobacter, and Serratia may develop resistance during prolonged therapy      -  Cefuroxime: S <=4      -  Ciprofloxacin: S <=0.25      -  Ertapenem: S <=0.5      -  Gentamicin: S <=2      -  Levofloxacin: S <=0.5      -  Meropenem: S <=1      -  Nitrofurantoin: R >64 Should not be used to treat pyelonephritis      -  Piperacillin/Tazobactam: S <=8      -  Tobramycin: S <=2      -  Trimethoprim/Sulfamethoxazole: S <=0.5/9.5    Culture - Blood (collected 03-26-23 @ 19:27)  Source: .Blood Blood-Peripheral  Gram Stain (03-27-23 @ 10:59):    Growth in anaerobic bottle: Gram Negative Rods    Growth in aerobic bottle: Gram Negative Rods  Final Report (03-28-23 @ 17:15):    Growth in aerobic and anaerobic bottles: Escherichia coli    See previous culture [id number]    Culture - Blood (collected 03-26-23 @ 19:27)  Source: .Blood Blood-Peripheral  Gram Stain (03-27-23 @ 10:59):    Growth in anaerobic bottle: Gram Negative Rods    Growth in aerobic bottle: Gram Negative Rods  Final Report (03-28-23 @ 16:34):    Growth in aerobic and anaerobic bottles: Escherichia coli    ***Blood Panel PCR results on this specimen are available    approximately 3 hours after the Gram stain result.***    Gram stain, PCR, and/or culture results may not always    correspond due to difference in methodologies.    ************************************************************    This PCR assay was performed by multiplex PCR. This    Assay tests for 66 bacterial and resistance gene targets.    Please refer to the [facility] test directory    at [email or web address] for details.  Organism: Blood Culture PCR  Escherichia coli (03-28-23 @ 16:34)  Organism: Escherichia coli (03-28-23 @ 16:34)      Method Type: ANGELINE      -  Amikacin: S <=16      -  Ampicillin: R >16 These ampicillin results predict results for amoxicillin      -  Ampicillin/Sulbactam: S 8/4 Enterobacter, Klebsiella aerogenes, Citrobacter, and Serratia may develop resistance during prolonged therapy (3-4 days)      -  Aztreonam: S <=4      -  Cefazolin: S <=2 Enterobacter, Klebsiella aerogenes, Citrobacter, and Serratia may develop resistance during prolonged therapy (3-4 days)      -  Cefepime: S <=2      -  Cefoxitin: S <=8      -  Ceftriaxone: S <=1 Enterobacter, Klebsiella aerogenes, Citrobacter, and Serratia may develop resistance during prolonged therapy      -  Ciprofloxacin: S <=0.25      -  Ertapenem: S <=0.5      -  Gentamicin: S <=2      -  Imipenem: S <=1      -  Levofloxacin: S <=0.5      -  Meropenem: S <=1      -  Piperacillin/Tazobactam: S <=8      -  Tobramycin: S <=2      -  Trimethoprim/Sulfamethoxazole: R >2/38  Organism: Blood Culture PCR (03-28-23 @ 16:34)      Method Type: PCR      -  Escherichia coli: Detec        Lactate, Blood: 0.9 mmol/L (03-28-23 @ 06:15)  Blood Gas Venous - Lactate: 2.20 mmol/L (03-26-23 @ 21:36)  Lactate, Blood: 1.3 mmol/L (03-26-23 @ 19:07)  Blood Gas Venous - Lactate: 2.70 mmol/L (03-26-23 @ 18:55)      INFECTIOUS DISEASES TESTING  MRSA PCR Result.: Negative (03-03-23 @ 11:22)  MRSA PCR Result.: Negative (06-17-22 @ 00:00)      RADIOLOGY & ADDITIONAL TESTS:  I have personally reviewed the last Chest xray  CXR      CT      CARDIOLOGY TESTING  12 Lead ECG:   Ventricular Rate 74 BPM    Atrial Rate 77 BPM    QRS Duration 166 ms    Q-T Interval 436 ms    QTC Calculation(Bazett) 483 ms    R Axis 160 degrees    T Axis -24 degrees    Diagnosis Line Atrial fibrillation  Right axis deviation  Non-specific intra-ventricular conduction block  Abnormal ECG    Confirmed by LOCO DAVIS MD ([id number]) on 3/27/2023 6:58:15 AM (03-26-23 @ 20:36)  12 Lead ECG:   Ventricular Rate 60 BPM    Atrial Rate 58 BPM    QRS Duration 164 ms    Q-T Interval 514 ms    QTC Calculation(Bazett) 514 ms    R Axis -66 degrees    T Axis 90 degrees    Diagnosis Line Ventricular-paced rhythm  Abnormal ECG    Confirmed by Beronica Benjamin MD ([id number]) on 3/26/2023 8:26:20 PM (03-26-23 @ 17:48)      MEDICATIONS  amLODIPine   Tablet 5  atorvastatin 20  enoxaparin Injectable 100  influenza  Vaccine (HIGH DOSE) 0.7  losartan 100  metoprolol tartrate 50  pantoprazole  Injectable 40  piperacillin/tazobactam IVPB.. 3.375  polyethylene glycol 3350 17  senna 2      ANTIBIOTICS:  piperacillin/tazobactam IVPB.. 3.375 Gram(s) IV Intermittent every 8 hours      All available historical data has been reviewed

## 2023-03-29 NOTE — PROGRESS NOTE ADULT - ASSESSMENT
76 yo male with a PMHx of HTN, HLD, CAD s/p CABG, CHF (s/p Pacemaker; ABBOTS  P/G [id number]), A-fib and LAAT on Eliquis, hard of hearing, osteoarthritis, and breast CA s/p R mastectomy in 2001 presents for chest pain/abdominal pain and right-sided pain for the past 4 hours. Found to have acute cholecystitis, E. coli bacteremia.     #Severe sepsis (not on admission) 2/2 Acute Cholecystitis (fever, wbc, source, lactic acidosis)    #Elevated LFTs in setting of Cholecystitis   #E. Coli Bacteremi  * likely has gallstone cholecystitis, given elevated LFTs/Bilirubin monitor for cholangitis (has RUQ and reported fever at home, no juandice, AMS, or septic at the moment), on imaging finding consistent with cholecystitis without biliary dilatation, lipase normal and no inflammation of pancreas on imaging so no gallstone pancreatitis  - HIDA scan reviewed, positive  - IR consult - plan for possible percutaneous cholecystostomy on MONDAY 4/3  - cardio consulted for clearance- spoke with Dr. Handy (private cardiologist), Dr. 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 ABBOTS  P/G [id number]- not in exacerbation   #HTN  #HLD  - Lasix held in setting of infection and need for IV hydration   - Cardiology consulted (input for antiplatelet and eliquis holding given need for procedure and risk of stroke in setting of Afib/LAAT)- as above   - Converted to lovenox 100 BID; spoke with IR, will need to switch to heparin drip on evening of 4/1  - C/w amiodarone, amlodipine, atorvastatin, losartan, metoprolol     #Hyponatremia  -resolved     #Breast CA s/p R mastectomy  - OP Follow up      #Progress Note Handoff  Pending (specify):  follow up cardio clearance, IR for perc cholecystostomy Monday 4/3, switch LVX to heparin drip 4/1 evening  Family discussion: Updated patient's wife Bev at [phone]  Disposition: snf vs home   High risk given above acuity and comorbidities, labs reviewed, dw IR and cardiology.

## 2023-03-30 LAB
ALBUMIN SERPL ELPH-MCNC: 3.3 G/DL — LOW (ref 3.5–5.2)
ALP SERPL-CCNC: 110 U/L — SIGNIFICANT CHANGE UP (ref 30–115)
ALT FLD-CCNC: 76 U/L — HIGH (ref 0–41)
ANION GAP SERPL CALC-SCNC: 9 MMOL/L — SIGNIFICANT CHANGE UP (ref 7–14)
AST SERPL-CCNC: 33 U/L — SIGNIFICANT CHANGE UP (ref 0–41)
BASOPHILS # BLD AUTO: 0.03 K/UL — SIGNIFICANT CHANGE UP (ref 0–0.2)
BASOPHILS NFR BLD AUTO: 0.4 % — SIGNIFICANT CHANGE UP (ref 0–1)
BILIRUB SERPL-MCNC: 1.8 MG/DL — HIGH (ref 0.2–1.2)
BUN SERPL-MCNC: 8 MG/DL — LOW (ref 10–20)
CALCIUM SERPL-MCNC: 8.1 MG/DL — LOW (ref 8.4–10.5)
CHLORIDE SERPL-SCNC: 104 MMOL/L — SIGNIFICANT CHANGE UP (ref 98–110)
CO2 SERPL-SCNC: 22 MMOL/L — SIGNIFICANT CHANGE UP (ref 17–32)
CREAT SERPL-MCNC: 0.5 MG/DL — LOW (ref 0.7–1.5)
EGFR: 104 ML/MIN/1.73M2 — SIGNIFICANT CHANGE UP
EOSINOPHIL # BLD AUTO: 0.1 K/UL — SIGNIFICANT CHANGE UP (ref 0–0.7)
EOSINOPHIL NFR BLD AUTO: 1.4 % — SIGNIFICANT CHANGE UP (ref 0–8)
GLUCOSE SERPL-MCNC: 95 MG/DL — SIGNIFICANT CHANGE UP (ref 70–99)
HCT VFR BLD CALC: 34.6 % — LOW (ref 42–52)
HGB BLD-MCNC: 11.6 G/DL — LOW (ref 14–18)
IMM GRANULOCYTES NFR BLD AUTO: 0.3 % — SIGNIFICANT CHANGE UP (ref 0.1–0.3)
LYMPHOCYTES # BLD AUTO: 1.15 K/UL — LOW (ref 1.2–3.4)
LYMPHOCYTES # BLD AUTO: 16.4 % — LOW (ref 20.5–51.1)
MAGNESIUM SERPL-MCNC: 2.2 MG/DL — SIGNIFICANT CHANGE UP (ref 1.8–2.4)
MCHC RBC-ENTMCNC: 29.5 PG — SIGNIFICANT CHANGE UP (ref 27–31)
MCHC RBC-ENTMCNC: 33.5 G/DL — SIGNIFICANT CHANGE UP (ref 32–37)
MCV RBC AUTO: 88 FL — SIGNIFICANT CHANGE UP (ref 80–94)
MONOCYTES # BLD AUTO: 0.69 K/UL — HIGH (ref 0.1–0.6)
MONOCYTES NFR BLD AUTO: 9.8 % — HIGH (ref 1.7–9.3)
NEUTROPHILS # BLD AUTO: 5.02 K/UL — SIGNIFICANT CHANGE UP (ref 1.4–6.5)
NEUTROPHILS NFR BLD AUTO: 71.7 % — SIGNIFICANT CHANGE UP (ref 42.2–75.2)
NRBC # BLD: 0 /100 WBCS — SIGNIFICANT CHANGE UP (ref 0–0)
PLATELET # BLD AUTO: 134 K/UL — SIGNIFICANT CHANGE UP (ref 130–400)
POTASSIUM SERPL-MCNC: 3.4 MMOL/L — LOW (ref 3.5–5)
POTASSIUM SERPL-SCNC: 3.4 MMOL/L — LOW (ref 3.5–5)
PROT SERPL-MCNC: 5.4 G/DL — LOW (ref 6–8)
RBC # BLD: 3.93 M/UL — LOW (ref 4.7–6.1)
RBC # FLD: 14.5 % — SIGNIFICANT CHANGE UP (ref 11.5–14.5)
SODIUM SERPL-SCNC: 135 MMOL/L — SIGNIFICANT CHANGE UP (ref 135–146)
WBC # BLD: 7.01 K/UL — SIGNIFICANT CHANGE UP (ref 4.8–10.8)
WBC # FLD AUTO: 7.01 K/UL — SIGNIFICANT CHANGE UP (ref 4.8–10.8)

## 2023-03-30 PROCEDURE — 99232 SBSQ HOSP IP/OBS MODERATE 35: CPT

## 2023-03-30 RX ADMIN — PIPERACILLIN AND TAZOBACTAM 25 GRAM(S): 4; .5 INJECTION, POWDER, LYOPHILIZED, FOR SOLUTION INTRAVENOUS at 21:37

## 2023-03-30 RX ADMIN — PANTOPRAZOLE SODIUM 40 MILLIGRAM(S): 20 TABLET, DELAYED RELEASE ORAL at 11:25

## 2023-03-30 RX ADMIN — PIPERACILLIN AND TAZOBACTAM 25 GRAM(S): 4; .5 INJECTION, POWDER, LYOPHILIZED, FOR SOLUTION INTRAVENOUS at 05:53

## 2023-03-30 RX ADMIN — Medication 50 MILLIGRAM(S): at 17:06

## 2023-03-30 RX ADMIN — PIPERACILLIN AND TAZOBACTAM 25 GRAM(S): 4; .5 INJECTION, POWDER, LYOPHILIZED, FOR SOLUTION INTRAVENOUS at 14:06

## 2023-03-30 RX ADMIN — LOSARTAN POTASSIUM 100 MILLIGRAM(S): 100 TABLET, FILM COATED ORAL at 05:53

## 2023-03-30 RX ADMIN — Medication 50 MILLIGRAM(S): at 05:53

## 2023-03-30 RX ADMIN — AMLODIPINE BESYLATE 5 MILLIGRAM(S): 2.5 TABLET ORAL at 05:53

## 2023-03-30 RX ADMIN — ENOXAPARIN SODIUM 100 MILLIGRAM(S): 100 INJECTION SUBCUTANEOUS at 05:53

## 2023-03-30 RX ADMIN — ENOXAPARIN SODIUM 100 MILLIGRAM(S): 100 INJECTION SUBCUTANEOUS at 17:07

## 2023-03-30 RX ADMIN — ATORVASTATIN CALCIUM 20 MILLIGRAM(S): 80 TABLET, FILM COATED ORAL at 21:38

## 2023-03-30 NOTE — PROGRESS NOTE ADULT - ASSESSMENT
Assessment and Plan:   · Assessment	  76 yo male with a PMHx of HTN, HLD, CAD s/p CABG, CHF (s/p Pacemaker; ABBOTS  P/G [id number]), A-fib and LAAT on Eliquis, hard of hearing, osteoarthritis, and breast CA s/p R mastectomy in 2001 presents for chest pain/abdominal pain and right-sided pain for the past 4 hours. Found to have acute cholecystitis, E. coli bacteremia.     #Severe sepsis (not on admission) 2/2 Acute Cholecystitis (fever, wbc, source, lactic acidosis)    #Elevated LFTs in setting of Cholecystitis   #E. Coli Bacteremi  * likely has gallstone cholecystitis, given elevated LFTs/Bilirubin monitor for cholangitis (has RUQ and reported fever at home, no juandice, AMS, or septic at the moment), on imaging finding consistent with cholecystitis without biliary dilatation, lipase normal and no inflammation of pancreas on imaging so no gallstone pancreatitis  - HIDA scan reviewed, positive  - IR consult - plan for possible percutaneous cholecystostomy on MONDAY 4/3  - cardio consulted for clearance- spoke with Dr. Handy (private cardiologist), Dr. 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 ABBOTS  P/G [id number]- not in exacerbation   #HTN  #HLD  - Lasix held in setting of infection and need for IV hydration   - Cardiology consulted (input for antiplatelet and eliquis holding given need for procedure and risk of stroke in setting of Afib/LAAT)- as above   - Converted to lovenox 100 BID; spoke with IR, will need to switch to heparin drip on evening of 4/1  - C/w amiodarone, amlodipine, atorvastatin, losartan, metoprolol     #Hyponatremia  -resolved     #Breast CA s/p R mastectomy  - OP Follow up      #Progress Note Handoff  Pending (specify):  follow up cardio clearance, IR for perc cholecystostomy Monday 4/3, switch LVX to heparin drip 4/1 evening  Family discussion: Updated patient's wife Bev at [phone]  Disposition: snf vs home   High risk given above acuity and comorbidities, labs reviewed, dw IR and cardiology.

## 2023-03-31 LAB
ANION GAP SERPL CALC-SCNC: 9 MMOL/L — SIGNIFICANT CHANGE UP (ref 7–14)
BUN SERPL-MCNC: 8 MG/DL — LOW (ref 10–20)
CALCIUM SERPL-MCNC: 8.3 MG/DL — LOW (ref 8.4–10.4)
CHLORIDE SERPL-SCNC: 105 MMOL/L — SIGNIFICANT CHANGE UP (ref 98–110)
CO2 SERPL-SCNC: 24 MMOL/L — SIGNIFICANT CHANGE UP (ref 17–32)
CREAT SERPL-MCNC: 0.7 MG/DL — SIGNIFICANT CHANGE UP (ref 0.7–1.5)
EGFR: 94 ML/MIN/1.73M2 — SIGNIFICANT CHANGE UP
GLUCOSE SERPL-MCNC: 93 MG/DL — SIGNIFICANT CHANGE UP (ref 70–99)
HCT VFR BLD CALC: 35.5 % — LOW (ref 42–52)
HGB BLD-MCNC: 11.6 G/DL — LOW (ref 14–18)
MCHC RBC-ENTMCNC: 29.4 PG — SIGNIFICANT CHANGE UP (ref 27–31)
MCHC RBC-ENTMCNC: 32.7 G/DL — SIGNIFICANT CHANGE UP (ref 32–37)
MCV RBC AUTO: 90.1 FL — SIGNIFICANT CHANGE UP (ref 80–94)
NRBC # BLD: 0 /100 WBCS — SIGNIFICANT CHANGE UP (ref 0–0)
PLATELET # BLD AUTO: 147 K/UL — SIGNIFICANT CHANGE UP (ref 130–400)
POTASSIUM SERPL-MCNC: 3.6 MMOL/L — SIGNIFICANT CHANGE UP (ref 3.5–5)
POTASSIUM SERPL-SCNC: 3.6 MMOL/L — SIGNIFICANT CHANGE UP (ref 3.5–5)
RBC # BLD: 3.94 M/UL — LOW (ref 4.7–6.1)
RBC # FLD: 14.4 % — SIGNIFICANT CHANGE UP (ref 11.5–14.5)
SODIUM SERPL-SCNC: 138 MMOL/L — SIGNIFICANT CHANGE UP (ref 135–146)
WBC # BLD: 6.64 K/UL — SIGNIFICANT CHANGE UP (ref 4.8–10.8)
WBC # FLD AUTO: 6.64 K/UL — SIGNIFICANT CHANGE UP (ref 4.8–10.8)

## 2023-03-31 PROCEDURE — 99232 SBSQ HOSP IP/OBS MODERATE 35: CPT

## 2023-03-31 RX ADMIN — PIPERACILLIN AND TAZOBACTAM 25 GRAM(S): 4; .5 INJECTION, POWDER, LYOPHILIZED, FOR SOLUTION INTRAVENOUS at 21:50

## 2023-03-31 RX ADMIN — ENOXAPARIN SODIUM 100 MILLIGRAM(S): 100 INJECTION SUBCUTANEOUS at 05:33

## 2023-03-31 RX ADMIN — LOSARTAN POTASSIUM 100 MILLIGRAM(S): 100 TABLET, FILM COATED ORAL at 05:33

## 2023-03-31 RX ADMIN — ATORVASTATIN CALCIUM 20 MILLIGRAM(S): 80 TABLET, FILM COATED ORAL at 21:50

## 2023-03-31 RX ADMIN — PIPERACILLIN AND TAZOBACTAM 25 GRAM(S): 4; .5 INJECTION, POWDER, LYOPHILIZED, FOR SOLUTION INTRAVENOUS at 05:32

## 2023-03-31 RX ADMIN — AMLODIPINE BESYLATE 5 MILLIGRAM(S): 2.5 TABLET ORAL at 05:33

## 2023-03-31 RX ADMIN — Medication 50 MILLIGRAM(S): at 17:18

## 2023-03-31 RX ADMIN — Medication 50 MILLIGRAM(S): at 05:32

## 2023-03-31 RX ADMIN — PANTOPRAZOLE SODIUM 40 MILLIGRAM(S): 20 TABLET, DELAYED RELEASE ORAL at 11:24

## 2023-03-31 RX ADMIN — ENOXAPARIN SODIUM 100 MILLIGRAM(S): 100 INJECTION SUBCUTANEOUS at 17:18

## 2023-03-31 RX ADMIN — PIPERACILLIN AND TAZOBACTAM 25 GRAM(S): 4; .5 INJECTION, POWDER, LYOPHILIZED, FOR SOLUTION INTRAVENOUS at 13:21

## 2023-03-31 NOTE — PROGRESS NOTE ADULT - SUBJECTIVE AND OBJECTIVE BOX
HOSPITALIST PROGRESS NOTE     Interval Events:      3.31.23  NAEO  IR for perc cholecystostomy Monday 4/3/23  Cardio consulted for sharri-operative use of AC  extensive discussion with pt and wife for risk and benefit of on and off AC for upcoming sharri-surgery.       3.30.23  NAEO  stable on Abx, neg fever, non toxic,  Cardio consulted for clot  Monday: intervention as per IR for perc cholecystostomy Monday 4/3/23        3.29.23  Patient reports no pain at this time.   He has been tolerating clear liquids without any nausea/vomiting.   Had soft bowel movements last night.   He reports appetite and is requesting diet to be advanced.         REVIEW OF SYSTEMS:  Negative except as noted above.     VITALS:  T(F): 98.3 (03-31 @ 16:35), Max: 98.5 (03-31 @ 13:00)  HR: 60 (03-31 @ 16:35) (60 - 65)  BP: 147/56 (03-31 @ 16:35) (147/56 - 169/73)  RR: 18 (03-31 @ 16:35) (18 - 18)  SpO2: 95% (03-31 @ 16:35) (95% - 96%)    HEIGHT/WEIGHT/BMI:   Height (cm): 180.3 (03-26), 180.3 (03-14), 188 (03-01), 190.5 (06-24)  Weight (kg): 103.1 (03-28), 108.9 (03-14), 108.8 (03-01), 108.9 (06-24)  BMI (kg/m2): 31.7 (03-28), 33.5 (03-26), 33.5 (03-14), 30.8 (03-01)    PHYSICAL EXAM:   PHYSICAL EXAM:  GENERAL: NAD, lying in bed comfortably  NERVOUS SYSTEM:  Alert & Oriented X3, speech clear. No deficits   HEAD:  Atraumatic, Normocephalic  EYES: EOMI, conjunctiva and sclera clear  ENT: Moist mucous membranes  NECK: Supple, No JVD  CHEST/LUNG: Clear to auscultation bilaterally; Unlabored respirations  HEART: Regular rate and rhythm  ABDOMEN: Bowel sounds present; Soft, Nontender, Nondistended  EXTREMITIES: No clubbing, cyanosis, or edema  MSK: FROM all 4 extremities, full and equal strength  SKIN: No rashes or lesions    I/Os:     03-30-23 @ 07:01  -  03-31-23 @ 07:00  --------------------------------------------------------  IN:    Oral Fluid: 360 mL  Total IN: 360 mL    OUT:    Voided (mL): 950 mL  Total OUT: 950 mL    Total NET: -590 mL        MEDICATIONS:   acetaminophen     Tablet .. 650 milliGRAM(s) Oral every 6 hours PRN  amLODIPine   Tablet 5 milliGRAM(s) Oral daily  atorvastatin 20 milliGRAM(s) Oral at bedtime  enoxaparin Injectable 100 milliGRAM(s) SubCutaneous every 12 hours  influenza  Vaccine (HIGH DOSE) 0.7 milliLiter(s) IntraMuscular once  losartan 100 milliGRAM(s) Oral daily  metoprolol tartrate 50 milliGRAM(s) Oral two times a day  ondansetron Injectable 4 milliGRAM(s) IV Push three times a day PRN  pantoprazole  Injectable 40 milliGRAM(s) IV Push daily  piperacillin/tazobactam IVPB.. 3.375 Gram(s) IV Intermittent every 8 hours    LABS:                         11.6   7.01  )-----------( 134      ( 30 Mar 2023 05:58 )             34.6     135  |  104  |  8<L>  ----------------------------<  95          (03-30-23 @ 05:58)  3.4<L>   |  22  |  0.5<L>    Ca    8.1<L>          (03-30-23 @ 05:58)  Mg     2.2         (03-30-23 @ 05:58)    TPro  5.4<L>  /  Alb  3.3<L>  /  TBili  1.8<H>  /  DBili  x   /  AST  33  /  ALT  76<H>  /  AlkPhos  110       03-30-23 @ 05:58      Blood Glucose (Past 24 hours):    DIET:   Diet, DASH/TLC:   Sodium & Cholesterol Restricted  Low Fat (LOWFAT) (03-29-23 @ 09:18) [Active]        RADIOLOGY:

## 2023-03-31 NOTE — PROGRESS NOTE ADULT - ASSESSMENT
Assessment and Plan:   · Assessment	    Assessment and Plan:   · Assessment	  76 yo male with a PMHx of HTN, HLD, CAD s/p CABG, CHF (s/p Pacemaker; ABBOTS  P/G [id number]), A-fib and LAAT on Eliquis, hard of hearing, osteoarthritis, and breast CA s/p R mastectomy in 2001 presents for chest pain/abdominal pain and right-sided pain for the past 4 hours. Found to have acute cholecystitis, E. coli bacteremia.     #Severe sepsis (not on admission) 2/2 Acute Cholecystitis (fever, wbc, source, lactic acidosis)    #Elevated LFTs in setting of Cholecystitis   #E. Coli Bacteremi  * likely has gallstone cholecystitis, given elevated LFTs/Bilirubin monitor for cholangitis (has RUQ and reported fever at home, no juandice, AMS, or septic at the moment), on imaging finding consistent with cholecystitis without biliary dilatation, lipase normal and no inflammation of pancreas on imaging so no gallstone pancreatitis  - HIDA scan reviewed, positive  - IR consult - plan for possible percutaneous cholecystostomy on MONDAY 4/3  - cardio consulted for clearance- spoke with Dr. Handy (private cardiologist), Dr. 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 ABBOTS  P/G [id number]- not in exacerbation   #HTN  #HLD  - Lasix held in setting of infection and need for IV hydration   - Cardiology consulted (input for antiplatelet and eliquis holding given need for procedure and risk of stroke in setting of Afib/LAAT)- as above   - Converted to lovenox 100 BID; spoke with IR, will need to switch to heparin drip on evening of 4/1  - C/w amiodarone, amlodipine, atorvastatin, losartan, metoprolol     #Hyponatremia  -resolved     #Breast CA s/p R mastectomy  - OP Follow up      #Progress Note Handoff  Pending (specify):  follow up cardio clearance, IR for perc cholecystostomy Monday 4/3, switch LVX to heparin drip 4/1 evening  Family discussion: Updated patient's wife Bev at [phone]  Disposition: snf vs home   High risk given above acuity and comorbidities, labs reviewed, dw IR and cardiology.                Glabellar Complex Units: 20

## 2023-04-01 LAB
ALBUMIN SERPL ELPH-MCNC: 3.8 G/DL — SIGNIFICANT CHANGE UP (ref 3.5–5.2)
ALP SERPL-CCNC: 118 U/L — HIGH (ref 30–115)
ALT FLD-CCNC: 57 U/L — HIGH (ref 0–41)
ANION GAP SERPL CALC-SCNC: 11 MMOL/L — SIGNIFICANT CHANGE UP (ref 7–14)
AST SERPL-CCNC: 37 U/L — SIGNIFICANT CHANGE UP (ref 0–41)
BASOPHILS # BLD AUTO: 0.06 K/UL — SIGNIFICANT CHANGE UP (ref 0–0.2)
BASOPHILS NFR BLD AUTO: 0.7 % — SIGNIFICANT CHANGE UP (ref 0–1)
BILIRUB SERPL-MCNC: 1.5 MG/DL — HIGH (ref 0.2–1.2)
BUN SERPL-MCNC: 7 MG/DL — LOW (ref 10–20)
CALCIUM SERPL-MCNC: 8.8 MG/DL — SIGNIFICANT CHANGE UP (ref 8.4–10.5)
CHLORIDE SERPL-SCNC: 105 MMOL/L — SIGNIFICANT CHANGE UP (ref 98–110)
CO2 SERPL-SCNC: 21 MMOL/L — SIGNIFICANT CHANGE UP (ref 17–32)
CREAT SERPL-MCNC: 0.6 MG/DL — LOW (ref 0.7–1.5)
CULTURE RESULTS: SIGNIFICANT CHANGE UP
EGFR: 99 ML/MIN/1.73M2 — SIGNIFICANT CHANGE UP
EOSINOPHIL # BLD AUTO: 0.23 K/UL — SIGNIFICANT CHANGE UP (ref 0–0.7)
EOSINOPHIL NFR BLD AUTO: 2.7 % — SIGNIFICANT CHANGE UP (ref 0–8)
GLUCOSE SERPL-MCNC: 94 MG/DL — SIGNIFICANT CHANGE UP (ref 70–99)
HCT VFR BLD CALC: 40.2 % — LOW (ref 42–52)
HGB BLD-MCNC: 13.4 G/DL — LOW (ref 14–18)
IMM GRANULOCYTES NFR BLD AUTO: 0.5 % — HIGH (ref 0.1–0.3)
LYMPHOCYTES # BLD AUTO: 2.18 K/UL — SIGNIFICANT CHANGE UP (ref 1.2–3.4)
LYMPHOCYTES # BLD AUTO: 25.9 % — SIGNIFICANT CHANGE UP (ref 20.5–51.1)
MAGNESIUM SERPL-MCNC: 2.3 MG/DL — SIGNIFICANT CHANGE UP (ref 1.8–2.4)
MCHC RBC-ENTMCNC: 29.6 PG — SIGNIFICANT CHANGE UP (ref 27–31)
MCHC RBC-ENTMCNC: 33.3 G/DL — SIGNIFICANT CHANGE UP (ref 32–37)
MCV RBC AUTO: 88.7 FL — SIGNIFICANT CHANGE UP (ref 80–94)
MONOCYTES # BLD AUTO: 0.67 K/UL — HIGH (ref 0.1–0.6)
MONOCYTES NFR BLD AUTO: 7.9 % — SIGNIFICANT CHANGE UP (ref 1.7–9.3)
NEUTROPHILS # BLD AUTO: 5.25 K/UL — SIGNIFICANT CHANGE UP (ref 1.4–6.5)
NEUTROPHILS NFR BLD AUTO: 62.3 % — SIGNIFICANT CHANGE UP (ref 42.2–75.2)
NRBC # BLD: 0 /100 WBCS — SIGNIFICANT CHANGE UP (ref 0–0)
PLATELET # BLD AUTO: 179 K/UL — SIGNIFICANT CHANGE UP (ref 130–400)
POTASSIUM SERPL-MCNC: 4.4 MMOL/L — SIGNIFICANT CHANGE UP (ref 3.5–5)
POTASSIUM SERPL-SCNC: 4.4 MMOL/L — SIGNIFICANT CHANGE UP (ref 3.5–5)
PROT SERPL-MCNC: 6.4 G/DL — SIGNIFICANT CHANGE UP (ref 6–8)
RBC # BLD: 4.53 M/UL — LOW (ref 4.7–6.1)
RBC # FLD: 14.5 % — SIGNIFICANT CHANGE UP (ref 11.5–14.5)
SARS-COV-2 RNA SPEC QL NAA+PROBE: SIGNIFICANT CHANGE UP
SODIUM SERPL-SCNC: 137 MMOL/L — SIGNIFICANT CHANGE UP (ref 135–146)
SPECIMEN SOURCE: SIGNIFICANT CHANGE UP
WBC # BLD: 8.43 K/UL — SIGNIFICANT CHANGE UP (ref 4.8–10.8)
WBC # FLD AUTO: 8.43 K/UL — SIGNIFICANT CHANGE UP (ref 4.8–10.8)

## 2023-04-01 PROCEDURE — 99232 SBSQ HOSP IP/OBS MODERATE 35: CPT

## 2023-04-01 RX ADMIN — PANTOPRAZOLE SODIUM 40 MILLIGRAM(S): 20 TABLET, DELAYED RELEASE ORAL at 11:37

## 2023-04-01 RX ADMIN — PIPERACILLIN AND TAZOBACTAM 25 GRAM(S): 4; .5 INJECTION, POWDER, LYOPHILIZED, FOR SOLUTION INTRAVENOUS at 21:27

## 2023-04-01 RX ADMIN — PIPERACILLIN AND TAZOBACTAM 25 GRAM(S): 4; .5 INJECTION, POWDER, LYOPHILIZED, FOR SOLUTION INTRAVENOUS at 05:56

## 2023-04-01 RX ADMIN — LOSARTAN POTASSIUM 100 MILLIGRAM(S): 100 TABLET, FILM COATED ORAL at 05:57

## 2023-04-01 RX ADMIN — PIPERACILLIN AND TAZOBACTAM 25 GRAM(S): 4; .5 INJECTION, POWDER, LYOPHILIZED, FOR SOLUTION INTRAVENOUS at 14:11

## 2023-04-01 RX ADMIN — AMLODIPINE BESYLATE 5 MILLIGRAM(S): 2.5 TABLET ORAL at 05:57

## 2023-04-01 RX ADMIN — Medication 50 MILLIGRAM(S): at 17:59

## 2023-04-01 RX ADMIN — Medication 50 MILLIGRAM(S): at 05:56

## 2023-04-01 RX ADMIN — ATORVASTATIN CALCIUM 20 MILLIGRAM(S): 80 TABLET, FILM COATED ORAL at 21:27

## 2023-04-01 RX ADMIN — ENOXAPARIN SODIUM 100 MILLIGRAM(S): 100 INJECTION SUBCUTANEOUS at 17:59

## 2023-04-01 RX ADMIN — ENOXAPARIN SODIUM 100 MILLIGRAM(S): 100 INJECTION SUBCUTANEOUS at 05:56

## 2023-04-01 NOTE — CONSULT NOTE ADULT - SUBJECTIVE AND OBJECTIVE BOX
Full consult to follow.  Will review office notes    Patient was seen and examined by me on 4C Room 31.  EMR reviewed              Physical Exam:  Not in gross distress  Alert, oriented x 3  No JVD; irregular rhythm; nl S1S2  Bilateral breath sounds  Abdomen soft  Edema in both legs  Moving all extremities    ROS: as stated in HPI  Labs: noted    < from: Intra-Operative Transesophageal Echo (23 @ 14:38) >  Summary:   1. Normal left ventricular internal cavity size. Mildly decreased global   left ventricular systolic function. Left ventricular ejection fraction,   by visual estimation, is 45 to 50%.   2. Normal right ventricular size and function.   3. Left atrial appendage thrombus visualized.   4. Biatrial dilatation.   5. Mild mitral valve regurgitation.   6. Mild tricuspid regurgitation.   7. Due to presence of ADDY thormbus, watchman procedure not attempted.      < end of copied text >      < from: CT Heart without Coronaries w/ IV Cont (02.15.23 @ 10:38) >  INTERPRETATION:    CARDIAC MORPHOLOGY:    LEFT ATRIUM: Left atrium is likely enlarged. There are bilateral superior   and inferior pulmonary vein which enter the left atrium.    Within the left atrium, there is incomplete opacification (43 Hounsfield   units) of the left atrial appendage (series 5 image 757). However, the   delayed phase of imaging, obtained 8 minutes after the injection, shows   that although the left atrial appendage is incompletely opacified, the   attenuation has changed (70-90 Hounsfield units).    There is no thrombus in the left atrium.    LEFT VENTRICLE: The left ventricle measures 5.7 cm   (anteroseptal/inferolateral).    There may be thickening of the inferolateral wall and inferior wall at   the base and midcavity.    No thrombus in the left ventricle    RIGHT ATRIUM: Enlarged. The inflow the IVC and SVC are normal.    RIGHT VENTRICLE: Biventricular pacemaker in place. No evidence of   thrombus.    PERICARDIUM: No pericardial effusion.    Valves: Mitral annular calcification. The anterior posterior leaflets of   the mitral valve are calcified.    VISULAIZED CORONARY ARTERIES (please note that this study is not tailored   for dedicated coronary artery evaluation):    There is a partially imaged LIMA to LAD. The visualized segments of the   graft are patent.    There is an aorta coronary venous bypass graft to the distal right   coronary artery. The graft is patent.    There is an aorta coronary venous bypass graft to the circumflex/obtuse   marginal system. The graft is patent.    IMAGED AORTA: Contains mild circumferential atherosclerotic plaque   without evidence of significant resulting stenosis.Ascending aorta   measures up to 3.8 cm. The thoracic aorta is normal in caliber.    OTHER: Left chest wall triple lead pacer device and appropriate position.    IMAGED EXTRACARDIAC FINDINGS:  Scattered atelectasis within the visualized lungs.    IMPRESSION:    1.  Status post coronary artery bypass grafts with a partially imaged   LIMA to LAD and aortocoronary venous bypass graft to the right coronary   artery and circumflex/obtuse marginal system.  2.  Although there is incomplete opacification of the left atrial   appendage on the delayed phase of imaging (8 minutes following the   administration of contrast) this may be secondary to very slow flow   rather than thrombus.  3.  A follow-up CTA to include prone and supine imaging is suggested to   optimize flow into the most distal segment and tip of the atrial   appendage.    --- End of Report ---    < end of copied text >    < from: CT Angio Abdomen and Pelvis w/ IV Cont (23 @ 21:22) >  TECHNIQUE: Contiguous axial CT images were obtained from the lower chest   to the pubic symphysis with IV contrast administration. The images were   obtained in arterial and venous phases. Oral contrast was not   administered.  Reformatted images in the coronal and sagittal planes were   acquired. 3-D/MIP postprocessingimages were performed as well.    Comparison made with CT abdomen and pelvis 2022.    FINDINGS:    Several images are degraded by motion    LOWER CHEST: Cardiomegaly. Cardiac pacing device.    HEPATOBILIARY: Cholelithiasis with gallbladder walledema. Liver   unremarkable. No biliary dilation.    SPLEEN: Unremarkable.    PANCREAS: Unremarkable    ADRENAL GLANDS: Unremarkable.    KIDNEYS: No hydronephrosis. Bilateral renal cysts and subcentimeter   hypodensities, too small to characterize. Nonobstructing left renal   calculus measuring up to 3 mm.    ABDOMINOPELVIC NODES: Unremarkable.    PELVIC ORGANS: Unremarkable.    PERITONEUM/MESENTERY/BOWEL: No evidence of bowel obstruction, gross   pneumatosis or ascites. Sigmoid diverticulosis without evidence of   diverticulitis.    BONES/SOFT TISSUES: Degenerative change of spine and both hips. Unchanged   gluteal subcutaneous probable sebaceous cyst measuring up to 6 cm. Right   lateral thigh intramuscular lipoma, unchanged.    OTHER: Vascular calcifications. Fat-containing umbilical hernia. Small   bilateral fat-containing inguinal hernias.      IMPRESSION:      Cholelithiasis with gallbladder wall thickening compatible with acute   cholecystitis in the appropriate clinical setting.    Normal appearance of the pancreas.    --- End of Report ---      < end of copied text >  _________________________________________________________________________      Patient is a 77y old  Male who presents with a chief complaint of Abdominal Pain (2023 16:10)      REASON FOR CONSULT     HPI:  HPI: Pt is a 76 yo male with a PMHx of HTN, HLD, CAD s/p CABG, CHF (s/p Pacemaker; ABBOTS  P/G [id number]), A-fib and LAAT on Eliquis, hard of hearing, osteoarthritis, and breast CA s/p R mastectomy in  presents for chest pain/abdominal pain and right-sided pain for the past 4 hours.  Patient states that he felt a sharp pain on the right side of his abdomen today.  Not associated with food intake.  States he feels febrile, weak.  Denies shortness of breath, nausea, vomiting, diarrhea, numbness, syncope, urinary symptoms.  Patient was evaluated with OTIS by Dr. Montiel on 3/14 for OTIS and Watchman Procedure for ADDY closure which was aborted due to the presence of a Left Atrial Thrombus. He drinks 2 beers a day and occasionally drinks hard liquor socially, never more than 4-5 drinks in one sitting. He smokes cigars, otherwise no other substance use.      In the ER:   Vitals:  /56, HR 64, RR 20, T 97.7 oral, SpO2 99 % RA  EKG: Line Ventricular-paced rhythm  Sig Labs: WBC 12.87, Hgb 13.2, INR 1.35, Cr 0.6 eGFR 99%, TBili 2.0, ALKP 120,  ALT 64, Lactate 2.20, Lipase 40, Trop<0.01, , VBG WNL, UA -ve, COVID -ve   Imagin) US RUQ: Cholelithiasis and gallbladder wall thickening may reflect cholecystitis in the appropriate clinical setting. Increased hepatic echogenicity may be secondary to fatty infiltration or hepatocellular disease.  2) CT Abdomen: Cholelithiasis with gallbladder wall thickening compatible with acute cholecystitis in the appropriate clinical setting. Normal appearance of the pancreas.  3) CXR (my read): No radiographic evidence of acute cardiopulmonary disease. PPM in place.     Interventions in the ER: Cefepime + Metronidazole, 1L Bolus, Tylenol,    (27 Mar 2023 02:12)      PAST MEDICAL & SURGICAL HISTORY:  Arrhythmia  Hypercholesteremia  HTN (hypertension)  Obesity  CHF (congestive heart failure)  Heart attack  Breast cancer    Osteoarthritis  CAD (coronary artery disease)  Atrial fibrillation  [place] (hard of hearing)  S/P CABG x 5    H/O mastectomy  right 2001  Pacemaker              SOCIAL HISTORY:     FAMILY HISTORY:  Family history of heart disease (Father, Mother)    Family history of lung cancer (Sibling)      No Known Allergies      MEDICATIONS  (STANDING):  amLODIPine   Tablet 5 milliGRAM(s) Oral daily  atorvastatin 20 milliGRAM(s) Oral at bedtime  enoxaparin Injectable 100 milliGRAM(s) SubCutaneous every 12 hours  influenza  Vaccine (HIGH DOSE) 0.7 milliLiter(s) IntraMuscular once  losartan 100 milliGRAM(s) Oral daily  metoprolol tartrate 50 milliGRAM(s) Oral two times a day  pantoprazole  Injectable 40 milliGRAM(s) IV Push daily  piperacillin/tazobactam IVPB.. 3.375 Gram(s) IV Intermittent every 8 hours    MEDICATIONS  (PRN):  acetaminophen     Tablet .. 650 milliGRAM(s) Oral every 6 hours PRN Temp greater or equal to 38C (100.4F), Mild Pain (1 - 3)  ondansetron Injectable 4 milliGRAM(s) IV Push three times a day PRN Nausea and/or Vomiting      Vital Signs Last 24 Hrs  T(C): 36.8 (2023 12:35), Max: 36.8 (2023 12:35)  T(F): 98.3 (2023 12:35), Max: 98.3 (2023 12:35)  HR: 60 (2023 17:54) (60 - 61)  BP: 142/67 (2023 17:54) (135/63 - 156/66)  BP(mean): --  RR: 18 (2023 17:54) (18 - 18)  SpO2: 97% (2023 17:54) (95% - 97%)    Parameters below as of 2023 17:54  Patient On (Oxygen Delivery Method): room air     I&O's Detail    31 Mar 2023 07:01  -  2023 07:00  --------------------------------------------------------  IN:    Oral Fluid: 400 mL  Total IN: 400 mL    OUT:    Voided (mL): 550 mL  Total OUT: 550 mL    Total NET: -150 mL      2023 07:  -  2023 18:42  --------------------------------------------------------  IN:    Oral Fluid: 1040 mL  Total IN: 1040 mL    OUT:    Voided (mL): 750 mL  Total OUT: 750 mL    Total NET: 290 mL      LABS:                        13.4   8.43  )-----------( 179      ( 2023 06:37 )             40.2     04    137  |  105  |  7<L>  ----------------------------<  94  4.4   |  21  |  0.6<L>    Ca    8.8      2023 06:37  Mg     2.3         TPro  6.4  /  Alb  3.8  /  TBili  1.5<H>  /  DBili  x   /  AST  37  /  ALT  57<H>  /  AlkPhos  118<H>            I&O's Summary    31 Mar 2023 07:01  -  2023 07:00  --------------------------------------------------------  IN: 400 mL / OUT: 550 mL / NET: -150 mL    2023 07:01  -  2023 18:42  --------------------------------------------------------  IN: 1040 mL / OUT: 750 mL / NET: 290 mL         Patient was seen and examined by me on 4C Room 31.  EMR reviewed    Mr. Tad Blanton is a 77-year-old  male with a past medical history of Coronary Artery Disease, S/P MI, S/P CABG, HTN, Hyperlipidemia, Atrial Fibrillation S/P PPM Implantation, ADDY Thrombus (detected on recent OTIS March 3, 2023), HF with mid range EF, Breast Cancer S/P Right Mastectomy, Osteoarthritis, Obesity and Cholelithiasis.    He is referred to cardiology for planned percutaneous cholecystostomy.  He denies any chest pain and shortness of breath at rest and exertion.  On 4C, he is comfortable.    Physical Exam:  Not in gross distress  Alert, oriented x 3  No JVD; irregular rhythm; nl S1S2  Bilateral breath sounds  Abdomen soft  Edema in both legs  Moving all extremities    ROS: as stated in HPI  Labs: noted    < from: Intra-Operative Transesophageal Echo (23 @ 14:38) >  Summary:   1. Normal left ventricular internal cavity size. Mildly decreased global   left ventricular systolic function. Left ventricular ejection fraction,   by visual estimation, is 45 to 50%.   2. Normal right ventricular size and function.   3. Left atrial appendage thrombus visualized.   4. Biatrial dilatation.   5. Mild mitral valve regurgitation.   6. Mild tricuspid regurgitation.   7. Due to presence of ADDY thormbus, watchman procedure not attempted.      < end of copied text >      < from: CT Heart without Coronaries w/ IV Cont (02.15.23 @ 10:38) >  INTERPRETATION:    CARDIAC MORPHOLOGY:    LEFT ATRIUM: Left atrium is likely enlarged. There are bilateral superior   and inferior pulmonary vein which enter the left atrium.    Within the left atrium, there is incomplete opacification (43 Hounsfield   units) of the left atrial appendage (series 5 image 757). However, the   delayed phase of imaging, obtained 8 minutes after the injection, shows   that although the left atrial appendage is incompletely opacified, the   attenuation has changed (70-90 Hounsfield units).    There is no thrombus in the left atrium.    LEFT VENTRICLE: The left ventricle measures 5.7 cm   (anteroseptal/inferolateral).    There may be thickening of the inferolateral wall and inferior wall at   the base and midcavity.    No thrombus in the left ventricle    RIGHT ATRIUM: Enlarged. The inflow the IVC and SVC are normal.    RIGHT VENTRICLE: Biventricular pacemaker in place. No evidence of   thrombus.    PERICARDIUM: No pericardial effusion.    Valves: Mitral annular calcification. The anterior posterior leaflets of   the mitral valve are calcified.    VISULAIZED CORONARY ARTERIES (please note that this study is not tailored   for dedicated coronary artery evaluation):    There is a partially imaged LIMA to LAD. The visualized segments of the   graft are patent.    There is an aorta coronary venous bypass graft to the distal right   coronary artery. The graft is patent.    There is an aorta coronary venous bypass graft to the circumflex/obtuse   marginal system. The graft is patent.    IMAGED AORTA: Contains mild circumferential atherosclerotic plaque   without evidence of significant resulting stenosis.Ascending aorta   measures up to 3.8 cm. The thoracic aorta is normal in caliber.    OTHER: Left chest wall triple lead pacer device and appropriate position.    IMAGED EXTRACARDIAC FINDINGS:  Scattered atelectasis within the visualized lungs.    IMPRESSION:    1.  Status post coronary artery bypass grafts with a partially imaged   LIMA to LAD and aortocoronary venous bypass graft to the right coronary   artery and circumflex/obtuse marginal system.  2.  Although there is incomplete opacification of the left atrial   appendage on the delayed phase of imaging (8 minutes following the   administration of contrast) this may be secondary to very slow flow   rather than thrombus.  3.  A follow-up CTA to include prone and supine imaging is suggested to   optimize flow into the most distal segment and tip of the atrial   appendage.    --- End of Report ---    < end of copied text >    < from: CT Angio Abdomen and Pelvis w/ IV Cont (23 @ 21:22) >  TECHNIQUE: Contiguous axial CT images were obtained from the lower chest   to the pubic symphysis with IV contrast administration. The images were   obtained in arterial and venous phases. Oral contrast was not   administered.  Reformatted images in the coronal and sagittal planes were   acquired. 3-D/MIP postprocessingimages were performed as well.    Comparison made with CT abdomen and pelvis 2022.    FINDINGS:    Several images are degraded by motion    LOWER CHEST: Cardiomegaly. Cardiac pacing device.    HEPATOBILIARY: Cholelithiasis with gallbladder walledema. Liver   unremarkable. No biliary dilation.    SPLEEN: Unremarkable.    PANCREAS: Unremarkable    ADRENAL GLANDS: Unremarkable.    KIDNEYS: No hydronephrosis. Bilateral renal cysts and subcentimeter   hypodensities, too small to characterize. Nonobstructing left renal   calculus measuring up to 3 mm.    ABDOMINOPELVIC NODES: Unremarkable.    PELVIC ORGANS: Unremarkable.    PERITONEUM/MESENTERY/BOWEL: No evidence of bowel obstruction, gross   pneumatosis or ascites. Sigmoid diverticulosis without evidence of   diverticulitis.    BONES/SOFT TISSUES: Degenerative change of spine and both hips. Unchanged   gluteal subcutaneous probable sebaceous cyst measuring up to 6 cm. Right   lateral thigh intramuscular lipoma, unchanged.    OTHER: Vascular calcifications. Fat-containing umbilical hernia. Small   bilateral fat-containing inguinal hernias.      IMPRESSION:      Cholelithiasis with gallbladder wall thickening compatible with acute   cholecystitis in the appropriate clinical setting.    Normal appearance of the pancreas.    --- End of Report ---      < end of copied text >  _________________________________________________________________________      Patient is a 77y old  Male who presents with a chief complaint of Abdominal Pain (2023 16:10)      REASON FOR CONSULT     HPI:  HPI: Pt is a 76 yo male with a PMHx of HTN, HLD, CAD s/p CABG, CHF (s/p Pacemaker; ABBOTS  P/G [id number]), A-fib and LAAT on Eliquis, hard of hearing, osteoarthritis, and breast CA s/p R mastectomy in  presents for chest pain/abdominal pain and right-sided pain for the past 4 hours.  Patient states that he felt a sharp pain on the right side of his abdomen today.  Not associated with food intake.  States he feels febrile, weak.  Denies shortness of breath, nausea, vomiting, diarrhea, numbness, syncope, urinary symptoms.  Patient was evaluated with OTIS by Dr. Montiel on 3/14 for OTIS and Watchman Procedure for ADDY closure which was aborted due to the presence of a Left Atrial Thrombus. He drinks 2 beers a day and occasionally drinks hard liquor socially, never more than 4-5 drinks in one sitting. He smokes cigars, otherwise no other substance use.      In the ER:   Vitals:  /56, HR 64, RR 20, T 97.7 oral, SpO2 99 % RA  EKG: Line Ventricular-paced rhythm  Sig Labs: WBC 12.87, Hgb 13.2, INR 1.35, Cr 0.6 eGFR 99%, TBili 2.0, ALKP 120,  ALT 64, Lactate 2.20, Lipase 40, Trop<0.01, , VBG WNL, UA -ve, COVID -ve   Imagin) US RUQ: Cholelithiasis and gallbladder wall thickening may reflect cholecystitis in the appropriate clinical setting. Increased hepatic echogenicity may be secondary to fatty infiltration or hepatocellular disease.  2) CT Abdomen: Cholelithiasis with gallbladder wall thickening compatible with acute cholecystitis in the appropriate clinical setting. Normal appearance of the pancreas.  3) CXR (my read): No radiographic evidence of acute cardiopulmonary disease. PPM in place.     Interventions in the ER: Cefepime + Metronidazole, 1L Bolus, Tylenol,    (27 Mar 2023 02:12)      PAST MEDICAL & SURGICAL HISTORY:  Arrhythmia  Hypercholesteremia  HTN (hypertension)  Obesity  CHF (congestive heart failure)  Heart attack  Breast cancer    Osteoarthritis  CAD (coronary artery disease)  Atrial fibrillation  [place] (hard of hearing)  S/P CABG x 5    H/O mastectomy  right 2001  Pacemaker              SOCIAL HISTORY:     FAMILY HISTORY:  Family history of heart disease (Father, Mother)    Family history of lung cancer (Sibling)      No Known Allergies      MEDICATIONS  (STANDING):  amLODIPine   Tablet 5 milliGRAM(s) Oral daily  atorvastatin 20 milliGRAM(s) Oral at bedtime  enoxaparin Injectable 100 milliGRAM(s) SubCutaneous every 12 hours  influenza  Vaccine (HIGH DOSE) 0.7 milliLiter(s) IntraMuscular once  losartan 100 milliGRAM(s) Oral daily  metoprolol tartrate 50 milliGRAM(s) Oral two times a day  pantoprazole  Injectable 40 milliGRAM(s) IV Push daily  piperacillin/tazobactam IVPB.. 3.375 Gram(s) IV Intermittent every 8 hours    MEDICATIONS  (PRN):  acetaminophen     Tablet .. 650 milliGRAM(s) Oral every 6 hours PRN Temp greater or equal to 38C (100.4F), Mild Pain (1 - 3)  ondansetron Injectable 4 milliGRAM(s) IV Push three times a day PRN Nausea and/or Vomiting      Vital Signs Last 24 Hrs  T(C): 36.8 (2023 12:35), Max: 36.8 (2023 12:35)  T(F): 98.3 (2023 12:35), Max: 98.3 (2023 12:35)  HR: 60 (2023 17:54) (60 - 61)  BP: 142/67 (2023 17:54) (135/63 - 156/66)  BP(mean): --  RR: 18 (2023 17:54) (18 - 18)  SpO2: 97% (:54) (95% - 97%)    Parameters below as of 2023 17:54  Patient On (Oxygen Delivery Method): room air     I&O's Detail    31 Mar 2023 07:01  -  2023 07:00  --------------------------------------------------------  IN:    Oral Fluid: 400 mL  Total IN: 400 mL    OUT:    Voided (mL): 550 mL  Total OUT: 550 mL    Total NET: -150 mL      2023 07:01  -  2023 18:42  --------------------------------------------------------  IN:    Oral Fluid: 1040 mL  Total IN: 1040 mL    OUT:    Voided (mL): 750 mL  Total OUT: 750 mL    Total NET: 290 mL      LABS:                        13.4   8.43  )-----------( 179      ( 2023 06:37 )             40.2     04    137  |  105  |  7<L>  ----------------------------<  94  4.4   |  21  |  0.6<L>    Ca    8.8      2023 06:37  Mg     2.3         TPro  6.4  /  Alb  3.8  /  TBili  1.5<H>  /  DBili  x   /  AST  37  /  ALT  57<H>  /  AlkPhos  118<H>            I&O's Summary    31 Mar 2023 07:01  -  2023 07:00  --------------------------------------------------------  IN: 400 mL / OUT: 550 mL / NET: -150 mL    2023 07:01  -  2023 18:42  --------------------------------------------------------  IN: 1040 mL / OUT: 750 mL / NET: 290 mL         Patient was seen and examined by me on 4C Room 31.  EMR reviewed    Mr. Tad Blanton is a 77-year-old  male with a past medical history of Coronary Artery Disease, S/P MI, S/P CABG, HTN, Hyperlipidemia, Atrial Fibrillation S/P PPM Implantation, ADDY Thrombus (detected on recent OTIS March 3, 2023), HF with mid range EF, Breast Cancer S/P Right Mastectomy, Osteoarthritis, Obesity and Cholelithiasis.    He is referred to cardiology for planned percutaneous cholecystostomy.  He denies any chest pain and shortness of breath at rest and exertion.  On 4C, he is comfortable.    Physical Exam:  Not in gross distress  Alert, oriented x 3  No JVD; irregular rhythm; nl S1S2  Bilateral breath sounds  Abdomen soft  Edema in both legs  Moving all extremities    ROS: as stated in HPI  Labs: noted    < from: Intra-Operative Transesophageal Echo (23 @ 14:38) >  Summary:   1. Normal left ventricular internal cavity size. Mildly decreased global   left ventricular systolic function. Left ventricular ejection fraction,   by visual estimation, is 45 to 50%.   2. Normal right ventricular size and function.   3. Left atrial appendage thrombus visualized.   4. Biatrial dilatation.   5. Mild mitral valve regurgitation.   6. Mild tricuspid regurgitation.   7. Due to presence of ADDY thormbus, watchman procedure not attempted.      < end of copied text >      < from: CT Heart without Coronaries w/ IV Cont (02.15.23 @ 10:38) >  INTERPRETATION:    CARDIAC MORPHOLOGY:    LEFT ATRIUM: Left atrium is likely enlarged. There are bilateral superior   and inferior pulmonary vein which enter the left atrium.    Within the left atrium, there is incomplete opacification (43 Hounsfield   units) of the left atrial appendage (series 5 image 757). However, the   delayed phase of imaging, obtained 8 minutes after the injection, shows   that although the left atrial appendage is incompletely opacified, the   attenuation has changed (70-90 Hounsfield units).    There is no thrombus in the left atrium.    LEFT VENTRICLE: The left ventricle measures 5.7 cm   (anteroseptal/inferolateral).    There may be thickening of the inferolateral wall and inferior wall at   the base and midcavity.    No thrombus in the left ventricle    RIGHT ATRIUM: Enlarged. The inflow the IVC and SVC are normal.    RIGHT VENTRICLE: Biventricular pacemaker in place. No evidence of   thrombus.    PERICARDIUM: No pericardial effusion.    Valves: Mitral annular calcification. The anterior posterior leaflets of   the mitral valve are calcified.    VISULAIZED CORONARY ARTERIES (please note that this study is not tailored   for dedicated coronary artery evaluation):    There is a partially imaged LIMA to LAD. The visualized segments of the   graft are patent.    There is an aorta coronary venous bypass graft to the distal right   coronary artery. The graft is patent.    There is an aorta coronary venous bypass graft to the circumflex/obtuse   marginal system. The graft is patent.    IMAGED AORTA: Contains mild circumferential atherosclerotic plaque   without evidence of significant resulting stenosis.Ascending aorta   measures up to 3.8 cm. The thoracic aorta is normal in caliber.    OTHER: Left chest wall triple lead pacer device and appropriate position.    IMAGED EXTRACARDIAC FINDINGS:  Scattered atelectasis within the visualized lungs.    IMPRESSION:    1.  Status post coronary artery bypass grafts with a partially imaged   LIMA to LAD and aortocoronary venous bypass graft to the right coronary   artery and circumflex/obtuse marginal system.  2.  Although there is incomplete opacification of the left atrial   appendage on the delayed phase of imaging (8 minutes following the   administration of contrast) this may be secondary to very slow flow   rather than thrombus.  3.  A follow-up CTA to include prone and supine imaging is suggested to   optimize flow into the most distal segment and tip of the atrial   appendage.    --- End of Report ---    < end of copied text >    < from: CT Angio Abdomen and Pelvis w/ IV Cont (23 @ 21:22) >  TECHNIQUE: Contiguous axial CT images were obtained from the lower chest   to the pubic symphysis with IV contrast administration. The images were   obtained in arterial and venous phases. Oral contrast was not   administered.  Reformatted images in the coronal and sagittal planes were   acquired. 3-D/MIP postprocessingimages were performed as well.    Comparison made with CT abdomen and pelvis 2022.    FINDINGS:    Several images are degraded by motion    LOWER CHEST: Cardiomegaly. Cardiac pacing device.    HEPATOBILIARY: Cholelithiasis with gallbladder walledema. Liver   unremarkable. No biliary dilation.    SPLEEN: Unremarkable.    PANCREAS: Unremarkable    ADRENAL GLANDS: Unremarkable.    KIDNEYS: No hydronephrosis. Bilateral renal cysts and subcentimeter   hypodensities, too small to characterize. Nonobstructing left renal   calculus measuring up to 3 mm.    ABDOMINOPELVIC NODES: Unremarkable.    PELVIC ORGANS: Unremarkable.    PERITONEUM/MESENTERY/BOWEL: No evidence of bowel obstruction, gross   pneumatosis or ascites. Sigmoid diverticulosis without evidence of   diverticulitis.    BONES/SOFT TISSUES: Degenerative change of spine and both hips. Unchanged   gluteal subcutaneous probable sebaceous cyst measuring up to 6 cm. Right   lateral thigh intramuscular lipoma, unchanged.    OTHER: Vascular calcifications. Fat-containing umbilical hernia. Small   bilateral fat-containing inguinal hernias.      IMPRESSION:      Cholelithiasis with gallbladder wall thickening compatible with acute   cholecystitis in the appropriate clinical setting.    Normal appearance of the pancreas.    --- End of Report ---      < end of copied text >  _________________________________________________________________________      Patient is a 77y old  Male who presents with a chief complaint of Abdominal Pain (2023 16:10)      REASON FOR CONSULT     HPI:  HPI: Pt is a 76 yo male with a PMHx of HTN, HLD, CAD s/p CABG, CHF (s/p Pacemaker; ABBOTS  P/G [id number]), A-fib and LAAT on Eliquis, hard of hearing, osteoarthritis, and breast CA s/p R mastectomy in  presents for chest pain/abdominal pain and right-sided pain for the past 4 hours.  Patient states that he felt a sharp pain on the right side of his abdomen today.  Not associated with food intake.  States he feels febrile, weak.  Denies shortness of breath, nausea, vomiting, diarrhea, numbness, syncope, urinary symptoms.  Patient was evaluated with OTIS by Dr. Montiel on 3/14 for OTIS and Watchman Procedure for ADDY closure which was aborted due to the presence of a Left Atrial Thrombus. He drinks 2 beers a day and occasionally drinks hard liquor socially, never more than 4-5 drinks in one sitting. He smokes cigars, otherwise no other substance use.      In the ER:   Vitals:  /56, HR 64, RR 20, T 97.7 oral, SpO2 99 % RA  EKG: Line Ventricular-paced rhythm  Sig Labs: WBC 12.87, Hgb 13.2, INR 1.35, Cr 0.6 eGFR 99%, TBili 2.0, ALKP 120,  ALT 64, Lactate 2.20, Lipase 40, Trop<0.01, , VBG WNL, UA -ve, COVID -ve   Imagin) US RUQ: Cholelithiasis and gallbladder wall thickening may reflect cholecystitis in the appropriate clinical setting. Increased hepatic echogenicity may be secondary to fatty infiltration or hepatocellular disease.  2) CT Abdomen: Cholelithiasis with gallbladder wall thickening compatible with acute cholecystitis in the appropriate clinical setting. Normal appearance of the pancreas.  3) CXR (my read): No radiographic evidence of acute cardiopulmonary disease. PPM in place.     Interventions in the ER: Cefepime + Metronidazole, 1L Bolus, Tylenol,    (27 Mar 2023 02:12)      PAST MEDICAL & SURGICAL HISTORY:  Arrhythmia  Hypercholesteremia  HTN (hypertension)  Obesity  CHF (congestive heart failure)  Heart attack  Breast cancer    Osteoarthritis  CAD (coronary artery disease)  Atrial fibrillation  [place] (hard of hearing)  S/P CABG x 5    H/O mastectomy  right 2001  Pacemaker              SOCIAL HISTORY:     FAMILY HISTORY:  Family history of heart disease (Father, Mother)    Family history of lung cancer (Sibling)      No Known Allergies      MEDICATIONS  (STANDING):  amLODIPine   Tablet 5 milliGRAM(s) Oral daily  atorvastatin 20 milliGRAM(s) Oral at bedtime  enoxaparin Injectable 100 milliGRAM(s) SubCutaneous every 12 hours  influenza  Vaccine (HIGH DOSE) 0.7 milliLiter(s) IntraMuscular once  losartan 100 milliGRAM(s) Oral daily  metoprolol tartrate 50 milliGRAM(s) Oral two times a day  pantoprazole  Injectable 40 milliGRAM(s) IV Push daily  piperacillin/tazobactam IVPB.. 3.375 Gram(s) IV Intermittent every 8 hours    MEDICATIONS  (PRN):  acetaminophen     Tablet .. 650 milliGRAM(s) Oral every 6 hours PRN Temp greater or equal to 38C (100.4F), Mild Pain (1 - 3)  ondansetron Injectable 4 milliGRAM(s) IV Push three times a day PRN Nausea and/or Vomiting      Vital Signs Last 24 Hrs  T(C): 36.8 (2023 12:35), Max: 36.8 (2023 12:35)  T(F): 98.3 (2023 12:35), Max: 98.3 (2023 12:35)  HR: 60 (2023 17:54) (60 - 61)  BP: 142/67 (2023 17:54) (135/63 - 156/66)  BP(mean): --  RR: 18 (2023 17:54) (18 - 18)  SpO2: 97% (:54) (95% - 97%)    Parameters below as of 2023 17:54  Patient On (Oxygen Delivery Method): room air     I&O's Detail    31 Mar 2023 07:01  -  2023 07:00  --------------------------------------------------------  IN:    Oral Fluid: 400 mL  Total IN: 400 mL    OUT:    Voided (mL): 550 mL  Total OUT: 550 mL    Total NET: -150 mL      2023 07:01  -  2023 18:42  --------------------------------------------------------  IN:    Oral Fluid: 1040 mL  Total IN: 1040 mL    OUT:    Voided (mL): 750 mL  Total OUT: 750 mL    Total NET: 290 mL      LABS:                        13.4   8.43  )-----------( 179      ( 2023 06:37 )             40.2     04    137  |  105  |  7<L>  ----------------------------<  94  4.4   |  21  |  0.6<L>    Ca    8.8      2023 06:37  Mg     2.3         TPro  6.4  /  Alb  3.8  /  TBili  1.5<H>  /  DBili  x   /  AST  37  /  ALT  57<H>  /  AlkPhos  118<H>      I&O's Summary    31 Mar 2023 07:01  -  2023 07:00  --------------------------------------------------------  IN: 400 mL / OUT: 550 mL / NET: -150 mL    2023 07:01  -  2023 18:42  --------------------------------------------------------  IN: 1040 mL / OUT: 750 mL / NET: 290 mL

## 2023-04-01 NOTE — PROGRESS NOTE ADULT - SUBJECTIVE AND OBJECTIVE BOX
HOSPITALIST PROGRESS NOTE     Interval Events:      4.1.23  no fever, no abd pain, normal WBC  Plan: IR for perc cholecystostomy Monday 4/3/23  Per-Op AC per IR      3.31.23  NAEO  IR for perc cholecystostomy Monday 4/3/23  Cardio consulted for sharri-operative use of AC  extensive discussion with pt and wife for risk and benefit of on and off AC for upcoming sharri-surgery.       3.30.23  NAEO  stable on Abx, neg fever, non toxic,  Cardio consulted for clot  Monday: intervention as per IR for perc cholecystostomy Monday 4/3/23        3.29.23  Patient reports no pain at this time.   He has been tolerating clear liquids without any nausea/vomiting.   Had soft bowel movements last night.   He reports appetite and is requesting diet to be advanced.             REVIEW OF SYSTEMS:  Negative except as noted above.     VITALS:  T(F): 98.3 (04-01 @ 12:35), Max: 98.3 (04-01 @ 12:35)  HR: 60 (04-01 @ 12:35) (60 - 60)  BP: 135/63 (04-01 @ 12:35) (135/63 - 156/66)  RR: 18 (04-01 @ 12:35) (18 - 18)  SpO2: 96% (04-01 @ 12:35) (96% - 96%)    HEIGHT/WEIGHT/BMI:   Height (cm): 180.3 (03-26), 180.3 (03-14), 188 (03-01), 190.5 (06-24)  Weight (kg): 103.1 (03-28), 108.9 (03-14), 108.8 (03-01), 108.9 (06-24)  BMI (kg/m2): 31.7 (03-28), 33.5 (03-26), 33.5 (03-14), 30.8 (03-01)    PHYSICAL EXAM:   PHYSICAL EXAM:  GENERAL: NAD, lying in bed comfortably  NERVOUS SYSTEM:  Alert & Oriented X3, speech clear. No deficits   HEAD:  Atraumatic, Normocephalic  EYES: EOMI, conjunctiva and sclera clear  ENT: Moist mucous membranes  NECK: Supple, No JVD  CHEST/LUNG: Clear to auscultation bilaterally; Unlabored respirations  HEART: Regular rate and rhythm  ABDOMEN: Bowel sounds present; Soft, Nontender, Nondistended  EXTREMITIES: No clubbing, cyanosis, or edema  MSK: FROM all 4 extremities, full and equal strength  SKIN: No rashes or lesions    I/Os:     03-31-23 @ 07:01  -  04-01-23 @ 07:00  --------------------------------------------------------  IN:    Oral Fluid: 400 mL  Total IN: 400 mL    OUT:    Voided (mL): 550 mL  Total OUT: 550 mL    Total NET: -150 mL        MEDICATIONS:   acetaminophen     Tablet .. 650 milliGRAM(s) Oral every 6 hours PRN  amLODIPine   Tablet 5 milliGRAM(s) Oral daily  atorvastatin 20 milliGRAM(s) Oral at bedtime  enoxaparin Injectable 100 milliGRAM(s) SubCutaneous every 12 hours  influenza  Vaccine (HIGH DOSE) 0.7 milliLiter(s) IntraMuscular once  losartan 100 milliGRAM(s) Oral daily  metoprolol tartrate 50 milliGRAM(s) Oral two times a day  ondansetron Injectable 4 milliGRAM(s) IV Push three times a day PRN  pantoprazole  Injectable 40 milliGRAM(s) IV Push daily  piperacillin/tazobactam IVPB.. 3.375 Gram(s) IV Intermittent every 8 hours    LABS:                         13.4   8.43  )-----------( 179      ( 01 Apr 2023 06:37 )             40.2     137  |  105  |  7<L>  ----------------------------<  94          (04-01-23 @ 06:37)  4.4   |  21  |  0.6<L>    Ca    8.8          (04-01-23 @ 06:37)  Mg     2.3         (04-01-23 @ 06:37)    TPro  6.4  /  Alb  3.8  /  TBili  1.5<H>  /  DBili  x   /  AST  37  /  ALT  57<H>  /  AlkPhos  118<H>       04-01-23 @ 06:37      Blood Glucose (Past 24 hours):    DIET:   Diet, DASH/TLC:   Sodium & Cholesterol Restricted  Low Fat (LOWFAT) (03-29-23 @ 09:18) [Active]        RADIOLOGY:

## 2023-04-01 NOTE — PROGRESS NOTE ADULT - ASSESSMENT
Assessment and Plan:   · Assessment	  78 yo male with a PMHx of HTN, HLD, CAD s/p CABG, CHF (s/p Pacemaker; ABBOTS  P/G [id number]), A-fib and LAAT on Eliquis, hard of hearing, osteoarthritis, and breast CA s/p R mastectomy in 2001 presents for chest pain/abdominal pain and right-sided pain for the past 4 hours. Found to have acute cholecystitis, E. coli bacteremia.     #Severe sepsis (not on admission) 2/2 Acute Cholecystitis (fever, wbc, source, lactic acidosis)    #Elevated LFTs in setting of Cholecystitis   #E. Coli Bacteremi  * likely has gallstone cholecystitis, given elevated LFTs/Bilirubin monitor for cholangitis (has RUQ and reported fever at home, no juandice, AMS, or septic at the moment), on imaging finding consistent with cholecystitis without biliary dilatation, lipase normal and no inflammation of pancreas on imaging so no gallstone pancreatitis  - HIDA scan reviewed, positive  - IR consult - plan for possible percutaneous cholecystostomy on MONDAY 4/3  - cardio consulted for clearance- spoke with Dr. Handy (private cardiologist), Dr. 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 ABBOTS  P/G [id number]- not in exacerbation   #HTN  #HLD  - Lasix held in setting of infection and need for IV hydration   - Cardiology consulted (input for antiplatelet and eliquis holding given need for procedure and risk of stroke in setting of Afib/LAAT)- as above   - Converted to lovenox 100 BID; spoke with IR, will need to switch to heparin drip on evening of 4/1  - C/w amiodarone, amlodipine, atorvastatin, losartan, metoprolol     #Hyponatremia  -resolved     #Breast CA s/p R mastectomy  - OP Follow up      #Progress Note Handoff  Pending (specify):  follow up cardio clearance, IR for perc cholecystostomy Monday 4/3, switch LVX to heparin drip 4/1 evening  Family discussion: Updated patient's wife Bev at [phone]  Disposition: snf vs home   High risk given above acuity and comorbidities, labs reviewed, dw IR and cardiology.

## 2023-04-01 NOTE — CONSULT NOTE ADULT - ASSESSMENT
1] Pre-op Evaluation for Percutaneous Cholecystostomy 1] Pre-op Evaluation for Percutaneous Cholecystostomy      RCRI: 3  - Patient is currently stable from cardiac standpoint.  He may proceed with the planned surgery without further cardiac workup.  He is at a high risk for a perioperative cardiac event.  - Hold Lovenox 12 hrs prior to planned procedure.   1] Pre-op Evaluation for Percutaneous Cholecystostomy      Cholelithiasis      RCRI: 3  - Patient is currently stable from cardiac standpoint.  He may proceed with the planned surgery without further cardiac workup.  He is at a high risk for a perioperative cardiac event.  - Hold Lovenox 12 hrs prior to planned procedure and restart 12 hrs after completion of procedure.    2] CAD S/P MI  S/P CABG  - Stable  - ECASA 81 mg tablet daily    3] Atrial Fibrillation S/P PPM Implantation  - Off OAC.  Currently on Lovenox    4] ADDY Thrombus  - Consider other OAC.  ADDY thrombus detected on March 3, 2023 prior to planned Watchman Device Implantation.    Patient was on Eliquis.  Consider Xarelto or Warfarin in place of Eliquis.    5] HF with mid range EF  - Patient is euvolemic    6] HTN  - Continue to monitor    7]  Hyperlipidemia  - On statin therapy     Will follow    Frankie Michelle MD (covering for Dr. Carlos Handy)  [phone] Office

## 2023-04-02 LAB
ANION GAP SERPL CALC-SCNC: 11 MMOL/L — SIGNIFICANT CHANGE UP (ref 7–14)
BUN SERPL-MCNC: 8 MG/DL — LOW (ref 10–20)
CALCIUM SERPL-MCNC: 8.4 MG/DL — SIGNIFICANT CHANGE UP (ref 8.4–10.4)
CHLORIDE SERPL-SCNC: 102 MMOL/L — SIGNIFICANT CHANGE UP (ref 98–110)
CO2 SERPL-SCNC: 23 MMOL/L — SIGNIFICANT CHANGE UP (ref 17–32)
CREAT SERPL-MCNC: 0.5 MG/DL — LOW (ref 0.7–1.5)
CULTURE RESULTS: SIGNIFICANT CHANGE UP
EGFR: 104 ML/MIN/1.73M2 — SIGNIFICANT CHANGE UP
GLUCOSE SERPL-MCNC: 120 MG/DL — HIGH (ref 70–99)
HCT VFR BLD CALC: 36.4 % — LOW (ref 42–52)
HGB BLD-MCNC: 11.9 G/DL — LOW (ref 14–18)
MCHC RBC-ENTMCNC: 29.2 PG — SIGNIFICANT CHANGE UP (ref 27–31)
MCHC RBC-ENTMCNC: 32.7 G/DL — SIGNIFICANT CHANGE UP (ref 32–37)
MCV RBC AUTO: 89.2 FL — SIGNIFICANT CHANGE UP (ref 80–94)
NRBC # BLD: 0 /100 WBCS — SIGNIFICANT CHANGE UP (ref 0–0)
PLATELET # BLD AUTO: 193 K/UL — SIGNIFICANT CHANGE UP (ref 130–400)
POTASSIUM SERPL-MCNC: 3.4 MMOL/L — LOW (ref 3.5–5)
POTASSIUM SERPL-SCNC: 3.4 MMOL/L — LOW (ref 3.5–5)
RBC # BLD: 4.08 M/UL — LOW (ref 4.7–6.1)
RBC # FLD: 14.5 % — SIGNIFICANT CHANGE UP (ref 11.5–14.5)
SODIUM SERPL-SCNC: 136 MMOL/L — SIGNIFICANT CHANGE UP (ref 135–146)
SPECIMEN SOURCE: SIGNIFICANT CHANGE UP
WBC # BLD: 9.29 K/UL — SIGNIFICANT CHANGE UP (ref 4.8–10.8)
WBC # FLD AUTO: 9.29 K/UL — SIGNIFICANT CHANGE UP (ref 4.8–10.8)

## 2023-04-02 PROCEDURE — 99232 SBSQ HOSP IP/OBS MODERATE 35: CPT

## 2023-04-02 RX ORDER — POTASSIUM CHLORIDE 20 MEQ
10 PACKET (EA) ORAL
Refills: 0 | Status: DISCONTINUED | OUTPATIENT
Start: 2023-04-02 | End: 2023-04-02

## 2023-04-02 RX ORDER — POTASSIUM CHLORIDE 20 MEQ
20 PACKET (EA) ORAL
Refills: 0 | Status: COMPLETED | OUTPATIENT
Start: 2023-04-02 | End: 2023-04-02

## 2023-04-02 RX ORDER — POTASSIUM CHLORIDE 20 MEQ
20 PACKET (EA) ORAL ONCE
Refills: 0 | Status: COMPLETED | OUTPATIENT
Start: 2023-04-02 | End: 2023-04-02

## 2023-04-02 RX ORDER — POTASSIUM CHLORIDE 20 MEQ
20 PACKET (EA) ORAL ONCE
Refills: 0 | Status: DISCONTINUED | OUTPATIENT
Start: 2023-04-02 | End: 2023-04-02

## 2023-04-02 RX ADMIN — Medication 20 MILLIEQUIVALENT(S): at 17:49

## 2023-04-02 RX ADMIN — PIPERACILLIN AND TAZOBACTAM 25 GRAM(S): 4; .5 INJECTION, POWDER, LYOPHILIZED, FOR SOLUTION INTRAVENOUS at 21:15

## 2023-04-02 RX ADMIN — Medication 20 MILLIEQUIVALENT(S): at 15:51

## 2023-04-02 RX ADMIN — AMLODIPINE BESYLATE 5 MILLIGRAM(S): 2.5 TABLET ORAL at 05:15

## 2023-04-02 RX ADMIN — PIPERACILLIN AND TAZOBACTAM 25 GRAM(S): 4; .5 INJECTION, POWDER, LYOPHILIZED, FOR SOLUTION INTRAVENOUS at 14:42

## 2023-04-02 RX ADMIN — PIPERACILLIN AND TAZOBACTAM 25 GRAM(S): 4; .5 INJECTION, POWDER, LYOPHILIZED, FOR SOLUTION INTRAVENOUS at 05:15

## 2023-04-02 RX ADMIN — Medication 50 MILLIEQUIVALENT(S): at 11:01

## 2023-04-02 RX ADMIN — Medication 50 MILLIGRAM(S): at 17:49

## 2023-04-02 RX ADMIN — ENOXAPARIN SODIUM 100 MILLIGRAM(S): 100 INJECTION SUBCUTANEOUS at 17:51

## 2023-04-02 RX ADMIN — ENOXAPARIN SODIUM 100 MILLIGRAM(S): 100 INJECTION SUBCUTANEOUS at 05:15

## 2023-04-02 RX ADMIN — Medication 50 MILLIGRAM(S): at 05:16

## 2023-04-02 RX ADMIN — ATORVASTATIN CALCIUM 20 MILLIGRAM(S): 80 TABLET, FILM COATED ORAL at 21:15

## 2023-04-02 RX ADMIN — PANTOPRAZOLE SODIUM 40 MILLIGRAM(S): 20 TABLET, DELAYED RELEASE ORAL at 11:04

## 2023-04-02 RX ADMIN — LOSARTAN POTASSIUM 100 MILLIGRAM(S): 100 TABLET, FILM COATED ORAL at 05:15

## 2023-04-02 NOTE — PROGRESS NOTE ADULT - ASSESSMENT
Assessment and Plan:   · Assessment	  78 yo male with a PMHx of HTN, HLD, CAD s/p CABG, CHF (s/p Pacemaker; ABBOTS  P/G [id number]), A-fib and LAAT on Eliquis, hard of hearing, osteoarthritis, and breast CA s/p R mastectomy in 2001 presents for chest pain/abdominal pain and right-sided pain for the past 4 hours. Found to have acute cholecystitis, E. coli bacteremia.     #Severe sepsis (not on admission) 2/2 Acute Cholecystitis (fever, wbc, source, lactic acidosis)    #Elevated LFTs in setting of Cholecystitis   #E. Coli Bacteremi  * likely has gallstone cholecystitis, given elevated LFTs/Bilirubin monitor for cholangitis (has RUQ and reported fever at home, no juandice, AMS, or septic at the moment), on imaging finding consistent with cholecystitis without biliary dilatation, lipase normal and no inflammation of pancreas on imaging so no gallstone pancreatitis  - HIDA scan reviewed, positive  - IR consult - plan for possible percutaneous cholecystostomy on MONDAY 4/3  - cardio consulted for clearance- spoke with Dr. Handy (private cardiologist), Dr. 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 ABBOTS  P/G [id number]- not in exacerbation   #HTN  #HLD  - Lasix held in setting of infection and need for IV hydration   - Cardiology consulted (input for antiplatelet and eliquis holding given need for procedure and risk of stroke in setting of Afib/LAAT)- as above   - Converted to lovenox 100 BID; spoke with IR, will need to switch to heparin drip on evening of 4/1  - C/w amiodarone, amlodipine, atorvastatin, losartan, metoprolol     #Hyponatremia  -resolved     #Breast CA s/p R mastectomy  - OP Follow up      #Progress Note Handoff  Pending (specify):  follow up cardio clearance, IR for perc cholecystostomy Monday 4.3.23, on Lovenox

## 2023-04-02 NOTE — PROGRESS NOTE ADULT - SUBJECTIVE AND OBJECTIVE BOX
HOSPITALIST PROGRESS NOTE     Interval Events:      4.2.23  Per IR: percutaneous cholecystostomy tomorrow  NAEO  acute Cholecystitis on Zosyn, asymptomatic  AFib with LA thrombus on full AC      4.1.23  no fever, no abd pain, normal WBC  Plan: IR for perc cholecystostomy Monday 4/3/23  Per-Op AC per IR      3.31.23  NAEO  IR for perc cholecystostomy Monday 4/3/23  Cardio consulted for sharri-operative use of AC  extensive discussion with pt and wife for risk and benefit of on and off AC for upcoming sharri-surgery.       3.30.23  NAEO  stable on Abx, neg fever, non toxic,  Cardio consulted for clot  Monday: intervention as per IR for perc cholecystostomy Monday 4/3/23        3.29.23  Patient reports no pain at this time.   He has been tolerating clear liquids without any nausea/vomiting.   Had soft bowel movements last night.   He reports appetite and is requesting diet to be advanced.           REVIEW OF SYSTEMS:  Negative except as noted above.     VITALS:  T(F): 98.4 (04-02 @ 13:00), Max: 98.4 (04-02 @ 13:00)  HR: 60 (04-02 @ 13:00) (60 - 62)  BP: 147/65 (04-02 @ 13:00) (117/57 - 154/70)  RR: 18 (04-02 @ 13:00) (18 - 18)  SpO2: 100% (04-02 @ 13:00) (95% - 100%)    HEIGHT/WEIGHT/BMI:   Height (cm): 180.3 (03-26), 180.3 (03-14), 188 (03-01), 190.5 (06-24)  Weight (kg): 103.1 (03-28), 108.9 (03-14), 108.8 (03-01), 108.9 (06-24)  BMI (kg/m2): 31.7 (03-28), 33.5 (03-26), 33.5 (03-14), 30.8 (03-01)    PHYSICAL EXAM:   PHYSICAL EXAM:  GENERAL: NAD, lying in bed comfortably  NERVOUS SYSTEM:  Alert & Oriented X3, speech clear. No deficits   HEAD:  Atraumatic, Normocephalic  EYES: EOMI, conjunctiva and sclera clear  ENT: Moist mucous membranes  NECK: Supple, No JVD  CHEST/LUNG: Clear to auscultation bilaterally; Unlabored respirations  HEART: Regular rate and rhythm  ABDOMEN: Bowel sounds present; Soft, Nontender, Nondistended  EXTREMITIES: No clubbing, cyanosis, or edema  MSK: FROM all 4 extremities, full and equal strength  SKIN: No rashes or lesions    I/Os:     04-01-23 @ 07:01  -  04-02-23 @ 07:00  --------------------------------------------------------  IN:    Oral Fluid: 1040 mL  Total IN: 1040 mL    OUT:    Voided (mL): 750 mL  Total OUT: 750 mL    Total NET: 290 mL        MEDICATIONS:   acetaminophen     Tablet .. 650 milliGRAM(s) Oral every 6 hours PRN  amLODIPine   Tablet 5 milliGRAM(s) Oral daily  atorvastatin 20 milliGRAM(s) Oral at bedtime  enoxaparin Injectable 100 milliGRAM(s) SubCutaneous every 12 hours  influenza  Vaccine (HIGH DOSE) 0.7 milliLiter(s) IntraMuscular once  losartan 100 milliGRAM(s) Oral daily  metoprolol tartrate 50 milliGRAM(s) Oral two times a day  ondansetron Injectable 4 milliGRAM(s) IV Push three times a day PRN  pantoprazole  Injectable 40 milliGRAM(s) IV Push daily  piperacillin/tazobactam IVPB.. 3.375 Gram(s) IV Intermittent every 8 hours    LABS:                         11.9   9.29  )-----------( 193      ( 02 Apr 2023 06:50 )             36.4     136  |  102  |  8<L>  ----------------------------<  120<H>          (04-02-23 @ 06:50)  3.4<L>   |  23  |  0.5<L>    Ca    8.4          (04-02-23 @ 06:50)  Mg     2.3         (04-01-23 @ 06:37)    TPro  6.4  /  Alb  3.8  /  TBili  1.5<H>  /  DBili  x   /  AST  37  /  ALT  57<H>  /  AlkPhos  118<H>       04-01-23 @ 06:37      Blood Glucose (Past 24 hours):    DIET:   Diet, DASH/TLC:   Sodium & Cholesterol Restricted  Low Fat (LOWFAT) (03-29-23 @ 09:18) [Active]        RADIOLOGY:

## 2023-04-03 LAB
ANION GAP SERPL CALC-SCNC: 13 MMOL/L — SIGNIFICANT CHANGE UP (ref 7–14)
BUN SERPL-MCNC: 7 MG/DL — LOW (ref 10–20)
CALCIUM SERPL-MCNC: 8.8 MG/DL — SIGNIFICANT CHANGE UP (ref 8.4–10.4)
CHLORIDE SERPL-SCNC: 105 MMOL/L — SIGNIFICANT CHANGE UP (ref 98–110)
CO2 SERPL-SCNC: 22 MMOL/L — SIGNIFICANT CHANGE UP (ref 17–32)
CREAT SERPL-MCNC: 0.5 MG/DL — LOW (ref 0.7–1.5)
EGFR: 104 ML/MIN/1.73M2 — SIGNIFICANT CHANGE UP
GLUCOSE SERPL-MCNC: 94 MG/DL — SIGNIFICANT CHANGE UP (ref 70–99)
HCT VFR BLD CALC: 36.1 % — LOW (ref 42–52)
HGB BLD-MCNC: 12.1 G/DL — LOW (ref 14–18)
INR BLD: 1.18 RATIO — SIGNIFICANT CHANGE UP (ref 0.65–1.3)
MAGNESIUM SERPL-MCNC: 2.3 MG/DL — SIGNIFICANT CHANGE UP (ref 1.8–2.4)
MCHC RBC-ENTMCNC: 29.8 PG — SIGNIFICANT CHANGE UP (ref 27–31)
MCHC RBC-ENTMCNC: 33.5 G/DL — SIGNIFICANT CHANGE UP (ref 32–37)
MCV RBC AUTO: 88.9 FL — SIGNIFICANT CHANGE UP (ref 80–94)
NRBC # BLD: 0 /100 WBCS — SIGNIFICANT CHANGE UP (ref 0–0)
PLATELET # BLD AUTO: 211 K/UL — SIGNIFICANT CHANGE UP (ref 130–400)
POTASSIUM SERPL-MCNC: 3.9 MMOL/L — SIGNIFICANT CHANGE UP (ref 3.5–5)
POTASSIUM SERPL-SCNC: 3.9 MMOL/L — SIGNIFICANT CHANGE UP (ref 3.5–5)
PROTHROM AB SERPL-ACNC: 13.5 SEC — HIGH (ref 9.95–12.87)
RBC # BLD: 4.06 M/UL — LOW (ref 4.7–6.1)
RBC # FLD: 14.6 % — HIGH (ref 11.5–14.5)
SODIUM SERPL-SCNC: 140 MMOL/L — SIGNIFICANT CHANGE UP (ref 135–146)
WBC # BLD: 7.74 K/UL — SIGNIFICANT CHANGE UP (ref 4.8–10.8)
WBC # FLD AUTO: 7.74 K/UL — SIGNIFICANT CHANGE UP (ref 4.8–10.8)

## 2023-04-03 PROCEDURE — 47490 INCISION OF GALLBLADDER: CPT

## 2023-04-03 PROCEDURE — 99232 SBSQ HOSP IP/OBS MODERATE 35: CPT

## 2023-04-03 RX ORDER — MORPHINE SULFATE 50 MG/1
2 CAPSULE, EXTENDED RELEASE ORAL ONCE
Refills: 0 | Status: DISCONTINUED | OUTPATIENT
Start: 2023-04-03 | End: 2023-04-03

## 2023-04-03 RX ORDER — PIPERACILLIN AND TAZOBACTAM 4; .5 G/20ML; G/20ML
3.38 INJECTION, POWDER, LYOPHILIZED, FOR SOLUTION INTRAVENOUS EVERY 8 HOURS
Refills: 0 | Status: DISCONTINUED | OUTPATIENT
Start: 2023-04-03 | End: 2023-04-05

## 2023-04-03 RX ADMIN — PIPERACILLIN AND TAZOBACTAM 25 GRAM(S): 4; .5 INJECTION, POWDER, LYOPHILIZED, FOR SOLUTION INTRAVENOUS at 13:37

## 2023-04-03 RX ADMIN — PANTOPRAZOLE SODIUM 40 MILLIGRAM(S): 20 TABLET, DELAYED RELEASE ORAL at 11:03

## 2023-04-03 RX ADMIN — Medication 50 MILLIGRAM(S): at 05:44

## 2023-04-03 RX ADMIN — LOSARTAN POTASSIUM 100 MILLIGRAM(S): 100 TABLET, FILM COATED ORAL at 05:44

## 2023-04-03 RX ADMIN — Medication 650 MILLIGRAM(S): at 18:20

## 2023-04-03 RX ADMIN — PIPERACILLIN AND TAZOBACTAM 25 GRAM(S): 4; .5 INJECTION, POWDER, LYOPHILIZED, FOR SOLUTION INTRAVENOUS at 21:43

## 2023-04-03 RX ADMIN — ATORVASTATIN CALCIUM 20 MILLIGRAM(S): 80 TABLET, FILM COATED ORAL at 21:43

## 2023-04-03 RX ADMIN — MORPHINE SULFATE 2 MILLIGRAM(S): 50 CAPSULE, EXTENDED RELEASE ORAL at 18:43

## 2023-04-03 RX ADMIN — AMLODIPINE BESYLATE 5 MILLIGRAM(S): 2.5 TABLET ORAL at 05:44

## 2023-04-03 RX ADMIN — Medication 50 MILLIGRAM(S): at 17:30

## 2023-04-03 NOTE — CHART NOTE - NSCHARTNOTEFT_GEN_A_CORE
Confirmed med rec with patient and his wife, patient takes the following medications:    Aspirin 81 mg daily  Lasix 20 mg daily  Losartan 100 mg daily  Metoprolol Tartrate 50 mg BID  Atorvastatin 20 mg daily  Amlodipine 5 mg daily
Recalled by IR to discuss case, patient still remains high risk for surgery from a cardiac standpoint. IR percutaneous cholecystomy recommended. Discussed with surgical attending Dr Yeager.    Please recall with any questions     Spectra [id number]
Talked to Interventional radiology resident due to recommendation for holding AC for 3 days. However, patient is on Eliquis for Afib and per IR patient is cleared for heparin drip which will need to be held 6 hours prior to OR.
Patient received aspirin today. As percutaneous cholecystostomy tube placement is a high risk procedure, patient needs to be off of anticoagulation/antiplatelets prior to procedure. Procedure is rescheduled for Monday (4/3). Patient can continue to be managed conservatively with antibiotics and if she improves can be discharged. Call IR if patient becomes unstable due to cholecystitis.

## 2023-04-03 NOTE — PROGRESS NOTE ADULT - ASSESSMENT
Assessment and Plan:   · Assessment	  78 yo male with a PMHx of HTN, HLD, CAD s/p CABG, CHF (s/p Pacemaker; ABBOTS  P/G [id number]), A-fib and LAAT on Eliquis, hard of hearing, osteoarthritis, and breast CA s/p R mastectomy in 2001 presents for chest pain/abdominal pain and right-sided pain for the past 4 hours. Found to have acute cholecystitis, E. coli bacteremia.     #Severe sepsis (not on admission) 2/2 Acute Cholecystitis (fever, wbc, source, lactic acidosis)    #Elevated LFTs in setting of Cholecystitis   #E. Coli Bacteremi  * likely has gallstone cholecystitis, given elevated LFTs/Bilirubin monitor for cholangitis (has RUQ and reported fever at home, no juandice, AMS, or septic at the moment), on imaging finding consistent with cholecystitis without biliary dilatation, lipase normal and no inflammation of pancreas on imaging so no gallstone pancreatitis  - HIDA scan reviewed, positive  - IR consult - plan for possible percutaneous cholecystostomy on MONDAY 4/3  - cardio consulted for clearance- spoke with Dr. Handy (private cardiologist), Dr. 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 ABBOTS  P/G [id number]- not in exacerbation   #HTN  #HLD  - Lasix held in setting of infection and need for IV hydration   - Cardiology consulted (input for antiplatelet and eliquis holding given need for procedure and risk of stroke in setting of Afib/LAAT)- as above   - Converted to lovenox 100 BID; spoke with IR, will need to switch to heparin drip on evening of 4/1  - C/w amiodarone, amlodipine, atorvastatin, losartan, metoprolol     #Hyponatremia  -resolved     #Breast CA s/p R mastectomy  - OP Follow up      #Progress Note Handoff  Pending (specify):  follow up cardio clearance, IR for perc cholecystostomy Monday 4.3.23, on Lovenox

## 2023-04-03 NOTE — PROGRESS NOTE ADULT - SUBJECTIVE AND OBJECTIVE BOX
HOSPITALIST PROGRESS NOTE     Interval Events:        4.3.23  NAEO, stable VS, no fever, no WBC, Cr 0.5  Scheduled for IR  percutaneous cholecystostomy today  Per Cardio: off full Lovenox 12h prior to  percutaneous cholecystostomy => resume Lovenox 12h after  percutaneous cholecystostomy  Wife at bedside      4.2.23  Per IR: percutaneous cholecystostomy tomorrow  NAEO  acute Cholecystitis on Zosyn, asymptomatic  AFib with LA thrombus on full AC      4.1.23  no fever, no abd pain, normal WBC  Plan: IR for perc cholecystostomy Monday 4/3/23  Per-Op AC per IR      3.31.23  NAEO  IR for perc cholecystostomy Monday 4/3/23  Cardio consulted for sharri-operative use of AC  extensive discussion with pt and wife for risk and benefit of on and off AC for upcoming sharri-surgery.       3.30.23  NAEO  stable on Abx, neg fever, non toxic,  Cardio consulted for clot  Monday: intervention as per IR for perc cholecystostomy Monday 4/3/23        3.29.23  Patient reports no pain at this time.   He has been tolerating clear liquids without any nausea/vomiting.   Had soft bowel movements last night.   He reports appetite and is requesting diet to be advanced.       REVIEW OF SYSTEMS:  Negative except as noted above.     VITALS:  T(F): 97.6 (04-03 @ 09:12), Max: 97.6 (04-03 @ 09:12)  HR: 62 (04-03 @ 09:12) (60 - 62)  BP: 172/74 (04-03 @ 09:12) (147/69 - 172/74)  RR: 18 (04-03 @ 09:12) (18 - 18)  SpO2: 96% (04-03 @ 09:12) (96% - 98%)    HEIGHT/WEIGHT/BMI:   Height (cm): 180 (04-03), 180.3 (03-14), 188 (03-01), 190.5 (06-24)  Weight (kg): 103.1 (04-03), 108.9 (03-14), 108.8 (03-01), 108.9 (06-24)  BMI (kg/m2): 31.8 (04-03), 33.5 (03-14), 30.8 (03-01), 30 (06-24)    PHYSICAL EXAM:   PHYSICAL EXAM:  GENERAL: NAD, lying in bed comfortably  NERVOUS SYSTEM:  Alert & Oriented X3, speech clear. No deficits   HEAD:  Atraumatic, Normocephalic  EYES: EOMI, conjunctiva and sclera clear  ENT: Moist mucous membranes  NECK: Supple, No JVD  CHEST/LUNG: Clear to auscultation bilaterally; Unlabored respirations  HEART: Regular rate and rhythm  ABDOMEN: Bowel sounds present; Soft, Nontender, Nondistended  EXTREMITIES: No clubbing, cyanosis, or edema  MSK: FROM all 4 extremities, full and equal strength  SKIN: No rashes or lesions    I/Os:     04-02-23 @ 07:01  -  04-03-23 @ 07:00  --------------------------------------------------------  IN:    IV PiggyBack: 200 mL  Total IN: 200 mL    OUT:    Voided (mL): 600 mL  Total OUT: 600 mL    Total NET: -400 mL        MEDICATIONS:   acetaminophen     Tablet .. 650 milliGRAM(s) Oral every 6 hours PRN  amLODIPine   Tablet 5 milliGRAM(s) Oral daily  atorvastatin 20 milliGRAM(s) Oral at bedtime  influenza  Vaccine (HIGH DOSE) 0.7 milliLiter(s) IntraMuscular once  losartan 100 milliGRAM(s) Oral daily  metoprolol tartrate 50 milliGRAM(s) Oral two times a day  ondansetron Injectable 4 milliGRAM(s) IV Push three times a day PRN  pantoprazole  Injectable 40 milliGRAM(s) IV Push daily  piperacillin/tazobactam IVPB.. 3.375 Gram(s) IV Intermittent every 8 hours    LABS:                         12.1   7.74  )-----------( 211      ( 03 Apr 2023 05:57 )             36.1     140  |  105  |  7<L>  ----------------------------<  94          (04-03-23 @ 05:57)  3.9   |  22  |  0.5<L>    Ca    8.8          (04-03-23 @ 05:57)  Mg     2.3         (04-03-23 @ 05:57)        Blood Glucose (Past 24 hours):    DIET:   Diet, NPO:   NPO for Procedure/Test     NPO Start Date: 03-Apr-2023,   NPO Start Time: 00:00  Except Medications (04-03-23 @ 02:42) [Active]  Diet, DASH/TLC:   Sodium & Cholesterol Restricted  Low Fat (LOWFAT) (03-29-23 @ 09:18) [Active]        RADIOLOGY:

## 2023-04-03 NOTE — PROGRESS NOTE ADULT - SUBJECTIVE AND OBJECTIVE BOX
INTERVENTIONAL RADIOLOGY BRIEF-OPERATIVE NOTE    Procedure: cholecystostomy tube placement    Pre-Op Diagnosis: acute cholecystitis.     Post-Op Diagnosis: same as pre    Attending: Bhavana  Resident:     Anesthesia (type):  [ ] General Anesthesia  [ x] Deep Sedation - provided by anesthesiologist  [ ] Conscious Sedation -  Versed and Fentanyl   [x ] Local/Regional    Contrast: 10cc    Estimated Blood Loss: minimal    Condition:   [ ] Critical  [ ] Serious  [ ] Fair   [x Good    Findings/Follow up Plan of Care: 8F cholecystostomy tube placed. F/u in 3 months for exchange, f/u with surgery for possible removal.     Specimens Removed: sample sent for culture.     Implants: see above    Complications: no immediate    Disposition: back to room.       Please call Interventional Radiology [phone] with any questions, concerns, or issues.

## 2023-04-04 LAB
ANION GAP SERPL CALC-SCNC: 10 MMOL/L — SIGNIFICANT CHANGE UP (ref 7–14)
BUN SERPL-MCNC: 10 MG/DL — SIGNIFICANT CHANGE UP (ref 10–20)
CALCIUM SERPL-MCNC: 8.1 MG/DL — LOW (ref 8.4–10.5)
CHLORIDE SERPL-SCNC: 103 MMOL/L — SIGNIFICANT CHANGE UP (ref 98–110)
CO2 SERPL-SCNC: 22 MMOL/L — SIGNIFICANT CHANGE UP (ref 17–32)
CREAT SERPL-MCNC: 0.6 MG/DL — LOW (ref 0.7–1.5)
EGFR: 99 ML/MIN/1.73M2 — SIGNIFICANT CHANGE UP
GLUCOSE SERPL-MCNC: 94 MG/DL — SIGNIFICANT CHANGE UP (ref 70–99)
GRAM STN FLD: SIGNIFICANT CHANGE UP
HCT VFR BLD CALC: 36.3 % — LOW (ref 42–52)
HGB BLD-MCNC: 12 G/DL — LOW (ref 14–18)
MAGNESIUM SERPL-MCNC: 2.1 MG/DL — SIGNIFICANT CHANGE UP (ref 1.8–2.4)
MCHC RBC-ENTMCNC: 29.3 PG — SIGNIFICANT CHANGE UP (ref 27–31)
MCHC RBC-ENTMCNC: 33.1 G/DL — SIGNIFICANT CHANGE UP (ref 32–37)
MCV RBC AUTO: 88.8 FL — SIGNIFICANT CHANGE UP (ref 80–94)
NRBC # BLD: 0 /100 WBCS — SIGNIFICANT CHANGE UP (ref 0–0)
PLATELET # BLD AUTO: 249 K/UL — SIGNIFICANT CHANGE UP (ref 130–400)
POTASSIUM SERPL-MCNC: 3.6 MMOL/L — SIGNIFICANT CHANGE UP (ref 3.5–5)
POTASSIUM SERPL-SCNC: 3.6 MMOL/L — SIGNIFICANT CHANGE UP (ref 3.5–5)
RBC # BLD: 4.09 M/UL — LOW (ref 4.7–6.1)
RBC # FLD: 14.8 % — HIGH (ref 11.5–14.5)
SODIUM SERPL-SCNC: 135 MMOL/L — SIGNIFICANT CHANGE UP (ref 135–146)
SPECIMEN SOURCE: SIGNIFICANT CHANGE UP
WBC # BLD: 16.2 K/UL — HIGH (ref 4.8–10.8)
WBC # FLD AUTO: 16.2 K/UL — HIGH (ref 4.8–10.8)

## 2023-04-04 PROCEDURE — 99232 SBSQ HOSP IP/OBS MODERATE 35: CPT

## 2023-04-04 RX ORDER — PANTOPRAZOLE SODIUM 20 MG/1
40 TABLET, DELAYED RELEASE ORAL
Refills: 0 | Status: DISCONTINUED | OUTPATIENT
Start: 2023-04-04 | End: 2023-04-05

## 2023-04-04 RX ORDER — OXYCODONE HYDROCHLORIDE 5 MG/1
5 TABLET ORAL ONCE
Refills: 0 | Status: DISCONTINUED | OUTPATIENT
Start: 2023-04-04 | End: 2023-04-04

## 2023-04-04 RX ORDER — SODIUM CHLORIDE 9 MG/ML
1000 INJECTION INTRAMUSCULAR; INTRAVENOUS; SUBCUTANEOUS
Refills: 0 | Status: DISCONTINUED | OUTPATIENT
Start: 2023-04-04 | End: 2023-04-04

## 2023-04-04 RX ORDER — APIXABAN 2.5 MG/1
5 TABLET, FILM COATED ORAL EVERY 12 HOURS
Refills: 0 | Status: DISCONTINUED | OUTPATIENT
Start: 2023-04-04 | End: 2023-04-05

## 2023-04-04 RX ORDER — OXYCODONE HYDROCHLORIDE 5 MG/1
5 TABLET ORAL EVERY 8 HOURS
Refills: 0 | Status: DISCONTINUED | OUTPATIENT
Start: 2023-04-04 | End: 2023-04-05

## 2023-04-04 RX ADMIN — PIPERACILLIN AND TAZOBACTAM 25 GRAM(S): 4; .5 INJECTION, POWDER, LYOPHILIZED, FOR SOLUTION INTRAVENOUS at 21:31

## 2023-04-04 RX ADMIN — ATORVASTATIN CALCIUM 20 MILLIGRAM(S): 80 TABLET, FILM COATED ORAL at 21:31

## 2023-04-04 RX ADMIN — APIXABAN 5 MILLIGRAM(S): 2.5 TABLET, FILM COATED ORAL at 17:05

## 2023-04-04 RX ADMIN — OXYCODONE HYDROCHLORIDE 5 MILLIGRAM(S): 5 TABLET ORAL at 20:27

## 2023-04-04 RX ADMIN — OXYCODONE HYDROCHLORIDE 5 MILLIGRAM(S): 5 TABLET ORAL at 08:48

## 2023-04-04 RX ADMIN — PIPERACILLIN AND TAZOBACTAM 25 GRAM(S): 4; .5 INJECTION, POWDER, LYOPHILIZED, FOR SOLUTION INTRAVENOUS at 05:13

## 2023-04-04 RX ADMIN — OXYCODONE HYDROCHLORIDE 5 MILLIGRAM(S): 5 TABLET ORAL at 19:47

## 2023-04-04 RX ADMIN — PIPERACILLIN AND TAZOBACTAM 25 GRAM(S): 4; .5 INJECTION, POWDER, LYOPHILIZED, FOR SOLUTION INTRAVENOUS at 13:42

## 2023-04-04 RX ADMIN — LOSARTAN POTASSIUM 100 MILLIGRAM(S): 100 TABLET, FILM COATED ORAL at 05:13

## 2023-04-04 RX ADMIN — Medication 50 MILLIGRAM(S): at 17:05

## 2023-04-04 RX ADMIN — PANTOPRAZOLE SODIUM 40 MILLIGRAM(S): 20 TABLET, DELAYED RELEASE ORAL at 12:02

## 2023-04-04 RX ADMIN — Medication 50 MILLIGRAM(S): at 05:13

## 2023-04-04 RX ADMIN — OXYCODONE HYDROCHLORIDE 5 MILLIGRAM(S): 5 TABLET ORAL at 21:00

## 2023-04-04 RX ADMIN — AMLODIPINE BESYLATE 5 MILLIGRAM(S): 2.5 TABLET ORAL at 05:13

## 2023-04-04 NOTE — PROGRESS NOTE ADULT - ASSESSMENT
Assessment and Plan:   · Assessment	  78 yo male with a PMHx of HTN, HLD, CAD s/p CABG, CHF (s/p Pacemaker; ABBOTS  P/G [id number]), A-fib and LAAT on Eliquis, hard of hearing, osteoarthritis, and breast CA s/p R mastectomy in 2001 presents for chest pain/abdominal pain and right-sided pain for the past 4 hours. Found to have acute cholecystitis, E. coli bacteremia.     #Severe sepsis (not on admission) 2/2 Acute Cholecystitis (fever, wbc, source, lactic acidosis)   s/p IR cholecystostomy tube 4.3.23  on Zosyn     #Elevated LFTs in setting of Cholecystitis   #E. Coli Bacteremi  * likely has gallstone cholecystitis, given elevated LFTs/Bilirubin monitor for cholangitis (has RUQ and reported fever at home, no juandice, AMS, or septic at the moment), on imaging finding consistent with cholecystitis without biliary dilatation, lipase normal and no inflammation of pancreas on imaging so no gallstone pancreatitis  - HIDA scan reviewed, positive  - IR consult - plan for possible percutaneous cholecystostomy on MONDAY 4/3  - cardio consulted for clearance- spoke with Dr. Handy (private cardiologist), Dr. 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 ABBOTS  P/G [id number]- not in exacerbation   #HTN  #HLD  - Lasix held in setting of infection and need for IV hydration   - Cardiology consulted (input for antiplatelet and eliquis holding given need for procedure and risk of stroke in setting of Afib/LAAT)- as above   - Converted to lovenox 100 BID; spoke with IR, will need to switch to heparin drip on evening of 4/1  - C/w amiodarone, amlodipine, atorvastatin, losartan, metoprolol     #Hyponatremia  -resolved     #Breast CA s/p R mastectomy  - OP Follow up      #Progress Note Handoff  Pending (specify):  follow up cardio clearance, IR for perc cholecystostomy Monday 4.3.23, on Lovenox

## 2023-04-04 NOTE — PROGRESS NOTE ADULT - SUBJECTIVE AND OBJECTIVE BOX
HOSPITALIST PROGRESS NOTE     Interval Events:      4.4.23  s/p IR cholecystostomy tube 4.3.23 => stable, back on Zosyn, full AC  re-consult ID 4.4.23 for Abx duration prior to DC   BCx neg 3.28.23 => repeat Bcx 4.4.23      4.3.23  NAEO, stable VS, no fever, no WBC, Cr 0.5  Scheduled for IR  percutaneous cholecystostomy today  Per Cardio: off full Lovenox 12h prior to  percutaneous cholecystostomy => resume Lovenox 12h after  percutaneous cholecystostomy  Wife at bedside      4.2.23  Per IR: percutaneous cholecystostomy tomorrow  NAEO  acute Cholecystitis on Zosyn, asymptomatic  AFib with LA thrombus on full AC      4.1.23  no fever, no abd pain, normal WBC  Plan: IR for perc cholecystostomy Monday 4/3/23  Per-Op AC per IR      3.31.23  NAEO  IR for perc cholecystostomy Monday 4/3/23  Cardio consulted for sharri-operative use of AC  extensive discussion with pt and wife for risk and benefit of on and off AC for upcoming sharri-surgery.       3.30.23  NAEO  stable on Abx, neg fever, non toxic,  Cardio consulted for clot  Monday: intervention as per IR for perc cholecystostomy Monday 4/3/23        3.29.23  Patient reports no pain at this time.   He has been tolerating clear liquids without any nausea/vomiting.   Had soft bowel movements last night.   He reports appetite and is requesting diet to be advanced.         REVIEW OF SYSTEMS:  Negative except as noted above.     VITALS:  T(F): 98 (04-04 @ 17:00), Max: 99.7 (04-04 @ 12:05)  HR: 60 (04-04 @ 17:00) (60 - 82)  BP: 122/60 (04-04 @ 17:00) (122/60 - 161/83)  RR: 18 (04-04 @ 17:00) (18 - 18)  SpO2: 98% (04-04 @ 17:00) (95% - 100%)    HEIGHT/WEIGHT/BMI:   Height (cm): 180.3 (04-03), 180.3 (03-14), 188 (03-01), 190.5 (06-24)  Weight (kg): 103.1 (04-03), 108.9 (03-14), 108.8 (03-01), 108.9 (06-24)  BMI (kg/m2): 31.7 (04-03), 33.5 (03-14), 30.8 (03-01), 30 (06-24)    PHYSICAL EXAM:   PHYSICAL EXAM:  GENERAL: NAD, lying in bed comfortably  NERVOUS SYSTEM:  Alert & Oriented X3, speech clear. No deficits   HEAD:  Atraumatic, Normocephalic  EYES: EOMI, conjunctiva and sclera clear  ENT: Moist mucous membranes  NECK: Supple, No JVD  CHEST/LUNG: Clear to auscultation bilaterally; Unlabored respirations  HEART: Regular rate and rhythm  ABDOMEN: Bowel sounds present; Soft, Nontender, Nondistended  EXTREMITIES: No clubbing, cyanosis, or edema  MSK: FROM all 4 extremities, full and equal strength  SKIN: No rashes or lesions    I/Os:     04-03-23 @ 07:01  -  04-04-23 @ 07:00  --------------------------------------------------------  IN:  Total IN: 0 mL    OUT:    Drain (mL): 400 mL  Total OUT: 400 mL    Total NET: -400 mL        MEDICATIONS:   acetaminophen     Tablet .. 650 milliGRAM(s) Oral every 6 hours PRN  amLODIPine   Tablet 5 milliGRAM(s) Oral daily  apixaban 5 milliGRAM(s) Oral every 12 hours  atorvastatin 20 milliGRAM(s) Oral at bedtime  influenza  Vaccine (HIGH DOSE) 0.7 milliLiter(s) IntraMuscular once  losartan 100 milliGRAM(s) Oral daily  metoprolol tartrate 50 milliGRAM(s) Oral two times a day  ondansetron Injectable 4 milliGRAM(s) IV Push three times a day PRN  pantoprazole    Tablet 40 milliGRAM(s) Oral before breakfast  piperacillin/tazobactam IVPB.. 3.375 Gram(s) IV Intermittent every 8 hours    LABS:                         12.0   16.20 )-----------( 249      ( 04 Apr 2023 06:23 )             36.3     135  |  103  |  10  ----------------------------<  94          (04-04-23 @ 06:23)  3.6   |  22  |  0.6<L>    Ca    8.1<L>          (04-04-23 @ 06:23)  Mg     2.1         (04-04-23 @ 06:23)        Blood Glucose (Past 24 hours):    DIET:   Diet, NPO:   NPO for Procedure/Test     NPO Start Date: 03-Apr-2023,   NPO Start Time: 00:00  Except Medications (04-03-23 @ 02:42) [Active]  Diet, DASH/TLC:   Sodium & Cholesterol Restricted  Low Fat (LOWFAT) (03-29-23 @ 09:18) [Active]        RADIOLOGY:

## 2023-04-05 ENCOUNTER — TRANSCRIPTION ENCOUNTER (OUTPATIENT)
Age: 78
End: 2023-04-05

## 2023-04-05 VITALS
SYSTOLIC BLOOD PRESSURE: 141 MMHG | HEART RATE: 60 BPM | RESPIRATION RATE: 18 BRPM | TEMPERATURE: 97 F | OXYGEN SATURATION: 98 % | DIASTOLIC BLOOD PRESSURE: 63 MMHG

## 2023-04-05 LAB
ALBUMIN SERPL ELPH-MCNC: 3.2 G/DL — LOW (ref 3.5–5.2)
ALP SERPL-CCNC: 130 U/L — HIGH (ref 30–115)
ALT FLD-CCNC: 36 U/L — SIGNIFICANT CHANGE UP (ref 0–41)
ANION GAP SERPL CALC-SCNC: 13 MMOL/L — SIGNIFICANT CHANGE UP (ref 7–14)
AST SERPL-CCNC: 19 U/L — SIGNIFICANT CHANGE UP (ref 0–41)
BASOPHILS # BLD AUTO: 0.02 K/UL — SIGNIFICANT CHANGE UP (ref 0–0.2)
BASOPHILS NFR BLD AUTO: 0.2 % — SIGNIFICANT CHANGE UP (ref 0–1)
BILIRUB SERPL-MCNC: 1.5 MG/DL — HIGH (ref 0.2–1.2)
BUN SERPL-MCNC: 12 MG/DL — SIGNIFICANT CHANGE UP (ref 10–20)
CALCIUM SERPL-MCNC: 8.2 MG/DL — LOW (ref 8.4–10.5)
CHLORIDE SERPL-SCNC: 104 MMOL/L — SIGNIFICANT CHANGE UP (ref 98–110)
CO2 SERPL-SCNC: 19 MMOL/L — SIGNIFICANT CHANGE UP (ref 17–32)
CREAT SERPL-MCNC: 0.6 MG/DL — LOW (ref 0.7–1.5)
EGFR: 99 ML/MIN/1.73M2 — SIGNIFICANT CHANGE UP
EOSINOPHIL # BLD AUTO: 0.09 K/UL — SIGNIFICANT CHANGE UP (ref 0–0.7)
EOSINOPHIL NFR BLD AUTO: 0.8 % — SIGNIFICANT CHANGE UP (ref 0–8)
GLUCOSE SERPL-MCNC: 91 MG/DL — SIGNIFICANT CHANGE UP (ref 70–99)
HCT VFR BLD CALC: 36 % — LOW (ref 42–52)
HGB BLD-MCNC: 11.9 G/DL — LOW (ref 14–18)
IMM GRANULOCYTES NFR BLD AUTO: 0.5 % — HIGH (ref 0.1–0.3)
LYMPHOCYTES # BLD AUTO: 1.42 K/UL — SIGNIFICANT CHANGE UP (ref 1.2–3.4)
LYMPHOCYTES # BLD AUTO: 12.3 % — LOW (ref 20.5–51.1)
MAGNESIUM SERPL-MCNC: 2.3 MG/DL — SIGNIFICANT CHANGE UP (ref 1.8–2.4)
MCHC RBC-ENTMCNC: 29.7 PG — SIGNIFICANT CHANGE UP (ref 27–31)
MCHC RBC-ENTMCNC: 33.1 G/DL — SIGNIFICANT CHANGE UP (ref 32–37)
MCV RBC AUTO: 89.8 FL — SIGNIFICANT CHANGE UP (ref 80–94)
MONOCYTES # BLD AUTO: 0.88 K/UL — HIGH (ref 0.1–0.6)
MONOCYTES NFR BLD AUTO: 7.6 % — SIGNIFICANT CHANGE UP (ref 1.7–9.3)
NEUTROPHILS # BLD AUTO: 9.06 K/UL — HIGH (ref 1.4–6.5)
NEUTROPHILS NFR BLD AUTO: 78.6 % — HIGH (ref 42.2–75.2)
NRBC # BLD: 0 /100 WBCS — SIGNIFICANT CHANGE UP (ref 0–0)
PLATELET # BLD AUTO: 235 K/UL — SIGNIFICANT CHANGE UP (ref 130–400)
POTASSIUM SERPL-MCNC: 3.6 MMOL/L — SIGNIFICANT CHANGE UP (ref 3.5–5)
POTASSIUM SERPL-SCNC: 3.6 MMOL/L — SIGNIFICANT CHANGE UP (ref 3.5–5)
PROT SERPL-MCNC: 5.6 G/DL — LOW (ref 6–8)
RBC # BLD: 4.01 M/UL — LOW (ref 4.7–6.1)
RBC # FLD: 14.7 % — HIGH (ref 11.5–14.5)
SODIUM SERPL-SCNC: 136 MMOL/L — SIGNIFICANT CHANGE UP (ref 135–146)
WBC # BLD: 11.53 K/UL — HIGH (ref 4.8–10.8)
WBC # FLD AUTO: 11.53 K/UL — HIGH (ref 4.8–10.8)

## 2023-04-05 PROCEDURE — 99239 HOSP IP/OBS DSCHRG MGMT >30: CPT

## 2023-04-05 RX ORDER — METRONIDAZOLE 500 MG
1 TABLET ORAL
Qty: 21 | Refills: 0
Start: 2023-04-05 | End: 2023-04-11

## 2023-04-05 RX ORDER — OXYCODONE HYDROCHLORIDE 5 MG/1
1 TABLET ORAL
Qty: 15 | Refills: 0
Start: 2023-04-05 | End: 2023-04-09

## 2023-04-05 RX ORDER — RIVAROXABAN 15 MG-20MG
1 KIT ORAL
Qty: 42 | Refills: 0
Start: 2023-04-05 | End: 2023-04-25

## 2023-04-05 RX ORDER — CIPROFLOXACIN LACTATE 400MG/40ML
1 VIAL (ML) INTRAVENOUS
Qty: 14 | Refills: 0
Start: 2023-04-05 | End: 2023-04-11

## 2023-04-05 RX ADMIN — PANTOPRAZOLE SODIUM 40 MILLIGRAM(S): 20 TABLET, DELAYED RELEASE ORAL at 06:14

## 2023-04-05 RX ADMIN — APIXABAN 5 MILLIGRAM(S): 2.5 TABLET, FILM COATED ORAL at 06:06

## 2023-04-05 RX ADMIN — AMLODIPINE BESYLATE 5 MILLIGRAM(S): 2.5 TABLET ORAL at 06:07

## 2023-04-05 RX ADMIN — PIPERACILLIN AND TAZOBACTAM 25 GRAM(S): 4; .5 INJECTION, POWDER, LYOPHILIZED, FOR SOLUTION INTRAVENOUS at 13:10

## 2023-04-05 RX ADMIN — LOSARTAN POTASSIUM 100 MILLIGRAM(S): 100 TABLET, FILM COATED ORAL at 06:08

## 2023-04-05 RX ADMIN — PIPERACILLIN AND TAZOBACTAM 25 GRAM(S): 4; .5 INJECTION, POWDER, LYOPHILIZED, FOR SOLUTION INTRAVENOUS at 06:14

## 2023-04-05 RX ADMIN — OXYCODONE HYDROCHLORIDE 5 MILLIGRAM(S): 5 TABLET ORAL at 15:43

## 2023-04-05 RX ADMIN — OXYCODONE HYDROCHLORIDE 5 MILLIGRAM(S): 5 TABLET ORAL at 06:06

## 2023-04-05 RX ADMIN — Medication 50 MILLIGRAM(S): at 06:07

## 2023-04-05 NOTE — DISCHARGE NOTE PROVIDER - NSDCFUSCHEDAPPT_GEN_ALL_CORE_FT
[facility]  CARDIOLOGY [address]  Scheduled Appointment: 04/06/2023    Rebeca Ojeda  [address]  Scheduled Appointment: 04/24/2023    Rebeca Ojeda  [address]  Scheduled Appointment: 04/24/2023

## 2023-04-05 NOTE — PROGRESS NOTE ADULT - ASSESSMENT
Pt s/p IR cholecystostomy tube  4/3 Body fluid with Gram Negative Rods     Afebrile  wbc 11.35 (improving)  B/C E coli was S cipro  TBili  1.5<H>  /  DBili  x   /  AST  19  /  ALT  36  /  AlkPhos  130<H>  04-05      PLAN  Can D/C pt on 7 days of PO Cipro 500mg q12h and 7 days of PO Flagyl 500mg q8h  F/U LFTs as outpt

## 2023-04-05 NOTE — DISCHARGE NOTE PROVIDER - CARE PROVIDERS DIRECT ADDRESSES
,[email or web address],DirectAddress_Unknown,DirectAddress_Unknown,DirectAddress_Unknown ,[email or web address],DirectAddress_Unknown,DirectAddress_Unknown,DirectAddress_Unknown,DirectAddress_Unknown

## 2023-04-05 NOTE — DISCHARGE NOTE NURSING/CASE MANAGEMENT/SOCIAL WORK - PATIENT PORTAL LINK FT
You can access the FollowMyHealth Patient Portal offered by [facility] by registering at the following website: [email or web address]. By joining [employer]’s FollowMyHealth portal, you will also be able to view your health information using other applications (apps) compatible with our system.

## 2023-04-05 NOTE — DISCHARGE NOTE NURSING/CASE MANAGEMENT/SOCIAL WORK - HISTORY OF COVID-19 VACCINATION
"        Marcelina Lott   2017 10:15 AM   Office Visit   MRN: [id number]    Department:  [facility] [place]   Dept Phone:  [phone]    Description:  Female : 1998   Provider:  Juventino Monaco PA-C           Reason for Visit     Eye Problem l eye irritation x 2 days . was using apricot scrub on face , noticed after .needs work note .      Allergies as of 2017     Allergen Noted Reactions    Pcn [Penicillins] 2017         You were diagnosed with     Corneal abrasion, left, initial encounter   [[id number]]         Vital Signs     Blood Pressure Pulse Temperature Respirations Height Weight    110/68 mmHg 74 36.8 °C (98.2 °F) 14 1.727 m (5' 8\") 72.576 kg (160 lb)    Body Mass Index Oxygen Saturation Last Menstrual Period Breastfeeding?          24.33 kg/m2 98% 2017 No        Basic Information     Date Of Birth Sex Race Ethnicity Preferred Language    1998 Female White Non- English      Health Maintenance     Patient has no pending health maintenance at this time      Current Immunizations     No immunizations on file.      Below and/or attached are the medications your provider expects you to take. Review all of your home medications and newly ordered medications with your provider and/or pharmacist. Follow medication instructions as directed by your provider and/or pharmacist. Please keep your medication list with you and share with your provider. Update the information when medications are discontinued, doses are changed, or new medications (including over-the-counter products) are added; and carry medication information at all times in the event of emergency situations     Allergies:  PCN - (reactions not documented)               Medications  Valid as of: 2017 - 10:50 AM    Generic Name Brand Name Tablet Size Instructions for use    Ofloxacin (Solution) OCUFLOX 0.3 % 1-2 DROPS TO AFFECTED EYE EVERY 2-4 HOURS WHILE AWAKE. USE FOR ONE EXTRA DAY AFTER BETTER.        .  "                Medicines prescribed today were sent to:     [id number]  [place], [place] - [address] AT [address]    [address] [place] [address]    Phone: [phone] Fax: [phone]    Open 24 Hours?: Yes      Medication refill instructions:       If your prescription bottle indicates you have medication refills left, it is not necessary to call your provider’s office. Please contact your pharmacy and they will refill your medication.    If your prescription bottle indicates you do not have any refills left, you may request refills at any time through one of the following ways: The online [employer] system (except Urgent Care), by calling your provider’s office, or by asking your pharmacy to contact your provider’s office with a refill request. Medication refills are processed only during regular business hours and may not be available until the next business day. Your provider may request additional information or to have a follow-up visit with you prior to refilling your medication.   *Please Note: Medication refills are assigned a new Rx number when refilled electronically. Your pharmacy may indicate that no refills were authorized even though a new prescription for the same medication is available at the pharmacy. Please request the medicine by name with the pharmacy before contacting your provider for a refill.        Instructions    Cool moist compresses to the left eye to help decrease swelling.  Abx eye drop every 2 hours.  No contact lens.  Return in 1-2 days for recheck.    Corneal Abrasion  The cornea is the clear covering at the front and center of the eye. When looking at the colored portion of the eye (iris), you are looking through the cornea. This very thin tissue is made up of many layers. The surface layer is a single layer of cells (corneal epithelium) and is one of the most sensitive tissues in the body. If a scratch or injury causes the corneal epithelium  to come off, it is called a corneal abrasion. If the injury extends to the tissues below the epithelium, the condition is called a corneal ulcer.  CAUSES   · Scratches.  · Trauma.  · Foreign body in the eye.  Some people have recurrences of abrasions in the area of the original injury even after it has healed (recurrent erosion syndrome). Recurrent erosion syndrome generally improves and goes away with time.  SYMPTOMS   · Eye pain.  · Difficulty or inability to keep the injured eye open.  · The eye becomes very sensitive to light.  · Recurrent erosions tend to happen suddenly, first thing in the morning, usually after waking up and opening the eye.  DIAGNOSIS   Your health care provider can diagnose a corneal abrasion during an eye exam. Dye is usually placed in the eye using a drop or a small paper strip moistened by your tears. When the eye is examined with a special light, the abrasion shows up clearly because of the dye.  TREATMENT   · Small abrasions may be treated with antibiotic drops or ointment alone.  · A pressure patch may be put over the eye. If this is done, follow your doctor's instructions for when to remove the patch. Do not drive or use machines while the eye patch is on. Judging distances is hard to do with a patch on.  If the abrasion becomes infected and spreads to the deeper tissues of the cornea, a corneal ulcer can result. This is serious because it can cause corneal scarring. Corneal scars interfere with light passing through the cornea and cause a loss of vision in the involved eye.  HOME CARE INSTRUCTIONS  · Use medicine or ointment as directed. Only take over-the-counter or prescription medicines for pain, discomfort, or fever as directed by your health care provider.  · Do not drive or operate machinery if your eye is patched. Your ability to  distances is impaired.  · If your health care provider has given you a follow-up appointment, it is very important to keep that appointment.  Not keeping the appointment could result in a severe eye infection or permanent loss of vision. If there is any problem keeping the appointment, let your health care provider know.  SEEK MEDICAL CARE IF:   · You have pain, light sensitivity, and a scratchy feeling in one eye or both eyes.  · Your pressure patch keeps loosening up, and you can blink your eye under the patch after treatment.  · Any kind of discharge develops from the eye after treatment or if the lids stick together in the morning.  · You have the same symptoms in the morning as you did with the original abrasion days, weeks, or months after the abrasion healed.  MAKE SURE YOU:   · Understand these instructions.  · Will watch your condition.  · Will get help right away if you are not doing well or get worse.     This information is not intended to replace advice given to you by your health care provider. Make sure you discuss any questions you have with your health care provider.     Document Released: 12/15/2001 Document Revised: 12/23/2014 Document Reviewed: 08/25/2014  [email or web address] / [employer] Patient Education ©2016 Elsevier Inc.            eFans Access Code: [email or web address]  Expires: 5/2/2017 10:07 AM    eFans  A secure, online tool to manage your health information     [employer]’s eFans® is a secure, online tool that connects you to your personalized health information from the privacy of your home -- day or night - making it very easy for you to manage your healthcare. Once the activation process is completed, you can even access your medical information using the eFans elizabeth, which is available for free in the Apple Elizabeth store or Google Play store.     eFans provides the following levels of access (as shown below):   My Chart Features   [facility] Primary [facility] Doctor [facility]  Specialists [facility]  Urgent  Care Non-[facility]  Primary Care  Doctor   Email your healthcare team securely and privately 24/7 X X X    Manage appointments: schedule your  next appointment; view details of past/upcoming appointments X      Request prescription refills. X      View recent personal medical records, including lab and immunizations X X X X   View health record, including health history, allergies, medications X X X X   Read reports about your outpatient visits, procedures, consult and ER notes X X X X   See your discharge summary, which is a recap of your hospital and/or ER visit that includes your diagnosis, lab results, and care plan. X X       How to register for Le[employer] Train:  1. Go to  [email or web address].  2. Click on the Sign Up Now box, which takes you to the New Member Sign Up page. You will need to provide the following information:  a. Enter your Le[employer] Train Access Code exactly as it appears at the top of this page. (You will not need to use this code after you’ve completed the sign-up process. If you do not sign up before the expiration date, you must request a new code.)   b. Enter your date of birth.   c. Enter your home email address.   d. Click Submit, and follow the next screen’s instructions.  3. Create a Lean Train ID. This will be your Lean Train login ID and cannot be changed, so think of one that is secure and easy to remember.  4. Create a Lean Train password. You can change your password at any time.  5. Enter your Password Reset Question and Answer. This can be used at a later time if you forget your password.   6. Enter your e-mail address. This allows you to receive e-mail notifications when new information is available in Lean Train.  7. Click Sign Up. You can now view your health information.    For assistance activating your [employer] Train account, call [phone]         No

## 2023-04-05 NOTE — PROGRESS NOTE ADULT - SUBJECTIVE AND OBJECTIVE BOX
COLTON, MAX  77y, Male  Allergy: No Known Allergies      CHIEF COMPLAINT: Abdominal Pain (04 Apr 2023 17:37)      INTERVAL EVENTS/HPI  - No acute events overnight  - T(F): , Max: 99.7 (04-04-23 @ 12:05)  - Denies any worsening symptoms  - Tolerating medication  - WBC Count: 11.53 K/uL (04-05-23 @ 05:06)      ROS  General: Denies fevers, chills, nightsweats, weight loss  HEENT: Denies headache, rhinorrhea, sore throat, eye pain  CV: Denies CP, palpitations  PULM: Denies SOB, cough  GI: Denies abdominal pain, diarrhea  : Denies dysuria, hematuria  MSK: Denies arthralgias  SKIN: Denies rash   NEURO: Denies paresthesias, weakness  PSYCH: Denies depression    FH non-contributory   Social Hx non-contributory    VITALS:  T(F): 98.2, Max: 99.7 (04-04-23 @ 12:05)  HR: 60  BP: 145/66  RR: 18Vital Signs Last 24 Hrs  T(C): 36.8 (05 Apr 2023 05:02), Max: 37.6 (04 Apr 2023 12:05)  T(F): 98.2 (05 Apr 2023 05:02), Max: 99.7 (04 Apr 2023 12:05)  HR: 60 (05 Apr 2023 05:02) (60 - 73)  BP: 145/66 (05 Apr 2023 05:02) (122/60 - 150/67)  BP(mean): --  RR: 18 (05 Apr 2023 05:02) (18 - 18)  SpO2: 98% (05 Apr 2023 05:02) (95% - 98%)        PHYSICAL EXAM:  Gen: NAD, resting in bed  HEENT: Normocephalic, atraumatic  Neck: supple, no lymphadenopathy  CV: Regular rate & regular rhythm  Lungs: decreased BS at bases, no fremitus  Abdomen: Soft, BS present  Ext: Warm, well perfused  Neuro: non focal, awake  Skin: no rash, no erythema      TESTS & MEASUREMENTS:                        11.9   11.53 )-----------( 235      ( 05 Apr 2023 05:06 )             36.0     04-05    136  |  104  |  12  ----------------------------<  91  3.6   |  19  |  0.6<L>    Ca    8.2<L>      05 Apr 2023 05:06  Mg     2.3     04-05    TPro  5.6<L>  /  Alb  3.2<L>  /  TBili  1.5<H>  /  DBili  x   /  AST  19  /  ALT  36  /  AlkPhos  130<H>  04-05      LIVER FUNCTIONS - ( 05 Apr 2023 05:06 )  Alb: 3.2 g/dL / Pro: 5.6 g/dL / ALK PHOS: 130 U/L / ALT: 36 U/L / AST: 19 U/L / GGT: x               Culture - Body Fluid with Gram Stain (collected 04-03-23 @ 17:17)  Source: .Body Fluid gallbladder  Gram Stain (04-04-23 @ 08:04):    polymorphonuclear leukocytes seen    Gram Negative Rods seen    by cytocentrifuge            INFECTIOUS DISEASES TESTING  MRSA PCR Result.: Negative (03-03-23 @ 11:22)  MRSA PCR Result.: Negative (06-17-22 @ 00:00)      RADIOLOGY & ADDITIONAL TESTS:  I have personally reviewed the last Chest xray  CXR      CT      CARDIOLOGY TESTING  12 Lead ECG:   Ventricular Rate 74 BPM    Atrial Rate 77 BPM    QRS Duration 166 ms    Q-T Interval 436 ms    QTC Calculation(Bazett) 483 ms    R Axis 160 degrees    T Axis -24 degrees    Diagnosis Line Atrial fibrillation  Right axis deviation  Non-specific intra-ventricular conduction block  Abnormal ECG    Confirmed by LOCO DAVIS MD ([id number]) on 3/27/2023 6:58:15 AM (03-26-23 @ 20:36)  12 Lead ECG:   Ventricular Rate 60 BPM    Atrial Rate 58 BPM    QRS Duration 164 ms    Q-T Interval 514 ms    QTC Calculation(Bazett) 514 ms    R Axis -66 degrees    T Axis 90 degrees    Diagnosis Line Ventricular-paced rhythm  Abnormal ECG    Confirmed by Beroinca Benjamin MD ([id number]) on 3/26/2023 8:26:20 PM (03-26-23 @ 17:48)      MEDICATIONS  amLODIPine   Tablet 5  apixaban 5  atorvastatin 20  influenza  Vaccine (HIGH DOSE) 0.7  losartan 100  metoprolol tartrate 50  pantoprazole    Tablet 40  piperacillin/tazobactam IVPB.. 3.375      ANTIBIOTICS:  piperacillin/tazobactam IVPB.. 3.375 Gram(s) IV Intermittent every 8 hours      All available historical data has been reviewed

## 2023-04-05 NOTE — DISCHARGE NOTE PROVIDER - NSDCCPCAREPLAN_GEN_ALL_CORE_FT
PRINCIPAL DISCHARGE DIAGNOSIS  Diagnosis: Cholecystitis  Assessment and Plan of Treatment: You had an acute infection of your gallbladder and had a cholecystomy tube place on 4/3. You will need to follow up with Interventional Radiology in 3 months. You should also follow up with surgery and your cardiologist in 2 weeks for planning to remove your gallbladder. You will be discharged with two antibiotics, and you should take both as prescribed until the entire bottle is completed. You should also record the amount of drainage from your cholecystostomy tube twice daily and bring the log to your appointments. Please return to the ER if you develop worsening abdominal pain, nausea/vomiting, fever/chills as this could be a sign of worsening infection.      SECONDARY DISCHARGE DIAGNOSES  Diagnosis: Thrombus of left atrial appendage  Assessment and Plan of Treatment: -continue taking your aspirin 81mg daily and lasix   -stop taking Eliquis and amiodarone   -a prescription for Xarelto will be sent to your pharmacy, you will take 15 mg twice daily for 21 days, THEN 20 mg daily after   -follow up with your cardiologist within 2 weeks after discharge to evaluate your medications and restart your amiodarone if needed    Diagnosis: Chronic atrial fibrillation  Assessment and Plan of Treatment: -follow up with your cardiologist within 2 weeks after discharge to evaluate your medications and restart your amiodarone if needed    Diagnosis: Elevated brain natriuretic peptide (BNP) level  Assessment and Plan of Treatment:     Diagnosis: Abnormal EKG  Assessment and Plan of Treatment:

## 2023-04-05 NOTE — DISCHARGE NOTE NURSING/CASE MANAGEMENT/SOCIAL WORK - NSDCPEFALRISK_GEN_ALL_CORE
For information on Fall & Injury Prevention, visit: [email or web address] OR  [email or web address] OR  https://www.cdc.gov/steadi/patient.html

## 2023-04-05 NOTE — PROGRESS NOTE ADULT - PROVIDER SPECIALTY LIST ADULT
Intervent Radiology
Intervent Radiology
Hospitalist
Infectious Disease
Gastroenterology
Hospitalist
Surgery
Hospitalist
Infectious Disease

## 2023-04-05 NOTE — PROGRESS NOTE ADULT - TIME BILLING
sepsis
Total time spent to complete patient's bedside assessment, physical examination, review medical chart including labs & imaging, discuss medical plan of care with housestaff was more than 50 minutes.
Bacteremia

## 2023-04-05 NOTE — DISCHARGE NOTE PROVIDER - NPI NUMBER (FOR SYSADMIN USE ONLY) :
[[id number]] [[id number]]

## 2023-04-05 NOTE — DISCHARGE NOTE PROVIDER - CARE PROVIDER_API CALL
Carlos Handy)  Cardiology; Interventional Cardiology  [address] [facility], [address]  [place] [address]  Phone: [phone]  Fax: [phone]  Follow Up Time: 2 weeks    Marixa Watkins)  Surgery; Surgical Critical Care  [address] [place] [address]  Phone: [phone]  Fax: [phone]  Follow Up Time: Routine    Trevon Champagne J  MEDICINE  [address] [facility] [address]  [place] [address]  Phone: [phone]  Fax: [phone]  Follow Up Time: 1 week    Dom Tong)  Gastroenterology; Internal Medicine  [address] [place] [address]  Phone: [phone]  Fax: [phone]  Follow Up Time: 2 weeks   Carlos Handy)  Cardiology; Interventional Cardiology  [address] [facility], [address]  [place] [address]  Phone: [phone]  Fax: [phone]  Follow Up Time: 2 weeks    Marixa Watkins)  Surgery; Surgical Critical Care  [address] [place] [address]  Phone: [phone]  Fax: [phone]  Follow Up Time: Routine    Trevon Champagne J  MEDICINE  [address] [facility] [address]  [place] [address]  Phone: [phone]  Fax: [phone]  Follow Up Time: 1 week    Dom Tong)  Gastroenterology; Internal Medicine  [address] [place] [address]  Phone: [phone]  Fax: [phone]  Follow Up Time: 2 weeks    Lani Bateman)  Interventional Radiology and Diagnostic Radiology  [address] [place] [address]  Phone: [phone]  Fax: [phone]  Follow Up Time: 2 months

## 2023-04-05 NOTE — DISCHARGE NOTE PROVIDER - HOSPITAL COURSE
78 yo male with a PMHx of HTN, HLD, CAD s/p CABG, CHF (s/p Pacemaker; ABBOTS  P/G [id number]), A-fib and LAAT on Eliquis, hard of hearing, osteoarthritis, and breast CA s/p R mastectomy in 2001 presents for chest pain/abdominal pain and right-sided pain for the past 4 hours. Found to have acute cholecystitis, E. coli bacteremia.     #Severe sepsis (not on admission) 2/2 Acute Cholecystitis (fever, wbc, source, lactic acidosis)   s/p IR cholecystostomy tube 4.3.23  on Zosyn     #Elevated LFTs in setting of Cholecystitis   #E. Coli Bacteremi  * likely has gallstone cholecystitis, given elevated LFTs/Bilirubin monitor for cholangitis (has RUQ and reported fever at home, no juandice, AMS, or septic at the moment), on imaging finding consistent with cholecystitis without biliary dilatation, lipase normal and no inflammation of pancreas on imaging so no gallstone pancreatitis  - HIDA scan positive  - s/p percutaneous cholecystostomy on 4/3  - follow up with IR outpatient for management of cholecystostomy tube   - received Zosyn while inpatient, will discharge with cipro and flagyl as per ID recs   - WBC trending down  - not hypotensive   - advance GI consult appreciated: No plan for endoscopic intervention right now, follow up outpatient   - LFTs flattening   - surgery: no intervention, follow up outpatient   - follow up cultures:     - BCx 3/26: +E coli bacteremia, sensitive to Zosyn     - UCx 3/26: normal james      - BCx 3/27: negative      - BCx 3/28: negative      - fluid culture 4/3: GNR, PMNs (follow up final results outpatient)     #CAD s/p CABG on aspirin   #Chronic Afib   #LAAT on Eliquis   #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 Handy (private cardiologist), Dr. Michelle will see patient today (3/29)  - ok to continue holding amiodarone for now, follow up with cardiology outpatient   - Cardiology consulted (input for antiplatelet and Eliquis holding given need for procedure and risk of stroke in setting of Afib/LAAT)  - will be started on Xarelto (15mg BID for 21 days THEN 20mg daily after)   - C/w home meds amlodipine, atorvastatin, losartan, metoprolol     #Hyponatremia  -resolved     #Breast CA s/p R mastectomy  - OP Follow up   76 yo male with a PMHx of HTN, HLD, CAD s/p CABG, CHF (s/p Pacemaker; ABBOTS  P/G [id number]), A-fib and LAAT on Eliquis, hard of hearing, osteoarthritis, and breast CA s/p R mastectomy in 2001 presents for chest pain/abdominal pain and right-sided pain for the past 4 hours. Found to have acute cholecystitis, E. coli bacteremia.     #Severe sepsis (not on admission) 2/2 Acute Cholecystitis (fever, wbc, source, lactic acidosis)   s/p IR cholecystostomy tube 4.3.23  on Zosyn     #Elevated LFTs in setting of Cholecystitis   #E. Coli Bacteremi  * likely has gallstone cholecystitis, given elevated LFTs/Bilirubin monitor for cholangitis (has RUQ and reported fever at home, no juandice, AMS, or septic at the moment), on imaging finding consistent with cholecystitis without biliary dilatation, lipase normal and no inflammation of pancreas on imaging so no gallstone pancreatitis  - HIDA scan positive  - s/p percutaneous cholecystostomy on 4/3  - follow up with IR outpatient for management of cholecystostomy tube   - received Zosyn while inpatient, will discharge with cipro and flagyl as per ID recs   - WBC trending down  - not hypotensive   - advance GI consult appreciated: No plan for endoscopic intervention right now, follow up outpatient   - LFTs flattening   - surgery: no intervention, follow up outpatient   - follow up cultures:     - BCx 3/26: +E coli bacteremia, sensitive to Zosyn     - UCx 3/26: normal james      - BCx 3/27: negative      - BCx 3/28: negative      - fluid culture 4/3: GNR, PMNs (follow up final results outpatient)     #CAD s/p CABG on aspirin   #Chronic Afib   #LAAT on Eliquis   #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 Handy (private cardiologist), Dr. Michelle saw patient   - ok to continue holding amiodarone for now, follow up with cardiology outpatient   - Cardiology consulted (input for antiplatelet and Eliquis holding given need for procedure and risk of stroke in setting of Afib/LAAT)  - will be started on Xarelto (15mg BID for 21 days THEN 20mg daily after)   - C/w home meds amlodipine, atorvastatin, losartan, metoprolol      #Hyponatremia  -resolved     #Breast CA s/p R mastectomy  - OP Follow up

## 2023-04-05 NOTE — DISCHARGE NOTE NURSING/CASE MANAGEMENT/SOCIAL WORK - NSDCVIVACCINE_GEN_ALL_CORE_FT
Tdap; 22-Nov-2018 08:52; Benito Lovell); [employer]; [id number] (Exp. Date: 23-Oct-2020); IntraMuscular; Deltoid Right.; 0.5 milliLiter(s); VIS (VIS Published: 09-May-2013, VIS Presented: 22-Nov-2018);

## 2023-04-05 NOTE — DISCHARGE NOTE PROVIDER - PROVIDER TOKENS
PROVIDER:[TOKEN:[[id number]],FOLLOWUP:[2 weeks]],PROVIDER:[TOKEN:[[id number]],FOLLOWUP:[Routine]],PROVIDER:[TOKEN:[[id number]],FOLLOWUP:[1 week]],PROVIDER:[TOKEN:[[id number]],FOLLOWUP:[2 weeks]] PROVIDER:[TOKEN:[[id number]],FOLLOWUP:[2 weeks]],PROVIDER:[TOKEN:[[id number]],FOLLOWUP:[Routine]],PROVIDER:[TOKEN:[[id number]],FOLLOWUP:[1 week]],PROVIDER:[TOKEN:[[id number]],FOLLOWUP:[2 weeks]],PROVIDER:[TOKEN:[[id number]],FOLLOWUP:[2 months]]

## 2023-04-05 NOTE — DISCHARGE NOTE PROVIDER - ATTENDING DISCHARGE PHYSICAL EXAMINATION:
Patient seen and examined at bedside. No acute events overnight. Denies pain. Having BM's. Tolerating diet. No fevers. Wants to go home. Stable for d/c.    PHYSICAL EXAM:  GENERAL: NAD, lying in bed comfortably  NERVOUS SYSTEM:  Alert & Oriented X3, speech clear. No deficits   HEAD:  Atraumatic, Normocephalic  EYES: EOMI, conjunctiva and sclera clear  ENT: Moist mucous membranes  NECK: Supple, No JVD  CHEST/LUNG: Clear to auscultation bilaterally; Unlabored respirations  HEART: Regular rate and rhythm  ABDOMEN: Bowel sounds present; Soft, Nontender, Nondistended; cholecystostomy tube draining bilious fluid  EXTREMITIES: No clubbing, cyanosis, or edema  MSK: FROM all 4 extremities, full and equal strength  SKIN: No rashes or lesions    Vital Signs Last 24 Hrs  T(C): 36.2 (05 Apr 2023 12:58), Max: 37.1 (05 Apr 2023 07:56)  T(F): 97.2 (05 Apr 2023 12:58), Max: 98.8 (05 Apr 2023 07:56)  HR: 60 (05 Apr 2023 12:58) (60 - 66)  BP: 141/63 (05 Apr 2023 12:58) (122/60 - 145/66)  BP(mean): --  RR: 18 (05 Apr 2023 12:58) (18 - 18)  SpO2: 98% (05 Apr 2023 12:58) (97% - 98%)

## 2023-04-06 ENCOUNTER — APPOINTMENT (OUTPATIENT)
Dept: CARDIOLOGY | Facility: CLINIC | Age: 78
End: 2023-04-06
Payer: MEDICARE

## 2023-04-06 ENCOUNTER — NON-APPOINTMENT (OUTPATIENT)
Age: 78
End: 2023-04-06

## 2023-04-06 LAB
-  AMIKACIN: SIGNIFICANT CHANGE UP
-  AMOXICILLIN/CLAVULANIC ACID: SIGNIFICANT CHANGE UP
-  AMPICILLIN/SULBACTAM: SIGNIFICANT CHANGE UP
-  AMPICILLIN: SIGNIFICANT CHANGE UP
-  AZTREONAM: SIGNIFICANT CHANGE UP
-  CEFAZOLIN: SIGNIFICANT CHANGE UP
-  CEFEPIME: SIGNIFICANT CHANGE UP
-  CEFOXITIN: SIGNIFICANT CHANGE UP
-  CEFTRIAXONE: SIGNIFICANT CHANGE UP
-  CIPROFLOXACIN: SIGNIFICANT CHANGE UP
-  ERTAPENEM: SIGNIFICANT CHANGE UP
-  GENTAMICIN: SIGNIFICANT CHANGE UP
-  IMIPENEM: SIGNIFICANT CHANGE UP
-  LEVOFLOXACIN: SIGNIFICANT CHANGE UP
-  MEROPENEM: SIGNIFICANT CHANGE UP
-  PIPERACILLIN/TAZOBACTAM: SIGNIFICANT CHANGE UP
-  TOBRAMYCIN: SIGNIFICANT CHANGE UP
-  TRIMETHOPRIM/SULFAMETHOXAZOLE: SIGNIFICANT CHANGE UP
METHOD TYPE: SIGNIFICANT CHANGE UP

## 2023-04-06 PROCEDURE — 93294 REM INTERROG EVL PM/LDLS PM: CPT

## 2023-04-06 PROCEDURE — 93296 REM INTERROG EVL PM/IDS: CPT

## 2023-04-09 LAB
CULTURE RESULTS: SIGNIFICANT CHANGE UP
CULTURE RESULTS: SIGNIFICANT CHANGE UP
ORGANISM # SPEC MICROSCOPIC CNT: SIGNIFICANT CHANGE UP
ORGANISM # SPEC MICROSCOPIC CNT: SIGNIFICANT CHANGE UP
SPECIMEN SOURCE: SIGNIFICANT CHANGE UP
SPECIMEN SOURCE: SIGNIFICANT CHANGE UP

## 2023-04-10 DIAGNOSIS — I25.10 ATHEROSCLEROTIC HEART DISEASE OF NATIVE CORONARY ARTERY WITHOUT ANGINA PECTORIS: ICD-10-CM

## 2023-04-10 DIAGNOSIS — Z90.11 ACQUIRED ABSENCE OF RIGHT BREAST AND NIPPLE: ICD-10-CM

## 2023-04-10 DIAGNOSIS — A41.51 SEPSIS DUE TO ESCHERICHIA COLI [E. COLI]: ICD-10-CM

## 2023-04-10 DIAGNOSIS — Z79.82 LONG TERM (CURRENT) USE OF ASPIRIN: ICD-10-CM

## 2023-04-10 DIAGNOSIS — I11.0 HYPERTENSIVE HEART DISEASE WITH HEART FAILURE: ICD-10-CM

## 2023-04-10 DIAGNOSIS — M19.90 UNSPECIFIED OSTEOARTHRITIS, UNSPECIFIED SITE: ICD-10-CM

## 2023-04-10 DIAGNOSIS — R65.20 SEVERE SEPSIS WITHOUT SEPTIC SHOCK: ICD-10-CM

## 2023-04-10 DIAGNOSIS — I50.22 CHRONIC SYSTOLIC (CONGESTIVE) HEART FAILURE: ICD-10-CM

## 2023-04-10 DIAGNOSIS — E66.9 OBESITY, UNSPECIFIED: ICD-10-CM

## 2023-04-10 DIAGNOSIS — I48.20 CHRONIC ATRIAL FIBRILLATION, UNSPECIFIED: ICD-10-CM

## 2023-04-10 DIAGNOSIS — K80.00 CALCULUS OF GALLBLADDER WITH ACUTE CHOLECYSTITIS WITHOUT OBSTRUCTION: ICD-10-CM

## 2023-04-10 DIAGNOSIS — Z79.899 OTHER LONG TERM (CURRENT) DRUG THERAPY: ICD-10-CM

## 2023-04-10 DIAGNOSIS — Z20.822 CONTACT WITH AND (SUSPECTED) EXPOSURE TO COVID-19: ICD-10-CM

## 2023-04-10 DIAGNOSIS — I25.2 OLD MYOCARDIAL INFARCTION: ICD-10-CM

## 2023-04-10 DIAGNOSIS — Z85.3 PERSONAL HISTORY OF MALIGNANT NEOPLASM OF BREAST: ICD-10-CM

## 2023-04-10 DIAGNOSIS — E78.00 PURE HYPERCHOLESTEROLEMIA, UNSPECIFIED: ICD-10-CM

## 2023-04-10 DIAGNOSIS — Z79.01 LONG TERM (CURRENT) USE OF ANTICOAGULANTS: ICD-10-CM

## 2023-04-10 DIAGNOSIS — F17.290 NICOTINE DEPENDENCE, OTHER TOBACCO PRODUCT, UNCOMPLICATED: ICD-10-CM

## 2023-04-10 DIAGNOSIS — Z95.1 PRESENCE OF AORTOCORONARY BYPASS GRAFT: ICD-10-CM

## 2023-04-10 DIAGNOSIS — E87.20 ACIDOSIS, UNSPECIFIED: ICD-10-CM

## 2023-04-10 DIAGNOSIS — R94.31 ABNORMAL ELECTROCARDIOGRAM [ECG] [EKG]: ICD-10-CM

## 2023-04-10 DIAGNOSIS — R79.89 OTHER SPECIFIED ABNORMAL FINDINGS OF BLOOD CHEMISTRY: ICD-10-CM

## 2023-04-10 DIAGNOSIS — I51.3 INTRACARDIAC THROMBOSIS, NOT ELSEWHERE CLASSIFIED: ICD-10-CM

## 2023-04-10 DIAGNOSIS — Z95.0 PRESENCE OF CARDIAC PACEMAKER: ICD-10-CM

## 2023-04-10 DIAGNOSIS — E87.1 HYPO-OSMOLALITY AND HYPONATREMIA: ICD-10-CM

## 2023-04-13 NOTE — PRE-ANESTHESIA EVALUATION ADULT - NSANTHAGERD_ENT_A_CORE
[FreeTextEntry6] : PULLING AT THE LEFT EAR X 1 DAY\par LOW GRADE TEMP HERE 100.2\par NO COUGH OR URI SYMPTOMS\par \par DIARRHEA LAST WEEK FOR 6 OR 7 DAYS  ALL WATER\par VOMITED ONCE\par NO HOUSEHOLD SICK CONTACTS\par NO TRAVEL Yes

## 2023-04-24 ENCOUNTER — APPOINTMENT (OUTPATIENT)
Dept: ELECTROPHYSIOLOGY | Facility: CLINIC | Age: 78
End: 2023-04-24
Payer: MEDICARE

## 2023-04-24 VITALS
DIASTOLIC BLOOD PRESSURE: 64 MMHG | SYSTOLIC BLOOD PRESSURE: 120 MMHG | HEIGHT: 74 IN | HEART RATE: 60 BPM | WEIGHT: 227 LBS | BODY MASS INDEX: 29.13 KG/M2

## 2023-04-24 DIAGNOSIS — Z95.0 PRESENCE OF CARDIAC PACEMAKER: ICD-10-CM

## 2023-04-24 DIAGNOSIS — Z00.00 ENCOUNTER FOR GENERAL ADULT MEDICAL EXAMINATION W/OUT ABNORMAL FINDINGS: ICD-10-CM

## 2023-04-24 PROCEDURE — 93000 ELECTROCARDIOGRAM COMPLETE: CPT | Mod: 59

## 2023-04-24 PROCEDURE — 99215 OFFICE O/P EST HI 40 MIN: CPT

## 2023-04-24 PROCEDURE — 93280 PM DEVICE PROGR EVAL DUAL: CPT

## 2023-04-24 RX ORDER — CLOPIDOGREL BISULFATE 75 MG/1
75 TABLET, FILM COATED ORAL DAILY
Qty: 90 | Refills: 3 | Status: DISCONTINUED | COMMUNITY
End: 2023-04-24

## 2023-04-24 RX ORDER — RIVAROXABAN 15 MG/1
15 TABLET, FILM COATED ORAL TWICE DAILY
Refills: 0 | Status: DISCONTINUED | COMMUNITY
End: 2023-04-24

## 2023-04-24 RX ORDER — AMIODARONE HYDROCHLORIDE 200 MG/1
200 TABLET ORAL DAILY
Refills: 0 | Status: DISCONTINUED | COMMUNITY
End: 2023-04-24

## 2023-04-24 RX ORDER — AMOXICILLIN AND CLAVULANATE POTASSIUM 875; 125 MG/1; MG/1
875-125 TABLET, COATED ORAL
Qty: 14 | Refills: 0 | Status: DISCONTINUED | COMMUNITY
Start: 2023-03-08 | End: 2023-04-24

## 2023-04-24 NOTE — DISCUSSION/SUMMARY
[Pacemaker Function Normal] : normal pacemaker function [FreeTextEntry1] : We had an extensive conversation regarding the nature of atrial fibrillation, including potential etiologies, underlying pathophysiology and natural history of the disease. In addition, the potential risk of thromboembolic events and assessment of that risk were discussed. I have emphasized the importance of continuing anticoagulation. \par

## 2023-04-24 NOTE — PHYSICAL EXAM
[General Appearance - Well Developed] : well developed [Normal Appearance] : normal appearance [Well Groomed] : well groomed [General Appearance - Well Nourished] : well nourished [No Deformities] : no deformities [General Appearance - In No Acute Distress] : no acute distress [Heart Rate And Rhythm] : heart rate and rhythm were normal [Murmurs] : no murmurs present [Heart Sounds] : normal S1 and S2 [] : no respiratory distress [Respiration, Rhythm And Depth] : normal respiratory rhythm and effort [Exaggerated Use Of Accessory Muscles For Inspiration] : no accessory muscle use [Auscultation Breath Sounds / Voice Sounds] : lungs were clear to auscultation bilaterally [Left Infraclavicular] : left infraclavicular area [Well-Healed] : well-healed [Erythema] : not erythematous [Warm] : not warm [Tender] : not tender [FreeTextEntry1] : cholecystostomy drain in place [Nail Clubbing] : no clubbing of the fingernails

## 2023-04-24 NOTE — PROCEDURE
[Atrial Fibrillation] : atrial fibrillation [CRT-P] : Cardiac resynchronization therapy pacemaker [DDDR] : DDDR [Voltage: ___ volts] : Voltage was [unfilled] volts [Magnet Rate: ___ Ppm] : magnet rate was [unfilled] Ppm [Sensing Amplitude ___mv] : sensing amplitude was [unfilled] mv [Lead Imp:  ___ohms] : lead impedance was [unfilled] ohms [___V @] : [unfilled] V [___ ms] : [unfilled] ms [Programmed for Longevity] : output reprogrammed for improved battery longevity [Apace-Vpace ___ %] : Apace-Vpace [unfilled]% [See Device Printout] : See device printout [de-identified] : [facility] [employer] [de-identified] : 1838 [de-identified] : [id number] [de-identified] : 06/24/2022 [de-identified] : 59 [de-identified] : 7.5-7.8 years [de-identified] : AT/AF [place] >99%.\par [employer] stable.\par Patient is transmitting to [facility].

## 2023-04-24 NOTE — HISTORY OF PRESENT ILLNESS
[de-identified] : \par Cardiologist: Dr. Handy\par Ophtho: Dr. GOMEZ ([facility])\par Pulm: referral provided\par \par 78 yo M with history of SSS s/p DC PPM, CAD s/p CABG, LBBB. BiV PPM was attempted but despite multiple catheters and sheaths, CS was unable to be cannulated at that time. Pt admitted 7/9-7/14 for abdominal pain --> recommended MRCP with possible ERCP but patient refused due to claustrophobia and refused ERCP bc he felt better and did not want any other procedures. \par \par Pt was in the ER 10/23 (no admission) for lower extremity edema due to excess salt intake (ate pickles and ham).  He can walk on flat ground and up the stairs without dyspnea.\par \par Patient had a BiV PPM upgrade with Dr. Danielle (6/24/22). Since Oct, AF has been persistent.\par \par 4/24/23 Pt went for OTIS prior to Watchman 3/14/23 and was found to have left atrial appendage clot. At that time he was not on any DOACs, so he was started on Eliquis 5mg PO BID. On 3/26/23, he went in for abdominal pain and was noted to have acute cholecystitis and E. Coli bacteremia. He had a percutaneous cholecystostomy 4/3 and cardiologist changed his OAC to Xarelto 15 mg. \par \par He is here for hospital follow up. He denies chest pain, dyspnea, palpitations, dizziness, lightheadedness, presyncope or syncope.

## 2023-04-24 NOTE — CARDIOLOGY SUMMARY
[de-identified] : 4/24/2023 AFIB, BiV paced (HR 60 bpm),  msec\par 7/27/2022 A-paced, BiV paced (HR 60 bpm),  msec [de-identified] : 12/2022 E 40-45% Mod dilated LA 5.4 cm. Mild MR. Mild TR. \par 9/1/21 EF 46%. Severe LAE. \par 4/1/21 EF 49%. Mild cLVH. Mild-mod MR. Mild TR.\par 1/14/20: EF 45-54% Mod Grade 2 DD. Mod eccentric MR. Mild TR.\par 8/20/18, Mod thickened mitral valve with mod restricted opening. Mild MAC. Mod MR. Mod LAE. Grade 1 DD. LVEF 55-75%.  [de-identified] : CRT-P (Dee) 7/27/2022

## 2023-04-24 NOTE — ASSESSMENT
[FreeTextEntry1] : 78 yo M with history of AFib, SSS s/p DC PPM with dyspnea and cardiomyopathy s/p BiV PPM upgrade who was noted to have left atrial appendage clot prior to Watchman procedure. \par \par # SSS s/p DC PPM with cardiomyopathy s/p BiV PPM upgrade\par - I interrogated and reprogrammed his device as described in procedure. \par - Remote monitor is set up and patient is transmitting.\par \par # Paroxysmal AFib, Persistent since Oct 2022 with left atrial appendage thrombus noted on pre-Watchman OTIS\par - >99% AT/AF burden (rate controlled)\par - D/c Xarelto and resume Eliquis 5mg PO BID. \par - Repeat chest CT to assess for left atrial appendage clot resolution. \par \par # Cholecystitis\par - Follow up with GI/surgeon about drain removal and cholecystectomy. If pt proceeds with Watchman, he will need 6 weeks of aspirin and Eliquis, followed by 6 mo of aspirin and plavix without interruption. Patient has had numerous GB attacks and prefers to have his gallbladder issues resolved first. \par \par # Mild cardiomyopathy (EF 40-45%)\par - Cont Metoprolol and Losartan\par - Consider Entresto. \par \par # HTN\par - BP well controlled\par - Cont Amlodipine 5mg, Losartan 100mg, and Metoprolol Tartrate 50 mg\par - I have advised him to adhere to a 2g Na diet and to be careful with consuming salty meals. \par \par I have also advised the patient to go to the nearest emergency room if he experiences any chest pain, dyspnea, syncope, or has any other compelling symptoms.\par  \par Follow up in 6 weeks to review CT and to follow up on surgical decisions re his gallbladder.

## 2023-04-26 ENCOUNTER — APPOINTMENT (OUTPATIENT)
Dept: SURGERY | Facility: CLINIC | Age: 78
End: 2023-04-26
Payer: MEDICARE

## 2023-04-26 PROCEDURE — 99212 OFFICE O/P EST SF 10 MIN: CPT

## 2023-04-26 RX ORDER — RIVAROXABAN 15 MG-20MG
1 KIT ORAL
Qty: 30 | Refills: 0
Start: 2023-04-26 | End: 2023-05-25

## 2023-05-03 ENCOUNTER — NON-APPOINTMENT (OUTPATIENT)
Age: 78
End: 2023-05-03

## 2023-05-04 ENCOUNTER — RESULT REVIEW (OUTPATIENT)
Age: 78
End: 2023-05-04

## 2023-05-04 ENCOUNTER — OUTPATIENT (OUTPATIENT)
Dept: OUTPATIENT SERVICES | Facility: HOSPITAL | Age: 78
LOS: 1 days | End: 2023-05-04
Payer: MEDICARE

## 2023-05-04 ENCOUNTER — APPOINTMENT (OUTPATIENT)
Dept: GASTROENTEROLOGY | Facility: CLINIC | Age: 78
End: 2023-05-04
Payer: MEDICARE

## 2023-05-04 VITALS
OXYGEN SATURATION: 98 % | DIASTOLIC BLOOD PRESSURE: 72 MMHG | WEIGHT: 226 LBS | HEIGHT: 74 IN | SYSTOLIC BLOOD PRESSURE: 135 MMHG | BODY MASS INDEX: 29 KG/M2 | HEART RATE: 90 BPM

## 2023-05-04 DIAGNOSIS — Z95.0 PRESENCE OF CARDIAC PACEMAKER: Chronic | ICD-10-CM

## 2023-05-04 DIAGNOSIS — I51.3 INTRACARDIAC THROMBOSIS, NOT ELSEWHERE CLASSIFIED: ICD-10-CM

## 2023-05-04 DIAGNOSIS — Z95.1 PRESENCE OF AORTOCORONARY BYPASS GRAFT: Chronic | ICD-10-CM

## 2023-05-04 DIAGNOSIS — Z90.10 ACQUIRED ABSENCE OF UNSPECIFIED BREAST AND NIPPLE: Chronic | ICD-10-CM

## 2023-05-04 PROCEDURE — 75574 CT ANGIO HRT W/3D IMAGE: CPT

## 2023-05-04 PROCEDURE — 99214 OFFICE O/P EST MOD 30 MIN: CPT

## 2023-05-04 PROCEDURE — 75574 CT ANGIO HRT W/3D IMAGE: CPT | Mod: 26,MH

## 2023-05-04 RX ORDER — APIXABAN 5 MG/1
5 TABLET, FILM COATED ORAL
Qty: 60 | Refills: 6 | Status: DISCONTINUED | COMMUNITY
Start: 2023-04-24 | End: 2023-05-04

## 2023-05-04 RX ORDER — ASPIRIN 81 MG
81 TABLET, DELAYED RELEASE (ENTERIC COATED) ORAL DAILY
Refills: 0 | Status: DISCONTINUED | COMMUNITY
End: 2023-05-04

## 2023-05-04 RX ORDER — ATORVASTATIN CALCIUM 20 MG/1
20 TABLET, FILM COATED ORAL
Refills: 0 | Status: DISCONTINUED | COMMUNITY
End: 2023-05-04

## 2023-05-04 NOTE — HISTORY OF PRESENT ILLNESS
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 coli bacteremia and acute cholecystitis s/p IR guided c-tube placement, here for a f/u. \par \par Patient was recently seen during hospitalization in late March 2023 after he presented with Periumbilical and lower quadrant abdominal pain that was sharp and had no alleviating and aggravating factors.  He had 3 similar prior episodes and was advised to have cholecystectomy but never followed up upon discharge.\par On ultrasound, he was found to have gallbladder wall thickening with gallstones and a CBD of 7 mm.\par He had a HIDA scan which was positive for cholecystitis.\par He was treated conservatively, seen by surgery and deemed not a surgical candidate, and had IR guided cholecystotomy tube placed with improvement since.\par \par During his stay, the patient had transaminitis with increased bilirubin up to 3.9 at some point.  His bilirubin trended down to 1.5 on discharge.\par \par Since discharge, he has been doing well and denies having any recurrent abdominal pain, fevers or chills.\par He completed his antibiotic course and has followed with surgery but has no plans for surgery.\par He is yet to follow-up with IR.\par His cholecystostomy tube is draining without any issues.  He has no pain at the site of the drain.\par \par Review of system otherwise negative.\par \par \par 3/26/23 CT abd/pelvis w/ IV contrast: Cholelithiasis with gallbladder wall thickening compatible with acute \par cholecystitis in the appropriate clinical setting.\par Normal appearance of the pancreas.\par \par 3/26/23 RUQ US: Cholelithiasis and gallbladder wall thickening may reflect cholecystitis \par in the appropriate clinical setting.\par Increased hepatic echogenicity may be secondary to fatty infiltration or \par hepatocellular disease.\par

## 2023-05-04 NOTE — REASON FOR VISIT
[Consultation] : a consultation visit [Spouse] : spouse [FreeTextEntry1] : NP - Gallbladder problems

## 2023-05-04 NOTE — PHYSICAL EXAM
[Hearing Threshold Finger Rub Not [place]] : hearing was normal [Normal Appearance] : the appearance of the neck was normal [No Respiratory Distress] : no respiratory distress [No Acc Muscle Use] : no accessory muscle use [Respiration, Rhythm And Depth] : normal respiratory rhythm and effort [Normal S1, S2] : normal S1 and S2 [Murmurs] : no murmurs [None] : no edema [Normal] : normal bowel sounds, non-tender, no masses, soft, no no hepato-splenomegaly [Normal Color / Pigmentation] : normal skin color and pigmentation [Oriented To Time, Place, And Person] : oriented to person, place, and time [de-identified] : Irregular rhythm, regular rate [de-identified] : Cholecystostomy tube noted with bilious drainage [de-identified] : Uses cane for ambulation

## 2023-05-04 NOTE — ASSESSMENT
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 coli bacteremia and acute cholecystitis s/p IR guided c-tube placement, here for a f/u. \par \par #transaminitis\par #symptomatic gallstones - not a surgical candidate for CCY\par #cholecystotomy tube\par \par Patient evaluated by surgery and deemed a poor surgical candidate\par Transaminitis is concerning for possible CBD debris including sludge/stones\par Patient may benefit from EUS plus ERCP with empiric sphincterotomy as he will not be having cholecystectomy\par However, we will first need clearance from cardiology in light of his history of left atrium thrombus\par Once cleared by cardiology, will schedule for EUS and ERCP\par \par -Check labs including CBC, CMP, INR\par -Await cardiology clearance\par -Once cleared by cardiology, will schedule EUS and ERCP\par -Will need to be off Xarelto for 2 to 3 days prior to procedure\par -Patient and wife advised to call us once cardiology clearance is granted\par -f/u w/ IR for management of cholecystotomy tube

## 2023-05-05 DIAGNOSIS — I51.3 INTRACARDIAC THROMBOSIS, NOT ELSEWHERE CLASSIFIED: ICD-10-CM

## 2023-05-10 ENCOUNTER — NON-APPOINTMENT (OUTPATIENT)
Age: 78
End: 2023-05-10

## 2023-05-15 ENCOUNTER — OUTPATIENT (OUTPATIENT)
Dept: OUTPATIENT SERVICES | Facility: HOSPITAL | Age: 78
LOS: 1 days | Discharge: HOME | End: 2023-05-15
Payer: MEDICARE

## 2023-05-15 ENCOUNTER — RESULT REVIEW (OUTPATIENT)
Age: 78
End: 2023-05-15

## 2023-05-15 ENCOUNTER — TRANSCRIPTION ENCOUNTER (OUTPATIENT)
Age: 78
End: 2023-05-15

## 2023-05-15 VITALS — RESPIRATION RATE: 18 BRPM | HEART RATE: 60 BPM | OXYGEN SATURATION: 98 %

## 2023-05-15 VITALS
DIASTOLIC BLOOD PRESSURE: 69 MMHG | SYSTOLIC BLOOD PRESSURE: 128 MMHG | WEIGHT: 223.11 LBS | TEMPERATURE: 98 F | HEART RATE: 60 BPM | HEIGHT: 74 IN | RESPIRATION RATE: 18 BRPM | OXYGEN SATURATION: 98 %

## 2023-05-15 DIAGNOSIS — Z95.1 PRESENCE OF AORTOCORONARY BYPASS GRAFT: Chronic | ICD-10-CM

## 2023-05-15 DIAGNOSIS — K81.9 CHOLECYSTITIS, UNSPECIFIED: ICD-10-CM

## 2023-05-15 DIAGNOSIS — Z90.10 ACQUIRED ABSENCE OF UNSPECIFIED BREAST AND NIPPLE: Chronic | ICD-10-CM

## 2023-05-15 DIAGNOSIS — Z95.0 PRESENCE OF CARDIAC PACEMAKER: Chronic | ICD-10-CM

## 2023-05-15 DIAGNOSIS — K80.20 CALCULUS OF GALLBLADDER WITHOUT CHOLECYSTITIS WITHOUT OBSTRUCTION: ICD-10-CM

## 2023-05-15 DIAGNOSIS — Z46.59 ENCOUNTER FOR FITTING AND ADJUSTMENT OF OTHER GASTROINTESTINAL APPLIANCE AND DEVICE: ICD-10-CM

## 2023-05-15 PROCEDURE — 47536 EXCHANGE BILIARY DRG CATH: CPT

## 2023-05-15 PROCEDURE — C1769: CPT

## 2023-05-15 PROCEDURE — C1729: CPT

## 2023-05-15 RX ORDER — CEFTRIAXONE 500 MG/1
1000 INJECTION, POWDER, FOR SOLUTION INTRAMUSCULAR; INTRAVENOUS ONCE
Refills: 0 | Status: COMPLETED | OUTPATIENT
Start: 2023-05-15 | End: 2023-05-15

## 2023-05-15 RX ADMIN — CEFTRIAXONE 100 MILLIGRAM(S): 500 INJECTION, POWDER, FOR SOLUTION INTRAMUSCULAR; INTRAVENOUS at 14:45

## 2023-05-15 NOTE — ASU PATIENT PROFILE, ADULT - NSICDXPASTMEDICALHX_GEN_ALL_CORE_FT
PAST MEDICAL HISTORY:  Arrhythmia     Atrial fibrillation     Breast cancer 2001    CAD (coronary artery disease)     CHF (congestive heart failure)     Heart attack     [place] (hard of hearing)     HTN (hypertension)     Hypercholesteremia     Obesity     Osteoarthritis

## 2023-05-15 NOTE — PROGRESS NOTE ADULT - SUBJECTIVE AND OBJECTIVE BOX
Interventional Radiology Brief- Operative Note    Procedure: exchange of percutaneous cholecystostomy tube    Pre-Op Diagnosis: cholelithiasis     Post-Op Diagnosis: same    Attending: Davin    Resident: Diann    Anesthesia (type):  [ ] General Anesthesia  [x ] Sedation: 25 mcg fentanyl, 0.5 mg versed  [ ] Spinal Anesthesia  [ x] Local/Regional    Contrast: 10 cc    Estimated Blood Loss: <5cc    Condition:   [ ] Critical  [ ] Serious  [ ] Fair   [ x] Good    Findings/Follow up Plan of Care: Image guided exchange of 8 Fr percutaneous cholecystostomy tube. Multiple filling defects within the gallbladder with slow transition through the cystic duct to the CBD. Plan for cholecystectomy once patient has watchman device placed.    Specimens Removed: None    Implants: None    Complications: None immediate    Condition/Disposition: Recovery then home      Please call Interventional Radiology [phone] with any questions, concerns, or issues.

## 2023-05-15 NOTE — ASU PATIENT PROFILE, ADULT - ABILITY TO HEAR (WITH HEARING AID OR HEARING APPLIANCE IF NORMALLY USED):
[place] B/L/Mildly to Moderately Impaired: difficulty hearing in some environments or speaker may need to increase volume or speak distinctly

## 2023-05-15 NOTE — ASU DISCHARGE PLAN (ADULT/PEDIATRIC) - NS MD DC FALL RISK RISK
For information on Fall & Injury Prevention, visit: [email or web address] OR  [email or web address] OR  https://www.cdc.gov/steadi/patient.html

## 2023-05-26 NOTE — DISCHARGE NOTE NURSING/CASE MANAGEMENT/SOCIAL WORK - NSDCPEXARELTO_GEN_ALL_CORE
Rivaroxaban/Xarelto - Compliance/Rivaroxaban/Xarelto - Dietary Advice/Rivaroxaban/Xarelto - Follow up monitoring/Rivaroxaban/Xarelto - Potential for adverse drug reactions and interactions Doxepin Pregnancy And Lactation Text: This medication is Pregnancy Category C and it isn't known if it is safe during pregnancy. It is also excreted in breast milk and breast feeding isn't recommended.

## 2023-06-02 ENCOUNTER — INPATIENT (INPATIENT)
Facility: HOSPITAL | Age: 78
LOS: 8 days | Discharge: HOME | DRG: 378 | End: 2023-06-11
Attending: STUDENT IN AN ORGANIZED HEALTH CARE EDUCATION/TRAINING PROGRAM | Admitting: STUDENT IN AN ORGANIZED HEALTH CARE EDUCATION/TRAINING PROGRAM
Payer: MEDICARE

## 2023-06-02 VITALS
HEIGHT: 74 IN | TEMPERATURE: 99 F | OXYGEN SATURATION: 99 % | HEART RATE: 64 BPM | RESPIRATION RATE: 18 BRPM | DIASTOLIC BLOOD PRESSURE: 63 MMHG | WEIGHT: 222.01 LBS | SYSTOLIC BLOOD PRESSURE: 148 MMHG

## 2023-06-02 DIAGNOSIS — Z95.0 PRESENCE OF CARDIAC PACEMAKER: Chronic | ICD-10-CM

## 2023-06-02 DIAGNOSIS — K92.2 GASTROINTESTINAL HEMORRHAGE, UNSPECIFIED: ICD-10-CM

## 2023-06-02 DIAGNOSIS — Z90.10 ACQUIRED ABSENCE OF UNSPECIFIED BREAST AND NIPPLE: Chronic | ICD-10-CM

## 2023-06-02 DIAGNOSIS — Z95.1 PRESENCE OF AORTOCORONARY BYPASS GRAFT: Chronic | ICD-10-CM

## 2023-06-02 LAB
ALBUMIN SERPL ELPH-MCNC: 3.7 G/DL — SIGNIFICANT CHANGE UP (ref 3.5–5.2)
ALP SERPL-CCNC: 65 U/L — SIGNIFICANT CHANGE UP (ref 30–115)
ALT FLD-CCNC: 9 U/L — SIGNIFICANT CHANGE UP (ref 0–41)
ANION GAP SERPL CALC-SCNC: 10 MMOL/L — SIGNIFICANT CHANGE UP (ref 7–14)
APTT BLD: 34 SEC — SIGNIFICANT CHANGE UP (ref 27–39.2)
AST SERPL-CCNC: 15 U/L — SIGNIFICANT CHANGE UP (ref 0–41)
BASOPHILS # BLD AUTO: 0.07 K/UL — SIGNIFICANT CHANGE UP (ref 0–0.2)
BASOPHILS NFR BLD AUTO: 0.7 % — SIGNIFICANT CHANGE UP (ref 0–1)
BILIRUB SERPL-MCNC: 0.4 MG/DL — SIGNIFICANT CHANGE UP (ref 0.2–1.2)
BLD GP AB SCN SERPL QL: SIGNIFICANT CHANGE UP
BUN SERPL-MCNC: 21 MG/DL — HIGH (ref 10–20)
CALCIUM SERPL-MCNC: 8.6 MG/DL — SIGNIFICANT CHANGE UP (ref 8.4–10.5)
CHLORIDE SERPL-SCNC: 105 MMOL/L — SIGNIFICANT CHANGE UP (ref 98–110)
CO2 SERPL-SCNC: 22 MMOL/L — SIGNIFICANT CHANGE UP (ref 17–32)
CREAT SERPL-MCNC: 0.7 MG/DL — SIGNIFICANT CHANGE UP (ref 0.7–1.5)
EGFR: 94 ML/MIN/1.73M2 — SIGNIFICANT CHANGE UP
EOSINOPHIL # BLD AUTO: 0.07 K/UL — SIGNIFICANT CHANGE UP (ref 0–0.7)
EOSINOPHIL NFR BLD AUTO: 0.7 % — SIGNIFICANT CHANGE UP (ref 0–8)
GLUCOSE SERPL-MCNC: 124 MG/DL — HIGH (ref 70–99)
HCT VFR BLD CALC: 22.6 % — LOW (ref 42–52)
HCT VFR BLD CALC: 25.3 % — LOW (ref 42–52)
HGB BLD-MCNC: 7.4 G/DL — LOW (ref 14–18)
HGB BLD-MCNC: 8.3 G/DL — LOW (ref 14–18)
IMM GRANULOCYTES NFR BLD AUTO: 0.4 % — HIGH (ref 0.1–0.3)
INR BLD: 1.4 RATIO — HIGH (ref 0.65–1.3)
LIDOCAIN IGE QN: 41 U/L — SIGNIFICANT CHANGE UP (ref 7–60)
LYMPHOCYTES # BLD AUTO: 1.97 K/UL — SIGNIFICANT CHANGE UP (ref 1.2–3.4)
LYMPHOCYTES # BLD AUTO: 18.6 % — LOW (ref 20.5–51.1)
MCHC RBC-ENTMCNC: 29.2 PG — SIGNIFICANT CHANGE UP (ref 27–31)
MCHC RBC-ENTMCNC: 29.5 PG — SIGNIFICANT CHANGE UP (ref 27–31)
MCHC RBC-ENTMCNC: 32.7 G/DL — SIGNIFICANT CHANGE UP (ref 32–37)
MCHC RBC-ENTMCNC: 32.8 G/DL — SIGNIFICANT CHANGE UP (ref 32–37)
MCV RBC AUTO: 89.1 FL — SIGNIFICANT CHANGE UP (ref 80–94)
MCV RBC AUTO: 90 FL — SIGNIFICANT CHANGE UP (ref 80–94)
MONOCYTES # BLD AUTO: 0.75 K/UL — HIGH (ref 0.1–0.6)
MONOCYTES NFR BLD AUTO: 7.1 % — SIGNIFICANT CHANGE UP (ref 1.7–9.3)
NEUTROPHILS # BLD AUTO: 7.69 K/UL — HIGH (ref 1.4–6.5)
NEUTROPHILS NFR BLD AUTO: 72.5 % — SIGNIFICANT CHANGE UP (ref 42.2–75.2)
NRBC # BLD: 0 /100 WBCS — SIGNIFICANT CHANGE UP (ref 0–0)
NRBC # BLD: 0 /100 WBCS — SIGNIFICANT CHANGE UP (ref 0–0)
PLATELET # BLD AUTO: 261 K/UL — SIGNIFICANT CHANGE UP (ref 130–400)
PLATELET # BLD AUTO: 291 K/UL — SIGNIFICANT CHANGE UP (ref 130–400)
PMV BLD: 10.3 FL — SIGNIFICANT CHANGE UP (ref 7.4–10.4)
PMV BLD: 10.4 FL — SIGNIFICANT CHANGE UP (ref 7.4–10.4)
POTASSIUM SERPL-MCNC: 4.6 MMOL/L — SIGNIFICANT CHANGE UP (ref 3.5–5)
POTASSIUM SERPL-SCNC: 4.6 MMOL/L — SIGNIFICANT CHANGE UP (ref 3.5–5)
PROT SERPL-MCNC: 5.7 G/DL — LOW (ref 6–8)
PROTHROM AB SERPL-ACNC: 16.1 SEC — HIGH (ref 9.95–12.87)
RBC # BLD: 2.51 M/UL — LOW (ref 4.7–6.1)
RBC # BLD: 2.84 M/UL — LOW (ref 4.7–6.1)
RBC # FLD: 14.6 % — HIGH (ref 11.5–14.5)
RBC # FLD: 14.6 % — HIGH (ref 11.5–14.5)
SODIUM SERPL-SCNC: 137 MMOL/L — SIGNIFICANT CHANGE UP (ref 135–146)
WBC # BLD: 10.59 K/UL — SIGNIFICANT CHANGE UP (ref 4.8–10.8)
WBC # BLD: 9.08 K/UL — SIGNIFICANT CHANGE UP (ref 4.8–10.8)
WBC # FLD AUTO: 10.59 K/UL — SIGNIFICANT CHANGE UP (ref 4.8–10.8)
WBC # FLD AUTO: 9.08 K/UL — SIGNIFICANT CHANGE UP (ref 4.8–10.8)

## 2023-06-02 PROCEDURE — 88312 SPECIAL STAINS GROUP 1: CPT

## 2023-06-02 PROCEDURE — C9113: CPT

## 2023-06-02 PROCEDURE — 86901 BLOOD TYPING SEROLOGIC RH(D): CPT

## 2023-06-02 PROCEDURE — 85610 PROTHROMBIN TIME: CPT

## 2023-06-02 PROCEDURE — P9016: CPT

## 2023-06-02 PROCEDURE — 86850 RBC ANTIBODY SCREEN: CPT

## 2023-06-02 PROCEDURE — 99223 1ST HOSP IP/OBS HIGH 75: CPT

## 2023-06-02 PROCEDURE — 80053 COMPREHEN METABOLIC PANEL: CPT

## 2023-06-02 PROCEDURE — 85730 THROMBOPLASTIN TIME PARTIAL: CPT

## 2023-06-02 PROCEDURE — 85027 COMPLETE CBC AUTOMATED: CPT

## 2023-06-02 PROCEDURE — 36415 COLL VENOUS BLD VENIPUNCTURE: CPT

## 2023-06-02 PROCEDURE — 85025 COMPLETE CBC W/AUTO DIFF WBC: CPT

## 2023-06-02 PROCEDURE — 86900 BLOOD TYPING SEROLOGIC ABO: CPT

## 2023-06-02 PROCEDURE — 80048 BASIC METABOLIC PNL TOTAL CA: CPT

## 2023-06-02 PROCEDURE — 83735 ASSAY OF MAGNESIUM: CPT

## 2023-06-02 PROCEDURE — 88305 TISSUE EXAM BY PATHOLOGIST: CPT

## 2023-06-02 PROCEDURE — 99285 EMERGENCY DEPT VISIT HI MDM: CPT

## 2023-06-02 RX ORDER — PANTOPRAZOLE SODIUM 20 MG/1
40 TABLET, DELAYED RELEASE ORAL
Refills: 0 | Status: DISCONTINUED | OUTPATIENT
Start: 2023-06-02 | End: 2023-06-06

## 2023-06-02 RX ORDER — ATORVASTATIN CALCIUM 80 MG/1
20 TABLET, FILM COATED ORAL AT BEDTIME
Refills: 0 | Status: DISCONTINUED | OUTPATIENT
Start: 2023-06-02 | End: 2023-06-11

## 2023-06-02 RX ORDER — FUROSEMIDE 40 MG
20 TABLET ORAL
Refills: 0 | Status: DISCONTINUED | OUTPATIENT
Start: 2023-06-02 | End: 2023-06-11

## 2023-06-02 RX ORDER — CHLORHEXIDINE GLUCONATE 213 G/1000ML
1 SOLUTION TOPICAL
Refills: 0 | Status: DISCONTINUED | OUTPATIENT
Start: 2023-06-02 | End: 2023-06-11

## 2023-06-02 RX ORDER — SODIUM CHLORIDE 9 MG/ML
500 INJECTION, SOLUTION INTRAVENOUS ONCE
Refills: 0 | Status: COMPLETED | OUTPATIENT
Start: 2023-06-02 | End: 2023-06-02

## 2023-06-02 RX ORDER — PANTOPRAZOLE SODIUM 20 MG/1
80 TABLET, DELAYED RELEASE ORAL ONCE
Refills: 0 | Status: COMPLETED | OUTPATIENT
Start: 2023-06-02 | End: 2023-06-02

## 2023-06-02 RX ORDER — METOPROLOL TARTRATE 50 MG
50 TABLET ORAL
Refills: 0 | Status: DISCONTINUED | OUTPATIENT
Start: 2023-06-02 | End: 2023-06-11

## 2023-06-02 RX ORDER — ASPIRIN/CALCIUM CARB/MAGNESIUM 324 MG
81 TABLET ORAL DAILY
Refills: 0 | Status: DISCONTINUED | OUTPATIENT
Start: 2023-06-02 | End: 2023-06-03

## 2023-06-02 RX ADMIN — Medication 50 MILLIGRAM(S): at 18:20

## 2023-06-02 RX ADMIN — PANTOPRAZOLE SODIUM 40 MILLIGRAM(S): 20 TABLET, DELAYED RELEASE ORAL at 18:19

## 2023-06-02 RX ADMIN — PANTOPRAZOLE SODIUM 80 MILLIGRAM(S): 20 TABLET, DELAYED RELEASE ORAL at 09:33

## 2023-06-02 RX ADMIN — ATORVASTATIN CALCIUM 20 MILLIGRAM(S): 80 TABLET, FILM COATED ORAL at 22:33

## 2023-06-02 RX ADMIN — SODIUM CHLORIDE 500 MILLILITER(S): 9 INJECTION, SOLUTION INTRAVENOUS at 08:16

## 2023-06-02 NOTE — CONSULT NOTE ADULT - SUBJECTIVE AND OBJECTIVE BOX
Gastroenterology Consultation:    Patient is a 77y old  Male who presents with a chief complaint of       Admitted on: 06-02-23      HPI: 78 yo male with a PMHx of HTN, HLD, CAD s/p CABG, CHF s/p Pacemaker, A-fib and LAAT on Xarelto, hard of hearing, osteoarthritis, and breast CA s/p R mastectomy in 2001, recent hospitalization for cholecystitis s/p IR percutaneous cholecystostomy on 4/3, presents for 1 week of melena. Patient states that he has been having black colored stools for the past week with associated light headedness. Denies syncope. Functional at baseline. Has not noticed any bright red blood per rectum. Denies abdominal pain, rectal pain, GERD sympotms, dysphagia, chest pain, nausea, vomiting. Has endorsed no pain at cholecystotomy sight, green fluid on discharge. Patient had colonoscopy and EGD over 40 years ago. Colonoscopy reportedly positive for multiple polyps, but never had repeat. Egd reportedly unremarkable. Denies smoking. No family history of GI malignancy, brother with bone cancer. Personal history of breast cancer s/p lumpectomy (no chemo/rads). Has reducible umbilical hernia, no abdominal surgeries. Hb noted drop form 12->7.4. Last dose of Xarelto AM 6/1. last bowel movement 6/1.         Prior EGD:40 years ago, reportedly unremarkable.    Prior Colonoscopy: 40 years ago,  positive for multiple polyps, but never had repeat      PAST MEDICAL & SURGICAL HISTORY:  Arrhythmia      Hypercholesteremia      HTN (hypertension)      Obesity      CHF (congestive heart failure)      Heart attack      Breast cancer  2001      Osteoarthritis      CAD (coronary artery disease)      Atrial fibrillation      [place] (hard of hearing)      S/P CABG x 5  2014      H/O mastectomy  right 2001      Pacemaker            FAMILY HISTORY:  Family history of heart disease (Father, Mother)    Family history of lung cancer (Sibling)        Social History:  Tobacco: denies  Alcohol: denies  Drugs:denies     Home Medications:  amLODIPine 5 mg oral tablet: 1 tab(s) orally once a day (14 Mar 2023 14:29)  atorvastatin 20 mg oral tablet: 1 tab(s) orally once a day (14 Mar 2023 14:29)  furosemide 20 mg oral tablet: 1 tab(s) orally 2 times a day (14 Mar 2023 14:29)  losartan 100 mg oral tablet: 1 tab(s) orally once a day (14 Mar 2023 14:29)  metoprolol tartrate 50 mg oral tablet: 1 tab(s) orally 2 times a day (14 Mar 2023 14:29)        MEDICATIONS  (STANDING):    MEDICATIONS  (PRN):      Allergies  No Known Allergies      Review of Systems:   Constitutional:  No Fever, No Chills  ENT/Mouth:  No Hearing Changes,  No Difficulty Swallowing  Eyes:  No Eye Pain, No Vision Changes  Cardiovascular:  No Chest Pain, No Palpitations  Respiratory:  No Cough, No Dyspnea  Gastrointestinal:  As described in HPI  Musculoskeletal:  No Joint Swelling, No Back Pain  Skin:  No Skin Lesions, No Jaundice  Neuro:  No Syncope, No Dizziness  Heme/Lymph:  No Bruising, No Bleeding.          Physical Examination:  T(C): 36.7 (06-02-23 @ 07:37), Max: 37.4 (06-02-23 @ 05:28)  HR: 61 (06-02-23 @ 07:37) (61 - 64)  BP: 118/56 (06-02-23 @ 07:37) (118/56 - 148/63)  RR: 18 (06-02-23 @ 07:37) (18 - 18)  SpO2: 97% (06-02-23 @ 07:37) (97% - 99%)  Height (cm): 188 (06-02-23 @ 05:28)  Weight (kg): 100.7 (06-02-23 @ 05:28)        GENERAL: AAOx3, no acute distress.  HEAD:  Atraumatic, Normocephalic  EYES: conjunctiva and sclera clear  NECK: Supple, no JVD or thyromegaly  CHEST/LUNG: Clear to auscultation bilaterally; No wheeze, rhonchi, or rales  HEART: Regular rate and rhythm; normal S1, S2, No murmurs.  ABDOMEN: Soft, nontender, nondistended; Bowel sounds present, umbilical hernia reducible, cholecystomy in place, bilious drainage   NEUROLOGY: No asterixis or tremor.   SKIN: Intact, no jaundice        Data:                        7.4    10.59 )-----------( 291      ( 02 Jun 2023 08:26 )             22.6     Hgb Trend:  7.4  06-02-23 @ 08:26      06-02    137  |  105  |  21<H>  ----------------------------<  124<H>  4.6   |  22  |  0.7    Ca    8.6      02 Jun 2023 08:26    TPro  5.7<L>  /  Alb  3.7  /  TBili  0.4  /  DBili  x   /  AST  15  /  ALT  9   /  AlkPhos  65  06-02    Liver panel trend:  TBili 0.4   /   AST 15   /   ALT 9   /   AlkP 65   /   Tptn 5.7   /   Alb 3.7    /   DBili --      06-02      PT/INR - ( 02 Jun 2023 08:26 )   PT: 16.10 sec;   INR: 1.40 ratio         PTT - ( 02 Jun 2023 08:26 )  PTT:34.0 sec        Radiology:       Gastroenterology Consultation:    Patient is a 77y old  Male who presents with a chief complaint of       Admitted on: 06-02-23      HPI: 78 yo male with a PMHx of HTN, HLD, CAD s/p CABG, CHF s/p Pacemaker, A-fib and LAAT on Xarelto, hard of hearing, osteoarthritis, and breast CA s/p R mastectomy in 2001, recent hospitalization for cholecystitis s/p IR percutaneous cholecystostomy on 4/3, presents for 1 week of melena. Patient states that he has been having black colored stools for the past week with associated light headedness. Denies syncope. Functional at baseline. Has not noticed any bright red blood per rectum. Denies abdominal pain, rectal pain, GERD sympotms, dysphagia, chest pain, nausea, vomiting. Has endorsed no pain at cholecystotomy sight, green fluid on discharge. Has not had history of GI bleed in the past. Patient had colonoscopy and EGD over 40 years ago. Colonoscopy reportedly positive for multiple polyps, but never had repeat. Egd reportedly unremarkable. Denies smoking. No family history of GI malignancy, brother with bone cancer. Personal history of breast cancer s/p lumpectomy (no chemo/rads). Has reducible umbilical hernia, no abdominal surgeries. Hb noted drop form 12->7.4. Last dose of Xarelto AM 6/1. last bowel movement 6/1.         Prior EGD:40 years ago, reportedly unremarkable.    Prior Colonoscopy: 40 years ago,  positive for multiple polyps, but never had repeat      PAST MEDICAL & SURGICAL HISTORY:  Arrhythmia      Hypercholesteremia      HTN (hypertension)      Obesity      CHF (congestive heart failure)      Heart attack      Breast cancer  2001      Osteoarthritis      CAD (coronary artery disease)      Atrial fibrillation      [place] (hard of hearing)      S/P CABG x 5  2014      H/O mastectomy  right 2001      Pacemaker            FAMILY HISTORY:  Family history of heart disease (Father, Mother)    Family history of lung cancer (Sibling)        Social History:  Tobacco: denies  Alcohol: denies  Drugs:denies     Home Medications:  amLODIPine 5 mg oral tablet: 1 tab(s) orally once a day (14 Mar 2023 14:29)  atorvastatin 20 mg oral tablet: 1 tab(s) orally once a day (14 Mar 2023 14:29)  furosemide 20 mg oral tablet: 1 tab(s) orally 2 times a day (14 Mar 2023 14:29)  losartan 100 mg oral tablet: 1 tab(s) orally once a day (14 Mar 2023 14:29)  metoprolol tartrate 50 mg oral tablet: 1 tab(s) orally 2 times a day (14 Mar 2023 14:29)        MEDICATIONS  (STANDING):    MEDICATIONS  (PRN):      Allergies  No Known Allergies      Review of Systems:   Constitutional:  No Fever, No Chills  ENT/Mouth:  No Hearing Changes,  No Difficulty Swallowing  Eyes:  No Eye Pain, No Vision Changes  Cardiovascular:  No Chest Pain, No Palpitations  Respiratory:  No Cough, No Dyspnea  Gastrointestinal:  As described in HPI  Musculoskeletal:  No Joint Swelling, No Back Pain  Skin:  No Skin Lesions, No Jaundice  Neuro:  No Syncope, No Dizziness  Heme/Lymph:  No Bruising, No Bleeding.          Physical Examination:  T(C): 36.7 (06-02-23 @ 07:37), Max: 37.4 (06-02-23 @ 05:28)  HR: 61 (06-02-23 @ 07:37) (61 - 64)  BP: 118/56 (06-02-23 @ 07:37) (118/56 - 148/63)  RR: 18 (06-02-23 @ 07:37) (18 - 18)  SpO2: 97% (06-02-23 @ 07:37) (97% - 99%)  Height (cm): 188 (06-02-23 @ 05:28)  Weight (kg): 100.7 (06-02-23 @ 05:28)        GENERAL: AAOx3, no acute distress.  HEAD:  Atraumatic, Normocephalic  EYES: conjunctiva and sclera clear  NECK: Supple, no JVD or thyromegaly  CHEST/LUNG: Clear to auscultation bilaterally; No wheeze, rhonchi, or rales  HEART: Regular rate and rhythm; normal S1, S2, No murmurs.  ABDOMEN: Soft, nontender, nondistended; Bowel sounds present, umbilical hernia reducible, cholecystomy in place, bilious drainage   NEUROLOGY: No asterixis or tremor.   SKIN: Intact, no jaundice  JACKY: black stool         Data:                        7.4    10.59 )-----------( 291      ( 02 Jun 2023 08:26 )             22.6     Hgb Trend:  7.4  06-02-23 @ 08:26      06-02    137  |  105  |  21<H>  ----------------------------<  124<H>  4.6   |  22  |  0.7    Ca    8.6      02 Jun 2023 08:26    TPro  5.7<L>  /  Alb  3.7  /  TBili  0.4  /  DBili  x   /  AST  15  /  ALT  9   /  AlkPhos  65  06-02    Liver panel trend:  TBili 0.4   /   AST 15   /   ALT 9   /   AlkP 65   /   Tptn 5.7   /   Alb 3.7    /   DBili --      06-02      PT/INR - ( 02 Jun 2023 08:26 )   PT: 16.10 sec;   INR: 1.40 ratio         PTT - ( 02 Jun 2023 08:26 )  PTT:34.0 sec        Radiology:       Gastroenterology Consultation:    Patient is a 77y old  Male who presents with a chief complaint of       Admitted on: 06-02-23      HPI: 78 yo male with a PMHx of HTN, HLD, CAD s/p CABG, CHF s/p Pacemaker, A-fib and LAAT on Xarelto, hard of hearing, osteoarthritis, and breast CA s/p R mastectomy in 2001, recent hospitalization for cholecystitis s/p IR percutaneous cholecystostomy on 4/3, presents for 1 week of melena. Patient states that he has been having black colored stools for the past week with associated light headedness. Of note his Xarelto was recently increased from 15mg to 20mg. Denies syncope. Functional at baseline. Has not noticed any bright red blood per rectum. Denies abdominal pain, rectal pain, GERD symptoms dysphagia, chest pain, nausea, vomiting. Has endorsed no pain at cholecystotomy sight, green fluid on discharge. Has not had history of GI bleed in the past. Patient had colonoscopy and EGD over 40 years ago. Colonoscopy reportedly positive for multiple polyps, but never had repeat. Egd reportedly unremarkable. Denies smoking. No family history of GI malignancy, brother with bone cancer. Personal history of breast cancer s/p lumpectomy (no chemo/rads). Has reducible umbilical hernia, no abdominal surgeries. Hb noted drop form 12->7.4. Last dose of Xarelto AM 6/1. last bowel movement 6/1.     Prior EGD:40 years ago, reportedly unremarkable.    Prior Colonoscopy: 40 years ago,  positive for multiple polyps, but never had repeat      PAST MEDICAL & SURGICAL HISTORY:  Arrhythmia      Hypercholesteremia      HTN (hypertension)      Obesity      CHF (congestive heart failure)      Heart attack      Breast cancer  2001      Osteoarthritis      CAD (coronary artery disease)      Atrial fibrillation      [place] (hard of hearing)      S/P CABG x 5  2014      H/O mastectomy  right 2001      Pacemaker            FAMILY HISTORY:  Family history of heart disease (Father, Mother)    Family history of lung cancer (Sibling)        Social History:  Tobacco: denies  Alcohol: denies  Drugs:denies     Home Medications:  amLODIPine 5 mg oral tablet: 1 tab(s) orally once a day (14 Mar 2023 14:29)  atorvastatin 20 mg oral tablet: 1 tab(s) orally once a day (14 Mar 2023 14:29)  furosemide 20 mg oral tablet: 1 tab(s) orally 2 times a day (14 Mar 2023 14:29)  losartan 100 mg oral tablet: 1 tab(s) orally once a day (14 Mar 2023 14:29)  metoprolol tartrate 50 mg oral tablet: 1 tab(s) orally 2 times a day (14 Mar 2023 14:29)        MEDICATIONS  (STANDING):    MEDICATIONS  (PRN):      Allergies  No Known Allergies      Review of Systems:   Constitutional:  No Fever, No Chills  ENT/Mouth:  No Hearing Changes,  No Difficulty Swallowing  Eyes:  No Eye Pain, No Vision Changes  Cardiovascular:  No Chest Pain, No Palpitations  Respiratory:  No Cough, No Dyspnea  Gastrointestinal:  As described in HPI  Musculoskeletal:  No Joint Swelling, No Back Pain  Skin:  No Skin Lesions, No Jaundice  Neuro:  No Syncope, No Dizziness  Heme/Lymph:  No Bruising, No Bleeding.          Physical Examination:  T(C): 36.7 (06-02-23 @ 07:37), Max: 37.4 (06-02-23 @ 05:28)  HR: 61 (06-02-23 @ 07:37) (61 - 64)  BP: 118/56 (06-02-23 @ 07:37) (118/56 - 148/63)  RR: 18 (06-02-23 @ 07:37) (18 - 18)  SpO2: 97% (06-02-23 @ 07:37) (97% - 99%)  Height (cm): 188 (06-02-23 @ 05:28)  Weight (kg): 100.7 (06-02-23 @ 05:28)        GENERAL: AAOx3, no acute distress.  HEAD:  Atraumatic, Normocephalic  EYES: conjunctiva and sclera clear  NECK: Supple, no JVD or thyromegaly  CHEST/LUNG: Clear to auscultation bilaterally; No wheeze, rhonchi, or rales  HEART: Regular rate and rhythm; normal S1, S2, No murmurs.  ABDOMEN: Soft, nontender, nondistended; Bowel sounds present, umbilical hernia reducible, cholecystomy in place, bilious drainage   NEUROLOGY: No asterixis or tremor.   SKIN: Intact, no jaundice  JACKY: black stool         Data:                        7.4    10.59 )-----------( 291      ( 02 Jun 2023 08:26 )             22.6     Hgb Trend:  7.4  06-02-23 @ 08:26      06-02    137  |  105  |  21<H>  ----------------------------<  124<H>  4.6   |  22  |  0.7    Ca    8.6      02 Jun 2023 08:26    TPro  5.7<L>  /  Alb  3.7  /  TBili  0.4  /  DBili  x   /  AST  15  /  ALT  9   /  AlkPhos  65  06-02    Liver panel trend:  TBili 0.4   /   AST 15   /   ALT 9   /   AlkP 65   /   Tptn 5.7   /   Alb 3.7    /   DBili --      06-02      PT/INR - ( 02 Jun 2023 08:26 )   PT: 16.10 sec;   INR: 1.40 ratio         PTT - ( 02 Jun 2023 08:26 )  PTT:34.0 sec        Radiology:       Gastroenterology Consultation:    Patient is a 77y old  Male who presents with a chief complaint of       Admitted on: 06-02-23      HPI: 76 yo male with a PMHx of HTN, HLD, CAD s/p CABG, CHF s/p Pacemaker, A-fib and LAAT on Xarelto, hard of hearing, osteoarthritis, and breast CA s/p R mastectomy in 2001, recent hospitalization for cholecystitis s/p IR percutaneous cholecystostomy on 4/3, presents for 1 week of melena. Patient states that he has been having black colored stools for the past week with associated light headedness. Of note his Xarelto was recently increased from 15mg to 20mg. Denies syncope. Functional at baseline. Has not noticed any bright red blood per rectum. Denies abdominal pain, rectal pain, GERD symptoms dysphagia, chest pain, nausea, vomiting. Has endorsed no pain at cholecystotomy sight, green fluid on discharge. Has not had history of GI bleed in the past. Patient had colonoscopy and EGD over 40 years ago. Colonoscopy reportedly positive for multiple polyps, but never had repeat. Egd reportedly unremarkable. Denies smoking. No family history of GI malignancy, brother with bone cancer. Personal history of breast cancer s/p lumpectomy (no chemo/rads). Has reducible umbilical hernia, no abdominal surgeries. Hb noted drop form 12->7.4. Last dose of Xarelto AM 6/1. last bowel movement 6/1.     Prior EGD:40 years ago, reportedly unremarkable.    Prior Colonoscopy: 40 years ago,  positive for multiple polyps, but never had repeat      PAST MEDICAL & SURGICAL HISTORY:  Arrhythmia  Hypercholesteremia  HTN (hypertension)  Obesity  CHF (congestive heart failure)  Heart attack  Breast cancer  2001  Osteoarthritis  CAD (coronary artery disease)  Atrial fibrillation  [place] (hard of hearing)  S/P CABG x 5  2014  H/O mastectomy  right 2001  Pacemaker      FAMILY HISTORY:  Family history of heart disease (Father, Mother)  Family history of lung cancer (Sibling)        Social History:  Tobacco: denies  Alcohol: denies  Drugs:denies     Home Medications:  amLODIPine 5 mg oral tablet: 1 tab(s) orally once a day (14 Mar 2023 14:29)  atorvastatin 20 mg oral tablet: 1 tab(s) orally once a day (14 Mar 2023 14:29)  furosemide 20 mg oral tablet: 1 tab(s) orally 2 times a day (14 Mar 2023 14:29)  losartan 100 mg oral tablet: 1 tab(s) orally once a day (14 Mar 2023 14:29)  metoprolol tartrate 50 mg oral tablet: 1 tab(s) orally 2 times a day (14 Mar 2023 14:29)        MEDICATIONS  (STANDING):    MEDICATIONS  (PRN):      Allergies  No Known Allergies      Review of Systems:   Constitutional:  No Fever, No Chills  ENT/Mouth:  No Hearing Changes,  No Difficulty Swallowing  Eyes:  No Eye Pain, No Vision Changes  Cardiovascular:  No Chest Pain, No Palpitations  Respiratory:  No Cough, No Dyspnea  Gastrointestinal:  As described in HPI  Musculoskeletal:  No Joint Swelling, No Back Pain  Skin:  No Skin Lesions, No Jaundice  Neuro:  No Syncope, No Dizziness  Heme/Lymph:  No Bruising, No Bleeding.          Physical Examination:  T(C): 36.7 (06-02-23 @ 07:37), Max: 37.4 (06-02-23 @ 05:28)  HR: 61 (06-02-23 @ 07:37) (61 - 64)  BP: 118/56 (06-02-23 @ 07:37) (118/56 - 148/63)  RR: 18 (06-02-23 @ 07:37) (18 - 18)  SpO2: 97% (06-02-23 @ 07:37) (97% - 99%)  Height (cm): 188 (06-02-23 @ 05:28)  Weight (kg): 100.7 (06-02-23 @ 05:28)        GENERAL: AAOx3, no acute distress.  HEAD:  Atraumatic, Normocephalic  EYES: conjunctiva and sclera clear  NECK: Supple, no JVD or thyromegaly  CHEST/LUNG: Clear to auscultation bilaterally; No wheeze, rhonchi, or rales  HEART: Regular rate and rhythm; normal S1, S2, No murmurs.  ABDOMEN: Soft, nontender, nondistended; Bowel sounds present, umbilical hernia reducible, cholecystomy in place, bilious drainage   NEUROLOGY: No asterixis or tremor.   SKIN: Intact, no jaundice  JACKY: black stool         Data:                        7.4    10.59 )-----------( 291      ( 02 Jun 2023 08:26 )             22.6     Hgb Trend:  7.4  06-02-23 @ 08:26      06-02    137  |  105  |  21<H>  ----------------------------<  124<H>  4.6   |  22  |  0.7    Ca    8.6      02 Jun 2023 08:26    TPro  5.7<L>  /  Alb  3.7  /  TBili  0.4  /  DBili  x   /  AST  15  /  ALT  9   /  AlkPhos  65  06-02    Liver panel trend:  TBili 0.4   /   AST 15   /   ALT 9   /   AlkP 65   /   Tptn 5.7   /   Alb 3.7    /   DBili --      06-02      PT/INR - ( 02 Jun 2023 08:26 )   PT: 16.10 sec;   INR: 1.40 ratio         PTT - ( 02 Jun 2023 08:26 )  PTT:34.0 sec        Radiology:  < from: CT Angio Abdomen and Pelvis w/ IV Cont (03.26.23 @ 21:22) >  Cholelithiasis with gallbladder wall thickening compatible with acute cholecystitis in the appropriate clinical setting.    Normal appearance of the pancreas.    < end of copied text >

## 2023-06-02 NOTE — CONSULT NOTE ADULT - ASSESSMENT
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 Patient states that he has been having black colored stools for the past week with associated light headedness. Denies syncope. Functional at baseline. Has not noticed any bright red blood per rectum. Denies abdominal pain, rectal pain, GERD sympotms, dysphagia, chest pain, nausea, vomiting. Has endorsed no pain at cholecystotomy sight, green fluid on discharge. Patient had colonoscopy and EGD over 40 years ago. Colonoscopy reportedly positive for multiple polyps, but never had repeat. Egd reportedly unremarkable. Denies smoking. No family history of GI malignancy, brother with bone cancer. Personal history of breast cancer s/p lumpectomy (no chemo/rads). Has reducible umbilical hernia, no abdominal surgeries. Hb noted drop form 12->7.4. Last dose of Xarelto AM 6/1. last bowel movement 6/1.       #Upper GI Bleed - likely d/t PUD vs Irais Ronquillo tear vs AVM vs Esophageal ulcer vs Erosive Hemorrhagic Gastritis vs Dieulafoy lesion Vs Malignancy vs Aortoenteric fistula  - Hemodynamically stable & no active bleeding  - Baseline hemoglobin - 12  - Hemoglobin on admission - 7.4  - Coags - INR 1.4  - Last use of Antithrombotic or no Antithrombotic - 6/1 AM  - JACKY + black stools     #Rec  - Please start pantoprazole 40 IV BID  - Transfuse 1 unit PRBC, target <8  - will plan for EGD early next week. 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 Patient states that he has been having black colored stools for the past week with associated light headedness. Denies syncope. Functional at baseline. Has not noticed any bright red blood per rectum. Denies abdominal pain, rectal pain, GERD sympotms, dysphagia, chest pain, nausea, vomiting. Has endorsed no pain at cholecystotomy sight, green fluid on discharge. Patient had colonoscopy and EGD over 40 years ago. Colonoscopy reportedly positive for multiple polyps, but never had repeat. Egd reportedly unremarkable. Denies smoking. No family history of GI malignancy, brother with bone cancer. Personal history of breast cancer s/p lumpectomy (no chemo/rads). Has reducible umbilical hernia, no abdominal surgeries. Hb noted drop form 12->7.4. Last dose of Xarelto AM 6/1. last bowel movement 6/1.       #Upper GI Bleed - likely d/t PUD vs Irais Ronquillo tear vs AVM vs Esophageal ulcer vs Erosive Hemorrhagic Gastritis vs Dieulafoy lesion Vs Malignancy vs Aortoenteric fistula  - Hemodynamically stable & no active bleeding  - Baseline hemoglobin - 12  - Hemoglobin on admission - 7.4  - Coags - INR 1.4  - Last use of Antithrombotic or no Antithrombotic - 6/1 AM  - JACKY + black stools     #Rec  - Please start pantoprazole 40 IV BID  - send iron studies  - Transfuse 1 unit PRBC, target <8  - will plan for EGD early next week. 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 Patient states that he has been having black colored stools for the past week with associated light headedness. Denies syncope. Functional at baseline. Has not noticed any bright red blood per rectum. Denies abdominal pain, rectal pain, GERD sympotms, dysphagia, chest pain, nausea, vomiting. Has endorsed no pain at cholecystotomy sight, green fluid on discharge. Patient had colonoscopy and EGD over 40 years ago. Colonoscopy reportedly positive for multiple polyps, but never had repeat. Egd reportedly unremarkable. Denies smoking. No family history of GI malignancy, brother with bone cancer. Personal history of breast cancer s/p lumpectomy (no chemo/rads). Has reducible umbilical hernia, no abdominal surgeries. Hb noted drop form 12->7.4. Last dose of Xarelto AM 6/1. last bowel movement 6/1.     #r/o Upper GI Bleed - DDx PUD vs Irais Ronquillo tear vs AVM vs Esophageal ulcer vs Dieulafoy lesion Vs Malignancy vs Aortoenteric fistula  - Hemodynamically stable & no active bleeding  - Baseline hemoglobin - 12  - Hemoglobin on admission - 7.4  - Coags - INR 1.4  - Last use of Antithrombotic or no Antithrombotic - 6/1 AM  - JACKY + black stools     #Rec  - Please start pantoprazole 40 IV BID  - send iron studies  - Transfuse 1 unit PRBC, target Hb >8  - will plan for EGD early next week. Earlier if unstable bleed   - hold xarelto now. Start Heparin bridge. Hold 4 hours prior to procedure   - Needs cardiac clearance   - Liquid diet   - Maintain active Type and screen, Trend H&H BID  - Please place x2 18G IVs  - Please correct electrolytes (Target Na 135-145, Mg 1.7-2.2, K 3.5-5)  - Please avoid any NSAIDs  - NPO after midnight for procedure  - If any unstable bleed, please call GI stat    #Cholecystitis s/p cholecystomy by IR   - was seen in advanced clinic. 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 Patient states that he has been having black colored stools for the past week with associated light headedness. Of note his Xarelto was recently increased from 15mg to 20mg. Denies syncope. Functional at baseline. Has not noticed any bright red blood per rectum. Denies abdominal pain, rectal pain, GERD symptoms dysphagia, chest pain, nausea, vomiting. Has endorsed no pain at cholecystotomy sight, green fluid on discharge. Has not had history of GI bleed in the past. Patient had colonoscopy and EGD over 40 years ago. Colonoscopy reportedly positive for multiple polyps, but never had repeat. Egd reportedly unremarkable. Denies smoking. No family history of GI malignancy, brother with bone cancer. Personal history of breast cancer s/p lumpectomy (no chemo/rads). Has reducible umbilical hernia, no abdominal surgeries. Hb noted drop form 12->7.4. Last dose of Xarelto AM 6/1. last bowel movement 6/1.     #r/o Upper GI Bleed - DDx PUD vs Irais Ronquillo tear vs AVM vs Esophageal ulcer vs Dieulafoy lesion Vs Malignancy vs Aortoenteric fistula  - Hemodynamically stable & no active bleeding  - Baseline hemoglobin - 12  - Hemoglobin on admission - 7.4  - Coags - INR 1.4  - Last use of Antithrombotic or no Antithrombotic - 6/1 AM  - JACKY + black stools     #Rec  - Please start pantoprazole 40 IV BID  - send iron studies  - Transfuse 1 unit PRBC, target Hb >8  - will plan for EGD early next week. Earlier if unstable bleed   - hold xarelto now. Start Heparin bridge. Hold 4 hours prior to procedure   - Needs cardiac clearance   - Liquid diet   - Maintain active Type and screen, Trend H&H BID  - Please place x2 18G IVs  - Please correct electrolytes (Target Na 135-145, Mg 1.7-2.2, K 3.5-5)  - Please avoid any NSAIDs  - NPO after midnight for procedure  - If any unstable bleed, please call GI stat    #Cholecystitis s/p cholecystomy by IR   - was seen in advanced clinic. 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 Patient states that he has been having black colored stools for the past week with associated light headedness. Of note his Xarelto was recently increased from 15mg to 20mg. Denies syncope. Functional at baseline. Has not noticed any bright red blood per rectum. Denies abdominal pain, rectal pain, GERD symptoms dysphagia, chest pain, nausea, vomiting. Has endorsed no pain at cholecystotomy sight, green fluid on discharge. Has not had history of GI bleed in the past. Patient had colonoscopy and EGD over 40 years ago. Colonoscopy reportedly positive for multiple polyps, but never had repeat. Egd reportedly unremarkable. Denies smoking. No family history of GI malignancy, brother with bone cancer. Personal history of breast cancer s/p lumpectomy (no chemo/rads). Has reducible umbilical hernia, no abdominal surgeries. Hb noted drop form 12->7.4. Last dose of Xarelto AM 6/1. last bowel movement 6/1.     #r/o Upper GI Bleed - DDx PUD vs AVM vs Esophageal ulcer vs Dieulafoy lesion Vs Malignancy  - Hemodynamically stable & no active bleeding  - Baseline hemoglobin - 12  - Hemoglobin on admission - 7.4  - Coags - INR 1.4  - Last use of Antithrombotic or no Antithrombotic - 6/1 AM  - JACKY + black stools     #Rec  - Please start pantoprazole 40 IV BID  - send iron studies  - Transfuse 1 unit PRBC, target Hb >8  - will plan for EGD early next week. Earlier if unstable bleed   - hold xarelto now. could place on Heparin if necessary. Hold 4-6 hours prior to procedure   - Needs cardiac clearance   - Liquid diet   - Maintain active Type and screen, Trend H&H BID  - Please place x2 18G IVs  - Please correct electrolytes (Target Na 135-145, Mg 1.7-2.2, K 3.5-5)  - Please avoid any NSAIDs  - NPO after midnight for procedure  - If any unstable bleed, please call GI stat    #Cholecystitis s/p cholecystomy by IR   - was seen in advanced clinic. Continue to follow OP for possible EUS plus ERCP with empiric sphincterotomy after cardiac clearance    - FU IR OP

## 2023-06-02 NOTE — H&P ADULT - NSHPLABSRESULTS_GEN_ALL_CORE
(06-02 @ 08:26)                      7.4  10.59 )-----------( 291                 22.6    Neutrophils = 7.69 (72.5%)  Lymphocytes = 1.97 (18.6%)  Eosinophils = 0.07 (0.7%)  Basophils = 0.07 (0.7%)  Monocytes = 0.75 (7.1%)  Bands = --%    06-02    137  |  105  |  21<H>  ----------------------------<  124<H>  4.6   |  22  |  0.7    Ca    8.6      02 Jun 2023 08:26    TPro  5.7<L>  /  Alb  3.7  /  TBili  0.4  /  DBili  x   /  AST  15  /  ALT  9   /  AlkPhos  65  06-02    ( 02 Jun 2023 08:26 )   PT: 16.10 sec;   INR: 1.40 ratio;       PTT:34.0 sec      RVP:          Tox:

## 2023-06-02 NOTE — ED PROVIDER NOTE - CLINICAL SUMMARY MEDICAL DECISION MAKING FREE TEXT BOX
78 yo male with a PMHx of HTN, HLD, CAD s/p CABG, CHF (s/p Pacemaker; ABBOTS  P/G [id number]), A-fib and LAAT on xarelto, hard of hearing, osteoarthritis, and breast CA s/p R mastectomy in 2001, with recent hospitalization for cholecystitis s/p IR percutaneous cholecystostomy on 4/3, presents for 1 week of melena. Patient reports that he has been having black stools for the past week with no abdominal pain. no hematochezia or hematemesis. Patient never had GI bleed in the past. He does have lightheadedness but no syncope or fall. no dysphagia, odynophagia, weight changes, nausea, vomiting, fever, or any other symptoms. Patient is on xarelto and aspirin last dose 6/1. had EGD/ Colonoscopy many years ago unremarkable per patient except for some polyps. Patient has cholecystostomy drain with greenish secretion nonbloody. Last BM yesterday.  Hgb 7.4 (baseline 12). INR 1.4, BUN 21 with normal Cr. Given 2 u PRBC, GI consulted.  VSS and admitted to floor.

## 2023-06-02 NOTE — H&P ADULT - NSHPPHYSICALEXAM_GEN_ALL_CORE
VITALS:   T(C): 36.7 (06-02-23 @ 07:37), Max: 37.4 (06-02-23 @ 05:28)  HR: 61 (06-02-23 @ 07:37) (61 - 64)  BP: 118/56 (06-02-23 @ 07:37) (118/56 - 148/63)  RR: 18 (06-02-23 @ 07:37) (18 - 18)  SpO2: 97% (06-02-23 @ 07:37) (97% - 99%)    GENERAL: AAOx3, no acute distress.  HEAD:  Atraumatic, Normocephalic  EYES: conjunctiva and sclera clear  NECK: Supple, no JVD or thyromegaly  CHEST/LUNG: Clear to auscultation bilaterally; No wheeze, rhonchi, or rales  HEART: irregular. S1,S2  ABDOMEN: Soft, nontender, nondistended; Bowel sounds present, umbilical hernia reducible, cholecystomy in place, bilious drainage   NEUROLOGY: No asterixis or tremor.   SKIN: Intact, no jaundice

## 2023-06-02 NOTE — ED PROVIDER NOTE - PHYSICAL EXAMINATION
PHYSICAL EXAM: I have reviewed current vital signs.  GENERAL: NAD, well-nourished; well-developed.  HEAD:  Normocephalic, atraumatic.  EYES: EOMI, PERRL, conjunctiva and sclera clear.  ENT: MMM, no erythema/exudates.  NECK: Supple, no JVD.  CHEST/LUNG: Clear to auscultation bilaterally; no wheezes, rales, or rhonchi.  HEART: Regular rate and rhythm, normal S1 and S2; no murmurs, rubs, or gallops.  ABDOMEN: Soft, nontender, nondistended.  Rectal: nontender, no hemorrhoids or fissures, no masses, Black melanic stool on rectal exam.  EXTREMITIES:  2+ peripheral pulses; no clubbing, cyanosis, or edema.  PSYCH: Cooperative, appropriate, normal mood and affect.  NEUROLOGY: A&O x 3. Motor 5/5. Sensory intact. No focal neurological deficits. CN II - XII intact. (-) dysmetria, facial droop, pronator drift.  SKIN: Warm and dry.

## 2023-06-02 NOTE — PATIENT PROFILE ADULT - FALL HARM RISK - HARM RISK INTERVENTIONS
Assistance with ambulation/Assistance OOB with selected safe patient handling equipment/Communicate Risk of Fall with Harm to all staff/Discuss with provider need for PT consult/Monitor for mental status changes/Monitor gait and stability/Move patient closer to nurses' station/Provide patient with walking aids - walker, cane, crutches/Reinforce activity limits and safety measures with patient and family/Sit up slowly, dangle for a short time, stand at bedside before walking/Tailored Fall Risk Interventions/Visual Cue: Yellow wristband and red socks/Bed in lowest position, wheels locked, appropriate side rails in place/Call bell, personal items and telephone in reach/Instruct patient to call for assistance before getting out of bed or chair/Non-slip footwear when patient is out of bed/[place] to call system/Physically safe environment - no spills, clutter or unnecessary equipment/Purposeful Proactive Rounding/Room/bathroom lighting operational, light cord in reach

## 2023-06-02 NOTE — ED PROVIDER NOTE - NS ED ROS FT
Constitutional:  No fevers or chills.  Eyes:  No visual changes, eye pain, or discharge.  ENT:  No hearing changes. No sore throat.  Neck:  No neck pain or stiffness.  Cardiac:  No CP or edema.  Resp:  No cough or SOB. No hemoptysis.   GI:  No nausea, vomiting, diarrhea, or abdominal pain.  (+) dark stool  :  No dysuria, frequency, or hematuria.  MSK:  No myalgias or joint pain/swelling.  Neuro:  No headache, dizziness, (+) weakness.  Skin:  No skin rash.

## 2023-06-02 NOTE — H&P ADULT - HISTORY OF PRESENT ILLNESS
76 yo male with a PMHx of HTN, HLD, CAD s/p CABG, CHF (s/p Pacemaker; ABBOTS  P/G [id number]), A-fib and LAAT on xarelto, hard of hearing, osteoarthritis, and breast CA s/p R mastectomy in 2001, with recent hospitalization for cholecystitis s/p IR percutaneous cholecystostomy on 4/3, presents for 1 week of melena. Patient reports that he has been having black stools for the past week with no abdominal pain. no hematochezia or hematemesis. Patient never had GI bleed in the past. He does have lightheadedness but no syncope or fall. no dysphagia, odynophagia, weight changes, nausea, vomiting, fever, or any other symptoms. Patient is on xarelto and aspirin last dose 6/1. had EGD/ [facility] many years ago unremarkable per patient except for some polyps. Patient has cholecystostomy drain with greenish secretion nonbloody. Last BM yesterday.    In ED, he was HD stable. labs showed hb 7.4 (baseline 12). INR 1.4, BUN 21 with normal Cr. he was given 2 u prbc and admitted to floor.

## 2023-06-02 NOTE — H&P ADULT - ASSESSMENT
78 yo male with a PMHx of HTN, HLD, CAD s/p CABG, CHF (s/p Pacemaker; ABBOTS  P/G [id number]), A-fib and LAAT on xarelto, hard of hearing, osteoarthritis, and breast CA s/p R mastectomy in 2001, with recent hospitalization for cholecystitis s/p IR percutaneous cholecystostomy on 4/3, presents for 1 week of melena. Patient reports that he has been having black stools for the past week with no abdominal pain. no hematochezia or hematemesis. Patient never had GI bleed in the past. He does have lightheadedness but no syncope or fall. no dysphagia, odynophagia, weight changes, nausea, vomiting, fever, or any other symptoms. Patient is on xarelto and aspirin last dose 6/1. had EGD/ [facility] many years ago unremarkable per patient except for some polyps. Patient has cholecystostomy drain with greenish secretion nonbloody.    #GI Bleed - likely d/t PUD vs Irais Ronquillo tear vs AVM vs Esophageal ulcer vs Erosive Hemorrhagic Gastritis vs Dieulafoy lesion Vs Malignancy vs Aortoenteric fistula  - Hemodynamically stable & no active bleeding  - Baseline hemoglobin - 12  - Hemoglobin on admission - 7.4  - Coags - INR 1.4  - Last use of Antithrombotic 6/1 AM  - JACKY + black stools ( done by GI team)  - start pantoprazole 40 IV BID  - Transfuse 1-2 unit PRBC. keep hb above 8  - GI eval --> plan for EGD early next week  - Cardiac clearance   - Liquid diet   - Maintain active Type and screen  - Trend H&H BID  - hold xarelto. 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%) s/p PPM ABBOTS  P/G [id number]  #HTN  #HLD  - continue lasix  - Cardiology consult for clearance before EGD  - hold xarelto for now  - hold bp meds for now due to GI bleed  - continue statin    #Breast CA s/p R mastectomy  - OP Follow up    #Misc:  #DVT PPX: heparin proph  #GI PPX: protonix  #Diet: liquid  #Activity: as tolerated  #Dispo: admit   78 yo male with a PMHx of HTN, HLD, CAD s/p CABG, CHF (s/p Pacemaker; ABBOTS  P/G [id number]), A-fib and LAAT on xarelto, hard of hearing, osteoarthritis, and breast CA s/p R mastectomy in 2001, with recent hospitalization for cholecystitis s/p IR percutaneous cholecystostomy on 4/3, presents for 1 week of melena. Patient reports that he has been having black stools for the past week with no abdominal pain. no hematochezia or hematemesis. Patient never had GI bleed in the past. He does have lightheadedness but no syncope or fall. no dysphagia, odynophagia, weight changes, nausea, vomiting, fever, or any other symptoms. Patient is on xarelto and aspirin last dose 6/1. had EGD/ [facility] many years ago unremarkable per patient except for some polyps. Patient has cholecystostomy drain with greenish secretion nonbloody.    #GI Bleed - likely d/t PUD vs Irais Ronquillo tear vs AVM vs Esophageal ulcer vs Erosive Hemorrhagic Gastritis vs Dieulafoy lesion Vs Malignancy vs Aortoenteric fistula  - Hemodynamically stable & no active bleeding  - Baseline hemoglobin - 12  - Hemoglobin on admission - 7.4  - Coags - INR 1.4  - Last use of Antithrombotic 6/1 AM  - JACKY + black stools ( done by GI team)  - start pantoprazole 40 IV BID  - Transfuse 1-2 unit PRBC. keep hb above 8  - GI eval --> plan for EGD early next week  - Cardiac clearance   - Liquid diet   - Maintain active Type and screen  - Trend H&H BID  - hold xarelto. 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 discuss with cardio team  - hold bp meds for now due to GI bleed and mild hypotension  - continue statin    #Breast CA s/p R mastectomy  - OP Follow up    #Misc:  #DVT PPX: SCD for today. reevaluate tomorrow  #GI PPX: protonix  #Diet: liquid  #Activity: as tolerated  #Dispo: admit

## 2023-06-02 NOTE — H&P ADULT - ATTENDING COMMENTS
76 yo male with a PMHx of HTN, HLD, CAD s/p CABG, CHF (s/p Pacemaker; ABBOTS  P/G [id number]), A-fib and LAAT on xarelto, hard of hearing, osteoarthritis, and breast CA s/p R mastectomy in 2001, with recent hospitalization for cholecystitis s/p IR percutaneous cholecystostomy on 4/3, presents for 1 week of melena. 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 H/H stable for now  HFrEF compensated-Continue his current diuretic dose.  HTN controlled-Continue amlodipine and losartan  CAD S/P CABG stable. Continue metoprolol, statin. Holding ASA due to GI bleed.  Persistent afib-Holding Xarelto due to active GI bleed.F/U  for Watchman device as outpt.   Handoff: Pending plan for EGD early next week.

## 2023-06-02 NOTE — ED ADULT NURSE NOTE - NSICDXPASTMEDICALHX_GEN_ALL_CORE_FT
PAST MEDICAL HISTORY:  Arrhythmia     Atrial fibrillation     Breast cancer 2001    CAD (coronary artery disease)     CHF (congestive heart failure)     Heart attack     [place] (hard of hearing)     HTN (hypertension)     Hypercholesteremia     Obesity     Osteoarthritis

## 2023-06-02 NOTE — H&P ADULT - NSICDXPASTMEDICALHX_GEN_ALL_CORE_FT
PAST MEDICAL HISTORY:  Arrhythmia     Atrial fibrillation     Breast cancer 2001    CAD (coronary artery disease)     CHF (congestive heart failure)     Heart attack     [place] (hard of hearing)     HTN (hypertension)     Hypercholesteremia     Obesity     Osteoarthritis

## 2023-06-02 NOTE — ED ADULT NURSE NOTE - NSFALLRISKINTERV_ED_ALL_ED
Assistance OOB with selected safe patient handling equipment if applicable/Assistance with ambulation/Communicate fall risk and risk factors to all staff, patient, and family/Encourage patient to sit up slowly, dangle for a short time, stand at bedside before walking/Monitor gait and stability/Monitor for mental status changes and reorient to person, place, and time, as needed/Provide visual cue: yellow wristband, yellow gown, etc/Reinforce activity limits and safety measures with patient and family/Use of alarms - bed, stretcher, chair and/or video monitoring/Call bell, personal items and telephone in reach/Instruct patient to call for assistance before getting out of bed/chair/stretcher/Non-slip footwear applied when patient is off stretcher/[place] to call system/Physically safe environment - no spills, clutter or unnecessary equipment/Purposeful Proactive Rounding/Room/bathroom lighting operational, light cord in reach

## 2023-06-02 NOTE — CONSULT NOTE ADULT - ATTENDING COMMENTS
agree w/ above - 76 yo M w/ HTN, DL, CAD s/p CABG, CHF s/p Pacemaker, A-fib and LAAT on Xarelto, OA, and breast CA s/p R mastectomy in 2001, recent hospitalization for cholecystitis s/p IR percutaneous cholecystostomy on 4/3,  who is well known to me, presenting w/ anemia and symptomatic anemia after increasing his dose of xarelto.   he is HD stable and has no other GI complaints.   -IV PPI q12h; clear liquid diet for now; monitor for recurrent bleeding events  hold xarelto  please place on heparin given hx of LA thrombus (risk of CVA)  -cardiology eval  -will benefit from EGD after optimization  -symptomatic gallstone management as outpatient

## 2023-06-02 NOTE — ED PROVIDER NOTE - OBJECTIVE STATEMENT
77-year male with past history of hypertension hyperlipidemia CAD status post CABG CHF status post pacemaker A-fib on Xarelto osteoarthritis breast CA status post right mastectomy in 2/20/2001 recent hospitalization for cholecystitis status post PERC drainage by IR on 4–3 presents with 1 week history of dark stool likely melena.  Patient states that she has been having black-colored stools for the past week and has also had associated weakness.  Denies abdominal pain, vomiting, diarrhea, constipation.

## 2023-06-02 NOTE — ED PROVIDER NOTE - NSICDXPASTMEDICALHX_GEN_ALL_CORE_FT
PAST MEDICAL HISTORY:  Arrhythmia     Atrial fibrillation     Breast cancer 2001    CAD (coronary artery disease)     CHF (congestive heart failure)     Heart attack     [place] (hard of hearing)     HTN (hypertension)     Hypercholesteremia     Obesity     Osteoarthritis

## 2023-06-03 LAB
ALBUMIN SERPL ELPH-MCNC: 3.4 G/DL — LOW (ref 3.5–5.2)
ALP SERPL-CCNC: 62 U/L — SIGNIFICANT CHANGE UP (ref 30–115)
ALT FLD-CCNC: 8 U/L — SIGNIFICANT CHANGE UP (ref 0–41)
ANION GAP SERPL CALC-SCNC: 10 MMOL/L — SIGNIFICANT CHANGE UP (ref 7–14)
APTT BLD: 32.8 SEC — SIGNIFICANT CHANGE UP (ref 27–39.2)
AST SERPL-CCNC: 14 U/L — SIGNIFICANT CHANGE UP (ref 0–41)
BASOPHILS # BLD AUTO: 0.06 K/UL — SIGNIFICANT CHANGE UP (ref 0–0.2)
BASOPHILS NFR BLD AUTO: 0.6 % — SIGNIFICANT CHANGE UP (ref 0–1)
BILIRUB SERPL-MCNC: 0.8 MG/DL — SIGNIFICANT CHANGE UP (ref 0.2–1.2)
BUN SERPL-MCNC: 10 MG/DL — SIGNIFICANT CHANGE UP (ref 10–20)
CALCIUM SERPL-MCNC: 8.4 MG/DL — SIGNIFICANT CHANGE UP (ref 8.4–10.5)
CHLORIDE SERPL-SCNC: 105 MMOL/L — SIGNIFICANT CHANGE UP (ref 98–110)
CO2 SERPL-SCNC: 23 MMOL/L — SIGNIFICANT CHANGE UP (ref 17–32)
CREAT SERPL-MCNC: 0.7 MG/DL — SIGNIFICANT CHANGE UP (ref 0.7–1.5)
EGFR: 94 ML/MIN/1.73M2 — SIGNIFICANT CHANGE UP
EOSINOPHIL # BLD AUTO: 0.23 K/UL — SIGNIFICANT CHANGE UP (ref 0–0.7)
EOSINOPHIL NFR BLD AUTO: 2.2 % — SIGNIFICANT CHANGE UP (ref 0–8)
GLUCOSE SERPL-MCNC: 99 MG/DL — SIGNIFICANT CHANGE UP (ref 70–99)
HCT VFR BLD CALC: 26.1 % — LOW (ref 42–52)
HCT VFR BLD CALC: 27 % — LOW (ref 42–52)
HGB BLD-MCNC: 8.5 G/DL — LOW (ref 14–18)
HGB BLD-MCNC: 8.7 G/DL — LOW (ref 14–18)
IMM GRANULOCYTES NFR BLD AUTO: 0.3 % — SIGNIFICANT CHANGE UP (ref 0.1–0.3)
INR BLD: 1.16 RATIO — SIGNIFICANT CHANGE UP (ref 0.65–1.3)
LYMPHOCYTES # BLD AUTO: 2.17 K/UL — SIGNIFICANT CHANGE UP (ref 1.2–3.4)
LYMPHOCYTES # BLD AUTO: 20.9 % — SIGNIFICANT CHANGE UP (ref 20.5–51.1)
MAGNESIUM SERPL-MCNC: 2.3 MG/DL — SIGNIFICANT CHANGE UP (ref 1.8–2.4)
MCHC RBC-ENTMCNC: 28.8 PG — SIGNIFICANT CHANGE UP (ref 27–31)
MCHC RBC-ENTMCNC: 29.1 PG — SIGNIFICANT CHANGE UP (ref 27–31)
MCHC RBC-ENTMCNC: 32.2 G/DL — SIGNIFICANT CHANGE UP (ref 32–37)
MCHC RBC-ENTMCNC: 32.6 G/DL — SIGNIFICANT CHANGE UP (ref 32–37)
MCV RBC AUTO: 89.4 FL — SIGNIFICANT CHANGE UP (ref 80–94)
MCV RBC AUTO: 89.4 FL — SIGNIFICANT CHANGE UP (ref 80–94)
MONOCYTES # BLD AUTO: 0.9 K/UL — HIGH (ref 0.1–0.6)
MONOCYTES NFR BLD AUTO: 8.7 % — SIGNIFICANT CHANGE UP (ref 1.7–9.3)
NEUTROPHILS # BLD AUTO: 6.98 K/UL — HIGH (ref 1.4–6.5)
NEUTROPHILS NFR BLD AUTO: 67.3 % — SIGNIFICANT CHANGE UP (ref 42.2–75.2)
NRBC # BLD: 0 /100 WBCS — SIGNIFICANT CHANGE UP (ref 0–0)
NRBC # BLD: 0 /100 WBCS — SIGNIFICANT CHANGE UP (ref 0–0)
PLATELET # BLD AUTO: 245 K/UL — SIGNIFICANT CHANGE UP (ref 130–400)
PLATELET # BLD AUTO: 263 K/UL — SIGNIFICANT CHANGE UP (ref 130–400)
PMV BLD: 10.2 FL — SIGNIFICANT CHANGE UP (ref 7.4–10.4)
PMV BLD: 10.4 FL — SIGNIFICANT CHANGE UP (ref 7.4–10.4)
POTASSIUM SERPL-MCNC: 4.1 MMOL/L — SIGNIFICANT CHANGE UP (ref 3.5–5)
POTASSIUM SERPL-SCNC: 4.1 MMOL/L — SIGNIFICANT CHANGE UP (ref 3.5–5)
PROT SERPL-MCNC: 5.2 G/DL — LOW (ref 6–8)
PROTHROM AB SERPL-ACNC: 13.3 SEC — HIGH (ref 9.95–12.87)
RBC # BLD: 2.92 M/UL — LOW (ref 4.7–6.1)
RBC # BLD: 3.02 M/UL — LOW (ref 4.7–6.1)
RBC # FLD: 14.6 % — HIGH (ref 11.5–14.5)
RBC # FLD: 14.7 % — HIGH (ref 11.5–14.5)
SODIUM SERPL-SCNC: 138 MMOL/L — SIGNIFICANT CHANGE UP (ref 135–146)
WBC # BLD: 10.37 K/UL — SIGNIFICANT CHANGE UP (ref 4.8–10.8)
WBC # BLD: 8.29 K/UL — SIGNIFICANT CHANGE UP (ref 4.8–10.8)
WBC # FLD AUTO: 10.37 K/UL — SIGNIFICANT CHANGE UP (ref 4.8–10.8)
WBC # FLD AUTO: 8.29 K/UL — SIGNIFICANT CHANGE UP (ref 4.8–10.8)

## 2023-06-03 PROCEDURE — 99223 1ST HOSP IP/OBS HIGH 75: CPT

## 2023-06-03 RX ADMIN — PANTOPRAZOLE SODIUM 40 MILLIGRAM(S): 20 TABLET, DELAYED RELEASE ORAL at 17:29

## 2023-06-03 RX ADMIN — Medication 20 MILLIGRAM(S): at 13:41

## 2023-06-03 RX ADMIN — CHLORHEXIDINE GLUCONATE 1 APPLICATION(S): 213 SOLUTION TOPICAL at 05:28

## 2023-06-03 RX ADMIN — Medication 50 MILLIGRAM(S): at 05:29

## 2023-06-03 RX ADMIN — Medication 50 MILLIGRAM(S): at 17:40

## 2023-06-03 RX ADMIN — Medication 20 MILLIGRAM(S): at 05:29

## 2023-06-03 RX ADMIN — PANTOPRAZOLE SODIUM 40 MILLIGRAM(S): 20 TABLET, DELAYED RELEASE ORAL at 05:29

## 2023-06-03 NOTE — PROGRESS NOTE ADULT - ASSESSMENT
78 yo male with a PMHx of HTN, HLD, CAD s/p CABG, CHF (s/p Pacemaker; ABBOTS  P/G [id number]), A-fib and LAAT on xarelto, hard of hearing, osteoarthritis, and breast CA s/p R mastectomy in 2001, with recent hospitalization for cholecystitis s/p IR percutaneous cholecystostomy on 4/3, presents for 1 week of melena. 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 H/H stable for now  ·	HFrEF compensated-Continue his current diuretic dose.  ·	HTN controlled-Continue amlodipine and losartan  ·	CAD S/P CABG stable. Continue metoprolol, statin. Holding ASA due to GI bleed.  ·	Persistent afib-Holding Xarelto due to active GI bleed.F/U  for Watchman device as outpt.   ·	Handoff: Pending plan for EGD early next week.

## 2023-06-03 NOTE — PROGRESS NOTE ADULT - SUBJECTIVE AND OBJECTIVE BOX
Progress Note:  Provider Speciality                            Hospitalist      WAGNER SEGURA MRN-[id number] 77y Male     CHIEF PRESENTING COMPLAINT:  Patient is a 77y old  Male who presents with a chief complaint of melena (03 Jun 2023 11:56)        SUBJECTIVE:  Patient was seen and examined at bedside. No new complaints described by patient in morning rounds.   No significant overnight events reported.     HISTORY OF PRESENTING ILLNESS:  HPI:  76 yo male with a PMHx of HTN, HLD, CAD s/p CABG, CHF (s/p Pacemaker; ABBOTS  P/G [id number]), A-fib and LAAT on xarelto, hard of hearing, osteoarthritis, and breast CA s/p R mastectomy in 2001, with recent hospitalization for cholecystitis s/p IR percutaneous cholecystostomy on 4/3, presents for 1 week of melena. Patient reports that he has been having black stools for the past week with no abdominal pain. no hematochezia or hematemesis. Patient never had GI bleed in the past. He does have lightheadedness but no syncope or fall. no dysphagia, odynophagia, weight changes, nausea, vomiting, fever, or any other symptoms. Patient is on xarelto and aspirin last dose 6/1. had EGD/ [facility] many years ago unremarkable per patient except for some polyps. Patient has cholecystostomy drain with greenish secretion nonbloody. Last BM yesterday.    In ED, he was HD stable. labs showed hb 7.4 (baseline 12). INR 1.4, BUN 21 with normal Cr. he was given 2 u prbc and admitted to floor. (02 Jun 2023 15:45)        REVIEW OF SYSTEMS:  Patient denies  headache, fever, chills. Denies chest pain, shortness of breath, palpitation. Denies nausea, vomiting, abdominal pain or diarrhoea, Denies dysuria.   At least 10 systems were reviewed in ROS. All systems reviewed  are within normal limits except for the complaints as described in Subjective.    PAST MEDICAL & SURGICAL HISTORY:  PAST MEDICAL & SURGICAL HISTORY:  Arrhythmia      Hypercholesteremia      HTN (hypertension)      Obesity      CHF (congestive heart failure)      Heart attack      Breast cancer  2001      Osteoarthritis      CAD (coronary artery disease)      Atrial fibrillation      [place] (hard of hearing)      S/P CABG x 5  2014      H/O mastectomy  right 2001      Pacemaker              VITAL SIGNS:  Vital Signs Last 24 Hrs  T(C): 35.7 (03 Jun 2023 11:57), Max: 36.8 (02 Jun 2023 16:14)  T(F): 96.3 (03 Jun 2023 11:57), Max: 98.3 (02 Jun 2023 22:15)  HR: 61 (03 Jun 2023 11:57) (61 - 67)  BP: 122/69 (03 Jun 2023 11:57) (122/69 - 146/67)  BP(mean): --  RR: 18 (03 Jun 2023 11:57) (18 - 18)  SpO2: 95% (03 Jun 2023 07:48) (95% - 100%)    Parameters below as of 03 Jun 2023 07:48  Patient On (Oxygen Delivery Method): room air              PHYSICAL EXAMINATION:  Not in acute distress  General: No icterus  HEENT:   no JVD.  Heart: S1+S2 audible  Lungs: bilateral  moderate air entry, no wheezing, no crepitations.  Abdomen: Soft, non-tender, non-distended , no  rigidity or guarding.  CNS: Awake alert, CN  grossly intact.  Extremities:  No edema            CONSULTS:  Consultant(s) Notes Reviewed by me.   Care Discussed with Consultants/Other Providers where required.    All the images and labs were reviewed today        MEDICATIONS:  MEDICATIONS  (STANDING):  atorvastatin 20 milliGRAM(s) Oral at bedtime  chlorhexidine 2% Cloths 1 Application(s) Topical <User Schedule>  furosemide    Tablet 20 milliGRAM(s) Oral two times a day  metoprolol tartrate 50 milliGRAM(s) Oral two times a day  pantoprazole  Injectable 40 milliGRAM(s) IV Push two times a day    MEDICATIONS  (PRN):

## 2023-06-03 NOTE — CONSULT NOTE ADULT - SUBJECTIVE AND OBJECTIVE BOX
Date of Admission:    CHIEF COMPLAINT:    HISTORY OF PRESENT ILLNESS:  78 yo male with a PMHx of HTN, HLD, CAD s/p CABG, CHF (s/p Pacemaker; ABBOTS  P/G [id number]), A-fib and LAAT on xarelto, hard of hearing, osteoarthritis, and breast CA s/p R mastectomy in 2001, with recent hospitalization for cholecystitis s/p IR percutaneous cholecystostomy on 4/3, presents for 1 week of melena. Patient reports that he has been having black stools for the past week with no abdominal pain. no hematochezia or hematemesis. Patient never had GI bleed in the past. He does have lightheadedness but no syncope or fall. no dysphagia, odynophagia, weight changes, nausea, vomiting, fever, or any other symptoms. Patient is on xarelto and aspirin last dose 6/1. had EGD/ [facility] many years ago unremarkable per patient except for some polyps. Patient has cholecystostomy drain with greenish secretion nonbloody. Last BM yesterday.    In ED, he was HD stable. labs showed hb 7.4 (baseline 12). INR 1.4, BUN 21 with normal Cr. he was given 2 u prbc and admitted to floor.      PAST MEDICAL & SURGICAL HISTORY:  Arrhythmia      Hypercholesteremia      HTN (hypertension)      Obesity      CHF (congestive heart failure)      Heart attack      Breast cancer  2001      Osteoarthritis      CAD (coronary artery disease)      Atrial fibrillation      [place] (hard of hearing)      S/P CABG x 5  2014      H/O mastectomy  right 2001      Pacemaker        HEALTH ISSUES - PROBLEM Dx:        FAMILY HISTORY:  Family history of heart disease (Father, Mother)    Family history of lung cancer (Sibling)      Allergies    No Known Allergies    Intolerances    	  Home Medications:  amLODIPine 5 mg oral tablet: 1 tab(s) orally once a day (14 Mar 2023 14:29)  atorvastatin 20 mg oral tablet: 1 tab(s) orally once a day (14 Mar 2023 14:29)  furosemide 20 mg oral tablet: 1 tab(s) orally 2 times a day (14 Mar 2023 14:29)  losartan 100 mg oral tablet: 1 tab(s) orally once a day (14 Mar 2023 14:29)  metoprolol tartrate 50 mg oral tablet: 1 tab(s) orally 2 times a day (14 Mar 2023 14:29)    MEDICATIONS  (STANDING):  aspirin enteric coated 81 milliGRAM(s) Oral daily  atorvastatin 20 milliGRAM(s) Oral at bedtime  chlorhexidine 2% Cloths 1 Application(s) Topical <User Schedule>  furosemide    Tablet 20 milliGRAM(s) Oral two times a day  metoprolol tartrate 50 milliGRAM(s) Oral two times a day  pantoprazole  Injectable 40 milliGRAM(s) IV Push two times a day      SOCIAL HISTORY:    [ ] Non-smoker  [ ] Smoker  [ ] Alcohol      REVIEW OF SYSTEMS:  CONSTITUTIONAL: No fever, weight loss, or fatigue  CARDIOLOGY: Patient denies chest pain, shortness of breath or syncopal episodes.   RESPIRATORY: denies shortness of breath, wheezing.   NEUROLOGICAL: NO weakness, no focal deficits to report.   GI: Melena.    PSYCHIATRY: normal mood and affect    PHYSICAL EXAM:  T(C): 36.3 (06-03-23 @ 04:59), Max: 36.8 (06-02-23 @ 16:14)  HR: 61 (06-03-23 @ 04:59) (61 - 67)  BP: 127/60 (06-03-23 @ 04:59) (127/60 - 146/67)  RR: 18 (06-03-23 @ 04:59) (18 - 18)  SpO2: 95% (06-03-23 @ 07:48) (95% - 100%)  Wt(kg): --  I&O's Summary    Daily     Daily     General Appearance: Normal	  Cardiovascular: S1 S2 irregular, No JVD, No murmurs, No edema  Respiratory: Lungs clear to auscultation	  Psychiatry: A & O x 3, Mood & affect appropriate  Gastrointestinal:  Soft, Non-tender  Extremities: Normal range of motion, No clubbing, cyanosis or edema  Vascular: Peripheral pulses palpable 2+ bilaterally        LABS:	 	                          8.5    8.29  )-----------( 263      ( 03 Jun 2023 07:07 )             26.1     06-03    138  |  105  |  10  ----------------------------<  99  4.1   |  23  |  0.7    Ca    8.4      03 Jun 2023 07:07  Mg     2.3     06-03    TPro  5.2<L>  /  Alb  3.4<L>  /  TBili  0.8  /  DBili  x   /  AST  14  /  ALT  8   /  AlkPhos  62  06-03        PT/INR - ( 03 Jun 2023 07:07 )   PT: 13.30 sec;   INR: 1.16 ratio         PTT - ( 03 Jun 2023 07:07 )  PTT:32.8 sec    proBNP:   Lipid Profile:   HgA1c:   TSH:     	  ASSESSMENT/PLAN: 	    1)HFrEF compensated.      Continue his current diuretic dose.    2)HTN controlled     Continue amlodipine and losartan    3)CAD S/P CABG stable.     Continue metoprolol, statin.     Hold ASA due to GI bleed.    4)Persistent afib     Hold Xarelto due to active GI bleed.     F/U  EP     He will need Watchman device.     He is stable for EGD and colonoscopy.

## 2023-06-04 LAB
ANION GAP SERPL CALC-SCNC: 11 MMOL/L — SIGNIFICANT CHANGE UP (ref 7–14)
APTT BLD: 159.3 SEC — CRITICAL HIGH (ref 27–39.2)
APTT BLD: 76.3 SEC — CRITICAL HIGH (ref 27–39.2)
BUN SERPL-MCNC: 7 MG/DL — LOW (ref 10–20)
CALCIUM SERPL-MCNC: 8.2 MG/DL — LOW (ref 8.4–10.5)
CHLORIDE SERPL-SCNC: 103 MMOL/L — SIGNIFICANT CHANGE UP (ref 98–110)
CO2 SERPL-SCNC: 23 MMOL/L — SIGNIFICANT CHANGE UP (ref 17–32)
CREAT SERPL-MCNC: 0.9 MG/DL — SIGNIFICANT CHANGE UP (ref 0.7–1.5)
EGFR: 87 ML/MIN/1.73M2 — SIGNIFICANT CHANGE UP
GLUCOSE SERPL-MCNC: 104 MG/DL — HIGH (ref 70–99)
HCT VFR BLD CALC: 28.3 % — LOW (ref 42–52)
HGB BLD-MCNC: 9.2 G/DL — LOW (ref 14–18)
MCHC RBC-ENTMCNC: 28.9 PG — SIGNIFICANT CHANGE UP (ref 27–31)
MCHC RBC-ENTMCNC: 32.5 G/DL — SIGNIFICANT CHANGE UP (ref 32–37)
MCV RBC AUTO: 89 FL — SIGNIFICANT CHANGE UP (ref 80–94)
NRBC # BLD: 0 /100 WBCS — SIGNIFICANT CHANGE UP (ref 0–0)
PLATELET # BLD AUTO: 283 K/UL — SIGNIFICANT CHANGE UP (ref 130–400)
PMV BLD: 9.9 FL — SIGNIFICANT CHANGE UP (ref 7.4–10.4)
POTASSIUM SERPL-MCNC: 4.2 MMOL/L — SIGNIFICANT CHANGE UP (ref 3.5–5)
POTASSIUM SERPL-SCNC: 4.2 MMOL/L — SIGNIFICANT CHANGE UP (ref 3.5–5)
RBC # BLD: 3.18 M/UL — LOW (ref 4.7–6.1)
RBC # FLD: 14.6 % — HIGH (ref 11.5–14.5)
SODIUM SERPL-SCNC: 137 MMOL/L — SIGNIFICANT CHANGE UP (ref 135–146)
WBC # BLD: 8.67 K/UL — SIGNIFICANT CHANGE UP (ref 4.8–10.8)
WBC # FLD AUTO: 8.67 K/UL — SIGNIFICANT CHANGE UP (ref 4.8–10.8)

## 2023-06-04 PROCEDURE — 99233 SBSQ HOSP IP/OBS HIGH 50: CPT

## 2023-06-04 RX ORDER — HEPARIN SODIUM 5000 [USP'U]/ML
4000 INJECTION INTRAVENOUS; SUBCUTANEOUS EVERY 6 HOURS
Refills: 0 | Status: DISCONTINUED | OUTPATIENT
Start: 2023-06-04 | End: 2023-06-05

## 2023-06-04 RX ORDER — HEPARIN SODIUM 5000 [USP'U]/ML
INJECTION INTRAVENOUS; SUBCUTANEOUS
Qty: 25000 | Refills: 0 | Status: DISCONTINUED | OUTPATIENT
Start: 2023-06-04 | End: 2023-06-05

## 2023-06-04 RX ORDER — HEPARIN SODIUM 5000 [USP'U]/ML
8500 INJECTION INTRAVENOUS; SUBCUTANEOUS EVERY 6 HOURS
Refills: 0 | Status: DISCONTINUED | OUTPATIENT
Start: 2023-06-04 | End: 2023-06-05

## 2023-06-04 RX ADMIN — ATORVASTATIN CALCIUM 20 MILLIGRAM(S): 80 TABLET, FILM COATED ORAL at 21:02

## 2023-06-04 RX ADMIN — Medication 20 MILLIGRAM(S): at 13:10

## 2023-06-04 RX ADMIN — HEPARIN SODIUM 1800 UNIT(S)/HR: 5000 INJECTION INTRAVENOUS; SUBCUTANEOUS at 15:37

## 2023-06-04 RX ADMIN — Medication 50 MILLIGRAM(S): at 17:47

## 2023-06-04 RX ADMIN — CHLORHEXIDINE GLUCONATE 1 APPLICATION(S): 213 SOLUTION TOPICAL at 05:39

## 2023-06-04 RX ADMIN — HEPARIN SODIUM 0 UNIT(S)/HR: 5000 INJECTION INTRAVENOUS; SUBCUTANEOUS at 23:54

## 2023-06-04 RX ADMIN — PANTOPRAZOLE SODIUM 40 MILLIGRAM(S): 20 TABLET, DELAYED RELEASE ORAL at 17:47

## 2023-06-04 RX ADMIN — Medication 50 MILLIGRAM(S): at 05:23

## 2023-06-04 RX ADMIN — PANTOPRAZOLE SODIUM 40 MILLIGRAM(S): 20 TABLET, DELAYED RELEASE ORAL at 05:28

## 2023-06-04 RX ADMIN — Medication 20 MILLIGRAM(S): at 05:28

## 2023-06-04 NOTE — PROGRESS NOTE ADULT - SUBJECTIVE AND OBJECTIVE BOX
Progress Note:  Provider Speciality                            Hospitalist      WAGNER SEGURA MRN-[id number] 77y Male     CHIEF PRESENTING COMPLAINT:  Patient is a 77y old  Male who presents with a chief complaint of melena (03 Jun 2023 11:56)        SUBJECTIVE:  Patient was seen and examined at bedside. No new complaints described by patient in morning rounds.   No significant overnight events reported.     HISTORY OF PRESENTING ILLNESS:  HPI:  76 yo male with a PMHx of HTN, HLD, CAD s/p CABG, CHF (s/p Pacemaker; ABBOTS  P/G [id number]), A-fib and LAAT on xarelto, hard of hearing, osteoarthritis, and breast CA s/p R mastectomy in 2001, with recent hospitalization for cholecystitis s/p IR percutaneous cholecystostomy on 4/3, presents for 1 week of melena. Patient reports that he has been having black stools for the past week with no abdominal pain. no hematochezia or hematemesis. Patient never had GI bleed in the past. He does have lightheadedness but no syncope or fall. no dysphagia, odynophagia, weight changes, nausea, vomiting, fever, or any other symptoms. Patient is on xarelto and aspirin last dose 6/1. had EGD/ [facility] many years ago unremarkable per patient except for some polyps. Patient has cholecystostomy drain with greenish secretion nonbloody. Last BM yesterday.    In ED, he was HD stable. labs showed hb 7.4 (baseline 12). INR 1.4, BUN 21 with normal Cr. he was given 2 u prbc and admitted to floor. (02 Jun 2023 15:45)        REVIEW OF SYSTEMS:  Patient denies  headache, fever, chills. Denies chest pain, shortness of breath, palpitation. Denies nausea, vomiting, abdominal pain or diarrhoea, Denies dysuria.   At least 10 systems were reviewed in ROS. All systems reviewed  are within normal limits except for the complaints as described in Subjective.    PAST MEDICAL & SURGICAL HISTORY:  PAST MEDICAL & SURGICAL HISTORY:  Arrhythmia      Hypercholesteremia      HTN (hypertension)      Obesity      CHF (congestive heart failure)      Heart attack      Breast cancer  2001      Osteoarthritis      CAD (coronary artery disease)      Atrial fibrillation      [place] (hard of hearing)      S/P CABG x 5  2014      H/O mastectomy  right 2001      Pacemaker              VITAL SIGNS:  Vital Signs Last 24 Hrs  T(C): 36 (04 Jun 2023 05:27), Max: 36 (04 Jun 2023 05:27)  T(F): 96.8 (04 Jun 2023 05:27), Max: 96.8 (04 Jun 2023 05:27)  HR: 60 (04 Jun 2023 05:27) (60 - 60)  BP: 117/55 (04 Jun 2023 05:27) (117/55 - 149/67)  BP(mean): --  RR: 18 (04 Jun 2023 05:27) (18 - 18)  SpO2: 97% (04 Jun 2023 07:44) (97% - 97%)    Parameters below as of 04 Jun 2023 07:44  Patient On (Oxygen Delivery Method): room air              PHYSICAL EXAMINATION:  Not in acute distress  General: No icterus  HEENT:   no JVD.  Heart: S1+S2 audible  Lungs: bilateral  moderate air entry, no wheezing, no crepitations.  Abdomen: Soft, non-tender, non-distended , no  rigidity or guarding.  CNS: Awake alert, CN  grossly intact.  Extremities:  No edema            CONSULTS:  Consultant(s) Notes Reviewed by me.   Care Discussed with Consultants/Other Providers where required.    All the images and labs were reviewed today        MEDICATIONS:  MEDICATIONS  (STANDING):  atorvastatin 20 milliGRAM(s) Oral at bedtime  chlorhexidine 2% Cloths 1 Application(s) Topical <User Schedule>  furosemide    Tablet 20 milliGRAM(s) Oral two times a day  metoprolol tartrate 50 milliGRAM(s) Oral two times a day  pantoprazole  Injectable 40 milliGRAM(s) IV Push two times a day    MEDICATIONS  (PRN):

## 2023-06-04 NOTE — PROGRESS NOTE ADULT - ASSESSMENT
78 yo male with a PMHx of HTN, HLD, CAD s/p CABG, CHF (s/p Pacemaker; ABBOTS  P/G [id number]), A-fib and LAAT on xarelto, hard of hearing, osteoarthritis, and breast CA s/p R mastectomy in 2001, with recent hospitalization for cholecystitis s/p IR percutaneous cholecystostomy on 4/3, presents for 1 week of melena. 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 NPO midnight  ·	keep hb above 8. H/H stable for now  ·	HFrEF compensated-Continue his current diuretic dose.  ·	HTN controlled-Continue amlodipine and losartan  ·	CAD S/P CABG stable. Continue metoprolol, statin. Holding ASA due to GI bleed.  ·	Persistent afib-Holding Xarelto due to active GI bilateraleed.F/U  for Watchman device as outpt.   ·	Handoff: Plan for EGD tomorrow   76 yo male with a PMHx of HTN, HLD, CAD s/p CABG, CHF (s/p Pacemaker; ABBOTS  P/G [id number]), A-fib and LAAT on xarelto, hard of hearing, osteoarthritis, and breast CA s/p R mastectomy in 2001, with recent hospitalization for cholecystitis s/p IR percutaneous cholecystostomy on 4/3, presents for 1 week of melena. 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 NPO midnight  ·	keep hb above 8. H/H stable for now  ·	HFrEF compensated-Continue his current diuretic dose.  ·	HTN controlled-Continue amlodipine and losartan  ·	CAD S/P CABG stable. Continue metoprolol, statin. Holding ASA due to GI bleed.  ·	Persistent afib-Holding Xarelto due to active GI bilateraleed.F/U  for Watchman device as outpt.   ·	L atrial thrombus- [facility] 03/2023- will start on heparin inf and hold  4 hrs prior to EGD in AM. Discussed with GI  ·	Handoff: Plan for EGD tomorrow, Hold heparin 4 hrs prior to EGD tomorrow

## 2023-06-05 ENCOUNTER — APPOINTMENT (OUTPATIENT)
Dept: ELECTROPHYSIOLOGY | Facility: CLINIC | Age: 78
End: 2023-06-05

## 2023-06-05 ENCOUNTER — RESULT REVIEW (OUTPATIENT)
Age: 78
End: 2023-06-05

## 2023-06-05 ENCOUNTER — TRANSCRIPTION ENCOUNTER (OUTPATIENT)
Age: 78
End: 2023-06-05

## 2023-06-05 LAB
APTT BLD: 32.2 SEC — SIGNIFICANT CHANGE UP (ref 27–39.2)
BLD GP AB SCN SERPL QL: SIGNIFICANT CHANGE UP
HCT VFR BLD CALC: 30.7 % — LOW (ref 42–52)
HGB BLD-MCNC: 9.8 G/DL — LOW (ref 14–18)
MCHC RBC-ENTMCNC: 28.7 PG — SIGNIFICANT CHANGE UP (ref 27–31)
MCHC RBC-ENTMCNC: 31.9 G/DL — LOW (ref 32–37)
MCV RBC AUTO: 90 FL — SIGNIFICANT CHANGE UP (ref 80–94)
NRBC # BLD: 0 /100 WBCS — SIGNIFICANT CHANGE UP (ref 0–0)
PLATELET # BLD AUTO: 312 K/UL — SIGNIFICANT CHANGE UP (ref 130–400)
PMV BLD: 10.3 FL — SIGNIFICANT CHANGE UP (ref 7.4–10.4)
RBC # BLD: 3.41 M/UL — LOW (ref 4.7–6.1)
RBC # FLD: 14.6 % — HIGH (ref 11.5–14.5)
WBC # BLD: 8.82 K/UL — SIGNIFICANT CHANGE UP (ref 4.8–10.8)
WBC # FLD AUTO: 8.82 K/UL — SIGNIFICANT CHANGE UP (ref 4.8–10.8)

## 2023-06-05 PROCEDURE — 99233 SBSQ HOSP IP/OBS HIGH 50: CPT

## 2023-06-05 PROCEDURE — 43239 EGD BIOPSY SINGLE/MULTIPLE: CPT

## 2023-06-05 PROCEDURE — 88312 SPECIAL STAINS GROUP 1: CPT | Mod: 26

## 2023-06-05 PROCEDURE — 88305 TISSUE EXAM BY PATHOLOGIST: CPT | Mod: 26

## 2023-06-05 RX ORDER — HEPARIN SODIUM 5000 [USP'U]/ML
8500 INJECTION INTRAVENOUS; SUBCUTANEOUS EVERY 6 HOURS
Refills: 0 | Status: DISCONTINUED | OUTPATIENT
Start: 2023-06-05 | End: 2023-06-07

## 2023-06-05 RX ORDER — HEPARIN SODIUM 5000 [USP'U]/ML
INJECTION INTRAVENOUS; SUBCUTANEOUS
Qty: 25000 | Refills: 0 | Status: DISCONTINUED | OUTPATIENT
Start: 2023-06-05 | End: 2023-06-07

## 2023-06-05 RX ORDER — HEPARIN SODIUM 5000 [USP'U]/ML
4000 INJECTION INTRAVENOUS; SUBCUTANEOUS EVERY 6 HOURS
Refills: 0 | Status: DISCONTINUED | OUTPATIENT
Start: 2023-06-05 | End: 2023-06-07

## 2023-06-05 RX ADMIN — PANTOPRAZOLE SODIUM 40 MILLIGRAM(S): 20 TABLET, DELAYED RELEASE ORAL at 17:07

## 2023-06-05 RX ADMIN — Medication 20 MILLIGRAM(S): at 05:42

## 2023-06-05 RX ADMIN — Medication 50 MILLIGRAM(S): at 17:06

## 2023-06-05 RX ADMIN — HEPARIN SODIUM 1800 UNIT(S)/HR: 5000 INJECTION INTRAVENOUS; SUBCUTANEOUS at 15:51

## 2023-06-05 RX ADMIN — ATORVASTATIN CALCIUM 20 MILLIGRAM(S): 80 TABLET, FILM COATED ORAL at 21:27

## 2023-06-05 RX ADMIN — Medication 20 MILLIGRAM(S): at 15:27

## 2023-06-05 RX ADMIN — Medication 50 MILLIGRAM(S): at 05:42

## 2023-06-05 RX ADMIN — CHLORHEXIDINE GLUCONATE 1 APPLICATION(S): 213 SOLUTION TOPICAL at 05:28

## 2023-06-05 RX ADMIN — PANTOPRAZOLE SODIUM 40 MILLIGRAM(S): 20 TABLET, DELAYED RELEASE ORAL at 05:42

## 2023-06-05 NOTE — DIETITIAN INITIAL EVALUATION ADULT - COLLABORATION WITH OTHER PROVIDERS
Interventions: coordination of care  Monitoring/Evaluation: diet order, energy intake, weight, labs, skin status, NFPE

## 2023-06-05 NOTE — PROGRESS NOTE ADULT - ASSESSMENT
76 yo male with a PMHx of HTN, HLD, CAD s/p CABG, CHF (s/p Pacemaker; ABBOTS  P/G [id number]), A-fib and LAAT on xarelto, hard of hearing, osteoarthritis, and breast CA s/p R mastectomy in 2001, with recent hospitalization for cholecystitis s/p IR percutaneous cholecystostomy on 4/3, presents for 1 week of melena. 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 H/H stable for now  ·	HFrEF compensated-Continue his current diuretic dose.  ·	HTN controlled-Continue amlodipine and losartan  ·	CAD S/P CABG stable. Continue metoprolol, statin. Holding ASA due to GI bleed.  ·	Persistent afib-Holding Xarelto due to active GI bileed. F/U  for Watchman device as outpt.   ·	L atrial thrombus- [facility]IS 03/2023-  on heparin inf and hold  4 hrs prior to EGD. Discussed with GI  ·	Handoff: Plan for EGD today, Hold heparin 4 hrs prior to EGD

## 2023-06-05 NOTE — PROGRESS NOTE ADULT - SUBJECTIVE AND OBJECTIVE BOX
WAGNER SEGURA 77y Male  MRN#: [id number]   CODE STATUS: FULL     Hospital Day: 3d    Pt is currently admitted with the primary diagnosis of GI bleed    SUBJECTIVE  Hospital Course:    Overnight events: No overnight events     Subjective complaints:                                                ----------------------------------------------------------  OBJECTIVE  PAST MEDICAL & SURGICAL HISTORY  Arrhythmia    Hypercholesteremia    HTN (hypertension)    Obesity    CHF (congestive heart failure)    Heart attack    Breast cancer  2001    Osteoarthritis    CAD (coronary artery disease)    Atrial fibrillation    [place] (hard of hearing)    S/P CABG x 5  2014    H/O mastectomy  right 2001    Pacemaker                                              -----------------------------------------------------------  ALLERGIES:  No Known Allergies                                            ------------------------------------------------------------    HOME MEDICATIONS  Home Medications:  amLODIPine 5 mg oral tablet: 1 tab(s) orally once a day (14 Mar 2023 14:29)  atorvastatin 20 mg oral tablet: 1 tab(s) orally once a day (14 Mar 2023 14:29)  furosemide 20 mg oral tablet: 1 tab(s) orally 2 times a day (14 Mar 2023 14:29)  losartan 100 mg oral tablet: 1 tab(s) orally once a day (14 Mar 2023 14:29)  metoprolol tartrate 50 mg oral tablet: 1 tab(s) orally 2 times a day (14 Mar 2023 14:29)                           MEDICATIONS:  STANDING MEDICATIONS  atorvastatin 20 milliGRAM(s) Oral at bedtime  chlorhexidine 2% Cloths 1 Application(s) Topical <User Schedule>  furosemide    Tablet 20 milliGRAM(s) Oral two times a day  metoprolol tartrate 50 milliGRAM(s) Oral two times a day  pantoprazole  Injectable 40 milliGRAM(s) IV Push two times a day    PRN MEDICATIONS                                            ------------------------------------------------------------  VITAL SIGNS: Last 24 Hours  T(C): 36.4 (05 Jun 2023 05:20), Max: 36.4 (05 Jun 2023 05:20)  T(F): 97.6 (05 Jun 2023 05:20), Max: 97.6 (05 Jun 2023 05:20)  HR: 60 (05 Jun 2023 05:20) (60 - 60)  BP: 123/59 (05 Jun 2023 05:20) (119/58 - 133/60)  BP(mean): --  RR: 18 (05 Jun 2023 05:20) (18 - 18)  SpO2: --      06-04-23 @ 07:01  -  06-05-23 @ 07:00  --------------------------------------------------------  IN: 0 mL / OUT: 700 mL / NET: -700 mL                                             --------------------------------------------------------------  LABS:                        9.2    8.67  )-----------( 283      ( 04 Jun 2023 11:59 )             28.3     06-04    137  |  103  |  7<L>  ----------------------------<  104<H>  4.2   |  23  |  0.9    Ca    8.2<L>      04 Jun 2023 11:59      PTT - ( 04 Jun 2023 21:09 )  PTT:159.3 sec                                            -------------------------------------------------------------  RADIOLOGY:                                            --------------------------------------------------------------    PHYSICAL EXAM:  GENERAL: NAD, well-developed, AAOx3  HEENT:  Atraumatic, Normocephalic. conjunctiva and sclera clear, No JVD  PULMONARY: Clear to auscultation bilaterally; No wheeze  CARDIOVASCULAR: Regular rate and rhythm; No murmurs, rubs, or gallops  GASTROINTESTINAL: Soft, Nontender, Nondistended; Bowel sounds present  MUSCULOSKELETAL:  2+ Peripheral Pulses, No clubbing, cyanosis, or edema  NEUROLOGY: non-focal  SKIN: No rashes or lesions                                           --------------------------------------------------------------    ASSESSMENT & PLAN    78 yo male with a PMHx of HTN, HLD, CAD s/p CABG, CHF (s/p Pacemaker; ABBOTS  P/G [id number]), A-fib and LAAT on xarelto, hard of hearing, osteoarthritis, and breast CA s/p R mastectomy in 2001, with recent hospitalization for cholecystitis s/p IR percutaneous cholecystostomy on 4/3, presents for 1 week of melena. Patient reports that he has been having black stools for the past week with no abdominal pain. no hematochezia or hematemesis. Patient never had GI bleed in the past. He does have lightheadedness but no syncope or fall. no dysphagia, odynophagia, weight changes, nausea, vomiting, fever, or any other symptoms. Patient is on xarelto and aspirin last dose 6/1. had EGD/ [facility] many years ago unremarkable per patient except for some polyps. Patient has cholecystostomy drain with greenish secretion nonbloody.    #GI Bleed - likely d/t PUD vs Irais Ronquillo tear vs AVM vs Esophageal ulcer vs Erosive Hemorrhagic Gastritis vs Dieulafoy lesion Vs Malignancy vs Aortoenteric fistula  - Hemodynamically stable & no active bleeding  - Baseline hemoglobin - 12  - Hemoglobin on admission - 7.4  - Coags - INR 1.4  - Last use of Antithrombotic 6/1 AM  - JACKY + black stools ( done by GI team)  - start pantoprazole 40 IV BID  - Transfuse 1-2 unit PRBC. keep hb above 8  - GI eval --> plan for EGD early next week  - Cardiac clearance   - Liquid diet   - Maintain active Type and screen  - Trend H&H BID  - hold xarelto. 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 discuss with cardio team  - hold bp meds for now due to GI bleed and mild hypotension  - continue statin    #Breast CA s/p R mastectomy  - OP Follow up    #Misc:  #DVT PPX: SCD for today. reevaluate tomorrow  #GI PPX: protonix  #Diet: liquid  #Activity: as tolerated  #Dispo: admit             WAGNER SEGURA 77y Male  MRN#: [id number]   CODE STATUS: FULL     Hospital Day: 3d    Pt is currently admitted with the primary diagnosis of GI bleed    SUBJECTIVE  Hospital Course:  78 yo male with a PMHx of HTN, HLD, CAD s/p CABG, CHF (s/p Pacemaker; ABBOTS  P/G [id number]), A-fib and LAAT on xarelto, hard of hearing, osteoarthritis, and breast CA s/p R mastectomy in 2001, with recent hospitalization for cholecystitis s/p IR percutaneous cholecystostomy on 4/3, presents for 1 week of melena. Patient reports that he has been having black stools for the past week with no abdominal pain. no hematochezia or hematemesis. Patient never had GI bleed in the past. He does have lightheadedness but no syncope or fall. no dysphagia, odynophagia, weight changes, nausea, vomiting, fever, or any other symptoms. Patient is on xarelto and aspirin last dose 6/1. had EGD/ [facility] many years ago unremarkable per patient except for some polyps. Patient has cholecystostomy drain with greenish secretion nonbloody.     In ED, he was HD stable. labs showed hb 7.4 (baseline 12). INR 1.4, BUN 21 with normal Cr. he was given 2 u prbc and admitted to floor. (02 Jun 2023 15:45)    Today, patient was seen and examined at bedside. He is resting comfortably and not in acute distress. Patient is currently HD stable. Labs showed hb 9.2 hct 28.3. Patient is NPO since midnight for his upcoming EGD. Heparin was stopped at 5 am- held 4 hours prior to EGD was discussed with GI.    Overnight events: No overnight events     Subjective complaints:  Patient denied any headaches, lightheadedness, chest pain, palpitations, shortness of breath, nausea, vomiting, diarrhea, abdominal pain, or any new episodes of melena. Patient's last BM was on Saturday.                                               ----------------------------------------------------------  OBJECTIVE  PAST MEDICAL & SURGICAL HISTORY  Arrhythmia    Hypercholesteremia    HTN (hypertension)    Obesity    CHF (congestive heart failure)    Heart attack    Breast cancer  2001    Osteoarthritis    CAD (coronary artery disease)    Atrial fibrillation    [place] (hard of hearing)    S/P CABG x 5  2014    H/O mastectomy  right 2001    Pacemaker                                              -----------------------------------------------------------  ALLERGIES:  No Known Allergies                                            ------------------------------------------------------------    HOME MEDICATIONS  Home Medications:  amLODIPine 5 mg oral tablet: 1 tab(s) orally once a day (14 Mar 2023 14:29)  atorvastatin 20 mg oral tablet: 1 tab(s) orally once a day (14 Mar 2023 14:29)  furosemide 20 mg oral tablet: 1 tab(s) orally 2 times a day (14 Mar 2023 14:29)  losartan 100 mg oral tablet: 1 tab(s) orally once a day (14 Mar 2023 14:29)  metoprolol tartrate 50 mg oral tablet: 1 tab(s) orally 2 times a day (14 Mar 2023 14:29)                           MEDICATIONS:  STANDING MEDICATIONS  atorvastatin 20 milliGRAM(s) Oral at bedtime  chlorhexidine 2% Cloths 1 Application(s) Topical <User Schedule>  furosemide    Tablet 20 milliGRAM(s) Oral two times a day  metoprolol tartrate 50 milliGRAM(s) Oral two times a day  pantoprazole  Injectable 40 milliGRAM(s) IV Push two times a day    PRN MEDICATIONS                                            ------------------------------------------------------------  VITAL SIGNS: Last 24 Hours  T(C): 36.4 (05 Jun 2023 05:20), Max: 36.4 (05 Jun 2023 05:20)  T(F): 97.6 (05 Jun 2023 05:20), Max: 97.6 (05 Jun 2023 05:20)  HR: 60 (05 Jun 2023 05:20) (60 - 60)  BP: 123/59 (05 Jun 2023 05:20) (119/58 - 133/60)  BP(mean): --  RR: 18 (05 Jun 2023 05:20) (18 - 18)  SpO2: --      06-04-23 @ 07:01  -  06-05-23 @ 07:00  --------------------------------------------------------  IN: 0 mL / OUT: 700 mL / NET: -700 mL                                             --------------------------------------------------------------  LABS:                        9.2    8.67  )-----------( 283      ( 04 Jun 2023 11:59 )             28.3     06-04    137  |  103  |  7<L>  ----------------------------<  104<H>  4.2   |  23  |  0.9    Ca    8.2<L>      04 Jun 2023 11:59      PTT - ( 04 Jun 2023 21:09 )  PTT:159.3 sec                                            -------------------------------------------------------------  RADIOLOGY: None                                            --------------------------------------------------------------    PHYSICAL EXAM:  GENERAL: NAD, well-developed, AAOx4  HEENT:  Atraumatic, Normocephalic. conjunctiva and sclera clear, No JVD  PULMONARY: Clear to auscultation bilaterally; No wheezing or crackles  CARDIOVASCULAR: Regular rate and rhythm; No murmurs, rubs, or gallops  GASTROINTESTINAL: Soft, Nontender, Nondistended; Bowel sounds present  MUSCULOSKELETAL:  2+ Peripheral Pulses, No clubbing, cyanosis, or edema  NEUROLOGY: non-focal  SKIN: Seborrheic Keratosis noted on abdomen. Skin discoloration on right leg.  EXTREMITIES: Non-edematous                                           --------------------------------------------------------------    ASSESSMENT & PLAN    78 yo male with a PMHx of HTN, HLD, CAD s/p CABG, CHF (s/p Pacemaker; ABBOTS  P/G [id number]), A-fib and LAAT on xarelto, hard of hearing, osteoarthritis, and breast CA s/p R mastectomy in 2001, with recent hospitalization for cholecystitis s/p IR percutaneous cholecystostomy on 4/3, presents for 1 week of melena.    #GI Bleed - likely d/t PUD vs Irais Ronquillo tear vs AVM vs Esophageal ulcer vs Erosive Hemorrhagic Gastritis vs Dieulafoy lesion Vs Malignancy vs Aortoenteric fistula  - EGD today  - Hemodynamically stable & no active bleeding. No BM today  - Continue pantoprazole 40mg IV push BID  -  Keep hb above 8. 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 Continue metoprolol tartrate 50mg PO BID, Atorvastatin 20mg PO HS. -Holding ASA due to GI bleed.  -Persistent afib-Holding Xarelto due to active GI bleed. F/U  for Watchman device as outpt.       #Breast CA s/p R mastectomy  - OP Follow up    #Misc:  #DVT PPX: SCD for today. Re-evaluate tomorrow.  #GI PPX: Protonix  #Diet: clear liquid  #Activity: As tolerated  #Dispo: Admit             WAGNER SEGURA 77y Male  MRN#: [id number]   CODE STATUS: FULL     Hospital Day: 3d    Pt is currently admitted with the primary diagnosis of GI bleed    SUBJECTIVE  Hospital Course:  76 yo male with a PMHx of HTN, HLD, CAD s/p CABG, CHF (s/p Pacemaker; ABBOTS  P/G [id number]), A-fib and LAAT on xarelto, hard of hearing, osteoarthritis, and breast CA s/p R mastectomy in 2001, with recent hospitalization for cholecystitis s/p IR percutaneous cholecystostomy on 4/3, presents for 1 week of melena. Patient reports that he has been having black stools for the past week with no abdominal pain. no hematochezia or hematemesis. Patient never had GI bleed in the past. He does have lightheadedness but no syncope or fall. no dysphagia, odynophagia, weight changes, nausea, vomiting, fever, or any other symptoms. Patient is on xarelto and aspirin last dose 6/1. had EGD/ [facility] many years ago unremarkable per patient except for some polyps. Patient has cholecystostomy drain with greenish secretion nonbloody.     In ED, he was HD stable. labs showed hb 7.4 (baseline 12). INR 1.4, BUN 21 with normal Cr. he was given 2 u prbc and admitted to floor. (02 Jun 2023 15:45)    Today, patient was seen and examined at bedside. He is resting comfortably and not in acute distress. Patient is currently HD stable. Labs showed hb 9.2 hct 28.3. Patient is NPO since midnight for his upcoming EGD. Heparin was stopped at 5 am- held 4 hours prior to EGD was discussed with GI.    Overnight events: No overnight events     Subjective complaints:  Patient denied any headaches, lightheadedness, chest pain, palpitations, shortness of breath, nausea, vomiting, diarrhea, abdominal pain, or any new episodes of melena. Patient's last BM was on Saturday.                                               ----------------------------------------------------------  OBJECTIVE  PAST MEDICAL & SURGICAL HISTORY  Arrhythmia    Hypercholesteremia    HTN (hypertension)    Obesity    CHF (congestive heart failure)    Heart attack    Breast cancer  2001    Osteoarthritis    CAD (coronary artery disease)    Atrial fibrillation    [place] (hard of hearing)    S/P CABG x 5  2014    H/O mastectomy  right 2001    Pacemaker                                              -----------------------------------------------------------  ALLERGIES:  No Known Allergies                                            ------------------------------------------------------------    HOME MEDICATIONS  Home Medications:  amLODIPine 5 mg oral tablet: 1 tab(s) orally once a day (14 Mar 2023 14:29)  atorvastatin 20 mg oral tablet: 1 tab(s) orally once a day (14 Mar 2023 14:29)  furosemide 20 mg oral tablet: 1 tab(s) orally 2 times a day (14 Mar 2023 14:29)  losartan 100 mg oral tablet: 1 tab(s) orally once a day (14 Mar 2023 14:29)  metoprolol tartrate 50 mg oral tablet: 1 tab(s) orally 2 times a day (14 Mar 2023 14:29)                           MEDICATIONS:  STANDING MEDICATIONS  atorvastatin 20 milliGRAM(s) Oral at bedtime  chlorhexidine 2% Cloths 1 Application(s) Topical <User Schedule>  furosemide    Tablet 20 milliGRAM(s) Oral two times a day  metoprolol tartrate 50 milliGRAM(s) Oral two times a day  pantoprazole  Injectable 40 milliGRAM(s) IV Push two times a day    PRN MEDICATIONS                                            ------------------------------------------------------------  VITAL SIGNS: Last 24 Hours  T(C): 36.4 (05 Jun 2023 05:20), Max: 36.4 (05 Jun 2023 05:20)  T(F): 97.6 (05 Jun 2023 05:20), Max: 97.6 (05 Jun 2023 05:20)  HR: 60 (05 Jun 2023 05:20) (60 - 60)  BP: 123/59 (05 Jun 2023 05:20) (119/58 - 133/60)  BP(mean): --  RR: 18 (05 Jun 2023 05:20) (18 - 18)  SpO2: --      06-04-23 @ 07:01  -  06-05-23 @ 07:00  --------------------------------------------------------  IN: 0 mL / OUT: 700 mL / NET: -700 mL                                             --------------------------------------------------------------  LABS:                        9.2    8.67  )-----------( 283      ( 04 Jun 2023 11:59 )             28.3     06-04    137  |  103  |  7<L>  ----------------------------<  104<H>  4.2   |  23  |  0.9    Ca    8.2<L>      04 Jun 2023 11:59      PTT - ( 04 Jun 2023 21:09 )  PTT:159.3 sec                                            -------------------------------------------------------------  RADIOLOGY: None                                            --------------------------------------------------------------    PHYSICAL EXAM:  GENERAL: NAD, well-developed, AAOx4  HEENT:  Atraumatic, Normocephalic. conjunctiva and sclera clear, No JVD  PULMONARY: Clear to auscultation bilaterally; No wheezing or crackles  CARDIOVASCULAR: Regular rate and rhythm; No murmurs, rubs, or gallops  GASTROINTESTINAL: Soft, Nontender, Nondistended; Bowel sounds present  MUSCULOSKELETAL:  2+ Peripheral Pulses, No clubbing, cyanosis, or edema  NEUROLOGY: non-focal  SKIN: Seborrheic Keratosis noted on abdomen. Skin discoloration on right leg.  EXTREMITIES: Non-edematous                                           --------------------------------------------------------------    ASSESSMENT & PLAN    76 yo male with a PMHx of HTN, HLD, CAD s/p CABG, CHF (s/p Pacemaker; ABBOTS  P/G [id number]), A-fib and LAAT on xarelto, hard of hearing, osteoarthritis, and breast CA s/p R mastectomy in 2001, with recent hospitalization for cholecystitis s/p IR percutaneous cholecystostomy on 4/3, presents for 1 week of melena.    #UGI Bleed - likely d/t PUD vs Irais Ronquillo tear vs AVM vs Esophageal ulcer vs Erosive Hemorrhagic Gastritis vs Dieulafoy lesion Vs Malignancy  - hgb 7.4 on admission (baseline 12), s/p 3 units total  - today hgb 9.8, stable   - no active bleeding. No BM since 3 days   - GI following: Planned for EGD today  - Continue pantoprazole 40mg IV push BID  - Keep hb above 8.  - Maintain active Type and screen  - Trend H&H BID  - hold xarelto, on heparin gtt. 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   - Holding ASA due to GI bleed.  - hold xarelto, on heparin gtt. Held at 5am for EGD today   - resume heparin gtt post EGD if ok with GI.   - monitor PTTs  - F/U  for Watchman device as outpt.     #Breast CA s/p R mastectomy  - OP Follow up    #Misc:  #DVT PPX: SCD for today. Re-evaluate tomorrow.  #GI PPX: Protonix  #Diet: clear liquid  #Activity: As tolerated  #Dispo: Acute              WAGNER SEGURA 77y Male  MRN#: [id number]   CODE STATUS: FULL     Hospital Day: 3d    Pt is currently admitted with the primary diagnosis of GI bleed    SUBJECTIVE  Hospital Course:  78 yo male with a PMHx of HTN, HLD, CAD s/p CABG, CHF (s/p Pacemaker; ABBOTS  P/G [id number]), A-fib and LAAT on xarelto, hard of hearing, osteoarthritis, and breast CA s/p R mastectomy in 2001, with recent hospitalization for cholecystitis s/p IR percutaneous cholecystostomy on 4/3, presents for 1 week of melena. Patient reports that he has been having black stools for the past week with no abdominal pain. no hematochezia or hematemesis. Patient never had GI bleed in the past. He does have lightheadedness but no syncope or fall. no dysphagia, odynophagia, weight changes, nausea, vomiting, fever, or any other symptoms. Patient is on xarelto and aspirin last dose 6/1. had EGD/ [facility] many years ago unremarkable per patient except for some polyps. Patient has cholecystostomy drain with greenish secretion nonbloody.     In ED, he was HD stable. labs showed hb 7.4 (baseline 12). INR 1.4, BUN 21 with normal Cr. he was given 2 u prbc and admitted to floor. (02 Jun 2023 15:45)    Today, patient was seen and examined at bedside. He is resting comfortably and not in acute distress. Patient is currently HD stable. Labs showed hb 9.2 hct 28.3. Patient is NPO since midnight for his upcoming EGD. Heparin was stopped at 5 am- held 4 hours prior to EGD was discussed with GI.    Overnight events: No overnight events     Subjective complaints:  Patient denied any headaches, lightheadedness, chest pain, palpitations, shortness of breath, nausea, vomiting, diarrhea, abdominal pain, or any new episodes of melena. Patient's last BM was on Saturday.                                               ----------------------------------------------------------  OBJECTIVE  PAST MEDICAL & SURGICAL HISTORY  Arrhythmia    Hypercholesteremia    HTN (hypertension)    Obesity    CHF (congestive heart failure)    Heart attack    Breast cancer  2001    Osteoarthritis    CAD (coronary artery disease)    Atrial fibrillation    [place] (hard of hearing)    S/P CABG x 5  2014    H/O mastectomy  right 2001    Pacemaker                                              -----------------------------------------------------------  ALLERGIES:  No Known Allergies                                            ------------------------------------------------------------    HOME MEDICATIONS  Home Medications:  amLODIPine 5 mg oral tablet: 1 tab(s) orally once a day (14 Mar 2023 14:29)  atorvastatin 20 mg oral tablet: 1 tab(s) orally once a day (14 Mar 2023 14:29)  furosemide 20 mg oral tablet: 1 tab(s) orally 2 times a day (14 Mar 2023 14:29)  losartan 100 mg oral tablet: 1 tab(s) orally once a day (14 Mar 2023 14:29)  metoprolol tartrate 50 mg oral tablet: 1 tab(s) orally 2 times a day (14 Mar 2023 14:29)                           MEDICATIONS:  STANDING MEDICATIONS  atorvastatin 20 milliGRAM(s) Oral at bedtime  chlorhexidine 2% Cloths 1 Application(s) Topical <User Schedule>  furosemide    Tablet 20 milliGRAM(s) Oral two times a day  metoprolol tartrate 50 milliGRAM(s) Oral two times a day  pantoprazole  Injectable 40 milliGRAM(s) IV Push two times a day    PRN MEDICATIONS                                            ------------------------------------------------------------  VITAL SIGNS: Last 24 Hours  T(C): 36.4 (05 Jun 2023 05:20), Max: 36.4 (05 Jun 2023 05:20)  T(F): 97.6 (05 Jun 2023 05:20), Max: 97.6 (05 Jun 2023 05:20)  HR: 60 (05 Jun 2023 05:20) (60 - 60)  BP: 123/59 (05 Jun 2023 05:20) (119/58 - 133/60)  BP(mean): --  RR: 18 (05 Jun 2023 05:20) (18 - 18)  SpO2: --      06-04-23 @ 07:01  -  06-05-23 @ 07:00  --------------------------------------------------------  IN: 0 mL / OUT: 700 mL / NET: -700 mL                                             --------------------------------------------------------------  LABS:                        9.2    8.67  )-----------( 283      ( 04 Jun 2023 11:59 )             28.3     06-04    137  |  103  |  7<L>  ----------------------------<  104<H>  4.2   |  23  |  0.9    Ca    8.2<L>      04 Jun 2023 11:59      PTT - ( 04 Jun 2023 21:09 )  PTT:159.3 sec                                            -------------------------------------------------------------  RADIOLOGY: None                                            --------------------------------------------------------------    PHYSICAL EXAM:  GENERAL: NAD, well-developed, AAOx4  HEENT:  Atraumatic, Normocephalic. conjunctiva and sclera clear, No JVD  PULMONARY: Clear to auscultation bilaterally; No wheezing or crackles  CARDIOVASCULAR: Regular rate and rhythm; No murmurs, rubs, or gallops  GASTROINTESTINAL: Soft, Nontender, Nondistended; Bowel sounds present  MUSCULOSKELETAL:  2+ Peripheral Pulses, No clubbing, cyanosis, or edema  NEUROLOGY: non-focal  SKIN: Seborrheic Keratosis noted on abdomen. Skin discoloration on right leg.  EXTREMITIES: Non-edematous                                           --------------------------------------------------------------    ASSESSMENT & PLAN    78 yo male with a PMHx of HTN, HLD, CAD s/p CABG, CHF (s/p Pacemaker; ABBOTS  P/G [id number]), A-fib and LAAT on xarelto, hard of hearing, osteoarthritis, and breast CA s/p R mastectomy in 2001, with recent hospitalization for cholecystitis s/p IR percutaneous cholecystostomy on 4/3, presents for 1 week of melena.    #UGI Bleed - likely d/t PUD vs Irais Ronquillo tear vs AVM vs Esophageal ulcer vs Erosive Hemorrhagic Gastritis vs Dieulafoy lesion Vs Malignancy  - hgb 7.4 on admission (baseline 12), s/p 3 units total  - today hgb 9.8, stable   - no active bleeding. No BM since 3 days   - GI following: Planned for EGD today  - Continue pantoprazole 40mg IV push BID  - Keep hb above 8.  - Maintain active Type and screen  - Trend H&H BID  - hold xarelto, on heparin gtt. 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   - Holding ASA due to GI bleed.  - hold xarelto, on heparin gtt. Held at 5am for EGD today   - resume heparin gtt post EGD if ok with GI.   - monitor PTTs  - F/U Dr. Ojeda for Watchman device as outpt.      #Breast CA s/p R mastectomy  - OP Follow up    #Misc:  #DVT PPX: SCD for today. Re-evaluate tomorrow.  #GI PPX: Protonix  #Diet: clear liquid  #Activity: As tolerated  #Dispo: Acute

## 2023-06-05 NOTE — DIETITIAN INITIAL EVALUATION ADULT - ORAL INTAKE PTA/DIET HISTORY
Patient reports good appetite and PO intake PTA. He was following a fat free diet. No vitamin/mineral supplements taken and no oral nutrition supplements taken. UBW: 230 - 240 lbs (6 months ago). NKFA, no food intolerances or other restrictions reported.     Patient currently NPO for procedure

## 2023-06-05 NOTE — DIETITIAN INITIAL EVALUATION ADULT - OTHER CALCULATIONS
Energy: 1801 - 2162 kcal/day (MSJ x 1.0 - 1.2 SF)   estimated needs with consideration for age, overweight BMI

## 2023-06-05 NOTE — DIETITIAN INITIAL EVALUATION ADULT - PERTINENT MEDS FT
MEDICATIONS  (STANDING):  atorvastatin 20 milliGRAM(s) Oral at bedtime  chlorhexidine 2% Cloths 1 Application(s) Topical <User Schedule>  furosemide    Tablet 20 milliGRAM(s) Oral two times a day  metoprolol tartrate 50 milliGRAM(s) Oral two times a day  pantoprazole  Injectable 40 milliGRAM(s) IV Push two times a day    MEDICATIONS  (PRN):

## 2023-06-05 NOTE — DIETITIAN INITIAL EVALUATION ADULT - OTHER INFO
Patient is 76 yo male with PMHx of HTN, HLD, CAD s/p CABG, CHF, Afib, and LAAT, hard of hearing, osteoarthritis, and breast CA s/p R mastectomy in 2001, with recent hospitalization for cholecystitis s/p IR percutaneous cholecystomy on 4/3, presents for 1 week of melena abdon  Suspected UGIB; CAD s/p CABG; Chronic AFib; HFmrEF s/p PPM; HTN/HLD; No melena reported today, NPO for EGD today.

## 2023-06-05 NOTE — PROGRESS NOTE ADULT - SUBJECTIVE AND OBJECTIVE BOX
Progress Note:  Provider Speciality                            Hospitalist      WAGNER SEGURA MRN-[id number] 77y Male     CHIEF PRESENTING COMPLAINT:  Patient is a 77y old  Male who presents with a chief complaint of melena (03 Jun 2023 11:56)        SUBJECTIVE:  Patient was seen and examined at bedside. No new complaints described by patient in morning rounds.   No significant overnight events reported.     HISTORY OF PRESENTING ILLNESS:  HPI:  78 yo male with a PMHx of HTN, HLD, CAD s/p CABG, CHF (s/p Pacemaker; ABBOTS  P/G [id number]), A-fib and LAAT on xarelto, hard of hearing, osteoarthritis, and breast CA s/p R mastectomy in 2001, with recent hospitalization for cholecystitis s/p IR percutaneous cholecystostomy on 4/3, presents for 1 week of melena. Patient reports that he has been having black stools for the past week with no abdominal pain. no hematochezia or hematemesis. Patient never had GI bleed in the past. He does have lightheadedness but no syncope or fall. no dysphagia, odynophagia, weight changes, nausea, vomiting, fever, or any other symptoms. Patient is on xarelto and aspirin last dose 6/1. had EGD/ [facility] many years ago unremarkable per patient except for some polyps. Patient has cholecystostomy drain with greenish secretion nonbloody. Last BM yesterday.    In ED, he was HD stable. labs showed hb 7.4 (baseline 12). INR 1.4, BUN 21 with normal Cr. he was given 2 u prbc and admitted to floor. (02 Jun 2023 15:45)        REVIEW OF SYSTEMS:  Patient denies  headache, fever, chills. Denies chest pain, shortness of breath, palpitation. Denies nausea, vomiting, abdominal pain or diarrhoea, Denies dysuria.   At least 10 systems were reviewed in ROS. All systems reviewed  are within normal limits except for the complaints as described in Subjective.    PAST MEDICAL & SURGICAL HISTORY:  PAST MEDICAL & SURGICAL HISTORY:  Arrhythmia      Hypercholesteremia      HTN (hypertension)      Obesity      CHF (congestive heart failure)      Heart attack      Breast cancer  2001      Osteoarthritis      CAD (coronary artery disease)      Atrial fibrillation      [place] (hard of hearing)      S/P CABG x 5  2014      H/O mastectomy  right 2001      Pacemaker              VITAL SIGNS:  Vital Signs Last 24 Hrs  T(C): 36.4 (05 Jun 2023 05:20), Max: 36.4 (05 Jun 2023 05:20)  T(F): 97.6 (05 Jun 2023 05:20), Max: 97.6 (05 Jun 2023 05:20)  HR: 60 (05 Jun 2023 05:20) (60 - 60)  BP: 123/59 (05 Jun 2023 05:20) (119/58 - 133/60)  BP(mean): --  RR: 18 (05 Jun 2023 05:20) (18 - 18)  SpO2: --          PHYSICAL EXAMINATION:  Not in acute distress  General: No icterus  HEENT:   no JVD.  Heart: S1+S2 audible  Lungs: bilateral  moderate air entry, no wheezing, no crepitations.  Abdomen: Soft, non-tender, non-distended , no  rigidity or guarding.  CNS: Awake alert, CN  grossly intact.  Extremities:  No edema            CONSULTS:  Consultant(s) Notes Reviewed by me.   Care Discussed with Consultants/Other Providers where required.    All the images and labs were reviewed today        MEDICATIONS:  MEDICATIONS  (STANDING):  atorvastatin 20 milliGRAM(s) Oral at bedtime  chlorhexidine 2% Cloths 1 Application(s) Topical <User Schedule>  furosemide    Tablet 20 milliGRAM(s) Oral two times a day  metoprolol tartrate 50 milliGRAM(s) Oral two times a day  pantoprazole  Injectable 40 milliGRAM(s) IV Push two times a day    MEDICATIONS  (PRN):

## 2023-06-05 NOTE — DIETITIAN INITIAL EVALUATION ADULT - PERTINENT LABORATORY DATA
06-04    137  |  103  |  7<L>  ----------------------------<  104<H>  4.2   |  23  |  0.9    Ca    8.2<L>      04 Jun 2023 11:59    A1C with Estimated Average Glucose Result: 5.7 % (03-28-23 @ 06:15)

## 2023-06-05 NOTE — DIETITIAN INITIAL EVALUATION ADULT - NS FNS DIET ORDER
Diet, NPO after Midnight:      NPO Start Date: 04-Jun-2023,   NPO Start Time: 23:59 (06-04-23 @ 17:45)

## 2023-06-05 NOTE — DIETITIAN INITIAL EVALUATION ADULT - NAME AND PHONE
Diann Becerril, RD [phone] or via Teams    Patient is at high nutrition risk, RD to f/u in 3-5 days or PRN

## 2023-06-05 NOTE — DIETITIAN INITIAL EVALUATION ADULT - NSICDXPASTMEDICALHX_GEN_ALL_CORE_FT
PAST MEDICAL HISTORY:  Arrhythmia     Atrial fibrillation     Breast cancer 2001    CAD (coronary artery disease)     CHF (congestive heart failure)     Heart attack     [place] (hard of hearing)     HTN (hypertension)     Hypercholesteremia     Obesity     Osteoarthritis

## 2023-06-05 NOTE — DIETITIAN INITIAL EVALUATION ADULT - NS FNS REASON FOR WEIGHT CHANG
Weight Hx per EMR:  101.2 kg - 222.64 lbs (5/15/23)   103.1 kg - Weight Hx per EMR:  101.2 kg - 222.64 lbs (5/15/23)   103.1 kg - 226.82 lbs (4/3/23)  108.9 kg - 239.58 lbs (6/24/22)    Patient endorses weight loss was due to following a low fat diet/other (specify)

## 2023-06-05 NOTE — CHART NOTE - NSCHARTNOTEFT_GEN_A_CORE
PACU ANESTHESIA ADMISSION NOTE      Procedure:   Post op diagnosis:      ____  Intubated  TV:______       Rate: ______      FiO2: ______    _x___  Patent Airway    _x___  Full return of protective reflexes    _x___  Full recovery from anesthesia / back to baseline status    Vitals:    BP  123/56  P  87  R  14  Sat  97    Mental Status:  _x___ Awake   _____ Alert   _____ Drowsy   _____ Sedated    Nausea/Vomiting:  _x___  NO       ______Yes,   See Post - Op Orders         Pain Scale (0-10):  __0___    Treatment: _x___ None    ____ See Post - Op/PCA Orders    Post - Operative Fluids:   __x__ Oral   ____ See Post - Op Orders    Plan: Discharge:   _x___Home       _____Floor     _____Critical Care    _____  Other:_________________    Comments:  No anesthesia issues or complications noted.  Discharge when criteria met.

## 2023-06-06 LAB
ANION GAP SERPL CALC-SCNC: 9 MMOL/L — SIGNIFICANT CHANGE UP (ref 7–14)
APTT BLD: 144.7 SEC — CRITICAL HIGH (ref 27–39.2)
APTT BLD: 166.5 SEC — CRITICAL HIGH (ref 27–39.2)
APTT BLD: 193.5 SEC — CRITICAL HIGH (ref 27–39.2)
APTT BLD: SIGNIFICANT CHANGE UP SEC (ref 27–39.2)
BASOPHILS # BLD AUTO: 0.07 K/UL — SIGNIFICANT CHANGE UP (ref 0–0.2)
BASOPHILS NFR BLD AUTO: 0.7 % — SIGNIFICANT CHANGE UP (ref 0–1)
BUN SERPL-MCNC: 15 MG/DL — SIGNIFICANT CHANGE UP (ref 10–20)
CALCIUM SERPL-MCNC: 8.1 MG/DL — LOW (ref 8.4–10.5)
CHLORIDE SERPL-SCNC: 100 MMOL/L — SIGNIFICANT CHANGE UP (ref 98–110)
CO2 SERPL-SCNC: 25 MMOL/L — SIGNIFICANT CHANGE UP (ref 17–32)
CREAT SERPL-MCNC: 0.8 MG/DL — SIGNIFICANT CHANGE UP (ref 0.7–1.5)
EGFR: 91 ML/MIN/1.73M2 — SIGNIFICANT CHANGE UP
EOSINOPHIL # BLD AUTO: 0.33 K/UL — SIGNIFICANT CHANGE UP (ref 0–0.7)
EOSINOPHIL NFR BLD AUTO: 3.5 % — SIGNIFICANT CHANGE UP (ref 0–8)
GLUCOSE SERPL-MCNC: 116 MG/DL — HIGH (ref 70–99)
HCT VFR BLD CALC: 28.7 % — LOW (ref 42–52)
HGB BLD-MCNC: 9.4 G/DL — LOW (ref 14–18)
IMM GRANULOCYTES NFR BLD AUTO: 0.3 % — SIGNIFICANT CHANGE UP (ref 0.1–0.3)
LYMPHOCYTES # BLD AUTO: 1.94 K/UL — SIGNIFICANT CHANGE UP (ref 1.2–3.4)
LYMPHOCYTES # BLD AUTO: 20.5 % — SIGNIFICANT CHANGE UP (ref 20.5–51.1)
MAGNESIUM SERPL-MCNC: 2.3 MG/DL — SIGNIFICANT CHANGE UP (ref 1.8–2.4)
MCHC RBC-ENTMCNC: 29.3 PG — SIGNIFICANT CHANGE UP (ref 27–31)
MCHC RBC-ENTMCNC: 32.8 G/DL — SIGNIFICANT CHANGE UP (ref 32–37)
MCV RBC AUTO: 89.4 FL — SIGNIFICANT CHANGE UP (ref 80–94)
MONOCYTES # BLD AUTO: 0.64 K/UL — HIGH (ref 0.1–0.6)
MONOCYTES NFR BLD AUTO: 6.8 % — SIGNIFICANT CHANGE UP (ref 1.7–9.3)
NEUTROPHILS # BLD AUTO: 6.47 K/UL — SIGNIFICANT CHANGE UP (ref 1.4–6.5)
NEUTROPHILS NFR BLD AUTO: 68.2 % — SIGNIFICANT CHANGE UP (ref 42.2–75.2)
NRBC # BLD: 0 /100 WBCS — SIGNIFICANT CHANGE UP (ref 0–0)
PLATELET # BLD AUTO: 311 K/UL — SIGNIFICANT CHANGE UP (ref 130–400)
PMV BLD: 10.2 FL — SIGNIFICANT CHANGE UP (ref 7.4–10.4)
POTASSIUM SERPL-MCNC: 3.7 MMOL/L — SIGNIFICANT CHANGE UP (ref 3.5–5)
POTASSIUM SERPL-SCNC: 3.7 MMOL/L — SIGNIFICANT CHANGE UP (ref 3.5–5)
RBC # BLD: 3.21 M/UL — LOW (ref 4.7–6.1)
RBC # FLD: 14.4 % — SIGNIFICANT CHANGE UP (ref 11.5–14.5)
SODIUM SERPL-SCNC: 134 MMOL/L — LOW (ref 135–146)
SURGICAL PATHOLOGY STUDY: SIGNIFICANT CHANGE UP
WBC # BLD: 9.48 K/UL — SIGNIFICANT CHANGE UP (ref 4.8–10.8)
WBC # FLD AUTO: 9.48 K/UL — SIGNIFICANT CHANGE UP (ref 4.8–10.8)

## 2023-06-06 PROCEDURE — 99233 SBSQ HOSP IP/OBS HIGH 50: CPT

## 2023-06-06 RX ORDER — PANTOPRAZOLE SODIUM 20 MG/1
40 TABLET, DELAYED RELEASE ORAL
Refills: 0 | Status: DISCONTINUED | OUTPATIENT
Start: 2023-06-06 | End: 2023-06-11

## 2023-06-06 RX ORDER — SOD SULF/SODIUM/NAHCO3/KCL/PEG
4000 SOLUTION, RECONSTITUTED, ORAL ORAL ONCE
Refills: 0 | Status: COMPLETED | OUTPATIENT
Start: 2023-06-06 | End: 2023-06-06

## 2023-06-06 RX ADMIN — Medication 4000 MILLILITER(S): at 14:11

## 2023-06-06 RX ADMIN — HEPARIN SODIUM 900 UNIT(S)/HR: 5000 INJECTION INTRAVENOUS; SUBCUTANEOUS at 19:11

## 2023-06-06 RX ADMIN — PANTOPRAZOLE SODIUM 40 MILLIGRAM(S): 20 TABLET, DELAYED RELEASE ORAL at 18:14

## 2023-06-06 RX ADMIN — PANTOPRAZOLE SODIUM 40 MILLIGRAM(S): 20 TABLET, DELAYED RELEASE ORAL at 05:36

## 2023-06-06 RX ADMIN — Medication 20 MILLIGRAM(S): at 13:45

## 2023-06-06 RX ADMIN — HEPARIN SODIUM 0 UNIT(S)/HR: 5000 INJECTION INTRAVENOUS; SUBCUTANEOUS at 18:10

## 2023-06-06 RX ADMIN — Medication 50 MILLIGRAM(S): at 05:36

## 2023-06-06 RX ADMIN — HEPARIN SODIUM 1200 UNIT(S)/HR: 5000 INJECTION INTRAVENOUS; SUBCUTANEOUS at 10:58

## 2023-06-06 RX ADMIN — Medication 20 MILLIGRAM(S): at 21:03

## 2023-06-06 RX ADMIN — CHLORHEXIDINE GLUCONATE 1 APPLICATION(S): 213 SOLUTION TOPICAL at 05:36

## 2023-06-06 RX ADMIN — HEPARIN SODIUM 0 UNIT(S)/HR: 5000 INJECTION INTRAVENOUS; SUBCUTANEOUS at 01:57

## 2023-06-06 RX ADMIN — Medication 20 MILLIGRAM(S): at 05:36

## 2023-06-06 RX ADMIN — HEPARIN SODIUM 900 UNIT(S)/HR: 5000 INJECTION INTRAVENOUS; SUBCUTANEOUS at 23:46

## 2023-06-06 RX ADMIN — ATORVASTATIN CALCIUM 20 MILLIGRAM(S): 80 TABLET, FILM COATED ORAL at 21:03

## 2023-06-06 RX ADMIN — HEPARIN SODIUM 0 UNIT(S)/HR: 5000 INJECTION INTRAVENOUS; SUBCUTANEOUS at 09:56

## 2023-06-06 RX ADMIN — HEPARIN SODIUM 1500 UNIT(S)/HR: 5000 INJECTION INTRAVENOUS; SUBCUTANEOUS at 02:58

## 2023-06-06 RX ADMIN — Medication 50 MILLIGRAM(S): at 18:12

## 2023-06-06 NOTE — CHART NOTE - NSCHARTNOTEFT_GEN_A_CORE
Spoke with Dr. Handy, patient's cardiologist about anticoagulation as patient has been non-compliant with PTT draws.   Per Dr. Handy, pt has not had a repeat OTIS confirming resolution of LAAT. CHadvasc also high. Recommended continuing with heparin gtt. On discharge, when cleared by GI, recommending decreasing Xarelto dose to 15mg.

## 2023-06-06 NOTE — PROGRESS NOTE ADULT - ASSESSMENT
76 yo male with a PMHx of HTN, HLD, CAD s/p CABG, CHF (s/p Pacemaker; ABBOTS  P/G [id number]), A-fib and LAAT on xarelto, hard of hearing, osteoarthritis, and breast CA s/p R mastectomy in 2001, with recent hospitalization for cholecystitis s/p IR percutaneous cholecystostomy on 4/3, presents for 1 week of melena. [place]    ·	Suspected UGIB  ·	CAD s/p CABG on aspirin   ·	Chronic Afib   ·	HFmrEF (03/14/23 EF 45-50%) s/p PPM   ·	HTN/HLD      ·	No melena  reported further  ·	EGD 06/06- possible healing ulcers with diffuse erythema of the mucosa was noted in the stomach. These findings are compatible with non-erosive gastritis. Biopsies were obtained to evaluate for H.Pylori infection.  ·	Keep protonix 40 mg q12  ·	Plan for coloscopy tomorrow  ·	 keep hb above 8. H/H stable for now  ·	HFrEF compensated-Continue his current diuretic dose.  ·	HTN controlled-Continue amlodipine and losartan  ·	CAD S/P CABG stable. Continue metoprolol, statin. Holding ASA due to GI bleed.  ·	Persistent afib-Holding Xarelto due to active GI bleed. F/U  for Watchman device as outpt.   ·	L atrial thrombus- OTIS 03/2023-  Spoke to Dr. Handy: c/w heparin gtt while inpatient. Xarelto 15mg on discharge once cleared by GI   ·	Handoff: Plan for coloscopy tomorrow

## 2023-06-06 NOTE — PROGRESS NOTE ADULT - SUBJECTIVE AND OBJECTIVE BOX
Progress Note:  Provider Speciality                            Hospitalist      WAGNER SEGURA MRN-[id number] 77y Male     CHIEF PRESENTING COMPLAINT:  Patient is a 77y old  Male who presents with a chief complaint of melena (03 Jun 2023 11:56)        SUBJECTIVE:  Patient was seen and examined at bedside. No new complaints described by patient in morning rounds.   No significant overnight events reported.     HISTORY OF PRESENTING ILLNESS:  HPI:  76 yo male with a PMHx of HTN, HLD, CAD s/p CABG, CHF (s/p Pacemaker; ABBOTS  P/G [id number]), A-fib and LAAT on xarelto, hard of hearing, osteoarthritis, and breast CA s/p R mastectomy in 2001, with recent hospitalization for cholecystitis s/p IR percutaneous cholecystostomy on 4/3, presents for 1 week of melena. Patient reports that he has been having black stools for the past week with no abdominal pain. no hematochezia or hematemesis. Patient never had GI bleed in the past. He does have lightheadedness but no syncope or fall. no dysphagia, odynophagia, weight changes, nausea, vomiting, fever, or any other symptoms. Patient is on xarelto and aspirin last dose 6/1. had EGD/ [facility] many years ago unremarkable per patient except for some polyps. Patient has cholecystostomy drain with greenish secretion nonbloody. Last BM yesterday.    In ED, he was HD stable. labs showed hb 7.4 (baseline 12). INR 1.4, BUN 21 with normal Cr. he was given 2 u prbc and admitted to floor. (02 Jun 2023 15:45)        REVIEW OF SYSTEMS:  Patient denies  headache, fever, chills. Denies chest pain, shortness of breath, palpitation. Denies nausea, vomiting, abdominal pain or diarrhoea, Denies dysuria.   At least 10 systems were reviewed in ROS. All systems reviewed  are within normal limits except for the complaints as described in Subjective.    PAST MEDICAL & SURGICAL HISTORY:  PAST MEDICAL & SURGICAL HISTORY:  Arrhythmia      Hypercholesteremia      HTN (hypertension)      Obesity      CHF (congestive heart failure)      Heart attack      Breast cancer  2001      Osteoarthritis      CAD (coronary artery disease)      Atrial fibrillation      [place] (hard of hearing)      S/P CABG x 5  2014      H/O mastectomy  right 2001      Pacemaker              VITAL SIGNS:  Vital Signs Last 24 Hrs  T(C): 36.6 (06 Jun 2023 12:49), Max: 37 (05 Jun 2023 20:41)  T(F): 97.8 (06 Jun 2023 12:49), Max: 98.6 (05 Jun 2023 20:41)  HR: 65 (06 Jun 2023 12:49) (60 - 65)  BP: 117/58 (06 Jun 2023 12:49) (99/52 - 134/62)  BP(mean): --  RR: 18 (06 Jun 2023 12:49) (18 - 18)  SpO2: 93% (06 Jun 2023 08:31) (93% - 95%)    Parameters below as of 06 Jun 2023 08:31  Patient On (Oxygen Delivery Method): room air            PHYSICAL EXAMINATION:  Not in acute distress  General: No icterus  HEENT:   no JVD.  Heart: S1+S2 audible  Lungs: bilateral  moderate air entry, no wheezing, no crepitations.  Abdomen: Soft, non-tender, non-distended , no  rigidity or guarding.  CNS: Awake alert, CN  grossly intact.  Extremities:  No edema            CONSULTS:  Consultant(s) Notes Reviewed by me.   Care Discussed with Consultants/Other Providers where required.    All the images and labs were reviewed today        MEDICATIONS:  MEDICATIONS  (STANDING):  atorvastatin 20 milliGRAM(s) Oral at bedtime  chlorhexidine 2% Cloths 1 Application(s) Topical <User Schedule>  furosemide    Tablet 20 milliGRAM(s) Oral two times a day  metoprolol tartrate 50 milliGRAM(s) Oral two times a day  pantoprazole  Injectable 40 milliGRAM(s) IV Push two times a day    MEDICATIONS  (PRN):

## 2023-06-06 NOTE — CHART NOTE - NSCHARTNOTEFT_GEN_A_CORE
Plan for Colonoscopy tomorrow   - Please hold heparin 6 hours before the procedure   - Please correct electrolytes (Target Na = 135-145 | Mg = 1.7-2.2 | K = 3.5-5)  - Please correct INR to <1.5  - Please target Hb  >7  - Please ensure there is one set of CBC BMP and Coagulation profile day prior to procedure and all labs to be optimized the day prior to procedure    Prep Instructions  - 2 Days Before Procedure - Please ensure patient is on LOW RESIDUE DIET    - 1 Day Before Procedure - Please ensure patient is on CLEAR LIQUID DIET and ORDER bowel prep - 1 Gallon of Golytely, dulcolax 20mg at night     - Night Before Procedure - Please keep patient NPO after midnight    - Day of Procedure - Please call [phone] at 7am to discuss quality of bowel movements and possible additional prep instructions. (Goal for bowel movement: clear watery stools with minimial blood or brown stools)

## 2023-06-06 NOTE — CHART NOTE - NSCHARTNOTEFT_GEN_A_CORE
Patient's neighbor came back COVID positive on d/c screen. However, per NH this is old covid. NH to fax over covid test results for neighbor. Can d/c isolation.

## 2023-06-06 NOTE — PROGRESS NOTE ADULT - SUBJECTIVE AND OBJECTIVE BOX
WAGNER SEGURA 77y Male  MRN#: [id number]   CODE STATUS: FULL    Hospital Day: 4d    Pt is currently admitted with the primary diagnosis of melena    SUBJECTIVE  Hospital Course  78 yo male with a PMHx of HTN, HLD, CAD s/p CABG, CHF (s/p Pacemaker; ABBOTS  P/G [id number]), A-fib and LAAT on xarelto, hard of hearing, osteoarthritis, and breast CA s/p R mastectomy in 2001, with recent hospitalization for cholecystitis s/p IR percutaneous cholecystostomy on 4/3, presents for 1 week of melena. Patient reports that he has been having black stools for the past week with no abdominal pain. no hematochezia or hematemesis. Patient never had GI bleed in the past. He does have lightheadedness but no syncope or fall. no dysphagia, odynophagia, weight changes, nausea, vomiting, fever, or any other symptoms. Patient is on xarelto and aspirin last dose 6/1. had EGD/ [facility] many years ago unremarkable per patient except for some polyps. Patient has cholecystostomy drain with greenish secretion nonbloody.     In ED, he was HD stable. labs showed hb 7.4 (baseline 12). INR 1.4, BUN 21 with normal Cr. he was given 2 u prbc and admitted to floor. (02 Jun 2023 15:45)    Today, patient was seen and examined at bedside. He is comfortable and not in acute distress. Patient is hemodynamically stable with hemoglobin 9.4, hematocrit 28.7, .5. Heparin was placed on hold. He is on a CLD and will start bowel prep today for colonoscopy scheduled for tomorrow. NPO midnight.     Overnight events: No overnight events.     Subjective complaints: Patient denied any headaches, lightheadedness, chest pain, palpitations, shortness of breath, nausea, vomiting, diarrhea, abdominal pain, or any new episodes of melena. Patient's last BM was on Saturday.                                             ----------------------------------------------------------  OBJECTIVE  PAST MEDICAL & SURGICAL HISTORY  Arrhythmia    Hypercholesteremia    HTN (hypertension)    Obesity    CHF (congestive heart failure)    Heart attack    Breast cancer  2001    Osteoarthritis    CAD (coronary artery disease)    Atrial fibrillation    [place] (hard of hearing)    S/P CABG x 5  2014    H/O mastectomy  right 2001    Pacemaker                                              -----------------------------------------------------------  ALLERGIES:  No Known Allergies                                            ------------------------------------------------------------    HOME MEDICATIONS  Home Medications:  amLODIPine 5 mg oral tablet: 1 tab(s) orally once a day (14 Mar 2023 14:29)  atorvastatin 20 mg oral tablet: 1 tab(s) orally once a day (14 Mar 2023 14:29)  furosemide 20 mg oral tablet: 1 tab(s) orally 2 times a day (14 Mar 2023 14:29)  losartan 100 mg oral tablet: 1 tab(s) orally once a day (14 Mar 2023 14:29)  metoprolol tartrate 50 mg oral tablet: 1 tab(s) orally 2 times a day (14 Mar 2023 14:29)                           MEDICATIONS:  STANDING MEDICATIONS  atorvastatin 20 milliGRAM(s) Oral at bedtime  bisacodyl 20 milliGRAM(s) Oral once  chlorhexidine 2% Cloths 1 Application(s) Topical <User Schedule>  furosemide    Tablet 20 milliGRAM(s) Oral two times a day  heparin  Infusion.  Unit(s)/Hr IV Continuous <Continuous>  metoprolol tartrate 50 milliGRAM(s) Oral two times a day  pantoprazole  Injectable 40 milliGRAM(s) IV Push two times a day  polyethylene glycol/electrolyte Solution. 4000 milliLiter(s) Oral once    PRN MEDICATIONS  heparin   Injectable 4000 Unit(s) IV Push every 6 hours PRN  heparin   Injectable 8500 Unit(s) IV Push every 6 hours PRN                                            ------------------------------------------------------------  VITAL SIGNS: Last 24 Hours  T(C): 36.9 (06 Jun 2023 04:40), Max: 37 (05 Jun 2023 14:46)  T(F): 98.5 (06 Jun 2023 04:40), Max: 98.6 (05 Jun 2023 14:46)  HR: 60 (06 Jun 2023 04:40) (59 - 72)  BP: 110/54 (06 Jun 2023 04:40) (99/52 - 156/68)  BP(mean): --  RR: 18 (06 Jun 2023 08:31) (16 - 22)  SpO2: 93% (06 Jun 2023 08:31) (93% - 97%)      06-05-23 @ 07:01  -  06-06-23 @ 07:00  --------------------------------------------------------  IN: 0 mL / OUT: 100 mL / NET: -100 mL                                             --------------------------------------------------------------  LABS:                        9.4    9.48  )-----------( 311      ( 06 Jun 2023 09:00 )             28.7     06-06    134<L>  |  100  |  15  ----------------------------<  116<H>  3.7   |  25  |  0.8    Ca    8.1<L>      06 Jun 2023 09:00  Mg     2.3     06-06      PTT - ( 06 Jun 2023 09:00 )  PTT:193.5 sec                                            -------------------------------------------------------------  RADIOLOGY:  EGD 6/5/23:   Findings from GI Note:  A medium size hiatal hernia was seen. Retroflexion view in the stomach confirmed the size and morphology of the hernia. A few scars were noted in the antrum. May represent healing ulcers. Diffuse erythema of the mucosa was noted in the stomach. These findings are compatible with non-erosive gastritis. Biopsies were obtained to evaluate for H. Pylori infection. Normal mucosa was noted in the whole examined duodenum.                                          --------------------------------------------------------------    PHYSICAL EXAM:  GENERAL: NAD, well-developed, AAOx4  HEENT:  Atraumatic, Normocephalic. conjunctiva and sclera clear, No JVD  PULMONARY: Clear to auscultation bilaterally; No wheeze  CARDIOVASCULAR: Regular rate and rhythm; No murmurs, rubs, or gallops  GASTROINTESTINAL: Soft, Nontender, Nondistended; Bowel sounds present  MUSCULOSKELETAL:  2+ Peripheral Pulses, No clubbing, cyanosis, or edema  NEUROLOGY: non-focal  SKIN: Seborrheic Keratosis noted on abdomen. Skin discoloration on right leg.  EXTREMITIES: Non-edematous                                           --------------------------------------------------------------    ASSESSMENT & PLAN    78 yo male with a PMHx of HTN, HLD, CAD s/p CABG, CHF (s/p Pacemaker; ABBOTS  P/G [id number]), A-fib and LAAT on xarelto, hard of hearing, osteoarthritis, and breast CA s/p R mastectomy in 2001, with recent hospitalization for cholecystitis s/p IR percutaneous cholecystostomy on 4/3, presents for 1 week of melena.    #UGI Bleed - likely d/t PUD vs Irais Ronquillo tear vs AVM vs Esophageal ulcer vs Erosive Hemorrhagic Gastritis vs Dieulafoy lesion Vs Malignancy  - hgb 7.4 on admission (baseline 12), s/p 3 units total  - today hgb 9.4, stable   - no active bleeding. No BM since 4 days ago  - GI following: Planned for Colonoscopy tomorrow  - Continue pantoprazole 40mg IV push BID  - Keep hb above 8.  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   - Holding ASA due to GI bleed.  - hold xarelto, on heparin gtt. Held at 5am for EGD today   - resume heparin gtt post EGD if ok with GI.   - monitor PTTs  - F/U Dr. Ojeda for Watchman device as outpt.      #Breast CA s/p R mastectomy  - OP Follow up    #Misc:  #DVT PPX: SCD  #GI PPX: Protonix  #Diet: clear liquid  #Activity: As tolerated  #Dispo: Acute        WAGNER SEGURA 77y Male  MRN#: [id number]   CODE STATUS: FULL    Hospital Day: 4d    Pt is currently admitted with the primary diagnosis of melena    SUBJECTIVE  Hospital Course  78 yo male with a PMHx of HTN, HLD, CAD s/p CABG, CHF (s/p Pacemaker; ABBOTS  P/G [id number]), A-fib and LAAT on xarelto, hard of hearing, osteoarthritis, and breast CA s/p R mastectomy in 2001, with recent hospitalization for cholecystitis s/p IR percutaneous cholecystostomy on 4/3, presents for 1 week of melena. Patient reports that he has been having black stools for the past week with no abdominal pain. no hematochezia or hematemesis. Patient never had GI bleed in the past. He does have lightheadedness but no syncope or fall. no dysphagia, odynophagia, weight changes, nausea, vomiting, fever, or any other symptoms. Patient is on xarelto and aspirin last dose 6/1. had EGD/ [facility] many years ago unremarkable per patient except for some polyps. Patient has cholecystostomy drain with greenish secretion nonbloody.     In ED, he was HD stable. labs showed hb 7.4 (baseline 12). INR 1.4, BUN 21 with normal Cr. he was given 2 u prbc and admitted to floor. (02 Jun 2023 15:45)    Today, patient was seen and examined at bedside. He is comfortable and not in acute distress. Patient is hemodynamically stable with hemoglobin 9.4, hematocrit 28.7, .5. Heparin was placed on hold. He is on a CLD and will start bowel prep today for colonoscopy scheduled for tomorrow. NPO midnight.     Overnight events: No overnight events.     Subjective complaints: Patient denied any headaches, lightheadedness, chest pain, palpitations, shortness of breath, nausea, vomiting, diarrhea, abdominal pain, or any new episodes of melena. Patient's last BM was on Saturday.                                             ----------------------------------------------------------  OBJECTIVE  PAST MEDICAL & SURGICAL HISTORY  Arrhythmia    Hypercholesteremia    HTN (hypertension)    Obesity    CHF (congestive heart failure)    Heart attack    Breast cancer  2001    Osteoarthritis    CAD (coronary artery disease)    Atrial fibrillation    [place] (hard of hearing)    S/P CABG x 5  2014    H/O mastectomy  right 2001    Pacemaker                                              -----------------------------------------------------------  ALLERGIES:  No Known Allergies                                            ------------------------------------------------------------    HOME MEDICATIONS  Home Medications:  amLODIPine 5 mg oral tablet: 1 tab(s) orally once a day (14 Mar 2023 14:29)  atorvastatin 20 mg oral tablet: 1 tab(s) orally once a day (14 Mar 2023 14:29)  furosemide 20 mg oral tablet: 1 tab(s) orally 2 times a day (14 Mar 2023 14:29)  losartan 100 mg oral tablet: 1 tab(s) orally once a day (14 Mar 2023 14:29)  metoprolol tartrate 50 mg oral tablet: 1 tab(s) orally 2 times a day (14 Mar 2023 14:29)                           MEDICATIONS:  STANDING MEDICATIONS  atorvastatin 20 milliGRAM(s) Oral at bedtime  bisacodyl 20 milliGRAM(s) Oral once  chlorhexidine 2% Cloths 1 Application(s) Topical <User Schedule>  furosemide    Tablet 20 milliGRAM(s) Oral two times a day  heparin  Infusion.  Unit(s)/Hr IV Continuous <Continuous>  metoprolol tartrate 50 milliGRAM(s) Oral two times a day  pantoprazole  Injectable 40 milliGRAM(s) IV Push two times a day  polyethylene glycol/electrolyte Solution. 4000 milliLiter(s) Oral once    PRN MEDICATIONS  heparin   Injectable 4000 Unit(s) IV Push every 6 hours PRN  heparin   Injectable 8500 Unit(s) IV Push every 6 hours PRN                                            ------------------------------------------------------------  VITAL SIGNS: Last 24 Hours  T(C): 36.9 (06 Jun 2023 04:40), Max: 37 (05 Jun 2023 14:46)  T(F): 98.5 (06 Jun 2023 04:40), Max: 98.6 (05 Jun 2023 14:46)  HR: 60 (06 Jun 2023 04:40) (59 - 72)  BP: 110/54 (06 Jun 2023 04:40) (99/52 - 156/68)  BP(mean): --  RR: 18 (06 Jun 2023 08:31) (16 - 22)  SpO2: 93% (06 Jun 2023 08:31) (93% - 97%)      06-05-23 @ 07:01  -  06-06-23 @ 07:00  --------------------------------------------------------  IN: 0 mL / OUT: 100 mL / NET: -100 mL                                             --------------------------------------------------------------  LABS:                        9.4    9.48  )-----------( 311      ( 06 Jun 2023 09:00 )             28.7     06-06    134<L>  |  100  |  15  ----------------------------<  116<H>  3.7   |  25  |  0.8    Ca    8.1<L>      06 Jun 2023 09:00  Mg     2.3     06-06      PTT - ( 06 Jun 2023 09:00 )  PTT:193.5 sec                                            -------------------------------------------------------------  RADIOLOGY:  EGD 6/5/23:   Findings from GI Note:  A medium size hiatal hernia was seen. Retroflexion view in the stomach confirmed the size and morphology of the hernia. A few scars were noted in the antrum. May represent healing ulcers. Diffuse erythema of the mucosa was noted in the stomach. These findings are compatible with non-erosive gastritis. Biopsies were obtained to evaluate for H. Pylori infection. Normal mucosa was noted in the whole examined duodenum.                                          --------------------------------------------------------------    PHYSICAL EXAM:  GENERAL: NAD, well-developed, AAOx4  HEENT:  Atraumatic, Normocephalic. conjunctiva and sclera clear, No JVD  PULMONARY: Clear to auscultation bilaterally; No wheeze  CARDIOVASCULAR: Regular rate and rhythm; No murmurs, rubs, or gallops  GASTROINTESTINAL: Soft, Nontender, Nondistended; Bowel sounds present  MUSCULOSKELETAL:  2+ Peripheral Pulses, No clubbing, cyanosis, or edema  NEUROLOGY: non-focal  SKIN: Seborrheic Keratosis noted on abdomen. Skin discoloration on right leg.  EXTREMITIES: Non-edematous                                           --------------------------------------------------------------    ASSESSMENT & PLAN    78 yo male with a PMHx of HTN, HLD, CAD s/p CABG, CHF (s/p Pacemaker; ABBOTS  P/G [id number]), A-fib and LAAT on xarelto, hard of hearing, osteoarthritis, and breast CA s/p R mastectomy in 2001, with recent hospitalization for cholecystitis s/p IR percutaneous cholecystostomy on 4/3, presents for 1 week of melena.    #UGI Bleed - likely d/t PUD vs Irais Ronquillo tear vs AVM vs Esophageal ulcer vs Erosive Hemorrhagic Gastritis vs Dieulafoy lesion Vs Malignancy  - hgb 7.4 on admission (baseline 12), s/p 3 units total  - today hgb 9.4, stable   - no active bleeding. No BM since 4 days ago  - s/p EGD 6/5/23: A medium size hiatal hernia was seen. Retroflexion view in the stomach confirmed the size and morphology of the hernia. A few scars were noted in the antrum. May represent healing ulcers.  Diffuse erythema of the mucosa was noted in the stomach. Non-erosive gastritis. Normal duodenum   - GI following: Planned for Colonoscopy tomorrow. Clear liquid diet today + prep. NPO after midnight   - Continue pantoprazole 40mg IV push BID  - Keep hb above 8.  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   - Holding ASA due to GI bleed.  - hold xarelto, on heparin gtt.   - monitor PTTs  - Spoke to Dr. Handy: c/w heparin gtt while inpatient. Xarelto 15mg on discharge once cleared by GI   - F/U Dr. Ojeda for Watchman device as outpt.      #Breast CA s/p R mastectomy  - OP Follow up    #Misc:  #DVT PPX: SCD  #GI PPX: Protonix  #Diet: clear liquid  #Activity: As tolerated  #Dispo: Acute, pending colonoscopy tomorrow

## 2023-06-07 ENCOUNTER — TRANSCRIPTION ENCOUNTER (OUTPATIENT)
Age: 78
End: 2023-06-07

## 2023-06-07 ENCOUNTER — RESULT REVIEW (OUTPATIENT)
Age: 78
End: 2023-06-07

## 2023-06-07 LAB
ANION GAP SERPL CALC-SCNC: 12 MMOL/L — SIGNIFICANT CHANGE UP (ref 7–14)
APTT BLD: 30.9 SEC — SIGNIFICANT CHANGE UP (ref 27–39.2)
APTT BLD: 32.9 SEC — SIGNIFICANT CHANGE UP (ref 27–39.2)
APTT BLD: 71.8 SEC — CRITICAL HIGH (ref 27–39.2)
BUN SERPL-MCNC: 9 MG/DL — LOW (ref 10–20)
CALCIUM SERPL-MCNC: 8.6 MG/DL — SIGNIFICANT CHANGE UP (ref 8.4–10.5)
CHLORIDE SERPL-SCNC: 97 MMOL/L — LOW (ref 98–110)
CO2 SERPL-SCNC: 27 MMOL/L — SIGNIFICANT CHANGE UP (ref 17–32)
CREAT SERPL-MCNC: 0.9 MG/DL — SIGNIFICANT CHANGE UP (ref 0.7–1.5)
EGFR: 87 ML/MIN/1.73M2 — SIGNIFICANT CHANGE UP
GLUCOSE SERPL-MCNC: 117 MG/DL — HIGH (ref 70–99)
HCT VFR BLD CALC: 31.2 % — LOW (ref 42–52)
HGB BLD-MCNC: 10.2 G/DL — LOW (ref 14–18)
INR BLD: 1.12 RATIO — SIGNIFICANT CHANGE UP (ref 0.65–1.3)
MAGNESIUM SERPL-MCNC: 2.2 MG/DL — SIGNIFICANT CHANGE UP (ref 1.8–2.4)
MCHC RBC-ENTMCNC: 29.3 PG — SIGNIFICANT CHANGE UP (ref 27–31)
MCHC RBC-ENTMCNC: 32.7 G/DL — SIGNIFICANT CHANGE UP (ref 32–37)
MCV RBC AUTO: 89.7 FL — SIGNIFICANT CHANGE UP (ref 80–94)
NRBC # BLD: 0 /100 WBCS — SIGNIFICANT CHANGE UP (ref 0–0)
PLATELET # BLD AUTO: 360 K/UL — SIGNIFICANT CHANGE UP (ref 130–400)
PMV BLD: 9.8 FL — SIGNIFICANT CHANGE UP (ref 7.4–10.4)
POTASSIUM SERPL-MCNC: 3.8 MMOL/L — SIGNIFICANT CHANGE UP (ref 3.5–5)
POTASSIUM SERPL-SCNC: 3.8 MMOL/L — SIGNIFICANT CHANGE UP (ref 3.5–5)
PROTHROM AB SERPL-ACNC: 12.8 SEC — SIGNIFICANT CHANGE UP (ref 9.95–12.87)
RBC # BLD: 3.48 M/UL — LOW (ref 4.7–6.1)
RBC # FLD: 14.2 % — SIGNIFICANT CHANGE UP (ref 11.5–14.5)
SODIUM SERPL-SCNC: 136 MMOL/L — SIGNIFICANT CHANGE UP (ref 135–146)
WBC # BLD: 10.24 K/UL — SIGNIFICANT CHANGE UP (ref 4.8–10.8)
WBC # FLD AUTO: 10.24 K/UL — SIGNIFICANT CHANGE UP (ref 4.8–10.8)

## 2023-06-07 PROCEDURE — 45385 COLONOSCOPY W/LESION REMOVAL: CPT

## 2023-06-07 PROCEDURE — 45380 COLONOSCOPY AND BIOPSY: CPT | Mod: XU

## 2023-06-07 PROCEDURE — 99233 SBSQ HOSP IP/OBS HIGH 50: CPT

## 2023-06-07 RX ORDER — HEPARIN SODIUM 5000 [USP'U]/ML
900 INJECTION INTRAVENOUS; SUBCUTANEOUS
Qty: 25000 | Refills: 0 | Status: DISCONTINUED | OUTPATIENT
Start: 2023-06-07 | End: 2023-06-08

## 2023-06-07 RX ORDER — HEPARIN SODIUM 5000 [USP'U]/ML
4000 INJECTION INTRAVENOUS; SUBCUTANEOUS EVERY 6 HOURS
Refills: 0 | Status: DISCONTINUED | OUTPATIENT
Start: 2023-06-07 | End: 2023-06-08

## 2023-06-07 RX ORDER — HEPARIN SODIUM 5000 [USP'U]/ML
8500 INJECTION INTRAVENOUS; SUBCUTANEOUS EVERY 6 HOURS
Refills: 0 | Status: DISCONTINUED | OUTPATIENT
Start: 2023-06-07 | End: 2023-06-08

## 2023-06-07 RX ADMIN — HEPARIN SODIUM 4000 UNIT(S): 5000 INJECTION INTRAVENOUS; SUBCUTANEOUS at 21:46

## 2023-06-07 RX ADMIN — HEPARIN SODIUM 900 UNIT(S)/HR: 5000 INJECTION INTRAVENOUS; SUBCUTANEOUS at 03:17

## 2023-06-07 RX ADMIN — Medication 50 MILLIGRAM(S): at 17:38

## 2023-06-07 RX ADMIN — HEPARIN SODIUM 900 UNIT(S)/HR: 5000 INJECTION INTRAVENOUS; SUBCUTANEOUS at 19:43

## 2023-06-07 RX ADMIN — ATORVASTATIN CALCIUM 20 MILLIGRAM(S): 80 TABLET, FILM COATED ORAL at 21:46

## 2023-06-07 RX ADMIN — PANTOPRAZOLE SODIUM 40 MILLIGRAM(S): 20 TABLET, DELAYED RELEASE ORAL at 05:46

## 2023-06-07 RX ADMIN — Medication 20 MILLIGRAM(S): at 17:38

## 2023-06-07 RX ADMIN — PANTOPRAZOLE SODIUM 40 MILLIGRAM(S): 20 TABLET, DELAYED RELEASE ORAL at 17:38

## 2023-06-07 RX ADMIN — Medication 20 MILLIGRAM(S): at 05:46

## 2023-06-07 RX ADMIN — Medication 50 MILLIGRAM(S): at 05:46

## 2023-06-07 RX ADMIN — CHLORHEXIDINE GLUCONATE 1 APPLICATION(S): 213 SOLUTION TOPICAL at 05:46

## 2023-06-07 NOTE — PROGRESS NOTE ADULT - ASSESSMENT
76 y/o male with PMHx of HTN, hyperlipidemia, CAD s/p CABG, HFmrEF, (s/p Pacemaker; ABBOTS  P/G [id number]), chronic A-fib and ADDY thrombus on Xarelto, hard of hearing, osteoarthritis, and breast CA s/p R mastectomy in 2001 and recent hospitalization for cholecystitis s/p IR percutaneous cholecystostomy on 4/3 now presented for 1 week of melena.    1. GI bleed  anemia due to acute blood loss - s/p 2 units PRBC and H/H now stable  Keep Hgb >8  no further bleeding now  EGD 06/06- possible healing ulcers with diffuse erythema of the mucosa was noted in the stomach. These findings are compatible with non-erosive gastritis. Biopsies were obtained to evaluate for H.Pylori infection.  for colonoscopy today  NPO for procedure  PPI q12hr x 2 months  monitor H/H  keep active T&S    2. CAD s/p CABG x 5 vessel in 2014  ASA held for GI bleed  on metoprolol and statin    3. Chronic HFmrEF  03/14/23 EF 45-50%  pt is euvolemic  on lasix 20mg bid and metoprolol    4. HTN - controlled    5. Chronic Afib  Xarelto on hold and on IV heparin now (held this morning for colonoscopy)  on metoprolol    6. ADDY thrombus  found when pt was being evaluated for Watchman procedure  on Xarelto at home -> per his cardiologist Dr. Handy: c/w heparin gtt while inpatient. Xarelto 15mg on discharge once cleared by GI    7. s/p pacemaker    8. DVT prophylaxis on IV heparin      full code  guarded prognosis  high risk pt - GI bleed and on anticoagulation      PROGRESS NOTE HANDOFF    Pending: colonoscopy today, PTT monitoring on IV heparin, labs in AM  pt informed of the plan of care  Disposition: home

## 2023-06-07 NOTE — CHART NOTE - NSCHARTNOTEFT_GEN_A_CORE
Findings:  Several non-bleeding diverticula with medium openings were seen in the the left side of the colon. Diverticulosis appeared to be of moderate severity.	  Normal mucosa was noted in the whole colon. There were no AVMs, masses, evidence of colitis or other abnormalities seen. Retroflexion of the scope in the rectum revealed no abnormalities. Small polyps could have been missed due to poor preparation.	  A single sessile 6 mm non-bleeding polyp of benign appearance was found in the transverse colon. A single-piece polypectomy was performed using a cold snare. The polyp was completely removed.	  A single sessile 6 mm non-bleeding polyp of benign appearance was found in the sigmoid colon. A single-piece polypectomy was performed using a cold snare. The polyp was completely removed.	  A single sessile 4 mm non-bleeding polyp of benign appearance was found in the ascending colon. A single-piece polypectomy was performed using a cold forceps. The polyp was completely removed.	  A single sessile 3 mm non-bleeding polyp of benign appearance was found in the sigmoid colon. A single-piece polypectomy was performed using a cold forceps. The polyp was completely removed.	  Medium non-bleeding internal and external hemorrhoids were noted.      PLAN:   Colonoscopy in 3 years.    Avoid constipation.     High fiber diet.     await pathology.    follow up visit in 2-3 weeks.     Resume necessary anticoagulation. Findings:  Several non-bleeding diverticula with medium openings were seen in the the left side of the colon. Diverticulosis appeared to be of moderate severity.	  Normal mucosa was noted in the whole colon. There were no AVMs, masses, evidence of colitis or other abnormalities seen. Retroflexion of the scope in the rectum revealed no abnormalities. Small polyps could have been missed due to poor preparation.	  A single sessile 6 mm non-bleeding polyp of benign appearance was found in the transverse colon. A single-piece polypectomy was performed using a cold snare. The polyp was completely removed.	  A single sessile 6 mm non-bleeding polyp of benign appearance was found in the sigmoid colon. A single-piece polypectomy was performed using a cold snare. The polyp was completely removed.	  A single sessile 4 mm non-bleeding polyp of benign appearance was found in the ascending colon. A single-piece polypectomy was performed using a cold forceps. The polyp was completely removed.	  A single sessile 3 mm non-bleeding polyp of benign appearance was found in the sigmoid colon. A single-piece polypectomy was performed using a cold forceps. The polyp was completely removed.	  Medium non-bleeding internal and external hemorrhoids were noted.      PLAN:   Colonoscopy in 3 years.    Avoid constipation.     High fiber diet.     await pathology.    follow up visit in 2-3 weeks at [facility].     Resume necessary anticoagulation.

## 2023-06-07 NOTE — CHART NOTE - NSCHARTNOTEFT_GEN_A_CORE
PACU ANESTHESIA ADMISSION NOTE      Procedure:   Post op diagnosis:      ____  Intubated  TV:______       Rate: ______      FiO2: ______    _x___  Patent Airway    _x___  Full return of protective reflexes    _x___  Full recovery from anesthesia / back to baseline status    Vitals  SPO2:-100  HR:-60  RR:-11  B.P:-11/59  TEMP:-98    Mental Status:  _x___ Awake   ___x_ Alert   _____ Drowsy   _____ Sedated    Nausea/Vomiting:  _x___  NO       ______Yes,   See Post - Op Orders         Pain Scale (0-10):  __0___    Treatment: _x___ None    __x__ See Post - Op/PCA Orders    Post - Operative Fluids:   ___ Oral   ____x See Post - Op Orders    Plan: Discharge:   ____Home       ___x__Floor     _____Critical Care    _____  Other:_________________    Comments:  Report endorsed to RN in pacu  Vitals stable  No anesthesia issues or complications noted.  Discharge to patient to floor / home when criteria met.

## 2023-06-07 NOTE — PRE-ANESTHESIA EVALUATION ADULT - MALLAMPATI CLASS
upper denture/Class II - visualization of the soft palate, fauces, and uvula
Class II - visualization of the soft palate, fauces, and uvula

## 2023-06-07 NOTE — PROGRESS NOTE ADULT - SUBJECTIVE AND OBJECTIVE BOX
COLTON, MAX  77y Male    INTERVAL HPI/OVERNIGHT EVENTS:    pt awaiting colonoscopy  no fever, abdominal pain, N/V, SOB, chest pain  no bleeding    T(F): 97.2 (06-07-23 @ 05:51), Max: 97.8 (06-06-23 @ 12:49)  HR: 60 (06-07-23 @ 05:51) (60 - 65)  BP: 125/64 (06-07-23 @ 05:51) (117/58 - 151/69)  RR: 18 (06-07-23 @ 05:51) (18 - 18)  SpO2: 99% (06-07-23 @ 08:23) (99% - 99%)    PHYSICAL EXAM:  GENERAL: NAD  HEAD:  Normocephalic  EYES:  conjunctiva and sclera clear  ENMT: Moist mucous membranes  NERVOUS SYSTEM:  Alert, awake, Good concentration  CHEST/LUNG: CTA b/l  HEART: Regular rate and rhythm; No murmurs  ABDOMEN: Soft, Nontender, Nondistended; Bowel sounds present, + cholecystostomy  EXTREMITIES:   No edema  SKIN: venous stasis changes    Consultant(s) Notes Reviewed:  [x ] YES  [ ] NO  Care Discussed with Consultants/Other Providers [ x] YES  [ ] NO    MEDICATIONS  (STANDING):  atorvastatin 20 milliGRAM(s) Oral at bedtime  chlorhexidine 2% Cloths 1 Application(s) Topical <User Schedule>  furosemide    Tablet 20 milliGRAM(s) Oral two times a day  heparin  Infusion.  Unit(s)/Hr (18 mL/Hr) IV Continuous <Continuous>  metoprolol tartrate 50 milliGRAM(s) Oral two times a day  pantoprazole    Tablet 40 milliGRAM(s) Oral two times a day    MEDICATIONS  (PRN):  heparin   Injectable 4000 Unit(s) IV Push every 6 hours PRN For aPTT between 40 - 57  heparin   Injectable 8500 Unit(s) IV Push every 6 hours PRN For aPTT less than 40      LABS:                        10.2   10.24 )-----------( 360      ( 07 Jun 2023 07:35 )             31.2     06-07    136  |  97<L>  |  9<L>  ----------------------------<  117<H>  3.8   |  27  |  0.9    Ca    8.6      07 Jun 2023 07:35  Mg     2.2     06-07      PTT - ( 07 Jun 2023 09:02 )  PTT:32.9 sec      RADIOLOGY & ADDITIONAL TESTS:    Imaging report Personally Reviewed:  [ x] YES  [ ] NO        Case discussed with resident, med student and RN on rounds today    Care discussed with pt

## 2023-06-07 NOTE — PROGRESS NOTE ADULT - SUBJECTIVE AND OBJECTIVE BOX
WAGNER SEGURA 77y Male  MRN#: [id number]   CODE STATUS: FULL    Hospital Day: 5d    Pt is currently admitted with the primary diagnosis of melena    SUBJECTIVE  Hospital Course:  76 yo male with a PMHx of HTN, HLD, CAD s/p CABG, CHF (s/p Pacemaker; ABBOTS  P/G [id number]), A-fib and LAAT on xarelto, hard of hearing, osteoarthritis, and breast CA s/p R mastectomy in 2001, with recent hospitalization for cholecystitis s/p IR percutaneous cholecystostomy on 4/3, presents for 1 week of melena. Patient reports that he has been having black stools for the past week with no abdominal pain. no hematochezia or hematemesis. Patient never had GI bleed in the past. He does have lightheadedness but no syncope or fall. no dysphagia, odynophagia, weight changes, nausea, vomiting, fever, or any other symptoms. Patient is on xarelto and aspirin last dose 6/1. had EGD/ [facility] many years ago unremarkable per patient except for some polyps. Patient has cholecystostomy drain with greenish secretion nonbloody.     In ED, he was HD stable. labs showed hb 7.4 (baseline 12). INR 1.4, BUN 21 with normal Cr. he was given 2 u prbc and admitted to floor. (02 Jun 2023 15:45)    Today, patient was seen and examined at bedside. He is comfortable and not in acute distress. Patient is hemodynamically stable with hemoglobin 10.2, hematocrit 31.2, PTT 71.8. Heparin was placed on hold 5am this morning for his colonoscopy today. He completed his bowel prep and has been NPO since midnight.     Overnight events: No overnight events    Subjective complaints: Patient denied any headaches, lightheadedness, chest pain, palpitations, shortness of breath, nausea, vomiting, diarrhea, abdominal pain, or any new episodes of melena. Patient's last BM was on Saturday. He reports that he only passed clear liquids and no stool.                                               ----------------------------------------------------------  OBJECTIVE  PAST MEDICAL & SURGICAL HISTORY  Arrhythmia    Hypercholesteremia    HTN (hypertension)    Obesity    CHF (congestive heart failure)    Heart attack    Breast cancer  2001    Osteoarthritis    CAD (coronary artery disease)    Atrial fibrillation    [place] (hard of hearing)    S/P CABG x 5  2014    H/O mastectomy  right 2001    Pacemaker                                              -----------------------------------------------------------  ALLERGIES:  No Known Allergies                                            ------------------------------------------------------------    HOME MEDICATIONS  Home Medications:  amLODIPine 5 mg oral tablet: 1 tab(s) orally once a day (14 Mar 2023 14:29)  atorvastatin 20 mg oral tablet: 1 tab(s) orally once a day (14 Mar 2023 14:29)  furosemide 20 mg oral tablet: 1 tab(s) orally 2 times a day (14 Mar 2023 14:29)  losartan 100 mg oral tablet: 1 tab(s) orally once a day (14 Mar 2023 14:29)  metoprolol tartrate 50 mg oral tablet: 1 tab(s) orally 2 times a day (14 Mar 2023 14:29)                           MEDICATIONS:  STANDING MEDICATIONS  atorvastatin 20 milliGRAM(s) Oral at bedtime  chlorhexidine 2% Cloths 1 Application(s) Topical <User Schedule>  furosemide    Tablet 20 milliGRAM(s) Oral two times a day  heparin  Infusion.  Unit(s)/Hr IV Continuous <Continuous>  metoprolol tartrate 50 milliGRAM(s) Oral two times a day  pantoprazole    Tablet 40 milliGRAM(s) Oral two times a day    PRN MEDICATIONS  heparin   Injectable 4000 Unit(s) IV Push every 6 hours PRN  heparin   Injectable 8500 Unit(s) IV Push every 6 hours PRN                                            ------------------------------------------------------------  VITAL SIGNS: Last 24 Hours  T(C): 36.2 (07 Jun 2023 05:51), Max: 36.6 (06 Jun 2023 12:49)  T(F): 97.2 (07 Jun 2023 05:51), Max: 97.8 (06 Jun 2023 12:49)  HR: 60 (07 Jun 2023 05:51) (60 - 65)  BP: 125/64 (07 Jun 2023 05:51) (117/58 - 151/69)  BP(mean): --  RR: 18 (07 Jun 2023 05:51) (18 - 18)  SpO2: 99% (07 Jun 2023 08:23) (99% - 99%)                                             --------------------------------------------------------------  LABS:                        10.2   10.24 )-----------( 360      ( 07 Jun 2023 07:35 )             31.2     06-07    136  |  97<L>  |  9<L>  ----------------------------<  117<H>  3.8   |  27  |  0.9    Ca    8.6      07 Jun 2023 07:35  Mg     2.2     06-07      PTT - ( 07 Jun 2023 09:02 )  PTT:32.9 sec                                              -------------------------------------------------------------  RADIOLOGY:    EGD 6/5/23:   Findings from GI Note:  A medium size hiatal hernia was seen. Retroflexion view in the stomach confirmed the size and morphology of the hernia. A few scars were noted in the antrum. May represent healing ulcers. Diffuse erythema of the mucosa was noted in the stomach. These findings are compatible with non-erosive gastritis. Biopsies were obtained to evaluate for H. Pylori infection. Normal mucosa was noted in the whole examined duodenum                                            --------------------------------------------------------------    PHYSICAL EXAM:  GENERAL: NAD, well-developed, AAOx4  HEENT:  Atraumatic, Normocephalic. conjunctiva and sclera clear, No JVD  PULMONARY: Clear to auscultation bilaterally; No wheeze  CARDIOVASCULAR: Regular rate and rhythm; No murmurs, rubs, or gallops  GASTROINTESTINAL: Soft, Nontender, Nondistended; Bowel sounds present.   MUSCULOSKELETAL:  2+ Peripheral Pulses, No clubbing, cyanosis, or edema  NEUROLOGY: non-focal  SKIN: Umbilical Hernia (reducible), Seborrheic Keratosis noted on abdomen. Venous stasis skin discoloration on legs bilaterally.  EXTREMITIES: Non-edematous                                           --------------------------------------------------------------    ASSESSMENT & PLAN    76 yo male with a PMHx of HTN, HLD, CAD s/p CABG, CHF (s/p Pacemaker; ABBOTS  P/G [id number]), A-fib and LAAT on xarelto, hard of hearing, osteoarthritis, and breast CA s/p R mastectomy in 2001, with recent hospitalization for cholecystitis s/p IR percutaneous cholecystostomy on 4/3, presents for 1 week of melena.    #UGI Bleed - likely d/t PUD vs Irais Ronquillo tear vs AVM vs Esophageal ulcer vs Erosive Hemorrhagic Gastritis vs Dieulafoy lesion Vs Malignancy  - hgb 7.4 on admission (baseline 12), s/p 3 units total  - today hgb 10.2, stable   - no active bleeding. No BM since 5 days ago  - s/p EGD 6/5/23: A medium size hiatal hernia was seen. Retroflexion view in the stomach confirmed the size and morphology of the hernia. A few scars were noted in the antrum. May represent healing ulcers. Diffuse erythema of the mucosa was noted in the stomach. Non-erosive gastritis. Normal duodenum   - GI following: Planned for Colonoscopy today. F/U recommendations  - Heparin held at 5 am for colonoscopy today  - Continue pantoprazole 40mg IV push BID  - Keep hb above 8.  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   - Holding ASA due to GI bleed.  - hold xarelto, on heparin gtt.   - monitor PTTs  - Spoke to Dr. Handy: c/w heparin gtt while inpatient. Xarelto 15mg on discharge once cleared by GI   - F/U Dr. Ojeda for Watchman device as outpt.      #Breast CA s/p R mastectomy  - OP Follow up    #Misc:  #DVT PPX: SCD  #GI PPX: Protonix  #Diet: NPO  #Activity: As tolerated  #Dispo: Acute, pending colonoscopy today     WAGNER SEGURA 77y Male  MRN#: [id number]   CODE STATUS: FULL    Hospital Day: 5d    Pt is currently admitted with the primary diagnosis of melena    SUBJECTIVE  Hospital Course:  78 yo male with a PMHx of HTN, HLD, CAD s/p CABG, CHF (s/p Pacemaker; ABBOTS  P/G [id number]), A-fib and LAAT on xarelto, hard of hearing, osteoarthritis, and breast CA s/p R mastectomy in 2001, with recent hospitalization for cholecystitis s/p IR percutaneous cholecystostomy on 4/3, presents for 1 week of melena. Patient reports that he has been having black stools for the past week with no abdominal pain. no hematochezia or hematemesis. Patient never had GI bleed in the past. He does have lightheadedness but no syncope or fall. no dysphagia, odynophagia, weight changes, nausea, vomiting, fever, or any other symptoms. Patient is on xarelto and aspirin last dose 6/1. had EGD/ [facility] many years ago unremarkable per patient except for some polyps. Patient has cholecystostomy drain with greenish secretion nonbloody.     In ED, he was HD stable. labs showed hb 7.4 (baseline 12). INR 1.4, BUN 21 with normal Cr. he was given 2 u prbc and admitted to floor. (02 Jun 2023 15:45)    Today, patient was seen and examined at bedside. He is comfortable and not in acute distress. Patient is hemodynamically stable with hemoglobin 10.2, hematocrit 31.2, PTT 32.9. Heparin was placed on hold 5am this morning for his colonoscopy today. He completed his bowel prep and has been NPO since midnight.     Overnight events: No overnight events    Subjective complaints: Patient denied any headaches, lightheadedness, chest pain, palpitations, shortness of breath, nausea, vomiting, diarrhea, abdominal pain, or any new episodes of melena. Patient's last BM was on Saturday. He reports that he only passed clear liquids and no stool.                                               ----------------------------------------------------------  OBJECTIVE  PAST MEDICAL & SURGICAL HISTORY  Arrhythmia    Hypercholesteremia    HTN (hypertension)    Obesity    CHF (congestive heart failure)    Heart attack    Breast cancer  2001    Osteoarthritis    CAD (coronary artery disease)    Atrial fibrillation    [place] (hard of hearing)    S/P CABG x 5  2014    H/O mastectomy  right 2001    Pacemaker                                              -----------------------------------------------------------  ALLERGIES:  No Known Allergies                                            ------------------------------------------------------------    HOME MEDICATIONS  Home Medications:  amLODIPine 5 mg oral tablet: 1 tab(s) orally once a day (14 Mar 2023 14:29)  atorvastatin 20 mg oral tablet: 1 tab(s) orally once a day (14 Mar 2023 14:29)  furosemide 20 mg oral tablet: 1 tab(s) orally 2 times a day (14 Mar 2023 14:29)  losartan 100 mg oral tablet: 1 tab(s) orally once a day (14 Mar 2023 14:29)  metoprolol tartrate 50 mg oral tablet: 1 tab(s) orally 2 times a day (14 Mar 2023 14:29)                           MEDICATIONS:  STANDING MEDICATIONS  atorvastatin 20 milliGRAM(s) Oral at bedtime  chlorhexidine 2% Cloths 1 Application(s) Topical <User Schedule>  furosemide    Tablet 20 milliGRAM(s) Oral two times a day  heparin  Infusion.  Unit(s)/Hr IV Continuous <Continuous>  metoprolol tartrate 50 milliGRAM(s) Oral two times a day  pantoprazole    Tablet 40 milliGRAM(s) Oral two times a day    PRN MEDICATIONS  heparin   Injectable 4000 Unit(s) IV Push every 6 hours PRN  heparin   Injectable 8500 Unit(s) IV Push every 6 hours PRN                                            ------------------------------------------------------------  VITAL SIGNS: Last 24 Hours  T(C): 36.2 (07 Jun 2023 05:51), Max: 36.6 (06 Jun 2023 12:49)  T(F): 97.2 (07 Jun 2023 05:51), Max: 97.8 (06 Jun 2023 12:49)  HR: 60 (07 Jun 2023 05:51) (60 - 65)  BP: 125/64 (07 Jun 2023 05:51) (117/58 - 151/69)  BP(mean): --  RR: 18 (07 Jun 2023 05:51) (18 - 18)  SpO2: 99% (07 Jun 2023 08:23) (99% - 99%)                                             --------------------------------------------------------------  LABS:                        10.2   10.24 )-----------( 360      ( 07 Jun 2023 07:35 )             31.2     06-07    136  |  97<L>  |  9<L>  ----------------------------<  117<H>  3.8   |  27  |  0.9    Ca    8.6      07 Jun 2023 07:35  Mg     2.2     06-07      PTT - ( 07 Jun 2023 09:02 )  PTT:32.9 sec                                              -------------------------------------------------------------  RADIOLOGY:    EGD 6/5/23:   Findings from GI Note:  A medium size hiatal hernia was seen. Retroflexion view in the stomach confirmed the size and morphology of the hernia. A few scars were noted in the antrum. May represent healing ulcers. Diffuse erythema of the mucosa was noted in the stomach. These findings are compatible with non-erosive gastritis. Biopsies were obtained to evaluate for H. Pylori infection. Normal mucosa was noted in the whole examined duodenum                                            --------------------------------------------------------------    PHYSICAL EXAM:  GENERAL: NAD, well-developed, AAOx4  HEENT:  Atraumatic, Normocephalic. conjunctiva and sclera clear, No JVD  PULMONARY: Clear to auscultation bilaterally; No wheeze  CARDIOVASCULAR: Regular rate and rhythm; No murmurs, rubs, or gallops  GASTROINTESTINAL: Soft, Nontender, Nondistended; Bowel sounds present.   MUSCULOSKELETAL:  2+ Peripheral Pulses, No clubbing, cyanosis, or edema  NEUROLOGY: non-focal  SKIN: Umbilical Hernia (reducible), Seborrheic Keratosis noted on abdomen. Venous stasis skin discoloration on legs bilaterally.  EXTREMITIES: Non-edematous                                           --------------------------------------------------------------    ASSESSMENT & PLAN    78 yo male with a PMHx of HTN, HLD, CAD s/p CABG, CHF (s/p Pacemaker; ABBOTS  P/G [id number]), A-fib and LAAT on xarelto, hard of hearing, osteoarthritis, and breast CA s/p R mastectomy in 2001, with recent hospitalization for cholecystitis s/p IR percutaneous cholecystostomy on 4/3, presents for 1 week of melena.    #UGI Bleed - likely d/t PUD vs Irais Ronquillo tear vs AVM vs Esophageal ulcer vs Erosive Hemorrhagic Gastritis vs Dieulafoy lesion Vs Malignancy  - hgb 7.4 on admission (baseline 12), s/p 3 units total  - today hgb 10.2, stable   - no active bleeding. No BM since 5 days ago  - s/p EGD 6/5/23: A medium size hiatal hernia was seen. Retroflexion view in the stomach confirmed the size and morphology of the hernia. A few scars were noted in the antrum. May represent healing ulcers. Diffuse erythema of the mucosa was noted in the stomach. Non-erosive gastritis. Normal duodenum   - GI following: Planned for Colonoscopy today. F/U recommendations  - Heparin held at 5 am for colonoscopy today  - Continue pantoprazole 40mg IV push BID  - Keep hb above 8.  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   - Holding ASA due to GI bleed.  - hold xarelto, on heparin gtt.   - monitor PTTs  - Spoke to Dr. Handy: c/w heparin gtt while inpatient. Xarelto 15mg on discharge once cleared by GI   - F/U Dr. Ojeda for Watchman device as outpt.      #Breast CA s/p R mastectomy  - OP Follow up    #Misc:  #DVT PPX: SCD  #GI PPX: Protonix  #Diet: NPO  #Activity: As tolerated  #Dispo: Acute, pending colonoscopy today     WAGNER SEGURA 77y Male  MRN#: [id number]   CODE STATUS: FULL    Hospital Day: 5d    Pt is currently admitted with the primary diagnosis of melena    SUBJECTIVE  Hospital Course:  78 yo male with a PMHx of HTN, HLD, CAD s/p CABG, CHF (s/p Pacemaker; ABBOTS  P/G [id number]), A-fib and LAAT on xarelto, hard of hearing, osteoarthritis, and breast CA s/p R mastectomy in 2001, with recent hospitalization for cholecystitis s/p IR percutaneous cholecystostomy on 4/3, presents for 1 week of melena. Patient reports that he has been having black stools for the past week with no abdominal pain. no hematochezia or hematemesis. Patient never had GI bleed in the past. He does have lightheadedness but no syncope or fall. no dysphagia, odynophagia, weight changes, nausea, vomiting, fever, or any other symptoms. Patient is on xarelto and aspirin last dose 6/1. had EGD/ [facility] many years ago unremarkable per patient except for some polyps. Patient has cholecystostomy drain with greenish secretion nonbloody.     In ED, he was HD stable. labs showed hb 7.4 (baseline 12). INR 1.4, BUN 21 with normal Cr. he was given 2 u prbc and admitted to floor. (02 Jun 2023 15:45)    Today, patient was seen and examined at bedside. He is comfortable and not in acute distress. Patient is hemodynamically stable with hemoglobin 10.2, hematocrit 31.2, PTT 32.9. Heparin was placed on hold 5am this morning for his colonoscopy today. He completed his bowel prep and has been NPO since midnight.     Overnight events: No overnight events    Subjective complaints: Patient denied any headaches, lightheadedness, chest pain, palpitations, shortness of breath, nausea, vomiting, diarrhea, abdominal pain, or any new episodes of melena. Patient's last BM was on Saturday. He reports that he only passed clear liquids and no stool.                                               ----------------------------------------------------------  OBJECTIVE  PAST MEDICAL & SURGICAL HISTORY  Arrhythmia    Hypercholesteremia    HTN (hypertension)    Obesity    CHF (congestive heart failure)    Heart attack    Breast cancer  2001    Osteoarthritis    CAD (coronary artery disease)    Atrial fibrillation    [place] (hard of hearing)    S/P CABG x 5  2014    H/O mastectomy  right 2001    Pacemaker                                              -----------------------------------------------------------  ALLERGIES:  No Known Allergies                                            ------------------------------------------------------------    HOME MEDICATIONS  Home Medications:  amLODIPine 5 mg oral tablet: 1 tab(s) orally once a day (14 Mar 2023 14:29)  atorvastatin 20 mg oral tablet: 1 tab(s) orally once a day (14 Mar 2023 14:29)  furosemide 20 mg oral tablet: 1 tab(s) orally 2 times a day (14 Mar 2023 14:29)  losartan 100 mg oral tablet: 1 tab(s) orally once a day (14 Mar 2023 14:29)  metoprolol tartrate 50 mg oral tablet: 1 tab(s) orally 2 times a day (14 Mar 2023 14:29)                           MEDICATIONS:  STANDING MEDICATIONS  atorvastatin 20 milliGRAM(s) Oral at bedtime  chlorhexidine 2% Cloths 1 Application(s) Topical <User Schedule>  furosemide    Tablet 20 milliGRAM(s) Oral two times a day  heparin  Infusion.  Unit(s)/Hr IV Continuous <Continuous>  metoprolol tartrate 50 milliGRAM(s) Oral two times a day  pantoprazole    Tablet 40 milliGRAM(s) Oral two times a day    PRN MEDICATIONS  heparin   Injectable 4000 Unit(s) IV Push every 6 hours PRN  heparin   Injectable 8500 Unit(s) IV Push every 6 hours PRN                                            ------------------------------------------------------------  VITAL SIGNS: Last 24 Hours  T(C): 36.2 (07 Jun 2023 05:51), Max: 36.6 (06 Jun 2023 12:49)  T(F): 97.2 (07 Jun 2023 05:51), Max: 97.8 (06 Jun 2023 12:49)  HR: 60 (07 Jun 2023 05:51) (60 - 65)  BP: 125/64 (07 Jun 2023 05:51) (117/58 - 151/69)  BP(mean): --  RR: 18 (07 Jun 2023 05:51) (18 - 18)  SpO2: 99% (07 Jun 2023 08:23) (99% - 99%)                                             --------------------------------------------------------------  LABS:                        10.2   10.24 )-----------( 360      ( 07 Jun 2023 07:35 )             31.2     06-07    136  |  97<L>  |  9<L>  ----------------------------<  117<H>  3.8   |  27  |  0.9    Ca    8.6      07 Jun 2023 07:35  Mg     2.2     06-07      PTT - ( 07 Jun 2023 09:02 )  PTT:32.9 sec                                              -------------------------------------------------------------  RADIOLOGY:    EGD 6/5/23:   Findings from GI Note:  A medium size hiatal hernia was seen. Retroflexion view in the stomach confirmed the size and morphology of the hernia. A few scars were noted in the antrum. May represent healing ulcers. Diffuse erythema of the mucosa was noted in the stomach. These findings are compatible with non-erosive gastritis. Biopsies were obtained to evaluate for H. Pylori infection. Normal mucosa was noted in the whole examined duodenum                                            --------------------------------------------------------------    PHYSICAL EXAM:  GENERAL: NAD, well-developed, AAOx4  HEENT:  Atraumatic, Normocephalic. conjunctiva and sclera clear, No JVD  PULMONARY: Clear to auscultation bilaterally; No wheeze  CARDIOVASCULAR: Regular rate and rhythm; No murmurs, rubs, or gallops  GASTROINTESTINAL: Soft, Nontender, Nondistended; Bowel sounds present.   MUSCULOSKELETAL:  2+ Peripheral Pulses, No clubbing, cyanosis, or edema  NEUROLOGY: non-focal  SKIN: Umbilical Hernia (reducible), Seborrheic Keratosis noted on abdomen. Venous stasis skin discoloration on legs bilaterally.  EXTREMITIES: Non-edematous                                           --------------------------------------------------------------    ASSESSMENT & PLAN    78 yo male with a PMHx of HTN, HLD, CAD s/p CABG, CHF (s/p Pacemaker; ABBOTS  P/G [id number]), A-fib and LAAT on xarelto, hard of hearing, osteoarthritis, and breast CA s/p R mastectomy in 2001, with recent hospitalization for cholecystitis s/p IR percutaneous cholecystostomy on 4/3, presents for 1 week of melena.    #UGI Bleed - likely d/t PUD vs Irais Ronquillo tear vs AVM vs Esophageal ulcer vs Erosive Hemorrhagic Gastritis vs Dieulafoy lesion Vs Malignancy  - hgb 7.4 on admission (baseline 12), s/p 3 units total  - today hgb 10.2, stable   - no active bleeding. No BM since 5 days ago  - s/p EGD 6/5/23: A medium size hiatal hernia was seen. Retroflexion view in the stomach confirmed the size and morphology of the hernia. A few scars were noted in the antrum. May represent healing ulcers. Diffuse erythema of the mucosa was noted in the stomach. Non-erosive gastritis. Normal duodenum   - GI following: Planned for Colonoscopy today. F/U recommendations  - Heparin held at 5 am for colonoscopy today  - Continue pantoprazole 40mg IV push BID  - Keep hb above 8.  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   - Holding ASA due to GI bleed.  - hold xarelto, on heparin gtt.   - monitor PTTs  - Spoke to Dr. Handy: c/w heparin gtt while inpatient. Xarelto 15mg on discharge once cleared by GI   - F/U Dr. Ojeda for Watchman device as outpt.      #Breast CA s/p R mastectomy  - OP Follow up    #Misc:  #DVT PPX: SCD  #GI PPX: Protonix  #Diet: NPO  #Activity: As tolerated  #Dispo: Acute, pending colonoscopy today

## 2023-06-08 LAB
ANION GAP SERPL CALC-SCNC: 13 MMOL/L — SIGNIFICANT CHANGE UP (ref 7–14)
APTT BLD: >200 SEC — CRITICAL HIGH (ref 27–39.2)
BLD GP AB SCN SERPL QL: SIGNIFICANT CHANGE UP
BUN SERPL-MCNC: 8 MG/DL — LOW (ref 10–20)
CALCIUM SERPL-MCNC: 8.4 MG/DL — SIGNIFICANT CHANGE UP (ref 8.4–10.5)
CHLORIDE SERPL-SCNC: 100 MMOL/L — SIGNIFICANT CHANGE UP (ref 98–110)
CO2 SERPL-SCNC: 25 MMOL/L — SIGNIFICANT CHANGE UP (ref 17–32)
CREAT SERPL-MCNC: 0.9 MG/DL — SIGNIFICANT CHANGE UP (ref 0.7–1.5)
EGFR: 87 ML/MIN/1.73M2 — SIGNIFICANT CHANGE UP
GLUCOSE SERPL-MCNC: 103 MG/DL — HIGH (ref 70–99)
HCT VFR BLD CALC: 28.6 % — LOW (ref 42–52)
HGB BLD-MCNC: 9.2 G/DL — LOW (ref 14–18)
MAGNESIUM SERPL-MCNC: 2.3 MG/DL — SIGNIFICANT CHANGE UP (ref 1.8–2.4)
MCHC RBC-ENTMCNC: 28.9 PG — SIGNIFICANT CHANGE UP (ref 27–31)
MCHC RBC-ENTMCNC: 32.2 G/DL — SIGNIFICANT CHANGE UP (ref 32–37)
MCV RBC AUTO: 89.9 FL — SIGNIFICANT CHANGE UP (ref 80–94)
NRBC # BLD: 0 /100 WBCS — SIGNIFICANT CHANGE UP (ref 0–0)
PLATELET # BLD AUTO: 274 K/UL — SIGNIFICANT CHANGE UP (ref 130–400)
PMV BLD: 10.3 FL — SIGNIFICANT CHANGE UP (ref 7.4–10.4)
POTASSIUM SERPL-MCNC: 3.7 MMOL/L — SIGNIFICANT CHANGE UP (ref 3.5–5)
POTASSIUM SERPL-SCNC: 3.7 MMOL/L — SIGNIFICANT CHANGE UP (ref 3.5–5)
RBC # BLD: 3.18 M/UL — LOW (ref 4.7–6.1)
RBC # FLD: 14.3 % — SIGNIFICANT CHANGE UP (ref 11.5–14.5)
SODIUM SERPL-SCNC: 138 MMOL/L — SIGNIFICANT CHANGE UP (ref 135–146)
WBC # BLD: 8.37 K/UL — SIGNIFICANT CHANGE UP (ref 4.8–10.8)
WBC # FLD AUTO: 8.37 K/UL — SIGNIFICANT CHANGE UP (ref 4.8–10.8)

## 2023-06-08 PROCEDURE — 99233 SBSQ HOSP IP/OBS HIGH 50: CPT

## 2023-06-08 RX ORDER — RIVAROXABAN 15 MG-20MG
15 KIT ORAL
Refills: 0 | Status: DISCONTINUED | OUTPATIENT
Start: 2023-06-08 | End: 2023-06-11

## 2023-06-08 RX ADMIN — Medication 20 MILLIGRAM(S): at 13:47

## 2023-06-08 RX ADMIN — PANTOPRAZOLE SODIUM 40 MILLIGRAM(S): 20 TABLET, DELAYED RELEASE ORAL at 17:03

## 2023-06-08 RX ADMIN — PANTOPRAZOLE SODIUM 40 MILLIGRAM(S): 20 TABLET, DELAYED RELEASE ORAL at 06:05

## 2023-06-08 RX ADMIN — Medication 50 MILLIGRAM(S): at 06:05

## 2023-06-08 RX ADMIN — RIVAROXABAN 15 MILLIGRAM(S): KIT at 17:02

## 2023-06-08 RX ADMIN — HEPARIN SODIUM 4000 UNIT(S): 5000 INJECTION INTRAVENOUS; SUBCUTANEOUS at 00:06

## 2023-06-08 RX ADMIN — Medication 20 MILLIGRAM(S): at 06:05

## 2023-06-08 RX ADMIN — Medication 50 MILLIGRAM(S): at 17:03

## 2023-06-08 RX ADMIN — HEPARIN SODIUM 4000 UNIT(S): 5000 INJECTION INTRAVENOUS; SUBCUTANEOUS at 06:05

## 2023-06-08 RX ADMIN — CHLORHEXIDINE GLUCONATE 1 APPLICATION(S): 213 SOLUTION TOPICAL at 06:05

## 2023-06-08 RX ADMIN — ATORVASTATIN CALCIUM 20 MILLIGRAM(S): 80 TABLET, FILM COATED ORAL at 21:03

## 2023-06-08 NOTE — PROGRESS NOTE ADULT - SUBJECTIVE AND OBJECTIVE BOX
Gastroenterology progress note:     Patient is a 77y old  Male who presents with a chief complaint of melena (08 Jun 2023 12:09)       Admitted on: 06-02-23    We are following the patient for        Interval History:    No acute events overnight.   - Diet -   - last BM -   - Abdominal pain -       PAST MEDICAL & SURGICAL HISTORY:  Arrhythmia      Hypercholesteremia      HTN (hypertension)      Obesity      CHF (congestive heart failure)      Heart attack      Breast cancer  2001      Osteoarthritis      CAD (coronary artery disease)      Atrial fibrillation      [place] (hard of hearing)      S/P CABG x 5  2014      H/O mastectomy  right 2001      Pacemaker          MEDICATIONS  (STANDING):  atorvastatin 20 milliGRAM(s) Oral at bedtime  chlorhexidine 2% Cloths 1 Application(s) Topical <User Schedule>  furosemide    Tablet 20 milliGRAM(s) Oral two times a day  metoprolol tartrate 50 milliGRAM(s) Oral two times a day  pantoprazole    Tablet 40 milliGRAM(s) Oral two times a day  rivaroxaban 15 milliGRAM(s) Oral with dinner    MEDICATIONS  (PRN):      Allergies  No Known Allergies      Review of Systems:   Cardiovascular:  No Chest Pain, No Palpitations  Respiratory:  No Cough, No Dyspnea  Gastrointestinal:  As described in HPI  Skin:  No Skin Lesions, No Jaundice  Neuro:  No Syncope, No Dizziness    Physical Examination:  T(C): 37.1 (06-08-23 @ 06:42), Max: 37.1 (06-08-23 @ 06:42)  HR: 60 (06-08-23 @ 07:38) (59 - 68)  BP: 117/58 (06-08-23 @ 07:38) (105/56 - 153/69)  RR: 18 (06-08-23 @ 06:42) (14 - 20)  SpO2: 96% (06-08-23 @ 07:38) (94% - 99%)  Weight (kg): 100.7 (06-07-23 @ 14:16)      GENERAL: AAOx3, no acute distress.  HEAD:  Atraumatic, Normocephalic  EYES: conjunctiva and sclera clear  NECK: Supple, no JVD or thyromegaly  CHEST/LUNG: Clear to auscultation bilaterally; No wheeze, rhonchi, or rales  HEART: Regular rate and rhythm; normal S1, S2, No murmurs.  ABDOMEN: Soft, nontender, nondistended; Bowel sounds present  NEUROLOGY: No asterixis or tremor.   SKIN: Intact, no jaundice     Data:                        9.2    8.37  )-----------( 274      ( 08 Jun 2023 08:08 )             28.6     Hgb trend:  9.2  06-08-23 @ 08:08  10.2  06-07-23 @ 07:35  9.4  06-06-23 @ 09:00        06-08    138  |  100  |  8<L>  ----------------------------<  103<H>  3.7   |  25  |  0.9    Ca    8.4      08 Jun 2023 08:08  Mg     2.3     06-08      Liver panel trend:  TBili 0.8   /   AST 14   /   ALT 8   /   AlkP 62   /   Tptn 5.2   /   Alb 3.4    /   DBili --      06-03  TBili 0.4   /   AST 15   /   ALT 9   /   AlkP 65   /   Tptn 5.7   /   Alb 3.7    /   DBili --      06-02      PT/INR - ( 07 Jun 2023 18:00 )   PT: 12.80 sec;   INR: 1.12 ratio         PTT - ( 08 Jun 2023 10:00 )  PTT:>200.0 sec       Radiology:  < from: EGD (06.05.23 @ 13:00) >  Impressions:    Esophageal hiatal hernia.    Erythema in the stomach compatible with non-erosive gastritis.    Scars in the antrum.    Normal mucosa in the whole examined duodenum.    < end of copied text >  < from: Colonoscopy (06.07.23 @ 11:30) >  Impressions:    Polyp (6 mm) in the transverse colon. (Polypectomy).    Polyp (6 mm) in the sigmoid colon. (Polypectomy).    Polyp (4 mm) in the ascending colon. (Polypectomy).    Polyp (3 mm) in the sigmoid colon. (Polypectomy).    Moderate diverticulosis of the the left side of the colon.    Internal and external hemorrhoids.    Normal mucosa in the colon.    < end of copied text >

## 2023-06-08 NOTE — PROGRESS NOTE ADULT - SUBJECTIVE AND OBJECTIVE BOX
COLTON, MAX  77y Male    INTERVAL HPI/OVERNIGHT EVENTS:    no bleeding, SOB, chest pain, N/V, abdominal pain  no fever    T(F): 98.7 (06-08-23 @ 06:42), Max: 98.7 (06-08-23 @ 06:42)  HR: 63 (06-08-23 @ 06:42) (59 - 68)  BP: 105/56 (06-08-23 @ 06:42) (105/56 - 153/69)  RR: 18 (06-08-23 @ 06:42) (14 - 20)  SpO2: 96% (06-08-23 @ 07:38) (94% - 99%) on RA    PHYSICAL EXAM:  GENERAL: NAD  HEAD:  Normocephalic  EYES:  conjunctiva and sclera clear  ENMT: Moist mucous membranes  NERVOUS SYSTEM:  Alert, awake, Good concentration  CHEST/LUNG: CTA b/l  HEART: Regular rate and rhythm  ABDOMEN: Soft, Nontender, Nondistended; Bowel sounds present  EXTREMITIES:   No edema  SKIN: venous stasis changed    Consultant(s) Notes Reviewed:  [x ] YES  [ ] NO  Care Discussed with Consultants/Other Providers [ x] YES  [ ] NO    MEDICATIONS  (STANDING):  atorvastatin 20 milliGRAM(s) Oral at bedtime  chlorhexidine 2% Cloths 1 Application(s) Topical <User Schedule>  furosemide    Tablet 20 milliGRAM(s) Oral two times a day  metoprolol tartrate 50 milliGRAM(s) Oral two times a day  pantoprazole    Tablet 40 milliGRAM(s) Oral two times a day  rivaroxaban 15 milliGRAM(s) Oral with dinner    MEDICATIONS  (PRN):      LABS:                        9.2    8.37  )-----------( 274      ( 08 Jun 2023 08:08 )             28.6     06-08    138  |  100  |  8<L>  ----------------------------<  103<H>  3.7   |  25  |  0.9    Ca    8.4      08 Jun 2023 08:08  Mg     2.3     06-08      PT/INR - ( 07 Jun 2023 18:00 )   PT: 12.80 sec;   INR: 1.12 ratio         PTT - ( 07 Jun 2023 18:00 )  PTT:30.9 sec        Case discussed with resident, med student and RN on rounds today    Care discussed with pt

## 2023-06-08 NOTE — PROGRESS NOTE ADULT - ASSESSMENT
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    1. GI bleed  anemia due to acute blood loss - s/p 2 units PRBC and H/H is relatively stable  Keep Hgb >8  no further bleeding now  EGD 06/06- possible healing ulcers with diffuse erythema of the mucosa was noted in the stomach. These findings are compatible with non-erosive gastritis. Biopsies were obtained to evaluate for H. Pylori infection.  Colonoscopy 6/7: moderate diverticulosis, 4 benign appearing polyps removed, internal and external hemorrhoids  Xarelto 15mg daily started and monitor H/H and for bleeding  PPI q12hr x 2 months  colonoscopy in 3 years  avoid constipation, high fiber diet  outpt GI f/u for biopsy results  keep active T&S    2. CAD s/p CABG x 5 vessel in 2014  ASA held for GI bleed  on metoprolol and statin    3. Chronic HFmrEF  03/14/23 EF 45-50%  pt is euvolemic  on lasix 20mg bid and metoprolol    4. HTN - controlled    5. Chronic Afib  Xarelto restarted at lower dose - 15mg daily  on metoprolol    6. ADDY thrombus  found when pt was being evaluated for Watchman procedure  per his cardiologist Dr. Handy: Xarelto 15mg on discharge once cleared by GI    7. s/p pacemaker    8. DVT prophylaxis on Xarelto      full code  guarded prognosis  high risk pt - GI bleed and on anticoagulation      PROGRESS NOTE HANDOFF    Pending: monitor for bleeding on Xarelto, labs in AM  pt informed of the plan of care  Disposition: home in 24hrs if H/H remains stable and no recurrent bleeding on anticoagulation

## 2023-06-08 NOTE — PROGRESS NOTE ADULT - ASSESSMENT
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 Patient states that he has been having black colored stools for the past week with associated light headedness. Of note his Xarelto was recently increased from 15mg to 20mg. Denies syncope. Functional at baseline. Has not noticed any bright red blood per rectum. Denies abdominal pain, rectal pain, GERD symptoms dysphagia, chest pain, nausea, vomiting. Has endorsed no pain at cholecystotomy sight, green fluid on discharge. Has not had history of GI bleed in the past. Patient had colonoscopy and EGD over 40 years ago. Colonoscopy reportedly positive for multiple polyps, but never had repeat. Egd reportedly unremarkable. Denies smoking. No family history of GI malignancy, brother with bone cancer. Personal history of breast cancer s/p lumpectomy (no chemo/rads). Has reducible umbilical hernia, no abdominal surgeries. Hb noted drop form 12->7.4. Last dose of Xarelto AM 6/1. last bowel movement 6/1.     # Melena s/p EGD and colonoscopy   - Baseline hemoglobin - 12  - Hemoglobin on admission - 7.4, stable   - Coags - INR 1.4  - Last use of Antithrombotic or no Antithrombotic - 6/1 AM  - JACKY + black stools on admission   - s/p egd and colonoscopy: hiatal hernia, polyps, diverticulosis and hemorrhoids     #Rec  Colonoscopy in 3 years (colon polyps).  Avoid constipation.  High fiber diet.  await pathology.  follow up visit in 2-3 weeks [facility].  Resume necessary anticoagulation.    #Cholecystitis s/p cholecystomy by IR   - was seen in [facility]. Continue to follow OP for possible EUS plus ERCP with empiric sphincterotomy after cardiac clearance    - FU IR OP   78 yo male with a PMHx of HTN, HLD, CAD s/p CABG, CHF s/p Pacemaker, A-fib and LAAT on Xarelto, hard of hearing, osteoarthritis, and breast CA s/p R mastectomy in 2001, recent hospitalization for cholecystitis s/p IR percutaneous cholecystostomy on 4/3, presents for 1 week of melena. Patient states that he has been having black colored stools for the past week with associated light headedness. Of note his Xarelto was recently increased from 15mg to 20mg. Denies syncope. Functional at baseline. Has not noticed any bright red blood per rectum. Denies abdominal pain, rectal pain, GERD symptoms dysphagia, chest pain, nausea, vomiting. Has endorsed no pain at cholecystotomy sight, green fluid on discharge. Has not had history of GI bleed in the past. Patient had colonoscopy and EGD over 40 years ago. Colonoscopy reportedly positive for multiple polyps, but never had repeat. Egd reportedly unremarkable. Denies smoking. No family history of GI malignancy, brother with bone cancer. Personal history of breast cancer s/p lumpectomy (no chemo/rads). Has reducible umbilical hernia, no abdominal surgeries. Hb noted drop form 12->7.4. Last dose of Xarelto AM 6/1. last bowel movement 6/1.     # Melena s/p EGD and colonoscopy   - Baseline hemoglobin - 12  - Hemoglobin on admission - 7.4, stable   - Coags - INR 1.4  - Last use of Antithrombotic or no Antithrombotic - 6/1 AM  - JACKY + black stools on admission   - s/p egd and colonoscopy: hiatal hernia, polyps, diverticulosis and hemorrhoids     #Rec  Colonoscopy in 3 years (colon polyps).  Avoid constipation.  High fiber diet.  await pathology.  follow up visit in 2-3 weeks [facility].  Resume necessary anticoagulation.  Capsule endoscopy as outpatient    #Cholecystitis s/p cholecystomy by IR   - was seen in [facility]. Continue to follow OP for possible EUS plus ERCP with empiric sphincterotomy after cardiac clearance    - FU IR OP

## 2023-06-08 NOTE — PROGRESS NOTE ADULT - SUBJECTIVE AND OBJECTIVE BOX
WAGNER SEGURA 77y Male  MRN#: [id number]   CODE STATUS: FULL    Hospital Day: 6d    Pt is currently admitted with the primary diagnosis of melena    SUBJECTIVE  Hospital Course  78 yo male with a PMHx of HTN, HLD, CAD s/p CABG, CHF (s/p Pacemaker; ABBOTS  P/G [id number]), A-fib and LAAT on xarelto, hard of hearing, osteoarthritis, and breast CA s/p R mastectomy in 2001, with recent hospitalization for cholecystitis s/p IR percutaneous cholecystostomy on 4/3, presents for 1 week of melena. Patient reports that he has been having black stools for the past week with no abdominal pain. no hematochezia or hematemesis. Patient never had GI bleed in the past. He does have lightheadedness but no syncope or fall. no dysphagia, odynophagia, weight changes, nausea, vomiting, fever, or any other symptoms. Patient is on xarelto and aspirin last dose 6/1. had EGD/ [facility] many years ago unremarkable per patient except for some polyps. Patient has cholecystostomy drain with greenish secretion nonbloody.     In ED, he was HD stable. labs showed hb 7.4 (baseline 12). INR 1.4, BUN 21 with normal Cr. he was given 2 u prbc and admitted to floor. (02 Jun 2023 15:45)    Today, patient was seen and examined at bedside. He is comfortable and not in acute distress. Patient is hemodynamically stable with hemoglobin 9.2, hematocrit 28.6, PTT >200. Heparin was discontinued and patient will begin Xarelto 15 mg and be monitored overnight.    Overnight events: No overnight events    Subjective complaints: Patient denied any headaches, lightheadedness, chest pain, palpitations, shortness of breath, nausea, vomiting, diarrhea, abdominal pain, or any new episodes of melena.   ----------------------------------------------------------  OBJECTIVE  PAST MEDICAL & SURGICAL HISTORY  Arrhythmia    Hypercholesteremia    HTN (hypertension)    Obesity    CHF (congestive heart failure)    Heart attack    Breast cancer  2001    Osteoarthritis    CAD (coronary artery disease)    Atrial fibrillation    [place] (hard of hearing)    S/P CABG x 5  2014    H/O mastectomy  right 2001    Pacemaker                                              -----------------------------------------------------------  ALLERGIES:  No Known Allergies                                            ------------------------------------------------------------    HOME MEDICATIONS  Home Medications:  amLODIPine 5 mg oral tablet: 1 tab(s) orally once a day (14 Mar 2023 14:29)  atorvastatin 20 mg oral tablet: 1 tab(s) orally once a day (14 Mar 2023 14:29)  furosemide 20 mg oral tablet: 1 tab(s) orally 2 times a day (14 Mar 2023 14:29)  losartan 100 mg oral tablet: 1 tab(s) orally once a day (14 Mar 2023 14:29)  metoprolol tartrate 50 mg oral tablet: 1 tab(s) orally 2 times a day (14 Mar 2023 14:29)                           MEDICATIONS:  STANDING MEDICATIONS  atorvastatin 20 milliGRAM(s) Oral at bedtime  chlorhexidine 2% Cloths 1 Application(s) Topical <User Schedule>  furosemide    Tablet 20 milliGRAM(s) Oral two times a day  metoprolol tartrate 50 milliGRAM(s) Oral two times a day  pantoprazole    Tablet 40 milliGRAM(s) Oral two times a day  rivaroxaban 15 milliGRAM(s) Oral with dinner    PRN MEDICATIONS                                            ------------------------------------------------------------  VITAL SIGNS: Last 24 Hours  T(C): 37.1 (08 Jun 2023 06:42), Max: 37.1 (08 Jun 2023 06:42)  T(F): 98.7 (08 Jun 2023 06:42), Max: 98.7 (08 Jun 2023 06:42)  HR: 63 (08 Jun 2023 06:42) (59 - 68)  BP: 105/56 (08 Jun 2023 06:42) (105/56 - 153/69)  BP(mean): --  RR: 18 (08 Jun 2023 06:42) (14 - 20)  SpO2: 96% (08 Jun 2023 07:38) (94% - 99%)                                             --------------------------------------------------------------  LABS:                        9.2    8.37  )-----------( 274      ( 08 Jun 2023 08:08 )             28.6     06-08    138  |  100  |  8<L>  ----------------------------<  103<H>  3.7   |  25  |  0.9    Ca    8.4      08 Jun 2023 08:08  Mg     2.3     06-08      PT/INR - ( 07 Jun 2023 18:00 )   PT: 12.80 sec;   INR: 1.12 ratio         PTT - ( 08 Jun 2023 10:00 )  PTT:>200.0 sec                                            -------------------------------------------------------------  RADIOLOGY:  Colonoscopy 6/6/23:   Findings from GI Note:  Several non-bleeding diverticula with medium openings were seen in the the left side of the colon. Diverticulosis appeared to be of moderate severity.	  Normal mucosa was noted in the whole colon. There were no AVMs, masses, evidence of colitis or other abnormalities seen. Retroflexion of the scope in the rectum revealed no abnormalities. Small polyps could have been missed due to poor preparation.	  A single sessile 6 mm non-bleeding polyp of benign appearance was found in the transverse colon. A single-piece polypectomy was performed using a cold snare. The polyp was completely removed.	  A single sessile 6 mm non-bleeding polyp of benign appearance was found in the sigmoid colon. A single-piece polypectomy was performed using a cold snare. The polyp was completely removed.	  A single sessile 4 mm non-bleeding polyp of benign appearance was found in the ascending colon. A single-piece polypectomy was performed using a cold forceps. The polyp was completely removed.	  A single sessile 3 mm non-bleeding polyp of benign appearance was found in the sigmoid colon. A single-piece polypectomy was performed using a cold forceps. The polyp was completely removed.	  Medium non-bleeding internal and external hemorrhoids were noted.    EGD 6/5/23:   Findings from GI Note:  A medium size hiatal hernia was seen. Retroflexion view in the stomach confirmed the size and morphology of the hernia. A few scars were noted in the antrum. May represent healing ulcers. Diffuse erythema of the mucosa was noted in the stomach. These findings are compatible with non-erosive gastritis. Biopsies were obtained to evaluate for H. Pylori infection. Normal mucosa was noted in the whole examined duodenum                                          --------------------------------------------------------------    PHYSICAL EXAM:  GENERAL: NAD, well-developed, AAOx4  HEENT:  Atraumatic, Normocephalic. conjunctiva and sclera clear, No JVD  PULMONARY: Clear to auscultation bilaterally; No wheeze  CARDIOVASCULAR: Regular rate and rhythm; No murmurs, rubs, or gallops  GASTROINTESTINAL: Soft, Nontender, Nondistended; Bowel sounds present.   MUSCULOSKELETAL:  2+ Peripheral Pulses, No clubbing, cyanosis, or edema  NEUROLOGY: non-focal  SKIN: Umbilical Hernia (reducible), Seborrheic Keratosis noted on abdomen. Venous stasis skin discoloration on legs bilaterally.  EXTREMITIES: Non-edematous                                      --------------------------------------------------------------    ASSESSMENT & PLAN    78 yo male with a PMHx of HTN, HLD, CAD s/p CABG, CHF (s/p Pacemaker; ABBOTS  P/G [id number]), A-fib and LAAT on xarelto, hard of hearing, osteoarthritis, and breast CA s/p R mastectomy in 2001, with recent hospitalization for cholecystitis s/p IR percutaneous cholecystostomy on 4/3, presents for 1 week of melena.    #UGI Bleed - likely d/t PUD vs Irais Ronquillo tear vs AVM vs Esophageal ulcer vs Erosive Hemorrhagic Gastritis vs Dieulafoy lesion Vs Malignancy  - hgb 7.4 on admission (baseline 12), s/p 3 units total  - today hgb 9.2, stable   - no active bleeding. No BM since 5 days ago  - s/p EGD 6/5/23: A medium size hiatal hernia was seen. Retroflexion view in the stomach confirmed the size and morphology of the hernia. A few scars were noted in the antrum. May represent healing ulcers. Diffuse erythema of the mucosa was noted in the stomach. Non-erosive gastritis. Normal duodenum   - s/p Colonoscopy 6/7/23: Several non-bleeding diverticula with medium openings were seen in the the left side of the colon. Diverticulosis appeared to be of moderate severity.	  Normal mucosa was noted in the whole colon. There were no AVMs, masses, evidence of colitis or other abnormalities seen. Retroflexion of the scope in the rectum revealed no abnormalities. Small polyps could have been missed due to poor preparation.	  A single sessile 6 mm non-bleeding polyp of benign appearance was found in the transverse colon. A single-piece polypectomy was performed using a cold snare. The polyp was completely removed.	  A single sessile 6 mm non-bleeding polyp of benign appearance was found in the sigmoid colon. A single-piece polypectomy was performed using a cold snare. The polyp was completely removed.	  A single sessile 4 mm non-bleeding polyp of benign appearance was found in the ascending colon. A single-piece polypectomy was performed using a cold forceps. The polyp was completely removed.	  A single sessile 3 mm non-bleeding polyp of benign appearance was found in the sigmoid colon. A single-piece polypectomy was performed using a cold forceps. The polyp was completely removed.	  Medium non-bleeding internal and external hemorrhoids were noted.  - GI following: Recommended Colonoscopy in 3 years. Avoid constipation. High fiber diet, Await pathology, Follow up visit in 2-3 weeks at [facility]. Resume necessary anticoagulation.  - Discontinue Heparin   - Continue pantoprazole 40mg IV push BID  - Keep hb above 8.  - Maintain active Type and screen  - Start Xarelto 15mg. Monitor.     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   - Holding ASA due to GI bleed.  - Spoke to Dr. Handy: c/w heparin gtt while inpatient. Xarelto 15mg on discharge once cleared by GI   - F/U Dr. Ojeda for Watchman device as outpt.      #Breast CA s/p R mastectomy  - OP Follow up    #Misc:  #DVT PPX: SCD  #GI PPX: Protonix  #Diet: DASH/TLC  #Activity: As tolerated  #Dispo: Acute     WAGNER SEGURA 77y Male  MRN#: [id number]   CODE STATUS: FULL    Hospital Day: 6d    Pt is currently admitted with the primary diagnosis of melena    SUBJECTIVE  Hospital Course  76 yo male with a PMHx of HTN, HLD, CAD s/p CABG, CHF (s/p Pacemaker; ABBOTS  P/G [id number]), A-fib and LAAT on xarelto, hard of hearing, osteoarthritis, and breast CA s/p R mastectomy in 2001, with recent hospitalization for cholecystitis s/p IR percutaneous cholecystostomy on 4/3, presents for 1 week of melena. Patient reports that he has been having black stools for the past week with no abdominal pain. no hematochezia or hematemesis. Patient never had GI bleed in the past. He does have lightheadedness but no syncope or fall. no dysphagia, odynophagia, weight changes, nausea, vomiting, fever, or any other symptoms. Patient is on xarelto and aspirin last dose 6/1. had EGD/ [facility] many years ago unremarkable per patient except for some polyps. Patient has cholecystostomy drain with greenish secretion nonbloody.     In ED, he was HD stable. labs showed hb 7.4 (baseline 12). INR 1.4, BUN 21 with normal Cr. he was given 2 u prbc and admitted to floor. (02 Jun 2023 15:45)    Today, patient was seen and examined at bedside. He is comfortable and not in acute distress. Patient is hemodynamically stable with hemoglobin 9.2, hematocrit 28.6, PTT >200. Heparin was discontinued and patient will begin Xarelto 15 mg and be monitored overnight.    Overnight events: No overnight events    Subjective complaints: Patient denied any headaches, lightheadedness, chest pain, palpitations, shortness of breath, nausea, vomiting, diarrhea, abdominal pain, or any new episodes of melena.   ----------------------------------------------------------  OBJECTIVE  PAST MEDICAL & SURGICAL HISTORY  Arrhythmia    Hypercholesteremia    HTN (hypertension)    Obesity    CHF (congestive heart failure)    Heart attack    Breast cancer  2001    Osteoarthritis    CAD (coronary artery disease)    Atrial fibrillation    [place] (hard of hearing)    S/P CABG x 5  2014    H/O mastectomy  right 2001    Pacemaker                                              -----------------------------------------------------------  ALLERGIES:  No Known Allergies                                            ------------------------------------------------------------    HOME MEDICATIONS  Home Medications:  amLODIPine 5 mg oral tablet: 1 tab(s) orally once a day (14 Mar 2023 14:29)  atorvastatin 20 mg oral tablet: 1 tab(s) orally once a day (14 Mar 2023 14:29)  furosemide 20 mg oral tablet: 1 tab(s) orally 2 times a day (14 Mar 2023 14:29)  losartan 100 mg oral tablet: 1 tab(s) orally once a day (14 Mar 2023 14:29)  metoprolol tartrate 50 mg oral tablet: 1 tab(s) orally 2 times a day (14 Mar 2023 14:29)                           MEDICATIONS:  STANDING MEDICATIONS  atorvastatin 20 milliGRAM(s) Oral at bedtime  chlorhexidine 2% Cloths 1 Application(s) Topical <User Schedule>  furosemide    Tablet 20 milliGRAM(s) Oral two times a day  metoprolol tartrate 50 milliGRAM(s) Oral two times a day  pantoprazole    Tablet 40 milliGRAM(s) Oral two times a day  rivaroxaban 15 milliGRAM(s) Oral with dinner    PRN MEDICATIONS                                            ------------------------------------------------------------  VITAL SIGNS: Last 24 Hours  T(C): 37.1 (08 Jun 2023 06:42), Max: 37.1 (08 Jun 2023 06:42)  T(F): 98.7 (08 Jun 2023 06:42), Max: 98.7 (08 Jun 2023 06:42)  HR: 63 (08 Jun 2023 06:42) (59 - 68)  BP: 105/56 (08 Jun 2023 06:42) (105/56 - 153/69)  BP(mean): --  RR: 18 (08 Jun 2023 06:42) (14 - 20)  SpO2: 96% (08 Jun 2023 07:38) (94% - 99%)                                             --------------------------------------------------------------  LABS:                        9.2    8.37  )-----------( 274      ( 08 Jun 2023 08:08 )             28.6     06-08    138  |  100  |  8<L>  ----------------------------<  103<H>  3.7   |  25  |  0.9    Ca    8.4      08 Jun 2023 08:08  Mg     2.3     06-08      PT/INR - ( 07 Jun 2023 18:00 )   PT: 12.80 sec;   INR: 1.12 ratio         PTT - ( 08 Jun 2023 10:00 )  PTT:>200.0 sec                                            -------------------------------------------------------------  RADIOLOGY:  Colonoscopy 6/6/23:   Findings from GI Note:  Several non-bleeding diverticula with medium openings were seen in the the left side of the colon. Diverticulosis appeared to be of moderate severity.	  Normal mucosa was noted in the whole colon. There were no AVMs, masses, evidence of colitis or other abnormalities seen. Retroflexion of the scope in the rectum revealed no abnormalities. Small polyps could have been missed due to poor preparation.	  A single sessile 6 mm non-bleeding polyp of benign appearance was found in the transverse colon. A single-piece polypectomy was performed using a cold snare. The polyp was completely removed.	  A single sessile 6 mm non-bleeding polyp of benign appearance was found in the sigmoid colon. A single-piece polypectomy was performed using a cold snare. The polyp was completely removed.	  A single sessile 4 mm non-bleeding polyp of benign appearance was found in the ascending colon. A single-piece polypectomy was performed using a cold forceps. The polyp was completely removed.	  A single sessile 3 mm non-bleeding polyp of benign appearance was found in the sigmoid colon. A single-piece polypectomy was performed using a cold forceps. The polyp was completely removed.	  Medium non-bleeding internal and external hemorrhoids were noted.    EGD 6/5/23:   Findings from GI Note:  A medium size hiatal hernia was seen. Retroflexion view in the stomach confirmed the size and morphology of the hernia. A few scars were noted in the antrum. May represent healing ulcers. Diffuse erythema of the mucosa was noted in the stomach. These findings are compatible with non-erosive gastritis. Biopsies were obtained to evaluate for H. Pylori infection. Normal mucosa was noted in the whole examined duodenum                                          --------------------------------------------------------------    PHYSICAL EXAM:  GENERAL: NAD, well-developed, AAOx4  HEENT:  Atraumatic, Normocephalic. conjunctiva and sclera clear, No JVD  PULMONARY: Clear to auscultation bilaterally; No wheeze  CARDIOVASCULAR: Regular rate and rhythm; No murmurs, rubs, or gallops  GASTROINTESTINAL: Soft, Nontender, Nondistended; Bowel sounds present.   MUSCULOSKELETAL:  2+ Peripheral Pulses, No clubbing, cyanosis, or edema  NEUROLOGY: non-focal  SKIN: Umbilical Hernia (reducible), Seborrheic Keratosis noted on abdomen. Venous stasis skin discoloration on legs bilaterally.  EXTREMITIES: Non-edematous                                      --------------------------------------------------------------    ASSESSMENT & PLAN    76 yo male with a PMHx of HTN, HLD, CAD s/p CABG, CHF (s/p Pacemaker; ABBOTS  P/G [id number]), A-fib and LAAT on xarelto, hard of hearing, osteoarthritis, and breast CA s/p R mastectomy in 2001, with recent hospitalization for cholecystitis s/p IR percutaneous cholecystostomy on 4/3, presents for 1 week of melena.    #UGI Bleed - likely d/t PUD vs Irais Ronquillo tear vs AVM vs Esophageal ulcer vs Erosive Hemorrhagic Gastritis vs Dieulafoy lesion Vs Malignancy  - hgb 7.4 on admission (baseline 12), s/p 3 units total  - today hgb 9.2, stable   - no active bleeding.   - s/p EGD 6/5/23: A medium size hiatal hernia was seen. Retroflexion view in the stomach confirmed the size and morphology of the hernia. A few scars were noted in the antrum. May represent healing ulcers. Diffuse erythema of the mucosa was noted in the stomach. Non-erosive gastritis. Normal duodenum   - s/p Colonoscopy 6/7/23: Multiple sessile polyps removed. Diverticulosis and hemorrhoids. No active bleeding. No AVMs   - GI following: Recommended repeat Colonoscopy in 3 years. Avoid constipation. High fiber diet, Await pathology, Follow up visit in 2-3 weeks at [facility]. Resume necessary anticoagulation.  - Continue pantoprazole 40mg IV push BID  - Keep hb above 8.  - Maintain active Type and screen  - Start Xarelto 15mg. Monitor.     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   - Was Holding ASA and Xarelto due to GI bleed.  - Spoke to Dr. Handy: Xarelto 15mg on discharge once cleared by GI   - stopped heparin gtt today, Resume Xarelto at 15mg today. Plan to resume Aspirin tomorrow if no bleed.   - F/U Dr. Ojeda for Watchman device as outpt.      #Breast CA s/p R mastectomy  - OP Follow up    #Misc:  #DVT PPX: SCD  #GI PPX: Protonix  #Diet: DASH/TLC  #Activity: As tolerated  #Dispo: Acute

## 2023-06-09 ENCOUNTER — TRANSCRIPTION ENCOUNTER (OUTPATIENT)
Age: 78
End: 2023-06-09

## 2023-06-09 LAB
ANION GAP SERPL CALC-SCNC: 11 MMOL/L — SIGNIFICANT CHANGE UP (ref 7–14)
BUN SERPL-MCNC: 9 MG/DL — LOW (ref 10–20)
CALCIUM SERPL-MCNC: 8.3 MG/DL — LOW (ref 8.4–10.4)
CHLORIDE SERPL-SCNC: 103 MMOL/L — SIGNIFICANT CHANGE UP (ref 98–110)
CO2 SERPL-SCNC: 24 MMOL/L — SIGNIFICANT CHANGE UP (ref 17–32)
CREAT SERPL-MCNC: 0.8 MG/DL — SIGNIFICANT CHANGE UP (ref 0.7–1.5)
EGFR: 91 ML/MIN/1.73M2 — SIGNIFICANT CHANGE UP
GLUCOSE SERPL-MCNC: 102 MG/DL — HIGH (ref 70–99)
HCT VFR BLD CALC: 28.2 % — LOW (ref 42–52)
HGB BLD-MCNC: 9 G/DL — LOW (ref 14–18)
MAGNESIUM SERPL-MCNC: 2.3 MG/DL — SIGNIFICANT CHANGE UP (ref 1.8–2.4)
MCHC RBC-ENTMCNC: 28.5 PG — SIGNIFICANT CHANGE UP (ref 27–31)
MCHC RBC-ENTMCNC: 31.9 G/DL — LOW (ref 32–37)
MCV RBC AUTO: 89.2 FL — SIGNIFICANT CHANGE UP (ref 80–94)
NRBC # BLD: 0 /100 WBCS — SIGNIFICANT CHANGE UP (ref 0–0)
PLATELET # BLD AUTO: 273 K/UL — SIGNIFICANT CHANGE UP (ref 130–400)
PMV BLD: 10 FL — SIGNIFICANT CHANGE UP (ref 7.4–10.4)
POTASSIUM SERPL-MCNC: 3.7 MMOL/L — SIGNIFICANT CHANGE UP (ref 3.5–5)
POTASSIUM SERPL-SCNC: 3.7 MMOL/L — SIGNIFICANT CHANGE UP (ref 3.5–5)
RBC # BLD: 3.16 M/UL — LOW (ref 4.7–6.1)
RBC # FLD: 14.1 % — SIGNIFICANT CHANGE UP (ref 11.5–14.5)
SODIUM SERPL-SCNC: 138 MMOL/L — SIGNIFICANT CHANGE UP (ref 135–146)
SURGICAL PATHOLOGY STUDY: SIGNIFICANT CHANGE UP
WBC # BLD: 6.9 K/UL — SIGNIFICANT CHANGE UP (ref 4.8–10.8)
WBC # FLD AUTO: 6.9 K/UL — SIGNIFICANT CHANGE UP (ref 4.8–10.8)

## 2023-06-09 PROCEDURE — 99232 SBSQ HOSP IP/OBS MODERATE 35: CPT

## 2023-06-09 RX ORDER — SENNA PLUS 8.6 MG/1
2 TABLET ORAL AT BEDTIME
Refills: 0 | Status: DISCONTINUED | OUTPATIENT
Start: 2023-06-09 | End: 2023-06-11

## 2023-06-09 RX ORDER — LOSARTAN POTASSIUM 100 MG/1
1 TABLET, FILM COATED ORAL
Qty: 0 | Refills: 0 | DISCHARGE

## 2023-06-09 RX ORDER — RIVAROXABAN 15 MG-20MG
1 KIT ORAL
Qty: 30 | Refills: 0
Start: 2023-06-09 | End: 2023-07-08

## 2023-06-09 RX ORDER — POLYETHYLENE GLYCOL 3350 17 G/17G
17 POWDER, FOR SOLUTION ORAL ONCE
Refills: 0 | Status: COMPLETED | OUTPATIENT
Start: 2023-06-09 | End: 2023-06-09

## 2023-06-09 RX ORDER — POLYETHYLENE GLYCOL 3350 17 G/17G
17 POWDER, FOR SOLUTION ORAL
Qty: 510 | Refills: 0
Start: 2023-06-09 | End: 2023-07-08

## 2023-06-09 RX ORDER — PANTOPRAZOLE SODIUM 20 MG/1
1 TABLET, DELAYED RELEASE ORAL
Qty: 60 | Refills: 0
Start: 2023-06-09 | End: 2023-07-08

## 2023-06-09 RX ADMIN — ATORVASTATIN CALCIUM 20 MILLIGRAM(S): 80 TABLET, FILM COATED ORAL at 21:17

## 2023-06-09 RX ADMIN — PANTOPRAZOLE SODIUM 40 MILLIGRAM(S): 20 TABLET, DELAYED RELEASE ORAL at 17:06

## 2023-06-09 RX ADMIN — Medication 20 MILLIGRAM(S): at 14:43

## 2023-06-09 RX ADMIN — Medication 20 MILLIGRAM(S): at 05:09

## 2023-06-09 RX ADMIN — POLYETHYLENE GLYCOL 3350 17 GRAM(S): 17 POWDER, FOR SOLUTION ORAL at 14:43

## 2023-06-09 RX ADMIN — Medication 50 MILLIGRAM(S): at 05:09

## 2023-06-09 RX ADMIN — PANTOPRAZOLE SODIUM 40 MILLIGRAM(S): 20 TABLET, DELAYED RELEASE ORAL at 05:09

## 2023-06-09 RX ADMIN — Medication 50 MILLIGRAM(S): at 17:06

## 2023-06-09 RX ADMIN — SENNA PLUS 2 TABLET(S): 8.6 TABLET ORAL at 21:17

## 2023-06-09 RX ADMIN — RIVAROXABAN 15 MILLIGRAM(S): KIT at 17:06

## 2023-06-09 RX ADMIN — CHLORHEXIDINE GLUCONATE 1 APPLICATION(S): 213 SOLUTION TOPICAL at 05:09

## 2023-06-09 NOTE — DISCHARGE NOTE PROVIDER - NSDCMRMEDTOKEN_GEN_ALL_CORE_FT
atorvastatin 20 mg oral tablet: 1 tab(s) orally once a day  furosemide 20 mg oral tablet: 1 tab(s) orally 2 times a day  metoprolol tartrate 50 mg oral tablet: 1 tab(s) orally 2 times a day  pantoprazole 40 mg oral delayed release tablet: 1 tab(s) orally 2 times a day  rivaroxaban 15 mg oral tablet: 1 tab(s) orally once a day (before a meal)   atorvastatin 20 mg oral tablet: 1 tab(s) orally once a day  furosemide 20 mg oral tablet: 1 tab(s) orally 2 times a day  metoprolol tartrate 50 mg oral tablet: 1 tab(s) orally 2 times a day  pantoprazole 40 mg oral delayed release tablet: 1 tab(s) orally 2 times a day  polyethylene glycol 3350 oral powder for reconstitution: 17 gram(s) orally once a day as needed for  constipation  rivaroxaban 15 mg oral tablet: 1 tab(s) orally once a day (before a meal)

## 2023-06-09 NOTE — DISCHARGE NOTE PROVIDER - NSDCCPCAREPLAN_GEN_ALL_CORE_FT
PRINCIPAL DISCHARGE DIAGNOSIS  Diagnosis: Upper GI bleed  Assessment and Plan of Treatment:      PRINCIPAL DISCHARGE DIAGNOSIS  Diagnosis: Upper GI bleed  Assessment and Plan of Treatment: Upper GI bleeding can present as vomiting blood, coffe-ground vomiting, black stools, or rectal bleeding. Ulcers can cause upper GI bleeding. On EGD, you were found to have healing ulcers. On colonoscopy, you had moderate diverticulosis which can also cause bleeding. Please follow a low fat and high fiber diet to avoid constipation and worsening of symptoms. Follow up with your GI doctor outpatient within a week of discharge to obtain biopsy results from the EGD.  If you develop and symptoms such as fever, dizziness, vomiting with blood, bloody stools, with associated low blood pressure or abdominal pain, go to the nearest ED.     PRINCIPAL DISCHARGE DIAGNOSIS  Diagnosis: Upper GI bleed  Assessment and Plan of Treatment: Upper GI bleeding can present as vomiting blood, coffe-ground vomiting, black stools, or rectal bleeding. Ulcers can cause upper GI bleeding. On EGD, you were found to have healing ulcers. On colonoscopy, you had moderate diverticulosis which can also cause bleeding. Please follow a low fat and high fiber diet to avoid constipation and worsening of symptoms. Follow up with your GI doctor outpatient within a week of discharge to obtain biopsy results from the EGD.  Please follow up with your cardiologist within 1 week of discharge. Please STOP taking aspirin. Continue to take Zarelto 15mg.   Please see your PCP for repeat blood tests within 1 week of discharge   If you develop and symptoms such as fever, dizziness, vomiting with blood, bloody stools, with associated low blood pressure or abdominal pain, go to the nearest ED. Stop taking your blood thinners if you see black stools or bright red blood in your stool again and come tothe ED.     PRINCIPAL DISCHARGE DIAGNOSIS  Diagnosis: Upper GI bleed  Assessment and Plan of Treatment: Upper GI bleeding can present as vomiting blood, coffe-ground vomiting, black stools, or rectal bleeding. Ulcers can cause upper GI bleeding. On EGD, you were found to have healing ulcers. On colonoscopy, you had moderate diverticulosis which can also cause bleeding. Please follow a low fat and high fiber diet to avoid constipation and worsening of symptoms. Follow up with your GI doctor outpatient within a week of discharge to obtain biopsy results from the EGD.  Please follow up with your cardiologist within 1 week of discharge. Please STOP taking aspirin. Continue to take Xarelto 15mg.   Please see your PCP for repeat blood tests within 1 week of discharge   Monitor blood pressure at home.  If you develop and symptoms such as fever, dizziness, vomiting with blood, bloody stools, with associated low blood pressure or abdominal pain, go to the nearest ED. Stop taking your blood thinners if you see black stools or bright red blood in your stool again and come tothe ED.     PRINCIPAL DISCHARGE DIAGNOSIS  Diagnosis: Upper GI bleed  Assessment and Plan of Treatment: Upper GI bleeding can present as vomiting blood, coffe-ground vomiting, black stools, or rectal bleeding. Ulcers can cause upper GI bleeding. On EGD, you were found to have healing ulcers. On colonoscopy, you had moderate diverticulosis which can also cause bleeding. Please follow a low fat and high fiber diet to avoid constipation and worsening of symptoms. Follow up with your GI doctor outpatient within a week of discharge to obtain biopsy results from the EGD.  Please follow up with your cardiologist within 1 week of discharge. Please STOP taking aspirin. Continue to take Xarelto 15mg.   Please see your PCP for repeat blood tests within 1 week of discharge   Monitor blood pressure at home. Please stop taking amlodipine and losartan until your cardiologist restarts.   If you develop and symptoms such as fever, dizziness, vomiting with blood, bloody stools, with associated low blood pressure or abdominal pain, go to the nearest ED. Stop taking your blood thinners if you see black stools or bright red blood in your stool again and come tothe ED.

## 2023-06-09 NOTE — PROGRESS NOTE ADULT - ASSESSMENT
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    1. GI bleed  anemia due to acute blood loss - s/p 2 units PRBC and H/H is relatively stable now  Keep Hgb >8  no further bleeding now  EGD 06/06- possible healing ulcers with diffuse erythema of the mucosa was noted in the stomach. These findings are compatible with non-erosive gastritis. Biopsies were obtained to evaluate for H. Pylori infection.  Colonoscopy 6/7: moderate diverticulosis, 4 benign appearing polyps removed, internal and external hemorrhoids  Xarelto 15mg daily started on 6/9  PPI q12hr x 2 months  colonoscopy in 3 years  avoid constipation, high fiber diet  outpt GI f/u for biopsy results  keep active T&S    2. CAD s/p CABG x 5 vessel in 2014  ASA held for GI bleed - pt instructed to f/u with Dr. Handy re: when to restart ASA if remains stable on Xarelto  on metoprolol and statin    3. Chronic HFmrEF  03/14/23 EF 45-50%  pt is euvolemic  on lasix 20mg bid and metoprolol    4. HTN - controlled on current meds  pt also on amlodipine and losartan at home which were held this admission - BP currently in the 100-110 range  pt has BP machine at home  he was instructed to check BP and if SBP >120, then he can slowly resume losartan and then amlodipine  outpt f/u with cardio and PMD    5. Chronic Afib  Xarelto restarted at lower dose - 15mg daily  on metoprolol    6. ADDY thrombus  found when pt was being evaluated for Watchman procedure  per his cardiologist Dr. Handy: Xarelto 15mg on discharge once cleared by GI    7. s/p pacemaker    8. DVT prophylaxis on Xarelto      full code      med reconciliation and discharge papers reviewed by me  spent 40 minutes on the discharge process of this pt

## 2023-06-09 NOTE — CHART NOTE - NSCHARTNOTESELECT_GEN_ALL_CORE
Event Note
GI/Event Note
EGD/Event Note
Event Note
Event Note
Transfer Note
colonoscopy/Event Note

## 2023-06-09 NOTE — DISCHARGE NOTE PROVIDER - NSDCFUSCHEDAPPT_GEN_ALL_CORE_FT
[facility] Physician [facility]  CARDIOLOGY [address] [facility]  Scheduled Appointment: 07/06/2023

## 2023-06-09 NOTE — DISCHARGE NOTE PROVIDER - CARE PROVIDER_API CALL
Carlos Handy  Cardiology  [address] [facility], [address]  [place] [address]  Phone: [phone]  Fax: [phone]  Follow Up Time:    Carlos Handy  Cardiology  [address] [facility], [address]  [place] [address]  Phone: [phone]  Fax: [phone]  Follow Up Time:     Dana Garcia  Gastroenterology  [address] [place] [address]  Phone: [phone]  Fax: [phone]  Follow Up Time:     Trevon Champagne  Internal Medicine  [address] [facility] [address]  [place] [address]  Phone: [phone]  Fax: [phone]  Follow Up Time:

## 2023-06-09 NOTE — DISCHARGE NOTE PROVIDER - HOSPITAL COURSE
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 Patient reports that he has been having black stools for the past week with no abdominal pain. no hematochezia or hematemesis. Patient never had GI bleed in the past. He does have lightheadedness but no syncope or fall. no dysphagia, odynophagia, weight changes, nausea, vomiting, fever, or any other symptoms. Patient is on xarelto and aspirin last dose 6/1. had EGD/ Colono many years ago unremarkable per patient except for some polyps. Patient has cholecystostomy drain with greenish secretion nonbloody.     In ED, he was HD stable. labs showed hb 7.4 (baseline 12). INR 1.4, BUN 21 with normal Cr. he was given 2 u prbc and admitted to floor.     Aspirin was held due to GI bleed. Xarelto held, placed on heparin gtt. Continued amlodipine, losartan, metoprolol tartrate, and atorvastatin. On 6/5/2023 and EGD was done which revealed possible healing ulcers with diffuse erythema of the mucosa was noted in the stomach. These findings are compatible with non-erosive gastritis. Biopsies were obtained to evaluate for H. Pylori infection.  On 6/7/2023, Colonoscopy showed Moderate diverticulosis, 4 benign appearing polyps that were removed, and internal and external hemorrhoids.     Xarelto 15mg daily started on 6/8. Patient had no episodes of melena since admission to the hospital. Patient is hemodynamically stable, ambulating well with walker, and is tolerating PO diet. He is stable for discharge.   GI recommended PPI q12hr x 2 months- patient given Protonix 40mg BID in the hospital, colonoscopy in 3 years, avoid constipation, follow a DASH/TLC Sodium and Cholesterol restricted diet with high fiber diet and low fat, outpatient GI f/u for biopsy results. Follow up with Cardiologist within a week of discharge to discuss restarting Aspirin and monitoring of Xarelto 15mg decreased from 20mg.    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 Patient reports that he has been having black stools for the past week with no abdominal pain. no hematochezia or hematemesis. Patient never had GI bleed in the past. He does have lightheadedness but no syncope or fall. no dysphagia, odynophagia, weight changes, nausea, vomiting, fever, or any other symptoms. Patient is on xarelto and aspirin last dose 6/1. had EGD/ [facility] many years ago unremarkable per patient except for some polyps. Patient has cholecystostomy drain with greenish secretion nonbloody.     In ED, he was HD stable. labs showed hb 7.4 (baseline 12). INR 1.4, BUN 21 with normal Cr. he was given 2 u prbc and admitted to floor.     Aspirin was held due to GI bleed. Xarelto held, placed on heparin gtt. On 6/5/2023 and EGD was done which revealed possible healing ulcers with diffuse erythema of the mucosa was noted in the stomach. These findings are compatible with non-erosive gastritis. Biopsies were obtained to evaluate for H. Pylori infection.  On 6/7/2023, Colonoscopy showed Moderate diverticulosis, 4 benign appearing polyps that were removed, and internal and external hemorrhoids.     Xarelto 15mg daily started on 6/8. Patient had no episodes of melena since admission to the hospital. Patient is hemodynamically stable, ambulating well with walker, and is tolerating PO diet. He is stable for discharge.   GI recommended PPI q12hr x 2 months- patient given Protonix 40mg BID in the hospital, colonoscopy in 3 years, avoid constipation, follow a DASH/TLC Sodium and Cholesterol restricted diet with high fiber diet and low fat, outpatient GI f/u for biopsy results. Follow up with Cardiologist within a week of discharge to discuss restarting Aspirin and monitoring of Xarelto 15mg decreased from 20mg.    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 Patient reports that he has been having black stools for the past week with no abdominal pain. no hematochezia or hematemesis. Patient never had GI bleed in the past. He does have lightheadedness but no syncope or fall. no dysphagia, odynophagia, weight changes, nausea, vomiting, fever, or any other symptoms. Patient is on xarelto and aspirin last dose 6/1. had EGD/ [facility] many years ago unremarkable per patient except for some polyps. Patient has cholecystostomy drain with greenish secretion nonbloody.     In ED, he was HD stable. labs showed hb 7.4 (baseline 12). INR 1.4, BUN 21 with normal Cr. he was given 2 u prbc and admitted to floor.     Aspirin was held due to GI bleed. Xarelto held, placed on heparin gtt 9due to LAAT and A fib with high CHADVASC. On 6/5/2023 and EGD was done which revealed possible healing ulcers with diffuse erythema of the mucosa  noted in the stomach. These findings are compatible with non-erosive gastritis. Biopsies were obtained to evaluate for H. Pylori infection.  On 6/7/2023, Colonoscopy showed Moderate diverticulosis, 4 benign appearing polyps that were removed, and internal and external hemorrhoids.     Spoke to Dr. Sosa who recommended re-starting Xarelto at lower dose, 15mg when cleared by GI. Continue to hold aspirin.     Xarelto 15mg daily started on 6/8. Patient had no episodes of melena since admission to the hospital. Patient is hemodynamically stable, ambulating well with walker, and is tolerating PO diet. He is stable for discharge.   GI recommended PPI q12hr x 2 months- patient given Protonix 40mg BID in the hospital, colonoscopy in 3 years, avoid constipation, follow a DASH/TLC Sodium and Cholesterol restricted diet with high fiber diet and low fat, outpatient GI f/u for biopsy results. Follow up with Cardiologist within a week of discharge to discuss restarting Aspirin and monitoring of Xarelto 15mg decreased from 20mg.    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 Patient reports that he has been having black stools for the past week with no abdominal pain. no hematochezia or hematemesis. Patient never had GI bleed in the past. He does have lightheadedness but no syncope or fall. no dysphagia, odynophagia, weight changes, nausea, vomiting, fever, or any other symptoms. Patient is on xarelto and aspirin last dose 6/1. had EGD/ [facility] many years ago unremarkable per patient except for some polyps. Patient has cholecystostomy drain with greenish secretion nonbloody.     In ED, he was HD stable. labs showed hb 7.4 (baseline 12). INR 1.4, BUN 21 with normal Cr. he was given 2 u prbc and admitted to floor.     Aspirin was held due to GI bleed. Xarelto held, placed on heparin gtt 9due to LAAT and A fib with high CHADVASC. On 6/5/2023 and EGD was done which revealed possible healing ulcers with diffuse erythema of the mucosa  noted in the stomach. These findings are compatible with non-erosive gastritis. Biopsies were obtained to evaluate for H. Pylori infection.  On 6/7/2023, Colonoscopy showed Moderate diverticulosis, 4 benign appearing polyps that were removed, and internal and external hemorrhoids.     Spoke to Dr. Sosa who recommended re-starting Xarelto at lower dose, 15mg when cleared by GI. Continue to hold aspirin.     Xarelto 15mg daily started on 6/8. Patient had no episodes of melena since admission to the hospital. Patient is hemodynamically stable, ambulating well with walker, and is tolerating PO diet. He is stable for discharge.   GI recommended PPI q12hr x 2 months- patient given Protonix 40mg BID in the hospital, colonoscopy in 3 years, avoid constipation, follow a DASH/TLC Sodium and Cholesterol restricted diet with high fiber diet and low fat, outpatient GI f/u for biopsy results. Follow up with Cardiologist within a week of discharge to discuss restarting Aspirin and monitoring of Xarelto 15mg decreased from 20mg. patient is medically stable and cleared for discharge.

## 2023-06-09 NOTE — DISCHARGE NOTE PROVIDER - PROVIDER TOKENS
PROVIDER:[TOKEN:[[id number]]] PROVIDER:[TOKEN:[[id number]]],PROVIDER:[TOKEN:[[id number]]],PROVIDER:[TOKEN:[[id number]]]

## 2023-06-09 NOTE — PROGRESS NOTE ADULT - SUBJECTIVE AND OBJECTIVE BOX
COLTON, MAX  77y Male    INTERVAL HPI/OVERNIGHT EVENTS:    pt feels well  no bleeding, SOB, chest pain, abdominal pain, N/V  ambulating with RW  for discharge home today    T(F): 97.6 (06-09-23 @ 04:47), Max: 98.6 (06-08-23 @ 20:00)  HR: 67 (06-09-23 @ 04:47) (60 - 67)  BP: 109/60 (06-09-23 @ 04:47) (109/59 - 113/57)  RR: 18 (06-09-23 @ 04:47) (18 - 18)  SpO2: 95% (06-08-23 @ 20:12) (95% - 95%) on RA      PHYSICAL EXAM:  GENERAL: NAD  HEAD:  Normocephalic  EYES:  conjunctiva and sclera clear  ENMT: Moist mucous membranes  NERVOUS SYSTEM:  Alert, awake, Good concentration  CHEST/LUNG: CTA b/l  HEART: Regular rate and rhythm  ABDOMEN: Soft, Nontender, Nondistended  EXTREMITIES:   No edema  SKIN: venous stasis changes b/l    Consultant(s) Notes Reviewed:  [x ] YES  [ ] NO  Care Discussed with Consultants/Other Providers [ x] YES  [ ] NO    MEDICATIONS  (STANDING):  atorvastatin 20 milliGRAM(s) Oral at bedtime  chlorhexidine 2% Cloths 1 Application(s) Topical <User Schedule>  furosemide    Tablet 20 milliGRAM(s) Oral two times a day  metoprolol tartrate 50 milliGRAM(s) Oral two times a day  pantoprazole    Tablet 40 milliGRAM(s) Oral two times a day  rivaroxaban 15 milliGRAM(s) Oral with dinner    MEDICATIONS  (PRN):      LABS:                        9.0    6.90  )-----------( 273      ( 09 Jun 2023 05:24 )             28.2     06-09    138  |  103  |  9<L>  ----------------------------<  102<H>  3.7   |  24  |  0.8    Ca    8.3<L>      09 Jun 2023 05:24  Mg     2.3     06-09      PT/INR - ( 07 Jun 2023 18:00 )   PT: 12.80 sec;   INR: 1.12 ratio         PTT - ( 08 Jun 2023 10:00 )  PTT:>200.0 sec          Case discussed with resident, med student and RN on rounds today    Care discussed with pt

## 2023-06-10 LAB
HCT VFR BLD CALC: 29.1 % — LOW (ref 42–52)
HGB BLD-MCNC: 9.2 G/DL — LOW (ref 14–18)
MCHC RBC-ENTMCNC: 28.4 PG — SIGNIFICANT CHANGE UP (ref 27–31)
MCHC RBC-ENTMCNC: 31.6 G/DL — LOW (ref 32–37)
MCV RBC AUTO: 89.8 FL — SIGNIFICANT CHANGE UP (ref 80–94)
NRBC # BLD: 0 /100 WBCS — SIGNIFICANT CHANGE UP (ref 0–0)
PLATELET # BLD AUTO: 278 K/UL — SIGNIFICANT CHANGE UP (ref 130–400)
PMV BLD: 10.3 FL — SIGNIFICANT CHANGE UP (ref 7.4–10.4)
RBC # BLD: 3.24 M/UL — LOW (ref 4.7–6.1)
RBC # FLD: 14.2 % — SIGNIFICANT CHANGE UP (ref 11.5–14.5)
WBC # BLD: 8.14 K/UL — SIGNIFICANT CHANGE UP (ref 4.8–10.8)
WBC # FLD AUTO: 8.14 K/UL — SIGNIFICANT CHANGE UP (ref 4.8–10.8)

## 2023-06-10 PROCEDURE — 99232 SBSQ HOSP IP/OBS MODERATE 35: CPT

## 2023-06-10 RX ORDER — LACTULOSE 10 G/15ML
20 SOLUTION ORAL
Refills: 0 | Status: DISCONTINUED | OUTPATIENT
Start: 2023-06-10 | End: 2023-06-11

## 2023-06-10 RX ADMIN — LACTULOSE 20 GRAM(S): 10 SOLUTION ORAL at 11:21

## 2023-06-10 RX ADMIN — Medication 20 MILLIGRAM(S): at 05:28

## 2023-06-10 RX ADMIN — Medication 50 MILLIGRAM(S): at 05:28

## 2023-06-10 RX ADMIN — CHLORHEXIDINE GLUCONATE 1 APPLICATION(S): 213 SOLUTION TOPICAL at 05:27

## 2023-06-10 RX ADMIN — Medication 20 MILLIGRAM(S): at 13:55

## 2023-06-10 RX ADMIN — ATORVASTATIN CALCIUM 20 MILLIGRAM(S): 80 TABLET, FILM COATED ORAL at 22:10

## 2023-06-10 RX ADMIN — RIVAROXABAN 15 MILLIGRAM(S): KIT at 17:26

## 2023-06-10 RX ADMIN — Medication 50 MILLIGRAM(S): at 17:26

## 2023-06-10 RX ADMIN — PANTOPRAZOLE SODIUM 40 MILLIGRAM(S): 20 TABLET, DELAYED RELEASE ORAL at 17:26

## 2023-06-10 RX ADMIN — PANTOPRAZOLE SODIUM 40 MILLIGRAM(S): 20 TABLET, DELAYED RELEASE ORAL at 05:27

## 2023-06-10 NOTE — PROGRESS NOTE ADULT - ASSESSMENT
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    1. GI bleed  anemia due to acute blood loss - s/p 2 units PRBC and H/H is relatively stable now  Keep Hgb >8  no further bleeding now  EGD 06/06- possible healing ulcers with diffuse erythema of the mucosa was noted in the stomach. These findings are compatible with non-erosive gastritis. Biopsies were obtained to evaluate for H. Pylori infection.  Colonoscopy 6/7: moderate diverticulosis, 4 benign appearing polyps removed, internal and external hemorrhoids  Xarelto 15mg daily started on 6/9  PPI q12hr x 2 months  colonoscopy in 3 years  avoid constipation, high fiber diet  outpt GI f/u for biopsy results  keep active T&S    2. CAD s/p CABG x 5 vessel in 2014  ASA held for GI bleed - pt instructed to f/u with Dr. Handy re: when to restart ASA if remains stable on Xarelto  on metoprolol and statin    3. Chronic HFmrEF  03/14/23 EF 45-50%  pt is euvolemic  on lasix 20mg bid and metoprolol    4. HTN - controlled on current meds  pt also on amlodipine and losartan at home which were held this admission - BP currently in the 100-110 range  pt has BP machine at home  he was instructed to check BP and if SBP >120, then he can slowly resume losartan and then amlodipine  outpt f/u with cardio and PMD    5. Chronic Afib  Xarelto restarted at lower dose - 15mg daily  on metoprolol    6. ADDY thrombus  found when pt was being evaluated for Watchman procedure  per his cardiologist Dr. Handy: Xarelto 15mg on discharge once cleared by GI    7. s/p pacemaker    8. DVT prophylaxis on Xarelto      full code    Tim Bee MD  Attending Hospitalist

## 2023-06-10 NOTE — PROGRESS NOTE ADULT - SUBJECTIVE AND OBJECTIVE BOX
SUBJECTIVE:    Patient is a 77y old Male who presents with a chief complaint of melena (09 Jun 2023 11:11)    Currently admitted to medicine with the primary diagnosis of Upper GI bleed       Today is hospital day 8d. This morning he is resting comfortably in bed and reports no new issues or overnight events.     PAST MEDICAL & SURGICAL HISTORY  Arrhythmia    Hypercholesteremia    HTN (hypertension)    Obesity    CHF (congestive heart failure)    Heart attack    Breast cancer  2001    Osteoarthritis    CAD (coronary artery disease)    Atrial fibrillation    [place] (hard of hearing)    S/P CABG x 5  2014    H/O mastectomy  right 2001    Pacemaker      SOCIAL HISTORY:  Negative for smoking/alcohol/drug use.     ALLERGIES:  No Known Allergies    MEDICATIONS:  STANDING MEDICATIONS  atorvastatin 20 milliGRAM(s) Oral at bedtime  chlorhexidine 2% Cloths 1 Application(s) Topical <User Schedule>  furosemide    Tablet 20 milliGRAM(s) Oral two times a day  metoprolol tartrate 50 milliGRAM(s) Oral two times a day  pantoprazole    Tablet 40 milliGRAM(s) Oral two times a day  rivaroxaban 15 milliGRAM(s) Oral with dinner  senna 2 Tablet(s) Oral at bedtime    PRN MEDICATIONS    VITALS:   T(F): 97.6  HR: 60  BP: 113/53  RR: 18  SpO2: 96%    PHYSICAL EXAM:  GEN: No acute distress  LUNGS: Clear to auscultation bilaterally   HEART: S1/S2 present.   ABD: Soft, non-tender, non-distended. Bowel sounds present      LABS:                        9.0    6.90  )-----------( 273      ( 09 Jun 2023 05:24 )             28.2     06-09    138  |  103  |  9<L>  ----------------------------<  102<H>  3.7   |  24  |  0.8    Ca    8.3<L>      09 Jun 2023 05:24  Mg     2.3     06-09                        RADIOLOGY:

## 2023-06-11 ENCOUNTER — TRANSCRIPTION ENCOUNTER (OUTPATIENT)
Age: 78
End: 2023-06-11

## 2023-06-11 VITALS
RESPIRATION RATE: 18 BRPM | SYSTOLIC BLOOD PRESSURE: 136 MMHG | DIASTOLIC BLOOD PRESSURE: 64 MMHG | TEMPERATURE: 98 F | HEART RATE: 64 BPM

## 2023-06-11 PROCEDURE — 99239 HOSP IP/OBS DSCHRG MGMT >30: CPT

## 2023-06-11 RX ORDER — SIMETHICONE 80 MG/1
80 TABLET, CHEWABLE ORAL DAILY
Refills: 0 | Status: DISCONTINUED | OUTPATIENT
Start: 2023-06-11 | End: 2023-06-11

## 2023-06-11 RX ADMIN — CHLORHEXIDINE GLUCONATE 1 APPLICATION(S): 213 SOLUTION TOPICAL at 06:06

## 2023-06-11 RX ADMIN — PANTOPRAZOLE SODIUM 40 MILLIGRAM(S): 20 TABLET, DELAYED RELEASE ORAL at 06:06

## 2023-06-11 RX ADMIN — Medication 20 MILLIGRAM(S): at 06:05

## 2023-06-11 RX ADMIN — SIMETHICONE 80 MILLIGRAM(S): 80 TABLET, CHEWABLE ORAL at 10:13

## 2023-06-11 RX ADMIN — Medication 20 MILLIGRAM(S): at 13:06

## 2023-06-11 RX ADMIN — Medication 50 MILLIGRAM(S): at 06:05

## 2023-06-11 NOTE — DISCHARGE NOTE NURSING/CASE MANAGEMENT/SOCIAL WORK - PATIENT PORTAL LINK FT
You can access the FollowMyHealth Patient Portal offered by [facility] by registering at the following website: [email or web address]. By joining [employer]’s FollowMyHealth portal, you will also be able to view your health information using other applications (apps) compatible with our system.

## 2023-06-11 NOTE — PROGRESS NOTE ADULT - PROVIDER SPECIALTY LIST ADULT
Hospitalist
Internal Medicine
Hospitalist
Hospitalist
Internal Medicine
Gastroenterology
Hospitalist
Hospitalist

## 2023-06-11 NOTE — DISCHARGE NOTE NURSING/CASE MANAGEMENT/SOCIAL WORK - NSDCPEFALRISK_GEN_ALL_CORE
For information on Fall & Injury Prevention, visit: [email or web address] OR  [email or web address] OR  https://www.cdc.gov/steadi/patient.html

## 2023-06-11 NOTE — PROGRESS NOTE ADULT - REASON FOR ADMISSION
celestine

## 2023-06-11 NOTE — PROGRESS NOTE ADULT - ASSESSMENT
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    # GI bleed: resolved   anemia due to acute blood loss - s/p 2 units PRBC and H/H is relatively stable   Keep Hgb >8  no further bleeding    EGD 06/06- possible healing ulcers with diffuse erythema of the mucosa was noted in the stomach. These findings are compatible with non-erosive gastritis. Biopsies were obtained to evaluate for H. Pylori infection.  Colonoscopy 6/7: moderate diverticulosis, 4 benign appearing polyps removed, internal and external hemorrhoids  Xarelto 15mg daily started on 6/9  PPI q12hr x 2 months  colonoscopy in 3 years  avoid constipation, high fiber diet  outpt GI f/u for biopsy results  keep active T&S    2. CAD s/p CABG x 5 vessel in 2014  ASA held for GI bleed - pt instructed to f/u with Dr. Handy re: when to restart ASA if remains stable on Xarelto  on metoprolol and statin    3. Chronic HFmrEF  03/14/23 EF 45-50%  pt is euvolemic  on lasix 20mg bid and metoprolol    4. HTN - controlled on current meds  pt also on amlodipine and losartan at home which were held this admission - BP currently in the 100-110 range  pt has BP machine at home  he was instructed to check BP and if SBP >120, then he can slowly resume losartan and then amlodipine  outpt f/u with cardio and PMD    5. Chronic Afib  Xarelto restarted at lower dose - 15mg daily  on metoprolol    6. ADDY thrombus  found when pt was being evaluated for Watchman procedure  per his cardiologist Dr. Handy: Xarelto 15mg on discharge once cleared by GI    7. s/p pacemaker    8. DVT prophylaxis on Xarelto    9. constipation: resolved, patient had bm today, encouraged ambulation and will discharged on Miralax PRN    full code  patient is medically stable to be discharged  d/c to home today, no need, wife will pick him up in the afternoon  discussed with case management  discussed with patient

## 2023-06-11 NOTE — DISCHARGE NOTE NURSING/CASE MANAGEMENT/SOCIAL WORK - NSDCVIVACCINE_GEN_ALL_CORE_FT
Tdap; 22-Nov-2018 08:52; Benito Lovell); [employer]; [id number] (Exp. Date: 23-Oct-2020); IntraMuscular; Deltoid Right.; 0.5 milliLiter(s); VIS (VIS Published: 09-May-2013, VIS Presented: 22-Nov-2018);

## 2023-06-11 NOTE — PROGRESS NOTE ADULT - SUBJECTIVE AND OBJECTIVE BOX
SUBJECTIVE:    Patient is a 77y old Male who presents with a chief complaint of melena (10 Óscar 2023 10:24)  Currently admitted to medicine with the primary diagnosis of Upper GI bleed  Today is hospital day 9d. This morning he is resting comfortably in bed and reports no new issues or overnight events.   had bm today, passing gas, tolerating diet. no complains  ready to go home     PAST MEDICAL & SURGICAL HISTORY  Arrhythmia    Hypercholesteremia    HTN (hypertension)    Obesity    CHF (congestive heart failure)    Heart attack    Breast cancer  2001    Osteoarthritis    CAD (coronary artery disease)    Atrial fibrillation    [place] (hard of hearing)    S/P CABG x 5  2014    H/O mastectomy  right 2001    Pacemaker      SOCIAL HISTORY:  Negative for smoking/alcohol/drug use.     ALLERGIES:  No Known Allergies    MEDICATIONS:  STANDING MEDICATIONS  atorvastatin 20 milliGRAM(s) Oral at bedtime  chlorhexidine 2% Cloths 1 Application(s) Topical <User Schedule>  furosemide    Tablet 20 milliGRAM(s) Oral two times a day  lactulose Syrup 20 Gram(s) Oral two times a day  metoprolol tartrate 50 milliGRAM(s) Oral two times a day  pantoprazole    Tablet 40 milliGRAM(s) Oral two times a day  rivaroxaban 15 milliGRAM(s) Oral with dinner  senna 2 Tablet(s) Oral at bedtime    PRN MEDICATIONS  simethicone 80 milliGRAM(s) Chew daily PRN    VITALS:   T(F): 98.1  HR: 61  BP: 158/70  RR: 18  SpO2: 98%    LABS:                        9.2    8.14  )-----------( 278      ( 10 Óscar 2023 18:13 )             29.1            PHYSICAL EXAM:  GEN: No acute distress  LUNGS: Clear to auscultation bilaterally   HEART: S1/S2 present. RRR.   ABD: Soft, non-tender, non-distended. Bowel sounds present  EXT: NC/NC/NE/2+PP/MILLS  NEURO: AAOX3

## 2023-06-15 DIAGNOSIS — Z95.0 PRESENCE OF CARDIAC PACEMAKER: ICD-10-CM

## 2023-06-15 DIAGNOSIS — K64.4 RESIDUAL HEMORRHOIDAL SKIN TAGS: ICD-10-CM

## 2023-06-15 DIAGNOSIS — I50.22 CHRONIC SYSTOLIC (CONGESTIVE) HEART FAILURE: ICD-10-CM

## 2023-06-15 DIAGNOSIS — E78.5 HYPERLIPIDEMIA, UNSPECIFIED: ICD-10-CM

## 2023-06-15 DIAGNOSIS — I87.8 OTHER SPECIFIED DISORDERS OF VEINS: ICD-10-CM

## 2023-06-15 DIAGNOSIS — Z85.3 PERSONAL HISTORY OF MALIGNANT NEOPLASM OF BREAST: ICD-10-CM

## 2023-06-15 DIAGNOSIS — K25.9 GASTRIC ULCER, UNSPECIFIED AS ACUTE OR CHRONIC, WITHOUT HEMORRHAGE OR PERFORATION: ICD-10-CM

## 2023-06-15 DIAGNOSIS — Z79.01 LONG TERM (CURRENT) USE OF ANTICOAGULANTS: ICD-10-CM

## 2023-06-15 DIAGNOSIS — K92.2 GASTROINTESTINAL HEMORRHAGE, UNSPECIFIED: ICD-10-CM

## 2023-06-15 DIAGNOSIS — I11.0 HYPERTENSIVE HEART DISEASE WITH HEART FAILURE: ICD-10-CM

## 2023-06-15 DIAGNOSIS — Z95.1 PRESENCE OF AORTOCORONARY BYPASS GRAFT: ICD-10-CM

## 2023-06-15 DIAGNOSIS — D62 ACUTE POSTHEMORRHAGIC ANEMIA: ICD-10-CM

## 2023-06-15 DIAGNOSIS — K44.9 DIAPHRAGMATIC HERNIA WITHOUT OBSTRUCTION OR GANGRENE: ICD-10-CM

## 2023-06-15 DIAGNOSIS — I48.20 CHRONIC ATRIAL FIBRILLATION, UNSPECIFIED: ICD-10-CM

## 2023-06-15 DIAGNOSIS — K64.8 OTHER HEMORRHOIDS: ICD-10-CM

## 2023-06-15 DIAGNOSIS — I25.10 ATHEROSCLEROTIC HEART DISEASE OF NATIVE CORONARY ARTERY WITHOUT ANGINA PECTORIS: ICD-10-CM

## 2023-06-15 DIAGNOSIS — Z79.82 LONG TERM (CURRENT) USE OF ASPIRIN: ICD-10-CM

## 2023-06-15 DIAGNOSIS — K57.91 DIVERTICULOSIS OF INTESTINE, PART UNSPECIFIED, WITHOUT PERFORATION OR ABSCESS WITH BLEEDING: ICD-10-CM

## 2023-06-20 NOTE — H&P PST ADULT - TOBACCO USE
Remote Alert Monitoring Note    Challenges to be reviewed by the provider   Additional needs identified to be addressed with provider No                Date/Time:  2023 10:19 AM  Patient Current Location: [facility]    LPN contacted patient by telephone regarding red alert received for blood pressure heart rate (115) and pulse ox heart rate (125). Verified patients name and  as identifiers. Background: HTN  Refer to 911 immediately if:  Patient unresponsive or unable to provide history  Change in cognition or sudden confusion  Patient unable to respond in complete sentences  Intense chest pain/tightness  Any concern for any clinical emergency  Red Alert: Provider response time of 1 hr required for any red alert requiring intervention  Yellow Alert: Provider response time of 3hr required for any escalated yellow alert    HR Triage  Are you having any Chest Pain? no   Are you having any Shortness of Breath? no   Are you having any dizziness? no   Are you feeling more fatigued or tired than normal? no   Are you having any other health concerns or issues? no       Clinical Interventions: Reviewed and followed up on alerts and treatments-Patient has elevated HR's of 115 & 125. Denies, CP, SOB, Dizziness, Syncope, Fatigue, shakiness. Has taken Metoprolol 25 mg this morning. Rechecked HR at 109. No further action needed at this time. Education of patient/family/caregiver/guardian to support self-management-Recheck HR  Have warm hands, hold hands and fingers very still while testing SpO2 level. Take a few deep breaths before testing. Advised Patient to contact PCP  regarding CP, SOB, Dizziness, Shakiness, Extreme Fatigue and any health concerns for early outpatient intervention in an effort to avoid hospitalization. Report any worsening symptoms to PCP and/or Call 911 and/or GO TO  EMERGENCY ROOM if symptoms are severe or worsening. Expresses understanding. Plan/Follow Up:  Will continue to
Former smoker

## 2023-07-06 ENCOUNTER — APPOINTMENT (OUTPATIENT)
Dept: CARDIOLOGY | Facility: CLINIC | Age: 78
End: 2023-07-06
Payer: MEDICARE

## 2023-07-06 ENCOUNTER — NON-APPOINTMENT (OUTPATIENT)
Age: 78
End: 2023-07-06

## 2023-07-06 PROCEDURE — 93294 REM INTERROG EVL PM/LDLS PM: CPT

## 2023-07-06 PROCEDURE — 93296 REM INTERROG EVL PM/IDS: CPT

## 2023-07-26 ENCOUNTER — OUTPATIENT (OUTPATIENT)
Dept: OUTPATIENT SERVICES | Facility: HOSPITAL | Age: 78
LOS: 1 days | Discharge: HOME | End: 2023-07-26
Payer: MEDICARE

## 2023-07-26 ENCOUNTER — RESULT REVIEW (OUTPATIENT)
Age: 78
End: 2023-07-26

## 2023-07-26 VITALS
TEMPERATURE: 98 F | SYSTOLIC BLOOD PRESSURE: 184 MMHG | WEIGHT: 225.09 LBS | HEIGHT: 74 IN | RESPIRATION RATE: 20 BRPM | HEART RATE: 60 BPM | DIASTOLIC BLOOD PRESSURE: 83 MMHG | OXYGEN SATURATION: 100 %

## 2023-07-26 DIAGNOSIS — Z95.0 PRESENCE OF CARDIAC PACEMAKER: Chronic | ICD-10-CM

## 2023-07-26 DIAGNOSIS — K81.9 CHOLECYSTITIS, UNSPECIFIED: ICD-10-CM

## 2023-07-26 DIAGNOSIS — Z90.10 ACQUIRED ABSENCE OF UNSPECIFIED BREAST AND NIPPLE: Chronic | ICD-10-CM

## 2023-07-26 DIAGNOSIS — Z95.1 PRESENCE OF AORTOCORONARY BYPASS GRAFT: Chronic | ICD-10-CM

## 2023-07-26 DIAGNOSIS — Z46.59 ENCOUNTER FOR FITTING AND ADJUSTMENT OF OTHER GASTROINTESTINAL APPLIANCE AND DEVICE: ICD-10-CM

## 2023-07-26 PROCEDURE — 47536 EXCHANGE BILIARY DRG CATH: CPT

## 2023-07-26 PROCEDURE — C1729: CPT

## 2023-07-26 PROCEDURE — C1769: CPT

## 2023-07-26 NOTE — CHART NOTE - NSCHARTNOTEFT_GEN_A_CORE
Cholangiogram performed demonstrating cystic duct obstruction.    Tube exchanged for new 8 Khmer tube.

## 2023-07-26 NOTE — PROGRESS NOTE ADULT - SUBJECTIVE AND OBJECTIVE BOX
Interventional Radiology Outpatient Documentation    PREOPERATIVE DAY OF PROCEDURE EVALUATION:     I have personally seen and examined this patient. He is a 77 year-old male with history of cholecystitis s/p percutaneous cholecystostomy tube placement.  He has not been cleared for surgery yet.    on exam his VSS, abdomen soft, in no respiratory distress.    Plan is for percutaneous cholecystostomy tube exchange. (Signed electronically) 07-26-23 @ 09:49     Procedure/ risks/ benefits/ goals/ alternatives were explained. All questions answered. Informed content obtained from patient. Consent placed in chart.

## 2023-08-07 ENCOUNTER — APPOINTMENT (OUTPATIENT)
Dept: ELECTROPHYSIOLOGY | Facility: CLINIC | Age: 78
End: 2023-08-07
Payer: MEDICARE

## 2023-08-07 VITALS
DIASTOLIC BLOOD PRESSURE: 78 MMHG | WEIGHT: 228 LBS | HEART RATE: 60 BPM | BODY MASS INDEX: 29.26 KG/M2 | HEIGHT: 74 IN | SYSTOLIC BLOOD PRESSURE: 156 MMHG

## 2023-08-07 PROCEDURE — 99215 OFFICE O/P EST HI 40 MIN: CPT

## 2023-08-07 PROCEDURE — 93281 PM DEVICE PROGR EVAL MULTI: CPT | Mod: 59

## 2023-08-07 PROCEDURE — 93000 ELECTROCARDIOGRAM COMPLETE: CPT | Mod: 59

## 2023-08-07 RX ORDER — RIVAROXABAN 20 MG/1
20 TABLET, FILM COATED ORAL
Qty: 90 | Refills: 0 | Status: DISCONTINUED | COMMUNITY
Start: 2023-05-04 | End: 2023-08-07

## 2023-08-07 RX ORDER — AMLODIPINE BESYLATE 5 MG/1
5 TABLET ORAL DAILY
Refills: 0 | Status: DISCONTINUED | COMMUNITY
End: 2023-08-07

## 2023-08-07 RX ORDER — LOSARTAN POTASSIUM 100 MG/1
100 TABLET, FILM COATED ORAL DAILY
Qty: 90 | Refills: 3 | Status: DISCONTINUED | COMMUNITY
End: 2023-08-07

## 2023-08-07 NOTE — ASSESSMENT
[FreeTextEntry1] : 76 yo M with history of AFib, SSS s/p DC PPM with dyspnea and cardiomyopathy s/p BiV PPM upgrade who was noted to have left atrial appendage clot prior to Watchman procedure.   # SSS s/p DC PPM with cardiomyopathy s/p BiV PPM upgrade - I interrogated and reprogrammed his device as described in procedure.  - Remote monitor is set up and patient is transmitting.  # Paroxysmal AFib, Persistent since Oct 2022 with left atrial appendage thrombus noted on pre-Watchman OTIS - >99% AT/AF burden (rate controlled) - On Xarelto 15 mg. Denies bleeding. States he had hematuria with Xarelto 20mg and Eliquis.  - Repeat chest CT to assess for left atrial appendage clot and anatomy. WIll schedule Watchman for 9/26. Discussed case with Dr. Handy.   # Cholecystitis - Follow up with GI/surgeon about drain removal and cholecystectomy. If pt proceeds with Watchman, he will need at least 6 mo of DAPT (aspirin and plavix) without interruption with 45 day OTIS. Patient has had numerous GB attacks but states no one will intervene due to high risk of procedure freida interrupting OAC.  # Mild cardiomyopathy (EF 40-45%) - Cont Metoprolol Tartrate 50 mg BID   - Consider Entresto. Pt to discuss with with cardiologist.  # HTN - BP elevated - Cont Metoprolol Tartrate 50 mg BID - I have advised him to adhere to a 2g Na diet and to be careful with consuming salty meals.  - Follow up with cardiologist  I have also advised the patient to go to the nearest emergency room if he experiences any chest pain, dyspnea, syncope, or has any other compelling symptoms.   Follow up 4 weeks after Watchman. Plan discussed with Dr. Handy who is in agreement with proceeding with Watchman. Will discuss case with Dr. Watkins.

## 2023-08-07 NOTE — PHYSICAL EXAM
[General Appearance - Well Developed] : well developed [Normal Appearance] : normal appearance [Well Groomed] : well groomed [General Appearance - Well Nourished] : well nourished [No Deformities] : no deformities [General Appearance - In No Acute Distress] : no acute distress [Heart Rate And Rhythm] : heart rate and rhythm were normal [Heart Sounds] : normal S1 and S2 [Murmurs] : no murmurs present [] : no respiratory distress [Respiration, Rhythm And Depth] : normal respiratory rhythm and effort [Exaggerated Use Of Accessory Muscles For Inspiration] : no accessory muscle use [Auscultation Breath Sounds / Voice Sounds] : lungs were clear to auscultation bilaterally [Left Infraclavicular] : left infraclavicular area [Well-Healed] : well-healed [Erythema] : not erythematous [Warm] : not warm [Tender] : not tender [FreeTextEntry1] : cholecystostomy drain in place [Nail Clubbing] : no clubbing of the fingernails

## 2023-08-07 NOTE — HISTORY OF PRESENT ILLNESS
[de-identified] : Cardiologist: Dr. Handy Ophtho: Dr. GOMEZ ([facility]) Pulm: referral provided  78 yo M with history of SSS s/p DC PPM, CAD s/p CABG, LBBB. BiV PPM attempted but despite multiple catheters and sheaths, CS was unable to be cannulated at that time. Pt admitted 7/9-7/14 for abdominal pain --> recommended MRCP with possible ERCP but patient refused due to claustrophobia and refused ERCP bc he felt better and did not want any other procedures.   Pt was in the ER 10/23 (no admission) for lower extremity edema due to excess salt intake (ate pickles and ham).  He can walk on flat ground and up the stairs without dyspnea.  Patient had a BiV PPM upgrade with Dr. Danielle (6/24/22). Since Oct, AF has been persistent.  4/24/23 Pt went for OTIS prior to Watchman 3/14/23 and was found to have left atrial appendage clot. At that time he was not on any DOACs, so he was started on Eliquis 5mg PO BID. On 3/26/23, he went in for abdominal pain and was noted to have acute cholecystitis and E. Coli bacteremia. He had a percutaneous cholecystostomy 4/3 and cardiologist changed his OAC to Xarelto 15 mg.   8/7/23 Patient is here to discuss Watchman. He still has a gallbladder drain. He denies chest pain, dyspnea, palpitations, dizziness, lightheadedness, presyncope or syncope.

## 2023-08-07 NOTE — DISCUSSION/SUMMARY
[Pacemaker Function Normal] : normal pacemaker function [FreeTextEntry1] : We had an extensive conversation regarding the nature of atrial fibrillation, including potential etiologies, underlying pathophysiology and natural history of the disease. In addition, the potential risk of thromboembolic events and assessment of that risk were discussed. I have emphasized the importance of continuing anticoagulation.   We discussed the risks/benefits/alternatives, nature of procedure, and follow up care after Watchman is implanted. We discussed the risks including but not limited to bleeding, hematoma, injury to vessels and heart, perforation, tamponade, pneumothorax, infection, embolization, malfunction, and rare risks of stroke/heart attack/death. Patient expressed understanding of the discussion.   Patient understands that they will need to take ASA and Plavix for 6 mo with a OTIS at 45 days post op to assess for leaks. I answered all questions in detail and patient was agreeable to Watchman implant.

## 2023-08-07 NOTE — PROCEDURE
[Atrial Fibrillation] : atrial fibrillation [See Device Printout] : See device printout [CRT-P] : Cardiac resynchronization therapy pacemaker [DDDR] : DDDR [Voltage: ___ volts] : Voltage was [unfilled] volts [Magnet Rate: ___ Ppm] : magnet rate was [unfilled] Ppm [Sensing Amplitude ___mv] : sensing amplitude was [unfilled] mv [Lead Imp:  ___ohms] : lead impedance was [unfilled] ohms [___V @] : [unfilled] V [___ ms] : [unfilled] ms [Programmed for Longevity] : output reprogrammed for improved battery longevity [Apace-Vpace ___ %] : Apace-Vpace [unfilled]% [de-identified] : [facility] [de-identified] : 2618 [de-identified] : [id number] [de-identified] : 06/24/2022 [de-identified] : 59 [de-identified] : 7.1-7.5 years [de-identified] : AT/AF [place] >99% (rate controlled). BiV Paced > 99% [place] stable. Patient is transmitting to [facility].

## 2023-08-07 NOTE — CARDIOLOGY SUMMARY
[de-identified] : 8/7/2023 AFib, BiV paced (HR 60 bpm),  msec 4/24/2023 AFIB, BiV paced (HR 60 bpm),  msec 7/27/2022 A-paced, BiV paced (HR 60 bpm),  msec [de-identified] : 12/2022 EF 40-45% Mod dilated LA 5.4 cm. Mild MR. Mild TR.  9/1/21 EF 46%. Severe LAE.  4/1/21 EF 49%. Mild cLVH. Mild-mod MR. Mild TR. 1/14/20: EF 45-54% Mod Grade 2 DD. Mod eccentric MR. Mild TR. 8/20/18, Mod thickened mitral valve with mod restricted opening. Mild MAC. Mod MR. Mod LAE. Grade 1 DD. LVEF 55-75%.  [de-identified] : CRT-P (Dee) 7/27/2022

## 2023-08-25 ENCOUNTER — OUTPATIENT (OUTPATIENT)
Dept: OUTPATIENT SERVICES | Facility: HOSPITAL | Age: 78
LOS: 1 days | End: 2023-08-25
Payer: MEDICARE

## 2023-08-25 ENCOUNTER — RESULT REVIEW (OUTPATIENT)
Age: 78
End: 2023-08-25

## 2023-08-25 DIAGNOSIS — Z95.1 PRESENCE OF AORTOCORONARY BYPASS GRAFT: Chronic | ICD-10-CM

## 2023-08-25 DIAGNOSIS — Z90.10 ACQUIRED ABSENCE OF UNSPECIFIED BREAST AND NIPPLE: Chronic | ICD-10-CM

## 2023-08-25 DIAGNOSIS — I48.91 UNSPECIFIED ATRIAL FIBRILLATION: ICD-10-CM

## 2023-08-25 DIAGNOSIS — Z95.0 PRESENCE OF CARDIAC PACEMAKER: Chronic | ICD-10-CM

## 2023-08-25 PROCEDURE — 75572 CT HRT W/3D IMAGE: CPT

## 2023-08-25 PROCEDURE — 75572 CT HRT W/3D IMAGE: CPT | Mod: 26,MH

## 2023-08-26 DIAGNOSIS — I48.91 UNSPECIFIED ATRIAL FIBRILLATION: ICD-10-CM

## 2023-09-04 NOTE — PATIENT PROFILE ADULT - FUNCTIONAL ASSESSMENT - BASIC MOBILITY 1.
4 = No assist / stand by assistance
pt admitted for pna, hypoxia at urgent, improved s/p nebs/steroids in ED but still feeling sob, tired. iv abx given.

## 2023-09-12 ENCOUNTER — OUTPATIENT (OUTPATIENT)
Dept: OUTPATIENT SERVICES | Facility: HOSPITAL | Age: 78
LOS: 1 days | End: 2023-09-12
Payer: MEDICARE

## 2023-09-12 VITALS
TEMPERATURE: 98 F | RESPIRATION RATE: 18 BRPM | HEART RATE: 60 BPM | WEIGHT: 229.94 LBS | DIASTOLIC BLOOD PRESSURE: 67 MMHG | OXYGEN SATURATION: 100 % | SYSTOLIC BLOOD PRESSURE: 167 MMHG | HEIGHT: 74 IN

## 2023-09-12 DIAGNOSIS — Z95.0 PRESENCE OF CARDIAC PACEMAKER: Chronic | ICD-10-CM

## 2023-09-12 DIAGNOSIS — Z90.10 ACQUIRED ABSENCE OF UNSPECIFIED BREAST AND NIPPLE: Chronic | ICD-10-CM

## 2023-09-12 DIAGNOSIS — Z95.1 PRESENCE OF AORTOCORONARY BYPASS GRAFT: Chronic | ICD-10-CM

## 2023-09-12 DIAGNOSIS — I48.19 OTHER PERSISTENT ATRIAL FIBRILLATION: ICD-10-CM

## 2023-09-12 DIAGNOSIS — Z01.818 ENCOUNTER FOR OTHER PREPROCEDURAL EXAMINATION: ICD-10-CM

## 2023-09-12 LAB
ALBUMIN SERPL ELPH-MCNC: 4.8 G/DL — SIGNIFICANT CHANGE UP (ref 3.5–5.2)
ALP SERPL-CCNC: 79 U/L — SIGNIFICANT CHANGE UP (ref 30–115)
ALT FLD-CCNC: 7 U/L — SIGNIFICANT CHANGE UP (ref 0–41)
ANION GAP SERPL CALC-SCNC: 11 MMOL/L — SIGNIFICANT CHANGE UP (ref 7–14)
APTT BLD: 44.7 SEC — HIGH (ref 27–39.2)
AST SERPL-CCNC: 15 U/L — SIGNIFICANT CHANGE UP (ref 0–41)
BASOPHILS # BLD AUTO: 0.1 K/UL — SIGNIFICANT CHANGE UP (ref 0–0.2)
BASOPHILS NFR BLD AUTO: 1.2 % — HIGH (ref 0–1)
BILIRUB SERPL-MCNC: 1 MG/DL — SIGNIFICANT CHANGE UP (ref 0.2–1.2)
BLD GP AB SCN SERPL QL: SIGNIFICANT CHANGE UP
BUN SERPL-MCNC: 14 MG/DL — SIGNIFICANT CHANGE UP (ref 10–20)
CALCIUM SERPL-MCNC: 9.4 MG/DL — SIGNIFICANT CHANGE UP (ref 8.4–10.5)
CHLORIDE SERPL-SCNC: 100 MMOL/L — SIGNIFICANT CHANGE UP (ref 98–110)
CO2 SERPL-SCNC: 26 MMOL/L — SIGNIFICANT CHANGE UP (ref 17–32)
CREAT SERPL-MCNC: 0.8 MG/DL — SIGNIFICANT CHANGE UP (ref 0.7–1.5)
EGFR: 91 ML/MIN/1.73M2 — SIGNIFICANT CHANGE UP
EOSINOPHIL # BLD AUTO: 0.17 K/UL — SIGNIFICANT CHANGE UP (ref 0–0.7)
EOSINOPHIL NFR BLD AUTO: 2 % — SIGNIFICANT CHANGE UP (ref 0–8)
GLUCOSE SERPL-MCNC: 91 MG/DL — SIGNIFICANT CHANGE UP (ref 70–99)
HCT VFR BLD CALC: 34 % — LOW (ref 42–52)
HGB BLD-MCNC: 10.5 G/DL — LOW (ref 14–18)
IMM GRANULOCYTES NFR BLD AUTO: 0.5 % — HIGH (ref 0.1–0.3)
INR BLD: 1.88 RATIO — HIGH (ref 0.65–1.3)
LYMPHOCYTES # BLD AUTO: 1.46 K/UL — SIGNIFICANT CHANGE UP (ref 1.2–3.4)
LYMPHOCYTES # BLD AUTO: 17.5 % — LOW (ref 20.5–51.1)
MCHC RBC-ENTMCNC: 23 PG — LOW (ref 27–31)
MCHC RBC-ENTMCNC: 30.9 G/DL — LOW (ref 32–37)
MCV RBC AUTO: 74.4 FL — LOW (ref 80–94)
MONOCYTES # BLD AUTO: 0.75 K/UL — HIGH (ref 0.1–0.6)
MONOCYTES NFR BLD AUTO: 9 % — SIGNIFICANT CHANGE UP (ref 1.7–9.3)
MRSA PCR RESULT.: NEGATIVE — SIGNIFICANT CHANGE UP
NEUTROPHILS # BLD AUTO: 5.82 K/UL — SIGNIFICANT CHANGE UP (ref 1.4–6.5)
NEUTROPHILS NFR BLD AUTO: 69.8 % — SIGNIFICANT CHANGE UP (ref 42.2–75.2)
NRBC # BLD: 0 /100 WBCS — SIGNIFICANT CHANGE UP (ref 0–0)
PLATELET # BLD AUTO: 263 K/UL — SIGNIFICANT CHANGE UP (ref 130–400)
PMV BLD: 10.4 FL — SIGNIFICANT CHANGE UP (ref 7.4–10.4)
POTASSIUM SERPL-MCNC: 5.2 MMOL/L — HIGH (ref 3.5–5)
POTASSIUM SERPL-SCNC: 5.2 MMOL/L — HIGH (ref 3.5–5)
PROT SERPL-MCNC: 7.3 G/DL — SIGNIFICANT CHANGE UP (ref 6–8)
PROTHROM AB SERPL-ACNC: 21.9 SEC — HIGH (ref 9.95–12.87)
RBC # BLD: 4.57 M/UL — LOW (ref 4.7–6.1)
RBC # FLD: 17.4 % — HIGH (ref 11.5–14.5)
SODIUM SERPL-SCNC: 137 MMOL/L — SIGNIFICANT CHANGE UP (ref 135–146)
WBC # BLD: 8.34 K/UL — SIGNIFICANT CHANGE UP (ref 4.8–10.8)
WBC # FLD AUTO: 8.34 K/UL — SIGNIFICANT CHANGE UP (ref 4.8–10.8)

## 2023-09-12 PROCEDURE — 87640 STAPH A DNA AMP PROBE: CPT

## 2023-09-12 PROCEDURE — 86900 BLOOD TYPING SEROLOGIC ABO: CPT

## 2023-09-12 PROCEDURE — 80053 COMPREHEN METABOLIC PANEL: CPT

## 2023-09-12 PROCEDURE — 85730 THROMBOPLASTIN TIME PARTIAL: CPT

## 2023-09-12 PROCEDURE — 93005 ELECTROCARDIOGRAM TRACING: CPT

## 2023-09-12 PROCEDURE — 87641 MR-STAPH DNA AMP PROBE: CPT

## 2023-09-12 PROCEDURE — 99214 OFFICE O/P EST MOD 30 MIN: CPT | Mod: 25

## 2023-09-12 PROCEDURE — 36415 COLL VENOUS BLD VENIPUNCTURE: CPT

## 2023-09-12 PROCEDURE — 86850 RBC ANTIBODY SCREEN: CPT

## 2023-09-12 PROCEDURE — 86901 BLOOD TYPING SEROLOGIC RH(D): CPT

## 2023-09-12 PROCEDURE — 93010 ELECTROCARDIOGRAM REPORT: CPT

## 2023-09-12 PROCEDURE — 85610 PROTHROMBIN TIME: CPT

## 2023-09-12 PROCEDURE — 85025 COMPLETE CBC W/AUTO DIFF WBC: CPT

## 2023-09-12 RX ORDER — ATORVASTATIN CALCIUM 80 MG/1
1 TABLET, FILM COATED ORAL
Qty: 0 | Refills: 0 | DISCHARGE

## 2023-09-12 NOTE — H&P PST ADULT - NSICDXPASTMEDICALHX_GEN_ALL_CORE_FT
PAST MEDICAL HISTORY:  Arrhythmia     Atrial fibrillation     Breast cancer 2001    CAD (coronary artery disease)     CHF (congestive heart failure)     Heart attack     [place] (hard of hearing)     HTN (hypertension)     Hypercholesteremia     Obesity     Osteoarthritis

## 2023-09-12 NOTE — H&P PST ADULT - HISTORY OF PRESENT ILLNESS
pt to have watchman so he doesn't have to take blood thinners    PATIENT CURRENTLY DENIES CHEST PAIN  SHORTNESS OF BREATH  PALPITATIONS,  DYSURIA, OR UPPER RESPIRATORY INFECTION IN PAST 2 WEEKS  denies travel outside the [place] in the past 30 days  Patient denies any signs or symptoms of COVID 19 and denies contact with known positive individuals.  They have an appointment for repeat COVID testing pre-procedure and acknowledge its time and place.  They were instructed to quarantine pre-procedure, practice exposure control measures, continue to self-monitor and report any concerns to their proceduralist.  pt advised self quarantine till day of procedure    Anesthesia Alert  NO--Difficult Airway  NO--History of neck surgery or radiation  NO--Limited ROM of neck  NO--History of Malignant hyperthermia  NO--No personal or family history of Pseudocholinesterase deficiency.  NO--Prior Anesthesia Complication  NO--Latex Allergy  NO--Loose teeth  NO--History of Rheumatoid Arthritis  Bleeding risk xarelto  falls precautions    NO--AUGUST    pt poor historian   [place]     PT DENIES ANY RASHES, ABRASION, OR OPEN WOUNDS OR CUTS    AS PER THE PT, THIS IS HIS/HER COMPLETE MEDICAL AND SURGICAL HX, INCLUDING MEDICATIONS PRESCRIBED AND OVER THE COUNTER    Patient verbalized understanding of instructions and was given the opportunity to ask questions and have them answered.    pt denies any suicidal ideation or thoughts, pt states not a threat to self or others    Other persistent atrial fibrillation    Encounter for other preprocedural examination    Family history of heart disease (Father, Mother)    Family history of lung cancer (Sibling)    Arrhythmia    Hypercholesteremia    HTN (hypertension)    Obesity    CHF (congestive heart failure)    Heart attack    Breast cancer    Osteoarthritis    CAD (coronary artery disease)    Atrial fibrillation    [place] (hard of hearing)    S/P CABG x 5    H/O mastectomy    Pacemaker    [email or web address]_[email or web address]    Revised Cardiac Risk Index for Pre-Operative Risk from [email or web address]  on 9/12/2023    RESULT SUMMARY:  3 points  Class IV Risk    15.0 %  30-day risk of death, MI, or cardiac arrest    From [employer] 2017, based on pooled data from 5 high quality external validations (4 prospective). These numbers are higher than those often quoted from the now-outdated original study (Juan F 1999). See Evidence for details.      INPUTS:  Elevated-risk surgery —> 1 = Yes  History of ischemic heart disease —> 1 = Yes  History of congestive heart failure —> 1 = Yes  History of cerebrovascular disease —> 0 = No  Pre-operative treatment with insulin —> 0 = No  Pre-operative creatinine >2 mg/dL / 176.8 µmol/L —> 0 = No      Duke Activity Status Index (DASI) from [employer] [facility] [employer]  on 9/12/2023  *    RESULT SUMMARY:  36.7 points  The higher the score (maximum 58.2), the higher the functional status.    7.25 METs        INPUTS:  Take care of self —> 2.75 = Yes  Walk indoors —> 1.75 = Yes  Walk 1&ndash;2 blocks on level ground —> 2.75 = Yes  Climb a flight of stairs or walk up a hill —> 5.5 = Yes  Run a short distance —> 0 = No  Do light work around the house —> 2.7 = Yes  Do moderate work around the house —> 3.5 = Yes  Do heavy work around the house —> 8 = Yes  Do yardwork —> 4.5 = Yes  Have sexual relations —> 5.25 = Yes  Participate in moderate recreational activities —> 0 = No  Participate in strenuous sports —> 0 = No

## 2023-09-12 NOTE — H&P PST ADULT - REASON FOR ADMISSION
Patient is a  77 year old  male presenting to PAST in preparation for  WATCHMAN/ OTIS  on 9/26/23 under general  anesthesia by Dr. jung

## 2023-09-12 NOTE — H&P PST ADULT - BMI (KG/M2)
Detail Level: Detailed Quality 130: Documentation Of Current Medications In The Medical Record: Current Medications Documented Quality 110: Preventive Care And Screening: Influenza Immunization: Influenza Immunization Administered during Influenza season 29.5

## 2023-09-13 ENCOUNTER — OUTPATIENT (OUTPATIENT)
Dept: OUTPATIENT SERVICES | Facility: HOSPITAL | Age: 78
LOS: 1 days | End: 2023-09-13
Payer: MEDICARE

## 2023-09-13 DIAGNOSIS — Z02.9 ENCOUNTER FOR ADMINISTRATIVE EXAMINATIONS, UNSPECIFIED: ICD-10-CM

## 2023-09-13 DIAGNOSIS — Z95.0 PRESENCE OF CARDIAC PACEMAKER: Chronic | ICD-10-CM

## 2023-09-13 DIAGNOSIS — Z90.10 ACQUIRED ABSENCE OF UNSPECIFIED BREAST AND NIPPLE: Chronic | ICD-10-CM

## 2023-09-13 DIAGNOSIS — Z01.818 ENCOUNTER FOR OTHER PREPROCEDURAL EXAMINATION: ICD-10-CM

## 2023-09-13 DIAGNOSIS — Z95.1 PRESENCE OF AORTOCORONARY BYPASS GRAFT: Chronic | ICD-10-CM

## 2023-09-13 DIAGNOSIS — I48.19 OTHER PERSISTENT ATRIAL FIBRILLATION: ICD-10-CM

## 2023-09-13 LAB
APPEARANCE UR: CLEAR — SIGNIFICANT CHANGE UP
BILIRUB UR-MCNC: NEGATIVE — SIGNIFICANT CHANGE UP
COLOR SPEC: YELLOW — SIGNIFICANT CHANGE UP
DIFF PNL FLD: NEGATIVE — SIGNIFICANT CHANGE UP
GLUCOSE UR QL: NEGATIVE MG/DL — SIGNIFICANT CHANGE UP
KETONES UR-MCNC: ABNORMAL MG/DL
LEUKOCYTE ESTERASE UR-ACNC: NEGATIVE — SIGNIFICANT CHANGE UP
NITRITE UR-MCNC: NEGATIVE — SIGNIFICANT CHANGE UP
PH UR: 6 — SIGNIFICANT CHANGE UP (ref 5–8)
PROT UR-MCNC: SIGNIFICANT CHANGE UP MG/DL
SP GR SPEC: 1.02 — SIGNIFICANT CHANGE UP (ref 1–1.03)
UROBILINOGEN FLD QL: 1 MG/DL — SIGNIFICANT CHANGE UP (ref 0.2–1)

## 2023-09-13 PROCEDURE — 81003 URINALYSIS AUTO W/O SCOPE: CPT

## 2023-09-13 PROCEDURE — 87086 URINE CULTURE/COLONY COUNT: CPT

## 2023-09-14 DIAGNOSIS — Z02.9 ENCOUNTER FOR ADMINISTRATIVE EXAMINATIONS, UNSPECIFIED: ICD-10-CM

## 2023-09-15 NOTE — ASU PATIENT PROFILE, ADULT - FALL HARM RISK - HARM RISK INTERVENTIONS

## 2023-09-16 LAB
CULTURE RESULTS: SIGNIFICANT CHANGE UP
SPECIMEN SOURCE: SIGNIFICANT CHANGE UP

## 2023-09-19 ENCOUNTER — OUTPATIENT (OUTPATIENT)
Dept: OUTPATIENT SERVICES | Facility: HOSPITAL | Age: 78
LOS: 1 days | End: 2023-09-19
Payer: MEDICARE

## 2023-09-19 VITALS — OXYGEN SATURATION: 100 %

## 2023-09-19 DIAGNOSIS — Z95.1 PRESENCE OF AORTOCORONARY BYPASS GRAFT: Chronic | ICD-10-CM

## 2023-09-19 DIAGNOSIS — I48.19 OTHER PERSISTENT ATRIAL FIBRILLATION: ICD-10-CM

## 2023-09-19 DIAGNOSIS — Z90.10 ACQUIRED ABSENCE OF UNSPECIFIED BREAST AND NIPPLE: Chronic | ICD-10-CM

## 2023-09-19 DIAGNOSIS — Z95.0 PRESENCE OF CARDIAC PACEMAKER: Chronic | ICD-10-CM

## 2023-09-19 PROCEDURE — 93312 ECHO TRANSESOPHAGEAL: CPT | Mod: 26,XU

## 2023-09-19 PROCEDURE — 93320 DOPPLER ECHO COMPLETE: CPT | Mod: 26

## 2023-09-19 PROCEDURE — 93312 ECHO TRANSESOPHAGEAL: CPT

## 2023-09-19 PROCEDURE — 93325 DOPPLER ECHO COLOR FLOW MAPG: CPT | Mod: 26

## 2023-09-19 PROCEDURE — 93325 DOPPLER ECHO COLOR FLOW MAPG: CPT

## 2023-09-19 PROCEDURE — 93320 DOPPLER ECHO COMPLETE: CPT

## 2023-09-19 RX ORDER — RIVAROXABAN 15 MG-20MG
1 KIT ORAL
Refills: 0 | DISCHARGE

## 2023-09-19 NOTE — ASU PATIENT PROFILE, ADULT - NSICDXPASTMEDICALHX_GEN_ALL_CORE_FT
PAST MEDICAL HISTORY:  Arrhythmia     Atrial fibrillation     Breast cancer 2001    CAD (coronary artery disease)     CHF (congestive heart failure)     Heart attack     [place] (hard of hearing)     HTN (hypertension)     Hypercholesteremia     Obesity     Osteoarthritis

## 2023-09-19 NOTE — CHART NOTE - NSCHARTNOTEFT_GEN_A_CORE
POST OPERATIVE PROCEDURAL DOCUMENTATION  PRE-OP DIAGNOSIS: pre-op watchman    POST-OP DIAGNOSIS: pre-op watchman with no ADDY clot    PROCEDURE: Transesophageal Echocardiogram    Primary Physician: Dr. Barrera  Cardiology Fellow: Dr. Evans    ANESTHESIA TYPE  [  ] General Anesthesia  [ x ] Conscious Sedation  [  ] Local/Regional    CONDITION  [  ] Critical  [  ] Serious  [  ] Fair  [ x ] Good    SPECIMENS REMOVED (IF APPLICABLE): N/A    IMPLANTS (IF APPLICABLE): None    ESTIMATED BLOOD LOSS: None    COMPLICATIONS: None    After risks and benefits of procedures were explained, informed consent was obtained and placed in chart.   The patient received topical anesthetic to the oropharynx with viscous lidocaine and benzocaine spray.  Refer to Anesthesia note for sedation details.  The OTIS probe was passed into the esophagus without difficulty.  Transesophageal and transgastric images were obtained.  The OTIS probe was removed without difficulty and examined.  There was no evidence for bleeding.  The patient tolerated the procedure well without any immediate OTIS-related complications.      Preliminary Findings:  LA: Mildly enlarged  ADDY: Left atrial appendage was clear of clot. Smoke noted in ADDY. Decreased doppler velocities   LV: LVEF was estimated at 15 - 20 %  MV: Mild  MR  AV: No AI, no  AS  RA: Mildly enlarged  RV: Normal size and function  TV: No TR  PV: No NE, no PS  IAS: No PFO or ASD on color doppler   Aorta: There was mild, non-mobile atheroma seen in the thoracic aorta.    DIAGNOSIS/IMPRESSION: No clot in ADDY    Full report to follow    PLAN OF CARE:  [x] Discharge home when stable and fully awake.  [x] No eating or drinking for 1 hour  [x] No driving for 24 hours    Results of procedure/ plan of care discussed with patient/  in detail. POST OPERATIVE PROCEDURAL DOCUMENTATION  PRE-OP DIAGNOSIS: pre-op watchman    POST-OP DIAGNOSIS: pre-op watchman with no ADDY clot    PROCEDURE: Transesophageal Echocardiogram    Primary Physician: Dr. Barrera  Cardiology Fellow: Dr. Evans    ANESTHESIA TYPE  [  ] General Anesthesia  [ x ] Conscious Sedation  [  ] Local/Regional    CONDITION  [  ] Critical  [  ] Serious  [  ] Fair  [ x ] Good    SPECIMENS REMOVED (IF APPLICABLE): N/A    IMPLANTS (IF APPLICABLE): None    ESTIMATED BLOOD LOSS: None    COMPLICATIONS: None    After risks and benefits of procedures were explained, informed consent was obtained and placed in chart.   The patient received topical anesthetic to the oropharynx with viscous lidocaine and benzocaine spray.  Refer to Anesthesia note for sedation details.  The OTIS probe was passed into the esophagus without difficulty.  Transesophageal and transgastric images were obtained.  The OTIS probe was removed without difficulty and examined.  There was no evidence for bleeding.  The patient tolerated the procedure well without any immediate OTIS-related complications.      Preliminary Findings:  < from: Transesophageal Echocardiogram (09.19.23 @ 13:03) >     1. Left ventricular ejection fraction, by visual estimation, is <20%.   2. Severely decreased global left ventricular systolic function.   3. Moderately reduced RV systolic function.   4. Moderately enlarged right ventricle.   5. Mild mitral valve regurgitation.   6. Mild to moderate mitral annular calcification.   7. Mild thickening of the anterior and posterior mitral valve leaflets.   8. Left atrial enlargement.   9. Right atrial enlargement.  10. No left atrial appendage thrombus. Decreased left atrial appendage   velocities.    < end of copied text >        DIAGNOSIS/IMPRESSION: No clot in ADDY    Full report to follow    PLAN OF CARE:  [x] Discharge home when stable and fully awake.  [x] No eating or drinking for 1 hour  [x] No driving for 24 hours    Results of procedure/ plan of care discussed with patient/  in detail.

## 2023-09-20 DIAGNOSIS — I48.19 OTHER PERSISTENT ATRIAL FIBRILLATION: ICD-10-CM

## 2023-09-20 NOTE — ASU PREOP CHECKLIST - TAMPON REMOVED
"Subjective   Patient ID: Jeanette Vargas is a 37 y.o. female who presents for No chief complaint on file..    HPI follow up htn, hyperlipidemia, hypothyoidism, gerd, avis, add, insomnia    Review of Systems  Anxiety better over all    Objective   /80   Pulse 81   Temp 36.1 °C (96.9 °F)   Ht 1.626 m (5' 4\")   Wt 137 kg (303 lb)   SpO2 99%   BMI 52.01 kg/m²     Physical Exam  GEN nad, Affect wnl  Heent ncat, eomfg, face symmetric  Skin good color  Resp breathing easily  No p/m retardation or agitation  Thinking appropriate  Oriented    Assessment/Plan   Diagnoses and all orders for this visit:  Hypothyroidism, unspecified type  -     Tsh With Reflex To Free T4 If Abnormal; Future  -     levothyroxine (Synthroid, Levoxyl) 88 mcg tablet; Take 1 tablet (88 mcg) by mouth once daily.  HTN (hypertension), benign  Gastroesophageal reflux disease without esophagitis  LOERA (nonalcoholic steatohepatitis)  IGT (impaired glucose tolerance)  Morbid obesity with body mass index (BMI) of 40.0 or higher (CMS/HCC)  Anxiety  Mixed hyperlipidemia     Start levothyroxine  Tsh 3 mos  "
n/a
Olumiant Pregnancy And Lactation Text: Based on animal studies, Olumiant may cause embryo-fetal harm when administered to pregnant women.  The medication should not be used in pregnancy.  Breastfeeding is not recommended during treatment.

## 2023-09-26 ENCOUNTER — APPOINTMENT (OUTPATIENT)
Dept: ELECTROPHYSIOLOGY | Facility: HOSPITAL | Age: 78
End: 2023-09-26

## 2023-09-26 ENCOUNTER — INPATIENT (INPATIENT)
Facility: HOSPITAL | Age: 78
LOS: 1 days | Discharge: HOME HEALTH | DRG: 274 | End: 2023-09-28
Attending: STUDENT IN AN ORGANIZED HEALTH CARE EDUCATION/TRAINING PROGRAM | Admitting: STUDENT IN AN ORGANIZED HEALTH CARE EDUCATION/TRAINING PROGRAM
Payer: MEDICARE

## 2023-09-26 ENCOUNTER — TRANSCRIPTION ENCOUNTER (OUTPATIENT)
Age: 78
End: 2023-09-26

## 2023-09-26 VITALS
SYSTOLIC BLOOD PRESSURE: 164 MMHG | RESPIRATION RATE: 19 BRPM | HEIGHT: 74 IN | OXYGEN SATURATION: 100 % | HEART RATE: 60 BPM | TEMPERATURE: 98 F | DIASTOLIC BLOOD PRESSURE: 71 MMHG | WEIGHT: 223.55 LBS

## 2023-09-26 DIAGNOSIS — Z95.1 PRESENCE OF AORTOCORONARY BYPASS GRAFT: Chronic | ICD-10-CM

## 2023-09-26 DIAGNOSIS — I48.19 OTHER PERSISTENT ATRIAL FIBRILLATION: ICD-10-CM

## 2023-09-26 DIAGNOSIS — Z95.0 PRESENCE OF CARDIAC PACEMAKER: Chronic | ICD-10-CM

## 2023-09-26 DIAGNOSIS — Z90.10 ACQUIRED ABSENCE OF UNSPECIFIED BREAST AND NIPPLE: Chronic | ICD-10-CM

## 2023-09-26 PROCEDURE — 93281 PM DEVICE PROGR EVAL MULTI: CPT | Mod: 26

## 2023-09-26 PROCEDURE — 85025 COMPLETE CBC W/AUTO DIFF WBC: CPT

## 2023-09-26 PROCEDURE — C1887: CPT

## 2023-09-26 PROCEDURE — 33340 PERQ CLSR TCAT L ATR APNDGE: CPT | Mod: Q0

## 2023-09-26 PROCEDURE — 93005 ELECTROCARDIOGRAM TRACING: CPT

## 2023-09-26 PROCEDURE — 85730 THROMBOPLASTIN TIME PARTIAL: CPT

## 2023-09-26 PROCEDURE — 80053 COMPREHEN METABOLIC PANEL: CPT

## 2023-09-26 PROCEDURE — 74150 CT ABDOMEN W/O CONTRAST: CPT

## 2023-09-26 PROCEDURE — 85610 PROTHROMBIN TIME: CPT

## 2023-09-26 PROCEDURE — C1769: CPT

## 2023-09-26 PROCEDURE — 36415 COLL VENOUS BLD VENIPUNCTURE: CPT

## 2023-09-26 PROCEDURE — 76937 US GUIDE VASCULAR ACCESS: CPT | Mod: 26

## 2023-09-26 PROCEDURE — 83735 ASSAY OF MAGNESIUM: CPT

## 2023-09-26 PROCEDURE — 93318 ECHO TRANSESOPHAGEAL INTRAOP: CPT

## 2023-09-26 PROCEDURE — C1894: CPT

## 2023-09-26 PROCEDURE — 93662 INTRACARDIAC ECG (ICE): CPT | Mod: 26

## 2023-09-26 PROCEDURE — 86900 BLOOD TYPING SEROLOGIC ABO: CPT

## 2023-09-26 PROCEDURE — C1729: CPT

## 2023-09-26 PROCEDURE — 74150 CT ABDOMEN W/O CONTRAST: CPT | Mod: 26

## 2023-09-26 PROCEDURE — C1889: CPT | Mod: Q0

## 2023-09-26 PROCEDURE — 47536 EXCHANGE BILIARY DRG CATH: CPT

## 2023-09-26 PROCEDURE — ZZZZZ: CPT

## 2023-09-26 PROCEDURE — 86901 BLOOD TYPING SEROLOGIC RH(D): CPT

## 2023-09-26 PROCEDURE — 86850 RBC ANTIBODY SCREEN: CPT

## 2023-09-26 RX ORDER — METOPROLOL TARTRATE 50 MG
50 TABLET ORAL
Refills: 0 | Status: DISCONTINUED | OUTPATIENT
Start: 2023-09-26 | End: 2023-09-28

## 2023-09-26 RX ORDER — ATORVASTATIN CALCIUM 80 MG/1
20 TABLET, FILM COATED ORAL AT BEDTIME
Refills: 0 | Status: DISCONTINUED | OUTPATIENT
Start: 2023-09-26 | End: 2023-09-28

## 2023-09-26 RX ORDER — CEFAZOLIN SODIUM 1 G
2000 VIAL (EA) INJECTION ONCE
Refills: 0 | Status: COMPLETED | OUTPATIENT
Start: 2023-09-26 | End: 2023-09-26

## 2023-09-26 RX ORDER — FUROSEMIDE 40 MG
20 TABLET ORAL
Refills: 0 | Status: DISCONTINUED | OUTPATIENT
Start: 2023-09-26 | End: 2023-09-28

## 2023-09-26 RX ORDER — ASPIRIN/CALCIUM CARB/MAGNESIUM 324 MG
1 TABLET ORAL
Qty: 0 | Refills: 0 | DISCHARGE
Start: 2023-09-26

## 2023-09-26 RX ORDER — CEPHALEXIN 500 MG
1 CAPSULE ORAL
Qty: 10 | Refills: 0
Start: 2023-09-26 | End: 2023-09-30

## 2023-09-26 RX ORDER — RIVAROXABAN 15 MG-20MG
15 KIT ORAL DAILY
Refills: 0 | Status: DISCONTINUED | OUTPATIENT
Start: 2023-09-26 | End: 2023-09-28

## 2023-09-26 RX ORDER — ASPIRIN/CALCIUM CARB/MAGNESIUM 324 MG
81 TABLET ORAL DAILY
Refills: 0 | Status: DISCONTINUED | OUTPATIENT
Start: 2023-09-26 | End: 2023-09-28

## 2023-09-26 RX ORDER — ACETAMINOPHEN 500 MG
650 TABLET ORAL EVERY 6 HOURS
Refills: 0 | Status: DISCONTINUED | OUTPATIENT
Start: 2023-09-26 | End: 2023-09-28

## 2023-09-26 RX ORDER — ATORVASTATIN CALCIUM 80 MG/1
1 TABLET, FILM COATED ORAL
Qty: 0 | Refills: 0 | DISCHARGE
Start: 2023-09-26

## 2023-09-26 RX ORDER — ATORVASTATIN CALCIUM 80 MG/1
1 TABLET, FILM COATED ORAL
Refills: 0 | DISCHARGE

## 2023-09-26 RX ORDER — LANOLIN ALCOHOL/MO/W.PET/CERES
5 CREAM (GRAM) TOPICAL AT BEDTIME
Refills: 0 | Status: DISCONTINUED | OUTPATIENT
Start: 2023-09-26 | End: 2023-09-28

## 2023-09-26 RX ADMIN — ATORVASTATIN CALCIUM 20 MILLIGRAM(S): 80 TABLET, FILM COATED ORAL at 21:10

## 2023-09-26 RX ADMIN — Medication 20 MILLIGRAM(S): at 14:29

## 2023-09-26 RX ADMIN — Medication 5 MILLIGRAM(S): at 21:09

## 2023-09-26 RX ADMIN — Medication 100 MILLIGRAM(S): at 09:30

## 2023-09-26 RX ADMIN — Medication 50 MILLIGRAM(S): at 18:00

## 2023-09-26 RX ADMIN — Medication 81 MILLIGRAM(S): at 14:29

## 2023-09-26 RX ADMIN — RIVAROXABAN 15 MILLIGRAM(S): KIT at 18:00

## 2023-09-26 NOTE — PRE PROCEDURE NOTE - PRE PROCEDURE EVALUATION
I have personally seen and examined the patient. I agree with the history and physical//consult//progress note, which I have reviewed and noted any changes below.     Pre-op Diagnosis: Anemia, AFib    Procedure: OTIS, Left Atrial Appendage Occlusion and All Related Procedures

## 2023-09-26 NOTE — DISCHARGE NOTE PROVIDER - NSDCCPTREATMENT_GEN_ALL_CORE_FT
PRINCIPAL PROCEDURE  Procedure: Closure, left atrial appendage, percutaneous  Findings and Treatment: - Please take Keflex 500 mg twice daily for 5 days.   - Please start taking aspirin 81 mg daily  - You should restart your Xarelto  - You may shower today.  - Do not drive or operate heavy machinery for 3 days.  - Do not submerge in water (example: baths, swimming) for 1 week.  - No squatting, exertional activities, or lifting anything > 10 lbs for 1 week.  - Any sudden swelling, redness, fever, discharge, or severe pain, call the Electrophysiology Office at [phone].     PRINCIPAL PROCEDURE  Procedure: Closure, left atrial appendage, percutaneous  Findings and Treatment: - Please take Keflex 500 mg twice daily for 5 days.   - Please start taking aspirin 81 mg daily  - You should restart your Xarelto  - You may shower today.  - Do not drive or operate heavy machinery for 3 days.  - Do not submerge in water (example: baths, swimming) for 1 week.  - No squatting, exertional activities, or lifting anything > 10 lbs for 1 week.  - Any sudden swelling, redness, fever, discharge, or severe pain, call the Electrophysiology Office at [phone].      SECONDARY PROCEDURE  Procedure: Fluoroscopic guidance for replacement of percutaneous cholecystostomy tube  Findings and Treatment: continue drain to gravity  flush daily with normal saline   follow up with Interventional radiology for further routine exchange     PRINCIPAL PROCEDURE  Procedure: Closure, left atrial appendage, percutaneous  Findings and Treatment: - Please take Keflex 500 mg twice daily for 5 days.   - Please start taking aspirin 81 mg daily  - You should restart your Xarelto  - You may shower today.  - Do not drive or operate heavy machinery for 3 days.  - Do not submerge in water (example: baths, swimming) for 1 week.  - No squatting, exertional activities, or lifting anything > 10 lbs for 1 week.  - Any sudden swelling, redness, fever, discharge, or severe pain, call the Electrophysiology Office at [phone].      SECONDARY PROCEDURE  Procedure: Fluoroscopic guidance for replacement of percutaneous cholecystostomy tube  Findings and Treatment: continue drain to gravity  flush once a day with normal saline 5- 10 cc   follow up with Interventional radiology for further routine exchange

## 2023-09-26 NOTE — PATIENT PROFILE ADULT - NSPROPASSIVESMOKEEXPOSURE_GEN_A_NUR
[de-identified] : Ms. MALISSA CHAPARRO is a 74 year old female who present today for initial consultation for recently diagnosed colon cancer. She was recently admitted to [facility] with anemia and underwent a colonoscopy- found to have a malignant friable hepatic flexure mass. \par PMH: CAD, Pacemaker, afib, hypothyroidism, HTN, HLD, DM, breast cancer s/p mastectomy. \par \par EGD 4/14/22: \par 1. Rectum; polypectomy: Hyperplastic polyp \par 2. Colon at hepatic flexure biopsy: Invasive adenocarcinoma, moderately differentiated. \par 3. Gastric antrum biopsy: Gastric mucosa without significant pathology. H. pylori are not identified with cresyl violet stain. \par \par CT Chest/abd/pelvis April 2022: A known hepatic flexure lesion recently biopsied on colonoscopy is not discretely visualized on this exam. There is thickening versus under distention at the hepatic flexure. There are no inflammatory changes or adjacent lymph nodes. No evidence for distant metastases.\par \par Labs 4/16/22: \par H&H 8.6.28.9 \par \par Today 4/27/22: Denies abdominal pain, bloating, nausea/vomiting, bowel habit changes, hematochezia, black sticky stools, fever, chills, night sweats or weight loss. \par \par Dr. Oscra Adair- PCP  Yes

## 2023-09-26 NOTE — DISCHARGE NOTE PROVIDER - PROVIDER TOKENS
PROVIDER:[TOKEN:[[id number]],FOLLOWUP:[1 month],ESTABLISHEDPATIENT:[T]] PROVIDER:[TOKEN:[[id number]],FOLLOWUP:[1 month],ESTABLISHEDPATIENT:[T]],FREE:[LAST:[interventional],FIRST:[radiology],PHONE:[phone]],FAX:[(   )    -],ADDRESS:[[address] [place] [address]],FOLLOWUP:[2 weeks]]

## 2023-09-26 NOTE — DISCHARGE NOTE PROVIDER - NSDCCPCAREPLAN_GEN_ALL_CORE_FT
PRINCIPAL DISCHARGE DIAGNOSIS  Diagnosis: Longstanding persistent atrial fibrillation  Assessment and Plan of Treatment:      PRINCIPAL DISCHARGE DIAGNOSIS  Diagnosis: Longstanding persistent atrial fibrillation  Assessment and Plan of Treatment:       SECONDARY DISCHARGE DIAGNOSES  Diagnosis: Migration of percutaneous cholecystostomy tube  Assessment and Plan of Treatment:

## 2023-09-26 NOTE — DISCHARGE NOTE PROVIDER - CARE PROVIDER_API CALL
Rebeca Ojeda  Cardiovascular Disease  [address] [place] [address]  Phone: [phone]  Fax: [phone]  Established Patient  Follow Up Time: 1 month   Rebeca Ojeda  Cardiovascular Disease  [address] [place] [address]  Phone: [phone]  Fax: [phone]  Established Patient  Follow Up Time: 1 month    interventional, radiology  [address] [place] [id number]  Phone: [phone]  Fax: (   )    -  Follow Up Time: 2 weeks

## 2023-09-26 NOTE — PATIENT PROFILE ADULT - FALL HARM RISK - HARM RISK INTERVENTIONS
Assistance with ambulation/Assistance OOB with selected safe patient handling equipment/Communicate Risk of Fall with Harm to all staff/Discuss with provider need for PT consult/Monitor gait and stability/Provide patient with walking aids - walker, cane, crutches/Reinforce activity limits and safety measures with patient and family/Sit up slowly, dangle for a short time, stand at bedside before walking/Tailored Fall Risk Interventions/Use of alarms - bed, chair and/or voice tab/Visual Cue: Yellow wristband and red socks/Bed in lowest position, wheels locked, appropriate side rails in place/Call bell, personal items and telephone in reach/Instruct patient to call for assistance before getting out of bed or chair/Non-slip footwear when patient is out of bed/[place] to call system/Physically safe environment - no spills, clutter or unnecessary equipment/Purposeful Proactive Rounding/Room/bathroom lighting operational, light cord in reach

## 2023-09-26 NOTE — DISCHARGE NOTE PROVIDER - CARE PROVIDERS DIRECT ADDRESSES
,[email or web address]

## 2023-09-26 NOTE — PROGRESS NOTE ADULT - SUBJECTIVE AND OBJECTIVE BOX
Electrophysiology Brief Post-Op Note    I have personally seen and examined the patient.  I agree with the history and physical which I have reviewed and noted any changes below.  09-26-23 @ 8 AM    PRE-OP DIAGNOSIS: AFib, hematuria    POST-OP DIAGNOSIS: AFib, hematuria    PROCEDURE: ADDY Occlusion via Watchman Device    Physician: Rebeca Ojeda MD    OTIS done by: Leonid Montiel MD    ESTIMATED BLOOD LOSS:      30 mL    ANESTHESIA TYPE:  [ x ] General Anesthesia  [  ] Sedation  [ x ] Local/Regional    CONDITION  [  ] Critical  [  ] Serious  [  ] Fair  [ x ] Good      SPECIMENS REMOVED (IF APPLICABLE): None    IMPLANTS (IF APPLICABLE): None    FINDINGS: AFib, hematuria    COMPLICATIONS: None    PLAN OF CARE  - Bedrest x 6 hours then out of bed with assistance   - Groin suture to be removed in the morning by EP service  - Aspirin 81 mg daily and Xarelto 15mg nightly x 6 weeks  - Observe on tele overnight  - Follow up with  me in 3 weeks at [address] [place] [address] ([phone])    Rebeca Ojeda MD   Electrophysiology Attending

## 2023-09-26 NOTE — DISCHARGE NOTE PROVIDER - NSDCMRMEDTOKEN_GEN_ALL_CORE_FT
atorvastatin 20 mg oral tablet: 1 tab(s) orally once a day (at bedtime)  [facility] Aspirin 81 mg oral tablet, chewable: 1 tab(s) orally once a day  cephalexin 500 mg oral tablet: 1 tab(s) orally 2 times a day  furosemide 20 mg oral tablet: 1 tab(s) orally 2 times a day  metoprolol tartrate 50 mg oral tablet: 1 tab(s) orally 2 times a day  rivaroxaban 15 mg oral tablet: 1 tab(s) orally once a day (before a meal)

## 2023-09-26 NOTE — DISCHARGE NOTE PROVIDER - NSDCFUSCHEDAPPT_GEN_ALL_CORE_FT
[facility]  CARDIOLOGY [address]  Scheduled Appointment: 10/05/2023    [facility]  ONCORTHO [address]  Scheduled Appointment: 10/16/2023    Rebeca Ojeda  [facility]  ELECTROPH [address]  Scheduled Appointment: 10/30/2023

## 2023-09-26 NOTE — PATIENT PROFILE ADULT - FUNCTIONAL ASSESSMENT - BASIC MOBILITY 6.
3-calculated by average/Not able to assess (calculate score using [facility] averaging method)

## 2023-09-26 NOTE — DISCHARGE NOTE PROVIDER - NSDCHHNEEDSERVICE_GEN_ALL_CORE
Observation and assessment/Wound care and assessment Medication teaching and assessment/Observation and assessment/Ostomy care and management/Teaching and training/Wound care and assessment

## 2023-09-26 NOTE — DISCHARGE NOTE PROVIDER - HOSPITAL COURSE
Patient is a 77y Male  with PMHx of CAD S/P CABG, SSS S/P DC PPM, Persistent AF with prior  bleeding who presented to [facility] for elective implantation of Left Atrial Appendage occlusive device. On 9/26/23 patient underwent successful Watchman implantation. Patient was monitored overnight. On POD 1 patient remains HD stable with no complaints. Examination of B/L common femoral venous access sites reveal a Clear and dry area with no hematoma or erythema. Patient will continue Xarelto and ASA 81mg for thromboembolic prophylaxis for 45 days with plan to for OTIS To evaluate appendage velocities as an outpatient. Patient is being DC home in stable condition.   Patient is a 77y Male  with PMHx of CAD S/P CABG, SSS S/P DC PPM, Persistent AF with prior  bleeding, history of cholecystitis s/p percutaneous cholecystostomy tube placement  who presented to [facility] for elective implantation of Left Atrial Appendage occlusive device. On 9/26/23 patient underwent successful Watchman implantation.  On POD 1 patient remains HD stable with no complaints. Examination of B/L common femoral venous access sites reveal a Clear and dry area with no hematoma or erythema. Patient will continue Xarelto 15 mg (lower dose due to history of  bleeding as per EP) and ASA 81mg for thromboembolic prophylaxis for 45 days with plan to for OTIS To evaluate appendage velocities as an outpatient.  During the stay, pt developed RUQ pain, had CT abd showed percutaneous  cholecystectomy tube coiled  in the gall bladder. Patient underwent  cholecystostomy tube exchange 9/28 with IR. As per IR, 8 Fr cholecystostomy tube successfully exchanged over a wire. As per IR, continue drain to gravity, and flush with NS daily. Follow up with IR outpatient for routine exchange.      Patient is a 77y Male  with PMHx of CAD S/P CABG, SSS S/P DC PPM, Persistent AF with prior  bleeding, history of cholecystitis s/p percutaneous cholecystostomy tube placement  who presented to [facility] for elective implantation of Left Atrial Appendage occlusive device. On 9/26/23 patient underwent successful Watchman implantation.  On POD 1 patient remains HD stable with no complaints. Examination of B/L common femoral venous access sites reveal a Clear and dry area with no hematoma or erythema. Patient will continue Xarelto 15 mg (lower dose due to history of  bleeding as per EP) and ASA 81mg for thromboembolic prophylaxis for 45 days with plan to for OTIS To evaluate appendage velocities as an outpatient.    During the stay, pt developed RUQ pain, had CT abd showed percutaneous cholecystectomy tube coiled  in the gall bladder. Patient underwent cholecystostomy tube exchange 9/28 with IR. As per IR, 8 Fr cholecystostomy tube successfully exchanged over a wire. As per IR, continue drain to gravity, and flush with NS daily. Follow up with IR outpatient for routine exchange.

## 2023-09-27 ENCOUNTER — TRANSCRIPTION ENCOUNTER (OUTPATIENT)
Age: 78
End: 2023-09-27

## 2023-09-27 PROCEDURE — 99232 SBSQ HOSP IP/OBS MODERATE 35: CPT

## 2023-09-27 PROCEDURE — 93010 ELECTROCARDIOGRAM REPORT: CPT

## 2023-09-27 PROCEDURE — 93010 ELECTROCARDIOGRAM REPORT: CPT | Mod: 77

## 2023-09-27 PROCEDURE — 99233 SBSQ HOSP IP/OBS HIGH 50: CPT

## 2023-09-27 RX ADMIN — Medication 81 MILLIGRAM(S): at 14:45

## 2023-09-27 RX ADMIN — ATORVASTATIN CALCIUM 20 MILLIGRAM(S): 80 TABLET, FILM COATED ORAL at 21:06

## 2023-09-27 RX ADMIN — Medication 5 MILLIGRAM(S): at 21:06

## 2023-09-27 RX ADMIN — Medication 20 MILLIGRAM(S): at 06:12

## 2023-09-27 RX ADMIN — Medication 30 MILLILITER(S): at 21:54

## 2023-09-27 RX ADMIN — Medication 50 MILLIGRAM(S): at 06:12

## 2023-09-27 RX ADMIN — RIVAROXABAN 15 MILLIGRAM(S): KIT at 14:45

## 2023-09-27 RX ADMIN — Medication 50 MILLIGRAM(S): at 17:43

## 2023-09-27 RX ADMIN — Medication 20 MILLIGRAM(S): at 14:45

## 2023-09-27 NOTE — PROGRESS NOTE ADULT - ASSESSMENT
77y Male  with PMHx of CAD S/P CABG, SSS S/P DC PPM, Persistent AF with prior  bleeding who presented to [facility] for elective implantation of Left Atrial Appendage occlusive Device, POD#1    Impression:  AF sp Watchman Implant  Hx GIB  CAD sp CABG  SSS sp DC PPM    Plan:  - Continue Xarelto 15mg for 45 days postop  - Cont all other home medications  - Will schedule outpatient OTIS on day 45 to assess for ADDY seal and if good will discontinue anticoagulation at that time  - No heavy lifting or squatting for 1 week  - Follow up with Dr Ojeda in one month

## 2023-09-27 NOTE — CHART NOTE - NSCHARTNOTEFT_GEN_A_CORE
Patient was admitted for left atrial appendage closure (now on aspirin and Xarelto). Per primary team, there has been no drainage from percutaneous cholecystostomy tube, which has been exchanged several times (4/6/2023, 5/15/23, 7/26/23). Per patient, there has been scant drainage for the last 2 weeks. CT abd and pelvis shows malpositioned tube, although tip of catheter remains in the gallbladder.     Patient was assessed at bedside. He reported no abdominal pain. Tube was flushed with  saline and bilious drainage was aspirated.     - Discussed case with IR team. Scheduled for percutaneous cholecystostomy tube exchange tomorrow.   - NPO after midnight.   - Please obtain CBC, CMP, INR/PT/PTT.  - Okay to keep on Aspirin/Xarelto.
Patient is a 77y Male  with PMHx of CAD S/P CABG, SSS S/P DC PPM, Persistent AF with prior  bleeding who presented to [facility] for elective implantation of Left Atrial Appendage occlusive device. On 9/26/23 patient underwent successful Watchman implantation. Patient was monitored overnight. On POD 1 had B/L groin superficial oozing that resolved  with manual  pressure followed by 3  ml of lidocaine w/ epinephrine X 1 on rt femoral venous access site. Superficial oozing resolved. PT denies  pain, numbenss,tingling. will continue to monitor pt acces site and telemetry overnight.

## 2023-09-27 NOTE — PROGRESS NOTE ADULT - SUBJECTIVE AND OBJECTIVE BOX
Chief complaint: Patient is a 77y old  Male who presents with a chief complaint of Atrial Fibrillation (26 Sep 2023 15:44)    Interval history:    Patient seen and examined at bedside. No acute events overnight.  Patient without complaints. Denies CP, SOB, palpitations, or dizziness  No events on telemetry overnight    Review of systems: A complete 10-point review of systems was obtained and is negative except as stated in the interval history.    Vitals:  T(F): 99.7, Max: 99.9 (09-26 @ 23:41)  HR: 61 (60 - 61)  BP: 135/60 (106/55 - 135/60)  RR: 20 (17 - 20)  SpO2: 100% (100% - 100%)    Ins & outs:     09-26 @ 07:01  -  09-27 @ 07:00  --------------------------------------------------------  IN: 0 mL / OUT: 250 mL / NET: -250 mL    09-27 @ 07:01 - 09-27 @ 16:01  --------------------------------------------------------  IN: 410 mL / OUT: 110 mL / NET: 300 mL      Weight trend:  Weight (kg): 101.4 (09-26)    Physical exam:  GENERAL: NAD, lying in bed comfortably  HEAD:  Atraumatic, normocephalic  EYES: EOMI, PERRLA, conjunctiva and sclera clear  ENT: Moist mucous membranes  NECK: Supple, no JVD  HEART: Regular rate and rhythm, no murmurs, rubs, or gallops  LUNGS: Unlabored respirations.  Clear to auscultation bilaterally, no crackles, wheezing, or rhonchi  ABDOMEN: Soft, nontender, nondistended, +BS  EXTREMITIES: 2+ peripheral pulses bilaterally. No clubbing, cyanosis, or edema  NERVOUS SYSTEM:  A&Ox3, no focal deficits   SKIN: No rashes or lesions    Data reviewed:  - Telemetry:   - ECG    - TTE (date***):   - Chest x-ray (date***):   - Stress test:   - CCTA:  - Cardiac catheterization:  - Cardiac MRI:    - Labs:                          Medications:  aspirin  chewable 81 milliGRAM(s) Oral daily  atorvastatin 20 milliGRAM(s) Oral at bedtime  furosemide    Tablet 20 milliGRAM(s) Oral two times a day  melatonin 5 milliGRAM(s) Oral at bedtime  metoprolol tartrate 50 milliGRAM(s) Oral two times a day  rivaroxaban 15 milliGRAM(s) Oral daily    Drips:    PRN:     Allergies    No Known Allergies    Intolerances      Assessment:      Problems discussed and associated plan:      DVT Prophylaxis:  Diet:  Activity:   Code Status:  Dispo:     Please contact me with any questions or concerns at [phone]. Chief complaint: Patient is a 77y old  Male who presents with a chief complaint of Atrial Fibrillation (26 Sep 2023 15:44)    Interval history:    Patient seen and examined at bedside. No acute events overnight.  Patient without complaints. Denies CP, SOB, palpitations, or dizziness  No events on telemetry overnight. s/p ADDY accesssite B/L common femoral venous reveal a Clear and dry area with no hematoma or erythema .S/p percutaneous cholecystostomy tube decreased draining  CT abdomen ordered and showed Percutaneous catheter coiled within the gallbladderStones within the gallbladder. IR consulted  for evaluation.    Review of systems: A complete 10-point review of systems was obtained and is negative except as stated in the interval history.    Vitals:  T(F): 99.7, Max: 99.9 (09-26 @ 23:41)  HR: 61 (60 - 61)  BP: 135/60 (106/55 - 135/60)  RR: 20 (17 - 20)  SpO2: 100% (100% - 100%)    Ins & outs:     09-26 @ 07:01  -  09-27 @ 07:00  --------------------------------------------------------  IN: 0 mL / OUT: 250 mL / NET: -250 mL    09-27 @ 07:01  -  09-27 @ 16:01  --------------------------------------------------------  IN: 410 mL / OUT: 110 mL / NET: 300 mL      Weight trend:  Weight (kg): 101.4 (09-26)    Physical exam:  GENERAL: NAD, lying in bed comfortably  HEAD:  Atraumatic, normocephalic  EYES: EOMI, PERRLA, conjunctiva and sclera clear  ENT: Moist mucous membranes  NECK: Supple, no JVD  HEART: Regular rate and rhythm, no murmurs, rubs, or gallops  LUNGS: Unlabored respirations.  Clear to auscultation bilaterally, no crackles, wheezing, or rhonchi  ABDOMEN: Soft, nontender, nondistended, +BS  EXTREMITIES: 2+ peripheral pulses bilaterally. No clubbing, cyanosis, or edema  NERVOUS SYSTEM:  A&Ox3, no focal deficits   SKIN: No rashes or lesions    Data reviewed:  - Telemetry: [id number]  - ECG  < from: 12 Lead ECG (09.12.23 @ 10:52) >  Ventricular Rate 60 BPM    Atrial Rate 56 BPM    QRS Duration 162 ms    Q-T Interval 498 ms    QTC Calculation(Bazett) 498 ms    R Axis -83 degrees    T Axis 63 degrees    Diagnosis Line Ventricular-paced rhythm  Abnormal ECG    < end of copied text >    - TTE < from: Transesophageal Echocardiogram (09.19.23 @ 13:03) >    Summary:   1. Left ventricular ejection fraction, by visual estimation, is <20%.   2. Severely decreased global left ventricular systolic function.   3. Moderately reduced RV systolic function.   4. Moderately enlarged right ventricle.   5. Mild mitral valve regurgitation.   6. Mild to moderate mitral annular calcification.   7. Mild thickening of the anterior and posterior mitral valve leaflets.   8. Left atrial enlargement.   9. Right atrial enlargement.  10. No left atrial appendage thrombus. Decreased left atrial appendage   velocities.      CTABDOMEN  < from: CT Abdomen No Cont (09.26.23 @ 19:06) >  FINDINGS:    The heart appears be enlarged. Pacer wires are noted. Bibasilar   atelectasis.    Pertinent is catheter is seen coiled within the gallbladder. Unenhanced   liver within normal limits. Stones are seen within the gallbladder.   Spleen within normal limits.    The adrenal glands within normal limits.Pancreas within normal limits.    Nonspecific perinephric stranding is seen of both kidneys. Partially   exophytic cyst is seen anterior to the mid to lower pole of the left   kidney measuring up to 4.6 cm. Hyperdensity within the renal pelvises are   seen bilaterally. Question related to prior intravenous administration or   stones.    Extensive vascular calcifications are seen of the abdominal aorta and its   branches.    Visualized bowel loops are within normal limits. Focal ventral hernia   containing fat. The neck of hernia measures up to 2.7 cm.    Scoliosis with multilevel degenerative changes.    IMPRESSION:    Percutaneous catheter coiled within the gallbladder. Stones within the   gallbladder.    High density within the renal pelvises which may related to prior   contrast administration. Small stones cannot be excluded.                            Medications:  aspirin  chewable 81 milliGRAM(s) Oral daily  atorvastatin 20 milliGRAM(s) Oral at bedtime  furosemide    Tablet 20 milliGRAM(s) Oral two times a day  melatonin 5 milliGRAM(s) Oral at bedtime  metoprolol tartrate 50 milliGRAM(s) Oral two times a day  rivaroxaban 15 milliGRAM(s) Oral daily    Drips:    PRN:     Allergies    No Known Allergies    Intolerances

## 2023-09-27 NOTE — DISCHARGE NOTE NURSING/CASE MANAGEMENT/SOCIAL WORK - NSDCPEFALRISK_GEN_ALL_CORE
For information on Fall & Injury Prevention, visit: [email or web address] OR  [email or web address] OR  https://www.cdc.gov/steadi/patient.html

## 2023-09-27 NOTE — PROGRESS NOTE ADULT - SUBJECTIVE AND OBJECTIVE BOX
INTERVAL HPI/OVERNIGHT EVENTS:  No acute events overnight  Pt SP Watchman Implant POD#1  HD stable and feeling well    MEDICATIONS  (STANDING):  aspirin  chewable 81 milliGRAM(s) Oral daily  atorvastatin 20 milliGRAM(s) Oral at bedtime  furosemide    Tablet 20 milliGRAM(s) Oral two times a day  melatonin 5 milliGRAM(s) Oral at bedtime  metoprolol tartrate 50 milliGRAM(s) Oral two times a day  rivaroxaban 15 milliGRAM(s) Oral daily    MEDICATIONS  (PRN):  acetaminophen     Tablet .. 650 milliGRAM(s) Oral every 6 hours PRN Severe Pain (7 - 10)      Allergies    No Known Allergies    Intolerances        REVIEW OF SYSTEMS: No CP, palpitations, dizziness or SOB    Vital Signs Last 24 Hrs  T(C): 37.6 (27 Sep 2023 07:23), Max: 37.7 (26 Sep 2023 23:41)  T(F): 99.7 (27 Sep 2023 07:23), Max: 99.9 (26 Sep 2023 23:41)  HR: 60 (27 Sep 2023 07:23) (60 - 61)  BP: 113/54 (27 Sep 2023 07:23) (106/55 - 164/71)  BP(mean): 78 (27 Sep 2023 07:23) (76 - 102)  RR: 18 (27 Sep 2023 07:23) (17 - 19)  SpO2: 100% (27 Sep 2023 05:09) (100% - 100%)    Parameters below as of 27 Sep 2023 05:09  Patient On (Oxygen Delivery Method): room air        09-26-23 @ 07:01  -  09-27-23 @ 07:00  --------------------------------------------------------  IN: 0 mL / OUT: 250 mL / NET: -250 mL    09-27-23 @ 07:01  -  09-27-23 @ 11:48  --------------------------------------------------------  IN: 200 mL / OUT: 110 mL / NET: 90 mL        Physical Exam  GENERAL: In no apparent distress, well nourished, and hydrated.  EYES: EOMI, PERRLA, conjunctiva and sclera clear  NECK: Supple  HEART: Regular rate and rhythm; No murmurs, rubs, or gallops.  PULMONARY: Clear to auscultation and perfusion.  No rales, wheezing, or rhonchi bilaterally.  EXTREMITIES:  2+ Peripheral Pulses, No clubbing, cyanosis, or edema  SKIN: Sutures removed from BL groins, no hematoma  NEUROLOGICAL: Grossly nonfocal    LABS:                RADIOLOGY & ADDITIONAL TESTS:

## 2023-09-27 NOTE — PROGRESS NOTE ADULT - ASSESSMENT
Assessment  Patient is a 77y Male  with PMHx of CAD S/P CABG, SSS S/P DC PPM, Persistent AF with prior  bleeding who presented to [facility] for elective implantation of Left Atrial Appendage occlusive device. Patient percutaneous cholecystostomy tube decreased draining  CT abdomen ordered and showed Percutaneous catheter coiled within the gallbladderStones within the gallbladder. IR consulted  for evaluation.   Assessment  Patient is a 77y Male  with PMHx of CAD S/P CABG, SSS S/P DC PPM, Persistent AF with prior  bleeding who presented to [facility] for elective implantation of Left Atrial Appendage occlusive device. Patient percutaneous cholecystostomy tube decreased draining  CT abdomen ordered and showed Percutaneous catheter coiled within the gallbladderStones within the gallbladder. IR consulted  for evaluation.      Problems discussed and associated plan:  #AF sp Watchman Implant      #history of cholecystitis   -s/p percutaneous cholecystostomy tube placement  -CT showed Percutaneous catheter coiled within the gallbladderStones within the gallbladder.  -Plan is for percutaneous cholecystostomy tube exchange the AM        #CAD sp CABG    #SSS sp DC PPM        DVT Prophylaxis:  Diet:  Activity:   Code Status:  Dispo:     Please contact me with any questions or concerns at [phone]. Assessment  Patient is a 77y Male  with PMHx of CAD S/P CABG, SSS S/P DC PPM, Persistent AF with prior  bleeding who presented to [facility] for elective implantation of Left Atrial Appendage occlusive device. Patient percutaneous cholecystostomy tube decreased draining  CT abdomen ordered and showed Percutaneous catheter coiled within the gallbladderStones within the gallbladder. IR consulted  for evaluation.      Problems discussed and associated plan:  #AF sp Watchman Implant  -c/w xarelto and aspirin  -RT groin oozing  resolve with manual pressure and lidocaine 3cc      #history of cholecystitis   -s/p percutaneous cholecystostomy tube placement  -CT showed Percutaneous catheter coiled within the gallbladder Stones within the gallbladder.  -Plan is for percutaneous cholecystostomy tube exchange the AM   -see IR recs :NPO after midnight.   - GI consult     #CAD sp CABG  -c/w asa   -c/w metroprolol tartrate 50mg Bid    # SSS s/p DC PPM with cardiomyopathy s/p BiV PPM upgrade  - Check with EP for interrogation       # Mild cardiomyopathy (EF 40-45%)  - Cont Metoprolol Tartrate 50 mg BID   - c/w furosemide 20mg PO daily    # HTN  - Cont Metoprolol Tartrate 50 mg BID                Please contact me with any questions or concerns at [phone].

## 2023-09-27 NOTE — DISCHARGE NOTE NURSING/CASE MANAGEMENT/SOCIAL WORK - NSDCVIVACCINE_GEN_ALL_CORE_FT
Tdap; 22-Nov-2018 08:52; Benito Lovell); [employer]; [id number] (Exp. Date: 23-Oct-2020); IntraMuscular; Deltoid Right.; 0.5 milliLiter(s); VIS (VIS Published: 09-May-2013, VIS Presented: 22-Nov-2018);

## 2023-09-27 NOTE — PROGRESS NOTE ADULT - NS ATTEND AMEND GEN_ALL_CORE FT
s/p watchman    Doing good.  He is concerned that we removed suture too early today.   Education provided.
S/p Watchman placement. Bilateral femoral venous access sites are clean, dry, and intact with no hematoma or tenderness.     Patient complained of RUQ pain. No Onofre's sign. CT abdomen shows a coiled perc mary tube. Consulted IR to evaluate if the drain is functioning. If not, plan to speak with GI re: plan for removal and ?replacement.

## 2023-09-27 NOTE — DISCHARGE NOTE NURSING/CASE MANAGEMENT/SOCIAL WORK - PATIENT PORTAL LINK FT
You can access the FollowMyHealth Patient Portal offered by [facility] by registering at the following website: [email or web address]. By joining [employer]’s FollowMyHealth portal, you will also be able to view your health information using other applications (apps) compatible with our system.

## 2023-09-28 VITALS
HEART RATE: 60 BPM | TEMPERATURE: 99 F | OXYGEN SATURATION: 95 % | DIASTOLIC BLOOD PRESSURE: 64 MMHG | SYSTOLIC BLOOD PRESSURE: 144 MMHG | RESPIRATION RATE: 20 BRPM

## 2023-09-28 LAB
ALBUMIN SERPL ELPH-MCNC: 3.7 G/DL — SIGNIFICANT CHANGE UP (ref 3.5–5.2)
ALP SERPL-CCNC: 98 U/L — SIGNIFICANT CHANGE UP (ref 30–115)
ALT FLD-CCNC: 30 U/L — SIGNIFICANT CHANGE UP (ref 0–41)
ANION GAP SERPL CALC-SCNC: 10 MMOL/L — SIGNIFICANT CHANGE UP (ref 7–14)
APTT BLD: 40.7 SEC — HIGH (ref 27–39.2)
AST SERPL-CCNC: 54 U/L — HIGH (ref 0–41)
BASOPHILS # BLD AUTO: 0.05 K/UL — SIGNIFICANT CHANGE UP (ref 0–0.2)
BASOPHILS NFR BLD AUTO: 0.6 % — SIGNIFICANT CHANGE UP (ref 0–1)
BILIRUB SERPL-MCNC: 1.8 MG/DL — HIGH (ref 0.2–1.2)
BUN SERPL-MCNC: 13 MG/DL — SIGNIFICANT CHANGE UP (ref 10–20)
CALCIUM SERPL-MCNC: 8.4 MG/DL — SIGNIFICANT CHANGE UP (ref 8.4–10.5)
CHLORIDE SERPL-SCNC: 103 MMOL/L — SIGNIFICANT CHANGE UP (ref 98–110)
CO2 SERPL-SCNC: 23 MMOL/L — SIGNIFICANT CHANGE UP (ref 17–32)
CREAT SERPL-MCNC: 0.6 MG/DL — LOW (ref 0.7–1.5)
EGFR: 99 ML/MIN/1.73M2 — SIGNIFICANT CHANGE UP
EOSINOPHIL # BLD AUTO: 0.55 K/UL — SIGNIFICANT CHANGE UP (ref 0–0.7)
EOSINOPHIL NFR BLD AUTO: 6.2 % — SIGNIFICANT CHANGE UP (ref 0–8)
GLUCOSE SERPL-MCNC: 114 MG/DL — HIGH (ref 70–99)
HCT VFR BLD CALC: 28.6 % — LOW (ref 42–52)
HGB BLD-MCNC: 8.9 G/DL — LOW (ref 14–18)
IMM GRANULOCYTES NFR BLD AUTO: 0.3 % — SIGNIFICANT CHANGE UP (ref 0.1–0.3)
INR BLD: 2.42 RATIO — HIGH (ref 0.65–1.3)
LYMPHOCYTES # BLD AUTO: 0.88 K/UL — LOW (ref 1.2–3.4)
LYMPHOCYTES # BLD AUTO: 10 % — LOW (ref 20.5–51.1)
MAGNESIUM SERPL-MCNC: 2.3 MG/DL — SIGNIFICANT CHANGE UP (ref 1.8–2.4)
MCHC RBC-ENTMCNC: 22.6 PG — LOW (ref 27–31)
MCHC RBC-ENTMCNC: 31.1 G/DL — LOW (ref 32–37)
MCV RBC AUTO: 72.8 FL — LOW (ref 80–94)
MONOCYTES # BLD AUTO: 1.01 K/UL — HIGH (ref 0.1–0.6)
MONOCYTES NFR BLD AUTO: 11.5 % — HIGH (ref 1.7–9.3)
NEUTROPHILS # BLD AUTO: 6.29 K/UL — SIGNIFICANT CHANGE UP (ref 1.4–6.5)
NEUTROPHILS NFR BLD AUTO: 71.4 % — SIGNIFICANT CHANGE UP (ref 42.2–75.2)
NRBC # BLD: 0 /100 WBCS — SIGNIFICANT CHANGE UP (ref 0–0)
PLATELET # BLD AUTO: 194 K/UL — SIGNIFICANT CHANGE UP (ref 130–400)
PMV BLD: 11.4 FL — HIGH (ref 7.4–10.4)
POTASSIUM SERPL-MCNC: 4 MMOL/L — SIGNIFICANT CHANGE UP (ref 3.5–5)
POTASSIUM SERPL-SCNC: 4 MMOL/L — SIGNIFICANT CHANGE UP (ref 3.5–5)
PROT SERPL-MCNC: 5.8 G/DL — LOW (ref 6–8)
PROTHROM AB SERPL-ACNC: 28.3 SEC — HIGH (ref 9.95–12.87)
RBC # BLD: 3.93 M/UL — LOW (ref 4.7–6.1)
RBC # FLD: 18.7 % — HIGH (ref 11.5–14.5)
SODIUM SERPL-SCNC: 136 MMOL/L — SIGNIFICANT CHANGE UP (ref 135–146)
WBC # BLD: 8.81 K/UL — SIGNIFICANT CHANGE UP (ref 4.8–10.8)
WBC # FLD AUTO: 8.81 K/UL — SIGNIFICANT CHANGE UP (ref 4.8–10.8)

## 2023-09-28 PROCEDURE — 99239 HOSP IP/OBS DSCHRG MGMT >30: CPT

## 2023-09-28 PROCEDURE — 47536 EXCHANGE BILIARY DRG CATH: CPT

## 2023-09-28 RX ADMIN — Medication 20 MILLIGRAM(S): at 05:21

## 2023-09-28 RX ADMIN — Medication 50 MILLIGRAM(S): at 05:21

## 2023-09-28 RX ADMIN — RIVAROXABAN 15 MILLIGRAM(S): KIT at 15:14

## 2023-09-28 RX ADMIN — Medication 81 MILLIGRAM(S): at 13:33

## 2023-09-28 RX ADMIN — Medication 20 MILLIGRAM(S): at 13:33

## 2023-09-28 NOTE — PROGRESS NOTE ADULT - SUBJECTIVE AND OBJECTIVE BOX
Chief complaint: Patient is a 77y old  Male who presents with a chief complaint of Atrial Fibrillation (26 Sep 2023 15:44)    Interval history:    Patient seen and examined at bedside. No acute events overnight.  Patient without complaints. Denies CP, SOB, palpitations, or dizziness  No events on telemetry overnight.  B/L groins reveal Clean bandage dressing  with no hematoma or erythema   for cholecystectomy tube change  by IR today     Review of systems: A complete 10-point review of systems was obtained and is negative except as stated in the interval history.    Vitals:  Vital Signs Last 24 Hrs  T(C): 36.8 (28 Sep 2023 10:54), Max: 37.3 (27 Sep 2023 15:36)  T(F): 98.2 (28 Sep 2023 10:54), Max: 99.1 (27 Sep 2023 15:36)  HR: 60 (28 Sep 2023 10:54) (60 - 65)  BP: 168/74 (28 Sep 2023 10:54) (112/57 - 168/74)  BP(mean): 97 (28 Sep 2023 08:02) (79 - 97)  RR: 16 (28 Sep 2023 10:54) (16 - 21)  SpO2: 95% (28 Sep 2023 10:54) (92% - 95%)    Parameters below as of 28 Sep 2023 08:02  Patient On (Oxygen Delivery Method): room air      Ins & outs:     09-26 @ 07:01  -  09-27 @ 07:00  --------------------------------------------------------  IN: 0 mL / OUT: 250 mL / NET: -250 mL    09-27 @ 07:01  -  09-27 @ 16:01  --------------------------------------------------------  IN: 410 mL / OUT: 110 mL / NET: 300 mL    I&O's Summary    27 Sep 2023 07:01  -  28 Sep 2023 07:00  --------------------------------------------------------  IN: 410 mL / OUT: 510 mL / NET: -100 mL    28 Sep 2023 07:01  -  28 Sep 2023 14:41  --------------------------------------------------------  IN: 0 mL / OUT: 100 mL / NET: -100 mL          Weight trend:  Weight (kg): 101.4 (09-26)    Physical exam:  GENERAL: NAD, lying in bed comfortably  HEAD:  Atraumatic, normocephalic  EYES: EOMI, PERRLA, conjunctiva and sclera clear  ENT: Moist mucous membranes  NECK: Supple, no JVD  HEART: Regular rate and rhythm, no murmurs, rubs, or gallops  LUNGS: Unlabored respirations.  Clear to auscultation bilaterally, no crackles, wheezing, or rhonchi  ABDOMEN: Soft, nontender, nondistended, +BS. B/L groins reveal Clean bandage dressing  with no hematoma or erythema . R cholecystectomy tube draining biliary drainage   EXTREMITIES: 2+ peripheral pulses bilaterally. No clubbing, cyanosis, or edema  NERVOUS SYSTEM:  A&Ox3, no focal deficits   SKIN: No rashes or lesions    Data reviewed:  - Telemetry: [id number]  - ECG  < from: 12 Lead ECG (09.12.23 @ 10:52) >  Ventricular Rate 60 BPM    Atrial Rate 56 BPM    QRS Duration 162 ms    Q-T Interval 498 ms    QTC Calculation(Bazett) 498 ms    R Axis -83 degrees    T Axis 63 degrees    Diagnosis Line Ventricular-paced rhythm  Abnormal ECG    < end of copied text >    - TTE < from: Transesophageal Echocardiogram (09.19.23 @ 13:03) >    Summary:   1. Left ventricular ejection fraction, by visual estimation, is <20%.   2. Severely decreased global left ventricular systolic function.   3. Moderately reduced RV systolic function.   4. Moderately enlarged right ventricle.   5. Mild mitral valve regurgitation.   6. Mild to moderate mitral annular calcification.   7. Mild thickening of the anterior and posterior mitral valve leaflets.   8. Left atrial enlargement.   9. Right atrial enlargement.  10. No left atrial appendage thrombus. Decreased left atrial appendage   velocities.      CTABDOMEN  < from: CT Abdomen No Cont (09.26.23 @ 19:06) >  FINDINGS:    The heart appears be enlarged. Pacer wires are noted. Bibasilar   atelectasis.    Pertinent is catheter is seen coiled within the gallbladder. Unenhanced   liver within normal limits. Stones are seen within the gallbladder.   Spleen within normal limits.    The adrenal glands within normal limits.Pancreas within normal limits.    Nonspecific perinephric stranding is seen of both kidneys. Partially   exophytic cyst is seen anterior to the mid to lower pole of the left   kidney measuring up to 4.6 cm. Hyperdensity within the renal pelvises are   seen bilaterally. Question related to prior intravenous administration or   stones.    Extensive vascular calcifications are seen of the abdominal aorta and its   branches.    Visualized bowel loops are within normal limits. Focal ventral hernia   containing fat. The neck of hernia measures up to 2.7 cm.    Scoliosis with multilevel degenerative changes.    IMPRESSION:    Percutaneous catheter coiled within the gallbladder. Stones within the   gallbladder.    High density within the renal pelvises which may related to prior   contrast administration. Small stones cannot be excluded.      Medications:  aspirin  chewable 81 milliGRAM(s) Oral daily  atorvastatin 20 milliGRAM(s) Oral at bedtime  furosemide    Tablet 20 milliGRAM(s) Oral two times a day  melatonin 5 milliGRAM(s) Oral at bedtime  metoprolol tartrate 50 milliGRAM(s) Oral two times a day  rivaroxaban 15 milliGRAM(s) Oral daily    Drips:    PRN:     Allergies    No Known Allergies    Intolerances

## 2023-09-28 NOTE — PROGRESS NOTE ADULT - SUBJECTIVE AND OBJECTIVE BOX
Interventional Radiology Brief Procedure Note    Procedure: Percutaneous cholecystostomy catheter exchange  Pre-op diagnosis: Malfunctioning cholecystostomy catheter  Post-op diagnosis: Same  Attending: Aldo Lin DO  Resident: Be Demarco MD    Anesthesia:  [ ] General Anesthesia  [ ] Sedation  [ ] Spinal Anesthesia  [x] Local/Regional    Total face-to-face sedation time: N/A  Total sedation: None  Contrast: 20 cc Visipaque 320  Estimated blood loss: None    Condition:   [ ] Critical  [ ] Serious  [ ] Fair   [x] Good    Findings: Existing cholecystostomy tube within the gallbladder. 8 Fr cholecystostomy tube successfully exchanged over a wire.   Specimens removed: None  Implants: None  Complications: None immediate  Disposition: Back to IR recovery then floor.    Please call Interventional Radiology [phone] with any questions, concerns, or issues regarding above.        Interventional Radiology Brief Procedure Note    Procedure: Percutaneous cholecystostomy catheter exchange  Pre-op diagnosis: Malfunctioning cholecystostomy catheter  Post-op diagnosis: Same  Attending: Aldo Lin DO  Resident: Be Demarco MD    Anesthesia:  [ ] General Anesthesia  [ ] Sedation  [ ] Spinal Anesthesia  [x] Local/Regional    Total face-to-face sedation time: N/A  Total sedation: None  Contrast: 10 cc Visipaque 320  Estimated blood loss: None    Condition:   [ ] Critical  [ ] Serious  [ ] Fair   [x] Good    Findings: Existing cholecystostomy tube pulled back within the gallbladder. 8 Fr cholecystostomy tube successfully exchanged over a wire.   Specimens removed: None  Complications: None immediate  Disposition: Back to IR recovery then floor.    Please call Interventional Radiology [phone] with any questions, concerns, or issues regarding above.

## 2023-09-28 NOTE — PROGRESS NOTE ADULT - ASSESSMENT
Assessment  Patient is a 77y Male  with PMHx of CAD S/P CABG, SSS S/P DC PPM, Persistent AF with prior  bleeding who presented to [facility] for elective implantation of Left Atrial Appendage occlusive device. Patient percutaneous cholecystostomy tube decreased draining  CT abdomen ordered and showed Percutaneous catheter coiled within the gallbladderStones within the gallbladder. IR consulted  for evaluation.      Problems discussed and associated plan:  #AF sp Watchman Implant  -c/w xarelto (pt on lower dose 15 mg daily due to history of  bleed)   - c/w aspirin 81 mg daily   -RT groin oozing  resolved with manual pressure and lidocaine 3cc      #history of cholecystitis   -s/p percutaneous cholecystostomy tube placement  -CT showed Percutaneous catheter coiled within the gallbladder Stones within the gallbladder.  - GI consult done  - s/p cholecystostomy tube replacement by IR 9/28      #CAD sp CABG  -c/w asa   -c/w metroprolol tartrate 50mg Bid    # SSS s/p DC PPM with cardiomyopathy s/p BiV PPM upgrade  - Check with EP for interrogation       # Mild cardiomyopathy (EF 40-45%)  - Cont Metoprolol Tartrate 50 mg BID   - c/w furosemide 20mg PO daily    # HTN  - Cont Metoprolol Tartrate 50 mg BID        Please contact me with any questions or concerns at [phone].

## 2023-10-02 DIAGNOSIS — I48.11 LONGSTANDING PERSISTENT ATRIAL FIBRILLATION: ICD-10-CM

## 2023-10-02 DIAGNOSIS — Z00.6 ENCOUNTER FOR EXAMINATION FOR NORMAL COMPARISON AND CONTROL IN CLINICAL RESEARCH PROGRAM: ICD-10-CM

## 2023-10-02 DIAGNOSIS — T85.528A DISPLACEMENT OF OTHER GASTROINTESTINAL PROSTHETIC DEVICES, IMPLANTS AND GRAFTS, INITIAL ENCOUNTER: ICD-10-CM

## 2023-10-02 DIAGNOSIS — Z95.0 PRESENCE OF CARDIAC PACEMAKER: ICD-10-CM

## 2023-10-02 DIAGNOSIS — I44.7 LEFT BUNDLE-BRANCH BLOCK, UNSPECIFIED: ICD-10-CM

## 2023-10-02 DIAGNOSIS — I42.9 CARDIOMYOPATHY, UNSPECIFIED: ICD-10-CM

## 2023-10-02 DIAGNOSIS — I25.10 ATHEROSCLEROTIC HEART DISEASE OF NATIVE CORONARY ARTERY WITHOUT ANGINA PECTORIS: ICD-10-CM

## 2023-10-02 DIAGNOSIS — Z95.1 PRESENCE OF AORTOCORONARY BYPASS GRAFT: ICD-10-CM
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
## 2023-10-02 DIAGNOSIS — I49.5 SICK SINUS SYNDROME: ICD-10-CM

## 2023-10-02 DIAGNOSIS — R31.9 HEMATURIA, UNSPECIFIED: ICD-10-CM

## 2023-10-05 ENCOUNTER — NON-APPOINTMENT (OUTPATIENT)
Age: 78
End: 2023-10-05

## 2023-10-05 ENCOUNTER — APPOINTMENT (OUTPATIENT)
Dept: CARDIOLOGY | Facility: CLINIC | Age: 78
End: 2023-10-05
Payer: MEDICARE

## 2023-10-05 PROCEDURE — 93294 REM INTERROG EVL PM/LDLS PM: CPT

## 2023-10-05 PROCEDURE — 93296 REM INTERROG EVL PM/IDS: CPT

## 2023-10-16 ENCOUNTER — APPOINTMENT (OUTPATIENT)
Dept: ORTHOPEDIC SURGERY | Facility: CLINIC | Age: 78
End: 2023-10-16
Payer: MEDICARE

## 2023-10-16 VITALS — WEIGHT: 225 LBS | BODY MASS INDEX: 28.88 KG/M2 | HEIGHT: 74 IN

## 2023-10-16 PROCEDURE — 99213 OFFICE O/P EST LOW 20 MIN: CPT

## 2023-10-16 PROCEDURE — 73562 X-RAY EXAM OF KNEE 3: CPT | Mod: 50

## 2023-10-19 ENCOUNTER — INPATIENT (INPATIENT)
Facility: HOSPITAL | Age: 78
LOS: 3 days | Discharge: HOME HEALTH | DRG: 872 | End: 2023-10-23
Attending: STUDENT IN AN ORGANIZED HEALTH CARE EDUCATION/TRAINING PROGRAM | Admitting: INTERNAL MEDICINE
Payer: MEDICARE

## 2023-10-19 VITALS
OXYGEN SATURATION: 99 % | HEART RATE: 69 BPM | WEIGHT: 225.09 LBS | HEIGHT: 74 IN | DIASTOLIC BLOOD PRESSURE: 70 MMHG | TEMPERATURE: 99 F | SYSTOLIC BLOOD PRESSURE: 155 MMHG | RESPIRATION RATE: 18 BRPM

## 2023-10-19 DIAGNOSIS — Z90.10 ACQUIRED ABSENCE OF UNSPECIFIED BREAST AND NIPPLE: Chronic | ICD-10-CM

## 2023-10-19 DIAGNOSIS — Z95.0 PRESENCE OF CARDIAC PACEMAKER: Chronic | ICD-10-CM

## 2023-10-19 DIAGNOSIS — Z95.1 PRESENCE OF AORTOCORONARY BYPASS GRAFT: Chronic | ICD-10-CM

## 2023-10-19 LAB
HCT VFR BLD CALC: 30.4 % — LOW (ref 42–52)
HCT VFR BLD CALC: 30.4 % — LOW (ref 42–52)
HGB BLD-MCNC: 9.5 G/DL — LOW (ref 14–18)
HGB BLD-MCNC: 9.5 G/DL — LOW (ref 14–18)
MCHC RBC-ENTMCNC: 24.4 PG — LOW (ref 27–31)
MCHC RBC-ENTMCNC: 24.4 PG — LOW (ref 27–31)
MCHC RBC-ENTMCNC: 31.3 G/DL — LOW (ref 32–37)
MCHC RBC-ENTMCNC: 31.3 G/DL — LOW (ref 32–37)
MCV RBC AUTO: 77.9 FL — LOW (ref 80–94)
MCV RBC AUTO: 77.9 FL — LOW (ref 80–94)
PLATELET # BLD AUTO: 224 K/UL — SIGNIFICANT CHANGE UP (ref 130–400)
PLATELET # BLD AUTO: 224 K/UL — SIGNIFICANT CHANGE UP (ref 130–400)
PMV BLD: 10.4 FL — SIGNIFICANT CHANGE UP (ref 7.4–10.4)
PMV BLD: 10.4 FL — SIGNIFICANT CHANGE UP (ref 7.4–10.4)
RBC # BLD: 3.9 M/UL — LOW (ref 4.7–6.1)
RBC # BLD: 3.9 M/UL — LOW (ref 4.7–6.1)
RBC # FLD: 20.8 % — HIGH (ref 11.5–14.5)
RBC # FLD: 20.8 % — HIGH (ref 11.5–14.5)
WBC # BLD: 11.98 K/UL — HIGH (ref 4.8–10.8)
WBC # BLD: 11.98 K/UL — HIGH (ref 4.8–10.8)
WBC # FLD AUTO: 11.98 K/UL — HIGH (ref 4.8–10.8)
WBC # FLD AUTO: 11.98 K/UL — HIGH (ref 4.8–10.8)

## 2023-10-19 PROCEDURE — 71045 X-RAY EXAM CHEST 1 VIEW: CPT | Mod: 26

## 2023-10-19 PROCEDURE — 93010 ELECTROCARDIOGRAM REPORT: CPT

## 2023-10-19 PROCEDURE — 99285 EMERGENCY DEPT VISIT HI MDM: CPT | Mod: GC

## 2023-10-19 RX ORDER — VANCOMYCIN HCL 1 G
1500 VIAL (EA) INTRAVENOUS EVERY 12 HOURS
Refills: 0 | Status: DISCONTINUED | OUTPATIENT
Start: 2023-10-20 | End: 2023-10-21

## 2023-10-19 RX ORDER — VANCOMYCIN HCL 1 G
VIAL (EA) INTRAVENOUS
Refills: 0 | Status: DISCONTINUED | OUTPATIENT
Start: 2023-10-20 | End: 2023-10-21

## 2023-10-19 RX ORDER — CEFEPIME 1 G/1
2000 INJECTION, POWDER, FOR SOLUTION INTRAMUSCULAR; INTRAVENOUS EVERY 8 HOURS
Refills: 0 | Status: DISCONTINUED | OUTPATIENT
Start: 2023-10-20 | End: 2023-10-22

## 2023-10-19 RX ORDER — CEFEPIME 1 G/1
2000 INJECTION, POWDER, FOR SOLUTION INTRAMUSCULAR; INTRAVENOUS ONCE
Refills: 0 | Status: COMPLETED | OUTPATIENT
Start: 2023-10-19 | End: 2023-10-19

## 2023-10-19 RX ORDER — VANCOMYCIN HCL 1 G
1500 VIAL (EA) INTRAVENOUS ONCE
Refills: 0 | Status: COMPLETED | OUTPATIENT
Start: 2023-10-19 | End: 2023-10-20

## 2023-10-19 RX ORDER — CEFEPIME 1 G/1
INJECTION, POWDER, FOR SOLUTION INTRAMUSCULAR; INTRAVENOUS
Refills: 0 | Status: DISCONTINUED | OUTPATIENT
Start: 2023-10-19 | End: 2023-10-22

## 2023-10-19 RX ORDER — IBUPROFEN 200 MG
600 TABLET ORAL ONCE
Refills: 0 | Status: COMPLETED | OUTPATIENT
Start: 2023-10-19 | End: 2023-10-19

## 2023-10-19 RX ORDER — SODIUM CHLORIDE 9 MG/ML
250 INJECTION INTRAMUSCULAR; INTRAVENOUS; SUBCUTANEOUS ONCE
Refills: 0 | Status: COMPLETED | OUTPATIENT
Start: 2023-10-19 | End: 2023-10-19

## 2023-10-19 RX ADMIN — CEFEPIME 100 MILLIGRAM(S): 1 INJECTION, POWDER, FOR SOLUTION INTRAMUSCULAR; INTRAVENOUS at 23:48

## 2023-10-19 RX ADMIN — SODIUM CHLORIDE 250 MILLILITER(S): 9 INJECTION INTRAMUSCULAR; INTRAVENOUS; SUBCUTANEOUS at 23:45

## 2023-10-19 RX ADMIN — Medication 600 MILLIGRAM(S): at 23:45

## 2023-10-19 NOTE — ED ADULT TRIAGE NOTE - CHIEF COMPLAINT QUOTE
BIBA form home with complaints of fever TMax 100.5. Patient with T-tube in place. Last dose tylenol 930pm.

## 2023-10-19 NOTE — ED PROVIDER NOTE - CLINICAL SUMMARY MEDICAL DECISION MAKING FREE TEXT BOX
77-year-old male presents for fevers and chills and weakness for 1 day, patient started on broad-spectrum antibiotics with treatment for treatment of sepsis, undifferentiated, chest x-ray without acute findings, CT abdomen pelvis imaging without acute pathology. patient admitted for further work-up and treatment.

## 2023-10-19 NOTE — ED PROVIDER NOTE - PHYSICAL EXAMINATION
VITAL SIGNS: I have reviewed nursing notes and confirm.  CONSTITUTIONAL: Well-developed; well-nourished; in no acute distress.  SKIN: Skin exam is warm and dry  HEAD: Normocephalic; atraumatic.  EYES: PERRL, EOM intact; conjunctiva and sclera clear.  ENT: airway clear.   NECK: Supple  CARD: S1, S2 normal; no murmurs, gallops, or rubs. Regular rate and rhythm.  RESP: No wheezes, rales or rhonchi.  ABD: Normal bowel sounds; soft; non-distended; non-tender. T-tube in place in right flank, draining brownish liquid.  EXT: Bilateral pitting edema noted with chronic venous stasis changes.  NEURO: Alert, oriented.   PSYCH: Cooperative, appropriate.

## 2023-10-19 NOTE — ED PROVIDER NOTE - OBJECTIVE STATEMENT
77-year-old male with history of hypertension, hypercholesterol, heart failure, CAD status post CABG, status post PPM, status post Watchman placement in September 2023, status post T-tube placement presents ED with complaints of chills and fever beginning today.  Wife noted at approximately 8 PM tonight, patient started having chills, stated that he felt cold in his hands.  Wife took his temperature later on and noted it to be 100.5.  Wife reports that patient also had some difficulty walking to the bathroom and needed her assistance, typically walks without assistance at baseline.  Patient reports some runny nose beginning yesterday, but denies any coughing, chest pain, difficulty breathing, abdominal pain, nausea, vomiting, diarrhea, dysuria, or hematuria.  Unknown sick contacts.  Patient is currently on Xarelto 50 mg status post the Watchman placement.  Patient last got Tylenol tonight at 9:30 PM.  Patient denies any other concerns at this time.

## 2023-10-19 NOTE — ED PROVIDER NOTE - CADM POA CENTRAL LINE
Med:  Baclofen 10 mg   -  Last filled on 1/22/20 for 180 with 2 refills    LOV:  12/17/19     Refill authorized per protocol.       No

## 2023-10-19 NOTE — ED PROVIDER NOTE - PROGRESS NOTE DETAILS
TAMIR: Patient re-evaluated. Feels ok at this time. Vitals stable. TAMIR: Patient transferred from SDU to MED/SURG after miscommunication during admission process. Patient not downgraded, but placed in MED/SURG as was initially expected.

## 2023-10-19 NOTE — ED PROVIDER NOTE - ATTENDING CONTRIBUTION TO CARE
77-year-old male with PMH CHF, A-fib status post Watchman placed several weeks ago, HTN, HLD, CAD s/p CABG, recurrent cholecystitis with cholecystomy tube placement,  presents for evaluation of fever and chills that started earlier this evening.  Patient's wife states they are watching TV and he was saying he felt cold and was asking for more blankets prompting her to check his temperature.  Patient later also developed shaking chills.  Had difficulty ambulating unassisted to bathroom due to overall weakness. Denies any cough, sore throat, chest pain, shortness of breath, vomiting or diarrhea.  No abdominal pain, headache or dizziness, rash.  Tolerated p.o.    VITAL SIGNS: noted  CONSTITUTIONAL: Well-developed; well-nourished; in no acute distress  HEAD: Normocephalic; atraumatic  EYES: PERRL, EOM intact; conjunctiva and sclera clear  ENT: No nasal discharge; airway clear. MMM  NECK: Supple; non tender. No anterior cervical lymphadenopathy noted  CARD: S1, S2 normal; no murmurs, gallops, or rubs. Regular rate and rhythm  RESP: CTAB/L, no wheezes, rales or rhonchi  ABD: Normal bowel sounds; soft; non-distended; non-tender; no hepatosplenomegaly. No CVA tenderness  EXT: Normal ROM. No calf tenderness, + chronic venous stasis changes and nonpitting edema bilateral LEs, or edema. Distal pulses intact  NEURO: Alert, oriented. Grossly unremarkable. No focal deficits  SKIN: Skin exam is warm and dry, no acute rash  MS: No midline spinal tenderness

## 2023-10-19 NOTE — ED PROVIDER NOTE - NSICDXPASTMEDICALHX_GEN_ALL_CORE_FT
PAST MEDICAL HISTORY:  Arrhythmia     Atrial fibrillation     Breast cancer 2001    CAD (coronary artery disease)     CHF (congestive heart failure)     Heart attack     [place] (hard of hearing)     HTN (hypertension)     Hypercholesteremia     Obesity     Osteoarthritis

## 2023-10-20 ENCOUNTER — APPOINTMENT (OUTPATIENT)
Dept: INTERVENTIONAL RADIOLOGY/VASCULAR | Facility: CLINIC | Age: 78
End: 2023-10-20

## 2023-10-20 VITALS
DIASTOLIC BLOOD PRESSURE: 82 MMHG | HEART RATE: 76 BPM | SYSTOLIC BLOOD PRESSURE: 147 MMHG | TEMPERATURE: 97.7 F | OXYGEN SATURATION: 98 %

## 2023-10-20 DIAGNOSIS — Z95.810 PRESENCE OF AUTOMATIC (IMPLANTABLE) CARDIAC DEFIBRILLATOR: Chronic | ICD-10-CM

## 2023-10-20 DIAGNOSIS — Z95.0 PRESENCE OF CARDIAC PACEMAKER: Chronic | ICD-10-CM

## 2023-10-20 DIAGNOSIS — A41.9 SEPSIS, UNSPECIFIED ORGANISM: ICD-10-CM

## 2023-10-20 LAB
ALBUMIN SERPL ELPH-MCNC: 3.5 G/DL — SIGNIFICANT CHANGE UP (ref 3.5–5.2)
ALBUMIN SERPL ELPH-MCNC: 3.5 G/DL — SIGNIFICANT CHANGE UP (ref 3.5–5.2)
ALBUMIN SERPL ELPH-MCNC: 4.3 G/DL — SIGNIFICANT CHANGE UP (ref 3.5–5.2)
ALBUMIN SERPL ELPH-MCNC: 4.3 G/DL — SIGNIFICANT CHANGE UP (ref 3.5–5.2)
ALP SERPL-CCNC: 64 U/L — SIGNIFICANT CHANGE UP (ref 30–115)
ALP SERPL-CCNC: 64 U/L — SIGNIFICANT CHANGE UP (ref 30–115)
ALP SERPL-CCNC: 82 U/L — SIGNIFICANT CHANGE UP (ref 30–115)
ALP SERPL-CCNC: 82 U/L — SIGNIFICANT CHANGE UP (ref 30–115)
ALT FLD-CCNC: 11 U/L — SIGNIFICANT CHANGE UP (ref 0–41)
ALT FLD-CCNC: 11 U/L — SIGNIFICANT CHANGE UP (ref 0–41)
ALT FLD-CCNC: 7 U/L — SIGNIFICANT CHANGE UP (ref 0–41)
ALT FLD-CCNC: 7 U/L — SIGNIFICANT CHANGE UP (ref 0–41)
ANION GAP SERPL CALC-SCNC: 11 MMOL/L — SIGNIFICANT CHANGE UP (ref 7–14)
ANION GAP SERPL CALC-SCNC: 11 MMOL/L — SIGNIFICANT CHANGE UP (ref 7–14)
ANION GAP SERPL CALC-SCNC: 9 MMOL/L — SIGNIFICANT CHANGE UP (ref 7–14)
ANION GAP SERPL CALC-SCNC: 9 MMOL/L — SIGNIFICANT CHANGE UP (ref 7–14)
APPEARANCE UR: CLEAR — SIGNIFICANT CHANGE UP
APPEARANCE UR: CLEAR — SIGNIFICANT CHANGE UP
AST SERPL-CCNC: 15 U/L — SIGNIFICANT CHANGE UP (ref 0–41)
AST SERPL-CCNC: 15 U/L — SIGNIFICANT CHANGE UP (ref 0–41)
AST SERPL-CCNC: 20 U/L — SIGNIFICANT CHANGE UP (ref 0–41)
AST SERPL-CCNC: 20 U/L — SIGNIFICANT CHANGE UP (ref 0–41)
BASOPHILS # BLD AUTO: 0 K/UL — SIGNIFICANT CHANGE UP (ref 0–0.2)
BASOPHILS # BLD AUTO: 0 K/UL — SIGNIFICANT CHANGE UP (ref 0–0.2)
BASOPHILS # BLD AUTO: 0.06 K/UL — SIGNIFICANT CHANGE UP (ref 0–0.2)
BASOPHILS # BLD AUTO: 0.06 K/UL — SIGNIFICANT CHANGE UP (ref 0–0.2)
BASOPHILS NFR BLD AUTO: 0 % — SIGNIFICANT CHANGE UP (ref 0–1)
BASOPHILS NFR BLD AUTO: 0 % — SIGNIFICANT CHANGE UP (ref 0–1)
BASOPHILS NFR BLD AUTO: 0.6 % — SIGNIFICANT CHANGE UP (ref 0–1)
BASOPHILS NFR BLD AUTO: 0.6 % — SIGNIFICANT CHANGE UP (ref 0–1)
BILIRUB SERPL-MCNC: 0.7 MG/DL — SIGNIFICANT CHANGE UP (ref 0.2–1.2)
BILIRUB SERPL-MCNC: 0.7 MG/DL — SIGNIFICANT CHANGE UP (ref 0.2–1.2)
BILIRUB SERPL-MCNC: 1 MG/DL — SIGNIFICANT CHANGE UP (ref 0.2–1.2)
BILIRUB SERPL-MCNC: 1 MG/DL — SIGNIFICANT CHANGE UP (ref 0.2–1.2)
BILIRUB UR-MCNC: ABNORMAL
BILIRUB UR-MCNC: ABNORMAL
BUN SERPL-MCNC: 18 MG/DL — SIGNIFICANT CHANGE UP (ref 10–20)
CALCIUM SERPL-MCNC: 8.5 MG/DL — SIGNIFICANT CHANGE UP (ref 8.4–10.5)
CALCIUM SERPL-MCNC: 8.5 MG/DL — SIGNIFICANT CHANGE UP (ref 8.4–10.5)
CALCIUM SERPL-MCNC: 8.8 MG/DL — SIGNIFICANT CHANGE UP (ref 8.4–10.5)
CALCIUM SERPL-MCNC: 8.8 MG/DL — SIGNIFICANT CHANGE UP (ref 8.4–10.5)
CHLORIDE SERPL-SCNC: 101 MMOL/L — SIGNIFICANT CHANGE UP (ref 98–110)
CHLORIDE SERPL-SCNC: 101 MMOL/L — SIGNIFICANT CHANGE UP (ref 98–110)
CHLORIDE SERPL-SCNC: 104 MMOL/L — SIGNIFICANT CHANGE UP (ref 98–110)
CHLORIDE SERPL-SCNC: 104 MMOL/L — SIGNIFICANT CHANGE UP (ref 98–110)
CO2 SERPL-SCNC: 23 MMOL/L — SIGNIFICANT CHANGE UP (ref 17–32)
CO2 SERPL-SCNC: 23 MMOL/L — SIGNIFICANT CHANGE UP (ref 17–32)
CO2 SERPL-SCNC: 24 MMOL/L — SIGNIFICANT CHANGE UP (ref 17–32)
CO2 SERPL-SCNC: 24 MMOL/L — SIGNIFICANT CHANGE UP (ref 17–32)
COLOR SPEC: SIGNIFICANT CHANGE UP
COLOR SPEC: SIGNIFICANT CHANGE UP
CREAT SERPL-MCNC: 0.8 MG/DL — SIGNIFICANT CHANGE UP (ref 0.7–1.5)
DIFF PNL FLD: ABNORMAL
DIFF PNL FLD: ABNORMAL
EGFR: 91 ML/MIN/1.73M2 — SIGNIFICANT CHANGE UP
EOSINOPHIL # BLD AUTO: 0 K/UL — SIGNIFICANT CHANGE UP (ref 0–0.7)
EOSINOPHIL # BLD AUTO: 0 K/UL — SIGNIFICANT CHANGE UP (ref 0–0.7)
EOSINOPHIL # BLD AUTO: 0.02 K/UL — SIGNIFICANT CHANGE UP (ref 0–0.7)
EOSINOPHIL # BLD AUTO: 0.02 K/UL — SIGNIFICANT CHANGE UP (ref 0–0.7)
EOSINOPHIL NFR BLD AUTO: 0 % — SIGNIFICANT CHANGE UP (ref 0–8)
EOSINOPHIL NFR BLD AUTO: 0 % — SIGNIFICANT CHANGE UP (ref 0–8)
EOSINOPHIL NFR BLD AUTO: 0.2 % — SIGNIFICANT CHANGE UP (ref 0–8)
EOSINOPHIL NFR BLD AUTO: 0.2 % — SIGNIFICANT CHANGE UP (ref 0–8)
FLUAV AG NPH QL: SIGNIFICANT CHANGE UP
FLUAV AG NPH QL: SIGNIFICANT CHANGE UP
FLUBV AG NPH QL: SIGNIFICANT CHANGE UP
FLUBV AG NPH QL: SIGNIFICANT CHANGE UP
GLUCOSE SERPL-MCNC: 129 MG/DL — HIGH (ref 70–99)
GLUCOSE SERPL-MCNC: 129 MG/DL — HIGH (ref 70–99)
GLUCOSE SERPL-MCNC: 98 MG/DL — SIGNIFICANT CHANGE UP (ref 70–99)
GLUCOSE SERPL-MCNC: 98 MG/DL — SIGNIFICANT CHANGE UP (ref 70–99)
GLUCOSE UR QL: NEGATIVE MG/DL — SIGNIFICANT CHANGE UP
GLUCOSE UR QL: NEGATIVE MG/DL — SIGNIFICANT CHANGE UP
HCT VFR BLD CALC: 27.8 % — LOW (ref 42–52)
HCT VFR BLD CALC: 27.8 % — LOW (ref 42–52)
HGB BLD-MCNC: 8.5 G/DL — LOW (ref 14–18)
HGB BLD-MCNC: 8.5 G/DL — LOW (ref 14–18)
IMM GRANULOCYTES NFR BLD AUTO: 0.4 % — HIGH (ref 0.1–0.3)
IMM GRANULOCYTES NFR BLD AUTO: 0.4 % — HIGH (ref 0.1–0.3)
KETONES UR-MCNC: ABNORMAL MG/DL
KETONES UR-MCNC: ABNORMAL MG/DL
LACTATE SERPL-SCNC: 1.4 MMOL/L — SIGNIFICANT CHANGE UP (ref 0.7–2)
LACTATE SERPL-SCNC: 1.4 MMOL/L — SIGNIFICANT CHANGE UP (ref 0.7–2)
LACTATE SERPL-SCNC: 1.9 MMOL/L — SIGNIFICANT CHANGE UP (ref 0.7–2)
LACTATE SERPL-SCNC: 1.9 MMOL/L — SIGNIFICANT CHANGE UP (ref 0.7–2)
LACTATE SERPL-SCNC: 2.5 MMOL/L — HIGH (ref 0.7–2)
LACTATE SERPL-SCNC: 2.5 MMOL/L — HIGH (ref 0.7–2)
LEUKOCYTE ESTERASE UR-ACNC: NEGATIVE — SIGNIFICANT CHANGE UP
LEUKOCYTE ESTERASE UR-ACNC: NEGATIVE — SIGNIFICANT CHANGE UP
LYMPHOCYTES # BLD AUTO: 0.5 K/UL — LOW (ref 1.2–3.4)
LYMPHOCYTES # BLD AUTO: 0.5 K/UL — LOW (ref 1.2–3.4)
LYMPHOCYTES # BLD AUTO: 0.53 K/UL — LOW (ref 1.2–3.4)
LYMPHOCYTES # BLD AUTO: 0.53 K/UL — LOW (ref 1.2–3.4)
LYMPHOCYTES # BLD AUTO: 4.4 % — LOW (ref 20.5–51.1)
LYMPHOCYTES # BLD AUTO: 4.4 % — LOW (ref 20.5–51.1)
LYMPHOCYTES # BLD AUTO: 5.1 % — LOW (ref 20.5–51.1)
LYMPHOCYTES # BLD AUTO: 5.1 % — LOW (ref 20.5–51.1)
MAGNESIUM SERPL-MCNC: 2.2 MG/DL — SIGNIFICANT CHANGE UP (ref 1.8–2.4)
MAGNESIUM SERPL-MCNC: 2.2 MG/DL — SIGNIFICANT CHANGE UP (ref 1.8–2.4)
MCHC RBC-ENTMCNC: 23.5 PG — LOW (ref 27–31)
MCHC RBC-ENTMCNC: 23.5 PG — LOW (ref 27–31)
MCHC RBC-ENTMCNC: 30.6 G/DL — LOW (ref 32–37)
MCHC RBC-ENTMCNC: 30.6 G/DL — LOW (ref 32–37)
MCV RBC AUTO: 77 FL — LOW (ref 80–94)
MCV RBC AUTO: 77 FL — LOW (ref 80–94)
MONOCYTES # BLD AUTO: 0.11 K/UL — SIGNIFICANT CHANGE UP (ref 0.1–0.6)
MONOCYTES # BLD AUTO: 0.11 K/UL — SIGNIFICANT CHANGE UP (ref 0.1–0.6)
MONOCYTES # BLD AUTO: 0.53 K/UL — SIGNIFICANT CHANGE UP (ref 0.1–0.6)
MONOCYTES # BLD AUTO: 0.53 K/UL — SIGNIFICANT CHANGE UP (ref 0.1–0.6)
MONOCYTES NFR BLD AUTO: 0.9 % — LOW (ref 1.7–9.3)
MONOCYTES NFR BLD AUTO: 0.9 % — LOW (ref 1.7–9.3)
MONOCYTES NFR BLD AUTO: 5.4 % — SIGNIFICANT CHANGE UP (ref 1.7–9.3)
MONOCYTES NFR BLD AUTO: 5.4 % — SIGNIFICANT CHANGE UP (ref 1.7–9.3)
NEUTROPHILS # BLD AUTO: 11.35 K/UL — HIGH (ref 1.4–6.5)
NEUTROPHILS # BLD AUTO: 11.35 K/UL — HIGH (ref 1.4–6.5)
NEUTROPHILS # BLD AUTO: 8.6 K/UL — HIGH (ref 1.4–6.5)
NEUTROPHILS # BLD AUTO: 8.6 K/UL — HIGH (ref 1.4–6.5)
NEUTROPHILS NFR BLD AUTO: 88.3 % — HIGH (ref 42.2–75.2)
NEUTROPHILS NFR BLD AUTO: 88.3 % — HIGH (ref 42.2–75.2)
NEUTROPHILS NFR BLD AUTO: 94.7 % — HIGH (ref 42.2–75.2)
NEUTROPHILS NFR BLD AUTO: 94.7 % — HIGH (ref 42.2–75.2)
NITRITE UR-MCNC: NEGATIVE — SIGNIFICANT CHANGE UP
NITRITE UR-MCNC: NEGATIVE — SIGNIFICANT CHANGE UP
NRBC # BLD: 0 /100 WBCS — SIGNIFICANT CHANGE UP (ref 0–0)
NRBC # BLD: 0 /100 WBCS — SIGNIFICANT CHANGE UP (ref 0–0)
PH UR: 5.5 — SIGNIFICANT CHANGE UP (ref 5–8)
PH UR: 5.5 — SIGNIFICANT CHANGE UP (ref 5–8)
PLATELET # BLD AUTO: 161 K/UL — SIGNIFICANT CHANGE UP (ref 130–400)
PLATELET # BLD AUTO: 161 K/UL — SIGNIFICANT CHANGE UP (ref 130–400)
PMV BLD: 11.3 FL — HIGH (ref 7.4–10.4)
PMV BLD: 11.3 FL — HIGH (ref 7.4–10.4)
POTASSIUM SERPL-MCNC: 4.4 MMOL/L — SIGNIFICANT CHANGE UP (ref 3.5–5)
POTASSIUM SERPL-MCNC: 4.4 MMOL/L — SIGNIFICANT CHANGE UP (ref 3.5–5)
POTASSIUM SERPL-MCNC: 4.7 MMOL/L — SIGNIFICANT CHANGE UP (ref 3.5–5)
POTASSIUM SERPL-MCNC: 4.7 MMOL/L — SIGNIFICANT CHANGE UP (ref 3.5–5)
POTASSIUM SERPL-SCNC: 4.4 MMOL/L — SIGNIFICANT CHANGE UP (ref 3.5–5)
POTASSIUM SERPL-SCNC: 4.4 MMOL/L — SIGNIFICANT CHANGE UP (ref 3.5–5)
POTASSIUM SERPL-SCNC: 4.7 MMOL/L — SIGNIFICANT CHANGE UP (ref 3.5–5)
POTASSIUM SERPL-SCNC: 4.7 MMOL/L — SIGNIFICANT CHANGE UP (ref 3.5–5)
PROT SERPL-MCNC: 5.5 G/DL — LOW (ref 6–8)
PROT SERPL-MCNC: 5.5 G/DL — LOW (ref 6–8)
PROT SERPL-MCNC: 6.6 G/DL — SIGNIFICANT CHANGE UP (ref 6–8)
PROT SERPL-MCNC: 6.6 G/DL — SIGNIFICANT CHANGE UP (ref 6–8)
PROT UR-MCNC: 30 MG/DL
PROT UR-MCNC: 30 MG/DL
RBC # BLD: 3.61 M/UL — LOW (ref 4.7–6.1)
RBC # BLD: 3.61 M/UL — LOW (ref 4.7–6.1)
RBC # FLD: 20.7 % — HIGH (ref 11.5–14.5)
RBC # FLD: 20.7 % — HIGH (ref 11.5–14.5)
RSV RNA NPH QL NAA+NON-PROBE: SIGNIFICANT CHANGE UP
RSV RNA NPH QL NAA+NON-PROBE: SIGNIFICANT CHANGE UP
SARS-COV-2 RNA SPEC QL NAA+PROBE: SIGNIFICANT CHANGE UP
SARS-COV-2 RNA SPEC QL NAA+PROBE: SIGNIFICANT CHANGE UP
SODIUM SERPL-SCNC: 133 MMOL/L — LOW (ref 135–146)
SODIUM SERPL-SCNC: 133 MMOL/L — LOW (ref 135–146)
SODIUM SERPL-SCNC: 139 MMOL/L — SIGNIFICANT CHANGE UP (ref 135–146)
SODIUM SERPL-SCNC: 139 MMOL/L — SIGNIFICANT CHANGE UP (ref 135–146)
SP GR SPEC: >1.03 — HIGH (ref 1–1.03)
SP GR SPEC: >1.03 — HIGH (ref 1–1.03)
TROPONIN T SERPL-MCNC: <0.01 NG/ML — SIGNIFICANT CHANGE UP
TROPONIN T SERPL-MCNC: <0.01 NG/ML — SIGNIFICANT CHANGE UP
UROBILINOGEN FLD QL: 1 MG/DL — SIGNIFICANT CHANGE UP (ref 0.2–1)
UROBILINOGEN FLD QL: 1 MG/DL — SIGNIFICANT CHANGE UP (ref 0.2–1)
VANCOMYCIN TROUGH SERPL-MCNC: 12.6 UG/ML — HIGH (ref 5–10)
VANCOMYCIN TROUGH SERPL-MCNC: 12.6 UG/ML — HIGH (ref 5–10)
WBC # BLD: 9.75 K/UL — SIGNIFICANT CHANGE UP (ref 4.8–10.8)
WBC # BLD: 9.75 K/UL — SIGNIFICANT CHANGE UP (ref 4.8–10.8)
WBC # FLD AUTO: 9.75 K/UL — SIGNIFICANT CHANGE UP (ref 4.8–10.8)
WBC # FLD AUTO: 9.75 K/UL — SIGNIFICANT CHANGE UP (ref 4.8–10.8)

## 2023-10-20 PROCEDURE — 85025 COMPLETE CBC W/AUTO DIFF WBC: CPT

## 2023-10-20 PROCEDURE — 74177 CT ABD & PELVIS W/CONTRAST: CPT | Mod: 26,MA

## 2023-10-20 PROCEDURE — 86900 BLOOD TYPING SEROLOGIC ABO: CPT

## 2023-10-20 PROCEDURE — 80202 ASSAY OF VANCOMYCIN: CPT

## 2023-10-20 PROCEDURE — 84100 ASSAY OF PHOSPHORUS: CPT

## 2023-10-20 PROCEDURE — 87040 BLOOD CULTURE FOR BACTERIA: CPT

## 2023-10-20 PROCEDURE — 36415 COLL VENOUS BLD VENIPUNCTURE: CPT

## 2023-10-20 PROCEDURE — 87086 URINE CULTURE/COLONY COUNT: CPT

## 2023-10-20 PROCEDURE — 80053 COMPREHEN METABOLIC PANEL: CPT

## 2023-10-20 PROCEDURE — 85610 PROTHROMBIN TIME: CPT

## 2023-10-20 PROCEDURE — 86850 RBC ANTIBODY SCREEN: CPT

## 2023-10-20 PROCEDURE — 81001 URINALYSIS AUTO W/SCOPE: CPT

## 2023-10-20 PROCEDURE — 83735 ASSAY OF MAGNESIUM: CPT

## 2023-10-20 PROCEDURE — 93306 TTE W/DOPPLER COMPLETE: CPT

## 2023-10-20 PROCEDURE — 97162 PT EVAL MOD COMPLEX 30 MIN: CPT | Mod: GP

## 2023-10-20 PROCEDURE — 86901 BLOOD TYPING SEROLOGIC RH(D): CPT

## 2023-10-20 PROCEDURE — 99221 1ST HOSP IP/OBS SF/LOW 40: CPT | Mod: GC

## 2023-10-20 PROCEDURE — 99223 1ST HOSP IP/OBS HIGH 75: CPT

## 2023-10-20 PROCEDURE — 85027 COMPLETE CBC AUTOMATED: CPT

## 2023-10-20 PROCEDURE — 85730 THROMBOPLASTIN TIME PARTIAL: CPT

## 2023-10-20 PROCEDURE — 83605 ASSAY OF LACTIC ACID: CPT

## 2023-10-20 RX ORDER — ATORVASTATIN CALCIUM 80 MG/1
20 TABLET, FILM COATED ORAL AT BEDTIME
Refills: 0 | Status: DISCONTINUED | OUTPATIENT
Start: 2023-10-20 | End: 2023-10-23

## 2023-10-20 RX ORDER — ENOXAPARIN SODIUM 100 MG/ML
100 INJECTION SUBCUTANEOUS EVERY 12 HOURS
Refills: 0 | Status: DISCONTINUED | OUTPATIENT
Start: 2023-10-20 | End: 2023-10-20

## 2023-10-20 RX ORDER — SODIUM CHLORIDE 9 MG/ML
250 INJECTION, SOLUTION INTRAVENOUS ONCE
Refills: 0 | Status: COMPLETED | OUTPATIENT
Start: 2023-10-20 | End: 2023-10-20

## 2023-10-20 RX ORDER — ACETAMINOPHEN 500 MG
650 TABLET ORAL EVERY 6 HOURS
Refills: 0 | Status: DISCONTINUED | OUTPATIENT
Start: 2023-10-20 | End: 2023-10-23

## 2023-10-20 RX ORDER — METRONIDAZOLE 500 MG
500 TABLET ORAL EVERY 8 HOURS
Refills: 0 | Status: DISCONTINUED | OUTPATIENT
Start: 2023-10-20 | End: 2023-10-22

## 2023-10-20 RX ORDER — SODIUM CHLORIDE 9 MG/ML
1000 INJECTION, SOLUTION INTRAVENOUS
Refills: 0 | Status: DISCONTINUED | OUTPATIENT
Start: 2023-10-20 | End: 2023-10-20

## 2023-10-20 RX ORDER — ASPIRIN/CALCIUM CARB/MAGNESIUM 324 MG
81 TABLET ORAL DAILY
Refills: 0 | Status: DISCONTINUED | OUTPATIENT
Start: 2023-10-20 | End: 2023-10-23

## 2023-10-20 RX ORDER — CHLORHEXIDINE GLUCONATE 213 G/1000ML
1 SOLUTION TOPICAL
Refills: 0 | Status: DISCONTINUED | OUTPATIENT
Start: 2023-10-20 | End: 2023-10-23

## 2023-10-20 RX ORDER — INFLUENZA VIRUS VACCINE 15; 15; 15; 15 UG/.5ML; UG/.5ML; UG/.5ML; UG/.5ML
0.7 SUSPENSION INTRAMUSCULAR ONCE
Refills: 0 | Status: DISCONTINUED | OUTPATIENT
Start: 2023-10-20 | End: 2023-10-23

## 2023-10-20 RX ORDER — METOPROLOL TARTRATE 50 MG
50 TABLET ORAL
Refills: 0 | Status: DISCONTINUED | OUTPATIENT
Start: 2023-10-20 | End: 2023-10-23

## 2023-10-20 RX ORDER — RIVAROXABAN 15 MG-20MG
15 KIT ORAL
Refills: 0 | Status: DISCONTINUED | OUTPATIENT
Start: 2023-10-20 | End: 2023-10-23

## 2023-10-20 RX ADMIN — Medication 100 MILLIGRAM(S): at 22:32

## 2023-10-20 RX ADMIN — Medication 50 MILLIGRAM(S): at 19:18

## 2023-10-20 RX ADMIN — Medication 650 MILLIGRAM(S): at 23:18

## 2023-10-20 RX ADMIN — Medication 250 MILLIGRAM(S): at 00:15

## 2023-10-20 RX ADMIN — SODIUM CHLORIDE 250 MILLILITER(S): 9 INJECTION, SOLUTION INTRAVENOUS at 00:44

## 2023-10-20 RX ADMIN — Medication 250 MILLIGRAM(S): at 06:52

## 2023-10-20 RX ADMIN — CEFEPIME 100 MILLIGRAM(S): 1 INJECTION, POWDER, FOR SOLUTION INTRAMUSCULAR; INTRAVENOUS at 22:32

## 2023-10-20 RX ADMIN — ATORVASTATIN CALCIUM 20 MILLIGRAM(S): 80 TABLET, FILM COATED ORAL at 22:31

## 2023-10-20 RX ADMIN — CEFEPIME 100 MILLIGRAM(S): 1 INJECTION, POWDER, FOR SOLUTION INTRAMUSCULAR; INTRAVENOUS at 06:25

## 2023-10-20 RX ADMIN — SODIUM CHLORIDE 100 MILLILITER(S): 9 INJECTION, SOLUTION INTRAVENOUS at 13:23

## 2023-10-20 RX ADMIN — CEFEPIME 100 MILLIGRAM(S): 1 INJECTION, POWDER, FOR SOLUTION INTRAMUSCULAR; INTRAVENOUS at 13:23

## 2023-10-20 RX ADMIN — Medication 100 MILLIGRAM(S): at 13:24

## 2023-10-20 RX ADMIN — Medication 250 MILLIGRAM(S): at 23:00

## 2023-10-20 NOTE — H&P ADULT - GASTROINTESTINAL
cholecystostomy tube w/ bilious drainage noted over RUQ abdomen; drain site appears c/d/i w/ no ttp, surrounding erythema, or drainage

## 2023-10-20 NOTE — H&P ADULT - NSICDXPASTMEDICALHX_GEN_ALL_CORE_FT
PAST MEDICAL HISTORY:  Breast cancer 2001    CAD (coronary artery disease)     Chronic atrial fibrillation     Dyslipidemia     Heart attack     Heart failure with improved ejection fraction (HFimpEF)     [place] (hard of hearing)     HTN (hypertension)     Obesity     Osteoarthritis

## 2023-10-20 NOTE — H&P ADULT - ATTENDING COMMENTS
Patient is a 77y Male  with PMHx of CAD S/P CABG, SSS S/P DC PPM, Persistent AF with prior  bleeding, history of cholecystitis s/p percutaneous cholecystostomy tube placement, recent left atrial appendage occlusive device on xarelto for 45 days, presenting to our hospital for fever.       1. Sepsis (fever, tachycardia) likely due to intra-abdominal infection likely cholecystitis due to malfunctioning of the cholecystostomy tube?   * pt and wife reported decrease in drainaing of cholecystostomy tube. no clear date for surgical intervention   - Admit to medicine       -LA elevated resolving   - Cont broad spectrum abs . add flagyl   - blood cx:pending   - Switch xarelto to lovenox in case he needs surgical intervention   - Already on a diet for now   - Cont LR at 100 ml/hr   - IR consult to check the drain. (already called and they will assess him)   - Consider gen surgery but doubt they will have plan for surgical intervention now     2. hx of left atrial appendage occlusive device  - Switch xarelto to lovenox in case he needs surgical procedure     3. Persistent Afib . hx of SSS s/p DC PPM   - Cont home meds    4. hx of CAD s/p CABG   - Cont home meds    5. DVT/GI px Patient is a 77y Male  with PMHx of CAD S/P CABG, SSS S/P DC PPM, Persistent AF with prior  bleeding, history of cholecystitis s/p percutaneous cholecystostomy tube placement, recent left atrial appendage occlusive device on xarelto for 45 days, presenting to our hospital for fever.       1. Sepsis (fever, tachycardia) likely due to intra-abdominal infection likely cholecystitis due to malfunctioning of the cholecystostomy tube?   * pt and wife reported decrease in drainaing of cholecystostomy tube. no clear date for surgical intervention   - Admit to medicine       -LA elevated resolving   - Cont broad spectrum abs . add flagyl   - blood cx:pending   - Switch xarelto to lovenox in case he needs surgical intervention   - Already on a diet for now   - Cont LR at 100 ml/hr   - IR consult to check the drain. (already called and they will assess him)   - Gen surgery consult:pending    2. hx of left atrial appendage occlusive device  - Switch xarelto to lovenox in case he needs surgical procedure     3. Persistent Afib . hx of SSS s/p DC PPM   - Cont home meds    4. hx of CAD s/p CABG   - Cont home meds    5. DVT/GI px Patient is a 77y Male  with PMHx of CAD S/P CABG, SSS S/P DC PPM, Persistent AF with prior  bleeding, history of cholecystitis s/p percutaneous cholecystostomy tube placement, recent left atrial appendage occlusive device on xarelto for 45 days, presenting to our hospital for fever.       1. Sepsis (fever, tachycardia) unclear focus. intra-abdominal infection ?   * pt and wife reported decrease in drainaing of cholecystostomy tube. no clear date for surgical intervention   - Admit to medicine       -LA elevated resolving   - Cont broad spectrum abs . add flagyl   - blood cx:pending   - Cont xarelto as no plan for intervention   - Already on a diet for now   - Cont LR at 100 ml/hr   - IR consult appreciated   - Gen surgery consult:pending  - ID consult:pending    2. hx of left atrial appendage occlusive device  -Cont xarelto as there is no surgcial plan     3. Persistent Afib . hx of SSS s/p DC PPM   - Cont home meds    4. hx of CAD s/p CABG   - Cont home meds    5. DVT/GI px

## 2023-10-20 NOTE — PATIENT PROFILE ADULT - FALL HARM RISK - HARM RISK INTERVENTIONS

## 2023-10-20 NOTE — H&P ADULT - ASSESSMENT
77M w/ h/o CAD (s/p CABG), HFimpEF, chronic afib (on Xarelto), SSS (s/p CRT-P), HTN, and DLD p/w fever and chills. Admitted to medicine for cryptogenic sepsis.    #Cryptogenic Sepsis  Initially p/w fevers and chills. Sepsis present on admission (T 100.5F [per pt's wife] and WBC 11.98). No clear source based on overall benign history and exam.  - c/w vancomycin 1500mg IV Q24H (adjust via trough)  - c/w cefepime 2g IV Q8H  - c/w Flagyl 500mg IV TID  - f/u BCx and UCx  - IR consulted for cholecystostomy tube eval; recommended surgery eval  - surgery consulted to consider cholecystitis; f/u final recs  - ID consulted; f/u recs    #Coronary Artrery Disease  #Dyslipidemia  S/P CABG  - c/w ASA 71mg PO QD  - c/w atorvastatin 20mg PO QHS  - c/w Lopressor 50mg PO BID    #Chronic Atrial Fibrillation  #Sick Sinus Syndrome  - c/w rivaroxaban 15mg PO QHS (avoid switching to Lovenox due to bleeding risk)  - c/w ASA 71mg PO QD  - c/w Lopressor 50mg PO BID    #Chronic HFimpEF  - c/w Lopressor 50mg PO BID  - hold Lasix for now    DVT PPX: rivaroxaban 15mg PO QHS  GI PPX: none indicated  DIET: DASH/TLC  ACTIVITY: AAT  CODE STATUS: Full Code  DISPOSITION: From Home    PENDING: f/u cultures; ID consult; surgery consult; monitor fever curve 77M w/ h/o CAD (s/p CABG), HFimpEF, chronic afib (on Xarelto), SSS (s/p CRT-P), HTN, and DLD p/w fever and chills. Admitted to medicine for cryptogenic sepsis.    #Cryptogenic Sepsis  Initially p/w fevers and chills. Sepsis present on admission (T 100.5F [per pt's wife], WBC 11.98, Lactate 2.5). Lactate improved to 1.7. No clear source based on overall benign history and exam. Flu, COVID, RSV negative. UA and CXR unremarkable. No evidence of intra-abdominal source on CTAP. Possibly related to recurrent cholecystitis. Cholecystostomy has been in place for 6 months and has required multiple exchanges. Evaluated by general surgery before, but cholecystectomy deferred in the past due to pt's multiple cardiac comorbidities and h/o ADDY thrombus (noted on WATCHMAN attempt in Mar 2023). Being considered for elective cholecystectomy after ADDY closure. IR consulted for cholecystostomy drain evaluation, but drain working well so no acute intervention offered.  - c/w vancomycin 1500mg IV Q12H (adjust via trough)  - c/w cefepime 2g IV Q8H  - c/w Flagyl 500mg IV Q8H  - f/u BCx and UCx  - f/u TTE (ordered to eval for endocarditis after recent LAAO device placement)  - surgery consulted to consider cholecystectomy as per IR recommendations; f/u recs - ID consulted; f/u recs    #Chronic Atrial Fibrillation  #Sick Sinus Syndrome  CHADS-VASC 5+ (CHF, HTN, Age x2, CAD). S/P PPM in 2018 for SSS (planned for BiV-PPM at the time for cardiomyopathy, but unable to pass LV lead into CS). Upgraded to CRT-P by CTSx using epicardial lead in June 2022 due to persistent cardiomyopathy and 80%+ RV pacing. S/P ADDY occlusion (WATCHMAN) in Sept 2023 due to significant GI and  bleeding history. Now requires 45 days DOAC + ASA followed by 6 months DAPT (once LAAO device confirmed to be in place via repeat OTIS)  - c/w rivaroxaban 15mg PO QHS (do NOT switch to Lovenox/heparin unless absolutely necesary due to bleeding risk)  - c/w ASA EC 81mg PO QD (stroke ppx after WATCHMAN placement; must NOT be stopped unless discussed w/ EP)  - c/w Lopressor 50mg PO BID    #Chronic HFimpEF  Previously known to have HFrEF. Most recent OTIS (Sept 2023) showed LVEF 55-60%.  - c/w Lopressor 50mg PO BID  - hold Lasix for now i/s/o sepsis (received fluids earlier)  - restart Lasix if pt develops volume overload  - f/u cardiology outpatient for GDMT optimization (Toprol XL, ACEi/ARB/ARNI, SGLT2i, etc)    #Coronary Artery Disease  #Hypertension  #Dyslipidemia  S/P CABGx5 (2014). Follows w/ Dr. Handy.  - c/w ASA EC 81mg PO QD  - c/w atorvastatin 20mg PO QHS  - c/w Lopressor 50mg PO BID    DVT PPX: rivaroxaban 15mg PO QHS  GI PPX: none indicated  DIET: DASH/TLC  ACTIVITY: AAT  CODE STATUS: Full Code  DISPOSITION: From Home    PENDING: f/u cultures; ID consult; surgery consult; monitor fever curve 77M w/ h/o CAD (s/p CABG), HFimpEF, chronic afib (on Xarelto), SSS (s/p CRT-P), HTN, and DLD p/w fever and chills. Admitted to medicine for cryptogenic sepsis.    #Cryptogenic Sepsis  Initially p/w fevers and chills. Sepsis present on admission (T 100.5F [per pt's wife], WBC 11.98, Lactate 2.5). Lactate improved to 1.7. No clear source based on overall benign history and exam. Flu, COVID, RSV negative. UA and CXR unremarkable. No evidence of intra-abdominal source on CTAP. Possibly related to recurrent cholecystitis. Cholecystostomy has been in place for 6 months and has required multiple exchanges. Evaluated by general surgery before, but cholecystectomy deferred in the past due to pt's multiple cardiac comorbidities and h/o ADDY thrombus (noted on WATCHMAN attempt in Mar 2023). Being considered for elective cholecystectomy after ADDY closure. IR consulted for cholecystostomy drain evaluation, but drain working well so no acute intervention offered.  - c/w vancomycin 1500mg IV Q12H (adjust via trough)  - c/w cefepime 2g IV Q8H  - c/w Flagyl 500mg IV Q8H  - f/u BCx and UCx  - f/u TTE (ordered to eval for endocarditis after recent LAAO device placement)  - surgery consulted to consider cholecystectomy as per IR recommendations; f/u recs - ID consulted; f/u recs    #Chronic Atrial Fibrillation  #Sick Sinus Syndrome  CHADS-VASC 5+ (CHF, HTN, Age x2, CAD). S/P PPM in 2018 for SSS (planned for BiV-PPM at the time, but unable to pass LV lead into CS). Upgraded to CRT-P by CTSx using epicardial lead in June 2022 due to PICM (cardiomyopathy i/s/o 80%+ RV pacing). S/P ADDY occlusion (WATCHMAN) in Sept 2023 due to significant GI and  bleeding history. Now requires 45 days DOAC + ASA followed by 6 months DAPT (once LAAO device confirmed to be in place via repeat OTIS).  - c/w rivaroxaban 15mg PO QHS (do NOT switch to Lovenox/heparin unless absolutely necessary due to bleeding risk)  - c/w ASA EC 81mg PO QD (stroke ppx after WATCHMAN placement; must NOT be stopped unless discussed w/ EP)  - c/w Lopressor 50mg PO BID    #Chronic HFimpEF  Previously known to have HFrEF. Likely PICM i/s/o 80%+ RV pacing. Improved after PPM upgrade to CRT-P in June 2022. Most recent OTIS (Sept 2023) showed LVEF 55-60%.  - c/w Lopressor 50mg PO BID  - hold Lasix for now i/s/o sepsis (received fluids earlier)  - restart Lasix if pt develops volume overload  - f/u cardiology outpatient to consider GDMT optimization (Toprol XL, ACEi/ARB/ARNI, SGLT2i, etc)    #Coronary Artery Disease  #Hypertension  #Dyslipidemia  S/P CABGx5 (2014). Follows w/ Dr. Handy.  - c/w ASA EC 81mg PO QD  - c/w atorvastatin 20mg PO QHS  - c/w Lopressor 50mg PO BID    DVT PPX: rivaroxaban 15mg PO QHS  GI PPX: none indicated  DIET: DASH/TLC  ACTIVITY: AAT  CODE STATUS: Full Code  DISPOSITION: From Home    PENDING: f/u cultures; ID consult; surgery consult; monitor fever curve

## 2023-10-20 NOTE — H&P ADULT - HISTORY OF PRESENT ILLNESS
77M w/ h/o CAD (s/p CABG), HFimpEF, chronic afib (on Xarelto), SSS (s/p CRT-P), HTN, and DLD p/w fever and chills. Symptoms started last night. At the time, pt febrile to 100.5F. Wife reports patient had some difficulty walking to bathroom last night and needed her assistance, (typically walks w/o assistance at baseline). Of note, pt had Watchman placed last month. Additionally, pt had cholecystostomy in Apr 2023 and has required multiple drainage exchanges. Otherwise, no reported CP, palpitations, SOB, abdominal pain, n/v/d, dysuria, or LE swelling. No reported sicks contacts.    In ED, pt HD stable on vitals. Labs demonstrated WBC 11.9 and Lactate 2.5. Flu, COVID, RSV neg. CTAP negative for bowel obstruction, pneumatosis, pneumoperitoneum, and ascites. CXR unremarkable. Admitted to medicine for cryptogenic sepsis.

## 2023-10-20 NOTE — CONSULT NOTE ADULT - SUBJECTIVE AND OBJECTIVE BOX
GENERAL SURGERY CONSULT NOTE    Patient: WAGNER SEGURA , 77y ([birth year])Male   MRN: [id number]  Location: [address] (Back) [address]  Visit: 10-20-23 Inpatient  Date: 10-20-23 @ 18:40    HPI:  77yoM Hx of CAD s/p CABG, SSS s/p PPM, AF s/p atrial appendage occlusive device (still on AC for first 45 days), presenting with fevers/chills. CT abd/p shows percutaneous cholecystostomy tube in appropriate position. No gallbladder distension. Trace pericholecystic fluid without gallbladder wall edema or significant inflammatory changes.     Per IR, no issues with PCT tube. Abdominal exam reassuring, no abdominal pain, benign. On exam, patient is non-toxic appearing, no abdominal pain, umbilical hernia non painful, PCT tube in place no pus or purulent drainage, with negative Onofre's sign.     WBC 11.98 from 8.8. Lactate 2.5.      PAST MEDICAL & SURGICAL HISTORY:  HTN (hypertension)  Obesity  Heart attack  Breast cancer  2001  Osteoarthritis  CAD (coronary artery disease)  [place] (hard of hearing)  Dyslipidemia  Heart failure with improved ejection fraction (HFimpEF)  Chronic atrial fibrillation  S/P CABG x 5  2014  H/O mastectomy  right 2001  Presence of biventricular cardiac pacemaker          Home Medications:  Aspirin Enteric Coated 81 mg oral delayed release tablet: 1 tab(s) orally once a day (20 Oct 2023 17:51)  atorvastatin 20 mg oral tablet: 1 tab(s) orally once a day (at bedtime) (20 Oct 2023 17:51)  furosemide 20 mg oral tablet: 1 tab(s) orally 2 times a day (20 Oct 2023 17:52)  metoprolol tartrate 50 mg oral tablet: 1 tab(s) orally 2 times a day (20 Oct 2023 17:52)  rivaroxaban 15 mg oral tablet: 1 tab(s) orally once a day (at bedtime) (20 Oct 2023 17:52)        VITALS:  T(F): 96.4 (10-20-23 @ 15:52), Max: 103.7 (10-19-23 @ 22:45)  HR: 60 (10-20-23 @ 15:52) (60 - 74)  BP: 108/56 (10-20-23 @ 15:52) (101/51 - 155/70)  RR: 18 (10-20-23 @ 15:52) (18 - 18)  SpO2: 98% (10-20-23 @ 06:13) (98% - 99%)    PHYSICAL EXAM:  General: NAD, AAOx3, calm and cooperative  HEENT: NCAT, EOMI  Cardiac: RRR  Respiratory: CTAB  Abdomen: Soft, non-distended, non-tender, no rebound, no guarding. +BS.  Musculoskeletal:  ROM intact, some b/l hand tremors, no asterixis  Neuro: Sensation grossly intact and equal throughout, no focal deficits  Vascular: Pulses 2+ throughout, extremities well perfused  Skin: Warm/dry, normal color, no jaundice      MEDICATIONS  (STANDING):  aspirin enteric coated 81 milliGRAM(s) Oral daily  atorvastatin 20 milliGRAM(s) Oral at bedtime  cefepime   IVPB 2000 milliGRAM(s) IV Intermittent every 8 hours  cefepime   IVPB      chlorhexidine 2% Cloths 1 Application(s) Topical <User Schedule>  influenza  Vaccine (HIGH DOSE) 0.7 milliLiter(s) IntraMuscular once  metoprolol tartrate 50 milliGRAM(s) Oral two times a day  metroNIDAZOLE  IVPB 500 milliGRAM(s) IV Intermittent every 8 hours  rivaroxaban 15 milliGRAM(s) Oral with dinner  vancomycin  IVPB 1500 milliGRAM(s) IV Intermittent every 12 hours  vancomycin  IVPB        MEDICATIONS  (PRN):  acetaminophen     Tablet .. 650 milliGRAM(s) Oral every 6 hours PRN Temp greater or equal to 38C (100.4F), Mild Pain (1 - 3)      LAB/STUDIES:                        9.5    11.98 )-----------( 224      ( 19 Oct 2023 23:15 )             30.4     10-19    139  |  104  |  18  ----------------------------<  129<H>  4.7   |  24  |  0.8    Ca    8.8      19 Oct 2023 23:15    TPro  6.6  /  Alb  4.3  /  TBili  0.7  /  DBili  x   /  AST  15  /  ALT  7   /  AlkPhos  82  10-19      LIVER FUNCTIONS - ( 19 Oct 2023 23:15 )  Alb: 4.3 g/dL / Pro: 6.6 g/dL / ALK PHOS: 82 U/L / ALT: 7 U/L / AST: 15 U/L / GGT: x           Urinalysis Basic - ( 20 Oct 2023 03:20 )    Color: Dark Yellow / Appearance: Clear / SG: >1.030 / pH: x  Gluc: x / Ketone: Trace mg/dL  / Bili: Small / Urobili: 1.0 mg/dL   Blood: x / Protein: 30 mg/dL / Nitrite: Negative   Leuk Esterase: Negative / RBC: 86 /HPF / WBC 2 /HPF   Sq Epi: x / Non Sq Epi: 2 /HPF / Bacteria: Negative /HPF      CARDIAC MARKERS ( 19 Oct 2023 23:15 )  x     / <0.01 ng/mL / x     / x     / x              IMAGING:  CTAP per radiology report:  "Percutaneous cholecystostomy tube with pigtail in the gallbladder lumen.   Sightly increased pericholecystic fluid without gallbladder distention,   nonspecific. Stones are noted in the cystic duct.  No bowel obstruction, pneumatosis, pneumoperitoneum, or ascites."

## 2023-10-20 NOTE — CONSULT NOTE ADULT - ATTENDING COMMENTS
patient seen. denies abdominal pain. abdomen soft, not tender. perc drain in place, checked by IR. LFTs normal. no new recs.

## 2023-10-20 NOTE — CONSULT NOTE ADULT - SUBJECTIVE AND OBJECTIVE BOX
INTERVENTIONAL RADIOLOGY CONSULT:     Procedure Requested:     HPI:      PAST MEDICAL & SURGICAL HISTORY:  Arrhythmia      Hypercholesteremia      HTN (hypertension)      Obesity      CHF (congestive heart failure)      Heart attack      Breast cancer  2001      Osteoarthritis      CAD (coronary artery disease)      Atrial fibrillation      [place] (hard of hearing)      S/P CABG x 5  2014      H/O mastectomy  right 2001      Pacemaker          MEDICATIONS  (STANDING):  cefepime   IVPB      cefepime   IVPB 2000 milliGRAM(s) IV Intermittent every 8 hours  enoxaparin Injectable 100 milliGRAM(s) SubCutaneous every 12 hours  influenza  Vaccine (HIGH DOSE) 0.7 milliLiter(s) IntraMuscular once  lactated ringers. 1000 milliLiter(s) (100 mL/Hr) IV Continuous <Continuous>  metroNIDAZOLE  IVPB 500 milliGRAM(s) IV Intermittent every 8 hours  vancomycin  IVPB      vancomycin  IVPB 1500 milliGRAM(s) IV Intermittent every 12 hours    MEDICATIONS  (PRN):  acetaminophen     Tablet .. 650 milliGRAM(s) Oral every 6 hours PRN Temp greater or equal to 38C (100.4F), Mild Pain (1 - 3)      Allergies    No Known Allergies    Intolerances        Social History:   Smoking: Yes [ ]  No [ ]   ______pk yrs  ETOH  Yes [ ]  No [ ]  Social [ ]  DRUGS:  Yes [ ]  No [ ]  if so what______________    FAMILY HISTORY:  Family history of heart disease (Father, Mother)    Family history of lung cancer (Sibling)        Physical Exam:   Vital Signs Last 24 Hrs  T(C): 36.7 (20 Oct 2023 08:00), Max: 39.8 (19 Oct 2023 22:45)  T(F): 98 (20 Oct 2023 08:00), Max: 103.7 (19 Oct 2023 22:45)  HR: 60 (20 Oct 2023 08:00) (60 - 74)  BP: 101/51 (20 Oct 2023 08:00) (101/51 - 155/70)  BP(mean): --  RR: 18 (20 Oct 2023 08:00) (18 - 18)  SpO2: 98% (20 Oct 2023 06:13) (98% - 99%)    Parameters below as of 20 Oct 2023 06:13  Patient On (Oxygen Delivery Method): room air        GENERAL: Resting comfortably in bed. NAD.  NEURO: Alert and oriented x3.  CARDIAC: Normal heart rate.  RESPIRATORY: Breathing comfortably on room air.  ABDOMEN: Soft, nontender.  EXTREMITIES: No peripheral edema.      Labs:                         9.5    11.98 )-----------( 224      ( 19 Oct 2023 23:15 )             30.4     10-19    139  |  104  |  18  ----------------------------<  129<H>  4.7   |  24  |  0.8    Ca    8.8      19 Oct 2023 23:15    TPro  6.6  /  Alb  4.3  /  TBili  0.7  /  DBili  x   /  AST  15  /  ALT  7   /  AlkPhos  82  10-19        Pertinent labs:                      9.5    11.98 )-----------( 224      ( 19 Oct 2023 23:15 )             30.4       10-19    139  |  104  |  18  ----------------------------<  129<H>  4.7   |  24  |  0.8    Ca    8.8      19 Oct 2023 23:15    TPro  6.6  /  Alb  4.3  /  TBili  0.7  /  DBili  x   /  AST  15  /  ALT  7   /  AlkPhos  82  10-19          Radiology & Additional Studies:     Radiology imaging reviewed.       ASSESSMENT AND PLAN:  77yoM Hx of CAD s/p CABG, SSS s/p PPM, AF s/p atrial appendage occlusive device (still on AC for first 45 days), presenting with fevers/chills. CT abd/p shows percutaneous cholecystostomy tube in appropriate position. No gallbladder distension. Trace pericholecystic fluid without gallbladder wall edema or significant inflammatory changes. On exam, patient is non-toxic appearing with negative Onofre's sign. IR consulted for evaluation of PCT.    - PCT flushes easily indicating patency. On aspiration, biliary content was seen  - No intervention from IR standpoint   - Consider surgical evaluation if warranted     Thank you for the courtesy of this consult, please call [phone] with any further questions.    INTERVENTIONAL RADIOLOGY CONSULT:     Procedure Requested: PCT evaluation     HPI:  77yoM Hx of CAD s/p CABG, SSS s/p PPM, AF s/p atrial appendage occlusive device (still on AC for first 45 days), presenting with fevers/chills. CT abd/p shows percutaneous cholecystostomy tube in appropriate position. No gallbladder distension. Trace pericholecystic fluid without gallbladder wall edema or significant inflammatory changes. On exam, patient is non-toxic appearing with negative Onofre's sign. IR consulted for evaluation of PCT.    PAST MEDICAL & SURGICAL HISTORY:  Arrhythmia      Hypercholesteremia      HTN (hypertension)      Obesity      CHF (congestive heart failure)      Heart attack      Breast cancer  2001      Osteoarthritis      CAD (coronary artery disease)      Atrial fibrillation      [place] (hard of hearing[place]      S/P CABG x 5  2014      H/O mastectomy  right 2001      Pacemaker          MEDICATIONS  (STANDING):  cefepime   IVPB      cefepime   IVPB 2000 milliGRAM(s) IV Intermittent every 8 hours  enoxaparin Injectable 100 milliGRAM(s) SubCutaneous every 12 hours  influenza  Vaccine (HIGH DOSE) 0.7 milliLiter(s) IntraMuscular once  lactated ringers. 1000 milliLiter(s) (100 mL/Hr) IV Continuous <Continuous>  metroNIDAZOLE  IVPB 500 milliGRAM(s) IV Intermittent every 8 hours  vancomycin  IVPB      vancomycin  IVPB 1500 milliGRAM(s) IV Intermittent every 12 hours    MEDICATIONS  (PRN):  acetaminophen     Tablet .. 650 milliGRAM(s) Oral every 6 hours PRN Temp greater or equal to 38C (100.4F), Mild Pain (1 - 3)      Allergies    No Known Allergies    Intolerances        Social History:   Smoking: Yes [ ]  No [ ]   ______pk yrs  ETOH  Yes [ ]  No [ ]  Social [ ]  DRUGS:  Yes [ ]  No [ ]  if so what______________    FAMILY HISTORY:  Family history of heart disease (Father, Mother)    Family history of lung cancer (Sibling)        Physical Exam:   Vital Signs Last 24 Hrs  T(C): 36.7 (20 Oct 2023 08:00), Max: 39.8 (19 Oct 2023 22:45)  T(F): 98 (20 Oct 2023 08:00), Max: 103.7 (19 Oct 2023 22:45)  HR: 60 (20 Oct 2023 08:00) (60 - 74)  BP: 101/51 (20 Oct 2023 08:00) (101/51 - 155/70)  BP(mean): --  RR: 18 (20 Oct 2023 08:00) (18 - 18)  SpO2: 98% (20 Oct 2023 06:13) (98% - 99%)    Parameters below as of 20 Oct 2023 06:13  Patient On (Oxygen Delivery Method): room air        GENERAL: Resting comfortably in bed. NAD.  NEURO: Alert and oriented x3.  CARDIAC: Normal heart rate.  RESPIRATORY: Breathing comfortably on room air.  ABDOMEN: Soft, nontender.  EXTREMITIES: No peripheral edema.      Labs:                         9.5    11.98 )-----------( 224      ( 19 Oct 2023 23:15 )             30.4     10-19    139  |  104  |  18  ----------------------------<  129<H>  4.7   |  24  |  0.8    Ca    8.8      19 Oct 2023 23:15    TPro  6.6  /  Alb  4.3  /  TBili  0.7  /  DBili  x   /  AST  15  /  ALT  7   /  AlkPhos  82  10-19        Pertinent labs:                      9.5    11.98 )-----------( 224      ( 19 Oct 2023 23:15 )             30.4       10-19    139  |  104  |  18  ----------------------------<  129<H>  4.7   |  24  |  0.8    Ca    8.8      19 Oct 2023 23:15    TPro  6.6  /  Alb  4.3  /  TBili  0.7  /  DBili  x   /  AST  15  /  ALT  7   /  AlkPhos  82  10-19          Radiology & Additional Studies:     Radiology imaging reviewed.       ASSESSMENT AND PLAN:  77yoM Hx of CAD s/p CABG, SSS s/p PPM, AF s/p atrial appendage occlusive device (still on AC for first 45 days), presenting with fevers/chills. CT abd/p shows percutaneous cholecystostomy tube in appropriate position. No gallbladder distension. Trace pericholecystic fluid without gallbladder wall edema or significant inflammatory changes. On exam, patient is non-toxic appearing with negative Onofre's sign. IR consulted for evaluation of PCT.    - PCT flushes easily indicating patency. On aspiration, biliary content was seen  - No intervention from IR standpoint   - Consider surgical evaluation if warranted     Thank you for the courtesy of this consult, please call [phone] with any further questions.    INTERVENTIONAL RADIOLOGY CONSULT:     Procedure Requested: PCT evaluation     HPI:  77yoM Hx of CAD s/p CABG, SSS s/p PPM, AF s/p atrial appendage occlusive device (still on AC for first 45 days), presenting with fevers/chills. CT abd/p shows percutaneous cholecystostomy tube in appropriate position. No gallbladder distension. Trace pericholecystic fluid without gallbladder wall edema or significant inflammatory changes. On exam, patient is non-toxic appearing with negative Onofre's sign. IR consulted for evaluation of PCT.    PAST MEDICAL & SURGICAL HISTORY:  Arrhythmia      Hypercholesteremia      HTN (hypertension)      Obesity      CHF (congestive heart failure)      Heart attack      Breast cancer  2001      Osteoarthritis      CAD (coronary artery disease)      Atrial fibrillation      [place] (hard of hearing)      S/P CABG x 5  2014      H/O mastectomy  right 2001      Pacemaker          MEDICATIONS  (STANDING):  cefepime   IVPB      cefepime   IVPB 2000 milliGRAM(s) IV Intermittent every 8 hours  enoxaparin Injectable 100 milliGRAM(s) SubCutaneous every 12 hours  influenza  Vaccine (HIGH DOSE) 0.7 milliLiter(s) IntraMuscular once  lactated ringers. 1000 milliLiter(s) (100 mL/Hr) IV Continuous <Continuous>  metroNIDAZOLE  IVPB 500 milliGRAM(s) IV Intermittent every 8 hours  vancomycin  IVPB      vancomycin  IVPB 1500 milliGRAM(s) IV Intermittent every 12 hours    MEDICATIONS  (PRN):  acetaminophen     Tablet .. 650 milliGRAM(s) Oral every 6 hours PRN Temp greater or equal to 38C (100.4F), Mild Pain (1 - 3)      Allergies    No Known Allergies    Intolerances        Social History:   Smoking: Yes [ ]  No [ ]   ______pk yrs  ETOH  Yes [ ]  No [ ]  Social [ ]  DRUGS:  Yes [ ]  No [ ]  if so what______________    FAMILY HISTORY:  Family history of heart disease (Father, Mother)    Family history of lung cancer (Sibling)        Physical Exam:   Vital Signs Last 24 Hrs  T(C): 36.7 (20 Oct 2023 08:00), Max: 39.8 (19 Oct 2023 22:45)  T(F): 98 (20 Oct 2023 08:00), Max: 103.7 (19 Oct 2023 22:45)  HR: 60 (20 Oct 2023 08:00) (60 - 74)  BP: 101/51 (20 Oct 2023 08:00) (101/51 - 155/70)  BP(mean): --  RR: 18 (20 Oct 2023 08:00) (18 - 18)  SpO2: 98% (20 Oct 2023 06:13) (98% - 99%)    Parameters below as of 20 Oct 2023 06:13  Patient On (Oxygen Delivery Method): room air        GENERAL: Resting comfortably in bed. NAD.  NEURO: Alert and oriented x3.  CARDIAC: Normal heart rate.  RESPIRATORY: Breathing comfortably on room air.  ABDOMEN: Soft, nontender.  EXTREMITIES: No peripheral edema.      Labs:                         9.5    11.98 )-----------( 224      ( 19 Oct 2023 23:15 )             30.4     10-19    139  |  104  |  18  ----------------------------<  129<H>  4.7   |  24  |  0.8    Ca    8.8      19 Oct 2023 23:15    TPro  6.6  /  Alb  4.3  /  TBili  0.7  /  DBili  x   /  AST  15  /  ALT  7   /  AlkPhos  82  10-19        Pertinent labs:                      9.5    11.98 )-----------( 224      ( 19 Oct 2023 23:15 )             30.4       10-19    139  |  104  |  18  ----------------------------<  129<H>  4.7   |  24  |  0.8    Ca    8.8      19 Oct 2023 23:15    TPro  6.6  /  Alb  4.3  /  TBili  0.7  /  DBili  x   /  AST  15  /  ALT  7   /  AlkPhos  82  10-19          Radiology & Additional Studies:     Radiology imaging reviewed.       ASSESSMENT AND PLAN:  77yoM Hx of CAD s/p CABG, SSS s/p PPM, AF s/p atrial appendage occlusive device (still on AC for first 45 days), presenting with fevers/chills. CT abd/p shows percutaneous cholecystostomy tube in appropriate position. No gallbladder distension. Trace pericholecystic fluid without gallbladder wall edema or significant inflammatory changes. On exam, patient is non-toxic appearing with negative Onofre's sign. IR consulted for evaluation of PCT.    - PCholeT flushes easily indicating patency. On aspiration, biliary content was seen  - No intervention from IR standpoint   - Consider surgical evaluation if warranted     Thank you for the courtesy of this consult, please call [phone] with any further questions.

## 2023-10-20 NOTE — ED ADULT NURSE NOTE - OBJECTIVE STATEMENT
BIBA form home with complaints of fever TMax 100.5. Patient with T-tube in place. Last dose tylenol 930pm. noted with right buttock redness - Dr. Reyes aware.  hx: right mastectomy, watchman placed in sept 2023.

## 2023-10-20 NOTE — H&P ADULT - NSHPLABSRESULTS_GEN_ALL_CORE
CT Abdomen and Pelvis w/ IV Cont (10.20.23 @ 01:13)  1. Percutaneous cholecystostomy tube with pigtail in the gallbladder lumen. Sightly increased pericholecystic fluid without gallbladder distention, nonspecific. Stones are noted in the cystic duct.  2. No bowel obstruction, pneumatosis, pneumoperitoneum, or ascites.

## 2023-10-20 NOTE — H&P ADULT - NSICDXPASTSURGICALHX_GEN_ALL_CORE_FT
PAST SURGICAL HISTORY:  H/O mastectomy right 2001    Presence of biventricular cardiac pacemaker     S/P CABG x 5 2014

## 2023-10-20 NOTE — ED ADULT NURSE NOTE - NSICDXPASTMEDICALHX_GEN_ALL_CORE_FT
PAST MEDICAL HISTORY:  Arrhythmia     Atrial fibrillation     Breast cancer 2001    CAD (coronary artery disease)     CHF (congestive heart failure)     Heart attack     [place] (hard of hearing)     HTN (hypertension)     Hypercholesteremia     Obesity     Osteoarthritis

## 2023-10-21 LAB
ALBUMIN SERPL ELPH-MCNC: 3.3 G/DL — LOW (ref 3.5–5.2)
ALBUMIN SERPL ELPH-MCNC: 3.3 G/DL — LOW (ref 3.5–5.2)
ALP SERPL-CCNC: 66 U/L — SIGNIFICANT CHANGE UP (ref 30–115)
ALP SERPL-CCNC: 66 U/L — SIGNIFICANT CHANGE UP (ref 30–115)
ALT FLD-CCNC: 12 U/L — SIGNIFICANT CHANGE UP (ref 0–41)
ALT FLD-CCNC: 12 U/L — SIGNIFICANT CHANGE UP (ref 0–41)
ANION GAP SERPL CALC-SCNC: 9 MMOL/L — SIGNIFICANT CHANGE UP (ref 7–14)
ANION GAP SERPL CALC-SCNC: 9 MMOL/L — SIGNIFICANT CHANGE UP (ref 7–14)
APTT BLD: 33.6 SEC — SIGNIFICANT CHANGE UP (ref 27–39.2)
APTT BLD: 33.6 SEC — SIGNIFICANT CHANGE UP (ref 27–39.2)
AST SERPL-CCNC: 20 U/L — SIGNIFICANT CHANGE UP (ref 0–41)
AST SERPL-CCNC: 20 U/L — SIGNIFICANT CHANGE UP (ref 0–41)
BASOPHILS # BLD AUTO: 0.06 K/UL — SIGNIFICANT CHANGE UP (ref 0–0.2)
BASOPHILS # BLD AUTO: 0.06 K/UL — SIGNIFICANT CHANGE UP (ref 0–0.2)
BASOPHILS NFR BLD AUTO: 0.8 % — SIGNIFICANT CHANGE UP (ref 0–1)
BASOPHILS NFR BLD AUTO: 0.8 % — SIGNIFICANT CHANGE UP (ref 0–1)
BILIRUB SERPL-MCNC: 0.9 MG/DL — SIGNIFICANT CHANGE UP (ref 0.2–1.2)
BILIRUB SERPL-MCNC: 0.9 MG/DL — SIGNIFICANT CHANGE UP (ref 0.2–1.2)
BLD GP AB SCN SERPL QL: SIGNIFICANT CHANGE UP
BLD GP AB SCN SERPL QL: SIGNIFICANT CHANGE UP
BUN SERPL-MCNC: 14 MG/DL — SIGNIFICANT CHANGE UP (ref 10–20)
BUN SERPL-MCNC: 14 MG/DL — SIGNIFICANT CHANGE UP (ref 10–20)
CALCIUM SERPL-MCNC: 8.3 MG/DL — LOW (ref 8.4–10.4)
CALCIUM SERPL-MCNC: 8.3 MG/DL — LOW (ref 8.4–10.4)
CHLORIDE SERPL-SCNC: 104 MMOL/L — SIGNIFICANT CHANGE UP (ref 98–110)
CHLORIDE SERPL-SCNC: 104 MMOL/L — SIGNIFICANT CHANGE UP (ref 98–110)
CO2 SERPL-SCNC: 23 MMOL/L — SIGNIFICANT CHANGE UP (ref 17–32)
CO2 SERPL-SCNC: 23 MMOL/L — SIGNIFICANT CHANGE UP (ref 17–32)
CREAT SERPL-MCNC: 0.7 MG/DL — SIGNIFICANT CHANGE UP (ref 0.7–1.5)
CREAT SERPL-MCNC: 0.7 MG/DL — SIGNIFICANT CHANGE UP (ref 0.7–1.5)
CULTURE RESULTS: NO GROWTH — SIGNIFICANT CHANGE UP
CULTURE RESULTS: NO GROWTH — SIGNIFICANT CHANGE UP
EGFR: 94 ML/MIN/1.73M2 — SIGNIFICANT CHANGE UP
EGFR: 94 ML/MIN/1.73M2 — SIGNIFICANT CHANGE UP
EOSINOPHIL # BLD AUTO: 0.07 K/UL — SIGNIFICANT CHANGE UP (ref 0–0.7)
EOSINOPHIL # BLD AUTO: 0.07 K/UL — SIGNIFICANT CHANGE UP (ref 0–0.7)
EOSINOPHIL NFR BLD AUTO: 0.9 % — SIGNIFICANT CHANGE UP (ref 0–8)
EOSINOPHIL NFR BLD AUTO: 0.9 % — SIGNIFICANT CHANGE UP (ref 0–8)
GLUCOSE SERPL-MCNC: 92 MG/DL — SIGNIFICANT CHANGE UP (ref 70–99)
GLUCOSE SERPL-MCNC: 92 MG/DL — SIGNIFICANT CHANGE UP (ref 70–99)
HCT VFR BLD CALC: 29.1 % — LOW (ref 42–52)
HCT VFR BLD CALC: 29.1 % — LOW (ref 42–52)
HGB BLD-MCNC: 8.9 G/DL — LOW (ref 14–18)
HGB BLD-MCNC: 8.9 G/DL — LOW (ref 14–18)
IMM GRANULOCYTES NFR BLD AUTO: 0.4 % — HIGH (ref 0.1–0.3)
IMM GRANULOCYTES NFR BLD AUTO: 0.4 % — HIGH (ref 0.1–0.3)
INR BLD: 1.49 RATIO — HIGH (ref 0.65–1.3)
INR BLD: 1.49 RATIO — HIGH (ref 0.65–1.3)
LACTATE SERPL-SCNC: 1 MMOL/L — SIGNIFICANT CHANGE UP (ref 0.7–2)
LYMPHOCYTES # BLD AUTO: 0.71 K/UL — LOW (ref 1.2–3.4)
LYMPHOCYTES # BLD AUTO: 0.71 K/UL — LOW (ref 1.2–3.4)
LYMPHOCYTES # BLD AUTO: 9.6 % — LOW (ref 20.5–51.1)
LYMPHOCYTES # BLD AUTO: 9.6 % — LOW (ref 20.5–51.1)
MAGNESIUM SERPL-MCNC: 2.2 MG/DL — SIGNIFICANT CHANGE UP (ref 1.8–2.4)
MAGNESIUM SERPL-MCNC: 2.2 MG/DL — SIGNIFICANT CHANGE UP (ref 1.8–2.4)
MCHC RBC-ENTMCNC: 23.6 PG — LOW (ref 27–31)
MCHC RBC-ENTMCNC: 23.6 PG — LOW (ref 27–31)
MCHC RBC-ENTMCNC: 30.6 G/DL — LOW (ref 32–37)
MCHC RBC-ENTMCNC: 30.6 G/DL — LOW (ref 32–37)
MCV RBC AUTO: 77.2 FL — LOW (ref 80–94)
MCV RBC AUTO: 77.2 FL — LOW (ref 80–94)
MONOCYTES # BLD AUTO: 0.55 K/UL — SIGNIFICANT CHANGE UP (ref 0.1–0.6)
MONOCYTES # BLD AUTO: 0.55 K/UL — SIGNIFICANT CHANGE UP (ref 0.1–0.6)
MONOCYTES NFR BLD AUTO: 7.4 % — SIGNIFICANT CHANGE UP (ref 1.7–9.3)
MONOCYTES NFR BLD AUTO: 7.4 % — SIGNIFICANT CHANGE UP (ref 1.7–9.3)
NEUTROPHILS # BLD AUTO: 6.01 K/UL — SIGNIFICANT CHANGE UP (ref 1.4–6.5)
NEUTROPHILS # BLD AUTO: 6.01 K/UL — SIGNIFICANT CHANGE UP (ref 1.4–6.5)
NEUTROPHILS NFR BLD AUTO: 80.9 % — HIGH (ref 42.2–75.2)
NEUTROPHILS NFR BLD AUTO: 80.9 % — HIGH (ref 42.2–75.2)
NRBC # BLD: 0 /100 WBCS — SIGNIFICANT CHANGE UP (ref 0–0)
NRBC # BLD: 0 /100 WBCS — SIGNIFICANT CHANGE UP (ref 0–0)
PHOSPHATE SERPL-MCNC: 3.1 MG/DL — SIGNIFICANT CHANGE UP (ref 2.1–4.9)
PHOSPHATE SERPL-MCNC: 3.1 MG/DL — SIGNIFICANT CHANGE UP (ref 2.1–4.9)
PLATELET # BLD AUTO: 163 K/UL — SIGNIFICANT CHANGE UP (ref 130–400)
PLATELET # BLD AUTO: 163 K/UL — SIGNIFICANT CHANGE UP (ref 130–400)
PMV BLD: 11.1 FL — HIGH (ref 7.4–10.4)
PMV BLD: 11.1 FL — HIGH (ref 7.4–10.4)
POTASSIUM SERPL-MCNC: 4.2 MMOL/L — SIGNIFICANT CHANGE UP (ref 3.5–5)
POTASSIUM SERPL-MCNC: 4.2 MMOL/L — SIGNIFICANT CHANGE UP (ref 3.5–5)
POTASSIUM SERPL-SCNC: 4.2 MMOL/L — SIGNIFICANT CHANGE UP (ref 3.5–5)
POTASSIUM SERPL-SCNC: 4.2 MMOL/L — SIGNIFICANT CHANGE UP (ref 3.5–5)
PROT SERPL-MCNC: 5.6 G/DL — LOW (ref 6–8)
PROT SERPL-MCNC: 5.6 G/DL — LOW (ref 6–8)
PROTHROM AB SERPL-ACNC: 17.2 SEC — HIGH (ref 9.95–12.87)
PROTHROM AB SERPL-ACNC: 17.2 SEC — HIGH (ref 9.95–12.87)
RBC # BLD: 3.77 M/UL — LOW (ref 4.7–6.1)
RBC # BLD: 3.77 M/UL — LOW (ref 4.7–6.1)
RBC # FLD: 20.9 % — HIGH (ref 11.5–14.5)
RBC # FLD: 20.9 % — HIGH (ref 11.5–14.5)
SODIUM SERPL-SCNC: 136 MMOL/L — SIGNIFICANT CHANGE UP (ref 135–146)
SODIUM SERPL-SCNC: 136 MMOL/L — SIGNIFICANT CHANGE UP (ref 135–146)
SPECIMEN SOURCE: SIGNIFICANT CHANGE UP
SPECIMEN SOURCE: SIGNIFICANT CHANGE UP
VANCOMYCIN TROUGH SERPL-MCNC: 16.4 UG/ML — HIGH (ref 5–10)
VANCOMYCIN TROUGH SERPL-MCNC: 16.4 UG/ML — HIGH (ref 5–10)
WBC # BLD: 7.43 K/UL — SIGNIFICANT CHANGE UP (ref 4.8–10.8)
WBC # BLD: 7.43 K/UL — SIGNIFICANT CHANGE UP (ref 4.8–10.8)
WBC # FLD AUTO: 7.43 K/UL — SIGNIFICANT CHANGE UP (ref 4.8–10.8)
WBC # FLD AUTO: 7.43 K/UL — SIGNIFICANT CHANGE UP (ref 4.8–10.8)

## 2023-10-21 PROCEDURE — 99233 SBSQ HOSP IP/OBS HIGH 50: CPT

## 2023-10-21 RX ORDER — FUROSEMIDE 40 MG
20 TABLET ORAL
Refills: 0 | Status: DISCONTINUED | OUTPATIENT
Start: 2023-10-21 | End: 2023-10-23

## 2023-10-21 RX ORDER — ACETAMINOPHEN 500 MG
1000 TABLET ORAL ONCE
Refills: 0 | Status: COMPLETED | OUTPATIENT
Start: 2023-10-21 | End: 2023-10-21

## 2023-10-21 RX ADMIN — Medication 650 MILLIGRAM(S): at 00:00

## 2023-10-21 RX ADMIN — RIVAROXABAN 15 MILLIGRAM(S): KIT at 17:28

## 2023-10-21 RX ADMIN — ATORVASTATIN CALCIUM 20 MILLIGRAM(S): 80 TABLET, FILM COATED ORAL at 21:09

## 2023-10-21 RX ADMIN — Medication 81 MILLIGRAM(S): at 13:32

## 2023-10-21 RX ADMIN — Medication 1000 MILLIGRAM(S): at 21:00

## 2023-10-21 RX ADMIN — CHLORHEXIDINE GLUCONATE 1 APPLICATION(S): 213 SOLUTION TOPICAL at 05:43

## 2023-10-21 RX ADMIN — Medication 20 MILLIGRAM(S): at 13:32

## 2023-10-21 RX ADMIN — CEFEPIME 100 MILLIGRAM(S): 1 INJECTION, POWDER, FOR SOLUTION INTRAMUSCULAR; INTRAVENOUS at 05:43

## 2023-10-21 RX ADMIN — Medication 650 MILLIGRAM(S): at 18:12

## 2023-10-21 RX ADMIN — Medication 100 MILLIGRAM(S): at 13:32

## 2023-10-21 RX ADMIN — Medication 400 MILLIGRAM(S): at 20:46

## 2023-10-21 RX ADMIN — CEFEPIME 100 MILLIGRAM(S): 1 INJECTION, POWDER, FOR SOLUTION INTRAMUSCULAR; INTRAVENOUS at 21:09

## 2023-10-21 RX ADMIN — CEFEPIME 100 MILLIGRAM(S): 1 INJECTION, POWDER, FOR SOLUTION INTRAMUSCULAR; INTRAVENOUS at 13:32

## 2023-10-21 RX ADMIN — Medication 100 MILLIGRAM(S): at 05:43

## 2023-10-21 RX ADMIN — Medication 100 MILLIGRAM(S): at 21:09

## 2023-10-21 RX ADMIN — Medication 50 MILLIGRAM(S): at 17:28

## 2023-10-21 NOTE — PROGRESS NOTE ADULT - SUBJECTIVE AND OBJECTIVE BOX
COLTON, MAX  77y  [facility] 4B (Back) 017 B      Patient is a 77y old  Male who presents with a chief complaint of fever (21 Oct 2023 01:51)      INTERVAL HPI/OVERNIGHT EVENTS:        REVIEW OF SYSTEMS:        FAMILY HISTORY:  Family history of heart disease (Father, Mother)    Family history of lung cancer (Sibling)      T(C): 36.4 (10-21-23 @ 07:49), Max: 37.8 (10-20-23 @ 23:20)  HR: 63 (10-21-23 @ 07:49) (60 - 69)  BP: 140/61 (10-21-23 @ 07:49) (108/56 - 155/70)  RR: 18 (10-21-23 @ 07:49) (18 - 18)  SpO2: 99% (10-20-23 @ 12:45) (99% - 99%)  Wt(kg): --Vital Signs Last 24 Hrs  T(C): 36.4 (21 Oct 2023 07:49), Max: 37.8 (20 Oct 2023 23:20)  T(F): 97.6 (21 Oct 2023 07:49), Max: 100.1 (20 Oct 2023 23:20)  HR: 63 (21 Oct 2023 07:49) (60 - 69)  BP: 140/61 (21 Oct 2023 07:49) (108/56 - 155/70)  BP(mean): 98 (20 Oct 2023 12:45) (98 - 98)  RR: 18 (21 Oct 2023 07:49) (18 - 18)  SpO2: 99% (20 Oct 2023 12:45) (99% - 99%)    Parameters below as of 20 Oct 2023 12:45  Patient On (Oxygen Delivery Method): room air        PHYSICAL EXAM:  GENERAL: NAD, well-groomed, well-developed  HEAD:  Atraumatic, Normocephalic  EYES: EOMI, PERRLA, conjunctiva and sclera clear  ENMT: No tonsillar erythema, exudates, or enlargement; Moist mucous membranes, Good dentition, No lesions  NECK: Supple, No JVD, Normal thyroid  NERVOUS SYSTEM:  Alert & Oriented X3, Good concentration; Motor Strength 5/5 B/L upper and lower extremities; DTRs 2+ intact and symmetric  PULM: Clear to auscultation bilaterally  CARDIAC: Regular rate and rhythm; No murmurs, rubs, or gallops  GI: Soft, Nontender, Nondistended; Bowel sounds present  EXTREMITIES:  2+ Peripheral Pulses, No clubbing, cyanosis, or edema  LYMPH: No lymphadenopathy noted  SKIN: No rashes or lesions    Consultant(s) Notes Reviewed:  [x ] YES  [ ] NO  Care Discussed with Consultants/Other Providers [ x] YES  [ ] NO    LABS:                            8.9    7.43  )-----------( 163      ( 21 Oct 2023 07:42 )             29.1   10-21    136  |  104  |  14  ----------------------------<  92  4.2   |  23  |  0.7    Ca    8.3<L>      21 Oct 2023 07:42  Phos  3.1     10-21  Mg     2.2     10-21    TPro  5.6<L>  /  Alb  3.3<L>  /  TBili  0.9  /  DBili  x   /  AST  20  /  ALT  12  /  AlkPhos  66  10-21            Culture - Urine (collected 20 Oct 2023 03:20)  Source: Clean Catch Clean Catch (Midstream)  Final Report (21 Oct 2023 09:04):    No growth    Culture - Blood (collected 19 Oct 2023 23:15)  Source: .Blood Blood-Peripheral  Preliminary Report (21 Oct 2023 09:01):    No growth at 24 hours    Culture - Blood (collected 19 Oct 2023 23:15)  Source: .Blood Blood-Peripheral  Preliminary Report (21 Oct 2023 09:01):    No growth at 24 hours      acetaminophen     Tablet .. 650 milliGRAM(s) Oral every 6 hours PRN  aspirin enteric coated 81 milliGRAM(s) Oral daily  atorvastatin 20 milliGRAM(s) Oral at bedtime  cefepime   IVPB 2000 milliGRAM(s) IV Intermittent every 8 hours  cefepime   IVPB      chlorhexidine 2% Cloths 1 Application(s) Topical <User Schedule>  influenza  Vaccine (HIGH DOSE) 0.7 milliLiter(s) IntraMuscular once  metoprolol tartrate 50 milliGRAM(s) Oral two times a day  metroNIDAZOLE  IVPB 500 milliGRAM(s) IV Intermittent every 8 hours  rivaroxaban 15 milliGRAM(s) Oral with dinner  vancomycin  IVPB      vancomycin  IVPB 1500 milliGRAM(s) IV Intermittent every 12 hours    Patient is a 77y Male  with PMHx of CAD S/P CABG, SSS S/P DC PPM, Persistent AF with prior  bleeding, history of cholecystitis s/p percutaneous cholecystostomy tube placement, recent left atrial appendage occlusive device on xarelto for 45 days, presenting to our hospital for fever.       1. Sepsis (fever, tachycardia) unclear focus. intra-abdominal infection ?   * pt and wife reported decrease in drainaing of cholecystostomy tube. no clear date for surgical intervention   - Admit to medicine       -LA elevated resolving   - Cont broad spectrum abs . add flagyl   - blood cx:pending   - Cont xarelto as no plan for intervention   - Already on a diet for now   - Cont LR at 100 ml/hr   - IR consult appreciated   - Gen surgery consult:pending  - ID consult:pending    2. hx of left atrial appendage occlusive device  -Cont xarelto as there is no surgcial plan     3. Persistent Afib . hx of SSS s/p DC PPM   - Cont home meds    4. hx of CAD s/p CABG   - Cont home meds    5. DVT/GI px.    COLTON, MAX  77y  [facility] 4[facility] (Back) 017 B      Patient is a 77y old  Male who presents with a chief complaint of fever (21 Oct 2023 01:51)      INTERVAL HPI/OVERNIGHT EVENTS:        REVIEW OF SYSTEMS:        FAMILY HISTORY:  Family history of heart disease (Father, Mother)    Family history of lung cancer (Sibling)      T(C): 36.4 (10-21-23 @ 07:49), Max: 37.8 (10-20-23 @ 23:20)  HR: 63 (10-21-23 @ 07:49) (60 - 69)  BP: 140/61 (10-21-23 @ 07:49) (108/56 - 155/70)  RR: 18 (10-21-23 @ 07:49) (18 - 18)  SpO2: 99% (10-20-23 @ 12:45) (99% - 99%)  Wt(kg): --Vital Signs Last 24 Hrs  T(C): 36.4 (21 Oct 2023 07:49), Max: 37.8 (20 Oct 2023 23:20)  T(F): 97.6 (21 Oct 2023 07:49), Max: 100.1 (20 Oct 2023 23:20)  HR: 63 (21 Oct 2023 07:49) (60 - 69)  BP: 140/61 (21 Oct 2023 07:49) (108/56 - 155/70)  BP(mean): 98 (20 Oct 2023 12:45) (98 - 98)  RR: 18 (21 Oct 2023 07:49) (18 - 18)  SpO2: 99% (20 Oct 2023 12:45) (99% - 99%)    Parameters below as of 20 Oct 2023 12:45  Patient On (Oxygen Delivery Method): room air        PHYSICAL EXAM:  GENERAL: NAD, well-groomed, well-developed  HEAD:  Atraumatic, Normocephalic  EYES: EOMI, PERRLA, conjunctiva and sclera clear  ENMT: No tonsillar erythema, exudates, or enlargement; Moist mucous membranes, Good dentition, No lesions  NECK: Supple, No JVD, Normal thyroid  NERVOUS SYSTEM:  Alert & Oriented X3, Good concentration; Motor Strength 5/5 B/L upper and lower extremities; DTRs 2+ intact and symmetric  PULM: Clear to auscultation bilaterally  CARDIAC: Regular rate and rhythm; No murmurs, rubs, or gallops  GI: Soft, Nontender, Nondistended; Bowel sounds present  EXTREMITIES:  2+ Peripheral Pulses, No clubbing, cyanosis, or edema  LYMPH: No lymphadenopathy noted  SKIN: No rashes or lesions    Consultant(s) Notes Reviewed:  [x ] YES  [ ] NO  Care Discussed with Consultants/Other Providers [ x] YES  [ ] NO    LABS:                            8.9    7.43  )-----------( 163      ( 21 Oct 2023 07:42 )             29.1   10-21    136  |  104  |  14  ----------------------------<  92  4.2   |  23  |  0.7    Ca    8.3<L>      21 Oct 2023 07:42  Phos  3.1     10-21  Mg     2.2     10-21    TPro  5.6<L>  /  Alb  3.3<L>  /  TBili  0.9  /  DBili  x   /  AST  20  /  ALT  12  /  AlkPhos  66  10-21            Culture - Urine (collected 20 Oct 2023 03:20)  Source: Clean Catch Clean Catch (Midstream)  Final Report (21 Oct 2023 09:04):    No growth    Culture - Blood (collected 19 Oct 2023 23:15)  Source: .Blood Blood-Peripheral  Preliminary Report (21 Oct 2023 09:01):    No growth at 24 hours    Culture - Blood (collected 19 Oct 2023 23:15)  Source: .Blood Blood-Peripheral  Preliminary Report (21 Oct 2023 09:01):    No growth at 24 hours      acetaminophen     Tablet .. 650 milliGRAM(s) Oral every 6 hours PRN  aspirin enteric coated 81 milliGRAM(s) Oral daily  atorvastatin 20 milliGRAM(s) Oral at bedtime  cefepime   IVPB 2000 milliGRAM(s) IV Intermittent every 8 hours  cefepime   IVPB      chlorhexidine 2% Cloths 1 Application(s) Topical <User Schedule>  influenza  Vaccine (HIGH DOSE) 0.7 milliLiter(s) IntraMuscular once  metoprolol tartrate 50 milliGRAM(s) Oral two times a day  metroNIDAZOLE  IVPB 500 milliGRAM(s) IV Intermittent every 8 hours  rivaroxaban 15 milliGRAM(s) Oral with dinner  vancomycin  IVPB      vancomycin  IVPB 1500 milliGRAM(s) IV Intermittent every 12 hours    Patient is a 77y Male  with PMHx of CAD S/P CABG, SSS S/P DC PPM, Persistent AF with prior  bleeding, history of cholecystitis s/p percutaneous cholecystostomy tube placement, recent left atrial appendage occlusive device on xarelto for 45 days, presenting to our hospital for fever.       1. Sepsis (fever, tachycardia) unclear focus. intra-abdominal infection ?   * pt and wife reported decrease in drainaing of cholecystostomy tube. no clear date for surgical intervention   - Admit to medicine       -LA elevated resolving   - Cont cefepime and flagyl   - blood cx:NTD   - Cont xarelto as no plan for intervention   - Already on a diet for now   - Cont LR at 100 ml/hr   - IR consult appreciated   - Gen surgery consult appreciated  - ID consult:pending    2. hx of left atrial appendage occlusive device  -Cont xarelto as there is no surgcial plan     3. Persistent Afib . hx of SSS s/p DC PPM   - Cont home meds    4. hx of CAD s/p CABG   - Cont home meds    5. DVT/GI px.    COLTON, MAX  77y  [facility]      Patient is a 77y old  Male who presents with a chief complaint of fever (21 Oct 2023 01:51)      INTERVAL HPI/OVERNIGHT EVENTS:  Patient feels significantly better   he reported that cath was not draining well until IR flushed it yest.   discuss with his wife plan of care  no other events noted         FAMILY HISTORY:  Family history of heart disease (Father, Mother)    Family history of lung cancer (Sibling)      T(C): 36.4 (10-21-23 @ 07:49), Max: 37.8 (10-20-23 @ 23:20)  HR: 63 (10-21-23 @ 07:49) (60 - 69)  BP: 140/61 (10-21-23 @ 07:49) (108/56 - 155/70)  RR: 18 (10-21-23 @ 07:49) (18 - 18)  SpO2: 99% (10-20-23 @ 12:45) (99% - 99%)  Wt(kg): --Vital Signs Last 24 Hrs  T(C): 36.4 (21 Oct 2023 07:49), Max: 37.8 (20 Oct 2023 23:20)  T(F): 97.6 (21 Oct 2023 07:49), Max: 100.1 (20 Oct 2023 23:20)  HR: 63 (21 Oct 2023 07:49) (60 - 69)  BP: 140/61 (21 Oct 2023 07:49) (108/56 - 155/70)  BP(mean): 98 (20 Oct 2023 12:45) (98 - 98)  RR: 18 (21 Oct 2023 07:49) (18 - 18)  SpO2: 99% (20 Oct 2023 12:45) (99% - 99%)    Parameters below as of 20 Oct 2023 12:45  Patient On (Oxygen Delivery Method): room air        PHYSICAL EXAM:  GENERAL: NAD, well-groomed, well-developed  NERVOUS SYSTEM:  Alert & Oriented X3,   PULM: Clear to auscultation bilaterally  CARDIAC: Regular rate and rhythm;  GI: Soft, Nontender, distended; Bowel sounds present  EXTREMITIES:  2+ Peripheral Pulses,    Consultant(s) Notes Reviewed:  [x ] YES  [ ] NO  Care Discussed with Consultants/Other Providers [ x] YES  [ ] NO    LABS:                            8.9    7.43  )-----------( 163      ( 21 Oct 2023 07:42 )             29.1   10-21    136  |  104  |  14  ----------------------------<  92  4.2   |  23  |  0.7    Ca    8.3<L>      21 Oct 2023 07:42  Phos  3.1     10-21  Mg     2.2     10-21    TPro  5.6<L>  /  Alb  3.3<L>  /  TBili  0.9  /  DBili  x   /  AST  20  /  ALT  12  /  AlkPhos  66  10-21            Culture - Urine (collected 20 Oct 2023 03:20)  Source: Clean Catch Clean Catch (Midstream)  Final Report (21 Oct 2023 09:04):    No growth    Culture - Blood (collected 19 Oct 2023 23:15)  Source: .Blood Blood-Peripheral  Preliminary Report (21 Oct 2023 09:01):    No growth at 24 hours    Culture - Blood (collected 19 Oct 2023 23:15)  Source: .Blood Blood-Peripheral  Preliminary Report (21 Oct 2023 09:01):    No growth at 24 hours      acetaminophen     Tablet .. 650 milliGRAM(s) Oral every 6 hours PRN  aspirin enteric coated 81 milliGRAM(s) Oral daily  atorvastatin 20 milliGRAM(s) Oral at bedtime  cefepime   IVPB 2000 milliGRAM(s) IV Intermittent every 8 hours  cefepime   IVPB      chlorhexidine 2% Cloths 1 Application(s) Topical <User Schedule>  influenza  Vaccine (HIGH DOSE) 0.7 milliLiter(s) IntraMuscular once  metoprolol tartrate 50 milliGRAM(s) Oral two times a day  metroNIDAZOLE  IVPB 500 milliGRAM(s) IV Intermittent every 8 hours  rivaroxaban 15 milliGRAM(s) Oral with dinner  vancomycin  IVPB      vancomycin  IVPB 1500 milliGRAM(s) IV Intermittent every 12 hours    Patient is a 77y Male  with PMHx of CAD S/P CABG, SSS S/P DC PPM, Persistent AF with prior  bleeding, history of cholecystitis s/p percutaneous cholecystostomy tube placement, recent left atrial appendage occlusive device on xarelto for 45 days, presenting to our hospital for fever.       1. Sepsis (fever, tachycardia) unclear focus. intra-abdominal infection ? resolving   * pt and wife reported decrease in drainaing of cholecystostomy tube. no clear date for surgical intervention   * drain output improve after Flushing it by IR   - Admit to medicine       -LA elevated resolving   - Cont cefepime and flagyl   - blood cx:NTD   - Cont xarelto as no plan for intervention   * pt will need at least xarelto for 45 days followed by DAPT. doubt will have any plan for elective surgery any time soon  - Was on LR at 100 ml/hr   - IR consult appreciated   - Gen surgery consult appreciated  - ID consult:pending    2. hx of left atrial appendage occlusive device  -Cont xarelto as there is no surgcial plan     3. Persistent Afib . hx of SSS s/p DC PPM   - Cont home meds    4. hx of CAD s/p CABG/CHF  - Re-start lasix 20mg po bid 10/21/2023    - Cont home meds    5. DVT/GI px.     Sepsis unclear focus. might be due to intra-abdominal infection in the setting of slow drainage that was fixed after flush by IR as per patient. ID consult:pending. cont abs for now  watch for fever   hx of CHF/Afib . cont home meds

## 2023-10-21 NOTE — PROGRESS NOTE ADULT - SUBJECTIVE AND OBJECTIVE BOX
GENERAL SURGERY PROGRESS NOTE     WAGNER SEGURA  77y  Male  Hospital day :2d    T(F): 97 (10-21-23 @ 00:00), Max: 101.4 (10-20-23 @ 03:37)  HR: 60 (10-21-23 @ 00:00) (60 - 74)  BP: 113/58 (10-21-23 @ 00:00) (101/51 - 155/70)  RR: 18 (10-21-23 @ 00:00) (18 - 18)  SpO2: 99% (10-20-23 @ 12:45) (98% - 99%)     AC/ proph: aspirin enteric coated 81 milliGRAM(s) Oral daily  rivaroxaban 15 milliGRAM(s) Oral with dinner    ABx: cefepime   IVPB      cefepime   IVPB 2000 milliGRAM(s) IV Intermittent every 8 hours  metroNIDAZOLE  IVPB 500 milliGRAM(s) IV Intermittent every 8 hours  vancomycin  IVPB      vancomycin  IVPB 1500 milliGRAM(s) IV Intermittent every 12 hours      PHYSICAL EXAM:  General: NAD, AAOx3, calm and cooperative  HEENT: NCAT, EOMI  Cardiac: RRR  Respiratory: CTAB  Abdomen: Soft, non-distended, non-tender, no rebound, no guarding. +BS.  Musculoskeletal:  ROM intact, some b/l hand tremors, no asterixis  Neuro: Sensation grossly intact and equal throughout, no focal deficits  Vascular: Pulses 2+ throughout, extremities well perfused  Skin: Warm/dry, normal color, no jaundice      LABS  Labs:  CAPILLARY BLOOD GLUCOSE                              8.5    9.75  )-----------( 161      ( 20 Oct 2023 20:07 )             27.8       Auto Neutrophil %: 88.3 % (10-20-23 @ 20:07)  Auto Immature Granulocyte %: 0.4 % (10-20-23 @ 20:07)    10-20    133<L>  |  101  |  18  ----------------------------<  98  4.4   |  23  |  0.8      Calcium: 8.5 mg/dL (10-20-23 @ 20:07)      LFTs:             5.5  | 1.0  | 20       ------------------[64      ( 20 Oct 2023 20:07 )  3.5  | x    | 11          Lipase:x      Amylase:x         Lactate, Blood: 1.4 mmol/L (10-20-23 @ 20:07)  Lactate, Blood: 1.9 mmol/L (10-20-23 @ 02:15)  Lactate, Blood: 2.5 mmol/L (10-19-23 @ 23:15)      Coags:    CARDIAC MARKERS ( 19 Oct 2023 23:15 )  x     / <0.01 ng/mL / x     / x     / x              Urinalysis Basic - ( 20 Oct 2023 20:07 )    Color: x / Appearance: x / SG: x / pH: x  Gluc: 98 mg/dL / Ketone: x  / Bili: x / Urobili: x   Blood: x / Protein: x / Nitrite: x   Leuk Esterase: x / RBC: x / WBC x   Sq Epi: x / Non Sq Epi: x / Bacteria: x            RADIOLOGY & ADDITIONAL TESTS:  < from: CT Abdomen and Pelvis w/ IV Cont (10.20.23 @ 01:13) >  IMPRESSION:  Percutaneous cholecystostomy tube with pigtail in the gallbladder lumen.   Sightly increased pericholecystic fluid without gallbladder distention,   nonspecific. Stones are noted in the cystic duct.  No bowel obstruction, pneumatosis, pneumoperitoneum, or ascites.

## 2023-10-21 NOTE — PROGRESS NOTE ADULT - ASSESSMENT
77M w/ h/o CAD (s/p CABG), HFimpEF, chronic afib (on Xarelto), SSS (s/p CRT-P), HTN, and DLD p/w fever and chills. Admitted to medicine for cryptogenic sepsis.    # Cryptogenic Sepsis  # Hx Cholecystostomy   - Sepsis present on admission   - Flu, COVID, RSV negative  - UA and CXR unremarkable  - IR consulted for cholecystostomy drain evaluation, but drain working well so no acute intervention offered.  - c/w vancomycin 1500mg IV Q12H (adjust via trough)  - c/w cefepime 2g IV Q8H  - c/w Flagyl 500mg IV Q8H  - f/u BCx and UCx  - f/u TTE (ordered to eval for endocarditis after recent LAAO device placement)  - surgery noted no intervention at this time   - f/u ID consult    # Chronic Atrial Fibrillation  # Sick Sinus Syndrome  - c/w rivaroxaban 15mg PO QHS (do NOT switch to Lovenox/heparin unless absolutely necessary due to bleeding risk)  - c/w ASA EC 81mg PO QD (stroke ppx after WATCHMAN placement; must NOT be stopped unless discussed w/ EP)  - c/w Lopressor 50mg PO BID    # Chronic HFimpEF  - c/w Lopressor 50mg PO BID  - hold Lasix for now i/s/o sepsis (received fluids earlier)  - restart Lasix if pt develops volume overload  - f/u cardiology outpatient to consider GDMT optimization (Toprol XL, ACEi/ARB/ARNI, SGLT2i, etc)    # Coronary Artery Disease  # Hypertension  # Dyslipidemia  # S/P CABGx5 (2014).   - c/w ASA EC 81mg PO QD  - c/w atorvastatin 20mg PO QHS  - c/w Lopressor 50mg PO BID  - Follows w/ Dr. Handy      # DVT prophylaxis: RIvaroxaban  # GI prophylaxis: not indicated  # Diet: DASH/TLC  # Activity: as tolerated  # Code status: Full Code  # Disposition: from home    Pending: f/u cultures, f/u ID consult    **********   THIS IS AN INCOMPLETE NOTE, FINAL NOTE PENDING   **********  77M w/ h/o CAD (s/p CABG), HFimpEF, chronic afib (on Xarelto), SSS (s/p CRT-P), HTN, and DLD p/w fever and chills. Admitted to medicine for cryptogenic sepsis.    # Cryptogenic Sepsis  # Hx Cholecystostomy   - Sepsis present on admission   - Flu, COVID, RSV negative  - UA and CXR unremarkable  - IR consulted for cholecystostomy drain evaluation, but drain working well so no acute intervention offered.  - c/w vancomycin 1500mg IV Q12H (adjust via trough)  - c/w cefepime 2g IV Q8H  - c/w Flagyl 500mg IV Q8H  - f/u BCx and UCx  - f/u TTE (ordered to eval for endocarditis after recent LAAO device placement)  - surgery noted no intervention at this time   - f/u ID consult    # Chronic Atrial Fibrillation  # Sick Sinus Syndrome  - c/w rivaroxaban 15mg PO QHS (do NOT switch to Lovenox/heparin unless absolutely necessary due to bleeding risk)  - c/w ASA EC 81mg PO QD (stroke ppx after WATCHMAN placement; must NOT be stopped unless discussed w/ EP)  - c/w Lopressor 50mg PO BID    # Chronic HFimpEF  - c/w Lopressor 50mg PO BID  - hold Lasix for now i/s/o sepsis (received fluids earlier)  - restart Lasix if pt develops volume overload  - f/u cardiology outpatient to consider GDMT optimization (Toprol XL, ACEi/ARB/ARNI, SGLT2i, etc)    # Coronary Artery Disease  # Hypertension  # Dyslipidemia  # S/P CABGx5 (2014).   - c/w ASA EC 81mg PO QD  - c/w atorvastatin 20mg PO QHS  - c/w Lopressor 50mg PO BID  - Follows w/ Dr. Handy      # DVT prophylaxis: RIvaroxaban  # GI prophylaxis: not indicated  # Diet: DASH/TLC  # Activity: as tolerated  # Code status: Full Code  # Disposition: from home    Pending: f/u cultures, f/u ID consult, f/u PT eval

## 2023-10-21 NOTE — PROGRESS NOTE ADULT - ASSESSMENT
ASSESSMENT:  77yoM Hx of CAD s/p CABG, SSS s/p PPM, AF s/p atrial appendage occlusive device (still on AC for first 45 days), presenting with fevers/chills. CT abd/p shows percutaneous cholecystostomy tube in appropriate position. No gallbladder distension. Trace pericholecystic fluid without gallbladder wall edema or significant inflammatory changes. Per IR, no issues with PCT tube.   Abdominal exam reassuring, no abdominal pain, benign. On exam, patient is non-toxic appearing, no abdominal pain, umbilical hernia non painful, PCT tube in place no pus or purulent drainage, with negative Onofre's sign.   WBC 11.98 from 8.8. Lactate 2.5.  Patient was seen in march 2023 for cholecystitis, and IR placed PCT, given poor surgical candidate and multiple cardiac comorbidities.     PLAN:  - No surgical intervention  - Patient has no gallbladder inflammation and PCT tube is in place  - Fever workup per primary team   - Appreciate IR recommendations    [id number] ASSESSMENT:  77yoM Hx of CAD s/p CABG, SSS s/p PPM, AF s/p atrial appendage occlusive device (still on AC for first 45 days), presenting with fevers/chills. CT abd/p shows percutaneous cholecystostomy tube in appropriate position. No gallbladder distension. Trace pericholecystic fluid without gallbladder wall edema or significant inflammatory changes. Per IR, no issues with PCT tube.   Abdominal exam reassuring, no abdominal pain, benign. On exam, patient is non-toxic appearing, no abdominal pain, umbilical hernia non painful, PCT tube in place no pus or purulent drainage, with negative Onofre's sign.   WBC 11.98 from 8.8. Lactate 2.5.  Patient was seen in march 2023 for cholecystitis, and IR placed PCT, given poor surgical candidate and multiple cardiac comorbidities.     PLAN:  - No surgical intervention  - Patient has no gallbladder inflammation and PCT tube is in place  - Fever workup per primary team   - Appreciate IR recommendations  - Surgery to sign off. Recall PRN     [id number]

## 2023-10-21 NOTE — CONSULT NOTE ADULT - SUBJECTIVE AND OBJECTIVE BOX
COLTON, WAGNER  77y, Male  Allergies    No Known Allergies    Intolerances        LOS  1d    HPI  HPI:  77M w/ h/o CAD (s/p CABG), HFimpEF, chronic afib (on Xarelto), SSS (s/p CRT-P), HTN, and DLD p/w fever and chills. Symptoms started last night. At the time, pt febrile to 100.5F. Wife reports patient had some difficulty walking to bathroom last night and needed her assistance, (typically walks w/o assistance at baseline). Of note, pt had Watchman placed last month. Additionally, pt had cholecystostomy in Apr 2023 and has required multiple drainage exchanges. Otherwise, no reported CP, palpitations, SOB, abdominal pain, n/v/d, dysuria, or LE swelling. No reported sicks contacts.    In ED, pt HD stable on vitals. Labs demonstrated WBC 11.9 and Lactate 2.5. Flu, COVID, RSV neg. CTAP negative for bowel obstruction, pneumatosis, pneumoperitoneum, and ascites. CXR unremarkable. Admitted to medicine for cryptogenic sepsis. (20 Oct 2023 12:45)    INFECTIOUS DISEASE HISTORY:  ID is consulted for antimicrobial recommendations.     Prior hospital charts reviewed [Yes]  Primary team notes reviewed [Yes]  Other consultant notes reviewed [Yes]    REVIEW OF SYSTEMS:  CONSTITUTIONAL: No fever or chills  HEENT: No sore throat  RESPIRATORY: No cough, no shortness of breath  CARDIOVASCULAR: No chest pain or palpitations  GASTROINTESTINAL: No abdominal or epigastric pain  GENITOURINARY: No dysuria  NEUROLOGICAL: No headache/dizziness  MSK: No joint pain, erythema, or swelling; no back pain  SKIN: No itching, rashes  All other ROS negative except noted above    PAST MEDICAL & SURGICAL HISTORY:  HTN (hypertension)      Obesity      Heart attack      Breast cancer  2001      Osteoarthritis      CAD (coronary artery disease)      [place] (hard of hearing)      Dyslipidemia      Heart failure with improved ejection fraction (HFimpEF)      Chronic atrial fibrillation      S/P CABG x 5  2014      H/O mastectomy  right 2001      Presence of biventricular cardiac pacemaker    SOCIAL HISTORY:  - No recent travel  - Denies tobacco use, alcohol use, illicit drug use    FAMILY HISTORY:  Family history of heart disease (Father, Mother)    Family history of lung cancer (Sibling)    ANTIMICROBIALS:  cefepime   IVPB    cefepime   IVPB 2000 every 8 hours  metroNIDAZOLE  IVPB 500 every 8 hours      ANTIMICROBIALS (past 90 days):  MEDICATIONS  (STANDING):  cefepime   IVPB   100 mL/Hr IV Intermittent (10-19-23 @ 23:48)    cefepime   IVPB   100 mL/Hr IV Intermittent (10-21-23 @ 13:32)   100 mL/Hr IV Intermittent (10-21-23 @ 05:43)   100 mL/Hr IV Intermittent (10-20-23 @ 22:32)   100 mL/Hr IV Intermittent (10-20-23 @ 13:23)   100 mL/Hr IV Intermittent (10-20-23 @ 06:25)    metroNIDAZOLE  IVPB   100 mL/Hr IV Intermittent (10-21-23 @ 13:32)   100 mL/Hr IV Intermittent (10-21-23 @ 05:43)   100 mL/Hr IV Intermittent (10-20-23 @ 22:32)   100 mL/Hr IV Intermittent (10-20-23 @ 13:24)    vancomycin  IVPB   250 mL/Hr IV Intermittent (10-20-23 @ 00:15)    vancomycin  IVPB   250 mL/Hr IV Intermittent (10-20-23 @ 23:00)   250 mL/Hr IV Intermittent (10-20-23 @ 06:52)    OTHER MEDS:   MEDICATIONS  (STANDING):  acetaminophen     Tablet .. 650 every 6 hours PRN  aspirin enteric coated 81 daily  atorvastatin 20 at bedtime  furosemide    Tablet 20 two times a day  influenza  Vaccine (HIGH DOSE) 0.7 once  metoprolol tartrate 50 two times a day  rivaroxaban 15 with dinner      VITALS:  Vital Signs Last 24 Hrs  T(F): 97.6 (10-21-23 @ 07:49), Max: 103.7 (10-19-23 @ 22:45)    Vital Signs Last 24 Hrs  HR: 63 (10-21-23 @ 07:49) (60 - 63)  BP: 140/61 (10-21-23 @ 07:49) (108/56 - 140/61)  RR: 18 (10-21-23 @ 07:49)  SpO2: --  Wt(kg): --    EXAM:  GENERAL: NAD, lying in bed  HEAD: No head lesions  NECK: Supple, nontender to palpation  CHEST/LUNG: Clear to auscultation bilaterally  HEART: S1 S2  ABDOMEN: Soft, nontender, nondistended. Right abdominal drain noted.   EXTREMITIES: No clubbing, cyanosis, or petal edema  NERVOUS SYSTEM: Alert and oriented to person, time, place and situation  MSK: No joint erythema, swelling or pain  SKIN: No rashes or lesions, no superficial thrombophlebitis    Labs:                        8.9    7.43  )-----------( 163      ( 21 Oct 2023 07:42 )             29.1     10-21    136  |  104  |  14  ----------------------------<  92  4.2   |  23  |  0.7    Ca    8.3<L>      21 Oct 2023 07:42  Phos  3.1     10-21  Mg     2.2     10-21    TPro  5.6<L>  /  Alb  3.3<L>  /  TBili  0.9  /  DBili  x   /  AST  20  /  ALT  12  /  AlkPhos  66  10-21      WBC Trend:  WBC Count: 7.43 (10-21-23 @ 07:42)  WBC Count: 9.75 (10-20-23 @ 20:07)  WBC Count: 11.98 (10-19-23 @ 23:15)      Auto Neutrophil #: 6.01 K/uL (10-21-23 @ 07:42)  Auto Neutrophil #: 8.60 K/uL (10-20-23 @ 20:07)  Auto Neutrophil #: 11.35 K/uL (10-19-23 @ 23:15)  Auto Neutrophil #: 6.29 K/uL (09-28-23 @ 05:24)  Auto Neutrophil #: 5.82 K/uL (09-12-23 @ 10:32)      Creatine Trend:  Creatinine: 0.7 (10-21)  Creatinine: 0.8 (10-20)  Creatinine: 0.8 (10-19)      Liver Biochemical Testing Trend:  Alanine Aminotransferase (ALT/SGPT): 12 (10-21)  Alanine Aminotransferase (ALT/SGPT): 11 (10-20)  Alanine Aminotransferase (ALT/SGPT): 7 (10-19)  Alanine Aminotransferase (ALT/SGPT): 30 (09-28)  Alanine Aminotransferase (ALT/SGPT): 7 (09-12)  Aspartate Aminotransferase (AST/SGOT): 20 (10-21-23 @ 07:42)  Aspartate Aminotransferase (AST/SGOT): 20 (10-20-23 @ 20:07)  Aspartate Aminotransferase (AST/SGOT): 15 (10-19-23 @ 23:15)  Aspartate Aminotransferase (AST/SGOT): 54 (09-28-23 @ 05:24)  Aspartate Aminotransferase (AST/SGOT): 15 (09-12-23 @ 10:32)  Bilirubin Total: 0.9 (10-21)  Bilirubin Total: 1.0 (10-20)  Bilirubin Total: 0.7 (10-19)  Bilirubin Total: 1.8 (09-28)  Bilirubin Total: 1.0 (09-12)      Trend LDH      Auto Eosinophil %: 0.9 % (10-21-23 @ 07:42)  Auto Eosinophil %: 0.2 % (10-20-23 @ 20:07)  Auto Eosinophil %: 0.0 % (10-19-23 @ 23:15)      Urinalysis Basic - ( 21 Oct 2023 07:42 )    Color: x / Appearance: x / SG: x / pH: x  Gluc: 92 mg/dL / Ketone: x  / Bili: x / Urobili: x   Blood: x / Protein: x / Nitrite: x   Leuk Esterase: x / RBC: x / WBC x   Sq Epi: x / Non Sq Epi: x / Bacteria: x        MICROBIOLOGY:  Vancomycin Level, Trough: 16.4 (10-21 @ 07:42)  Vancomycin Level, Trough: 12.6 (10-20 @ 20:07)    Male    Culture - Urine (collected 20 Oct 2023 03:20)  Source: Clean Catch Clean Catch (Midstream)  Final Report (21 Oct 2023 09:04):    No growth    Culture - Blood (collected 19 Oct 2023 23:15)  Source: .Blood Blood-Peripheral  Preliminary Report (21 Oct 2023 09:01):    No growth at 24 hours    Culture - Blood (collected 19 Oct 2023 23:15)  Source: .Blood Blood-Peripheral  Preliminary Report (21 Oct 2023 09:01):    No growth at 24 hours    Lactate, Blood: 1.0 (10-21 @ 07:42)  Lactate, Blood: 1.4 (10-20 @ 20:07)  Lactate, Blood: 1.9 (10-20 @ 02:15)  Lactate, Blood: 2.5 (10-19 @ 23:15)    INFLAMMATORY MARKERS    RADIOLOGY & ADDITIONAL TESTS:  I have personally reviewed the imagings.  CXR  Xray Chest 1 View- PORTABLE-Urgent:   ACC: [id number] EXAM:  XR CHEST PORTABLE URGENT 1V   ORDERED BY: BETITO PULLIAM     PROCEDURE DATE:  10/19/2023          INTERPRETATION:  STUDY INDICATION: fever    TECHNIQUE:  Portable frontal view of the chest obtained.    COMPARISON: 3/26/2023    FINDINGS/  IMPRESSION:      No focal consolidation, pneumothorax or pleural effusion.    Stable cardiomediastinal silhouette.    Unchanged osseous structures.    --- End of Report ---            ANALIA HARO MD; Attending Radiologist  This documenthas been electronically signed. Oct 20 2023  3:58AM (10-19-23 @ 23:48)      CT  CT Abdomen and Pelvis w/ IV Cont:   ACC: [id number] EXAM:  CT ABDOMEN AND PELVIS IC   ORDERED BY: BETITO PULLIAM     PROCEDURE DATE:  10/20/2023          INTERPRETATION:  CLINICAL STATEMENT: t tube in place, febrile    TECHNIQUE: Contiguous axial CT images were obtained from the lower chest   to the pubic symphysis following administration of intravenous contrast.   Oral contrast was not administered.  Reformatted images in the coronal   and sagittal planes were acquired.    CONTRAST VOLUME: Omnipaque 350. 100 cc administered. 0 cc discarded.    COMPARISON CT: 9/26/2023    FINDINGS:    LOWER CHEST: Lung base without mass or consolidation. Multivessel   coronary arterial calcifications. Mitral annular calcification. Left   atrial enlargement.    HEPATOBILIARY: Percutaneous cholecystostomy tube with pigtail formed in   the gallbladder. Small amount of fluid around the gallbladder.   Cholelithiasis. No significant gallbladder distention. Stones are noted   in the cystic duct.    SPLEEN: Unremarkable.    PANCREAS: Unremarkable.    ADRENAL GLANDS: Unremarkable.    KIDNEYS: Symmetric enhancement. No hydronephrosis. Left renal cysts.    ABDOMINOPELVIC NODES: Unremarkable.    PELVIC ORGANS: Unremarkable.    PERITONEUM/MESENTERY/BOWEL: No bowel obstruction, pneumatosis,   pneumoperitoneum, or ascites.    BONES/SOFT TISSUES: No acute osseous abnormality. Ostomy. Bony   degenerative changes. Fat-containing periumbilical hernia. right gluteal   lipoma. Unchanged anterolisthesis of L4 on L5.    OTHER: Normal caliber aorta with scattered atherosclerotic calcifications.      IMPRESSION:    Percutaneous cholecystostomy tube with pigtail in the gallbladder lumen.   Sightly increased pericholecystic fluid without gallbladder distention,   nonspecific. Stones are noted in the cystic duct.    No bowel obstruction, pneumatosis, pneumoperitoneum, or ascites.    --- End of Report ---            ANALIA HARO MD; Attending Radiologist  This document has been electronically signed. Oct 20 2023  1:35AM (10-20-23 @ 01:13)      CARDIOLOGY TESTING  12 Lead ECG:   Ventricular Rate 61 BPM    Atrial Rate 202 BPM    QRS Duration 126 ms    Q-T Interval 416 ms    QTC Calculation(Bazett) 418 ms    R Axis -59 degrees    T Axis 102 degrees    Diagnosis Line Atrial fibrillation  with frequent ventricular-paced complexes  Left axis deviation  Left ventricular hypertrophy with QRS widening and repolarization abnormality      Abnormal ECG    Confirmed by LOCO DAVIS MD ([id number]) on 10/20/2023 6:50:02 AM (10-19-23 @ 23:52)      < from: Intra-Operative Transesophageal Echo (09.26.23 @ 09:18) >  Summary:   1. Left ventricular ejection fraction, by visual estimation, is 55 to   60%.   2. Left atrial enlargement.   3. Normal watchman position with 15-25 % compression. No Leaks.   4.Mild to moderate mitral valve regurgitation.   5. Mild tricuspid regurgitation.   6. An Intra-operative OTIS was performed by me. The written report of   findings was placed in the medical record.    < end of copied text >

## 2023-10-21 NOTE — PROGRESS NOTE ADULT - SUBJECTIVE AND OBJECTIVE BOX
24H events:    Patient is a 77y old Male who presents with a chief complaint of fever (21 Oct 2023 01:51)    Primary diagnosis of Acute sepsis    In ED, pt HD stable on vitals. Labs demonstrated WBC 11.9 and Lactate 2.5. Flu, COVID, RSV neg. CTAP negative for bowel obstruction, pneumatosis, pneumoperitoneum, and ascites. CXR unremarkable. Admitted to medicine for cryptogenic sepsis.      Today is hospital day 1d. This morning patient was seen and examined at bedside, resting comfortably in bed.    No acute or major events overnight.    Code Status: FULL      PAST MEDICAL & SURGICAL HISTORY  HTN (hypertension)    Obesity    Heart attack    Breast cancer  2001    Osteoarthritis    CAD (coronary artery disease)    [place] (hard of hearing)    Dyslipidemia    Heart failure with improved ejection fraction (HFimpEF)    Chronic atrial fibrillation    S/P CABG x 5  2014    H/O mastectomy  right 2001    Presence of biventricular cardiac pacemaker      SOCIAL HISTORY:  Social History:      ALLERGIES:  No Known Allergies    MEDICATIONS:  STANDING MEDICATIONS  aspirin enteric coated 81 milliGRAM(s) Oral daily  atorvastatin 20 milliGRAM(s) Oral at bedtime  cefepime   IVPB      cefepime   IVPB 2000 milliGRAM(s) IV Intermittent every 8 hours  chlorhexidine 2% Cloths 1 Application(s) Topical <User Schedule>  influenza  Vaccine (HIGH DOSE) 0.7 milliLiter(s) IntraMuscular once  metoprolol tartrate 50 milliGRAM(s) Oral two times a day  metroNIDAZOLE  IVPB 500 milliGRAM(s) IV Intermittent every 8 hours  rivaroxaban 15 milliGRAM(s) Oral with dinner  vancomycin  IVPB      vancomycin  IVPB 1500 milliGRAM(s) IV Intermittent every 12 hours    PRN MEDICATIONS  acetaminophen     Tablet .. 650 milliGRAM(s) Oral every 6 hours PRN    VITALS:   T(F): 97.6  HR: 63  BP: 140/61  RR: 18  SpO2: 99%    PHYSICAL EXAM:  GENERAL:   ( x ) NAD, lying in bed comfortably     (  ) obtunded     (  ) lethargic     (  ) somnolent    HEAD:   ( x ) Atraumatic     (  ) hematoma     (  ) laceration (specify location:       )     NECK:  ( x ) Supple     (  ) neck stiffness     (  ) nuchal rigidity     (  )  no JVD     (  ) JVD present ( -- cm)    HEART:  Rate -->     ( x ) normal rate     (  ) bradycardic     (  ) tachycardic  Rhythm -->     ( x ) regular     (  ) regularly irregular     (  ) irregularly irregular  Murmurs -->     ( x ) normal s1s2     (  ) systolic murmur     (  ) diastolic murmur     (  ) continuous murmur      (  ) S3 present     (  ) S4 present    LUNGS:   ( x ) Unlabored respirations     (  ) tachypnea  ( x ) B/L air entry     (  ) decreased breath sounds in:  (location     )    (  ) no adventitious sound     (  ) crackles     (  ) wheezing      (  ) rhonchi      (specify location:       )  (  ) chest wall tenderness (specify location:       )    ABDOMEN:   ( x ) Soft     (  ) tense   |   ( x ) nondistended     (  ) distended   |   (  ) +BS     (  ) hypoactive bowel sounds     (  ) hyperactive bowel sounds  ( x ) nontender     (  ) RUQ tenderness     (  ) RLQ tenderness     (  ) LLQ tenderness     (  ) epigastric tenderness     (  ) diffuse tenderness  (  ) Splenomegaly      (  ) Hepatomegaly      (  ) Jaundice     (  ) ecchymosis     EXTREMITIES:  ( x ) Normal     (  ) Rash     (  ) ecchymosis     (  ) varicose veins      (  ) pitting edema     (  ) non-pitting edema   (  ) ulceration     (  ) gangrene:     (location:     )    NERVOUS SYSTEM:    ( x ) A&Ox3     (  ) confused     (  ) lethargic  CN II-XII:     (  ) Intact     (  ) deficits found     (Specify:     )   Upper extremities:     (  ) no sensorimotor deficits     (  ) weakness     (  ) loss of proprioception/vibration     (  ) loss of touch/temperature (specify:    )  Lower extremities:     (  ) no sensorimotor deficits     (  ) weakness     (  ) loss of proprioception/vibration     (  ) loss of touch/temperature (specify:    )    SKIN:   ( x ) No rashes or lesions     (  ) maculopapular rash     (  ) pustules     (  ) vesicles     (  ) ulcer     (  ) ecchymosis     (specify location:     )      (  ) Central Line:   Date inserted:  Location: (  ) Right IJ     (  ) Left IJ     (  ) Right Fem     (  ) Left Fem    ( x ) Right Cholecystostomy Drain        (  ) pigtail       (  ) PEG tube       (  ) colostomy       (  ) jejunostomy  (  ) U-Dall    LABS:                        8.9    7.43  )-----------( 163      ( 21 Oct 2023 07:42 )             29.1     10-21    136  |  104  |  14  ----------------------------<  92  4.2   |  23  |  0.7    Ca    8.3<L>      21 Oct 2023 07:42  Phos  3.1     10-21  Mg     2.2     10-21    TPro  5.6<L>  /  Alb  3.3<L>  /  TBili  0.9  /  DBili  x   /  AST  20  /  ALT  12  /  AlkPhos  66  10-21    PT/INR - ( 21 Oct 2023 07:42 )   PT: 17.20 sec;   INR: 1.49 ratio         PTT - ( 21 Oct 2023 07:42 )  PTT:33.6 sec  Urinalysis Basic - ( 21 Oct 2023 07:42 )    Color: x / Appearance: x / SG: x / pH: x  Gluc: 92 mg/dL / Ketone: x  / Bili: x / Urobili: x   Blood: x / Protein: x / Nitrite: x   Leuk Esterase: x / RBC: x / WBC x   Sq Epi: x / Non Sq Epi: x / Bacteria: x        Lactate, Blood: 1.0 mmol/L (10-21-23 @ 07:42)  Lactate, Blood: 1.4 mmol/L (10-20-23 @ 20:07)      Culture - Urine (collected 20 Oct 2023 03:20)  Source: Clean Catch Clean Catch (Midstream)  Final Report (21 Oct 2023 09:04):    No growth    Culture - Blood (collected 19 Oct 2023 23:15)  Source: .Blood Blood-Peripheral  Preliminary Report (21 Oct 2023 09:01):    No growth at 24 hours    Culture - Blood (collected 19 Oct 2023 23:15)  Source: .Blood Blood-Peripheral  Preliminary Report (21 Oct 2023 09:01):    No growth at 24 hours      CARDIAC MARKERS ( 19 Oct 2023 23:15 )  x     / <0.01 ng/mL / x     / x     / x          RADIOLOGY:  CXR  Xray Chest 1 View- PORTABLE-Urgent:   ACC: [id number] EXAM:  XR CHEST PORTABLE URGENT 1V   ORDERED BY: BETITO PULLIAM     PROCEDURE DATE:  10/19/2023          INTERPRETATION:  STUDY INDICATION: fever    TECHNIQUE:  Portable frontal view of the chest obtained.    COMPARISON: 3/26/2023    FINDINGS/  IMPRESSION:      No focal consolidation, pneumothorax or pleural effusion.    Stable cardiomediastinal silhouette.    Unchanged osseous structures.    --- End of Report ---            ANALIA HARO MD; Attending Radiologist  This documenthas been electronically signed. Oct 20 2023  3:58AM (10-19-23 @ 23:48)      CT  CT Abdomen and Pelvis w/ IV Cont:   ACC: [id number] EXAM:  CT ABDOMEN AND PELVIS IC   ORDERED BY: BETITO PULLIAM     PROCEDURE DATE:  10/20/2023          INTERPRETATION:  CLINICAL STATEMENT: t tube in place, febrile    TECHNIQUE: Contiguous axial CT images were obtained from the lower chest   to the pubic symphysis following administration of intravenous contrast.   Oral contrast was not administered.  Reformatted images in the coronal   and sagittal planes were acquired.    CONTRAST VOLUME: Omnipaque 350. 100 cc administered. 0 cc discarded.    COMPARISON CT: 9/26/2023    FINDINGS:    LOWER CHEST: Lung base without mass or consolidation. Multivessel   coronary arterial calcifications. Mitral annular calcification. Left   atrial enlargement.    HEPATOBILIARY: Percutaneous cholecystostomy tube with pigtail formed in   the gallbladder. Small amount of fluid around the gallbladder.   Cholelithiasis. No significant gallbladder distention. Stones are noted   in the cystic duct.    SPLEEN: Unremarkable.    PANCREAS: Unremarkable.    ADRENAL GLANDS: Unremarkable.    KIDNEYS: Symmetric enhancement. No hydronephrosis. Left renal cysts.    ABDOMINOPELVIC NODES: Unremarkable.    PELVIC ORGANS: Unremarkable.    PERITONEUM/MESENTERY/BOWEL: No bowel obstruction, pneumatosis,   pneumoperitoneum, or ascites.    BONES/SOFT TISSUES: No acute osseous abnormality. Ostomy. Bony   degenerative changes. Fat-containing periumbilical hernia. right gluteal   lipoma. Unchanged anterolisthesis of L4 on L5.    OTHER: Normal caliber aorta with scattered atherosclerotic calcifications.      IMPRESSION:    Percutaneous cholecystostomy tube with pigtail in the gallbladder lumen.   Sightly increased pericholecystic fluid without gallbladder distention,   nonspecific. Stones are noted in the cystic duct.    No bowel obstruction, pneumatosis, pneumoperitoneum, or ascites.    --- End of Report ---            ANALIA HARO MD; Attending Radiologist  This document has been electronically signed. Oct 20 2023  1:35AM (10-20-23 @ 01:13)

## 2023-10-22 LAB
-  BLOOD PCR PANEL: SIGNIFICANT CHANGE UP
-  BLOOD PCR PANEL: SIGNIFICANT CHANGE UP
ALBUMIN SERPL ELPH-MCNC: 3.5 G/DL — SIGNIFICANT CHANGE UP (ref 3.5–5.2)
ALBUMIN SERPL ELPH-MCNC: 3.5 G/DL — SIGNIFICANT CHANGE UP (ref 3.5–5.2)
ALP SERPL-CCNC: 67 U/L — SIGNIFICANT CHANGE UP (ref 30–115)
ALP SERPL-CCNC: 67 U/L — SIGNIFICANT CHANGE UP (ref 30–115)
ALT FLD-CCNC: 10 U/L — SIGNIFICANT CHANGE UP (ref 0–41)
ALT FLD-CCNC: 10 U/L — SIGNIFICANT CHANGE UP (ref 0–41)
ANION GAP SERPL CALC-SCNC: 10 MMOL/L — SIGNIFICANT CHANGE UP (ref 7–14)
ANION GAP SERPL CALC-SCNC: 10 MMOL/L — SIGNIFICANT CHANGE UP (ref 7–14)
AST SERPL-CCNC: 16 U/L — SIGNIFICANT CHANGE UP (ref 0–41)
AST SERPL-CCNC: 16 U/L — SIGNIFICANT CHANGE UP (ref 0–41)
BILIRUB SERPL-MCNC: 0.7 MG/DL — SIGNIFICANT CHANGE UP (ref 0.2–1.2)
BILIRUB SERPL-MCNC: 0.7 MG/DL — SIGNIFICANT CHANGE UP (ref 0.2–1.2)
BUN SERPL-MCNC: 13 MG/DL — SIGNIFICANT CHANGE UP (ref 10–20)
BUN SERPL-MCNC: 13 MG/DL — SIGNIFICANT CHANGE UP (ref 10–20)
CALCIUM SERPL-MCNC: 8.2 MG/DL — LOW (ref 8.4–10.5)
CALCIUM SERPL-MCNC: 8.2 MG/DL — LOW (ref 8.4–10.5)
CHLORIDE SERPL-SCNC: 105 MMOL/L — SIGNIFICANT CHANGE UP (ref 98–110)
CHLORIDE SERPL-SCNC: 105 MMOL/L — SIGNIFICANT CHANGE UP (ref 98–110)
CO2 SERPL-SCNC: 24 MMOL/L — SIGNIFICANT CHANGE UP (ref 17–32)
CO2 SERPL-SCNC: 24 MMOL/L — SIGNIFICANT CHANGE UP (ref 17–32)
CREAT SERPL-MCNC: 0.8 MG/DL — SIGNIFICANT CHANGE UP (ref 0.7–1.5)
CREAT SERPL-MCNC: 0.8 MG/DL — SIGNIFICANT CHANGE UP (ref 0.7–1.5)
EGFR: 91 ML/MIN/1.73M2 — SIGNIFICANT CHANGE UP
EGFR: 91 ML/MIN/1.73M2 — SIGNIFICANT CHANGE UP
GLUCOSE SERPL-MCNC: 107 MG/DL — HIGH (ref 70–99)
GLUCOSE SERPL-MCNC: 107 MG/DL — HIGH (ref 70–99)
GRAM STN FLD: SIGNIFICANT CHANGE UP
GRAM STN FLD: SIGNIFICANT CHANGE UP
HCT VFR BLD CALC: 28.5 % — LOW (ref 42–52)
HCT VFR BLD CALC: 28.5 % — LOW (ref 42–52)
HGB BLD-MCNC: 8.8 G/DL — LOW (ref 14–18)
HGB BLD-MCNC: 8.8 G/DL — LOW (ref 14–18)
MAGNESIUM SERPL-MCNC: 2.3 MG/DL — SIGNIFICANT CHANGE UP (ref 1.8–2.4)
MAGNESIUM SERPL-MCNC: 2.3 MG/DL — SIGNIFICANT CHANGE UP (ref 1.8–2.4)
MCHC RBC-ENTMCNC: 23.9 PG — LOW (ref 27–31)
MCHC RBC-ENTMCNC: 23.9 PG — LOW (ref 27–31)
MCHC RBC-ENTMCNC: 30.9 G/DL — LOW (ref 32–37)
MCHC RBC-ENTMCNC: 30.9 G/DL — LOW (ref 32–37)
MCV RBC AUTO: 77.4 FL — LOW (ref 80–94)
MCV RBC AUTO: 77.4 FL — LOW (ref 80–94)
METHOD TYPE: SIGNIFICANT CHANGE UP
METHOD TYPE: SIGNIFICANT CHANGE UP
NRBC # BLD: 0 /100 WBCS — SIGNIFICANT CHANGE UP (ref 0–0)
NRBC # BLD: 0 /100 WBCS — SIGNIFICANT CHANGE UP (ref 0–0)
PLATELET # BLD AUTO: 148 K/UL — SIGNIFICANT CHANGE UP (ref 130–400)
PLATELET # BLD AUTO: 148 K/UL — SIGNIFICANT CHANGE UP (ref 130–400)
PMV BLD: 11.4 FL — HIGH (ref 7.4–10.4)
PMV BLD: 11.4 FL — HIGH (ref 7.4–10.4)
POTASSIUM SERPL-MCNC: 3.9 MMOL/L — SIGNIFICANT CHANGE UP (ref 3.5–5)
POTASSIUM SERPL-MCNC: 3.9 MMOL/L — SIGNIFICANT CHANGE UP (ref 3.5–5)
POTASSIUM SERPL-SCNC: 3.9 MMOL/L — SIGNIFICANT CHANGE UP (ref 3.5–5)
POTASSIUM SERPL-SCNC: 3.9 MMOL/L — SIGNIFICANT CHANGE UP (ref 3.5–5)
PROT SERPL-MCNC: 5.6 G/DL — LOW (ref 6–8)
PROT SERPL-MCNC: 5.6 G/DL — LOW (ref 6–8)
RBC # BLD: 3.68 M/UL — LOW (ref 4.7–6.1)
RBC # BLD: 3.68 M/UL — LOW (ref 4.7–6.1)
RBC # FLD: 20.8 % — HIGH (ref 11.5–14.5)
RBC # FLD: 20.8 % — HIGH (ref 11.5–14.5)
SODIUM SERPL-SCNC: 139 MMOL/L — SIGNIFICANT CHANGE UP (ref 135–146)
SODIUM SERPL-SCNC: 139 MMOL/L — SIGNIFICANT CHANGE UP (ref 135–146)
WBC # BLD: 7.91 K/UL — SIGNIFICANT CHANGE UP (ref 4.8–10.8)
WBC # BLD: 7.91 K/UL — SIGNIFICANT CHANGE UP (ref 4.8–10.8)
WBC # FLD AUTO: 7.91 K/UL — SIGNIFICANT CHANGE UP (ref 4.8–10.8)
WBC # FLD AUTO: 7.91 K/UL — SIGNIFICANT CHANGE UP (ref 4.8–10.8)

## 2023-10-22 PROCEDURE — 99232 SBSQ HOSP IP/OBS MODERATE 35: CPT

## 2023-10-22 RX ORDER — AMPICILLIN SODIUM AND SULBACTAM SODIUM 250; 125 MG/ML; MG/ML
3 INJECTION, POWDER, FOR SUSPENSION INTRAMUSCULAR; INTRAVENOUS ONCE
Refills: 0 | Status: COMPLETED | OUTPATIENT
Start: 2023-10-22 | End: 2023-10-22

## 2023-10-22 RX ORDER — AMPICILLIN SODIUM AND SULBACTAM SODIUM 250; 125 MG/ML; MG/ML
3 INJECTION, POWDER, FOR SUSPENSION INTRAMUSCULAR; INTRAVENOUS EVERY 6 HOURS
Refills: 0 | Status: DISCONTINUED | OUTPATIENT
Start: 2023-10-22 | End: 2023-10-23

## 2023-10-22 RX ORDER — AMPICILLIN SODIUM AND SULBACTAM SODIUM 250; 125 MG/ML; MG/ML
INJECTION, POWDER, FOR SUSPENSION INTRAMUSCULAR; INTRAVENOUS
Refills: 0 | Status: DISCONTINUED | OUTPATIENT
Start: 2023-10-22 | End: 2023-10-23

## 2023-10-22 RX ADMIN — AMPICILLIN SODIUM AND SULBACTAM SODIUM 200 GRAM(S): 250; 125 INJECTION, POWDER, FOR SUSPENSION INTRAMUSCULAR; INTRAVENOUS at 08:55

## 2023-10-22 RX ADMIN — Medication 50 MILLIGRAM(S): at 18:00

## 2023-10-22 RX ADMIN — Medication 100 MILLIGRAM(S): at 05:09

## 2023-10-22 RX ADMIN — Medication 50 MILLIGRAM(S): at 05:09

## 2023-10-22 RX ADMIN — AMPICILLIN SODIUM AND SULBACTAM SODIUM 200 GRAM(S): 250; 125 INJECTION, POWDER, FOR SUSPENSION INTRAMUSCULAR; INTRAVENOUS at 12:20

## 2023-10-22 RX ADMIN — RIVAROXABAN 15 MILLIGRAM(S): KIT at 16:36

## 2023-10-22 RX ADMIN — Medication 20 MILLIGRAM(S): at 13:51

## 2023-10-22 RX ADMIN — CHLORHEXIDINE GLUCONATE 1 APPLICATION(S): 213 SOLUTION TOPICAL at 05:09

## 2023-10-22 RX ADMIN — AMPICILLIN SODIUM AND SULBACTAM SODIUM 200 GRAM(S): 250; 125 INJECTION, POWDER, FOR SUSPENSION INTRAMUSCULAR; INTRAVENOUS at 23:31

## 2023-10-22 RX ADMIN — Medication 20 MILLIGRAM(S): at 05:09

## 2023-10-22 RX ADMIN — CEFEPIME 100 MILLIGRAM(S): 1 INJECTION, POWDER, FOR SOLUTION INTRAMUSCULAR; INTRAVENOUS at 05:09

## 2023-10-22 RX ADMIN — AMPICILLIN SODIUM AND SULBACTAM SODIUM 200 GRAM(S): 250; 125 INJECTION, POWDER, FOR SUSPENSION INTRAMUSCULAR; INTRAVENOUS at 18:00

## 2023-10-22 RX ADMIN — Medication 81 MILLIGRAM(S): at 12:20

## 2023-10-22 RX ADMIN — ATORVASTATIN CALCIUM 20 MILLIGRAM(S): 80 TABLET, FILM COATED ORAL at 22:29

## 2023-10-22 NOTE — PROGRESS NOTE ADULT - SUBJECTIVE AND OBJECTIVE BOX
COLTON, MAX  77y  [facility]      Patient is a 77y old  Male who presents with a chief complaint of fever (21 Oct 2023 01:51)      INTERVAL HPI/OVERNIGHT EVENTS:  Patient feels significantly better   he reported that cath was not draining well until IR flushed it yest.   discuss with his wife plan of care  no other events noted         FAMILY HISTORY:  Family history of heart disease (Father, Mother)    Family history of lung cancer (Sibling)      T(C): 36.4 (10-21-23 @ 07:49), Max: 37.8 (10-20-23 @ 23:20)  HR: 63 (10-21-23 @ 07:49) (60 - 69)  BP: 140/61 (10-21-23 @ 07:49) (108/56 - 155/70)  RR: 18 (10-21-23 @ 07:49) (18 - 18)  SpO2: 99% (10-20-23 @ 12:45) (99% - 99%)  Wt(kg): --Vital Signs Last 24 Hrs  T(C): 36.4 (21 Oct 2023 07:49), Max: 37.8 (20 Oct 2023 23:20)  T(F): 97.6 (21 Oct 2023 07:49), Max: 100.1 (20 Oct 2023 23:20)  HR: 63 (21 Oct 2023 07:49) (60 - 69)  BP: 140/61 (21 Oct 2023 07:49) (108/56 - 155/70)  BP(mean): 98 (20 Oct 2023 12:45) (98 - 98)  RR: 18 (21 Oct 2023 07:49) (18 - 18)  SpO2: 99% (20 Oct 2023 12:45) (99% - 99%)    Parameters below as of 20 Oct 2023 12:45  Patient On (Oxygen Delivery Method): room air        PHYSICAL EXAM:  GENERAL: NAD, well-groomed, well-developed  NERVOUS SYSTEM:  Alert & Oriented X3,   PULM: Clear to auscultation bilaterally  CARDIAC: Regular rate and rhythm;  GI: Soft, Nontender, distended; Bowel sounds present  EXTREMITIES:  2+ Peripheral Pulses,    Consultant(s) Notes Reviewed:  [x ] YES  [ ] NO  Care Discussed with Consultants/Other Providers [ x] YES  [ ] NO    LABS:                            8.9    7.43  )-----------( 163      ( 21 Oct 2023 07:42 )             29.1   10-21    136  |  104  |  14  ----------------------------<  92  4.2   |  23  |  0.7    Ca    8.3<L>      21 Oct 2023 07:42  Phos  3.1     10-21  Mg     2.2     10-21    TPro  5.6<L>  /  Alb  3.3<L>  /  TBili  0.9  /  DBili  x   /  AST  20  /  ALT  12  /  AlkPhos  66  10-21            Culture - Urine (collected 20 Oct 2023 03:20)  Source: Clean Catch Clean Catch (Midstream)  Final Report (21 Oct 2023 09:04):    No growth    Culture - Blood (collected 19 Oct 2023 23:15)  Source: .Blood Blood-Peripheral  Preliminary Report (21 Oct 2023 09:01):    No growth at 24 hours    Culture - Blood (collected 19 Oct 2023 23:15)  Source: .Blood Blood-Peripheral  Preliminary Report (21 Oct 2023 09:01):    No growth at 24 hours      acetaminophen     Tablet .. 650 milliGRAM(s) Oral every 6 hours PRN  aspirin enteric coated 81 milliGRAM(s) Oral daily  atorvastatin 20 milliGRAM(s) Oral at bedtime  cefepime   IVPB 2000 milliGRAM(s) IV Intermittent every 8 hours  cefepime   IVPB      chlorhexidine 2% Cloths 1 Application(s) Topical <User Schedule>  influenza  Vaccine (HIGH DOSE) 0.7 milliLiter(s) IntraMuscular once  metoprolol tartrate 50 milliGRAM(s) Oral two times a day  metroNIDAZOLE  IVPB 500 milliGRAM(s) IV Intermittent every 8 hours  rivaroxaban 15 milliGRAM(s) Oral with dinner  vancomycin  IVPB      vancomycin  IVPB 1500 milliGRAM(s) IV Intermittent every 12 hours    Patient is a 77y Male  with PMHx of CAD S/P CABG, SSS S/P DC PPM, Persistent AF with prior  bleeding, history of cholecystitis s/p percutaneous cholecystostomy tube placement, recent left atrial appendage occlusive device on xarelto for 45 days, presenting to our hospital for fever.       1. Sepsis (fever, tachycardia) secondary to gram positive rods bacteremia resolving . Intra-abdominal infection?   * pt and wife reported decrease in drainaing of cholecystostomy tube. no clear date for surgical intervention   * drain output improve after Flushing it by IR   - Admit to medicine       -LA elevated resolving   - Was on cefepime and flagyl that was switched to unasyn   - Blood cx: Gram positive rods . Repeat blood cx:pending   - Cont xarelto as no plan for intervention   * pt will need at least xarelto for 45 days followed by DAPT. doubt will have any plan for elective surgery any time soon  - Was on LR at 100 ml/hr   - IR consult appreciated   - Gen surgery consult appreciated  - ID consult appreciated     2. hx of left atrial appendage occlusive device  -Cont xarelto as there is no surgcial plan     3. Persistent Afib . hx of SSS s/p DC PPM   - Cont home meds    4. hx of CAD s/p CABG/CHF  - Re-start lasix 20mg po bid 10/21/2023    - Cont home meds    5. DVT/GI px.     Sepsis unclear focus. might be due to intra-abdominal infection in the setting of slow drainage that was fixed after flush by IR as per patient. ID consult:pending. cont abs for now  watch for fever   hx of CHF/Afib . cont home meds    COLTON, WAGNER  77y  [facility] 4[facility] (Back) 017 B      Patient is a 77y old  Male who presents with a chief complaint of fever (21 Oct 2023 01:51)      INTERVAL HPI/OVERNIGHT EVENTS:  Patient feels well   has no complains   updated him and wife about the new blood cx result       FAMILY HISTORY:  Family history of heart disease (Father, Mother)    Family history of lung cancer (Sibling)      T(C): 36.4 (10-21-23 @ 07:49), Max: 37.8 (10-20-23 @ 23:20)  HR: 63 (10-21-23 @ 07:49) (60 - 69)  BP: 140/61 (10-21-23 @ 07:49) (108/56 - 155/70)  RR: 18 (10-21-23 @ 07:49) (18 - 18)  SpO2: 99% (10-20-23 @ 12:45) (99% - 99%)  Wt(kg): --Vital Signs Last 24 Hrs  T(C): 36.4 (21 Oct 2023 07:49), Max: 37.8 (20 Oct 2023 23:20)  T(F): 97.6 (21 Oct 2023 07:49), Max: 100.1 (20 Oct 2023 23:20)  HR: 63 (21 Oct 2023 07:49) (60 - 69)  BP: 140/61 (21 Oct 2023 07:49) (108/56 - 155/70)  BP(mean): 98 (20 Oct 2023 12:45) (98 - 98)  RR: 18 (21 Oct 2023 07:49) (18 - 18)  SpO2: 99% (20 Oct 2023 12:45) (99% - 99%)    Parameters below as of 20 Oct 2023 12:45  Patient On (Oxygen Delivery Method): room air        PHYSICAL EXAM:  GENERAL: NAD, well-groomed, well-developed  NERVOUS SYSTEM:  Alert & Oriented X3,   PULM: Clear to auscultation bilaterally  CARDIAC: Regular rate and rhythm;  GI: Soft, Nontender, distended; Bowel sounds present  EXTREMITIES:  2+ Peripheral Pulses,    Consultant(s) Notes Reviewed:  [x ] YES  [ ] NO  Care Discussed with Consultants/Other Providers [ x] YES  [ ] NO    LABS:                            8.9    7.43  )-----------( 163      ( 21 Oct 2023 07:42 )             29.1   10-21    136  |  104  |  14  ----------------------------<  92  4.2   |  23  |  0.7    Ca    8.3<L>      21 Oct 2023 07:42  Phos  3.1     10-21  Mg     2.2     10-21    TPro  5.6<L>  /  Alb  3.3<L>  /  TBili  0.9  /  DBili  x   /  AST  20  /  ALT  12  /  AlkPhos  66  10-21            Culture - Urine (collected 20 Oct 2023 03:20)  Source: Clean Catch Clean Catch (Midstream)  Final Report (21 Oct 2023 09:04):    No growth    Culture - Blood (collected 19 Oct 2023 23:15)  Source: .Blood Blood-Peripheral  Preliminary Report (21 Oct 2023 09:01):    No growth at 24 hours    Culture - Blood (collected 19 Oct 2023 23:15)  Source: .Blood Blood-Peripheral  Preliminary Report (21 Oct 2023 09:01):    No growth at 24 hours      acetaminophen     Tablet .. 650 milliGRAM(s) Oral every 6 hours PRN  aspirin enteric coated 81 milliGRAM(s) Oral daily  atorvastatin 20 milliGRAM(s) Oral at bedtime  cefepime   IVPB 2000 milliGRAM(s) IV Intermittent every 8 hours  cefepime   IVPB      chlorhexidine 2% Cloths 1 Application(s) Topical <User Schedule>  influenza  Vaccine (HIGH DOSE) 0.7 milliLiter(s) IntraMuscular once  metoprolol tartrate 50 milliGRAM(s) Oral two times a day  metroNIDAZOLE  IVPB 500 milliGRAM(s) IV Intermittent every 8 hours  rivaroxaban 15 milliGRAM(s) Oral with dinner  vancomycin  IVPB      vancomycin  IVPB 1500 milliGRAM(s) IV Intermittent every 12 hours    Patient is a 77y Male  with PMHx of CAD S/P CABG, SSS S/P DC PPM, Persistent AF with prior  bleeding, history of cholecystitis s/p percutaneous cholecystostomy tube placement, recent left atrial appendage occlusive device on xarelto for 45 days, presenting to our hospital for fever.       1. Sepsis (fever, tachycardia) secondary to gram positive rods bacteremia resolving . Intra-abdominal infection?   * pt and wife reported decrease in drainaing of cholecystostomy tube. no clear date for surgical intervention   * drain output improve after Flushing it by IR   - Admit to medicine       -LA elevated resolving   - Was on cefepime and flagyl that was switched to unasyn   - Blood cx: Gram positive rods . Repeat blood cx:pending   - Echocardio:pending   - Cont xarelto as no plan for intervention   * pt will need at least xarelto for 45 days followed by DAPT. doubt will have any plan for elective surgery any time soon  - Was on LR at 100 ml/hr   - IR consult appreciated   - Gen surgery consult appreciated  - ID consult appreciated     2. hx of left atrial appendage occlusive device  -Cont xarelto as there is no surgcial plan     3. Persistent Afib . hx of SSS s/p DC PPM   - Cont home meds    4. hx of CAD s/p CABG/CHF  - Re-start lasix 20mg po bid 10/21/2023    - Cont home meds    5. DVT/GI px.     Sepsis seems related to gram positive bacteremia and possible intra-abdominal infection. echocardio. repeat blood cx. need id follow up tmr.   pt should start working with patient

## 2023-10-22 NOTE — CHART NOTE - NSCHARTNOTEFT_GEN_A_CORE
Blood Cultures positive with Gram positive Rods. Fever last recorded 10/21 18:10.  D/w ID and DCd cefefpime/ flagyll. Ordered Unasyn 3 Grams Q6. F/u culture speciation and sensitivities. Monitor fever curve.

## 2023-10-22 NOTE — PHYSICAL THERAPY INITIAL EVALUATION ADULT - ADDITIONAL COMMENTS
Patient lives with wife in house with 9 steps to enter. Was independent in ADL's and ambulation using cane.

## 2023-10-22 NOTE — PHYSICAL THERAPY INITIAL EVALUATION ADULT - IMPAIRMENTS CONTRIBUTING IMPAIRED BED MOBILITY, REHAB EVAL
CULLEN TRUONG; First Name: Demetrius     GA 26.6 weeks;     Age: 116 d;   PMA: 40+ Birth wt:  607g   MRN: [id number]    COURSE: 26w with cystic BPD, Hx of oesophageal perforation,  hx of  Pneumonia, Feeding and thermal support, contraction alkalosis    INTERVAL EVENTS:   BCPAP 8 40-45% ,  intermittent tachypnea;     Weight (g): 2870+15  Intake (ml/kg/day): 136  Urine output (ml/kg/hr or frequency): 2.0   Stools (frequency): x 4  Other: OC    Growth:     HC (cm): 30.5 (12-11), 30.5 (12-04)  31.5 (12/26)% 0.         [12-13]  Length (cm):  41; % 0 44 (12/26)  Weight %  0.2-->0.3; ADWG (g/day)  30.   (Growth chart used  Francisca) .  *******************************************************  Respiratory: cystic BPD. BCPAP8 40--45%. Did not tolerated weaning. CXR with cystic BPD, hx of recurrent RUL atelectasis. on Diuril 10mg/kg/dose BID.   s/p DART course #3 12/9. Completed 2nd course of  DART 11/2-11/14 ( modified prolong with each stage lasting 5 days)  started per Pulm;  was on Orapred without significant improvement before, extubated on first course of DART ( 10/17-25). On Albuterol q8 and Atrovent q12  Pulmicort BID ( started 11/16--12/28 ).    s/p HFOV; HFJV, CPAP; treated  for clinical Pneumonia through 11/5.    CV: Hemodynamically stable. 9/13 ECHO: PFO, cannot completely rule out small PDA. 9/30 ECHO: Trivial PDA. 10/31 ECHO: No PH.  repeat 11/14: No PH, 12/15 - no pulm Htn.   Heme:  Anemia of prematurity, frequent transfusions, last one on 10/31. 12/19 Hct 33.5% R 4.0%  FEN:  SSC (30cal) 47 ml Q3H OG (132) gtt over 60 mins for GERD sx's  LP 2 ml Q3.S/P Direct hyperbilirubinemia resolved. Was NPO x 21 days due to esophageal perforation.  Contraction alkalosis due to chronic diuretics, s/p Diamox week of 11/ 27  ID:   S/p Clinical PNA on 10/30, treated with 7 days of Cefepime. Neg BCX/ RVP. S/P multiple courses of antibiotics for esophageal perforation and then pneumonia.   Received 2 mo vaccines 12/16-12/20  Neuro:  HUS 9/6, 9/8, 9/12, 10/3: No IVH. Repeat HUS at term-equivalent. Will need MRI PTD due to prolong high oxygen use. ND PTD  Sedation:  Methadone-off 12/7.   Required occasional morphine doses as needed. Melatonin at night starting 12/14.   Ophtho: ROP 11/28 S0Z3,f/u in 6 month  Thermal: open crib equivalent  Social: 12/29 Spoke to both parents over phone and disscussed the need of tracheostomy for this baby, parents stillnot yet decided but says willdecide in next 2 days. (SP) 12/23 Mom updated .12/20 Mom updated at bedside. 12/19 spoke to mom at bedside and discuses the plan of care,  PO feed ,rehab and need for tracheostomy. She does not seems to be willing at this time but will follow. (SP).   Mother updated at the bedside 11/17 by medical team: extensive discussion of current course and future potential need for trach, risk vs benefit, showed tracheostomy to mom and plan to re-eval in1 week. If no significant improvement on vent settings and oxygen requirement will discuss surgical plan. Mother is aware that Asa will need rehab with or without trach. Mom updated in person 12/7re:improvement with the steroids course; possibility to rebound and need for trach should this occur and need for rehab.   2 mo vaccines Completed 12/20.  SLP evaluation.   Meds: Diuril , MVI,  Albuterol q8 , Atrovent q 12,  Melatonin,  Iron 12/5     Labs/Images/Studies:  Monday 1/2 Neolytes, Bili, Nut,hct, Retic  Plan : On BCPAP 8  40--45% oxygen, observe for oxygen requirement. For BPD management will continue with  optimum calories, continue with 30kcal/oz feeding, respiratory management . In view of high respiratory support need,on going   discussion  with parents for possibility of tracheostomy . Mom still reluctant ,we will follow. Awaiting  Rehab bed.   This patient requires ICU care including continuous monitoring and frequent vital sign assessment due to significant risk of cardiorespiratory compromise or decompensation outside of the NICU.   impaired balance/decreased flexibility/decreased strength

## 2023-10-22 NOTE — PHYSICAL THERAPY INITIAL EVALUATION ADULT - PERTINENT HX OF CURRENT PROBLEM, REHAB EVAL
77yoM Hx of CAD s/p CABG, SSS s/p PPM, AF s/p atrial appendage occlusive device (still on AC for first 45 days), presenting with fevers/chills. CT abd/p shows percutaneous cholecystostomy tube in appropriate position. No gallbladder distension. Trace pericholecystic fluid without gallbladder wall edema or significant inflammatory changes. Per IR, no issues with PCT tube.   Abdominal exam reassuring, no abdominal pain, benign. On exam, patient is non-toxic appearing, no abdominal pain, umbilical hernia non painful, PCT tube in place no pus or purulent drainage, with negative Onofre's sign.

## 2023-10-23 ENCOUNTER — TRANSCRIPTION ENCOUNTER (OUTPATIENT)
Age: 78
End: 2023-10-23

## 2023-10-23 VITALS
HEART RATE: 63 BPM | TEMPERATURE: 98 F | DIASTOLIC BLOOD PRESSURE: 76 MMHG | OXYGEN SATURATION: 99 % | SYSTOLIC BLOOD PRESSURE: 172 MMHG | RESPIRATION RATE: 18 BRPM

## 2023-10-23 LAB
ALBUMIN SERPL ELPH-MCNC: 3.2 G/DL — LOW (ref 3.5–5.2)
ALBUMIN SERPL ELPH-MCNC: 3.2 G/DL — LOW (ref 3.5–5.2)
ALP SERPL-CCNC: 123 U/L — HIGH (ref 30–115)
ALP SERPL-CCNC: 123 U/L — HIGH (ref 30–115)
ALT FLD-CCNC: 19 U/L — SIGNIFICANT CHANGE UP (ref 0–41)
ALT FLD-CCNC: 19 U/L — SIGNIFICANT CHANGE UP (ref 0–41)
ANION GAP SERPL CALC-SCNC: 10 MMOL/L — SIGNIFICANT CHANGE UP (ref 7–14)
ANION GAP SERPL CALC-SCNC: 10 MMOL/L — SIGNIFICANT CHANGE UP (ref 7–14)
AST SERPL-CCNC: 36 U/L — SIGNIFICANT CHANGE UP (ref 0–41)
AST SERPL-CCNC: 36 U/L — SIGNIFICANT CHANGE UP (ref 0–41)
BILIRUB SERPL-MCNC: 1.1 MG/DL — SIGNIFICANT CHANGE UP (ref 0.2–1.2)
BILIRUB SERPL-MCNC: 1.1 MG/DL — SIGNIFICANT CHANGE UP (ref 0.2–1.2)
BUN SERPL-MCNC: 9 MG/DL — LOW (ref 10–20)
BUN SERPL-MCNC: 9 MG/DL — LOW (ref 10–20)
CALCIUM SERPL-MCNC: 8.2 MG/DL — LOW (ref 8.4–10.5)
CALCIUM SERPL-MCNC: 8.2 MG/DL — LOW (ref 8.4–10.5)
CHLORIDE SERPL-SCNC: 106 MMOL/L — SIGNIFICANT CHANGE UP (ref 98–110)
CHLORIDE SERPL-SCNC: 106 MMOL/L — SIGNIFICANT CHANGE UP (ref 98–110)
CO2 SERPL-SCNC: 25 MMOL/L — SIGNIFICANT CHANGE UP (ref 17–32)
CO2 SERPL-SCNC: 25 MMOL/L — SIGNIFICANT CHANGE UP (ref 17–32)
CREAT SERPL-MCNC: 0.8 MG/DL — SIGNIFICANT CHANGE UP (ref 0.7–1.5)
CREAT SERPL-MCNC: 0.8 MG/DL — SIGNIFICANT CHANGE UP (ref 0.7–1.5)
CULTURE RESULTS: NO GROWTH — SIGNIFICANT CHANGE UP
CULTURE RESULTS: NO GROWTH — SIGNIFICANT CHANGE UP
CULTURE RESULTS: SIGNIFICANT CHANGE UP
CULTURE RESULTS: SIGNIFICANT CHANGE UP
EGFR: 91 ML/MIN/1.73M2 — SIGNIFICANT CHANGE UP
EGFR: 91 ML/MIN/1.73M2 — SIGNIFICANT CHANGE UP
GLUCOSE SERPL-MCNC: 92 MG/DL — SIGNIFICANT CHANGE UP (ref 70–99)
GLUCOSE SERPL-MCNC: 92 MG/DL — SIGNIFICANT CHANGE UP (ref 70–99)
HCT VFR BLD CALC: 29.1 % — LOW (ref 42–52)
HCT VFR BLD CALC: 29.1 % — LOW (ref 42–52)
HGB BLD-MCNC: 9.1 G/DL — LOW (ref 14–18)
HGB BLD-MCNC: 9.1 G/DL — LOW (ref 14–18)
MAGNESIUM SERPL-MCNC: 2.2 MG/DL — SIGNIFICANT CHANGE UP (ref 1.8–2.4)
MAGNESIUM SERPL-MCNC: 2.2 MG/DL — SIGNIFICANT CHANGE UP (ref 1.8–2.4)
MCHC RBC-ENTMCNC: 24.1 PG — LOW (ref 27–31)
MCHC RBC-ENTMCNC: 24.1 PG — LOW (ref 27–31)
MCHC RBC-ENTMCNC: 31.3 G/DL — LOW (ref 32–37)
MCHC RBC-ENTMCNC: 31.3 G/DL — LOW (ref 32–37)
MCV RBC AUTO: 77 FL — LOW (ref 80–94)
MCV RBC AUTO: 77 FL — LOW (ref 80–94)
NRBC # BLD: 0 /100 WBCS — SIGNIFICANT CHANGE UP (ref 0–0)
NRBC # BLD: 0 /100 WBCS — SIGNIFICANT CHANGE UP (ref 0–0)
ORGANISM # SPEC MICROSCOPIC CNT: SIGNIFICANT CHANGE UP
PLATELET # BLD AUTO: 158 K/UL — SIGNIFICANT CHANGE UP (ref 130–400)
PLATELET # BLD AUTO: 158 K/UL — SIGNIFICANT CHANGE UP (ref 130–400)
PMV BLD: 11.2 FL — HIGH (ref 7.4–10.4)
PMV BLD: 11.2 FL — HIGH (ref 7.4–10.4)
POTASSIUM SERPL-MCNC: 3.6 MMOL/L — SIGNIFICANT CHANGE UP (ref 3.5–5)
POTASSIUM SERPL-MCNC: 3.6 MMOL/L — SIGNIFICANT CHANGE UP (ref 3.5–5)
POTASSIUM SERPL-SCNC: 3.6 MMOL/L — SIGNIFICANT CHANGE UP (ref 3.5–5)
POTASSIUM SERPL-SCNC: 3.6 MMOL/L — SIGNIFICANT CHANGE UP (ref 3.5–5)
PROT SERPL-MCNC: 5.7 G/DL — LOW (ref 6–8)
PROT SERPL-MCNC: 5.7 G/DL — LOW (ref 6–8)
RBC # BLD: 3.78 M/UL — LOW (ref 4.7–6.1)
RBC # BLD: 3.78 M/UL — LOW (ref 4.7–6.1)
RBC # FLD: 20.3 % — HIGH (ref 11.5–14.5)
RBC # FLD: 20.3 % — HIGH (ref 11.5–14.5)
SODIUM SERPL-SCNC: 141 MMOL/L — SIGNIFICANT CHANGE UP (ref 135–146)
SODIUM SERPL-SCNC: 141 MMOL/L — SIGNIFICANT CHANGE UP (ref 135–146)
SPECIMEN SOURCE: SIGNIFICANT CHANGE UP
WBC # BLD: 6.29 K/UL — SIGNIFICANT CHANGE UP (ref 4.8–10.8)
WBC # BLD: 6.29 K/UL — SIGNIFICANT CHANGE UP (ref 4.8–10.8)
WBC # FLD AUTO: 6.29 K/UL — SIGNIFICANT CHANGE UP (ref 4.8–10.8)
WBC # FLD AUTO: 6.29 K/UL — SIGNIFICANT CHANGE UP (ref 4.8–10.8)

## 2023-10-23 PROCEDURE — 93306 TTE W/DOPPLER COMPLETE: CPT | Mod: 26

## 2023-10-23 PROCEDURE — 99239 HOSP IP/OBS DSCHRG MGMT >30: CPT

## 2023-10-23 RX ADMIN — Medication 20 MILLIGRAM(S): at 13:14

## 2023-10-23 RX ADMIN — Medication 20 MILLIGRAM(S): at 05:50

## 2023-10-23 RX ADMIN — CHLORHEXIDINE GLUCONATE 1 APPLICATION(S): 213 SOLUTION TOPICAL at 05:50

## 2023-10-23 RX ADMIN — Medication 50 MILLIGRAM(S): at 05:50

## 2023-10-23 RX ADMIN — AMPICILLIN SODIUM AND SULBACTAM SODIUM 200 GRAM(S): 250; 125 INJECTION, POWDER, FOR SUSPENSION INTRAMUSCULAR; INTRAVENOUS at 05:50

## 2023-10-23 NOTE — PROGRESS NOTE ADULT - ASSESSMENT
77M w/ h/o CAD (s/p CABG), HFimpEF, chronic afib (on Xarelto), SSS (s/p CRT-P), HTN, and DLD p/w fever and chills. Admitted to medicine for cryptogenic sepsis.    # Sepsis secondary to gram positive rods, no obvious source of infection  # Hx Cholecystostomy   - Sepsis present on admission   - Flu, COVID, RSV negative  - UA and CXR unremarkable  - IR consulted for cholecystostomy drain evaluation, but drain working well so no acute intervention offered.  - c/w vancomycin 1500mg IV Q12H (adjust via trough)  - c/w cefepime 2g IV Q8H  - c/w Flagyl 500mg IV Q8H  - BCx positive for gram positive rods  - f/u TTE (ordered to eval for endocarditis after recent LAAO device placement)  - surgery noted no intervention at this time   - per ID, d/c abx for now    # Chronic Atrial Fibrillation  # Sick Sinus Syndrome  - c/w rivaroxaban 15mg PO QHS (do NOT switch to Lovenox/heparin unless absolutely necessary due to bleeding risk)  - c/w ASA EC 81mg PO QD (stroke ppx after WATCHMAN placement; must NOT be stopped unless discussed w/ EP)  - c/w Lopressor 50mg PO BID    # Chronic HFimpEF  - c/w Lopressor 50mg PO BID  - hold Lasix for now i/s/o sepsis (received fluids earlier)  - restart Lasix if pt develops volume overload  - f/u cardiology outpatient to consider GDMT optimization (Toprol XL, ACEi/ARB/ARNI, SGLT2i, etc)    # Coronary Artery Disease  # Hypertension  # Dyslipidemia  # S/P CABGx5 (2014).   - c/w ASA EC 81mg PO QD  - c/w atorvastatin 20mg PO QHS  - c/w Lopressor 50mg PO BID  - Follows w/ Dr. Handy      # DVT prophylaxis: RIvaroxaban  # GI prophylaxis: not indicated  # Diet: DASH/TLC  # Activity: as tolerated  # Code status: Full Code  # Disposition: from home    Pending: f/u repeat cultures    **********   THIS IS AN INCOMPLETE NOTE, FINAL NOTE PENDING   **********  77M w/ h/o CAD (s/p CABG), HFimpEF, chronic afib (on Xarelto), SSS (s/p CRT-P), HTN, and DLD p/w fever and chills. Admitted to medicine for cryptogenic sepsis.    # Sepsis secondary to gram positive rods, no obvious source of infection  # Hx Cholecystostomy   - Sepsis present on admission   - Flu, COVID, RSV negative  - UA and CXR unremarkable  - IR consulted for cholecystostomy drain evaluation, but drain working well so no acute intervention offered.  - c/w vancomycin 1500mg IV Q12H (adjust via trough)  - c/w cefepime 2g IV Q8H  - c/w Flagyl 500mg IV Q8H  - BCx positive for gram positive rods  - f/u TTE (ordered to eval for endocarditis after recent LAAO device placement)  - surgery noted no intervention at this time   - per ID, d/c abx for now    # Chronic Atrial Fibrillation  # Sick Sinus Syndrome  - c/w rivaroxaban 15mg PO QHS (do NOT switch to Lovenox/heparin unless absolutely necessary due to bleeding risk)  - c/w ASA EC 81mg PO QD (stroke ppx after WATCHMAN placement; must NOT be stopped unless discussed w/ EP)  - c/w Lopressor 50mg PO BID    # Chronic HFimpEF  - c/w Lopressor 50mg PO BID  - hold Lasix for now i/s/o sepsis (received fluids earlier)  - restart Lasix if pt develops volume overload  - f/u cardiology outpatient to consider GDMT optimization (Toprol XL, ACEi/ARB/ARNI, SGLT2i, etc)    # Coronary Artery Disease  # Hypertension  # Dyslipidemia  # S/P CABGx5 (2014).   - c/w ASA EC 81mg PO QD  - c/w atorvastatin 20mg PO QHS  - c/w Lopressor 50mg PO BID  - Follows w/ Dr. Handy      # DVT prophylaxis: RIvaroxaban  # GI prophylaxis: not indicated  # Diet: DASH/TLC  # Activity: as tolerated  # Code status: Full Code  # Disposition: from home    Pending: f/u repeat cultures

## 2023-10-23 NOTE — PROGRESS NOTE ADULT - ASSESSMENT
ASSESSMENT  This is a 77M w/ h/o CAD (s/p CABG), HFpEF, chronic afib (on Xarelto), SSS (s/p CRT-P), HTN, and DLD p/w fever and chills. He had cholecystostomy in Apr 2023 and has required multiple drainage exchanges.     IMPRESSION  #Resolved SIRS on admission with no obvious focus of infection  10/19 BCx 1/2 GPR anaerobes > doubt true pathogen  #Lactic acidosis  # CAD (s/p CABG), HFpEF, chronic afib, SSS (s/p CRT-P), HTN, and DLD  #Obesity BMI (kg/m2): 28.9, 28.7    RECOMMENDATIONS  -Could d/c all ABx for now     If any questions, please send a message or call on [employer] Teams  Please continue to update ID with any pertinent new laboratory or radiographic findings.

## 2023-10-23 NOTE — DISCHARGE NOTE NURSING/CASE MANAGEMENT/SOCIAL WORK - NSDCVIVACCINE_GEN_ALL_CORE_FT
Tdap; 22-Nov-2018 08:52; Benito Lovell); [employer]; [id number] (Exp. Date: 23-Oct-2020); IntraMuscular; Deltoid Right.; 0.5 milliLiter(s); VIS (VIS Published: 09-May-2013, VIS Presented: 22-Nov-2018);

## 2023-10-23 NOTE — DISCHARGE NOTE PROVIDER - NSDCFUSCHEDAPPT_GEN_ALL_CORE_FT
Rebeca Ojeda  [facility]  ELECT[address]  Scheduled Appointment: 10/30/2023    [facility]  [address]  Scheduled Appointment: 10/31/2023

## 2023-10-23 NOTE — PROGRESS NOTE ADULT - SUBJECTIVE AND OBJECTIVE BOX
24H events:    Patient is a 77y old Male who presents with a chief complaint of fever (21 Oct 2023 01:51)    Primary diagnosis of Acute sepsis    ED Course:  Patient HD stable on vitals. Labs demonstrated WBC 11.9 and Lactate 2.5. Flu, COVID, RSV neg. CTAP negative for bowel obstruction, pneumatosis, pneumoperitoneum, and ascites. CXR unremarkable.     Hospital Course:  IR flushed cholecystostomy drain, drain is working. Blood cultures positive for gram positive rods. per ID, can d/c abx and monitor.      Today is hospital day 3d. This morning patient was seen and examined at bedside, resting comfortably in bed.    No acute or major events overnight.    Code Status: FULL      PAST MEDICAL & SURGICAL HISTORY  HTN (hypertension)    Obesity    Heart attack    Breast cancer  2001    Osteoarthritis    CAD (coronary artery disease)    [place] (hard of hearing)    Dyslipidemia    Heart failure with improved ejection fraction (HFimpEF)    Chronic atrial fibrillation    S/P CABG x 5 2014    H/O mastectomy  right 2001    Presence of biventricular cardiac pacemaker      SOCIAL HISTORY:  Social History:      ALLERGIES:  No Known Allergies    MEDICATIONS:  STANDING MEDICATIONS  aspirin enteric coated 81 milliGRAM(s) Oral daily  atorvastatin 20 milliGRAM(s) Oral at bedtime  chlorhexidine 2% Cloths 1 Application(s) Topical <User Schedule>  furosemide    Tablet 20 milliGRAM(s) Oral two times a day  influenza  Vaccine (HIGH DOSE) 0.7 milliLiter(s) IntraMuscular once  metoprolol tartrate 50 milliGRAM(s) Oral two times a day  rivaroxaban 15 milliGRAM(s) Oral with dinner    PRN MEDICATIONS  acetaminophen     Tablet .. 650 milliGRAM(s) Oral every 6 hours PRN    VITALS:   T(F): 98.2  HR: 63  BP: 172/76  RR: 18  SpO2: 99%    PHYSICAL EXAM:  GENERAL:   ( x ) NAD, lying in bed comfortably     (  ) obtunded     (  ) lethargic     (  ) somnolent    HEAD:   ( x ) Atraumatic     (  ) hematoma     (  ) laceration (specify location:       )     NECK:  ( x ) Supple     (  ) neck stiffness     (  ) nuchal rigidity     (  )  no JVD     (  ) JVD present ( -- cm)    HEART:  Rate -->     ( x ) normal rate     (  ) bradycardic     (  ) tachycardic  Rhythm -->     ( x ) regular     (  ) regularly irregular     (  ) irregularly irregular  Murmurs -->     ( x ) normal s1s2     (  ) systolic murmur     (  ) diastolic murmur     (  ) continuous murmur      (  ) S3 present     (  ) S4 present    LUNGS:   ( x ) Unlabored respirations     (  ) tachypnea  ( x ) B/L air entry     (  ) decreased breath sounds in:  (location     )    (  ) no adventitious sound     (  ) crackles     (  ) wheezing      (  ) rhonchi      (specify location:       )  (  ) chest wall tenderness (specify location:       )    ABDOMEN:   ( x ) Soft     (  ) tense   |   ( x ) nondistended     (  ) distended   |   (  ) +BS     (  ) hypoactive bowel sounds     (  ) hyperactive bowel sounds  ( x ) nontender     (  ) RUQ tenderness     (  ) RLQ tenderness     (  ) LLQ tenderness     (  ) epigastric tenderness     (  ) diffuse tenderness  (  ) Splenomegaly      (  ) Hepatomegaly      (  ) Jaundice     (  ) ecchymosis     EXTREMITIES:  ( x ) Normal     (  ) Rash     (  ) ecchymosis     (  ) varicose veins      (  ) pitting edema     (  ) non-pitting edema   (  ) ulceration     (  ) gangrene:     (location:     )    NERVOUS SYSTEM:    ( x ) A&Ox3     (  ) confused     (  ) lethargic  CN II-XII:     (  ) Intact     (  ) deficits found     (Specify:     )   Upper extremities:     (  ) no sensorimotor deficits     (  ) weakness     (  ) loss of proprioception/vibration     (  ) loss of touch/temperature (specify:    )  Lower extremities:     (  ) no sensorimotor deficits     (  ) weakness     (  ) loss of proprioception/vibration     (  ) loss of touch/temperature (specify:    )    SKIN:   ( x ) No rashes or lesions     (  ) maculopapular rash     (  ) pustules     (  ) vesicles     (  ) ulcer     (  ) ecchymosis     (specify location:     )      (  ) Central Line:   Date inserted:  Location: (  ) Right IJ     (  ) Left IJ     (  ) Right Fem     (  ) Left Fem    ( x ) Right Cholecystostomy Drain        (  ) pigtail       (  ) PEG tube       (  ) colostomy       (  ) jejunostomy  (  ) U-Dall    LABS:                        9.1    6.29  )-----------( 158      ( 23 Oct 2023 06:57 )             29.1     10-23    141  |  106  |  9<L>  ----------------------------<  92  3.6   |  25  |  0.8    Ca    8.2<L>      23 Oct 2023 06:57  Mg     2.2     10-23    TPro  5.7<L>  /  Alb  3.2<L>  /  TBili  1.1  /  DBili  x   /  AST  36  /  ALT  19  /  AlkPhos  123<H>  10-23      Urinalysis Basic - ( 23 Oct 2023 06:57 )    Color: x / Appearance: x / SG: x / pH: x  Gluc: 92 mg/dL / Ketone: x  / Bili: x / Urobili: x   Blood: x / Protein: x / Nitrite: x   Leuk Esterase: x / RBC: x / WBC x   Sq Epi: x / Non Sq Epi: x / Bacteria: x            Culture - Blood (collected 21 Oct 2023 22:06)  Source: .Blood None  Preliminary Report (23 Oct 2023 06:01):    No growth at 24 hours    Culture - Urine (collected 21 Oct 2023 19:06)  Source: Clean Catch Clean Catch (Midstream)  Final Report (23 Oct 2023 10:13):    No growth

## 2023-10-23 NOTE — DISCHARGE NOTE PROVIDER - CARE PROVIDER_API CALL
Trevon Champagne  Internal Medicine  [address] [address]  Phone: [phone]  Fax: [phone]  Follow Up Time:    Trevon Champagne J  Internal Medicine  [address] [address]  Phone: [phone]  Fax: [phone]  Follow Up Time: 2 weeks

## 2023-10-23 NOTE — DISCHARGE NOTE PROVIDER - ATTENDING DISCHARGE PHYSICAL EXAMINATION:
Gen- elderly M, NAD, non toxic appearing  Eyes- anicteric sclera, non injected conjunctiva, EOMI  ENT- hearing grossly intact, oropharynx clear  Chest- symmetrical chest rise, no accessory muscle use  Cardiac- non tachycardic  Abdominal- non distended  Ext- no clubbing or cyanosis  Skin- warm, dry, intact

## 2023-10-23 NOTE — DISCHARGE NOTE PROVIDER - HOSPITAL COURSE
77M w/ h/o CAD (s/p CABG), HFimpEF, chronic afib (on Xarelto), SSS (s/p CRT-P), HTN, and DLD p/w fever and chills. Symptoms started last night. At the time, pt febrile to 100.5F. Wife reports patient had some difficulty walking to bathroom last night and needed her assistance, (typically walks w/o assistance at baseline). Of note, pt had Watchman placed last month. Additionally, pt had cholecystostomy in Apr 2023 and has required multiple drainage exchanges. Otherwise, no reported CP, palpitations, SOB, abdominal pain, n/v/d, dysuria, or LE swelling. No reported sicks contacts.    ED Course:  Patient HD stable on vitals. Labs demonstrated WBC 11.9 and Lactate 2.5. Flu, COVID, RSV neg. CTAP negative for bowel obstruction, pneumatosis, pneumoperitoneum, and ascites. CXR unremarkable.     Hospital Course:  IR flushed cholecystostomy drain, drain is working. Blood cultures positive for gram positive rods. per ID, can d/c abx and monitor.      # Sepsis secondary to gram positive rods, no obvious source of infection  # Hx Cholecystostomy   - Sepsis present on admission   - Flu, COVID, RSV negative  - UA and CXR unremarkable  - IR consulted for cholecystostomy drain evaluation, but drain working well so no acute intervention offered.  - BCx positive for gram positive rods  - f/u TTE (ordered to eval for endocarditis after recent LAAO device placement)  - surgery noted no intervention at this time   - per ID, d/c abx for now    # Chronic Atrial Fibrillation  # Sick Sinus Syndrome  - c/w rivaroxaban 15mg PO QHS (do NOT switch to Lovenox/heparin unless absolutely necessary due to bleeding risk)  - c/w ASA EC 81mg PO QD (stroke ppx after WATCHMAN placement; must NOT be stopped unless discussed w/ EP)  - c/w Lopressor 50mg PO BID    # Chronic HFimpEF  - c/w Lopressor 50mg PO BID  - hold Lasix for now i/s/o sepsis (received fluids earlier)  - restart Lasix if pt develops volume overload  - f/u cardiology outpatient to consider GDMT optimization (Toprol XL, ACEi/ARB/ARNI, SGLT2i, etc)    # Coronary Artery Disease  # Hypertension  # Dyslipidemia  # S/P CABGx5 (2014).   - c/w ASA EC 81mg PO QD  - c/w atorvastatin 20mg PO QHS  - c/w Lopressor 50mg PO BID  - Follows w/ Dr. Handy 77M w/ h/o CAD (s/p CABG), HFimpEF, chronic afib (on Xarelto), SSS (s/p CRT-P), HTN, and DLD p/w fever and chills. Symptoms started last night. At the time, pt febrile to 100.5F. Wife reports patient had some difficulty walking to bathroom last night and needed her assistance, (typically walks w/o assistance at baseline). Of note, pt had Watchman placed last month. Additionally, pt had cholecystostomy in Apr 2023 and has required multiple drainage exchanges. Otherwise, no reported CP, palpitations, SOB, abdominal pain, n/v/d, dysuria, or LE swelling. No reported sicks contacts.    ED Course:  Patient HD stable on vitals. Labs demonstrated WBC 11.9 and Lactate 2.5. Flu, COVID, RSV neg. CTAP negative for bowel obstruction, pneumatosis, pneumoperitoneum, and ascites. CXR unremarkable.     Hospital Course:  - IR flushed cholecystostomy drain, drain is working. Blood cultures positive for gram positive rods.  - No source of infection is clear however cultures showed actinomyces neuii  - TTE showed improved EF and mild pulm htn.   - pt will be sent home with augmentin 500 bid  - pt needs to follow up with primary care in 2 weeks

## 2023-10-23 NOTE — DISCHARGE NOTE NURSING/CASE MANAGEMENT/SOCIAL WORK - PATIENT PORTAL LINK FT
You can access the FollowMyHealth Patient Portal offered by [facility] by registering at the following website: [email or web address]. By joining [facility]’s FollowMyHealth portal, you will also be able to view your health information using other applications (apps) compatible with our system.

## 2023-10-23 NOTE — DISCHARGE NOTE PROVIDER - NSDCCPCAREPLAN_GEN_ALL_CORE_FT
PRINCIPAL DISCHARGE DIAGNOSIS  Diagnosis: Acute sepsis  Assessment and Plan of Treatment: Sepsis is a serious condition that occurs when the body overreacts to an infection. It is also called systemic inflammatory response syndrome (SIRS) with infection. An infection is usually caused by bacteria that attack the body. The body's defense system normally fights off infection within the affected body part. With sepsis, the body overreacts and causes symptoms to occur throughout the body. This leads to uncontrolled and widespread inflammation and clotting in small blood vessels. Blood flow to different body parts decreases and may lead to organ failure. Your gallbladder drain was evaluated for possible clog, IR flushed the drain and noted that it was working fine. Surgery and IR both recommended no intervention at this time. You were given antibiotics during this hospital admission but it was deemed not necessary to continue on discharge.  Please follow up with your primary care physician for evaluation of your bacterial infection.        SECONDARY DISCHARGE DIAGNOSES  Diagnosis: Leukocytosis  Assessment and Plan of Treatment:      PRINCIPAL DISCHARGE DIAGNOSIS  Diagnosis: Acute sepsis  Assessment and Plan of Treatment: Sepsis is a serious condition that occurs when the body overreacts to an infection. It is also called systemic inflammatory response syndrome (SIRS) with infection. An infection is usually caused by bacteria that attack the body. The body's defense system normally fights off infection within the affected body part. With sepsis, the body overreacts and causes symptoms to occur throughout the body. This leads to uncontrolled and widespread inflammation and clotting in small blood vessels. Blood flow to different body parts decreases and may lead to organ failure. Your gallbladder drain was evaluated for possible clog, IR flushed the drain and noted that it was working fine. Surgery and IR both recommended no intervention at this time. You were given antibiotics during this hospital admission and need you to continue abx augmentin 500 twice a day for 2 weeks.  Please follow up with your primary care physician for evaluation of your bacterial infection.

## 2023-10-23 NOTE — PROGRESS NOTE ADULT - ATTENDING COMMENTS
***My note supersedes any discrepancies that may be above in the resident's note***    77M w/ h/o CAD (s/p CABG), HFimpEF, chronic afib (on Xarelto), SSS (s/p CRT-P), HTN, and DLD p/w fever and chills. Admitted to medicine for cryptogenic sepsis.    # Sepsis   # Hx Cholecystostomy   # Bacteremia  - Sepsis present on admission  - no leukocytosis currently  - HDS currently  - Flu, COVID, RSV negative  - UA and CXR unremarkable  - BCx positive for gram positive rods  - IR consulted for cholecystostomy drain evaluation, but drain working well so no acute intervention offered.  - s/p vancomycin 1500mg IV Q12H, cefepime 2g IV Q8H, Flagyl 500mg IV Q8H--> ID recommending d/c abx for now  - f/u TTE (ordered to eval for endocarditis after recent LAAO device placement)  - surgery noted no intervention at this time     # Chronic Atrial Fibrillation  # Sick Sinus Syndrome  - c/w rivaroxaban 15mg PO QHS (do NOT switch to Lovenox/heparin unless absolutely necessary due to bleeding risk)  - c/w ASA EC 81mg PO QD (stroke ppx after WATCHMAN placement; must NOT be stopped unless discussed w/ EP)  - c/w Lopressor 50mg PO BID    # Chronic HFimpEF  - c/w Lopressor 50mg PO BID  - hold Lasix for now i/s/o sepsis (received fluids earlier)  - restart Lasix if pt develops volume overload  - f/u cardiology outpatient to consider GDMT optimization (Toprol XL, ACEi/ARB/ARNI, SGLT2i, etc)    # Coronary Artery Disease  # Hypertension  # Dyslipidemia  # S/P CABGx5 (2014).   - c/w ASA EC 81mg PO QD  - c/w atorvastatin 20mg PO QHS  - c/w Lopressor 50mg PO BID  - Follows w/ Dr. Handy      # DVT prophylaxis: Rivaroxaban  # GI prophylaxis: not indicated  # Diet: DASH/TLC  # Activity: as tolerated  # Code status: Full Code  # Disposition: from home

## 2023-10-23 NOTE — PROGRESS NOTE ADULT - SUBJECTIVE AND OBJECTIVE BOX
WAGNER SEGURA  77y, Male    All available historical data reviewed    OVERNIGHT EVENTS:  feels well and has no new complaints  No fevers  no abd pain  no  issues    ROS:  General: Denies rigors, nightsweats  HEENT: Denies headache, rhinorrhea, sore throat, eye pain  CV: Denies CP, palpitations  PULM: Denies wheezing, hemoptysis  GI: Denies hematemesis, hematochezia, melena  : Denies discharge, hematuria  MSK: Denies arthralgias, myalgias  SKIN: Denies rash, lesions  NEURO: Denies paresthesias, weakness  PSYCH: Denies depression, anxiety    VITALS:  T(F): 98.2, Max: 98.8 (10-23-23 @ 00:20)  HR: 63  BP: 172/76  RR: 18Vital Signs Last 24 Hrs  T(C): 36.8 (23 Oct 2023 08:26), Max: 37.1 (23 Oct 2023 00:20)  T(F): 98.2 (23 Oct 2023 08:26), Max: 98.8 (23 Oct 2023 00:20)  HR: 63 (23 Oct 2023 08:26) (60 - 63)  BP: 172/76 (23 Oct 2023 08:26) (157/72 - 176/76)  BP(mean): 104 (23 Oct 2023 00:20) (104 - 104)  RR: 18 (23 Oct 2023 08:26) (18 - 20)  SpO2: 99% (23 Oct 2023 08:26) (98% - 99%)    Parameters below as of 23 Oct 2023 08:26  Patient On (Oxygen Delivery Method): room air        TESTS & MEASUREMENTS:                        9.1    6.29  )-----------( 158      ( 23 Oct 2023 06:57 )             29.1     10-22    139  |  105  |  13  ----------------------------<  107<H>  3.9   |  24  |  0.8    Ca    8.2<L>      22 Oct 2023 06:27  Mg     2.3     10-22    TPro  5.6<L>  /  Alb  3.5  /  TBili  0.7  /  DBili  x   /  AST  16  /  ALT  10  /  AlkPhos  67  10-22    LIVER FUNCTIONS - ( 22 Oct 2023 06:27 )  Alb: 3.5 g/dL / Pro: 5.6 g/dL / ALK PHOS: 67 U/L / ALT: 10 U/L / AST: 16 U/L / GGT: x             Culture - Blood (collected 10-21-23 @ 22:06)  Source: .Blood None  Preliminary Report (10-23-23 @ 06:01):    No growth at 24 hours    Culture - Urine (collected 10-20-23 @ 03:20)  Source: Clean Catch Clean Catch (Midstream)  Final Report (10-21-23 @ 09:04):    No growth    Culture - Blood (collected 10-19-23 @ 23:15)  Source: .Blood Blood-Peripheral  Gram Stain (10-22-23 @ 05:12):    Growth in anaerobic bottle: Gram Positive Rods  Preliminary Report (10-22-23 @ 05:12):    Growth in anaerobic bottle: Gram Positive Rods    Direct identification is available within approximately 3-5    hours either by Blood Panel Multiplexed PCR or Direct    MALDI-TOF. Details: [email or web address]  Organism: Blood Culture PCR (10-22-23 @ 06:27)  Organism: Blood Culture PCR (10-22-23 @ 06:27)      Method Type: PCR      -  Blood PCR Panel: NEG    Culture - Blood (collected 10-19-23 @ 23:15)  Source: .Blood Blood-Peripheral  Preliminary Report (10-23-23 @ 09:01):    No growth at 72 Hours      Urinalysis Basic - ( 22 Oct 2023 06:27 )    Color: x / Appearance: x / SG: x / pH: x  Gluc: 107 mg/dL / Ketone: x  / Bili: x / Urobili: x   Blood: x / Protein: x / Nitrite: x   Leuk Esterase: x / RBC: x / WBC x   Sq Epi: x / Non Sq Epi: x / Bacteria: x          RADIOLOGY & ADDITIONAL TESTS:  Personal review of radiological diagnostics performed  Echo and EKG results noted when applicable.     MEDICATIONS:  acetaminophen     Tablet .. 650 milliGRAM(s) Oral every 6 hours PRN  ampicillin/sulbactam  IVPB 3 Gram(s) IV Intermittent every 6 hours  ampicillin/sulbactam  IVPB      aspirin enteric coated 81 milliGRAM(s) Oral daily  atorvastatin 20 milliGRAM(s) Oral at bedtime  chlorhexidine 2% Cloths 1 Application(s) Topical <User Schedule>  furosemide    Tablet 20 milliGRAM(s) Oral two times a day  influenza  Vaccine (HIGH DOSE) 0.7 milliLiter(s) IntraMuscular once  metoprolol tartrate 50 milliGRAM(s) Oral two times a day  rivaroxaban 15 milliGRAM(s) Oral with dinner      ANTIBIOTICS:  ampicillin/sulbactam  IVPB 3 Gram(s) IV Intermittent every 6 hours  ampicillin/sulbactam  IVPB

## 2023-10-23 NOTE — DISCHARGE NOTE PROVIDER - NSCORESITESY/N_GEN_A_CORE_RD
ONGOING DISCHARGE PLAN:    Patient is alert and oriented x4. Spoke with patient and patients daughter regarding discharge plan and patient confirms that plan is still home. Patient reports she no longer wants [facility] service. Was previously set up with Teodoro Swanson notified of change. Patient set up with portable oxygen concentrator. Mariann Goodson from [facility] to deliver [address] to bedside today. Anticipate discharge today. Will continue to follow for additional discharge needs.     Electronically signed by Syed Jackson RN on 3/17/2022 at 1:01 PM No

## 2023-10-23 NOTE — DISCHARGE NOTE PROVIDER - NSDCMRMEDTOKEN_GEN_ALL_CORE_FT
Aspirin Enteric Coated 81 mg oral delayed release tablet: 1 tab(s) orally once a day  atorvastatin 20 mg oral tablet: 1 tab(s) orally once a day (at bedtime)  furosemide 20 mg oral tablet: 1 tab(s) orally 2 times a day  metoprolol tartrate 50 mg oral tablet: 1 tab(s) orally 2 times a day  rivaroxaban 15 mg oral tablet: 1 tab(s) orally once a day (at bedtime)   amoxicillin-clavulanate 500 mg-125 mg oral tablet: 500 milligram(s) orally 2 times a day  Aspirin Enteric Coated 81 mg oral delayed release tablet: 1 tab(s) orally once a day  atorvastatin 20 mg oral tablet: 1 tab(s) orally once a day (at bedtime)  furosemide 20 mg oral tablet: 1 tab(s) orally 2 times a day  metoprolol tartrate 50 mg oral tablet: 1 tab(s) orally 2 times a day  rivaroxaban 15 mg oral tablet: 1 tab(s) orally once a day (at bedtime)

## 2023-10-25 LAB
CULTURE RESULTS: SIGNIFICANT CHANGE UP
CULTURE RESULTS: SIGNIFICANT CHANGE UP
SPECIMEN SOURCE: SIGNIFICANT CHANGE UP
SPECIMEN SOURCE: SIGNIFICANT CHANGE UP

## 2023-10-27 PROBLEM — E78.5 HYPERLIPIDEMIA, UNSPECIFIED: Chronic | Status: ACTIVE | Noted: 2023-10-20

## 2023-10-27 PROBLEM — I50.22 CHRONIC SYSTOLIC (CONGESTIVE) HEART FAILURE: Chronic | Status: ACTIVE | Noted: 2023-10-20

## 2023-10-30 ENCOUNTER — APPOINTMENT (OUTPATIENT)
Dept: ELECTROPHYSIOLOGY | Facility: CLINIC | Age: 78
End: 2023-10-30
Payer: MEDICARE

## 2023-10-30 VITALS
WEIGHT: 225 LBS | HEIGHT: 74 IN | BODY MASS INDEX: 28.88 KG/M2 | SYSTOLIC BLOOD PRESSURE: 140 MMHG | DIASTOLIC BLOOD PRESSURE: 82 MMHG | HEART RATE: 64 BPM

## 2023-10-30 DIAGNOSIS — I49.5 SICK SINUS SYNDROME: ICD-10-CM

## 2023-10-30 DIAGNOSIS — Z23 ENCOUNTER FOR IMMUNIZATION: ICD-10-CM

## 2023-10-30 DIAGNOSIS — I51.3 INTRACARDIAC THROMBOSIS, NOT ELSEWHERE CLASSIFIED: ICD-10-CM

## 2023-10-30 DIAGNOSIS — I25.2 OLD MYOCARDIAL INFARCTION: ICD-10-CM

## 2023-10-30 DIAGNOSIS — I47.29 OTHER VENTRICULAR TACHYCARDIA: ICD-10-CM

## 2023-10-30 DIAGNOSIS — E66.9 OBESITY, UNSPECIFIED: ICD-10-CM

## 2023-10-30 DIAGNOSIS — E78.00 PURE HYPERCHOLESTEROLEMIA, UNSPECIFIED: ICD-10-CM

## 2023-10-30 DIAGNOSIS — I48.19 OTHER PERSISTENT ATRIAL FIBRILLATION: ICD-10-CM

## 2023-10-30 DIAGNOSIS — Z87.891 PERSONAL HISTORY OF NICOTINE DEPENDENCE: ICD-10-CM

## 2023-10-30 DIAGNOSIS — K80.20 CALCULUS OF GALLBLADDER WITHOUT CHOLECYSTITIS WITHOUT OBSTRUCTION: ICD-10-CM

## 2023-10-30 DIAGNOSIS — Z95.0 PRESENCE OF CARDIAC PACEMAKER: ICD-10-CM

## 2023-10-30 DIAGNOSIS — I11.0 HYPERTENSIVE HEART DISEASE WITH HEART FAILURE: ICD-10-CM

## 2023-10-30 DIAGNOSIS — Z90.11 ACQUIRED ABSENCE OF RIGHT BREAST AND NIPPLE: ICD-10-CM

## 2023-10-30 DIAGNOSIS — I27.20 PULMONARY HYPERTENSION, UNSPECIFIED: ICD-10-CM

## 2023-10-30 DIAGNOSIS — E78.5 HYPERLIPIDEMIA, UNSPECIFIED: ICD-10-CM

## 2023-10-30 DIAGNOSIS — H91.90 UNSPECIFIED HEARING LOSS, UNSPECIFIED EAR: ICD-10-CM

## 2023-10-30 DIAGNOSIS — I50.9 HEART FAILURE, UNSPECIFIED: ICD-10-CM

## 2023-10-30 DIAGNOSIS — L89.311 PRESSURE ULCER OF RIGHT BUTTOCK, STAGE 1: ICD-10-CM

## 2023-10-30 DIAGNOSIS — Z20.822 CONTACT WITH AND (SUSPECTED) EXPOSURE TO COVID-19: ICD-10-CM

## 2023-10-30 DIAGNOSIS — Z79.82 LONG TERM (CURRENT) USE OF ASPIRIN: ICD-10-CM

## 2023-10-30 DIAGNOSIS — I25.10 ATHEROSCLEROTIC HEART DISEASE OF NATIVE CORONARY ARTERY WITHOUT ANGINA PECTORIS: ICD-10-CM

## 2023-10-30 DIAGNOSIS — Z95.1 PRESENCE OF AORTOCORONARY BYPASS GRAFT: ICD-10-CM

## 2023-10-30 DIAGNOSIS — Z85.3 PERSONAL HISTORY OF MALIGNANT NEOPLASM OF BREAST: ICD-10-CM

## 2023-10-30 DIAGNOSIS — A41.89 OTHER SPECIFIED SEPSIS: ICD-10-CM

## 2023-10-30 PROCEDURE — 93000 ELECTROCARDIOGRAM COMPLETE: CPT | Mod: 59

## 2023-10-30 PROCEDURE — 93281 PM DEVICE PROGR EVAL MULTI: CPT

## 2023-10-30 PROCEDURE — 99215 OFFICE O/P EST HI 40 MIN: CPT

## 2023-10-31 ENCOUNTER — APPOINTMENT (OUTPATIENT)
Dept: ORTHOPEDIC SURGERY | Facility: CLINIC | Age: 78
End: 2023-10-31
Payer: MEDICARE

## 2023-10-31 PROCEDURE — 20611 DRAIN/INJ JOINT/BURSA W/US: CPT | Mod: 50

## 2023-10-31 PROCEDURE — 99213 OFFICE O/P EST LOW 20 MIN: CPT | Mod: 25

## 2023-11-03 ENCOUNTER — APPOINTMENT (OUTPATIENT)
Dept: INTERVENTIONAL RADIOLOGY/VASCULAR | Facility: CLINIC | Age: 78
End: 2023-11-03
Payer: MEDICARE

## 2023-11-03 VITALS
HEART RATE: 60 BPM | SYSTOLIC BLOOD PRESSURE: 136 MMHG | OXYGEN SATURATION: 92 % | TEMPERATURE: 98.9 F | DIASTOLIC BLOOD PRESSURE: 73 MMHG

## 2023-11-03 PROCEDURE — 99213 OFFICE O/P EST LOW 20 MIN: CPT

## 2023-11-06 ENCOUNTER — OUTPATIENT (OUTPATIENT)
Dept: OUTPATIENT SERVICES | Facility: HOSPITAL | Age: 78
LOS: 1 days | End: 2023-11-06
Payer: MEDICARE

## 2023-11-06 VITALS
RESPIRATION RATE: 16 BRPM | DIASTOLIC BLOOD PRESSURE: 67 MMHG | OXYGEN SATURATION: 99 % | TEMPERATURE: 98 F | WEIGHT: 225.09 LBS | SYSTOLIC BLOOD PRESSURE: 151 MMHG | HEIGHT: 74 IN | HEART RATE: 60 BPM

## 2023-11-06 DIAGNOSIS — I48.0 PAROXYSMAL ATRIAL FIBRILLATION: ICD-10-CM

## 2023-11-06 DIAGNOSIS — Z01.818 ENCOUNTER FOR OTHER PREPROCEDURAL EXAMINATION: ICD-10-CM

## 2023-11-06 DIAGNOSIS — Z95.0 PRESENCE OF CARDIAC PACEMAKER: Chronic | ICD-10-CM

## 2023-11-06 DIAGNOSIS — Z90.10 ACQUIRED ABSENCE OF UNSPECIFIED BREAST AND NIPPLE: Chronic | ICD-10-CM

## 2023-11-06 DIAGNOSIS — Z95.1 PRESENCE OF AORTOCORONARY BYPASS GRAFT: Chronic | ICD-10-CM

## 2023-11-06 LAB
ALBUMIN SERPL ELPH-MCNC: 4.3 G/DL — SIGNIFICANT CHANGE UP (ref 3.5–5.2)
ALBUMIN SERPL ELPH-MCNC: 4.3 G/DL — SIGNIFICANT CHANGE UP (ref 3.5–5.2)
ALP SERPL-CCNC: 94 U/L — SIGNIFICANT CHANGE UP (ref 30–115)
ALP SERPL-CCNC: 94 U/L — SIGNIFICANT CHANGE UP (ref 30–115)
ALT FLD-CCNC: 8 U/L — SIGNIFICANT CHANGE UP (ref 0–41)
ALT FLD-CCNC: 8 U/L — SIGNIFICANT CHANGE UP (ref 0–41)
ANION GAP SERPL CALC-SCNC: 12 MMOL/L — SIGNIFICANT CHANGE UP (ref 7–14)
ANION GAP SERPL CALC-SCNC: 12 MMOL/L — SIGNIFICANT CHANGE UP (ref 7–14)
AST SERPL-CCNC: 15 U/L — SIGNIFICANT CHANGE UP (ref 0–41)
AST SERPL-CCNC: 15 U/L — SIGNIFICANT CHANGE UP (ref 0–41)
BASOPHILS # BLD AUTO: 0.14 K/UL — SIGNIFICANT CHANGE UP (ref 0–0.2)
BASOPHILS # BLD AUTO: 0.14 K/UL — SIGNIFICANT CHANGE UP (ref 0–0.2)
BASOPHILS NFR BLD AUTO: 1.9 % — HIGH (ref 0–1)
BASOPHILS NFR BLD AUTO: 1.9 % — HIGH (ref 0–1)
BILIRUB SERPL-MCNC: 0.7 MG/DL — SIGNIFICANT CHANGE UP (ref 0.2–1.2)
BILIRUB SERPL-MCNC: 0.7 MG/DL — SIGNIFICANT CHANGE UP (ref 0.2–1.2)
BUN SERPL-MCNC: 10 MG/DL — SIGNIFICANT CHANGE UP (ref 10–20)
BUN SERPL-MCNC: 10 MG/DL — SIGNIFICANT CHANGE UP (ref 10–20)
CALCIUM SERPL-MCNC: 9 MG/DL — SIGNIFICANT CHANGE UP (ref 8.4–10.5)
CALCIUM SERPL-MCNC: 9 MG/DL — SIGNIFICANT CHANGE UP (ref 8.4–10.5)
CHLORIDE SERPL-SCNC: 103 MMOL/L — SIGNIFICANT CHANGE UP (ref 98–110)
CHLORIDE SERPL-SCNC: 103 MMOL/L — SIGNIFICANT CHANGE UP (ref 98–110)
CO2 SERPL-SCNC: 22 MMOL/L — SIGNIFICANT CHANGE UP (ref 17–32)
CO2 SERPL-SCNC: 22 MMOL/L — SIGNIFICANT CHANGE UP (ref 17–32)
CREAT SERPL-MCNC: 0.7 MG/DL — SIGNIFICANT CHANGE UP (ref 0.7–1.5)
CREAT SERPL-MCNC: 0.7 MG/DL — SIGNIFICANT CHANGE UP (ref 0.7–1.5)
EGFR: 94 ML/MIN/1.73M2 — SIGNIFICANT CHANGE UP
EGFR: 94 ML/MIN/1.73M2 — SIGNIFICANT CHANGE UP
EOSINOPHIL # BLD AUTO: 0.23 K/UL — SIGNIFICANT CHANGE UP (ref 0–0.7)
EOSINOPHIL # BLD AUTO: 0.23 K/UL — SIGNIFICANT CHANGE UP (ref 0–0.7)
EOSINOPHIL NFR BLD AUTO: 3.1 % — SIGNIFICANT CHANGE UP (ref 0–8)
EOSINOPHIL NFR BLD AUTO: 3.1 % — SIGNIFICANT CHANGE UP (ref 0–8)
GLUCOSE SERPL-MCNC: 96 MG/DL — SIGNIFICANT CHANGE UP (ref 70–99)
GLUCOSE SERPL-MCNC: 96 MG/DL — SIGNIFICANT CHANGE UP (ref 70–99)
HCT VFR BLD CALC: 32 % — LOW (ref 42–52)
HCT VFR BLD CALC: 32 % — LOW (ref 42–52)
HGB BLD-MCNC: 9.9 G/DL — LOW (ref 14–18)
HGB BLD-MCNC: 9.9 G/DL — LOW (ref 14–18)
IMM GRANULOCYTES NFR BLD AUTO: 0.3 % — SIGNIFICANT CHANGE UP (ref 0.1–0.3)
IMM GRANULOCYTES NFR BLD AUTO: 0.3 % — SIGNIFICANT CHANGE UP (ref 0.1–0.3)
LYMPHOCYTES # BLD AUTO: 2.2 K/UL — SIGNIFICANT CHANGE UP (ref 1.2–3.4)
LYMPHOCYTES # BLD AUTO: 2.2 K/UL — SIGNIFICANT CHANGE UP (ref 1.2–3.4)
LYMPHOCYTES # BLD AUTO: 30 % — SIGNIFICANT CHANGE UP (ref 20.5–51.1)
LYMPHOCYTES # BLD AUTO: 30 % — SIGNIFICANT CHANGE UP (ref 20.5–51.1)
MCHC RBC-ENTMCNC: 24.3 PG — LOW (ref 27–31)
MCHC RBC-ENTMCNC: 24.3 PG — LOW (ref 27–31)
MCHC RBC-ENTMCNC: 30.9 G/DL — LOW (ref 32–37)
MCHC RBC-ENTMCNC: 30.9 G/DL — LOW (ref 32–37)
MCV RBC AUTO: 78.4 FL — LOW (ref 80–94)
MCV RBC AUTO: 78.4 FL — LOW (ref 80–94)
MONOCYTES # BLD AUTO: 0.58 K/UL — SIGNIFICANT CHANGE UP (ref 0.1–0.6)
MONOCYTES # BLD AUTO: 0.58 K/UL — SIGNIFICANT CHANGE UP (ref 0.1–0.6)
MONOCYTES NFR BLD AUTO: 7.9 % — SIGNIFICANT CHANGE UP (ref 1.7–9.3)
MONOCYTES NFR BLD AUTO: 7.9 % — SIGNIFICANT CHANGE UP (ref 1.7–9.3)
NEUTROPHILS # BLD AUTO: 4.17 K/UL — SIGNIFICANT CHANGE UP (ref 1.4–6.5)
NEUTROPHILS # BLD AUTO: 4.17 K/UL — SIGNIFICANT CHANGE UP (ref 1.4–6.5)
NEUTROPHILS NFR BLD AUTO: 56.8 % — SIGNIFICANT CHANGE UP (ref 42.2–75.2)
NEUTROPHILS NFR BLD AUTO: 56.8 % — SIGNIFICANT CHANGE UP (ref 42.2–75.2)
NRBC # BLD: 0 /100 WBCS — SIGNIFICANT CHANGE UP (ref 0–0)
NRBC # BLD: 0 /100 WBCS — SIGNIFICANT CHANGE UP (ref 0–0)
PLATELET # BLD AUTO: 370 K/UL — SIGNIFICANT CHANGE UP (ref 130–400)
PLATELET # BLD AUTO: 370 K/UL — SIGNIFICANT CHANGE UP (ref 130–400)
PMV BLD: 10.5 FL — HIGH (ref 7.4–10.4)
PMV BLD: 10.5 FL — HIGH (ref 7.4–10.4)
POTASSIUM SERPL-MCNC: 5.2 MMOL/L — HIGH (ref 3.5–5)
POTASSIUM SERPL-MCNC: 5.2 MMOL/L — HIGH (ref 3.5–5)
POTASSIUM SERPL-SCNC: 5.2 MMOL/L — HIGH (ref 3.5–5)
POTASSIUM SERPL-SCNC: 5.2 MMOL/L — HIGH (ref 3.5–5)
PROT SERPL-MCNC: 7 G/DL — SIGNIFICANT CHANGE UP (ref 6–8)
PROT SERPL-MCNC: 7 G/DL — SIGNIFICANT CHANGE UP (ref 6–8)
RBC # BLD: 4.08 M/UL — LOW (ref 4.7–6.1)
RBC # BLD: 4.08 M/UL — LOW (ref 4.7–6.1)
RBC # FLD: 19 % — HIGH (ref 11.5–14.5)
RBC # FLD: 19 % — HIGH (ref 11.5–14.5)
SODIUM SERPL-SCNC: 137 MMOL/L — SIGNIFICANT CHANGE UP (ref 135–146)
SODIUM SERPL-SCNC: 137 MMOL/L — SIGNIFICANT CHANGE UP (ref 135–146)
WBC # BLD: 7.34 K/UL — SIGNIFICANT CHANGE UP (ref 4.8–10.8)
WBC # BLD: 7.34 K/UL — SIGNIFICANT CHANGE UP (ref 4.8–10.8)
WBC # FLD AUTO: 7.34 K/UL — SIGNIFICANT CHANGE UP (ref 4.8–10.8)
WBC # FLD AUTO: 7.34 K/UL — SIGNIFICANT CHANGE UP (ref 4.8–10.8)

## 2023-11-06 PROCEDURE — 80053 COMPREHEN METABOLIC PANEL: CPT

## 2023-11-06 PROCEDURE — 99214 OFFICE O/P EST MOD 30 MIN: CPT | Mod: 25

## 2023-11-06 PROCEDURE — 93010 ELECTROCARDIOGRAM REPORT: CPT

## 2023-11-06 PROCEDURE — 85025 COMPLETE CBC W/AUTO DIFF WBC: CPT

## 2023-11-06 PROCEDURE — 36415 COLL VENOUS BLD VENIPUNCTURE: CPT

## 2023-11-06 PROCEDURE — 93005 ELECTROCARDIOGRAM TRACING: CPT

## 2023-11-06 NOTE — H&P PST ADULT - GASTROINTESTINAL COMMENTS
mary drainage present, DSD  covering on, clean and dry, checked by visiting nurse twice weekly mary drainage present, DSD covering on, clean and dry, checked by visiting nurse twice weekly

## 2023-11-06 NOTE — H&P PST ADULT - HISTORY OF PRESENT ILLNESS
78 yo male presents for PAST in preparation for OTIS ONLY (POST WATCHMAN)  ERP?: UnavailableLaterality: N/ALength of Procedure: 60 Minutes  Pt states that he is scheduled for "to check if  watchman is working, so he can be ready cholecystectomy". Currently has a tube, visiting nurse checks it twice weekly and is on oral antibiotics. Pt denies any pain at this time. Watchman was done 9/26/23 by Dr Montiel.   PATIENT CURRENTLY DENIES CHEST PAIN    PALPITATIONS,  DYSURIA, OR URI WITHIN THE IN PAST 2 WEEKS    -Denies travel outside the [place] in the past 30 days  -Patient denies any signs or symptoms of COVID 19 and denies contact with known positive individuals.    -Patient was instructed to quarantine pre-procedure, practice exposure control measures, continue to self-monitor and report any concerns to their proceduralist.  -Pt advised self quarantine till day of procedure    ANESTHESIA ALERT  NO--Difficult Airway  NO--History of neck surgery or radiation  NO--Limited ROM of neck  NO--History of Malignant hyperthermia  NO--No personal or family history of Pseudocholinesterase deficiency.  NO--Prior Anesthesia Complication  NO--Latex Allergy  NO--Loose teeth  NO--History of Rheumatoid Arthritis  YES--Bleeding risk  NO--AUGUST  YES--Implants-PPM/Watchman   NO--Other  Duke Activity Status Index (DASI) from [employer]  on 11/6/2023  ** All calculations should be rechecked by clinician prior to use **    RESULT SUMMARY:  18.95 points  The higher the score (maximum 58.2), the higher the functional status.    5.07 METs        INPUTS:  Take care of self —> 2.75 = Yes  Walk indoors —> 1.75 = Yes  Walk 1&ndash;2 blocks on level ground —> 2.75 = Yes  Climb a flight of stairs or walk up a hill —> 5.5 = Yes  Run a short distance —> 0 = No  Do light work around the house —> 2.7 = Yes  Do moderate work around the house —> 3.5 = Yes  Do heavy work around the house —> 0 = No  Do yardwork —> 0 = No  Have sexual relations —> 0 = No  Participate in moderate recreational activities —> 0 = No  Participate in strenuous sports —> 0 = No  Revised Cardiac Risk Index for Pre-Operative Risk from [employer]  on 11/6/2023  ** All calculations should be rechecked by clinician prior to use **    RESULT SUMMARY:  2 points  Class III Risk    10.1 %  30-day risk of death, MI, or cardiac arrest    From [employer]ppe 2017, based on pooled data from 5 high quality external validations (4 prospective). These numbers are higher than those often quoted from the now-outdated original study (Juan F 1999). See Evidence for details.      INPUTS:  Elevated-risk surgery —> 1 = Yes  History of ischemic heart disease —> 1 = Yes  History of congestive heart failure —> 0 = No  History of cerebrovascular disease —> 0 = No  Pre-operative treatment with insulin —> 0 = No  Pre-operative creatinine >2 mg/dL / 176.8 µmol/L —> 0 = No    -PT DENIES ANY RASHES, ABRASION, OR OPEN WOUNDS   -Pt denies any suicidal ideation or thoughts.  Pt states not a threat to self or others.  AS PER THE PT, THIS IS HIS/HER COMPLETE MEDICAL AND SURGICAL HX, INCLUDING MEDICATIONS PRESCRIBED AND OVER THE COUNTER  Patient verbalized understanding of instructions and was given the opportunity to ask questions and have them answered.   78 yo male presents for PAST in preparation for OTIS ONLY (POST WATCHMAN)  ERP?: UnavailableLaterality: N/ALength of Procedure: 60 Minutes  Pt states that he is scheduled for "to check if  watchman is working, so he can be ready cholecystectomy". Currently has a tube, visiting nurse checks it twice weekly and is on oral antibiotics. Pt denies any pain at this time. Watchman was done 9/26/23 by Dr Montiel.   PATIENT CURRENTLY DENIES CHEST PAIN    PALPITATIONS,  DYSURIA, OR URI WITHIN THE IN PAST 2 WEEKS    -Den[place] travel outside the [place] in the past 30 days  -Patient denies any signs or symptoms of COVID 19 and denies contact with known positive individuals.    -Patient was instructed to quarantine pre-procedure, practice exposure control measures, continue to self-monitor and report any concerns to their proceduralist.  -Pt advised self quarantine till day of procedure    ANESTHESIA ALERT  NO--Difficult Airway  NO--History of neck surgery or radiation  NO--Limited ROM of neck  NO--History of Malignant hyperthermia  NO--No personal or family history of Pseudocholinesterase deficiency.  NO--Prior Anesthesia Complication  NO--Latex Allergy  NO--Loose teeth  NO--History of Rheumatoid Arthritis  YES--Bleeding risk  NO--AUGUST  YES--Implants-PPM/Watchman   NO--Other  Duke Activity Status Index (DASI) from [employer]  on 11/6/2023  ** All calculations should be rechecked by clinician prior to use **    RESULT SUMMARY:  18.95 points  The higher the score (maximum 58.2), the higher the functional status.    5.07 METs    INPUTS:  Take care of self —> 2.75 = Yes  Walk indoors —> 1.75 = Yes  Walk 1&ndash;2 blocks on level ground —> 2.75 = Yes  Climb a flight of stairs or walk up a hill —> 5.5 = Yes  Run a short distance —> 0 = No  Do light work around the house —> 2.7 = Yes  Do moderate work around the house —> 3.5 = Yes  Do heavy work around the house —> 0 = No  Do yardwork —> 0 = No  Have sexual relations —> 0 = No  Participate in moderate recreational activities —> 0 = No  Participate in strenuous sports —> 0 = No  Revised Cardiac Risk Index for Pre-Operative Risk from [employer]  on 11/6/2023  ** All calculations should be rechecked by clinician prior to use **    RESULT SUMMARY:  2 points  Class III Risk    10.1 %  30-day risk of death, MI, or cardiac arrest    From Duceppe 2017, based on pooled data from 5 high quality external validations (4 prospective). These numbers are higher than those often quoted from the now-outdated original study (Juan F 1999). See Evidence for details.  INPUTS:  Elevated-risk surgery —> 1 = Yes  History of ischemic heart disease —> 1 = Yes  History of congestive heart failure —> 0 = No  History of cerebrovascular disease —> 0 = No  Pre-operative treatment with insulin —> 0 = No  Pre-operative creatinine >2 mg/dL / 176.8 µmol/L —> 0 = No    -PT DENIES ANY RASHES, ABRASION, OR OPEN WOUNDS   -Pt denies any suicidal ideation or thoughts.  Pt states not a threat to self or others.  AS PER THE PT, THIS IS HIS/HER COMPLETE MEDICAL AND SURGICAL HX, INCLUDING MEDICATIONS PRESCRIBED AND OVER THE COUNTER  Patient verbalized understanding of instructions and was given the opportunity to ask questions and have them answered.   76 yo male presents for PAST in preparation for OTIS ONLY (POST WATCHMAN)  ERP?: Unavailable Laterality: N/A Length of Procedure: 60 Minutes  Pt states that he is scheduled for "to check if  watchman is working, so he can be ready cholecystectomy". Currently has a tube, visiting nurse checks it twice weekly and is on oral antibiotics. Pt denies any pain at this time. Watchman was done 9/26/23 by Dr Montiel.     PATIENT CURRENTLY DENIES CHEST PAIN    PALPITATIONS,  DYSURIA, OR URI WITHIN THE IN PAST 2 WEEKS    -Den[place] travel outside the [place] in the past 30 days  -Patient denies any signs or symptoms of COVID 19 and denies contact with known positive individuals.    -Patient was instructed to quarantine pre-procedure, practice exposure control measures, continue to self-monitor and report any concerns to their proceduralist.  -Pt advised self quarantine till day of procedure    ANESTHESIA ALERT  NO--Difficult Airway  NO--History of neck surgery or radiation  NO--Limited ROM of neck  NO--History of Malignant hyperthermia  NO--No personal or family history of Pseudocholinesterase deficiency.  NO--Prior Anesthesia Complication  NO--Latex Allergy  NO--Loose teeth  NO--History of Rheumatoid Arthritis  YES--Bleeding risk  NO--AUGUST  YES--Implants-PPM/Watchman   NO--Other  Duke Activity Status Index (DASI) from [employer]  on 11/6/2023  ** All calculations should be rechecked by clinician prior to use **    RESULT SUMMARY:  18.95 points  The higher the score (maximum 58.2), the higher the functional status.    5.07 METs    INPUTS:  Take care of self —> 2.75 = Yes  Walk indoors —> 1.75 = Yes  Walk 1&ndash;2 blocks on level ground —> 2.75 = Yes  Climb a flight of stairs or walk up a hill —> 5.5 = Yes  Run a short distance —> 0 = No  Do light work around the house —> 2.7 = Yes  Do moderate work around the house —> 3.5 = Yes  Do heavy work around the house —> 0 = No  Do yardwork —> 0 = No  Have sexual relations —> 0 = No  Participate in moderate recreational activities —> 0 = No  Participate in strenuous sports —> 0 = No  Revised Cardiac Risk Index for Pre-Operative Risk from [employer]  on 11/6/2023  ** All calculations should be rechecked by clinician prior to use **    RESULT SUMMARY:  2 points  Class III Risk    10.1 %  30-day risk of death, MI, or cardiac arrest    From Duceppe 2017, based on pooled data from 5 high quality external validations (4 prospective). These numbers are higher than those often quoted from the now-outdated original study (Juan F 1999). See Evidence for details.  INPUTS:  Elevated-risk surgery —> 1 = Yes  History of ischemic heart disease —> 1 = Yes  History of congestive heart failure —> 0 = No  History of cerebrovascular disease —> 0 = No  Pre-operative treatment with insulin —> 0 = No  Pre-operative creatinine >2 mg/dL / 176.8 µmol/L —> 0 = No    -PT DENIES ANY RASHES, ABRASION, OR OPEN WOUNDS   -Pt denies any suicidal ideation or thoughts.  Pt states not a threat to self or others.  AS PER THE PT, THIS IS HIS/HER COMPLETE MEDICAL AND SURGICAL HX, INCLUDING MEDICATIONS PRESCRIBED AND OVER THE COUNTER  Patient verbalized understanding of instructions and was given the opportunity to ask questions and have them answered.

## 2023-11-06 NOTE — H&P PST ADULT - NSICDXPASTMEDICALHX_GEN_ALL_CORE_FT
PAST MEDICAL HISTORY:  Breast cancer 2001    CAD (coronary artery disease)     Chronic atrial fibrillation     Dyslipidemia     Heart attack     Heart failure with improved ejection fraction (HFimpEF)     [place] (hard of hearing)     HTN (hypertension)     Osteoarthritis      PAST MEDICAL HISTORY:  Breast cancer 2001    CAD (coronary artery disease)     Chronic atrial fibrillation     Dyslipidemia     H/O cardiac murmur     Heart attack     Heart failure with improved ejection fraction (HFimpEF)     [place] (hard of hearing)     HTN (hypertension)     Osteoarthritis

## 2023-11-06 NOTE — H&P PST ADULT - REASON FOR ADMISSION
Case Type: OP Block TimeSuite: Cath LabProceduralist: Beronica Benjamin  Confirmed Surgery DateTime: 11- - 8:00PAST DateTime: 11- - 7:15Procedure: OTIS ONLY (POST WATCHMAN)  ERP?: UnavailableLaterality: N/ALength of Procedure: 60 Minutes  Anesthesia Type: Local Standby

## 2023-11-06 NOTE — H&P PST ADULT - SKIN
warm and dry/color normal/normal/no rashes/no ulcers warm and dry/color normal/pale/no rashes/no ulcers warm and dry/pale/swelling

## 2023-11-07 DIAGNOSIS — Z01.818 ENCOUNTER FOR OTHER PREPROCEDURAL EXAMINATION: ICD-10-CM

## 2023-11-07 DIAGNOSIS — I48.0 PAROXYSMAL ATRIAL FIBRILLATION: ICD-10-CM

## 2023-11-10 RX ORDER — ASPIRIN/CALCIUM CARB/MAGNESIUM 324 MG
1 TABLET ORAL
Refills: 0 | DISCHARGE

## 2023-11-13 ENCOUNTER — OUTPATIENT (OUTPATIENT)
Dept: OUTPATIENT SERVICES | Facility: HOSPITAL | Age: 78
LOS: 1 days | End: 2023-11-13
Payer: MEDICARE

## 2023-11-13 DIAGNOSIS — I48.0 PAROXYSMAL ATRIAL FIBRILLATION: ICD-10-CM

## 2023-11-13 DIAGNOSIS — Z95.0 PRESENCE OF CARDIAC PACEMAKER: Chronic | ICD-10-CM

## 2023-11-13 DIAGNOSIS — Z95.1 PRESENCE OF AORTOCORONARY BYPASS GRAFT: Chronic | ICD-10-CM

## 2023-11-13 DIAGNOSIS — Z90.10 ACQUIRED ABSENCE OF UNSPECIFIED BREAST AND NIPPLE: Chronic | ICD-10-CM

## 2023-11-13 PROCEDURE — 93312 ECHO TRANSESOPHAGEAL: CPT | Mod: 26,XU

## 2023-11-13 PROCEDURE — 93320 DOPPLER ECHO COMPLETE: CPT

## 2023-11-13 PROCEDURE — 93325 DOPPLER ECHO COLOR FLOW MAPG: CPT

## 2023-11-13 PROCEDURE — 93320 DOPPLER ECHO COMPLETE: CPT | Mod: 26

## 2023-11-13 PROCEDURE — 93325 DOPPLER ECHO COLOR FLOW MAPG: CPT | Mod: 26

## 2023-11-13 PROCEDURE — 93312 ECHO TRANSESOPHAGEAL: CPT

## 2023-11-13 NOTE — CHART NOTE - NSCHARTNOTEFT_GEN_A_CORE
POST OPERATIVE PROCEDURAL DOCUMENTATION  PRE-OP DIAGNOSIS: Check for watchman device placement    POST-OP DIAGNOSIS: Watchman device well seated, free of thrombus w/o evidence of paravalvular leak     PROCEDURE: Transesophageal Echocardiogram     Primary Physician: Dr. Benjamin  Cardiology Fellow: Dr Jitendra Trujillo    ANESTHESIA TYPE  [  ] General Anesthesia  [ x ] Conscious Sedation  [  ] Local/Regional    CONDITION  [  ] Critical  [  ] Serious  [  ] Fair  [ x ] Good    SPECIMENS REMOVED (IF APPLICABLE): N/A    IMPLANTS (IF APPLICABLE): None    ESTIMATED BLOOD LOSS: None    COMPLICATIONS: None    After risks and benefits of procedures were explained, informed consent was obtained and placed in chart.   The patient received topical anesthetic to the oropharynx with viscous lidocaine and benzocaine spray.  Refer to Anesthesia note for sedation details.  The OTIS probe was passed into the esophagus without difficulty.  Transesophageal and transgastric images were obtained.  The OTIS probe was removed without difficulty and examined.  There was no evidence for bleeding.  The patient tolerated the procedure well without any immediate OTIS-related complications.      Preliminary Findings:  LA: Moderately enlarged  ADDY: Watchman device well seated, free of thrombus w/o evidence of paravalvular leak   LV: LVEF was estimated at 55-65%  MV: mild MR  AV: trace AI  RA: Mildly enlarged  RV: Normal size and function  TV: trace TR  PV: No VA, no PS  IAS: No PFO or ASD. No R-> L shunt   Aorta: no atheroma was seen.    DIAGNOSIS/IMPRESSION: Watchman device position assessed. it is well seated, free of thrombus w/o evidence of paravalvular leak     Full report to follow    PLAN OF CARE:  [x] Discharge home   [x] Continue Xarelto  [x] No eating or drinking for 1 hour  [x] No driving for 24 hours  [x] f/u with Dr Ojeda as OP    Results of procedure/ plan of care discussed with patient/  in detail. POST OPERATIVE PROCEDURAL DOCUMENTATION    PRE-OP DIAGNOSIS:  AF s/p Watchman device implant    POST-OP DIAGNOSIS:  Watchman device well seated, no thrombus, no sharri-device leak    PROCEDURE: Transesophageal Echocardiogram     Primary Physician: Dr. Benjamin  Cardiology Fellow: Dr Jitendra Trujillo    ANESTHESIA TYPE  [  ] General Anesthesia  [ x ] Conscious Sedation  [  ] Local/Regional    CONDITION  [  ] Critical  [  ] Serious  [  ] Fair  [ x ] Good    SPECIMENS REMOVED (IF APPLICABLE): N/A    IMPLANTS (IF APPLICABLE): None    ESTIMATED BLOOD LOSS: None    COMPLICATIONS: None    After risks and benefits of procedures were explained, informed consent was obtained and placed in chart.   The patient received topical anesthetic to the oropharynx with viscous lidocaine and benzocaine spray.  Refer to Anesthesia note for sedation details.  The OTIS probe was passed into the esophagus without difficulty.  Transesophageal and transgastric images were obtained.  The OTIS probe was removed without difficulty and examined.  There was no evidence for bleeding.  The patient tolerated the procedure well without any immediate OTIS-related complications.      Preliminary Findings:  LVEF >55%  LA: Moderately enlarged  ADDY: Watchman device well seated, no thrombus, no sharri-device leak   MV: mild MR  AV: trace AI  RA: Mildly enlarged  RV: Normal size and function  TV: trace TR  PV: No PI  Aorta: mild atheroma    DIAGNOSIS/IMPRESSION: Watchman device is well seated, no thrombus, no sharri-device leak    Full report to follow.      PLAN OF CARE:  [x] Discharge home   [x] Continue Xarelto  [x] No eating or drinking for 1 hour  [x] No driving for 24 hours  [x] f/u with Dr Ojeda as OP    Results of procedure/plan of care discussed with patient/ in detail.

## 2023-11-13 NOTE — CHART NOTE - NSCHARTNOTEFT_GEN_A_CORE
PACU ANESTHESIA ADMISSION NOTE      Procedure:   Post op diagnosis:      ____  Intubated  TV:______       Rate: ______      FiO2: ______    __x__  Patent Airway    __x__  Full return of protective reflexes    __x__  Full recovery from anesthesia / back to baseline     Vitals:   T:  98         R:   14               BP:    155/74              Sat:   98                P: 83      Mental Status:  __x__ Awake   __x___ Alert   _____ Drowsy   _____ Sedated    Nausea/Vomiting:  _x___ NO  ______Yes,   See Post - Op Orders          Pain Scale (0-10):  __0___    Treatment: ____ None    ___x_ See Post - Op/PCA Orders    Post - Operative Fluids:   ____ Oral   __x__ See Post - Op Orders    Plan: Discharge:   __x__Home       _____Floor     _____Critical Care    _____  Other:_________________    Comments:

## 2023-11-13 NOTE — ASU PATIENT PROFILE, ADULT - FALL HARM RISK - UNIVERSAL INTERVENTIONS
Bed in lowest position, wheels locked, appropriate side rails in place/Call bell, personal items and telephone in reach/Instruct patient to call for assistance before getting out of bed or chair/Non-slip footwear when patient is out of bed/[place] to call system/Physically safe environment - no spills, clutter or unnecessary equipment/Purposeful Proactive Rounding/Room/bathroom lighting operational, light cord in reach

## 2023-11-14 DIAGNOSIS — I48.0 PAROXYSMAL ATRIAL FIBRILLATION: ICD-10-CM

## 2023-11-16 RX ORDER — RIVAROXABAN 15 MG/1
15 TABLET, FILM COATED ORAL DAILY
Refills: 0 | Status: DISCONTINUED | COMMUNITY
End: 2023-11-16

## 2023-11-17 ENCOUNTER — RX RENEWAL (OUTPATIENT)
Age: 78
End: 2023-11-17

## 2023-11-17 NOTE — ED ADULT NURSE NOTE - NS ED NURSE RECORD ANOTHER HT AND WT
Attempted to reach Pt to discuss discharge planning process prior to upcoming surgery. Left message for return call.   No

## 2023-11-21 PROBLEM — Z86.79 PERSONAL HISTORY OF OTHER DISEASES OF THE CIRCULATORY SYSTEM: Chronic | Status: ACTIVE | Noted: 2023-11-06

## 2023-12-15 ENCOUNTER — APPOINTMENT (OUTPATIENT)
Dept: INTERVENTIONAL RADIOLOGY/VASCULAR | Facility: CLINIC | Age: 78
End: 2023-12-15

## 2023-12-15 NOTE — HISTORY OF PRESENT ILLNESS
[FreeTextEntry1] : 77 year-old male with history of gallstones and cholecystitis s/p percutaneous cholecystostomy tube placement.  He has residual stones and repeat gallbladder issues but has not yet been cleared by surgery.  He states that he just had a Watchman device placed and is hoping to have surgery soon.  Still waiting for clearance based on phone conversation today.  Cardiology also started on anticoagulation.

## 2023-12-15 NOTE — ASSESSMENT
[FreeTextEntry1] : 77 year-old male with a percutaneous cholecystostomy tube being evaluated for surgery.  Plan:  Patient hopes to have surgery If he doesn't he will follow up with us regarding Spyglass.

## 2024-01-02 NOTE — PRE-ANESTHESIA EVALUATION ADULT - NSANTHOSAYNRD_GEN_A_CORE
ID # [id number]    Second attempt to call patient- unable to leave voicemail.         ----- Message from Juliet Melo CNM sent at 12/29/2023  7:07 AM CST -----  Please notify of single live intrauterine pregnancy with EDC consistent with LMP - 7/18.   see eleanor tool/No. ELEANOR screening performed.  STOP BANG Legend: 0-2 = LOW Risk; 3-4 = INTERMEDIATE Risk; 5-8 = HIGH Risk

## 2024-01-05 ENCOUNTER — APPOINTMENT (OUTPATIENT)
Dept: INTERVENTIONAL RADIOLOGY/VASCULAR | Facility: CLINIC | Age: 79
End: 2024-01-05

## 2024-01-05 DIAGNOSIS — R74.01 ELEVATION OF LEVELS OF LIVER TRANSAMINASE LEVELS: ICD-10-CM

## 2024-01-05 PROCEDURE — XXXXX: CPT | Mod: 1L

## 2024-01-05 NOTE — ASSESSMENT
[FreeTextEntry1] : Will bring patient in for perc mary tube exchange.  If it stops draining or he gets a fever he should go to the ER.

## 2024-01-05 NOTE — HISTORY OF PRESENT ILLNESS
[FreeTextEntry1] : 78 year-old male with history of gallstones and cholecystitis s/p percutaneous cholecystostomy tube placement.  He has residual stones and repeat gallbladder issues but has not yet been cleared by surgery.  He states that he just had a Watchman device placed and is hoping to have surgery soon.  The patient is wife called today stating that his tube is not functioning appropriately.  They say that if they disconnect it from the bag it drains, however when it is connected the bag it does not drain appropriately.  He is due for exchange.  He met with his vascular surgeon who stated that he could come off anticoagulation in March and hopefully can have surgery then.

## 2024-01-08 ENCOUNTER — TRANSCRIPTION ENCOUNTER (OUTPATIENT)
Age: 79
End: 2024-01-08

## 2024-01-08 ENCOUNTER — RESULT REVIEW (OUTPATIENT)
Age: 79
End: 2024-01-08

## 2024-01-08 ENCOUNTER — OUTPATIENT (OUTPATIENT)
Dept: OUTPATIENT SERVICES | Facility: HOSPITAL | Age: 79
LOS: 1 days | Discharge: HOME | End: 2024-01-08
Payer: MEDICARE

## 2024-01-08 DIAGNOSIS — K80.10 CALCULUS OF GALLBLADDER WITH CHRONIC CHOLECYSTITIS WITHOUT OBSTRUCTION: ICD-10-CM

## 2024-01-08 DIAGNOSIS — Z95.1 PRESENCE OF AORTOCORONARY BYPASS GRAFT: Chronic | ICD-10-CM

## 2024-01-08 DIAGNOSIS — Z90.10 ACQUIRED ABSENCE OF UNSPECIFIED BREAST AND NIPPLE: Chronic | ICD-10-CM

## 2024-01-08 DIAGNOSIS — Z95.0 PRESENCE OF CARDIAC PACEMAKER: Chronic | ICD-10-CM

## 2024-01-08 PROCEDURE — C1729: CPT

## 2024-01-08 PROCEDURE — C1769: CPT

## 2024-01-08 PROCEDURE — 47536 EXCHANGE BILIARY DRG CATH: CPT

## 2024-01-08 NOTE — PROGRESS NOTE ADULT - SUBJECTIVE AND OBJECTIVE BOX
INTERVENTIONAL RADIOLOGY BRIEF PROCEDURE NOTE    Procedure: Fluoroscopic percutaneous cholecystostomy tube exchange  Pre-op diagnosis: Cholelithiasis, cholecystitis  Post-op diagnosis: Same  Attending: Xavier Franco MD  Resident: Be Demarco MD    Anesthesia:  [ ] General anesthesia  [ ] Deep sedation  [ ] Conscious sedation  [x] Local    Total face-to-face sedation time: N/A  Total sedation: None  Contrast: 10 cc Visipaque 320  Estimated blood loss: None    Condition:   [ ] Critical  [ ] Serious  [ ] Fair   [x] Good    Findings: Existing cholecystostomy tube in appropriate positioning. Successfully exchanged under fluoroscopic guidance. Secured to skin with sutures.  Specimens removed: As above.  Implants: As above.  Complications: None immediate.  Disposition: Back to IR recovery then home.    Please call Interventional Radiology [phone] (M-F, until 5PM) or [phone] (all other hours) if questions.        INTERVENTIONAL RADIOLOGY BRIEF PROCEDURE NOTE    Procedure: Fluoroscopic percutaneous cholecystostomy tube exchange  Pre-op diagnosis: Cholelithiasis, cholecystitis  Post-op diagnosis: Same  Attending: Xavier Franco MD  Resident: Be Demarco MD    Anesthesia:  [ ] General anesthesia  [ ] Deep sedation  [ ] Conscious sedation  [x] Local    Total face-to-face sedation time: N/A  Total sedation: None  Contrast: 10 cc Visipaque 320  Estimated blood loss: None    Condition:   [ ] Critical  [ ] Serious  [ ] Fair   [x] Good    Findings: Existing cholecystostomy tube in appropriate positioning. Successfully exchanged under fluoroscopic guidance. Secured to skin with sutures.  Specimens removed: As above.  Implants: As above.  Complications: None immediate.  Disposition: Back to IR recovery then home.    Please call Interventional Radiology [phone] (M-F, until 5PM) or [phone] (all other hours) if questions.

## 2024-01-08 NOTE — PROGRESS NOTE ADULT - SUBJECTIVE AND OBJECTIVE BOX
INTERVENTIONAL RADIOLOGY PRE-OPERATIVE NOTE:     I have personally seen and examined this patient. I agree with the recent IR clinic visit note 1/5/2024 which I have reviewed.    Meds:   Home Medications:  atorvastatin 20 mg oral tablet: 1 tab(s) orally once a day (at bedtime) (13 Nov 2023 08:01)  furosemide 20 mg oral tablet: 1 tab(s) orally 2 times a day (13 Nov 2023 08:01)  metoprolol tartrate 50 mg oral tablet: 1 tab(s) orally 2 times a day (13 Nov 2023 08:01)  rivaroxaban 15 mg oral tablet: 1 tab(s) orally once a day (at bedtime) (13 Nov 2023 08:01)        Allergies:   No Known Allergies    Will proceed with planned fluoroscopic guided percutaneous cholecystostomy catheter exchange.  Procedure/risks/benefits/goals/alternatives were explained. All questions answered. Informed content obtained.

## 2024-01-08 NOTE — ASU DISCHARGE PLAN (ADULT/PEDIATRIC) - NS MD DC FALL RISK RISK
For information on Fall & Injury Prevention, visit: [email or web address] OR  [email or web address] OR  https://www.cdc.gov/steadi/patient.html For information on Fall & Injury Prevention, visit: [email or web address] OR  [email or web address] OR  https://www.cdc.gov/steadi/patient.html

## 2024-01-10 DIAGNOSIS — K80.10 CALCULUS OF GALLBLADDER WITH CHRONIC CHOLECYSTITIS WITHOUT OBSTRUCTION: ICD-10-CM

## 2024-01-10 NOTE — ED ADULT NURSE NOTE - CAS TRG GENERAL NORM CIRC DET
"1/10/2024       RE: Doretha Yu  [id number]  [facility] [address]       Dear Colleague,    Thank you for referring your patient, Doretha Yu, to the [facility] [place] at [facility]. Please see a copy of my visit note below.    Colon and Rectal Surgery Postoperative Clinic Note    RE: Doretha Yu  : 1977  JUNIOR: 1/10/2024    Doretha Yu is a very pleasant 46 year old female with a history of ulcerative colitis s/p total abdominal colectomy with IPAA and DLI now status post loop ileostomy takedown with Dr. Ram on 23.    Final Diagnosis   A(1). ILEOSTOMY TRIM:  - Skin and attached small bowel with focal mucosal erosion and mildly active chronic inflammation     Interval history: Doing so much better. Feeling healthy. No abdominal pain. Only having some anal irritation and pressure. Main concern is that she spends most of her mornings and overnights going to the bathroom. She is having about 15 bowel movements daily. Usually she has few bowel movements during the daytime. Taking imodium 7-8 tabs daily.    Physical Examination:  /77 (BP Location: Right arm, Patient Position: Sitting, Cuff Size: Adult Regular)   Pulse 82   Ht 5' 3\"   Wt 184 lb 9.6 oz   LMP 2023 (Approximate)   SpO2 100%   BMI 32.70 kg/m    General: alert, oriented, in no acute distress, sitting comfortably  HEENT: moist mucous membranes  Abdomen: Takedown site on RLQ scabbing in. No erythema or drainage.    Assessment/Plan:  46 year old female with a history of ulcerative colitis s/p total abdominal colectomy with IPAA and DLI now status post loop ileostomy takedown with Dr. Ram on 23. Doing great after surgery. Will refill Lomotil to use in addition to imodium. Can also try Tereso-4 suppositories for the anal irritation. She is seeing Dr. Ram on 24. Contact us in the meantime with questions " or concerns. Patient's questions were answered to her stated satisfaction and she is in agreement with this plan.     Medical history:  Past Medical History:   Diagnosis Date    Abnormal MRI     Abnormal MRI and postive prothrombin genetic mutation.     Anxiety     Basal cell carcinoma     Cervical high risk HPV (human papillomavirus) test positive 2019    See problem list    Colitis     Depression     Diabetes mellitus, iatrogenic (H) 2020    Esophageal reflux     Inflammatory arthritis     Insomnia     Intestinal giardiasis 2018    Lumbago     left lower back pain    Lymphedema     Malignant melanoma (H)     Melanoma (H) 10/23/2017    Migraines     Mild persistent asthma     Morbid obesity with BMI of 40.0-44.9, adult (H)     STORMY (obstructive sleep apnea)     Prothrombin deficiency (H)     takes 81mg asa daily    Stroke (cerebrum) (H)     During     TIA (transient ischemic attack)     Type 2 diabetes mellitus (H)     Ulcerative pancolitis (H)        Surgical history:  Past Surgical History:   Procedure Laterality Date    APPENDECTOMY      COLONOSCOPY N/A 10/18/2017    Procedure: COLONOSCOPY;  Colon;  Surgeon: Debbie Stephens MD;  [facility]: [facility]    COLONOSCOPY N/A 2018    Procedure: COMBINED COLONOSCOPY, SINGLE OR MULTIPLE BIOPSY/POLYPECTOMY BY BIOPSY;  colon;  Surgeon: Benita Schumacher MD;  Location: [facility] GI    COLONOSCOPY      multiple since  to present - about 6 total    DAVINCI ASSISTED TRANSANAL TOTAL MESORECTAL EXCISION N/A 2023    Procedure: COMPLETION PROCTECTOMY, ROBOT-ASSISTED, ILEAL POUCH ANASTAMOSIS;  Surgeon: Quinton Ram MD;  Location: [facility] OR    DISSECT LYMPH NODE AXILLA Left 10/23/2017    Procedure: DISSECT LYMPH NODE AXILLA;  Left Axillary Lymph Node Dissection ;  Surgeon: Laurent Cool MD;  Location: [facility]    EXAM UNDER ANESTHESIA PELVIC N/A 2020    Procedure: EXAM UNDER ANESTHESIA, PELVIS; with Cervical Biopsies, Vaginal  Biopsy and Endocervical Curettings;  Surgeon: Melina Jung MD;  Location: [facility] OR    GYN SURGERY  ,         LAPAROSCOPIC ASSISTED COLECTOMY N/A 06/15/2021    Procedure: laparoscopic total abdominal colectomy, end ileostomy;  Surgeon: Quinton Ram MD;  Location: [facility]    REPAIR MOHS Left 2017    Procedure: REPAIR MOHS;  Left Upper Lid Moh's Reconstruction;  Surgeon: Kisha Bosch MD;  [facility]: [facility]    SIGMOIDOSCOPY FLEXIBLE N/A 2023    Procedure: Sigmoidoscopy flexible;  Surgeon: Quinton Ram MD;  Location: [facility]    TAKEDOWN ILEOSTOMY N/A 2023    Procedure: CLOSURE, ILEOSTOMY;  Surgeon: Quinton Ram MD;  Location: [facility]       Problem list:    Patient Active Problem List    Diagnosis Date Noted    Ileostomy in place (H) 2023     Priority: Medium    High output ileostomy (H) 10/30/2023     Priority: Medium    Post-op pain 2023     Priority: Medium    Gastroesophageal reflux disease without esophagitis 2023     Priority: Medium    Biliary dyskinesia 2023     Priority: Medium    Migraines      Priority: Medium    Colostomy in place (H) 2021     Priority: Medium    S/P colectomy 2021     Priority: Medium    Ulcerative colitis without complications, unspecified location (H) 2021     Priority: Medium    Dehydration 2021     Priority: Medium    Hematochezia 2021     Priority: Medium    Diarrhea of infectious origin 2021     Priority: Medium    C. difficile colitis 2021     Priority: Medium    Adjustment disorder with mixed emotional features 2020     Priority: Medium    Polyarthralgia 2020     Priority: Medium    Drug-induced polyneuropathy (H24) 2020     Priority: Medium    Pain syndrome, chronic 2020     Priority: Medium    Drug-induced Cushing's syndrome (H24) 2020     Priority: Medium    Diabetes mellitus, iatrogenic (H) 2020     Priority:  Medium    High risk HPV infection 01/28/2020     Priority: Medium     Added automatically from request for surgery [id number]      Immunosuppression (H24) 01/28/2020     Priority: Medium     Added automatically from request for surgery [id number]      STORMY (obstructive sleep apnea) 01/27/2020     Priority: Medium     1/2019 (241#)-AHI 24, lowest oxygen saturation was 86%, no periodic limb movement were noted, CPAP 8 cm/H20 was effective.      Secondary lymphedema 01/27/2020     Priority: Medium    Chronic neutrophilia 01/27/2020     Priority: Medium    Cervical high risk HPV (human papillomavirus) test positive 12/13/2019     Priority: Medium     2011 NIL pap.  2015 NIL pap, Neg HPV.  12/13/19 NIL pap , + HR HPV 16. Plan colp.   1/6/19 Failed colp exam secondary to anatomic constraints with gyn. Referred to gyn/onc.   2/25/20 Colpo bx and ECC Negative for dysplasia. Plan cotest in 1 year.   8/20/21 NIL pap, Neg HPV. Plan cotest in 1 year per provider.   9/28/22 Lost to follow-up for pap tracking       Immunosuppressed status (H24) 09/05/2019     Priority: Medium    Ulcerative colitis with complication, unspecified location (H) 09/05/2019     Priority: Medium    Morbid obesity (H) 09/05/2019     Priority: Medium    Arthritis, rheumatoid (H) 11/26/2018     Priority: Medium    Hypocalcemia 05/15/2018     Priority: Medium    Anemia 05/14/2018     Priority: Medium    Menstrual irregularity 05/14/2018     Priority: Medium    Arthralgia of both knees 05/12/2018     Priority: Medium     Formatting of this note might be different from the original.  Work-up in progress. There is concern for inflammatory arthritis.      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     Priority: Medium    Malignant melanoma of left upper extremity including shoulder (H) 10/12/2017     Priority: Medium    Metastatic malignant melanoma (H) 10/10/2017     Priority: Medium    Prothrombin mutation (H24) 04/05/2017     Priority: Medium     On daily aspirin 81 mg per hematology's recommendations from 2008      Vision changes 04/01/2017     Priority: Medium    Mild intermittent asthma without complication 11/08/2013     Priority: Medium    Moderate major depression (H) 06/24/2013     Priority: Medium    Anxiety state 09/13/2012     Priority: Medium     Problem list name updated by automated process. Provider to review      Esophageal reflux 09/13/2012     Priority: Medium    DUB (dysfunctional uterine bleeding) 07/28/2011     Priority: Medium    CARDIOVASCULAR SCREENING; LDL GOAL LESS THAN 160 07/28/2011     Priority: Low       Medications:  Current Outpatient Medications   Medication Sig Dispense Refill    diphenoxylate-atropine (LOMOTIL) 2.5-0.025 MG tablet Take 2 tablets by mouth 2 times daily as needed for diarrhea 240 tablet 5    methylcellulose (CITRUCEL) powder Take 0.55 g (1.925 teaspoonful) by mouth daily 454 g 3    Rectal Protectant-Emollient (CALMOL-4) 76-10 % SUPP Place 1 suppository rectally 2 times daily as needed (anal irritation) 24 suppository 11    acetaminophen (TYLENOL) 325 MG tablet Take 3 tablets (975 mg) by mouth 3 times daily      acetaminophen (TYLENOL) 500 MG tablet Take 1,000 mg by mouth every 6 hours as needed for mild pain      ARIPiprazole (ABILIFY) 2 MG tablet Take 1 tablet (2 mg) by mouth daily (Patient taking differently: Take 2 mg by mouth every morning) 30 tablet 0    cholestyramine (QUESTRAN) 4 g packet Take 1  packet (4 g) by mouth 2 times daily (with meals) 60 packet 1    hydrOXYzine (ATARAX) 25 MG tablet Take 1 tablet (25 mg) by mouth 2 times daily (Patient taking differently: Take 25 mg by mouth 2 times daily as needed) 60 tablet 1    medical cannabis (Patient's own supply)       medical cannabis (Patient's own supply) See Admin Instructions (The purpose of this order is to document that the patient reports taking medical cannabis.  This is not a prescription, and is not used to certify that the patient has a qualifying medical condition.)      metFORMIN (GLUCOPHAGE) 500 MG tablet TAKE 2 TABLETS BY MOUTH 2 TIMES DAILY WITH MEALS 360 tablet 1    methocarbamol (ROBAXIN) 500 MG tablet Take 1 tablet (500 mg) by mouth 4 times daily as needed for muscle spasms 56 tablet 0    ondansetron (ZOFRAN ODT) 4 MG ODT tab DISSOLVE ONE TABLET BY MOUTH EVERY 8 HOURS AS NEEDED FOR NAUSEA. 20 tablet 1    Ostomy Supplies MISC 20 each daily 20 each 11    oxyCODONE IR (ROXICODONE) 10 MG tablet Take 0.5 tablets (5 mg) by mouth every 8 hours as needed for severe pain Take one whole tab every 6 hours for the next 24 hours then go to half a tab every 6 hours for the following 48 hour then taper off. 12 tablet 0    simethicone (MYLICON) 125 MG chewable tablet Take 1 tablet (125 mg) by mouth 2 times daily as needed for intestinal gas 10 tablet 0    venlafaxine (EFFEXOR) 75 MG tablet TAKE TWO TABLETS BY MOUTH TWICE A  tablet 0       Allergies:  Allergies   Allergen Reactions    Bee Venom Swelling    Azithromycin Diarrhea    Erythromycin      Other reaction(s): GI intolerance, Vomiting    Fentanyl Other (See Comments)     sweating  sweating    Prochlorperazine Fatigue     Other reaction(s): Other (see comments)  Fatigue    Buspirone      Other reaction(s): GI intolerance  vomiting    Erythrocin Nausea and Vomiting    Gluten Meal      Celiac disease    Topamax [Topiramate]      Made her lethargic    Zithromax [Azithromycin Dihydrate] Diarrhea     Enbrel [Etanercept] Hives and Rash       Family history:  Family History   Problem Relation Age of Onset    Cancer Mother 45        lung    Neurologic Disorder Mother         epilepsy    Lipids Father     Gastrointestinal Disease Father         diverticulitis     Depression Father     Colitis Father     Colon Cancer Father     Diverticulitis Father     Depression Sister     Cancer Maternal Grandmother     Blood Disease Maternal Grandmother         lymphoma     Arthritis Maternal Grandmother     Diabetes Maternal Grandmother     Depression Maternal Grandmother     Macular Degeneration Maternal Grandmother     Glaucoma Maternal Grandmother     Diabetes Maternal Grandfather     Cerebrovascular Disease Maternal Grandfather     Blood Disease Maternal Grandfather     Heart Disease Maternal Grandfather     Glaucoma Maternal Grandfather     Cancer Paternal Grandmother     Cancer - colorectal Paternal Grandmother     Colitis Paternal Grandmother     Colon Cancer Paternal Grandmother     Diverticulitis Paternal Grandmother     Respiratory Paternal Grandfather         emphysema     Colitis Paternal Grandfather     Colon Cancer Paternal Grandfather     Diverticulitis Paternal Grandfather     Heart Disease Daughter     Asthma Daughter     Melanoma No family hx of     Anesthesia Reaction No family hx of     Clotting Disorder No family hx of        Social history:  Social History     Tobacco Use    Smoking status: Former     Packs/day: 1.00     Years: 5.00     Additional pack years: 0.00     Total pack years: 5.00     Types: Cigarettes     Quit date: 3/20/1998     Years since quittin.8     Passive exposure: Past    Smokeless tobacco: Never   Substance Use Topics    Alcohol use: Not Currently     Marital status: .    Nursing Notes:   Tiffanie Naidu  1/10/2024 10:32 AM  Signed  Chief Complaint   Patient presents with    Post-op Visit       Vitals:    01/10/24 1030   BP: 113/77   BP Location: Right arm   Patient  "Position: Sitting   Cuff Size: Adult Regular   Pulse: 82   SpO2: 100%   Weight: 184 lb 9.6 oz   Height: 5' 3\"       Body mass index is 32.7 kg/m .    Tiffanie Naidu CMA       15 minutes spent on the date of the encounter doing chart review, history and exam, documentation and further activities as noted above.   This is a postop visit.          Again, thank you for allowing me to participate in the care of your patient.      Sincerely,    Mariela Crandall PA-C    " Strong peripheral pulses

## 2024-01-12 DIAGNOSIS — K81.0 ACUTE CHOLECYSTITIS: ICD-10-CM

## 2024-01-12 DIAGNOSIS — Z46.59 ENCOUNTER FOR FITTING AND ADJUSTMENT OF OTHER GASTROINTESTINAL APPLIANCE AND DEVICE: ICD-10-CM

## 2024-01-16 ENCOUNTER — RX RENEWAL (OUTPATIENT)
Age: 79
End: 2024-01-16

## 2024-01-31 ENCOUNTER — APPOINTMENT (OUTPATIENT)
Dept: CARDIOLOGY | Facility: CLINIC | Age: 79
End: 2024-01-31
Payer: MEDICARE

## 2024-01-31 ENCOUNTER — NON-APPOINTMENT (OUTPATIENT)
Age: 79
End: 2024-01-31

## 2024-01-31 PROCEDURE — 93294 REM INTERROG EVL PM/LDLS PM: CPT

## 2024-01-31 PROCEDURE — 93296 REM INTERROG EVL PM/IDS: CPT

## 2024-03-15 ENCOUNTER — RX RENEWAL (OUTPATIENT)
Age: 79
End: 2024-03-15

## 2024-03-25 ENCOUNTER — APPOINTMENT (OUTPATIENT)
Dept: ELECTROPHYSIOLOGY | Facility: CLINIC | Age: 79
End: 2024-03-25
Payer: MEDICARE

## 2024-03-25 VITALS — TEMPERATURE: 98 F | BODY MASS INDEX: 28.88 KG/M2 | WEIGHT: 225 LBS | HEIGHT: 74 IN

## 2024-03-25 VITALS — DIASTOLIC BLOOD PRESSURE: 72 MMHG | TEMPERATURE: 98 F | SYSTOLIC BLOOD PRESSURE: 132 MMHG | HEART RATE: 60 BPM

## 2024-03-25 DIAGNOSIS — I10 ESSENTIAL (PRIMARY) HYPERTENSION: ICD-10-CM

## 2024-03-25 DIAGNOSIS — Z45.018 ENCOUNTER FOR ADJUSTMENT AND MANAGEMENT OF OTHER PART OF CARDIAC PACEMAKER: ICD-10-CM

## 2024-03-25 DIAGNOSIS — Z95.818 PRESENCE OF OTHER CARDIAC IMPLANTS AND GRAFTS: ICD-10-CM

## 2024-03-25 DIAGNOSIS — I49.5 SICK SINUS SYNDROME: ICD-10-CM

## 2024-03-25 PROCEDURE — 93284 PRGRMG EVAL IMPLANTABLE DFB: CPT

## 2024-03-25 PROCEDURE — 93290 INTERROG DEV EVAL ICPMS IP: CPT | Mod: 26

## 2024-03-25 PROCEDURE — 99214 OFFICE O/P EST MOD 30 MIN: CPT

## 2024-03-25 PROCEDURE — 93000 ELECTROCARDIOGRAM COMPLETE: CPT | Mod: 59

## 2024-03-25 RX ORDER — METOPROLOL TARTRATE 50 MG/1
50 TABLET, FILM COATED ORAL TWICE DAILY
Refills: 0 | Status: ACTIVE | COMMUNITY

## 2024-03-25 RX ORDER — ASPIRIN 325 MG/1
325 TABLET, COATED ORAL
Qty: 90 | Refills: 3 | Status: ACTIVE | COMMUNITY
Start: 2024-01-16

## 2024-03-25 RX ORDER — CLOPIDOGREL BISULFATE 75 MG/1
75 TABLET, FILM COATED ORAL DAILY
Qty: 90 | Refills: 0 | Status: DISCONTINUED | COMMUNITY
Start: 2023-11-16 | End: 2024-03-25

## 2024-03-25 RX ORDER — FUROSEMIDE 20 MG/1
20 TABLET ORAL TWICE DAILY
Refills: 0 | Status: ACTIVE | COMMUNITY

## 2024-03-25 RX ORDER — PANTOPRAZOLE 40 MG/1
40 TABLET, DELAYED RELEASE ORAL
Qty: 60 | Refills: 0 | Status: COMPLETED | COMMUNITY
Start: 2023-06-09 | End: 2024-03-25

## 2024-03-25 RX ORDER — ASPIRIN 325 MG/1
325 TABLET, FILM COATED ORAL DAILY
Qty: 30 | Refills: 6 | Status: COMPLETED | COMMUNITY
Start: 2023-11-16 | End: 2024-03-25

## 2024-03-25 RX ORDER — ATORVASTATIN CALCIUM 20 MG/1
20 TABLET, FILM COATED ORAL
Qty: 90 | Refills: 2 | Status: ACTIVE | COMMUNITY

## 2024-03-25 RX ORDER — POLYETHYLENE GLYCOL 3350 17 G/17G
17 POWDER, FOR SOLUTION ORAL
Qty: 510 | Refills: 0 | Status: COMPLETED | COMMUNITY
Start: 2023-06-09 | End: 2024-03-25

## 2024-03-25 RX ORDER — CEPHALEXIN 500 MG/1
500 TABLET ORAL
Qty: 10 | Refills: 0 | Status: COMPLETED | COMMUNITY
Start: 2023-09-26 | End: 2024-03-25

## 2024-03-25 RX ORDER — LOSARTAN POTASSIUM 25 MG/1
25 TABLET, FILM COATED ORAL
Qty: 90 | Refills: 0 | Status: COMPLETED | COMMUNITY
Start: 2023-10-17 | End: 2024-03-25

## 2024-03-25 NOTE — PROCEDURE
[No] : not [Atrial Fibrillation] : atrial fibrillation [CRT-P] : Cardiac resynchronization therapy pacemaker [DDDR] : DDDR [Longevity: ___ months] : The estimated remaining battery life is [unfilled] months [Threshold Testing Performed] : Threshold testing was performed [Sensing Amplitude ___mv] : sensing amplitude was [unfilled] mv [Lead Imp:  ___ohms] : lead impedance was [unfilled] ohms [___V @] : [unfilled] V [___ ms] : [unfilled] ms [Programmed for Longevity] : output reprogrammed for improved battery longevity [See Device Printout] : See device printout [de-identified] : [place] [de-identified] : [id number] [de-identified] : 6/24/2022 [de-identified] : [id number] [de-identified] : 60 [de-identified] : ->99% No fluid recorded.  No new events.

## 2024-03-25 NOTE — PHYSICAL EXAM
[General Appearance - Well Developed] : well developed [Normal Appearance] : normal appearance [Well Groomed] : well groomed [General Appearance - Well Nourished] : well nourished [No Deformities] : no deformities [General Appearance - In No Acute Distress] : no acute distress [Heart Rate And Rhythm] : heart rate and rhythm were normal [Heart Sounds] : normal S1 and S2 [Murmurs] : no murmurs present [Edema] : no peripheral edema present [] : no respiratory distress [Respiration, Rhythm And Depth] : normal respiratory rhythm and effort [Exaggerated Use Of Accessory Muscles For Inspiration] : no accessory muscle use [Left Infraclavicular] : left infraclavicular area [Auscultation Breath Sounds / Voice Sounds] : lungs were clear to auscultation bilaterally [Well-Healed] : well-healed [Warm] : not warm [Erythema] : not erythematous [Tender] : not tender

## 2024-03-25 NOTE — CARDIOLOGY SUMMARY
[de-identified] : 3/25/2024 AFib, BiV-paced (HR 60 bpm) 10/30/2023 AFib, BiV paced (HR 64 bpm) 8/7/2023 AFib, BiV paced (HR 60 bpm),  msec 4/24/2023 AFIB, BiV paced (HR 60 bpm),  msec 7/27/2022 A-paced, BiV paced (HR 60 bpm),  msec [de-identified] : 11/13/2023 LVEF 50-55%. Mild MR. Mild TR. Watchman ADDY closure device in position. No evidence of sharri-device leak or thrombus.  12/2022 EF 40-45% Mod dilated LA 5.4 cm. Mild MR. Mild TR.  9/1/21 EF 46%. Severe LAE.  4/1/21 EF 49%. Mild cLVH. Mild-mod MR. Mild TR. 1/14/20: EF 45-54% Mod Grade 2 DD. Mod eccentric MR. Mild TR. 8/20/18, Mod thickened mitral valve with mod restricted opening. Mild MAC. Mod MR. Mod LAE. Grade 1 DD. LVEF 55-75%.  [de-identified] : CRT-P (Dee) 7/27/2022 [de-identified] : Watchman 9/26/2023

## 2024-03-25 NOTE — HISTORY OF PRESENT ILLNESS
[de-identified] : Cardiologist: Dr. Handy Ophtho: Dr. GOMEZ ([facility]) Pulm: referral provided  79 yo M with history of SSS s/p DC PPM, CAD s/p CABG, LBBB. BiV PPM attempted but despite multiple catheters and sheaths, CS was unable to be cannulated at that time. Pt admitted 7/9-7/14 for abdominal pain --> recommended MRCP with possible ERCP but patient refused due to claustrophobia and refused ERCP bc he felt better and did not want any other procedures.   Pt was in the ER 10/23 (no admission) for lower extremity edema due to excess salt intake (ate pickles and ham).  He can walk on flat ground and up the stairs without dyspnea.  Patient had a BiV PPM upgrade with Dr. Danielle (6/24/22). Since Oct, AF has been persistent.  4/24/23 Pt went for OTIS prior to Watchman 3/14/23 and was found to have left atrial appendage clot. At that time he was not on any DOACs, so he was started on Eliquis 5mg PO BID. On 3/26/23, he went in for abdominal pain and was noted to have acute cholecystitis and E. Coli bacteremia. He had a percutaneous cholecystostomy 4/3 and cardiologist changed his OAC to Xarelto 15 mg.   8/7/23 Patient is here to discuss Watchman. He still has a gallbladder drain. He denies chest pain, dyspnea, palpitations, dizziness, lightheadedness, presyncope or syncope.   10/30/23 s/p Watchman 9/26/2023.   3/25/2024 Here for follow up after Watchman implant. Had OTIS 45 days after Watchman with no sharri-device leak or thrombus. Feels well but wants his gallbladder surgery so he can eat pasta. Denies any bleeding with aspirin and plavix. Will finish plavix tomorrow.

## 2024-03-25 NOTE — ASSESSMENT
[FreeTextEntry1] : 79 yo M with history of AFib, SSS s/p DC PPM with dyspnea and cardiomyopathy s/p BiV PPM upgrade noted to have left atrial appendage clot prior to Watchman procedure s/p anticoagulation and Watchman 9/26/2023.  # SSS s/p DC PPM with cardiomyopathy s/p BiV PPM upgrade - Interrogation as above. Device reprogrammed as above.  - Patient denies any cardiovascular complaints.  - Remote monitor is set up and patient is transmitting.   # Persistent AFib since Oct 2022 with left atrial appendage thrombus noted on pre-Watchman OTIS in March 2023, now s/p Watchman 9/26/2023 - Rate controlled AF.  - s/p Watchman 9/26/23. Finishing up 6 mo of DAPT. No sharri-device leak. - Cont aspirin 325 mg indefinitely. Can interrupt for surgeries.   # Cholecystitis - Follow up with GI/surgeon about drain removal and cholecystectomy. Patient has had numerous GB attacks but states no one will intervene due to high risk of procedure freida interrupting OAC.  # Mild cardiomyopathy (EF 40-45%) - Cont Metoprolol Tartrate 50 mg BID - Consider Entresto. Pt to discuss with cardiologist.  # HTN - BP well controlled - Cont Metoprolol Tartrate 50 mg BID - I have advised him to adhere to a 2g Na diet and to be careful with consuming salty meals. - Follow up with cardiologist  I have also advised the patient to go to the nearest emergency room if he experiences any chest pain, dyspnea, syncope, or has any other compelling symptoms.  Follow up in 4 mo

## 2024-04-11 ENCOUNTER — APPOINTMENT (OUTPATIENT)
Dept: SURGERY | Facility: CLINIC | Age: 79
End: 2024-04-11
Payer: MEDICARE

## 2024-04-11 VITALS
DIASTOLIC BLOOD PRESSURE: 70 MMHG | WEIGHT: 221 LBS | TEMPERATURE: 97.3 F | SYSTOLIC BLOOD PRESSURE: 140 MMHG | HEART RATE: 80 BPM | OXYGEN SATURATION: 99 % | HEIGHT: 74 IN | BODY MASS INDEX: 28.36 KG/M2

## 2024-04-11 DIAGNOSIS — I48.0 PAROXYSMAL ATRIAL FIBRILLATION: ICD-10-CM

## 2024-04-11 PROCEDURE — 99204 OFFICE O/P NEW MOD 45 MIN: CPT

## 2024-04-12 PROBLEM — I48.0 PAROXYSMAL ATRIAL FIBRILLATION: Status: ACTIVE | Noted: 2019-04-03

## 2024-04-12 NOTE — PHYSICAL EXAM
[Normal Thyroid] : the thyroid was normal [Normal Breath Sounds] : Normal breath sounds [Normal Heart Sounds] : normal heart sounds [Normal Rate and Rhythm] : normal rate and rhythm [Abdominal Masses] : No abdominal masses [Abdomen Tenderness] : ~T ~M No abdominal tenderness [No HSM] : no hepatosplenomegaly [de-identified] : Healthy for stated age, somewhat debilitated.  Anicteric [de-identified] : No adenopathy [de-identified] : He seems to be an RSR at this time, with no murmurs [de-identified] : Right mastectomy site is soft and flat, with no evidence of locally recurrent disease.  Left breast is quite prominent, but examination is otherwise unremarkable.  No axillary adenopathy bilaterally. [de-identified] : Obese and protuberant but soft.  Gallbladder not palpable, Onofre sign negative.  His percutaneous cholecystostomy tube exits in the right flank just above the costal margin and is draining a small amount of bilious material.  There is a 3 cm diameter umbilical hernia with no local acute changes, this contains some omental fat which is easily reduced through a 1 cm fascial defect.  No other hernias noted.

## 2024-04-12 NOTE — ASSESSMENT
[FreeTextEntry1] : Prior percutaneous cholecystostomy for acute calculus cholecystitis, with no biliary symptoms or findings at this time other than the presence of the cholecystostomy tube.  His recent cholecystograms show no bile leak or other problem and he appears to be in satisfactory condition for an elective cholecystectomy.  We will plan for a robotic assisted laparoscopic cholecystectomy, with fluorescent cholangiogram and possible open exploration as may be indicated at the time of surgery, along with primary repair of the umbilical hernia.  The details of the procedure and its risks and benefits were discussed in full and they understand and agree.  A repeat abdominal sonogram will be obtained prior to surgery for reevaluation of the extrahepatic biliary anatomy, and he will contact the cardiologist for documentation of his stability for surgery, and for decisions about the perioperative management of his antiplatelet therapy.  They understand that there will be a slightly higher bleeding risk if he is not allowed to discontinue his aspirin preoperatively but I do not think this should be a major problem.  All their questions were answered and they understand and agree.  They were given a requisition for the sonogram and the procedure will be scheduled once the above have been completed.  They are happy with the assessment and plan.

## 2024-04-12 NOTE — PAST MEDICAL HISTORY
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  He is on no anticoagulation at this time other than a daily adult aspirin, following a recent successful Watchman procedure.

## 2024-04-12 NOTE — HISTORY OF PRESENT ILLNESS
[de-identified] : The patient presents with his wife to be evaluated for an elective interval cholecystectomy.  He had acute calculus cholecystitis over 1 year ago and had a percutaneous cholecystostomy due to his medical compromise and chronic anticoagulation.  He improved significantly after that procedure but has had other issues that have interfered with him having the eventual cholecystectomy.  Currently he has no biliary symptomatology and has no history of hepatitis, pancreatitis, or jaundice.  He has had no prior abdominal surgery.  He is well-known to me in the past, having had a right mastectomy for carcinoma in 2001.  He has had no problems in that regard since.

## 2024-04-20 ENCOUNTER — OUTPATIENT (OUTPATIENT)
Dept: OUTPATIENT SERVICES | Facility: HOSPITAL | Age: 79
LOS: 1 days | End: 2024-04-20
Payer: MEDICARE

## 2024-04-20 DIAGNOSIS — Z95.1 PRESENCE OF AORTOCORONARY BYPASS GRAFT: Chronic | ICD-10-CM

## 2024-04-20 DIAGNOSIS — Z90.10 ACQUIRED ABSENCE OF UNSPECIFIED BREAST AND NIPPLE: Chronic | ICD-10-CM

## 2024-04-20 DIAGNOSIS — K80.10 CALCULUS OF GALLBLADDER WITH CHRONIC CHOLECYSTITIS WITHOUT OBSTRUCTION: ICD-10-CM

## 2024-04-20 DIAGNOSIS — Z95.0 PRESENCE OF CARDIAC PACEMAKER: Chronic | ICD-10-CM

## 2024-04-20 DIAGNOSIS — Z00.8 ENCOUNTER FOR OTHER GENERAL EXAMINATION: ICD-10-CM

## 2024-04-20 PROCEDURE — 76705 ECHO EXAM OF ABDOMEN: CPT

## 2024-04-20 PROCEDURE — 76705 ECHO EXAM OF ABDOMEN: CPT | Mod: 26

## 2024-04-21 DIAGNOSIS — K80.10 CALCULUS OF GALLBLADDER WITH CHRONIC CHOLECYSTITIS WITHOUT OBSTRUCTION: ICD-10-CM

## 2024-04-24 ENCOUNTER — NON-APPOINTMENT (OUTPATIENT)
Age: 79
End: 2024-04-24

## 2024-04-24 ENCOUNTER — RESULT REVIEW (OUTPATIENT)
Age: 79
End: 2024-04-24

## 2024-04-24 ENCOUNTER — OUTPATIENT (OUTPATIENT)
Dept: OUTPATIENT SERVICES | Facility: HOSPITAL | Age: 79
LOS: 1 days | End: 2024-04-24
Payer: MEDICARE

## 2024-04-24 DIAGNOSIS — Z95.1 PRESENCE OF AORTOCORONARY BYPASS GRAFT: Chronic | ICD-10-CM

## 2024-04-24 DIAGNOSIS — Z90.10 ACQUIRED ABSENCE OF UNSPECIFIED BREAST AND NIPPLE: Chronic | ICD-10-CM

## 2024-04-24 DIAGNOSIS — K80.10 CALCULUS OF GALLBLADDER WITH CHRONIC CHOLECYSTITIS WITHOUT OBSTRUCTION: ICD-10-CM

## 2024-04-24 DIAGNOSIS — Z95.0 PRESENCE OF CARDIAC PACEMAKER: Chronic | ICD-10-CM

## 2024-04-24 PROCEDURE — 74160 CT ABDOMEN W/CONTRAST: CPT | Mod: 26,MH

## 2024-04-24 PROCEDURE — 74160 CT ABDOMEN W/CONTRAST: CPT

## 2024-04-25 DIAGNOSIS — K80.10 CALCULUS OF GALLBLADDER WITH CHRONIC CHOLECYSTITIS WITHOUT OBSTRUCTION: ICD-10-CM

## 2024-04-29 ENCOUNTER — NON-APPOINTMENT (OUTPATIENT)
Age: 79
End: 2024-04-29

## 2024-04-30 ENCOUNTER — APPOINTMENT (OUTPATIENT)
Dept: ORTHOPEDIC SURGERY | Facility: CLINIC | Age: 79
End: 2024-04-30
Payer: MEDICARE

## 2024-04-30 PROCEDURE — 99213 OFFICE O/P EST LOW 20 MIN: CPT

## 2024-04-30 NOTE — HISTORY OF PRESENT ILLNESS
[de-identified] : The patient is a 78-year-old male here for a subsequent reevaluation of bilateral knee osteoarthritis.  He is status post Synvisc 1 injection for each knee on 10/31/2023.  The injections helped his knees however the pain has returned.  He reports stiffness in his knee.

## 2024-04-30 NOTE — DISCUSSION/SUMMARY
[de-identified] : At this point I recommend repeat viscous injections for Synvisc 1 for each knee.  Please arrange for that.  Of note he is having a nephrostomy tube removal and a cholecystectomy 5/15/2024.  We will do the injections after his surgery.

## 2024-04-30 NOTE — IMAGING
[de-identified] : Physical exam both knees: No effusion, no erythema, no ecchymosis.  There varicosities noted on the anterior aspect of the right knee.  Full extension bilaterally, flexion to 90 degrees bilaterally, patellofemoral crepitus with range of motion to range of motion assessed with the patient sitting up on the exam table.  Tenderness to palpation over the medial joint line of each knee.  Intact to light touch, he is ambulating with a cane.

## 2024-05-02 ENCOUNTER — OUTPATIENT (OUTPATIENT)
Dept: OUTPATIENT SERVICES | Facility: HOSPITAL | Age: 79
LOS: 1 days | End: 2024-05-02
Payer: MEDICARE

## 2024-05-02 VITALS
SYSTOLIC BLOOD PRESSURE: 147 MMHG | HEIGHT: 74 IN | WEIGHT: 220.02 LBS | TEMPERATURE: 98 F | HEART RATE: 58 BPM | OXYGEN SATURATION: 98 % | DIASTOLIC BLOOD PRESSURE: 66 MMHG

## 2024-05-02 DIAGNOSIS — Z01.818 ENCOUNTER FOR OTHER PREPROCEDURAL EXAMINATION: ICD-10-CM

## 2024-05-02 DIAGNOSIS — Z95.818 PRESENCE OF OTHER CARDIAC IMPLANTS AND GRAFTS: Chronic | ICD-10-CM

## 2024-05-02 DIAGNOSIS — Z90.10 ACQUIRED ABSENCE OF UNSPECIFIED BREAST AND NIPPLE: Chronic | ICD-10-CM

## 2024-05-02 DIAGNOSIS — K80.10 CALCULUS OF GALLBLADDER WITH CHRONIC CHOLECYSTITIS WITHOUT OBSTRUCTION: ICD-10-CM

## 2024-05-02 DIAGNOSIS — Z95.0 PRESENCE OF CARDIAC PACEMAKER: Chronic | ICD-10-CM

## 2024-05-02 DIAGNOSIS — Z95.1 PRESENCE OF AORTOCORONARY BYPASS GRAFT: Chronic | ICD-10-CM

## 2024-05-02 LAB
ALBUMIN SERPL ELPH-MCNC: 4.6 G/DL — SIGNIFICANT CHANGE UP (ref 3.5–5.2)
ALP SERPL-CCNC: 83 U/L — SIGNIFICANT CHANGE UP (ref 30–115)
ALT FLD-CCNC: 11 U/L — SIGNIFICANT CHANGE UP (ref 0–41)
ANION GAP SERPL CALC-SCNC: 13 MMOL/L — SIGNIFICANT CHANGE UP (ref 7–14)
APTT BLD: 32.8 SEC — SIGNIFICANT CHANGE UP (ref 27–39.2)
AST SERPL-CCNC: 19 U/L — SIGNIFICANT CHANGE UP (ref 0–41)
BASOPHILS # BLD AUTO: 0.11 K/UL — SIGNIFICANT CHANGE UP (ref 0–0.2)
BASOPHILS NFR BLD AUTO: 1.2 % — HIGH (ref 0–1)
BILIRUB SERPL-MCNC: 0.9 MG/DL — SIGNIFICANT CHANGE UP (ref 0.2–1.2)
BUN SERPL-MCNC: 17 MG/DL — SIGNIFICANT CHANGE UP (ref 10–20)
CALCIUM SERPL-MCNC: 9.2 MG/DL — SIGNIFICANT CHANGE UP (ref 8.4–10.5)
CHLORIDE SERPL-SCNC: 100 MMOL/L — SIGNIFICANT CHANGE UP (ref 98–110)
CO2 SERPL-SCNC: 24 MMOL/L — SIGNIFICANT CHANGE UP (ref 17–32)
CREAT SERPL-MCNC: 0.9 MG/DL — SIGNIFICANT CHANGE UP (ref 0.7–1.5)
EGFR: 87 ML/MIN/1.73M2 — SIGNIFICANT CHANGE UP
EOSINOPHIL # BLD AUTO: 0.3 K/UL — SIGNIFICANT CHANGE UP (ref 0–0.7)
EOSINOPHIL NFR BLD AUTO: 3.4 % — SIGNIFICANT CHANGE UP (ref 0–8)
GLUCOSE SERPL-MCNC: 90 MG/DL — SIGNIFICANT CHANGE UP (ref 70–99)
HCT VFR BLD CALC: 40.4 % — LOW (ref 42–52)
HGB BLD-MCNC: 12.7 G/DL — LOW (ref 14–18)
IMM GRANULOCYTES NFR BLD AUTO: 0.2 % — SIGNIFICANT CHANGE UP (ref 0.1–0.3)
INR BLD: 1.1 RATIO — SIGNIFICANT CHANGE UP (ref 0.65–1.3)
LYMPHOCYTES # BLD AUTO: 1.65 K/UL — SIGNIFICANT CHANGE UP (ref 1.2–3.4)
LYMPHOCYTES # BLD AUTO: 18.5 % — LOW (ref 20.5–51.1)
MCHC RBC-ENTMCNC: 25.5 PG — LOW (ref 27–31)
MCHC RBC-ENTMCNC: 31.4 G/DL — LOW (ref 32–37)
MCV RBC AUTO: 81 FL — SIGNIFICANT CHANGE UP (ref 80–94)
MONOCYTES # BLD AUTO: 0.81 K/UL — HIGH (ref 0.1–0.6)
MONOCYTES NFR BLD AUTO: 9.1 % — SIGNIFICANT CHANGE UP (ref 1.7–9.3)
NEUTROPHILS # BLD AUTO: 6.04 K/UL — SIGNIFICANT CHANGE UP (ref 1.4–6.5)
NEUTROPHILS NFR BLD AUTO: 67.6 % — SIGNIFICANT CHANGE UP (ref 42.2–75.2)
NRBC # BLD: 0 /100 WBCS — SIGNIFICANT CHANGE UP (ref 0–0)
PLATELET # BLD AUTO: 249 K/UL — SIGNIFICANT CHANGE UP (ref 130–400)
PMV BLD: 11.6 FL — HIGH (ref 7.4–10.4)
POTASSIUM SERPL-MCNC: 5.3 MMOL/L — HIGH (ref 3.5–5)
POTASSIUM SERPL-SCNC: 5.3 MMOL/L — HIGH (ref 3.5–5)
PROT SERPL-MCNC: 7.5 G/DL — SIGNIFICANT CHANGE UP (ref 6–8)
PROTHROM AB SERPL-ACNC: 12.6 SEC — SIGNIFICANT CHANGE UP (ref 9.95–12.87)
RBC # BLD: 4.99 M/UL — SIGNIFICANT CHANGE UP (ref 4.7–6.1)
RBC # FLD: 16.9 % — HIGH (ref 11.5–14.5)
SODIUM SERPL-SCNC: 137 MMOL/L — SIGNIFICANT CHANGE UP (ref 135–146)
WBC # BLD: 8.93 K/UL — SIGNIFICANT CHANGE UP (ref 4.8–10.8)
WBC # FLD AUTO: 8.93 K/UL — SIGNIFICANT CHANGE UP (ref 4.8–10.8)

## 2024-05-02 PROCEDURE — 80053 COMPREHEN METABOLIC PANEL: CPT

## 2024-05-02 PROCEDURE — 85730 THROMBOPLASTIN TIME PARTIAL: CPT

## 2024-05-02 PROCEDURE — 99214 OFFICE O/P EST MOD 30 MIN: CPT | Mod: 25

## 2024-05-02 PROCEDURE — 85025 COMPLETE CBC W/AUTO DIFF WBC: CPT

## 2024-05-02 PROCEDURE — 36415 COLL VENOUS BLD VENIPUNCTURE: CPT

## 2024-05-02 PROCEDURE — 85610 PROTHROMBIN TIME: CPT

## 2024-05-02 RX ORDER — RIVAROXABAN 15 MG-20MG
1 KIT ORAL
Refills: 0 | DISCHARGE

## 2024-05-02 NOTE — H&P PST ADULT - HISTORY OF PRESENT ILLNESS
pt with gall stones x 3 yrs  has "tube" in since then   needed to do watchman prior to cholecystectomy- done 11/23  now for planned procedure     pt states will also have hernia repair- not booked in [facility]  (but consent has umbilical hernia repair)  will work up as if also having hernia repair     PATIENT CURRENTLY DENIES CHEST PAIN  SHORTNESS OF BREATH  PALPITATIONS,  DYSURIA, OR UPPER RESPIRATORY INFECTION IN PAST 2 WEEKS  denies travel outside the [place] in the past 30 days    Anesthesia Alert  NO--Difficult Airway  NO--History of neck surgery or radiation  NO--Limited ROM of neck  NO--History of Malignant hyperthermia  NO--No personal or family history of Pseudocholinesterase deficiency.  NO--Prior Anesthesia Complication  NO--Latex Allergy  NO--Loose teeth  NO--History of Rheumatoid Arthritis  +Bleeding risk asa   NO--AUGUST  NO--Other_____  [place]    PT DENIES ANY RASHES, ABRASION, OR OPEN WOUNDS OR CUTS    AS PER THE PT, THIS IS HIS/HER COMPLETE MEDICAL AND SURGICAL HX, INCLUDING MEDICATIONS PRESCRIBED AND OVER THE COUNTER    Patient verbalized understanding of instructions and was given the opportunity to ask questions and have them answered.    pt denies any suicidal ideation or thoughts, pt states not a threat to self or others      Duke Activity Status Index (DASI)  39.45 points  The higher the score (maximum 58.2), the higher the functional status.  7.59 METs    Revised Cardiac Risk Index for Pre-Operative Risk  3 points  Class IV Risk  15.0 %  30-day risk of death, MI, or cardiac arrest      Calculus of gallbladder with chronic cholecystitis without obstruction    Encounter for other preprocedural examination    Family history of heart disease (Father, Mother)    Family history of lung cancer (Sibling)    Arrhythmia    Hypercholesteremia    HTN (hypertension)    Obesity    CHF (congestive heart failure)    Heart attack    Breast cancer    Osteoarthritis    CAD (coronary artery disease)    Atrial fibrillation    [place] (hard of hearing)    Dyslipidemia    Heart failure with improved ejection fraction (HFimpEF)    Chronic atrial fibrillation    H/O cardiac murmur    S/P CABG x 5    H/O mastectomy    Pacemaker    Presence of biventricular AICD    Presence of biventricular cardiac pacemaker    [id number]    [id number]

## 2024-05-02 NOTE — H&P PST ADULT - NSICDXPASTSURGICALHX_GEN_ALL_CORE_FT
PAST SURGICAL HISTORY:  H/O mastectomy right 2001    Presence of biventricular cardiac pacemaker     Presence of Watchman left atrial appendage closure device     S/P CABG x 5 2014

## 2024-05-02 NOTE — H&P PST ADULT - REASON FOR ADMISSION
Patient is a 78  year old  male presenting to PAST in preparation for robotic cholecystectomy with fluorescent cholangiogram  on   5/15/24 under general anesthesia by Dr. fields

## 2024-05-02 NOTE — H&P PST ADULT - NSICDXPASTMEDICALHX_GEN_ALL_CORE_FT
PAST MEDICAL HISTORY:  Breast cancer 2001    CAD (coronary artery disease)     Chronic atrial fibrillation     Dyslipidemia     H/O cardiac murmur     Heart attack     Heart failure with improved ejection fraction (HFimpEF)     [place] (hard of hearing)     HTN (hypertension)     Osteoarthritis

## 2024-05-02 NOTE — H&P PST ADULT - ATTENDING PHYSICIAN: I HAVE REVIEWED THE CLINICAL DOCUMENTATION AND AGREE WITH THE ABOVE NOTE
@ 37 6/7wks -- oligo; AUTUMN 3.97.  AP Course:    Pregestational DM; Elevated umbilical doppler Statement Selected

## 2024-05-03 ENCOUNTER — INPATIENT (INPATIENT)
Facility: HOSPITAL | Age: 79
LOS: 2 days | Discharge: HOME | DRG: 195 | End: 2024-05-06
Attending: INTERNAL MEDICINE | Admitting: INTERNAL MEDICINE
Payer: MEDICARE

## 2024-05-03 VITALS
SYSTOLIC BLOOD PRESSURE: 116 MMHG | HEART RATE: 63 BPM | OXYGEN SATURATION: 97 % | HEIGHT: 74 IN | DIASTOLIC BLOOD PRESSURE: 85 MMHG | TEMPERATURE: 102 F | WEIGHT: 220.02 LBS | RESPIRATION RATE: 18 BRPM

## 2024-05-03 DIAGNOSIS — Z95.0 PRESENCE OF CARDIAC PACEMAKER: Chronic | ICD-10-CM

## 2024-05-03 DIAGNOSIS — I50.32 CHRONIC DIASTOLIC (CONGESTIVE) HEART FAILURE: ICD-10-CM

## 2024-05-03 DIAGNOSIS — Z95.1 PRESENCE OF AORTOCORONARY BYPASS GRAFT: ICD-10-CM

## 2024-05-03 DIAGNOSIS — Z95.1 PRESENCE OF AORTOCORONARY BYPASS GRAFT: Chronic | ICD-10-CM

## 2024-05-03 DIAGNOSIS — Z90.10 ACQUIRED ABSENCE OF UNSPECIFIED BREAST AND NIPPLE: Chronic | ICD-10-CM

## 2024-05-03 DIAGNOSIS — Z95.818 PRESENCE OF OTHER CARDIAC IMPLANTS AND GRAFTS: Chronic | ICD-10-CM

## 2024-05-03 DIAGNOSIS — Z01.818 ENCOUNTER FOR OTHER PREPROCEDURAL EXAMINATION: ICD-10-CM

## 2024-05-03 DIAGNOSIS — D84.9 IMMUNODEFICIENCY, UNSPECIFIED: ICD-10-CM

## 2024-05-03 DIAGNOSIS — I25.10 ATHEROSCLEROTIC HEART DISEASE OF NATIVE CORONARY ARTERY WITHOUT ANGINA PECTORIS: ICD-10-CM

## 2024-05-03 DIAGNOSIS — I48.20 CHRONIC ATRIAL FIBRILLATION, UNSPECIFIED: ICD-10-CM

## 2024-05-03 DIAGNOSIS — R65.10 SYSTEMIC INFLAMMATORY RESPONSE SYNDROME (SIRS) OF NON-INFECTIOUS ORIGIN WITHOUT ACUTE ORGAN DYSFUNCTION: ICD-10-CM

## 2024-05-03 DIAGNOSIS — E78.5 HYPERLIPIDEMIA, UNSPECIFIED: ICD-10-CM

## 2024-05-03 DIAGNOSIS — K80.10 CALCULUS OF GALLBLADDER WITH CHRONIC CHOLECYSTITIS WITHOUT OBSTRUCTION: ICD-10-CM

## 2024-05-03 DIAGNOSIS — K81.9 CHOLECYSTITIS, UNSPECIFIED: ICD-10-CM

## 2024-05-03 DIAGNOSIS — Z11.52 ENCOUNTER FOR SCREENING FOR COVID-19: ICD-10-CM

## 2024-05-03 DIAGNOSIS — J18.9 PNEUMONIA, UNSPECIFIED ORGANISM: ICD-10-CM

## 2024-05-03 DIAGNOSIS — K29.80 DUODENITIS WITHOUT BLEEDING: ICD-10-CM

## 2024-05-03 LAB
ALBUMIN SERPL ELPH-MCNC: 4.4 G/DL — SIGNIFICANT CHANGE UP (ref 3.5–5.2)
ALP SERPL-CCNC: 81 U/L — SIGNIFICANT CHANGE UP (ref 30–115)
ALT FLD-CCNC: 11 U/L — SIGNIFICANT CHANGE UP (ref 0–41)
ANION GAP SERPL CALC-SCNC: 14 MMOL/L — SIGNIFICANT CHANGE UP (ref 7–14)
AST SERPL-CCNC: 19 U/L — SIGNIFICANT CHANGE UP (ref 0–41)
BASOPHILS # BLD AUTO: 0.07 K/UL — SIGNIFICANT CHANGE UP (ref 0–0.2)
BASOPHILS NFR BLD AUTO: 0.6 % — SIGNIFICANT CHANGE UP (ref 0–1)
BILIRUB DIRECT SERPL-MCNC: 0.3 MG/DL — SIGNIFICANT CHANGE UP (ref 0–0.3)
BILIRUB INDIRECT FLD-MCNC: 0.9 MG/DL — SIGNIFICANT CHANGE UP (ref 0.2–1.2)
BILIRUB SERPL-MCNC: 1.2 MG/DL — SIGNIFICANT CHANGE UP (ref 0.2–1.2)
BUN SERPL-MCNC: 19 MG/DL — SIGNIFICANT CHANGE UP (ref 10–20)
CALCIUM SERPL-MCNC: 9 MG/DL — SIGNIFICANT CHANGE UP (ref 8.4–10.4)
CHLORIDE SERPL-SCNC: 99 MMOL/L — SIGNIFICANT CHANGE UP (ref 98–110)
CO2 SERPL-SCNC: 20 MMOL/L — SIGNIFICANT CHANGE UP (ref 17–32)
CREAT SERPL-MCNC: 1 MG/DL — SIGNIFICANT CHANGE UP (ref 0.7–1.5)
EGFR: 77 ML/MIN/1.73M2 — SIGNIFICANT CHANGE UP
EOSINOPHIL # BLD AUTO: 0.01 K/UL — SIGNIFICANT CHANGE UP (ref 0–0.7)
EOSINOPHIL NFR BLD AUTO: 0.1 % — SIGNIFICANT CHANGE UP (ref 0–8)
FLUAV AG NPH QL: SIGNIFICANT CHANGE UP
FLUBV AG NPH QL: SIGNIFICANT CHANGE UP
GLUCOSE SERPL-MCNC: 105 MG/DL — HIGH (ref 70–99)
HCT VFR BLD CALC: 37.3 % — LOW (ref 42–52)
HGB BLD-MCNC: 11.9 G/DL — LOW (ref 14–18)
IMM GRANULOCYTES NFR BLD AUTO: 0.3 % — SIGNIFICANT CHANGE UP (ref 0.1–0.3)
LYMPHOCYTES # BLD AUTO: 0.45 K/UL — LOW (ref 1.2–3.4)
LYMPHOCYTES # BLD AUTO: 3.6 % — LOW (ref 20.5–51.1)
MCHC RBC-ENTMCNC: 25.3 PG — LOW (ref 27–31)
MCHC RBC-ENTMCNC: 31.9 G/DL — LOW (ref 32–37)
MCV RBC AUTO: 79.2 FL — LOW (ref 80–94)
MONOCYTES # BLD AUTO: 0.83 K/UL — HIGH (ref 0.1–0.6)
MONOCYTES NFR BLD AUTO: 6.7 % — SIGNIFICANT CHANGE UP (ref 1.7–9.3)
NEUTROPHILS # BLD AUTO: 11.03 K/UL — HIGH (ref 1.4–6.5)
NEUTROPHILS NFR BLD AUTO: 88.7 % — HIGH (ref 42.2–75.2)
NRBC # BLD: 0 /100 WBCS — SIGNIFICANT CHANGE UP (ref 0–0)
PLATELET # BLD AUTO: 204 K/UL — SIGNIFICANT CHANGE UP (ref 130–400)
PMV BLD: 10.7 FL — HIGH (ref 7.4–10.4)
POTASSIUM SERPL-MCNC: 5 MMOL/L — SIGNIFICANT CHANGE UP (ref 3.5–5)
POTASSIUM SERPL-SCNC: 5 MMOL/L — SIGNIFICANT CHANGE UP (ref 3.5–5)
PROT SERPL-MCNC: 7.3 G/DL — SIGNIFICANT CHANGE UP (ref 6–8)
RBC # BLD: 4.71 M/UL — SIGNIFICANT CHANGE UP (ref 4.7–6.1)
RBC # FLD: 16.7 % — HIGH (ref 11.5–14.5)
RSV RNA NPH QL NAA+NON-PROBE: SIGNIFICANT CHANGE UP
SARS-COV-2 RNA SPEC QL NAA+PROBE: SIGNIFICANT CHANGE UP
SODIUM SERPL-SCNC: 133 MMOL/L — LOW (ref 135–146)
TROPONIN T, HIGH SENSITIVITY RESULT: 29 NG/L — HIGH (ref 6–21)
TROPONIN T, HIGH SENSITIVITY RESULT: 29 NG/L — HIGH (ref 6–21)
WBC # BLD: 12.43 K/UL — HIGH (ref 4.8–10.8)
WBC # FLD AUTO: 12.43 K/UL — HIGH (ref 4.8–10.8)

## 2024-05-03 PROCEDURE — 81001 URINALYSIS AUTO W/SCOPE: CPT

## 2024-05-03 PROCEDURE — 80053 COMPREHEN METABOLIC PANEL: CPT

## 2024-05-03 PROCEDURE — 36415 COLL VENOUS BLD VENIPUNCTURE: CPT

## 2024-05-03 PROCEDURE — 80048 BASIC METABOLIC PNL TOTAL CA: CPT

## 2024-05-03 PROCEDURE — 87040 BLOOD CULTURE FOR BACTERIA: CPT

## 2024-05-03 PROCEDURE — 83735 ASSAY OF MAGNESIUM: CPT

## 2024-05-03 PROCEDURE — 71046 X-RAY EXAM CHEST 2 VIEWS: CPT | Mod: 26

## 2024-05-03 PROCEDURE — 83605 ASSAY OF LACTIC ACID: CPT

## 2024-05-03 PROCEDURE — 99285 EMERGENCY DEPT VISIT HI MDM: CPT | Mod: FS

## 2024-05-03 PROCEDURE — 74177 CT ABD & PELVIS W/CONTRAST: CPT | Mod: MC

## 2024-05-03 PROCEDURE — 84484 ASSAY OF TROPONIN QUANT: CPT

## 2024-05-03 PROCEDURE — 85025 COMPLETE CBC W/AUTO DIFF WBC: CPT

## 2024-05-03 RX ORDER — CEFTRIAXONE 500 MG/1
1000 INJECTION, POWDER, FOR SOLUTION INTRAMUSCULAR; INTRAVENOUS ONCE
Refills: 0 | Status: COMPLETED | OUTPATIENT
Start: 2024-05-03 | End: 2024-05-03

## 2024-05-03 RX ORDER — ACETAMINOPHEN 500 MG
975 TABLET ORAL ONCE
Refills: 0 | Status: COMPLETED | OUTPATIENT
Start: 2024-05-03 | End: 2024-05-03

## 2024-05-03 RX ORDER — AZITHROMYCIN 500 MG/1
500 TABLET, FILM COATED ORAL ONCE
Refills: 0 | Status: COMPLETED | OUTPATIENT
Start: 2024-05-03 | End: 2024-05-03

## 2024-05-03 RX ORDER — ACETAMINOPHEN 500 MG
650 TABLET ORAL ONCE
Refills: 0 | Status: COMPLETED | OUTPATIENT
Start: 2024-05-03 | End: 2024-05-03

## 2024-05-03 RX ADMIN — CEFTRIAXONE 100 MILLIGRAM(S): 500 INJECTION, POWDER, FOR SOLUTION INTRAMUSCULAR; INTRAVENOUS at 18:35

## 2024-05-03 RX ADMIN — Medication 975 MILLIGRAM(S): at 17:44

## 2024-05-03 RX ADMIN — AZITHROMYCIN 255 MILLIGRAM(S): 500 TABLET, FILM COATED ORAL at 18:36

## 2024-05-03 NOTE — ED PROVIDER NOTE - CARE PLAN
Principal Discharge DX:	Pneumonia  Secondary Diagnosis:	Chest pain  Secondary Diagnosis:	T wave inversion in EKG   1

## 2024-05-03 NOTE — ED PROVIDER NOTE - OBJECTIVE STATEMENT
Patient is a 78y Male PMH  hypertension, HLD, CHF, CAD status post CABG, status post PPM, AFIB s/p Watchman placement in September 2023, s/p T-tube placement scheduled for robotic cholecystectomy with fluorescent cholangiogram on 5/15/24  by Dr. Borjas, who presents to the ED with complaints of fever (101F tmax), nasal congestion, generalized weakness, and dizziness that began this AM. Patient also endorsing intermittent sharp non-radiating chest pain that began while in ED. T-tube has been draining without issue, no redness, pain, swelling, or rash surrounding incision; no change in quality of leg bag drainage). Denies visual changes, sore throat, nausea, vomiting, diarrhea, abdominal pain, constipation, or urinary sx.

## 2024-05-03 NOTE — ED PROVIDER NOTE - PHYSICAL EXAMINATION
VITAL SIGNS: I have reviewed nursing notes and confirm.  CONSTITUTIONAL: In no acute distress.  SKIN: Skin exam is warm and dry.  HEAD: Normocephalic; atraumatic.  EYES: PERRL, EOM intact; conjunctiva without pallor.   ENT: No nasal discharge; airway clear. TMs clear.  NECK: Supple; non tender.  CARD: S1, S2; Regular rate and rhythm.  RESP: CTAB. Speaking in full sentences.   ABD: T-tube in place in right flank/RUQ, incision clean, dry, nonerythematous, no skin changes or signs of infection; leg bag draining bilious fluid, non-cloudy, of typical appearance as per pt and wife. Soft; non-distended; non-tender; No rebound or guarding. No CVA tenderness.  EXT: Normal ROM.  NEURO: Alert, oriented. Grossly unremarkable. No focal deficits.

## 2024-05-03 NOTE — ED PROVIDER NOTE - CLINICAL SUMMARY MEDICAL DECISION MAKING FREE TEXT BOX
Pt here with atypical cp in setting of cough and fever. Has T tube in place and scheduled for cholecystectomy soon but denies any GI complaints. Labs notable for trop 29>29. Ekg with new lateral TWI compared to prior. Cxr with questionable RLL pneumonia s/p abx. Pt at high risk for decompensation given multiple comorbidities including cardiac disease. Pt requires admission for continued cardiorespiratory monitoring given risk for ACS, heart failure, cardiogenic shock, dangerous arrhythmia if not closely monitored and tx'ed.

## 2024-05-03 NOTE — ED PROVIDER NOTE - ATTENDING APP SHARED VISIT CONTRIBUTION OF CARE
77 yo M with hx of CAD s/p CABG x5, HLD, MI, CHF, afib s/p watchman and PPM, HTN, cholecystostomy with T tube in place who presents with intermittent L sided cp which started at 4am after eating a candy his wife gave him. Nonpleuritic, nonexertional. Has been having productive cough for a few days and today found to be febrile. No sob, abd pain, nausea, vomiting, diarrhea. Scheduled to have robotic cholecystectomy on 5/15.     PMD Dr. Champagne  Cardio Dr. Handy    CONSTITUTIONAL: well developed, nontoxic appearing, in no acute distress, speaking in full sentences  SKIN: warm, dry, no rash, cap refill < 2 seconds  HEENT: normocephalic, atraumatic, no conjunctival erythema, moist mucous membranes, patent airway  NECK: supple  CV:  regular rate, regular rhythm, 2+ radial pulses bilaterally  RESP: no wheezes, no rales, no rhonchi, normal work of breathing  ABD: soft, no tenderness, nondistended, no rebound, no guarding  MSK: normal ROM, no cyanosis, no edema  NEURO: alert, oriented, grossly unremarkable  PSYCH: cooperative, appropriate    MDM:  Pt here with atypical cp in setting of cough and fever. Has T tube in place and scheduled for cholecystectomy soon but denies any GI complaints. Low suspicion for PE given no PE risk factors, no tachycardia, no tachypnea, no hypoxemia. Low suspicion for dissection given equal distal pulses, normotensive, no neuro deficits however will check for widened mediastinum on cxr. Low suspicion for esophageal perforation given no recent instrumentation or vomiting, no increased thoracic pressure, no hammans crunch. Plan for labs, ekg, cxr r/o ACS, CHF exac, ptx, pneumomediastinum, pneumonia, viral URI, pericarditis, myocarditis, pleural effusion.

## 2024-05-03 NOTE — ED PROVIDER NOTE - NSICDXPASTMEDICALHX_GEN_ALL_CORE_FT
PAST MEDICAL HISTORY:  Atrial fibrillation     Breast cancer 2001    CAD (coronary artery disease)     Dyslipidemia     H/O cardiac murmur     Heart attack     Heart failure with improved ejection fraction (HFimpEF)     [place] (hard of hearing)     HTN (hypertension)     Osteoarthritis

## 2024-05-03 NOTE — ED PROVIDER NOTE - DIFFERENTIAL DIAGNOSIS
ACS, CHF exac, ptx, pneumomediastinum, pneumonia, viral URI, pericarditis, myocarditis, pleural effusion Differential Diagnosis

## 2024-05-03 NOTE — ED PROVIDER NOTE - PROGRESS NOTE DETAILS
CR: Pneumonia appreciated on chest x-ray, informed patient confirm no antibiotic allergies, started on ceftriaxone and azithromycin.

## 2024-05-04 LAB
ANION GAP SERPL CALC-SCNC: 11 MMOL/L — SIGNIFICANT CHANGE UP (ref 7–14)
APPEARANCE UR: CLEAR — SIGNIFICANT CHANGE UP
BASOPHILS # BLD AUTO: 0.05 K/UL — SIGNIFICANT CHANGE UP (ref 0–0.2)
BASOPHILS NFR BLD AUTO: 0.7 % — SIGNIFICANT CHANGE UP (ref 0–1)
BILIRUB UR-MCNC: NEGATIVE — SIGNIFICANT CHANGE UP
BUN SERPL-MCNC: 17 MG/DL — SIGNIFICANT CHANGE UP (ref 10–20)
CALCIUM SERPL-MCNC: 8.2 MG/DL — LOW (ref 8.4–10.5)
CHLORIDE SERPL-SCNC: 101 MMOL/L — SIGNIFICANT CHANGE UP (ref 98–110)
CO2 SERPL-SCNC: 23 MMOL/L — SIGNIFICANT CHANGE UP (ref 17–32)
COLOR SPEC: SIGNIFICANT CHANGE UP
CREAT SERPL-MCNC: 0.8 MG/DL — SIGNIFICANT CHANGE UP (ref 0.7–1.5)
DIFF PNL FLD: NEGATIVE — SIGNIFICANT CHANGE UP
EGFR: 90 ML/MIN/1.73M2 — SIGNIFICANT CHANGE UP
EOSINOPHIL # BLD AUTO: 0.01 K/UL — SIGNIFICANT CHANGE UP (ref 0–0.7)
EOSINOPHIL NFR BLD AUTO: 0.1 % — SIGNIFICANT CHANGE UP (ref 0–8)
GLUCOSE SERPL-MCNC: 95 MG/DL — SIGNIFICANT CHANGE UP (ref 70–99)
GLUCOSE UR QL: NEGATIVE MG/DL — SIGNIFICANT CHANGE UP
HCT VFR BLD CALC: 33.3 % — LOW (ref 42–52)
HGB BLD-MCNC: 10.6 G/DL — LOW (ref 14–18)
IMM GRANULOCYTES NFR BLD AUTO: 0.4 % — HIGH (ref 0.1–0.3)
KETONES UR-MCNC: 15 MG/DL
LACTATE SERPL-SCNC: 1 MMOL/L — SIGNIFICANT CHANGE UP (ref 0.7–2)
LEUKOCYTE ESTERASE UR-ACNC: ABNORMAL
LYMPHOCYTES # BLD AUTO: 0.74 K/UL — LOW (ref 1.2–3.4)
LYMPHOCYTES # BLD AUTO: 9.6 % — LOW (ref 20.5–51.1)
MCHC RBC-ENTMCNC: 25.3 PG — LOW (ref 27–31)
MCHC RBC-ENTMCNC: 31.8 G/DL — LOW (ref 32–37)
MCV RBC AUTO: 79.5 FL — LOW (ref 80–94)
MONOCYTES # BLD AUTO: 0.91 K/UL — HIGH (ref 0.1–0.6)
MONOCYTES NFR BLD AUTO: 11.9 % — HIGH (ref 1.7–9.3)
NEUTROPHILS # BLD AUTO: 5.93 K/UL — SIGNIFICANT CHANGE UP (ref 1.4–6.5)
NEUTROPHILS NFR BLD AUTO: 77.3 % — HIGH (ref 42.2–75.2)
NITRITE UR-MCNC: NEGATIVE — SIGNIFICANT CHANGE UP
NRBC # BLD: 0 /100 WBCS — SIGNIFICANT CHANGE UP (ref 0–0)
PH UR: 5 — SIGNIFICANT CHANGE UP (ref 5–8)
PLATELET # BLD AUTO: 155 K/UL — SIGNIFICANT CHANGE UP (ref 130–400)
PMV BLD: 11.6 FL — HIGH (ref 7.4–10.4)
POTASSIUM SERPL-MCNC: 4.3 MMOL/L — SIGNIFICANT CHANGE UP (ref 3.5–5)
POTASSIUM SERPL-SCNC: 4.3 MMOL/L — SIGNIFICANT CHANGE UP (ref 3.5–5)
PROT UR-MCNC: SIGNIFICANT CHANGE UP MG/DL
RBC # BLD: 4.19 M/UL — LOW (ref 4.7–6.1)
RBC # FLD: 16.8 % — HIGH (ref 11.5–14.5)
SODIUM SERPL-SCNC: 135 MMOL/L — SIGNIFICANT CHANGE UP (ref 135–146)
SP GR SPEC: 1.03 — SIGNIFICANT CHANGE UP (ref 1–1.03)
TROPONIN T, HIGH SENSITIVITY RESULT: 33 NG/L — HIGH (ref 6–21)
UROBILINOGEN FLD QL: 1 MG/DL — SIGNIFICANT CHANGE UP (ref 0.2–1)
WBC # BLD: 7.67 K/UL — SIGNIFICANT CHANGE UP (ref 4.8–10.8)
WBC # FLD AUTO: 7.67 K/UL — SIGNIFICANT CHANGE UP (ref 4.8–10.8)

## 2024-05-04 PROCEDURE — 99222 1ST HOSP IP/OBS MODERATE 55: CPT

## 2024-05-04 PROCEDURE — 74177 CT ABD & PELVIS W/CONTRAST: CPT | Mod: 26

## 2024-05-04 RX ORDER — ASPIRIN/CALCIUM CARB/MAGNESIUM 324 MG
325 TABLET ORAL DAILY
Refills: 0 | Status: DISCONTINUED | OUTPATIENT
Start: 2024-05-04 | End: 2024-05-06

## 2024-05-04 RX ORDER — ACETAMINOPHEN 500 MG
650 TABLET ORAL EVERY 6 HOURS
Refills: 0 | Status: DISCONTINUED | OUTPATIENT
Start: 2024-05-04 | End: 2024-05-06

## 2024-05-04 RX ORDER — ENOXAPARIN SODIUM 100 MG/ML
40 INJECTION SUBCUTANEOUS EVERY 24 HOURS
Refills: 0 | Status: DISCONTINUED | OUTPATIENT
Start: 2024-05-04 | End: 2024-05-06

## 2024-05-04 RX ORDER — CEFEPIME 1 G/1
2000 INJECTION, POWDER, FOR SOLUTION INTRAMUSCULAR; INTRAVENOUS EVERY 8 HOURS
Refills: 0 | Status: DISCONTINUED | OUTPATIENT
Start: 2024-05-04 | End: 2024-05-06

## 2024-05-04 RX ORDER — METOPROLOL TARTRATE 50 MG
50 TABLET ORAL
Refills: 0 | Status: DISCONTINUED | OUTPATIENT
Start: 2024-05-04 | End: 2024-05-06

## 2024-05-04 RX ORDER — ATORVASTATIN CALCIUM 80 MG/1
20 TABLET, FILM COATED ORAL AT BEDTIME
Refills: 0 | Status: DISCONTINUED | OUTPATIENT
Start: 2024-05-04 | End: 2024-05-06

## 2024-05-04 RX ORDER — PANTOPRAZOLE SODIUM 20 MG/1
40 TABLET, DELAYED RELEASE ORAL
Refills: 0 | Status: DISCONTINUED | OUTPATIENT
Start: 2024-05-04 | End: 2024-05-06

## 2024-05-04 RX ORDER — METRONIDAZOLE 500 MG
500 TABLET ORAL EVERY 8 HOURS
Refills: 0 | Status: DISCONTINUED | OUTPATIENT
Start: 2024-05-04 | End: 2024-05-06

## 2024-05-04 RX ADMIN — ATORVASTATIN CALCIUM 20 MILLIGRAM(S): 80 TABLET, FILM COATED ORAL at 21:00

## 2024-05-04 RX ADMIN — Medication 100 MILLIGRAM(S): at 21:00

## 2024-05-04 RX ADMIN — PANTOPRAZOLE SODIUM 40 MILLIGRAM(S): 20 TABLET, DELAYED RELEASE ORAL at 06:12

## 2024-05-04 RX ADMIN — Medication 325 MILLIGRAM(S): at 14:38

## 2024-05-04 RX ADMIN — Medication 50 MILLIGRAM(S): at 17:42

## 2024-05-04 RX ADMIN — Medication 100 MILLIGRAM(S): at 05:31

## 2024-05-04 RX ADMIN — CEFEPIME 100 MILLIGRAM(S): 1 INJECTION, POWDER, FOR SOLUTION INTRAMUSCULAR; INTRAVENOUS at 14:39

## 2024-05-04 RX ADMIN — CEFEPIME 100 MILLIGRAM(S): 1 INJECTION, POWDER, FOR SOLUTION INTRAMUSCULAR; INTRAVENOUS at 21:00

## 2024-05-04 RX ADMIN — Medication 100 MILLIGRAM(S): at 14:39

## 2024-05-04 RX ADMIN — CEFEPIME 100 MILLIGRAM(S): 1 INJECTION, POWDER, FOR SOLUTION INTRAMUSCULAR; INTRAVENOUS at 05:32

## 2024-05-04 RX ADMIN — Medication 50 MILLIGRAM(S): at 05:33

## 2024-05-04 RX ADMIN — ENOXAPARIN SODIUM 40 MILLIGRAM(S): 100 INJECTION SUBCUTANEOUS at 05:33

## 2024-05-04 NOTE — CONSULT NOTE ADULT - SUBJECTIVE AND OBJECTIVE BOX
INFECTIOUS DISEASE CONSULT NOTE    Patient is a 78y old  Male who presents with a chief complaint of   HPI:  77M w/ h/o CAD (s/p CABG), HFimpEF ( EF 50-55% in 2023 ), chronic afib s/p watchman procedure ( off AC ), SSS (s/p CRT-P), HTN, and DLD presents to the ED with fever and chills. Patient reported that today he felt very cold and started shaking, took his temp was 99.9 at home, he also felt very weak so he came to the ED for evaluation. Pt reported associated intermittent epigastric pain, and left sided chest pain that happened once and went away. Denies any shortness of breath, palpitations, cough, sore throat, congestion, abdominal pain, diarrhea, constipation, dysuria. To note pt had cholecystitis in 2023 with GNR bacteremia s/p percutaneous cholecystostomy ( drain still there ), and was evaluated by surgery yesterday and was planned for robotic cholecystectomy with cholangiogram on 5/15/2024. Pt was also admitted in October with same complaint of fever with no clear source which resolved later, workup then was negative and ID recommended no abx.     Vitals: T 101.6, HR 63, /85, spo2 97% on RA   Labs: WBC 12K, Hb 11.9, Na 133, trop 29 --> 29, LFTs normal   UA negative   covid/RSV/flu negative   EKG: paced rhythm  (04 May 2024 02:05)         Prior hospital charts reviewed [Yes]  Primary team notes reviewed [Yes]  Other consultant notes reviewed [Yes]    REVIEW OF SYSTEMS:      PAST MEDICAL & SURGICAL HISTORY:  HTN (hypertension)      Heart attack      Breast cancer        Osteoarthritis      CAD (coronary artery disease)      [place] (hard of hearing)      Dyslipidemia      Heart failure with improved ejection fraction (HFimpEF)      H/O cardiac murmur      Atrial fibrillation      S/P CABG x 2014      H/O mastectomy  right       Presence of biventricular cardiac pacemaker      Presence of Watchman left atrial appendage closure device          SOCIAL HISTORY:  - Born in [place], migrated [place] in 18  - Currently working as / Retired  - Lives with _____; no pets  - No recent travel  - Denies tobacco use  - Denies alcohol use  - Denies illicit drug use  - Currently sexually active, has one male/female sexual partner    FAMILY HISTORY:  Family history of heart disease (Father, Mother)    Family history of lung cancer (Sibling)        Allergies:  No Known Allergies      ANTIMICROBIALS:  cefepime   IVPB 2000 every 8 hours  metroNIDAZOLE  IVPB 500 every 8 hours      ANTIMICROBIALS (past 90 days):  MEDICATIONS  (STANDING):  azithromycin  IVPB   255 mL/Hr IV Intermittent (24 @ 18:36)    cefepime   IVPB   100 mL/Hr IV Intermittent (24 @ 05:32)    cefTRIAXone   IVPB   100 mL/Hr IV Intermittent (24 @ 18:35)    metroNIDAZOLE  IVPB   100 mL/Hr IV Intermittent (24 @ 05:31)        OTHER MEDS:   MEDICATIONS  (STANDING):  acetaminophen     Tablet .. 650 every 6 hours PRN  aspirin 325 daily  atorvastatin 20 at bedtime  enoxaparin Injectable 40 every 24 hours  metoprolol tartrate 50 two times a day  pantoprazole    Tablet 40 before breakfast      VITALS:  Vital Signs Last 24 Hrs  T(F): 98.4 (24 @ 05:00), Max: 101.9 (24 @ 16:33)    Vital Signs Last 24 Hrs  HR: 98 (24 @ 05:00) (60 - 98)  BP: 144/60 (24 @ 05:00) (106/53 - 144/60)  RR: 18 (24 @ 00:10)  SpO2: 99% (24 @ 00:10) (95% - 99%)  Wt(kg): --    EXAM:    Labs:                        11.9   12.43 )-----------( 204      ( 03 May 2024 17:47 )             37.3         133<L>  |  99  |  19  ----------------------------<  105<H>  5.0   |  20  |  1.0    Ca    9.0      03 May 2024 17:47    TPro  7.3  /  Alb  4.4  /  TBili  1.2  /  DBili  0.3  /  AST  19  /  ALT  11  /  AlkPhos  81        WBC Trend:  WBC Count: 12.43 (24 @ 17:47)  WBC Count: 8.93 (24 @ 09:58)      Auto Neutrophil #: 11.03 K/uL (24 @ 17:47)  Auto Neutrophil #: 6.04 K/uL (24 @ 09:58)  Auto Neutrophil #: 4.17 K/uL (23 @ 06:45)  Auto Neutrophil #: 6.01 K/uL (10-21-23 @ 07:42)  Auto Neutrophil #: 8.60 K/uL (10-20-23 @ 20:07)      Creatine Trend:  Creatinine: 1.0 ()  Creatinine: 0.9 ()      Liver Biochemical Testing Trend:  Alanine Aminotransferase (ALT/SGPT): 11 ()  Alanine Aminotransferase (ALT/SGPT): 11 ()  Alanine Aminotransferase (ALT/SGPT): 8 ()  Alanine Aminotransferase (ALT/SGPT): 19 (10-23)  Alanine Aminotransferase (ALT/SGPT): 10 (10-22)  Aspartate Aminotransferase (AST/SGOT): 19 (24 @ :47)  Aspartate Aminotransferase (AST/SGOT): 19 (24 @ 09:58)  Aspartate Aminotransferase (AST/SGOT): 15 (23 @ 06:45)  Aspartate Aminotransferase (AST/SGOT): 36 (10-23-23 @ 06:57)  Aspartate Aminotransferase (AST/SGOT): 16 (10-22-23 @ 06:27)  Bilirubin Direct: 0.3 ()  Bilirubin Total: 1.2 ()  Bilirubin Total: 0.9 ()  Bilirubin Total: 0.7 ()  Bilirubin Total: 1.1 (10-23)      Trend LDH      Auto Eosinophil %: 0.1 % (24 @ 17:47)  Auto Eosinophil %: 3.4 % (24 @ 09:58)      Urinalysis Basic - ( 04 May 2024 02:26 )    Color: Dark Yellow / Appearance: Clear / S.029 / pH: x  Gluc: x / Ketone: 15 mg/dL  / Bili: Negative / Urobili: 1.0 mg/dL   Blood: x / Protein: Trace mg/dL / Nitrite: Negative   Leuk Esterase: Trace / RBC: 2 /HPF / WBC 1 /HPF   Sq Epi: x / Non Sq Epi: 2 /HPF / Bacteria: Negative /HPF        MICROBIOLOGY:    MRSA PCR Result.: Negative (23 @ 10:43)      Urinalysis with Rflx Culture (collected 04 May 2024 02:26)    Troponin T, High Sensitivity Result: 29 ()  Troponin T, High Sensitivity Result: 29 ()          RADIOLOGY:  imaging below personally reviewed    < from: CT Abdomen w/ IV Cont (24 @ 17:38) >  IMPRESSION:    1. Since 2023, unchanged position of a percutaneous   cholecystostomy tube, with distal tip within a contracted gallbladder.  2. Mottled enhancement of the liver, suggestive of passive hepatic venous   congestion.    < end of copied text >   INFECTIOUS DISEASE CONSULT NOTE    Patient is a 78y old  Male who presents with a chief complaint of fever  HPI:  77M w/ h/o CAD (s/p CABG), HFimpEF ( EF 50-55% in 2023 ), chronic afib s/p watchman procedure ( off AC ), SSS (s/p CRT-P), HTN, and DLD presents to the ED with fever and chills. Patient reported that today he felt very cold and started shaking, took his temp was 99.9 at home, he also felt very weak so he came to the ED for evaluation. Pt reported associated intermittent epigastric pain, and left sided chest pain that happened once and went away. Denies any shortness of breath, palpitations, cough, sore throat, congestion, abdominal pain, diarrhea, constipation, dysuria. To note pt had cholecystitis in 2023 with GNR bacteremia s/p percutaneous cholecystostomy ( drain still there ), and was evaluated by surgery yesterday and was planned for robotic cholecystectomy with cholangiogram on 5/15/2024. Pt was also admitted in October with same complaint of fever with no clear source which resolved later, workup then was negative and ID recommended no abx.     Vitals: T 101.6, HR 63, /85, spo2 97% on RA   Labs: WBC 12K, Hb 11.9, Na 133, trop 29 --> 29, LFTs normal   UA negative   covid/RSV/flu negative   EKG: paced rhythm  (04 May 2024 02:05)      Prior hospital charts reviewed [Yes]  Primary team notes reviewed [Yes]  Other consultant notes reviewed [Yes]    REVIEW OF SYSTEMS:  CONSTITUTIONAL: No fever or chills  HEAD: No lesion on scalp  EYES: No visual disturbance  ENT: No sore throat  RESPIRATORY: No cough, no shortness of breath  CARDIOVASCULAR: No chest pain or palpitations  GASTROINTESTINAL: + epigastric pain; + percutaneous cholecystostomy tube  GENITOURINARY: No dysuria  NEUROLOGICAL: No headache/dizziness  MUSCULOSKELETAL: No joint pain, erythema, or swelling; no back pain  SKIN: No itching, rashes  All other ROS negative except noted above      PAST MEDICAL & SURGICAL HISTORY:  HTN (hypertension)      Heart attack      Breast cancer        Osteoarthritis      CAD (coronary artery disease)      [place] (hard of hearing)      Dyslipidemia      Heart failure with improved ejection fraction (HFimpEF)      H/O cardiac murmur      Atrial fibrillation      S/P CABG x 5        H/O mastectomy  right 2001      Presence of biventricular cardiac pacemaker      Presence of Watchman left atrial appendage closure device          SOCIAL HISTORY:  - No recent travel  - Denies tobacco use  - Denies alcohol use  - Denies illicit drug use      FAMILY HISTORY:  Family history of heart disease (Father, Mother)    Family history of lung cancer (Sibling)        Allergies:  No Known Allergies      ANTIMICROBIALS:  cefepime   IVPB 2000 every 8 hours  metroNIDAZOLE  IVPB 500 every 8 hours      ANTIMICROBIALS (past 90 days):  MEDICATIONS  (STANDING):  azithromycin  IVPB   255 mL/Hr IV Intermittent (24 @ 18:36)    cefepime   IVPB   100 mL/Hr IV Intermittent (24 @ 05:32)    cefTRIAXone   IVPB   100 mL/Hr IV Intermittent (24 @ 18:35)    metroNIDAZOLE  IVPB   100 mL/Hr IV Intermittent (24 @ 05:31)        OTHER MEDS:   MEDICATIONS  (STANDING):  acetaminophen     Tablet .. 650 every 6 hours PRN  aspirin 325 daily  atorvastatin 20 at bedtime  enoxaparin Injectable 40 every 24 hours  metoprolol tartrate 50 two times a day  pantoprazole    Tablet 40 before breakfast      VITALS:  Vital Signs Last 24 Hrs  T(F): 98.4 (24 @ 05:00), Max: 101.9 (24 @ 16:33)    Vital Signs Last 24 Hrs  HR: 98 (24 @ 05:00) (60 - 98)  BP: 144/60 (24 @ 05:00) (106/53 - 144/60)  RR: 18 (24 @ 00:10)  SpO2: 99% (24 @ 00:10) (95% - 99%)  Wt(kg): --    EXAM:  GENERAL: NAD, lying in bed  HEAD: No head lesions  EYES: Conjunctiva pink and cornea white  EAR, NOSE, MOUTH, THROAT: Normal external ears and nose, no discharges; moist mucous membranes  NECK: Supple, nontender to palpation; no JVD  RESPIRATORY: Clear to auscultation bilaterally  CARDIOVASCULAR: S1 S2  GASTROINTESTINAL: Soft, nondistended; epigastric pain; normoactive bowel sounds; + percutaneous cholecystostomy tube  EXTREMITIES: No clubbing, cyanosis, or petal edema  NERVOUS SYSTEM: Alert and oriented to person, time, place and situation, speech clear. No focal deficits   MUSCULOSKELETAL: No joint erythema, swelling or pain  SKIN: No rashes or lesions, no superficial thrombophlebitis  PSYCH: Normal affect      Labs:                        11.9   12.43 )-----------( 204      ( 03 May 2024 17:47 )             37.3         133<L>  |  99  |  19  ----------------------------<  105<H>  5.0   |  20  |  1.0    Ca    9.0      03 May 2024 17:47    TPro  7.3  /  Alb  4.4  /  TBili  1.2  /  DBili  0.3  /  AST  19  /  ALT  11  /  AlkPhos  81  03      WBC Trend:  WBC Count: 12.43 (24 @ 17:47)  WBC Count: 8.93 (24 @ 09:58)      Auto Neutrophil #: 11.03 K/uL (24 @ 17:47)  Auto Neutrophil #: 6.04 K/uL (24 @ 09:58)  Auto Neutrophil #: 4.17 K/uL (23 @ 06:45)  Auto Neutrophil #: 6.01 K/uL (10-21-23 @ 07:42)  Auto Neutrophil #: 8.60 K/uL (10-20-23 @ 20:07)      Creatine Trend:  Creatinine: 1.0 ()  Creatinine: 0.9 ()      Liver Biochemical Testing Trend:  Alanine Aminotransferase (ALT/SGPT): 11 ()  Alanine Aminotransferase (ALT/SGPT): 11 ()  Alanine Aminotransferase (ALT/SGPT): 8 ()  Alanine Aminotransferase (ALT/SGPT): 19 (10-23)  Alanine Aminotransferase (ALT/SGPT): 10 (10-22)  Aspartate Aminotransferase (AST/SGOT): 19 (24 @ 17:47)  Aspartate Aminotransferase (AST/SGOT): 19 (24 @ 09:58)  Aspartate Aminotransferase (AST/SGOT): 15 (23 @ 06:45)  Aspartate Aminotransferase (AST/SGOT): 36 (10-23-23 @ 06:57)  Aspartate Aminotransferase (AST/SGOT): 16 (10-22-23 @ 06:27)  Bilirubin Direct: 0.3 (-)  Bilirubin Total: 1.2 ()  Bilirubin Total: 0.9 ()  Bilirubin Total: 0.7 ()  Bilirubin Total: 1.1 (10-23)      Trend LDH      Auto Eosinophil %: 0.1 % (24 @ 17:47)  Auto Eosinophil %: 3.4 % (24 @ 09:58)      Urinalysis Basic - ( 04 May 2024 02:26 )    Color: Dark Yellow / Appearance: Clear / S.029 / pH: x  Gluc: x / Ketone: 15 mg/dL  / Bili: Negative / Urobili: 1.0 mg/dL   Blood: x / Protein: Trace mg/dL / Nitrite: Negative   Leuk Esterase: Trace / RBC: 2 /HPF / WBC 1 /HPF   Sq Epi: x / Non Sq Epi: 2 /HPF / Bacteria: Negative /HPF        MICROBIOLOGY:    MRSA PCR Result.: Negative (23 @ 10:43)      Urinalysis with Rflx Culture (collected 04 May 2024 02:26)    Troponin T, High Sensitivity Result: 29 ()  Troponin T, High Sensitivity Result: 29 ()          RADIOLOGY:  imaging below personally reviewed    < from: CT Abdomen w/ IV Cont (24 @ 17:38) >  IMPRESSION:    1. Since 2023, unchanged position of a percutaneous   cholecystostomy tube, with distal tip within a contracted gallbladder.  2. Mottled enhancement of the liver, suggestive of passive hepatic venous   congestion.    < end of copied text >

## 2024-05-04 NOTE — H&P ADULT - ATTENDING COMMENTS
77 year old male with history of HTN, CAD (s/p CABG), HFimpEF, AFIB s/p watchman, SSS (s/p CRT-P) and Cholecystitis s/p cholecystostomy drainage  presents to the ED with fever and chills. Patient reported that today he felt very cold and started shaking, took his temp was 99.9 at home, he also felt very weak. Pt reported associated intermittent epigastric pain, and left sided chest pain that happened once, patient had cholecystitis in April 2023 with GNR bacteremia s/p percutaneous cholecystostomy  and was planned for robotic cholecystectomy with cholangiogram on 5/15/2024. In the ED He was febrile T 101.6, HR 62, /85, labs showed WBC 12K.    PHYSICAL EXAM:  GENERAL: NAD, well-developed.  HEAD:  Atraumatic, Normocephalic.  EYES: EOMI, PERRLA, conjunctiva and sclera clear.  NECK: Supple, No JVD.  CHEST/LUNG: Clear to auscultation bilaterally; No wheeze.  HEART: Regular rate and rhythm; S1 S2.   ABDOMEN: Soft, Nontender, Nondistended; Bowel sounds present. RUQ drainage tube in place  EXTREMITIES:  2+ Peripheral Pulses, No clubbing, cyanosis, or edema.  PSYCH: AAOx3.  NEUROLOGY: non-focal.  SKIN: No rashes or lesions.    A/P:   Possible Sepsis on admission: SIRS : fever and leukocytosis.   Mild epigastric pain, history of Cholecystitis s/p Cholecystostomy drainage.   UA negative, CXR showed no acute infiltrates.   Send Abdomen CT to rule out intraabdominal infection  Send  fluid culture from the drainage.   Started on Cefepime and Flagyl, ID consult. Surgery consult.     Chronic HFpEF:   - Previously known to have HFrEF < 20%.   Echo (Nov 2023) showed LVEF 55-60%.  Continue Lopressor 50mg PO BID  hold Lasix for now for possible  sepsis    CAD s/p CABG   Mild chest pain, improved.   - trop 29 --> 29   EKG showed paced rhythm  Continue ASA, Lopressor and Lipitor.     #Chronic afib  s/p watchman procedure   #Sick Sinus Syndrome s/p CRT-P   - c/w Lopressor 50mg PO BID      DVT Prophylaxis: lovenox

## 2024-05-04 NOTE — H&P ADULT - NSICDXPASTMEDICALHX_GEN_ALL_CORE_FT
PAST MEDICAL HISTORY:  Atrial fibrillation     Breast cancer 2001    CAD (coronary artery disease)     Dyslipidemia     H/O cardiac murmur     Heart attack     Heart failure with improved ejection fraction (HFimpEF)     [place] (hard of hearing)     HTN (hypertension)     Osteoarthritis

## 2024-05-04 NOTE — PATIENT PROFILE ADULT - FALL HARM RISK - HARM RISK INTERVENTIONS

## 2024-05-04 NOTE — H&P ADULT - HISTORY OF PRESENT ILLNESS
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 Patient reported that today he felt very cold and started shaking, took his temp was 99.9 at home, he also felt very weak so he came to the ED for evaluation. Pt reported associated intermittent epigastric pain, and left sided chest pain that happened once and went away. Denies any shortness of breath, palpitations, cough, sore throat, congestion, abdominal pain, diarrhea, constipation, dysuria. To note pt had cholecystitis in April 2023 with GNR bacteremia s/p percutaneous cholecystostomy ( drain still there ), and was evaluated by surgery yesterday and was planned for robotic cholecystectomy with cholangiogram on 5/15/2024. Pt was also admitted in October with same complaint of fever with no clear source which resolved later, workup then was negative and ID recommended no abx.     Vitals: T 101.6, HR 63, /85, spo2 97% on RA   Labs: WBC 12K, Hb 11.9, Na 133, trop 29 --> 29, LFTs normal   UA negative   covid/RSV/flu negative   EKG: paced rhythm

## 2024-05-04 NOTE — H&P ADULT - NSHPPHYSICALEXAM_GEN_ALL_CORE
LOS: 1d    VITALS:   T(C): 37.3 (05-04-24 @ 00:10), Max: 38.8 (05-03-24 @ 16:33)  HR: 60 (05-04-24 @ 00:10) (60 - 72)  BP: 127/59 (05-04-24 @ 00:10) (106/53 - 127/59)  RR: 18 (05-04-24 @ 00:10) (16 - 18)  SpO2: 99% (05-04-24 @ 00:10) (95% - 99%)    GENERAL: NAD, lying in bed comfortably  HEAD:  Atraumatic, Normocephalic  EYES: EOMI, PERRLA, conjunctiva and sclera clear  ENT: Moist mucous membranes  NECK: Supple, No JVD  CHEST/LUNG: Clear to auscultation bilaterally; No rales, rhonchi, wheezing, or rubs. Unlabored respirations  HEART: Regular rate and rhythm; No murmurs, rubs, or gallops  ABDOMEN: BSx4; Soft, nontender, nondistended  EXTREMITIES:  2+ Peripheral Pulses, brisk capillary refill. No clubbing, cyanosis, or edema  NERVOUS SYSTEM:  A&Ox3, no focal deficits   SKIN: No rashes or lesions

## 2024-05-04 NOTE — CONSULT NOTE ADULT - ASSESSMENT
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      IMPRESSION:  # Fever  Previous BCx 1/4 bottle actinomyces neuii on 10/2023  WBC 12.43, febrile to 101.9  COVID/flu/RSV negative  UA negative  OTIS 11/2023 no vegetation    # Cholecystitis s/p percutaneous cholecystostomy tube 4/3/23  Last exchanged on 1/2024  Planned for robotic cholecystectomy on 5/15    # Hx AICD    RECOMMENDATIONS:        * THIS IS AN INCOMPLETE NOTE. 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      IMPRESSION:  # Fever  Previous BCx 1/4 bottle actinomyces neuii on 10/2023  WBC 12.43, febrile to 101.9  COVID/flu/RSV negative  UA negative  OTIS 11/2023 no vegetation    # Cholecystitis s/p percutaneous cholecystostomy tube   Tube is draining appropriately  Last exchanged on 1/2024  Planned for robotic cholecystectomy on 5/15    # Hx AICD  # Immunosuppression secondary to multiple comorbidities which could result in poor clinical outcome      RECOMMENDATIONS:  - Continue IV cefepime 2g q8hrs and IV Flagyl 500mg q8hrs  - Follow up BCx  - Follow up CT A/P with contrast  - Check TTE  - Offloading and frequent position changes, aspiration precaution  - Trend WBC, fever curve, transaminases, creatinine daily      Darcy Salomon D.O.  Attending Physician  Division of Infectious Diseases  [facility] [place] [facility] - [facility]  Please contact me via [employer] Teams

## 2024-05-04 NOTE — H&P ADULT - ASSESSMENT
77M w/ h/o CAD (s/p CABG), HFimpEF, chronic afib (on Xarelto), SSS (s/p CRT-P), HTN, and DLD p/w fever and chills. Admitted to medicine for cryptogenic sepsis.    #Cryptogenic Sepsis  Initially p/w fevers and chills. Sepsis present on admission (T 100.5F [per pt's wife], WBC 11.98, Lactate 2.5). Lactate improved to 1.7. No clear source based on overall benign history and exam. Flu, COVID, RSV negative. UA and CXR unremarkable. No evidence of intra-abdominal source on CTAP.     Possibly related to recurrent cholecystitis. Cholecystostomy has been in place for 6 months and has required multiple exchanges. Evaluated by general surgery before, but cholecystectomy deferred in the past due to pt's multiple cardiac comorbidities and h/o ADDY thrombus (noted on WATCHMAN attempt in Mar 2023). Being considered for elective cholecystectomy after ADDY closure. IR consulted for cholecystostomy drain evaluation, but drain working well so no acute intervention offered.  - c/w vancomycin 1500mg IV Q12H (adjust via trough)  - c/w cefepime 2g IV Q8H  - c/w Flagyl 500mg IV Q8H  - f/u BCx and UCx  - f/u TTE (ordered to eval for endocarditis after recent LAAO device placement)  - surgery consulted to consider cholecystectomy as per IR recommendations; f/u recs - ID consulted; f/u recs    #Chronic Atrial Fibrillation  #Sick Sinus Syndrome  CHADS-VASC 5+ (CHF, HTN, Age x2, CAD). S/P PPM in 2018 for SSS (planned for BiV-PPM at the time, but unable to pass LV lead into CS). Upgraded to CRT-P by CTSx using epicardial lead in June 2022 due to PICM (cardiomyopathy i/s/o 80%+ RV pacing). S/P ADDY occlusion (WATCHMAN) in Sept 2023 due to significant GI and  bleeding history. Now requires 45 days DOAC + ASA followed by 6 months DAPT (once LAAO device confirmed to be in place via repeat OTIS).  - c/w rivaroxaban 15mg PO QHS (do NOT switch to Lovenox/heparin unless absolutely necessary due to bleeding risk)  - c/w ASA EC 81mg PO QD (stroke ppx after WATCHMAN placement; must NOT be stopped unless discussed w/ EP)  - c/w Lopressor 50mg PO BID    #Chronic HFimpEF  Previously known to have HFrEF. Likely PICM i/s/o 80%+ RV pacing. Improved after PPM upgrade to CRT-P in June 2022. Most recent OTIS (Sept 2023) showed LVEF 55-60%.  - c/w Lopressor 50mg PO BID  - hold Lasix for now i/s/o sepsis (received fluids earlier)  - restart Lasix if pt develops volume overload  - f/u cardiology outpatient to consider GDMT optimization (Toprol XL, ACEi/ARB/ARNI, SGLT2i, etc)    #Coronary Artery Disease  #Hypertension  #Dyslipidemia  S/P CABGx5 (2014). Follows w/ Dr. Handy.  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     #Cryptogenic sepsis   - Vitals: T 101.6, HR 63, /85, spo2 97% on RA   - Labs: WBC 12K, Hb 11.9, Na 133, trop 29 --> 29, LFTs normal   - UA negative   - covid/RSV/flu negative   - EKG: paced rhythm   - CXR unremarkable   - s/p ceftriaxone azithromycin in ED   - No clear source based on overall benign history and exam  - Possibly related to recurrent cholecystitis. Cholecystostomy tube draining biliary fluid   - talked to surgery, they don't think it's related to cholecystitis, pt scheduled for cholecystectomy on 5/15   - will get repeat CT A/P as this is the most likely source   - will start cefepime and flagyl   - f/u blood cx and urine cx   - ID consult   - consider surgery official consult based on CT findings     #CAD s/p CABG   #HDL   - trop 29 --> 29   - chest pain resolved   - EKG with no new changes   - monitor on tele   - c/w aspirin, statin, and metoprolol   - Follows w/ Dr. Handy.    #Chronic afib  s/p watchman procedure   #Sick Sinus Syndrome s/p CRT-P   - c/w Lopressor 50mg PO BID    #Chronic HFimpEF  - Previously known to have HFrEF < 20%.   - Improved after PPM upgrade to CRT-P in June 2022. Most recent OTIS (Nov 2023) showed LVEF 55-60%.  - c/w Lopressor 50mg PO BID  - hold Lasix for now i/s/o sepsis  - restart Lasix if pt develops volume overload    #DVT ppx: lovenox   #GI ppx: NA   #Diet: DASH/TLC  #Activity: as tolerated  #Dispo: tele

## 2024-05-05 ENCOUNTER — TRANSCRIPTION ENCOUNTER (OUTPATIENT)
Age: 79
End: 2024-05-05

## 2024-05-05 LAB
ALBUMIN SERPL ELPH-MCNC: 3.8 G/DL — SIGNIFICANT CHANGE UP (ref 3.5–5.2)
ALP SERPL-CCNC: 62 U/L — SIGNIFICANT CHANGE UP (ref 30–115)
ALT FLD-CCNC: 10 U/L — SIGNIFICANT CHANGE UP (ref 0–41)
ANION GAP SERPL CALC-SCNC: 9 MMOL/L — SIGNIFICANT CHANGE UP (ref 7–14)
AST SERPL-CCNC: 16 U/L — SIGNIFICANT CHANGE UP (ref 0–41)
BASOPHILS # BLD AUTO: 0.06 K/UL — SIGNIFICANT CHANGE UP (ref 0–0.2)
BASOPHILS NFR BLD AUTO: 0.8 % — SIGNIFICANT CHANGE UP (ref 0–1)
BILIRUB SERPL-MCNC: 0.6 MG/DL — SIGNIFICANT CHANGE UP (ref 0.2–1.2)
BUN SERPL-MCNC: 13 MG/DL — SIGNIFICANT CHANGE UP (ref 10–20)
CALCIUM SERPL-MCNC: 8.5 MG/DL — SIGNIFICANT CHANGE UP (ref 8.4–10.4)
CHLORIDE SERPL-SCNC: 104 MMOL/L — SIGNIFICANT CHANGE UP (ref 98–110)
CO2 SERPL-SCNC: 22 MMOL/L — SIGNIFICANT CHANGE UP (ref 17–32)
CREAT SERPL-MCNC: 0.7 MG/DL — SIGNIFICANT CHANGE UP (ref 0.7–1.5)
EGFR: 94 ML/MIN/1.73M2 — SIGNIFICANT CHANGE UP
EOSINOPHIL # BLD AUTO: 0.24 K/UL — SIGNIFICANT CHANGE UP (ref 0–0.7)
EOSINOPHIL NFR BLD AUTO: 3.3 % — SIGNIFICANT CHANGE UP (ref 0–8)
GLUCOSE SERPL-MCNC: 94 MG/DL — SIGNIFICANT CHANGE UP (ref 70–99)
HCT VFR BLD CALC: 35.3 % — LOW (ref 42–52)
HGB BLD-MCNC: 11.1 G/DL — LOW (ref 14–18)
IMM GRANULOCYTES NFR BLD AUTO: 0.4 % — HIGH (ref 0.1–0.3)
LYMPHOCYTES # BLD AUTO: 1.02 K/UL — LOW (ref 1.2–3.4)
LYMPHOCYTES # BLD AUTO: 13.9 % — LOW (ref 20.5–51.1)
MAGNESIUM SERPL-MCNC: 2.3 MG/DL — SIGNIFICANT CHANGE UP (ref 1.8–2.4)
MCHC RBC-ENTMCNC: 25.1 PG — LOW (ref 27–31)
MCHC RBC-ENTMCNC: 31.4 G/DL — LOW (ref 32–37)
MCV RBC AUTO: 79.9 FL — LOW (ref 80–94)
MONOCYTES # BLD AUTO: 1.11 K/UL — HIGH (ref 0.1–0.6)
MONOCYTES NFR BLD AUTO: 15.2 % — HIGH (ref 1.7–9.3)
NEUTROPHILS # BLD AUTO: 4.86 K/UL — SIGNIFICANT CHANGE UP (ref 1.4–6.5)
NEUTROPHILS NFR BLD AUTO: 66.4 % — SIGNIFICANT CHANGE UP (ref 42.2–75.2)
NRBC # BLD: 0 /100 WBCS — SIGNIFICANT CHANGE UP (ref 0–0)
PLATELET # BLD AUTO: 164 K/UL — SIGNIFICANT CHANGE UP (ref 130–400)
PMV BLD: 11.2 FL — HIGH (ref 7.4–10.4)
POTASSIUM SERPL-MCNC: 4.4 MMOL/L — SIGNIFICANT CHANGE UP (ref 3.5–5)
POTASSIUM SERPL-SCNC: 4.4 MMOL/L — SIGNIFICANT CHANGE UP (ref 3.5–5)
PROT SERPL-MCNC: 6.2 G/DL — SIGNIFICANT CHANGE UP (ref 6–8)
RBC # BLD: 4.42 M/UL — LOW (ref 4.7–6.1)
RBC # FLD: 16.7 % — HIGH (ref 11.5–14.5)
SODIUM SERPL-SCNC: 135 MMOL/L — SIGNIFICANT CHANGE UP (ref 135–146)
WBC # BLD: 7.32 K/UL — SIGNIFICANT CHANGE UP (ref 4.8–10.8)
WBC # FLD AUTO: 7.32 K/UL — SIGNIFICANT CHANGE UP (ref 4.8–10.8)

## 2024-05-05 PROCEDURE — 99232 SBSQ HOSP IP/OBS MODERATE 35: CPT

## 2024-05-05 RX ADMIN — Medication 50 MILLIGRAM(S): at 06:12

## 2024-05-05 RX ADMIN — Medication 50 MILLIGRAM(S): at 17:30

## 2024-05-05 RX ADMIN — Medication 100 MILLIGRAM(S): at 14:11

## 2024-05-05 RX ADMIN — PANTOPRAZOLE SODIUM 40 MILLIGRAM(S): 20 TABLET, DELAYED RELEASE ORAL at 06:12

## 2024-05-05 RX ADMIN — Medication 100 MILLIGRAM(S): at 06:11

## 2024-05-05 RX ADMIN — Medication 325 MILLIGRAM(S): at 12:39

## 2024-05-05 RX ADMIN — Medication 100 MILLIGRAM(S): at 21:11

## 2024-05-05 RX ADMIN — CEFEPIME 100 MILLIGRAM(S): 1 INJECTION, POWDER, FOR SOLUTION INTRAMUSCULAR; INTRAVENOUS at 21:11

## 2024-05-05 RX ADMIN — ATORVASTATIN CALCIUM 20 MILLIGRAM(S): 80 TABLET, FILM COATED ORAL at 21:11

## 2024-05-05 RX ADMIN — CEFEPIME 100 MILLIGRAM(S): 1 INJECTION, POWDER, FOR SOLUTION INTRAMUSCULAR; INTRAVENOUS at 14:11

## 2024-05-05 RX ADMIN — CEFEPIME 100 MILLIGRAM(S): 1 INJECTION, POWDER, FOR SOLUTION INTRAMUSCULAR; INTRAVENOUS at 06:11

## 2024-05-05 NOTE — DISCHARGE NOTE NURSING/CASE MANAGEMENT/SOCIAL WORK - PATIENT PORTAL LINK FT
You can access the FollowMyHealth Patient Portal offered by [facility] by registering at the following website: [email or web address]. By joining [facility]’s FollowMyHealth portal, you will also be able to view your health information using other applications (apps) compatible with our system.

## 2024-05-05 NOTE — DISCHARGE NOTE NURSING/CASE MANAGEMENT/SOCIAL WORK - NSDCVIVACCINE_GEN_ALL_CORE_FT
Tdap; 22-Nov-2018 08:52; Benito Lovell); [employer]; [id number] (Exp. Date: 23-Oct-2020); IntraMuscular; Deltoid Right.; 0.5 milliLiter(s); VIS (VIS Published: 09-May-2013, VIS Presented: 22-Nov-2018);

## 2024-05-06 ENCOUNTER — TRANSCRIPTION ENCOUNTER (OUTPATIENT)
Age: 79
End: 2024-05-06

## 2024-05-06 VITALS — WEIGHT: 171.96 LBS

## 2024-05-06 LAB
ALBUMIN SERPL ELPH-MCNC: 4 G/DL — SIGNIFICANT CHANGE UP (ref 3.5–5.2)
ALP SERPL-CCNC: 69 U/L — SIGNIFICANT CHANGE UP (ref 30–115)
ALT FLD-CCNC: 10 U/L — SIGNIFICANT CHANGE UP (ref 0–41)
ANION GAP SERPL CALC-SCNC: 10 MMOL/L — SIGNIFICANT CHANGE UP (ref 7–14)
AST SERPL-CCNC: 17 U/L — SIGNIFICANT CHANGE UP (ref 0–41)
BASOPHILS # BLD AUTO: 0.08 K/UL — SIGNIFICANT CHANGE UP (ref 0–0.2)
BASOPHILS NFR BLD AUTO: 0.9 % — SIGNIFICANT CHANGE UP (ref 0–1)
BILIRUB SERPL-MCNC: 0.6 MG/DL — SIGNIFICANT CHANGE UP (ref 0.2–1.2)
BUN SERPL-MCNC: 13 MG/DL — SIGNIFICANT CHANGE UP (ref 10–20)
CALCIUM SERPL-MCNC: 8.6 MG/DL — SIGNIFICANT CHANGE UP (ref 8.4–10.5)
CHLORIDE SERPL-SCNC: 102 MMOL/L — SIGNIFICANT CHANGE UP (ref 98–110)
CO2 SERPL-SCNC: 23 MMOL/L — SIGNIFICANT CHANGE UP (ref 17–32)
CREAT SERPL-MCNC: 0.7 MG/DL — SIGNIFICANT CHANGE UP (ref 0.7–1.5)
EGFR: 94 ML/MIN/1.73M2 — SIGNIFICANT CHANGE UP
EOSINOPHIL # BLD AUTO: 0.47 K/UL — SIGNIFICANT CHANGE UP (ref 0–0.7)
EOSINOPHIL NFR BLD AUTO: 5.5 % — SIGNIFICANT CHANGE UP (ref 0–8)
GLUCOSE SERPL-MCNC: 99 MG/DL — SIGNIFICANT CHANGE UP (ref 70–99)
HCT VFR BLD CALC: 38 % — LOW (ref 42–52)
HGB BLD-MCNC: 12 G/DL — LOW (ref 14–18)
IMM GRANULOCYTES NFR BLD AUTO: 0.4 % — HIGH (ref 0.1–0.3)
LYMPHOCYTES # BLD AUTO: 1.81 K/UL — SIGNIFICANT CHANGE UP (ref 1.2–3.4)
LYMPHOCYTES # BLD AUTO: 21.3 % — SIGNIFICANT CHANGE UP (ref 20.5–51.1)
MAGNESIUM SERPL-MCNC: 2.4 MG/DL — SIGNIFICANT CHANGE UP (ref 1.8–2.4)
MCHC RBC-ENTMCNC: 25.3 PG — LOW (ref 27–31)
MCHC RBC-ENTMCNC: 31.6 G/DL — LOW (ref 32–37)
MCV RBC AUTO: 80 FL — SIGNIFICANT CHANGE UP (ref 80–94)
MONOCYTES # BLD AUTO: 1.04 K/UL — HIGH (ref 0.1–0.6)
MONOCYTES NFR BLD AUTO: 12.2 % — HIGH (ref 1.7–9.3)
NEUTROPHILS # BLD AUTO: 5.07 K/UL — SIGNIFICANT CHANGE UP (ref 1.4–6.5)
NEUTROPHILS NFR BLD AUTO: 59.7 % — SIGNIFICANT CHANGE UP (ref 42.2–75.2)
NRBC # BLD: 0 /100 WBCS — SIGNIFICANT CHANGE UP (ref 0–0)
PLATELET # BLD AUTO: 189 K/UL — SIGNIFICANT CHANGE UP (ref 130–400)
PMV BLD: 11.2 FL — HIGH (ref 7.4–10.4)
POTASSIUM SERPL-MCNC: 4.4 MMOL/L — SIGNIFICANT CHANGE UP (ref 3.5–5)
POTASSIUM SERPL-SCNC: 4.4 MMOL/L — SIGNIFICANT CHANGE UP (ref 3.5–5)
PROT SERPL-MCNC: 6.6 G/DL — SIGNIFICANT CHANGE UP (ref 6–8)
RBC # BLD: 4.75 M/UL — SIGNIFICANT CHANGE UP (ref 4.7–6.1)
RBC # FLD: 16.6 % — HIGH (ref 11.5–14.5)
SODIUM SERPL-SCNC: 135 MMOL/L — SIGNIFICANT CHANGE UP (ref 135–146)
WBC # BLD: 8.5 K/UL — SIGNIFICANT CHANGE UP (ref 4.8–10.8)
WBC # FLD AUTO: 8.5 K/UL — SIGNIFICANT CHANGE UP (ref 4.8–10.8)

## 2024-05-06 PROCEDURE — 99239 HOSP IP/OBS DSCHRG MGMT >30: CPT

## 2024-05-06 RX ORDER — METRONIDAZOLE 500 MG
1 TABLET ORAL
Qty: 10 | Refills: 0
Start: 2024-05-06 | End: 2024-05-10

## 2024-05-06 RX ORDER — CEFPODOXIME PROXETIL 100 MG
1 TABLET ORAL
Qty: 10 | Refills: 0
Start: 2024-05-06 | End: 2024-05-10

## 2024-05-06 RX ADMIN — CEFEPIME 100 MILLIGRAM(S): 1 INJECTION, POWDER, FOR SOLUTION INTRAMUSCULAR; INTRAVENOUS at 14:10

## 2024-05-06 RX ADMIN — Medication 100 MILLIGRAM(S): at 05:07

## 2024-05-06 RX ADMIN — PANTOPRAZOLE SODIUM 40 MILLIGRAM(S): 20 TABLET, DELAYED RELEASE ORAL at 05:06

## 2024-05-06 RX ADMIN — Medication 100 MILLIGRAM(S): at 14:10

## 2024-05-06 RX ADMIN — Medication 50 MILLIGRAM(S): at 05:07

## 2024-05-06 RX ADMIN — Medication 325 MILLIGRAM(S): at 12:12

## 2024-05-06 RX ADMIN — CEFEPIME 100 MILLIGRAM(S): 1 INJECTION, POWDER, FOR SOLUTION INTRAMUSCULAR; INTRAVENOUS at 05:07

## 2024-05-06 NOTE — DIETITIAN INITIAL EVALUATION ADULT - PERTINENT MEDS FT
MEDICATIONS  (STANDING):  aspirin 325 milliGRAM(s) Oral daily  atorvastatin 20 milliGRAM(s) Oral at bedtime  cefepime   IVPB 2000 milliGRAM(s) IV Intermittent every 8 hours  enoxaparin Injectable 40 milliGRAM(s) SubCutaneous every 24 hours  metoprolol tartrate 50 milliGRAM(s) Oral two times a day  metroNIDAZOLE  IVPB 500 milliGRAM(s) IV Intermittent every 8 hours  pantoprazole    Tablet 40 milliGRAM(s) Oral before breakfast    MEDICATIONS  (PRN):  acetaminophen     Tablet .. 650 milliGRAM(s) Oral every 6 hours PRN Mild Pain (1 - 3)

## 2024-05-06 NOTE — DIETITIAN INITIAL EVALUATION ADULT - NSICDXPASTMEDICALHX_GEN_ALL_CORE_FT
PAST MEDICAL HISTORY:  Atrial fibrillation     Breast cancer 2001    CAD (coronary artery disease)     Dyslipidemia     H/O cardiac murmur     Heart attack     Heart failure with improved ejection fraction (HFimpEF)     [place] (hard of hearing)     HTN (hypertension)     Osteoarthritis

## 2024-05-06 NOTE — PROGRESS NOTE ADULT - SUBJECTIVE AND OBJECTIVE BOX
----------Daily Progress Note----------    HISTORY OF PRESENT ILLNESS:  Patient is a 76 yo M w/PMH of CAD s/p CABG, HFimpEF ( EF 50-55% in Nov 2023 ), chronic afib s/p watchman procedure ( off AC ), SSS (s/p CRT-P), HTN, and DLD who presented ot the ED w/fever and chills with associated intermittent epigastric pain, and left sided chest pain that happened once and went away w/o any shortness of breath, palpitations, cough, sore throat, congestion, diarrhea, constipation, or dysuria w/ED vitals notable for fever to 101.6, labs notable for WBC 12K, Hb 11.9, Na 133, trop 29 --> 29, LFTs normal, UA negative, and covid/RSV/flu negative, and EKG showing paced rhythm. CXR was unremarkable. He is now admitted for work up and management of fever of unknown origin.     Today is hospital day 2d.     INTERVAL HOSPITAL COURSE / OVERNIGHT EVENTS:  O/N events:  None    24 hr events:  None    Patient was examined and seen at bedside.     Review of Systems: Otherwise unremarkable     <<<<<PAST MEDICAL & SURGICAL HISTORY>>>>>  HTN (hypertension)    Heart attack    Breast cancer  2001    Osteoarthritis    CAD (coronary artery disease)    [place] (hard of hearing)    Dyslipidemia    Heart failure with improved ejection fraction (HFimpEF)    H/O cardiac murmur    Atrial fibrillation    S/P CABG x 5 2014    H/O mastectomy  right 2001    Presence of biventricular cardiac pacemaker    Presence of Watchman left atrial appendage closure device      ALLERGIES  No Known Allergies      Home Medications:  aspirin 325 mg oral tablet: 1 tab(s) orally once a day (04 May 2024 02:48)  atorvastatin 20 mg oral tablet: 1 tab(s) orally once a day (at bedtime) (04 May 2024 02:48)  furosemide 20 mg oral tablet: 1 tab(s) orally 2 times a day (04 May 2024 02:48)  metoprolol tartrate 50 mg oral tablet: 1 tab(s) orally 2 times a day (04 May 2024 02:48)        MEDICATIONS  STANDING MEDICATIONS  aspirin 325 milliGRAM(s) Oral daily  atorvastatin 20 milliGRAM(s) Oral at bedtime  cefepime   IVPB 2000 milliGRAM(s) IV Intermittent every 8 hours  enoxaparin Injectable 40 milliGRAM(s) SubCutaneous every 24 hours  metoprolol tartrate 50 milliGRAM(s) Oral two times a day  metroNIDAZOLE  IVPB 500 milliGRAM(s) IV Intermittent every 8 hours  pantoprazole    Tablet 40 milliGRAM(s) Oral before breakfast    PRN MEDICATIONS  acetaminophen     Tablet .. 650 milliGRAM(s) Oral every 6 hours PRN    VITALS:  T(F): 98.1  HR: 73  BP: 114/52  RR: 18  SpO2: 98%    <<<<<PHYSICAL EXAM>>>>>  GENERAL: NAD  PULMONARY: Clear to auscultation bilaterally. No wheezing or crackles  CARDIOVASCULAR: Regular rate and rhythm, S1-S2, no murmurs, rubs, or gallops  GASTROINTESTINAL: Soft, non-tender, non-distended, no guarding  SKIN/EXTREMITIES: Warm, 2+ tibialis posterior pulses, no UE or LE edema  NEUROLOGIC/MUSCULOSKELETAL: AOx4, grossly moving all extremities, no focal deficits    <<<<<LABS>>>>>                        10.6   7.67  )-----------( 155      ( 04 May 2024 09:51 )             33.3     05-04    135  |  101  |  17  ----------------------------<  95  4.3   |  23  |  0.8    Ca    8.2<L>      04 May 2024 09:51    TPro  7.3  /  Alb  4.4  /  TBili  1.2  /  DBili  0.3  /  AST  19  /  ALT  11  /  AlkPhos  81  05-03      Urinalysis Basic - ( 04 May 2024 09:51 )    Color: x / Appearance: x / SG: x / pH: x  Gluc: 95 mg/dL / Ketone: x  / Bili: x / Urobili: x   Blood: x / Protein: x / Nitrite: x   Leuk Esterase: x / RBC: x / WBC x   Sq Epi: x / Non Sq Epi: x / Bacteria: x        Lactate, Blood: 1.0 mmol/L (05-04-24 @ 09:51)      Urinalysis with Rflx Culture (collected 04 May 2024 02:26)    [id number]        <<<<<RADIOLOGY>>>>>          -----------------------------------------------------------------------------------------------------------------------------------------------------------------------------------------------
  WAGNER SEGURA  78y  Male      Patient is a 78y old  Male who presents with a chief complaint of Fever (04 May 2024 09:00)      INTERVAL HPI/OVERNIGHT EVENTS:  He feels ok, abdominal pain improved, no fever.   Vital Signs Last 24 Hrs  T(C): 37.1 (05 May 2024 04:52), Max: 37.1 (05 May 2024 04:52)  T(F): 98.7 (05 May 2024 04:52), Max: 98.7 (05 May 2024 04:52)  HR: 60 (05 May 2024 04:52) (60 - 73)  BP: 143/69 (05 May 2024 04:52) (114/52 - 153/64)  BP(mean): --  RR: 18 (05 May 2024 04:52) (18 - 18)  SpO2: 96% (05 May 2024 08:05) (96% - 96%)    Parameters below as of 05 May 2024 08:05  Patient On (Oxygen Delivery Method): room air          05-04-24 @ 07:01  -  05-05-24 @ 07:00  --------------------------------------------------------  IN: 480 mL / OUT: 1500 mL / NET: -1020 mL            Consultant(s) Notes Reviewed:  [x ] YES  [ ] NO          MEDICATIONS  (STANDING):  aspirin 325 milliGRAM(s) Oral daily  atorvastatin 20 milliGRAM(s) Oral at bedtime  cefepime   IVPB 2000 milliGRAM(s) IV Intermittent every 8 hours  enoxaparin Injectable 40 milliGRAM(s) SubCutaneous every 24 hours  metoprolol tartrate 50 milliGRAM(s) Oral two times a day  metroNIDAZOLE  IVPB 500 milliGRAM(s) IV Intermittent every 8 hours  pantoprazole    Tablet 40 milliGRAM(s) Oral before breakfast    MEDICATIONS  (PRN):  acetaminophen     Tablet .. 650 milliGRAM(s) Oral every 6 hours PRN Mild Pain (1 - 3)      LABS                          11.1   7.32  )-----------( 164      ( 05 May 2024 07:10 )             35.3     05-05    135  |  104  |  13  ----------------------------<  94  4.4   |  22  |  0.7    Ca    8.5      05 May 2024 07:10  Mg     2.3     05-05    TPro  6.2  /  Alb  3.8  /  TBili  0.6  /  DBili  x   /  AST  16  /  ALT  10  /  AlkPhos  62  05-05      Urinalysis Basic - ( 05 May 2024 07:10 )    Color: x / Appearance: x / SG: x / pH: x  Gluc: 94 mg/dL / Ketone: x  / Bili: x / Urobili: x   Blood: x / Protein: x / Nitrite: x   Leuk Esterase: x / RBC: x / WBC x   Sq Epi: x / Non Sq Epi: x / Bacteria: x        Lactate Trend  05-04 @ 09:51 Lactate:1.0         CAPILLARY BLOOD GLUCOSE          Culture - Blood (collected 05-04-24 @ 02:39)  Source: .Blood Blood-Peripheral  Preliminary Report (05-05-24 @ 10:01):    No growth at 24 hours    Urinalysis with Rflx Culture (collected 05-04-24 @ 02:26)    Culture - Blood (collected 05-04-24 @ 00:30)  Source: .Blood Blood-Peripheral  Preliminary Report (05-05-24 @ 07:01):    No growth at 24 hours        RADIOLOGY & ADDITIONAL TESTS:    Imaging Personally Reviewed:  [ ] YES  [ ] NO    HEALTH ISSUES - PROBLEM Dx:          PHYSICAL EXAM:  GENERAL: NAD, well-developed.  HEAD:  Atraumatic, Normocephalic.  EYES: EOMI, PERRLA, conjunctiva and sclera clear.  NECK: Supple, No JVD.  CHEST/LUNG: Clear to auscultation bilaterally; No wheeze.  HEART: Regular rate and rhythm; S1 S2.   ABDOMEN: Soft, Nontender, Nondistended; Bowel sounds present. RUQ drainage tube in place  EXTREMITIES:  2+ Peripheral Pulses, No clubbing, cyanosis, or edema.  PSYCH: AAOx3.  NEUROLOGY: non-focal.  SKIN: No rashes or lesions.  
  WAGNER SEGURA  78y, Male    All available historical data reviewed    OVERNIGHT EVENTS:  no fevers  feels well and has no new complaints  Mild epigastric discomfort  no diarrhea  no  issues  on RA    ROS:  General: Denies rigors, nightsweats  HEENT: Denies headache, rhinorrhea, sore throat, eye pain  CV: Denies CP, palpitations  PULM: Denies wheezing, hemoptysis  GI: Denies hematemesis, hematochezia, melena  : Denies discharge, hematuria  MSK: Denies arthralgias, myalgias  SKIN: Denies rash, lesions  NEURO: Denies paresthesias, weakness  PSYCH: Denies depression, anxiety    VITALS:  T(F): 97.7, Max: 98.2 (05-06-24 @ 00:21)  HR: 60  BP: 143/68  RR: 18Vital Signs Last 24 Hrs  T(C): 36.5 (06 May 2024 08:10), Max: 36.8 (06 May 2024 00:21)  T(F): 97.7 (06 May 2024 08:10), Max: 98.2 (06 May 2024 00:21)  HR: 60 (06 May 2024 08:10) (60 - 79)  BP: 143/68 (06 May 2024 08:10) (129/63 - 166/68)  BP(mean): --  RR: 18 (06 May 2024 08:10) (18 - 18)  SpO2: 97% (06 May 2024 00:21) (97% - 98%)    Parameters below as of 06 May 2024 00:21  Patient On (Oxygen Delivery Method): room air        TESTS & MEASUREMENTS:                        12.0   8.50  )-----------( 189      ( 06 May 2024 06:38 )             38.0     05-06    135  |  102  |  13  ----------------------------<  99  4.4   |  23  |  0.7    Ca    8.6      06 May 2024 06:38  Mg     2.4     05-06    TPro  6.6  /  Alb  4.0  /  TBili  0.6  /  DBili  x   /  AST  17  /  ALT  10  /  AlkPhos  69  05-06    LIVER FUNCTIONS - ( 06 May 2024 06:38 )  Alb: 4.0 g/dL / Pro: 6.6 g/dL / ALK PHOS: 69 U/L / ALT: 10 U/L / AST: 17 U/L / GGT: x             Culture - Blood (collected 05-04-24 @ 02:39)  Source: .Blood Blood-Peripheral  Preliminary Report (05-05-24 @ 10:01):    No growth at 24 hours    Urinalysis with Rflx Culture (collected 05-04-24 @ 02:26)    Culture - Blood (collected 05-04-24 @ 00:30)  Source: .Blood Blood-Peripheral  Preliminary Report (05-06-24 @ 07:02):    No growth at 48 Hours      Urinalysis Basic - ( 06 May 2024 06:38 )    Color: x / Appearance: x / SG: x / pH: x  Gluc: 99 mg/dL / Ketone: x  / Bili: x / Urobili: x   Blood: x / Protein: x / Nitrite: x   Leuk Esterase: x / RBC: x / WBC x   Sq Epi: x / Non Sq Epi: x / Bacteria: x          RADIOLOGY & ADDITIONAL TESTS:  Personal review of radiological diagnostics performed  Echo and EKG results noted when applicable.     MEDICATIONS:  acetaminophen     Tablet .. 650 milliGRAM(s) Oral every 6 hours PRN  aspirin 325 milliGRAM(s) Oral daily  atorvastatin 20 milliGRAM(s) Oral at bedtime  cefepime   IVPB 2000 milliGRAM(s) IV Intermittent every 8 hours  enoxaparin Injectable 40 milliGRAM(s) SubCutaneous every 24 hours  metoprolol tartrate 50 milliGRAM(s) Oral two times a day  metroNIDAZOLE  IVPB 500 milliGRAM(s) IV Intermittent every 8 hours  pantoprazole    Tablet 40 milliGRAM(s) Oral before breakfast      ANTIBIOTICS:  cefepime   IVPB 2000 milliGRAM(s) IV Intermittent every 8 hours  metroNIDAZOLE  IVPB 500 milliGRAM(s) IV Intermittent every 8 hours

## 2024-05-06 NOTE — DISCHARGE NOTE PROVIDER - ATTENDING DISCHARGE PHYSICAL EXAMINATION:
T(F): , Max: 98.3 (05-06-24 @ 15:00)  HR: 60 (05-06-24 @ 15:00) (60 - 60)  BP: 136/61 (05-06-24 @ 15:00)  RR: 18 (05-06-24 @ 15:00)  SpO2: 97% (05-06-24 @ 15:00)  IN: 480 mL / OUT: 1100 mL / NET: -620 mL    IN: 350 mL / OUT: 550 mL / NET: -200 mL      General: No apparent distress  Cardiovascular: S1, S2  Gastrointestinal: Soft, Non-tender, Non-distended, RUQ cholecystostomy tube  Respiratory: Good air entry bilaterally  Musculoskeletal: Moves all extremities  Lymphatic: No edema  Neurologic: No gross motor deficit  Dermatologic: Skin dry                          12.0   8.50  )-----------( 189      ( 06 May 2024 06:38 )             38.0     05-06    135  |  102  |  13  ----------------------------<  99  4.4   |  23  |  0.7    Ca    8.6      06 May 2024 06:38  Mg     2.4     05-06    TPro  6.6  /  Alb  4.0  /  TBili  0.6  /  DBili  x   /  AST  17  /  ALT  10  /  AlkPhos  69  05-06      Culture - Blood (collected 04 May 2024 02:39)  Source: .Blood Blood-Peripheral  Preliminary Report (06 May 2024 10:02):    No growth at 48 Hours    Urinalysis with Rflx Culture (collected 04 May 2024 02:26)    Culture - Blood (collected 04 May 2024 00:30)  Source: .Blood Blood-Peripheral  Preliminary Report (06 May 2024 07:02):    No growth at 48 Hours

## 2024-05-06 NOTE — DISCHARGE NOTE PROVIDER - NSDCMRMEDTOKEN_GEN_ALL_CORE_FT
aspirin 325 mg oral tablet: 1 tab(s) orally once a day  atorvastatin 20 mg oral tablet: 1 tab(s) orally once a day (at bedtime)  cefpodoxime 200 mg oral tablet: 1 tab(s) orally 2 times a day  furosemide 20 mg oral tablet: 1 tab(s) orally 2 times a day  metoprolol tartrate 50 mg oral tablet: 1 tab(s) orally 2 times a day  metroNIDAZOLE 500 mg oral tablet: 1 tab(s) orally 2 times a day

## 2024-05-06 NOTE — DIETITIAN INITIAL EVALUATION ADULT - OTHER INFO
76 yo M w/PMH of CAD s/p CABG, HFimpEF ( EF 50-55% in Nov 2023 ), chronic afib s/p watchman procedure ( off AC ), SSS (s/p CRT-P), HTN, and DLD who presented ot the ED w/fever and chills with associated intermittent epigastric pain, and left sided chest pain

## 2024-05-06 NOTE — DISCHARGE NOTE PROVIDER - CARE PROVIDER_API CALL
Trevon Champagne  Internal Medicine  [address] [address]  Phone: [phone]  Fax: [phone]  Follow Up Time: 1 week

## 2024-05-06 NOTE — DIETITIAN INITIAL EVALUATION ADULT - NSICDXPASTSURGICALHX_GEN_ALL_CORE_FT
Subjective   Patient ID: Allie is a 72 year old female.    Chief Complaint   Patient presents with   • Other     patient states 2 days ago she had a brief episode of slurred speech and right arm tingling.   • Anxiety     patient has history of anxiety and is worried about this episode.      HPI    Experienced episode of slurred speech 2d ago. No arm or leg weakness. No further episodes since    Past Medical History:   Diagnosis Date   • Anxiety    • Depressive disorder    • Essential (primary) hypertension    • Hyperlipidemia    • Thyroid disease        MEDICATIONS:  Current Outpatient Medications   Medication Sig   • amLODIPine (NORVASC) 10 MG tablet Take 1 tablet by mouth daily.   • pravastatin (PRAVACHOL) 40 MG tablet Take 1 tablet by mouth daily.   • levothyroxine 25 MCG tablet Take 1 tablet by mouth daily.   • omeprazole (PRILOSEC) 20 MG capsule Take 1 capsule by mouth daily.   • escitalopram (LEXAPRO) 10 MG tablet Take 1.5 tablets by mouth daily.   • ALPRAZolam (XANAX) 0.25 MG tablet Take 1 tablet by mouth daily. As needed   • CALCIUM CITRATE-VITAMIN D PO    • famotidine (PEPCID) 20 MG tablet Take 1 tablet by mouth 2 times daily.   • neomycin-polymyxin-dexamethasone (MAXITROL) 0.1 % ophthalmic suspension Apply 1 Drop in the eye(s) 4 (four) times daily. - Opthalmic     No current facility-administered medications for this visit.        ALLERGIES:  ALLERGIES:   Allergen Reactions   • Sulfa Antibiotics HIVES       PAST SURGICAL HISTORY:  Past Surgical History:   Procedure Laterality Date   • Anesth,lower arm surgery         FAMILY HISTORY:  Family History   Problem Relation Age of Onset   • Stroke Father    • Heart disease Father        SOCIAL HISTORY:  Social History     Tobacco Use   • Smoking status: Former Smoker   • Smokeless tobacco: Never Used   Substance Use Topics   • Alcohol use: Yes   • Drug use: Never         Patient's medications, allergies, past medical, surgical, and social history  were  reviewed and updated as appropriate.    Review of Systems   Constitutional: Negative.    HENT: Negative.    Eyes: Negative.    Respiratory: Negative.    Cardiovascular: Negative.    Gastrointestinal: Negative.    Endocrine: Negative.    Genitourinary: Negative.    Musculoskeletal: Negative.    Skin: Negative.    Allergic/Immunologic: Negative.    Neurological: Positive for speech difficulty.   Hematological: Negative.    Psychiatric/Behavioral: Negative.    All other systems reviewed and are negative.      Objective   Physical Exam  Vitals signs and nursing note reviewed.   Constitutional:       Appearance: Normal appearance. She is normal weight.   HENT:      Head: Normocephalic.      Right Ear: Tympanic membrane normal.      Left Ear: Tympanic membrane normal.      Nose: Nose normal.      Mouth/Throat:      Mouth: Mucous membranes are moist.   Eyes:      Extraocular Movements: Extraocular movements intact.      Conjunctiva/sclera: Conjunctivae normal.      Pupils: Pupils are equal, round, and reactive to light.   Neck:      Musculoskeletal: Normal range of motion.      Vascular: No carotid bruit.   Cardiovascular:      Rate and Rhythm: Normal rate and regular rhythm.      Pulses: Normal pulses.   Pulmonary:      Effort: Pulmonary effort is normal.   Abdominal:      Palpations: Abdomen is soft.   Musculoskeletal: Normal range of motion.   Skin:     General: Skin is warm.      Capillary Refill: Capillary refill takes less than 2 seconds.   Neurological:      General: No focal deficit present.      Mental Status: She is alert and oriented to person, place, and time. Mental status is at baseline.      Cranial Nerves: No cranial nerve deficit.      Sensory: No sensory deficit.      Motor: No weakness.      Coordination: Coordination normal.      Gait: Gait normal.      Deep Tendon Reflexes: Reflexes normal.   Psychiatric:         Mood and Affect: Mood normal.         Behavior: Behavior normal.         Thought Content:  Thought content normal.         Judgment: Judgment normal.       Visit Vitals  /65   Pulse 83   Temp 99.1 °F (37.3 °C) (Temporal)   Resp 16   SpO2 95%     Assessment and Plan    Possible TIA  Normal exam now    F/U with pcp for CT and carotid ultrasound  ASA daily    To ER for further episodes    Side effects of medication discussed.  Follow up if symptoms continue or worsen.  To emergency room for severe symptoms.    Roopa Hernandez MD      PAST SURGICAL HISTORY:  H/O mastectomy right 2001    Presence of biventricular cardiac pacemaker     Presence of Watchman left atrial appendage closure device     S/P CABG x 5 2014

## 2024-05-06 NOTE — PROGRESS NOTE ADULT - ASSESSMENT
77 year old male with history of HTN, CAD (s/p CABG), HFimpEF, AFIB s/p watchman, SSS (s/p CRT-P) and Cholecystitis s/p cholecystostomy drainage  presents to the ED with fever and chills with intermittent epigastric pain, and left sided chest pain that happened once, patient had cholecystitis in April 2023 with GNR bacteremia s/p percutaneous cholecystostomy  and was planned for robotic cholecystectomy with cholangiogram on 5/15/2024. In the ED He was febrile T 101.6, HR 62, /85, labs showed WBC 12K.    A/P:   Possible Sepsis on admission: SIRS : fever and leukocytosis.   Acute duodenitis:   Mild epigastric pain, history of Cholecystitis s/p Cholecystostomy drainage.   UA negative, CXR showed no acute infiltrates.   Abdomen CT showed Cholecystostomy tube in appropriate position around a contracted  gallbladder, possibly duodenitis.   Started on Cefepime and Flagyl, ID follow up. Symptoms are improving.     Chronic HFpEF:   Previously known to have HFrEF < 20%.   Echo (Nov 2023) showed LVEF 55-60%.  Continue Lopressor 50mg PO BID  Hold Lasix for now for possible  sepsis, can resume tomorrow.     CAD s/p CABG   Mild chest pain, improved.   Trop 29 > 29>33  EKG showed paced rhythm  Continue ASA, Lopressor and Lipitor.     Chronic Atrial Fibrillation  s/p watchman procedure   Sick Sinus Syndrome s/p CRT-P   Continue Lopressor 50mg PO BID    Microcytic anemia:   Check Iron studies.     DVT Prophylaxis: lovenox .    #Progress Note Handoff:  Pending (specify):  ID follow up,   Family discussion:  Disposition: Home in 24 hrs. 
77M w/ h/o CAD (s/p CABG), HFimpEF ( EF 50-55% in Nov 2023 ), chronic afib s/p watchman procedure ( off AC ), SSS (s/p CRT-P), HTN, and DLD presents to the ED with fever and chills and mild epigastric pain now admitted for work up and management of fever of unknown origin.    #Fever of unknown origin, possible 2/2 duodenitis?  #SIRS+  - Vitals: T 101.6, HR 63, /85, spo2 97% on RA   - Labs: WBC 12K, Hb 11.9, Na 133, trop 29 --> 29, LFTs normal   - UA negative   - covid/RSV/flu negative   - EKG: paced rhythm   - CXR unremarkable   - s/p ceftriaxone azithromycin in ED   - No clear source based on overall benign history and exam  - Possibly related to recurrent cholecystitis. Cholecystostomy tube draining biliary fluid   - As per surgery, unlikely to be related to cholecystitis, pt scheduled for cholecystectomy on 5/15   - CTAP (5/4): New thickening of the first/second portion of the duodenum around a duodenal diverticulum, which may represent duodenitis. Cholecystostomy tube in appropriate position around a contracted gallbladder.  - ID recs (5/4): Continue IV cefepime 2g q8hrs and IV Flagyl 500mg q8hrs, Follow up BCx Check TTE  - c/w IV cefepime 2 g q8h and flagyl 500 mg q8h   - f/u blood cx and urine cx     #CAD s/p CABG   #HDL   - trop 29 --> 29   - chest pain resolved   - EKG with no new changes   - monitor on tele   - c/w aspirin, statin, and metoprolol   - Follows w/ Dr. Handy.    #Chronic afib  s/p watchman procedure   #Sick Sinus Syndrome s/p CRT-P   - c/w Lopressor 50mg PO BID    #Chronic HFimpEF  - Previously known to have HFrEF < 20%.   - Improved after PPM upgrade to CRT-P in June 2022. Most recent OTIS (Nov 2023) showed LVEF 55-60%.  - c/w Lopressor 50mg PO BID  - hold Lasix for now i/s/o sepsis  - restart Lasix if pt develops volume overload    #MISC  - DVT ppx: lovenox   - GI ppx: NA   - Diet: DASH/TLC  - Activity: as tolerated
· Assessment	  77M w/ h/o CAD (s/p CABG), HFimpEF ( EF 50-55% in Nov 2023 ), chronic afib s/p watchman procedure ( off AC ), SSS (s/p CRT-P), HTN, and DLD presents to the ED with fever and weakness.      IMPRESSION:  SIRS in admission  No ongoing acute cholecystitis/cholangitis/pancreatitis  5/4 CT with Duodenitis   5/4 BCx NG  Cholecystostomy draining well. 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

## 2024-05-06 NOTE — DIETITIAN INITIAL EVALUATION ADULT - PERTINENT LABORATORY DATA
05-06    135  |  102  |  13  ----------------------------<  99  4.4   |  23  |  0.7    Ca    8.6      06 May 2024 06:38  Mg     2.4     05-06    TPro  6.6  /  Alb  4.0  /  TBili  0.6  /  DBili  x   /  AST  17  /  ALT  10  /  AlkPhos  69  05-06

## 2024-05-06 NOTE — DISCHARGE NOTE PROVIDER - HOSPITAL COURSE
History of Presenting Illness:   77M w/ h/o CAD (s/p CABG), HFimpEF ( EF 50-55% in Nov 2023 ), chronic afib s/p watchman procedure ( off AC ), SSS (s/p CRT-P), HTN, and DLD presents to the ED with fever and chills. Patient reported that today he felt very cold and started shaking, took his temp was 99.9 at home, he also felt very weak so he came to the ED for evaluation. Pt reported associated intermittent epigastric pain, and left sided chest pain that happened once and went away. Denies any shortness of breath, palpitations, cough, sore throat, congestion, abdominal pain, diarrhea, constipation, dysuria. To note pt had cholecystitis in April 2023 with GNR bacteremia s/p percutaneous cholecystostomy ( drain still there ), and was evaluated by surgery yesterday and was planned for robotic cholecystectomy with cholangiogram on 5/15/2024. Pt was also admitted in October with same complaint of fever with no clear source which resolved later, workup then was negative and ID recommended no abx.     Vitals: T 101.6, HR 63, /85, spo2 97% on RA   Labs: WBC 12K, Hb 11.9, Na 133, trop 29 --> 29, LFTs normal   UA negative   covid/RSV/flu negative   EKG: paced rhythm       Hospital Course:   77 year old male with history of HTN, CAD (s/p CABG), HFimpEF, AFIB s/p watchman, SSS (s/p CRT-P) and Cholecystitis s/p cholecystostomy drainage  presents to the ED with fever and chills with intermittent epigastric pain, and left sided chest pain that happened once, patient had cholecystitis in April 2023 with GNR bacteremia s/p percutaneous cholecystostomy  and was planned for robotic cholecystectomy with cholangiogram on 5/15/2024. In the ED He was febrile T 101.6, HR 62, /85, labs showed WBC 12K.      Possible Sepsis on admission: SIRS : fever and leukocytosis.   Acute duodenitis:   Mild epigastric pain, history of Cholecystitis s/p Cholecystostomy drainage.   UA negative, CXR showed no acute infiltrates.   Abdomen CT showed Cholecystostomy tube in appropriate position around a contracted  gallbladder, possibly duodenitis.   Started on Cefepime and Flagyl, ID follow up. Symptoms are improving.   - Consulted ID for change in PO antibiotics >> Discharging the patient on Vantin 200 q12 and Flagyl 500 q12 for 5 days.     Chronic HFpEF:   Previously known to have HFrEF < 20%.   Echo (Nov 2023) showed LVEF 55-60%.  Continue Lopressor 50mg PO BID  Hold Lasix for now for possible  sepsis>>> Patient is clinically better and restarted Lasix.     CAD s/p CABG   Mild chest pain, improved.   Trop 29 > 29>33  EKG showed paced rhythm  Continue ASA, Lopressor and Lipitor.     Chronic Atrial Fibrillation  s/p watchman procedure   Sick Sinus Syndrome s/p CRT-P   Continue Lopressor 50mg PO BID    Patient is stable to be discharged.

## 2024-05-06 NOTE — DISCHARGE NOTE PROVIDER - NSDCCPCAREPLAN_GEN_ALL_CORE_FT
PRINCIPAL DISCHARGE DIAGNOSIS  Diagnosis: Duodenitis  Assessment and Plan of Treatment: You were admitted to the hospital for fever, chills and epigastric pain. You got a CT scan showing signs of duodenitis. You were started on antibiotics Cefepime and Flagyl. Infectious disease was consulted. Changed to oral vantin and flagyl.   All other tests for other infection was normal.   You are eating well, vitally and clinically well.   You are stable to be discharged.      SECONDARY DISCHARGE DIAGNOSES  Diagnosis: Chest pain  Assessment and Plan of Treatment:     Diagnosis: T wave inversion in EKG  Assessment and Plan of Treatment:     Diagnosis: CAD (coronary artery disease)  Assessment and Plan of Treatment: Continue taking home medications    Diagnosis: Chronic atrial fibrillation  Assessment and Plan of Treatment: Continue taking home metoprolol .    Diagnosis: Hyperlipidemia  Assessment and Plan of Treatment: Continue home medications

## 2024-05-06 NOTE — DIETITIAN INITIAL EVALUATION ADULT - COLLABORATION WITH OTHER PROVIDERS
Nutrition Intervention: Meals and Snacks, Coordination of Care  RD to monitor PO intake, GI function, Nutrition Labs, Electrolytes, NFPF, Weights

## 2024-05-06 NOTE — DIETITIAN INITIAL EVALUATION ADULT - OTHER CALCULATIONS
Weight Used: ABW 78 kg (BED SCALE WEIGHT 5/5)  Estimated Energy Needs: 3139-4108 kcal/day (25-30 kcal/kg)  Estimated Protein Needs:  g/day (1.2-1.5 g/kg)  Estimated Fluid Needs: 1 mL/kcal  -- estimated needs with consideration for Age, Weight, BMI, suspected DTI

## 2024-05-06 NOTE — DIETITIAN INITIAL EVALUATION ADULT - ORAL INTAKE PTA/DIET HISTORY
Nutrition Hx- Patient with good PO intake and appetite PTA. Patient cooks and eats a lot of soups. Denies use of supplements. Denies dietary restrictions.    Weight Hx-  Patient reports UBW of 102 kg vs CBW 78 kg (bed scale 5/5). Endorses some weight loss of about 5 lbs over the last 2 weeks of unknown etiology. Based on current weight vs UBW, patient has significant weight loss; 24% weight loss over the last 2 weeks if accurate. Noted no weight hx available per [facility] tab, unable to verify weight hx at this time. Does not currently meet criteria for malnutrition at this time.

## 2024-05-06 NOTE — DIETITIAN INITIAL EVALUATION ADULT - ADD RECOMMEND
Will continue to follow at high nutrition risk; obtain detailed weight hx, new bed scale weight; f/u in 3-5 days or PRN.

## 2024-05-06 NOTE — DISCHARGE NOTE PROVIDER - NSDCFUSCHEDAPPT_GEN_ALL_CORE_FT
Byron Borjas  [facility] PreAdmits  Scheduled Appointment: 05/15/2024    Byron Borjas  [facility]  [facility] [address] [address]  Scheduled Appointment: 05/15/2024    Byron Borjas  [facility]  GENSURG [address]  Scheduled Appointment: 05/30/2024    [facility]  ONCORTHO [address]  Scheduled Appointment: 06/04/2024    [facility]  INTERVEN [address]  Scheduled Appointment: 06/14/2024    [facility]  CARDIOLOGY [address]  Scheduled Appointment: 06/25/2024    Rebeca Ojeda  [facility]  ELECTROPH [address]  Scheduled Appointment: 07/22/2024

## 2024-05-08 ENCOUNTER — NON-APPOINTMENT (OUTPATIENT)
Age: 79
End: 2024-05-08

## 2024-05-13 ENCOUNTER — APPOINTMENT (OUTPATIENT)
Dept: CARE COORDINATION | Facility: HOME HEALTH | Age: 79
End: 2024-05-13

## 2024-05-13 PROBLEM — I48.91 UNSPECIFIED ATRIAL FIBRILLATION: Chronic | Status: ACTIVE | Noted: 2024-05-03

## 2024-05-14 NOTE — ASU PATIENT PROFILE, ADULT - FALL HARM RISK - HARM RISK INTERVENTIONS
Assistance with ambulation/Assistance OOB with selected safe patient handling equipment/Communicate Risk of Fall with Harm to all staff/Monitor for mental status changes/Monitor gait and stability/Move patient closer to nurses' station/Provide patient with walking aids - walker, cane, crutches/Reinforce activity limits and safety measures with patient and family/Reorient to person, place and time as needed/Review medications for side effects contributing to fall risk/Sit up slowly, dangle for a short time, stand at bedside before walking/Tailored Fall Risk Interventions/Toileting schedule using arm’s reach rule for commode and bathroom/Use of alarms - bed, chair and/or voice tab/Visual Cue: Yellow wristband and red socks/Bed in lowest position, wheels locked, appropriate side rails in place/Call bell, personal items and telephone in reach/Instruct patient to call for assistance before getting out of bed or chair/Non-slip footwear when patient is out of bed/[place] to call system/Physically safe environment - no spills, clutter or unnecessary equipment/Purposeful Proactive Rounding/Room/bathroom lighting operational, light cord in reach

## 2024-05-14 NOTE — ASU PATIENT PROFILE, ADULT - NSICDXPASTMEDICALHX_GEN_ALL_CORE_FT
PAST MEDICAL HISTORY:  Atrial fibrillation     Breast cancer 2001    CAD (coronary artery disease)     Dyslipidemia     H/O cardiac murmur     Heart attack     Heart failure with improved ejection fraction (HFimpEF)     [place] (hard of hearing)     HTN (hypertension)     Osteoarthritis

## 2024-05-15 ENCOUNTER — OUTPATIENT (OUTPATIENT)
Dept: OUTPATIENT SERVICES | Facility: HOSPITAL | Age: 79
LOS: 1 days | Discharge: HOME | End: 2024-05-15
Payer: MEDICARE

## 2024-05-15 ENCOUNTER — TRANSCRIPTION ENCOUNTER (OUTPATIENT)
Age: 79
End: 2024-05-15

## 2024-05-15 ENCOUNTER — RESULT REVIEW (OUTPATIENT)
Age: 79
End: 2024-05-15

## 2024-05-15 ENCOUNTER — APPOINTMENT (OUTPATIENT)
Dept: SURGERY | Facility: AMBULATORY SURGERY CENTER | Age: 79
End: 2024-05-15

## 2024-05-15 VITALS
DIASTOLIC BLOOD PRESSURE: 72 MMHG | SYSTOLIC BLOOD PRESSURE: 156 MMHG | OXYGEN SATURATION: 97 % | RESPIRATION RATE: 18 BRPM | HEIGHT: 74 IN | HEART RATE: 64 BPM | WEIGHT: 220.02 LBS | TEMPERATURE: 98 F

## 2024-05-15 VITALS
SYSTOLIC BLOOD PRESSURE: 110 MMHG | DIASTOLIC BLOOD PRESSURE: 54 MMHG | HEART RATE: 60 BPM | OXYGEN SATURATION: 96 % | RESPIRATION RATE: 20 BRPM

## 2024-05-15 DIAGNOSIS — Z95.1 PRESENCE OF AORTOCORONARY BYPASS GRAFT: Chronic | ICD-10-CM

## 2024-05-15 DIAGNOSIS — Z95.0 PRESENCE OF CARDIAC PACEMAKER: Chronic | ICD-10-CM

## 2024-05-15 DIAGNOSIS — K80.10 CALCULUS OF GALLBLADDER WITH CHRONIC CHOLECYSTITIS WITHOUT OBSTRUCTION: ICD-10-CM

## 2024-05-15 DIAGNOSIS — Z95.818 PRESENCE OF OTHER CARDIAC IMPLANTS AND GRAFTS: Chronic | ICD-10-CM

## 2024-05-15 DIAGNOSIS — Z90.10 ACQUIRED ABSENCE OF UNSPECIFIED BREAST AND NIPPLE: Chronic | ICD-10-CM

## 2024-05-15 PROCEDURE — 88304 TISSUE EXAM BY PATHOLOGIST: CPT | Mod: 26

## 2024-05-15 PROCEDURE — 87186 SC STD MICRODIL/AGAR DIL: CPT

## 2024-05-15 PROCEDURE — S2900: CPT

## 2024-05-15 PROCEDURE — 87070 CULTURE OTHR SPECIMN AEROBIC: CPT

## 2024-05-15 PROCEDURE — 12032 INTMD RPR S/A/T/EXT 2.6-7.5: CPT | Mod: 59

## 2024-05-15 PROCEDURE — 88304 TISSUE EXAM BY PATHOLOGIST: CPT

## 2024-05-15 PROCEDURE — 47563 LAPARO CHOLECYSTECTOMY/GRAPH: CPT

## 2024-05-15 PROCEDURE — 87075 CULTR BACTERIA EXCEPT BLOOD: CPT

## 2024-05-15 PROCEDURE — 88302 TISSUE EXAM BY PATHOLOGIST: CPT | Mod: 26

## 2024-05-15 PROCEDURE — 87077 CULTURE AEROBIC IDENTIFY: CPT

## 2024-05-15 PROCEDURE — C9399: CPT

## 2024-05-15 PROCEDURE — 88302 TISSUE EXAM BY PATHOLOGIST: CPT

## 2024-05-15 PROCEDURE — 49591 RPR AA HRN 1ST < 3 CM RDC: CPT | Mod: 59

## 2024-05-15 PROCEDURE — C1889: CPT

## 2024-05-15 RX ORDER — FUROSEMIDE 40 MG
1 TABLET ORAL
Refills: 0 | DISCHARGE

## 2024-05-15 RX ORDER — ASPIRIN/CALCIUM CARB/MAGNESIUM 324 MG
1 TABLET ORAL
Refills: 0 | DISCHARGE

## 2024-05-15 RX ORDER — SODIUM CHLORIDE 9 MG/ML
1000 INJECTION, SOLUTION INTRAVENOUS
Refills: 0 | Status: DISCONTINUED | OUTPATIENT
Start: 2024-05-15 | End: 2024-05-15

## 2024-05-15 RX ORDER — HYDROMORPHONE HYDROCHLORIDE 2 MG/ML
0.5 INJECTION INTRAMUSCULAR; INTRAVENOUS; SUBCUTANEOUS
Refills: 0 | Status: COMPLETED | OUTPATIENT
Start: 2024-05-15 | End: 2024-05-15

## 2024-05-15 RX ORDER — OXYCODONE AND ACETAMINOPHEN 5; 325 MG/1; MG/1
1 TABLET ORAL
Qty: 15 | Refills: 0
Start: 2024-05-15 | End: 2024-05-19

## 2024-05-15 RX ORDER — ATORVASTATIN CALCIUM 80 MG/1
1 TABLET, FILM COATED ORAL
Refills: 0 | DISCHARGE

## 2024-05-15 RX ORDER — METOPROLOL TARTRATE 50 MG
1 TABLET ORAL
Refills: 0 | DISCHARGE

## 2024-05-15 RX ORDER — ACETAMINOPHEN 500 MG
1000 TABLET ORAL ONCE
Refills: 0 | Status: DISCONTINUED | OUTPATIENT
Start: 2024-05-15 | End: 2024-05-15

## 2024-05-15 RX ORDER — ONDANSETRON 8 MG/1
4 TABLET, FILM COATED ORAL ONCE
Refills: 0 | Status: DISCONTINUED | OUTPATIENT
Start: 2024-05-15 | End: 2024-05-15

## 2024-05-15 RX ORDER — OXYCODONE HYDROCHLORIDE 5 MG/1
5 TABLET ORAL ONCE
Refills: 0 | Status: DISCONTINUED | OUTPATIENT
Start: 2024-05-15 | End: 2024-05-15

## 2024-05-15 RX ADMIN — HYDROMORPHONE HYDROCHLORIDE 0.5 MILLIGRAM(S): 2 INJECTION INTRAMUSCULAR; INTRAVENOUS; SUBCUTANEOUS at 12:52

## 2024-05-15 NOTE — BRIEF OPERATIVE NOTE - NSICDXBRIEFPREOP_GEN_ALL_CORE_FT
PRE-OP DIAGNOSIS:  Chronic cholecystitis 15-May-2024 12:24:38  Kip Madera  Umbilical hernia 15-May-2024 12:24:54  Kip Madera

## 2024-05-15 NOTE — BRIEF OPERATIVE NOTE - NSICDXBRIEFPROCEDURE_GEN_ALL_CORE_FT
PROCEDURES:  Robot-assisted cholecystectomy 15-May-2024 12:23:23  Kip Madera  Intraoperative cholangiogram 15-May-2024 12:24:05  Kip Madera  Repair of umbilical hernia with lipectomy 15-May-2024 12:24:26  Kip Madera

## 2024-05-15 NOTE — BRIEF OPERATIVE NOTE - OPERATION/FINDINGS
Robotic-assisted cholecystectomy and intraoperative fluorescent cholangiogram was performed. Umbilical hernia was repaired. Hemostasis achieved. Skin incisions closed.

## 2024-05-15 NOTE — ASU DISCHARGE PLAN (ADULT/PEDIATRIC) - NS MD DC FALL RISK RISK
For information on Fall & Injury Prevention, visit: [email or web address] OR  [email or web address] OR  https://www.cdc.gov/steadi/patient.html

## 2024-05-15 NOTE — ASU DISCHARGE PLAN (ADULT/PEDIATRIC) - ASU DC SPECIAL INSTRUCTIONSFT
Diet:    - Continue low fat diet as tolerated for 3 weeks.    - Please avoid spicy and greasy food.    Activity:    - Avoid heavy lifting or straining (anything over 10-15 pounds) for at least 6 weeks.    - You may ambulate and otherwise resume normal daily activities as tolerated.     Incisions:    - You may remove your dressings in 2 days.    - You may shower and allow soap and water to rinse over incisions after 2 days.    - Please avoid scrubbing the areas and do not submerge your incisions in water for at least 2 weeks.    Medications:    - New medications: (Sent to [facility])          *Percocet every 8 hours for 5 days          *Please  these medications on your way home   - You may resume your home medications.    - You may take Tylenol 650mg every 6 hours and alternate with Ibuprofen 600mg every 8 hours for pain. You may find these medications over the counter at your local pharmacy.   - A prescription has been sent to your pharmacy for Percocet every 8 hours only as needed for severe breakthrough pain after taking Tylenol and Motrin. Please do not use this medication when driving or operating heavy machinery as it can make your drowsy.    Follow-up:    - Please follow-up with Dr. Borjas as previously scheduled.    Please call the office or return to the ED with persistent fever greater than 100.4F, chest pain, shortness of breath, uncontrollable nausea/vomiting/abdominal pain, constipation, bloody bowel movements, abdominal distention, inability to tolerate oral intake.

## 2024-05-15 NOTE — BRIEF OPERATIVE NOTE - NSICDXBRIEFPOSTOP_GEN_ALL_CORE_FT
POST-OP DIAGNOSIS:  Chronic cholecystitis 15-May-2024 12:24:44  Kip Madera  Umbilical hernia 15-May-2024 12:25:00  Kip Madera

## 2024-05-15 NOTE — PRE-ANESTHESIA EVALUATION ADULT - NSANTHPMHFT_GEN_ALL_CORE
s/p [place]/Abbott CRT-D PPM for improved EF (50-55%), V-Paced 99%, not pacer dependent. Native rhythm Afib  denies CP/SOB  CABG >10 years ago  h/o right mastectomy for breast cancer  admitted 5/3-5/6 for fever, found to have duodenitis. completed abx on 5/12/24.

## 2024-05-15 NOTE — CHART NOTE - NSCHARTNOTEFT_GEN_A_CORE
PACU ANESTHESIA ADMISSION NOTE      Procedure: Robot-assisted cholecystectomy    Intraoperative cholangiogram    Repair of umbilical hernia with lipectomy      Post op diagnosis:  Chronic cholecystitis    Umbilical hernia        ____  Intubated  TV:______       Rate: ______      FiO2: ______    __x__  Patent Airway    __x__  Full return of protective reflexes    __x__  Full recovery from anesthesia / back to baseline status    Vitals  HR: 60  BP: 151/67  RR: 18  O2 Sat: 100  Temp: 97    Mental Status:  __x__ Awake   ___x__ Alert   _____ Drowsy   _____ Sedated    Nausea/Vomiting:  __x__ NO  ______Yes,   See Post - Op Orders          Pain Scale (0-10):  _____    Treatment: ____ None    __x__ See Post - Op/PCA Orders    Post - Operative Fluids:   ____ Oral   __x__ See Post - Op Orders    Plan: Discharge when criteria met:   _x___Home       _____Floor     _____Critical Care   Other:_________________    Comments: Patient had smooth intraoperative event, no anesthesia complication.

## 2024-05-15 NOTE — ASU DISCHARGE PLAN (ADULT/PEDIATRIC) - PAIN MANAGEMENT
100 Take over the counter pain medication/Prescriptions electronically submitted to pharmacy from [facility]

## 2024-05-15 NOTE — ASU DISCHARGE PLAN (ADULT/PEDIATRIC) - CARE PROVIDER_API CALL
Byron Borjas  Surgery  [address] [place] [id number]  Phone: [phone]  Fax: [phone]  Follow Up Time:

## 2024-05-16 ENCOUNTER — APPOINTMENT (OUTPATIENT)
Dept: CARE COORDINATION | Facility: HOME HEALTH | Age: 79
End: 2024-05-16

## 2024-05-16 LAB
GRAM STN FLD: ABNORMAL
SPECIMEN SOURCE: SIGNIFICANT CHANGE UP

## 2024-05-17 LAB
-  AMOXICILLIN/CLAVULANIC ACID: SIGNIFICANT CHANGE UP
-  AMPICILLIN/SULBACTAM: SIGNIFICANT CHANGE UP
-  AMPICILLIN: SIGNIFICANT CHANGE UP
-  AMPICILLIN: SIGNIFICANT CHANGE UP
-  AZTREONAM: SIGNIFICANT CHANGE UP
-  CEFAZOLIN: SIGNIFICANT CHANGE UP
-  CEFEPIME: SIGNIFICANT CHANGE UP
-  CEFOXITIN: SIGNIFICANT CHANGE UP
-  CEFTRIAXONE: SIGNIFICANT CHANGE UP
-  CIPROFLOXACIN: SIGNIFICANT CHANGE UP
-  ERTAPENEM: SIGNIFICANT CHANGE UP
-  GENTAMICIN: SIGNIFICANT CHANGE UP
-  IMIPENEM: SIGNIFICANT CHANGE UP
-  LEVOFLOXACIN: SIGNIFICANT CHANGE UP
-  MEROPENEM: SIGNIFICANT CHANGE UP
-  PIPERACILLIN/TAZOBACTAM: SIGNIFICANT CHANGE UP
-  TOBRAMYCIN: SIGNIFICANT CHANGE UP
-  TRIMETHOPRIM/SULFAMETHOXAZOLE: SIGNIFICANT CHANGE UP
-  VANCOMYCIN: SIGNIFICANT CHANGE UP
METHOD TYPE: SIGNIFICANT CHANGE UP
METHOD TYPE: SIGNIFICANT CHANGE UP
SURGICAL PATHOLOGY STUDY: SIGNIFICANT CHANGE UP

## 2024-05-20 ENCOUNTER — NON-APPOINTMENT (OUTPATIENT)
Age: 79
End: 2024-05-20

## 2024-05-20 ENCOUNTER — APPOINTMENT (OUTPATIENT)
Dept: CARE COORDINATION | Facility: HOME HEALTH | Age: 79
End: 2024-05-20
Payer: MEDICARE

## 2024-05-20 DIAGNOSIS — I48.91 UNSPECIFIED ATRIAL FIBRILLATION: ICD-10-CM

## 2024-05-20 DIAGNOSIS — K59.09 OTHER CONSTIPATION: ICD-10-CM

## 2024-05-20 DIAGNOSIS — I50.30 UNSPECIFIED DIASTOLIC (CONGESTIVE) HEART FAILURE: ICD-10-CM

## 2024-05-20 LAB
CULTURE RESULTS: ABNORMAL
ORGANISM # SPEC MICROSCOPIC CNT: ABNORMAL
ORGANISM # SPEC MICROSCOPIC CNT: ABNORMAL
ORGANISM # SPEC MICROSCOPIC CNT: SIGNIFICANT CHANGE UP
SPECIMEN SOURCE: SIGNIFICANT CHANGE UP

## 2024-05-20 PROCEDURE — 99349 HOME/RES VST EST MOD MDM 40: CPT

## 2024-05-21 VITALS — DIASTOLIC BLOOD PRESSURE: 80 MMHG | SYSTOLIC BLOOD PRESSURE: 120 MMHG

## 2024-05-21 PROBLEM — I50.30 DIASTOLIC HEART FAILURE: Status: ACTIVE | Noted: 2018-10-03

## 2024-05-21 PROBLEM — I48.91 ATRIAL FIBRILLATION WITH RVR: Status: ACTIVE | Noted: 2022-11-22

## 2024-05-21 PROBLEM — K59.09 CHRONIC CONSTIPATION: Status: ACTIVE | Noted: 2024-05-21

## 2024-05-21 NOTE — HISTORY OF PRESENT ILLNESS
[Home] : at home, [unfilled] , at the time of the visit. [Other Location: e.g. Home (Enter Location, City,State)___] : at [unfilled] [Other:____] : [unfilled] [Verbal consent obtained from patient] : the patient, [unfilled] [Spouse] : spouse [FreeTextEntry1] : F/u post hospitalization at [facility] from 5/3-6 weakness. Dx with Acute Duodenitis   [de-identified] : This patient is Enrolled in the Post-Discharge [facility] Follow Up Program through [facility] for Continuity of Care S/P Recent Hospitalization until seen by PCP.  Brief Hospital Course copied:  77M w/ h/o CAD (s/p CABG), HFimpEF ( EF 50-55% in Nov 2023 ), chronic afib s/p watchman procedure ( off AC ), SSS (s/p CRT-P), HTN, and DLD presents to the ED with fever and chills. Patient reported that today he felt very cold and started shaking, took his temp was 99.9 at home, he also felt very weak, so he came to the ED for evaluation. Pt reported associated intermittent epigastric pain and left sided chest pain that happened once and went away.  Televisit made with pt he is s/p planned for robotic cholecystectomy with cholangiogram on 5/15/2024. Per RN site is clean, dry and intact.  States he is feeling much better, regained his appetite and is looking forward to his wife making Spaghetti and meatballs later today. Only new concern is constipation due to recent Oxycodone for pain. Wife spoke with surgery team earlier today and was instructed to use MOM and Mineral oil. Pt wants to try Dulcolax she has in the home prior to purchasing what was recommended. Pt reports compliance with all medications.  Pt has Knee OA get Synvisc from [facility], next appt 6/4. Next PCP  6/6.

## 2024-05-21 NOTE — PLAN
[FreeTextEntry1] : 1. continue all medications as prescribed 2. f/u with [facility] 5/22, CAR[facility] 6/25; /PCP 6/6; INTER[facility] 6/14, EP 7/22, ONCORTHO 6/4 3. Maintain DASH diet 4. maintain fall precaution 5. Call PCP/TCM for any questions or concerns.

## 2024-05-21 NOTE — PHYSICAL EXAM
[No Acute Distress] : no acute distress [Well Nourished] : well nourished [Normal Sclera/Conjunctiva] : normal sclera/conjunctiva [Normal Outer Ear/Nose] : the outer ears and nose were normal in appearance [No JVD] : no jugular venous distention [No Respiratory Distress] : no respiratory distress  [No Accessory Muscle Use] : no accessory muscle use [Normal] : affect was normal and insight and judgment were intact [de-identified] : constipated - wants to try Dulcolax [de-identified] : knee pain [de-identified] : surgery site intact [de-identified] : makes contact with wall/furniture with indoor ambulation

## 2024-05-21 NOTE — REVIEW OF SYSTEMS
[Fever] : no fever [Chills] : no chills [Pain] : no pain [Vision Problems] : no vision problems [Hearing Loss] : no hearing loss [Chest Pain] : no chest pain [Lower Ext Edema] : no lower extremity edema [Shortness Of Breath] : no shortness of breath [Cough] : no cough [Constipation] : no constipation [Incontinence] : no incontinence [Muscle Weakness] : no muscle weakness [Unsteady Walk] : no ataxia [Insomnia] : no insomnia [Anxiety] : no anxiety [Depression] : no depression [Negative] : Heme/Lymph

## 2024-05-21 NOTE — COUNSELING
[Fall prevention counseling provided] : Fall prevention counseling provided [Use recommended devices] : Use recommended devices [FreeTextEntry1] : makes contact with wall and furniture indoors, using walker or cane outdoors.  [None] : None [Good understanding] : Patient has a good understanding of lifestyle changes and steps needed to achieve self management goal

## 2024-05-21 NOTE — ASSESSMENT
[FreeTextEntry1] : 77M w/ h/o CAD (s/p CABG), HFimpEF ( EF 50-55% in Nov 2023 ), chronic afib s/p watchman procedure ( off AC ), SSS (s/p CRT-P), HTN, and DLD presents to the ED with fever and chills. Patient reported that today he felt very cold and started shaking, took his temp was 99.9 at home, he also felt very weak so he came to the ED for evaluation. Pt reported associated intermittent epigastric pain and left sided chest pain that happened once and went away.  5/15 he underwent successful planned robotic cholecystectomy with cholangiogram. TCM NP reviewed that pt is under the [facility] program for home care until pt is seen by PCP. TCM NP will be assigned to sign Home Care orders post-discharge. #Cholecystitis -cont metroNIDAZOLE -planned robotic cholecystectomy with cholangiogram -maintain adequate fluid intake -maintain DASH diet -keep f/u with PCP/INTERVEN #AFIB -Continue Lopressor 50mg PO BID -keep f/ with CARDS #Osteoarthritis of both knees -fall prevention -keep f/u with ORTHO 6/4 for Synvisc  #Heart Failure - LVEF 55-60%. -Continue Lopressor 50mg PO BID -cont Lasix -keep f/u with CARDS 6/25 #CONSTIPATION -maintain adequate nutrition and hydration -wants to try Dulcolax -was instructed by SURG to use MOM and mineral oil

## 2024-05-22 DIAGNOSIS — I50.32 CHRONIC DIASTOLIC (CONGESTIVE) HEART FAILURE: ICD-10-CM

## 2024-05-22 DIAGNOSIS — Z95.1 PRESENCE OF AORTOCORONARY BYPASS GRAFT: ICD-10-CM

## 2024-05-22 DIAGNOSIS — K81.0 ACUTE CHOLECYSTITIS: ICD-10-CM

## 2024-05-22 DIAGNOSIS — I10 ESSENTIAL (PRIMARY) HYPERTENSION: ICD-10-CM

## 2024-05-22 DIAGNOSIS — I48.20 CHRONIC ATRIAL FIBRILLATION, UNSPECIFIED: ICD-10-CM

## 2024-05-22 DIAGNOSIS — K42.9 UMBILICAL HERNIA WITHOUT OBSTRUCTION OR GANGRENE: ICD-10-CM

## 2024-05-22 DIAGNOSIS — M19.90 UNSPECIFIED OSTEOARTHRITIS, UNSPECIFIED SITE: ICD-10-CM

## 2024-05-22 DIAGNOSIS — Z85.3 PERSONAL HISTORY OF MALIGNANT NEOPLASM OF BREAST: ICD-10-CM

## 2024-05-22 DIAGNOSIS — Z79.82 LONG TERM (CURRENT) USE OF ASPIRIN: ICD-10-CM

## 2024-05-22 DIAGNOSIS — K80.12 CALCULUS OF GALLBLADDER WITH ACUTE AND CHRONIC CHOLECYSTITIS WITHOUT OBSTRUCTION: ICD-10-CM

## 2024-05-22 DIAGNOSIS — Z90.11 ACQUIRED ABSENCE OF RIGHT BREAST AND NIPPLE: ICD-10-CM

## 2024-05-22 DIAGNOSIS — E78.5 HYPERLIPIDEMIA, UNSPECIFIED: ICD-10-CM

## 2024-05-22 DIAGNOSIS — I25.10 ATHEROSCLEROTIC HEART DISEASE OF NATIVE CORONARY ARTERY WITHOUT ANGINA PECTORIS: ICD-10-CM

## 2024-05-22 DIAGNOSIS — K82.8 OTHER SPECIFIED DISEASES OF GALLBLADDER: ICD-10-CM

## 2024-05-22 NOTE — ED PROVIDER NOTE - OBJECTIVE STATEMENT
Successfully called pt and scheduled him for a new patient appt with Dr. Ace. Confirmed appt details with pt, as well office location.   72 yo M with pmhx of CAD s/p CABG, HTN presenting with aching, left sided hip pain that started 2 weeks ago after hitting his hip on a rail. Patient states he was seen by pmd afterwards was given tramadol with some relief. Patient also [place] [facility] [place]. States pain worsens with weather changes. States it is rainy today and thinks that is why it hurts more. No falls. States hip pain started hurting after fall 6 months ago. Has been able to ambulate but with pain. Reports a lump to left lower leg. Denies any leg pain. Concerned for blood clots. Reports chornic left leg swelling. No cp, sob, fever, chills, abdominal pain, nausea, vomiting, diarrhea, back pain, urinary symptoms, headache, dizziness, paresthesias, or weakness. 72 yo M with pmhx of CAD s/p CABG, HTN presenting with aching, left sided hip pain that started 2 weeks ago after hitting his hip on a rail. Patient states he was seen by pmd afterwards was given tramadol with some relief. Patient also states he was seen at an [facility] [place]rays done. States pain worsens with weather changes. States it is rainy today and thinks that is why it hurts more. No falls. States hip pain started hurting after fall 6 months ago. Has been able to ambulate but with pain. Reports a lump to left leg that has resolved . Denies any leg pain. Concerned for clot - has an appointment with Dr. Winkler on Monday. Reports chornic left leg swelling. No cp, sob, fever, chills, abdominal pain, nausea, vomiting, diarrhea, back pain, urinary symptoms, headache, dizziness, paresthesias, or weakness.

## 2024-05-30 ENCOUNTER — APPOINTMENT (OUTPATIENT)
Dept: SURGERY | Facility: CLINIC | Age: 79
End: 2024-05-30
Payer: MEDICARE

## 2024-05-30 ENCOUNTER — APPOINTMENT (OUTPATIENT)
Dept: SURGERY | Facility: CLINIC | Age: 79
End: 2024-05-30

## 2024-05-30 VITALS
DIASTOLIC BLOOD PRESSURE: 82 MMHG | HEART RATE: 68 BPM | SYSTOLIC BLOOD PRESSURE: 134 MMHG | HEIGHT: 74 IN | TEMPERATURE: 97.1 F | WEIGHT: 220 LBS | BODY MASS INDEX: 28.23 KG/M2 | OXYGEN SATURATION: 98 %

## 2024-05-30 DIAGNOSIS — K42.9 UMBILICAL HERNIA W/OUT OBSTRUCTION OR GANGRENE: ICD-10-CM

## 2024-05-30 DIAGNOSIS — K80.10 CALCULUS OF GALLBLADDER WITH CHRONIC CHOLECYSTITIS W/OUT OBSTRUCTION: ICD-10-CM

## 2024-05-30 PROCEDURE — 99024 POSTOP FOLLOW-UP VISIT: CPT

## 2024-05-31 PROBLEM — K80.10 CALCULUS OF GALLBLADDER WITH CHOLECYSTITIS WITHOUT BILIARY OBSTRUCTION, UNSPECIFIED CHOLECYSTITIS ACUITY: Status: ACTIVE | Noted: 2023-11-03

## 2024-06-03 PROBLEM — K42.9 UMBILICAL HERNIA: Status: ACTIVE | Noted: 2024-06-03

## 2024-06-03 NOTE — ASSESSMENT
[FreeTextEntry1] : Mr. Tad Blanton is a 78-year-old male who returns to the office with his wife for a post-operative visit after his recent robotic cholecystectomy and primary repair of umbilical hernia on 5/15. No post-operative problems noted. He has no complaints about the surgery. He did not require pain medication. He reports normal appetite, urination, and bowel movements. We reviewed local care, dietary restrictions, and activity restrictions. Pathology was noted and discussed. He will return to the office in 3 months for re-exam, or sooner as needed. They are happy with the assessment and plan. All of their questions were answered.

## 2024-06-03 NOTE — HISTORY OF PRESENT ILLNESS
[de-identified] : The patient returns to the office with his wife for a post-operative visit after his recent robotic cholecystectomy and primary repair of umbilical hernia on 5/15. He looks and feels well. He denies dark-colored urine or light-colored stool.

## 2024-06-03 NOTE — PHYSICAL EXAM
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  Left breast is quite prominent, but examination is otherwise unremarkable.  No axillary adenopathy bilaterally. [de-identified] : Healthy for stated age, somewhat debilitated. Anicteric [de-identified] : Obese and protuberant but soft. Well-healing curvilinear incision in the periumbilical region. Well-healing laparoscopic port sites. They are clean and dry, with no evidence of hematoma or infection. No hernias noted.

## 2024-06-04 ENCOUNTER — APPOINTMENT (OUTPATIENT)
Dept: ORTHOPEDIC SURGERY | Facility: CLINIC | Age: 79
End: 2024-06-04
Payer: MEDICARE

## 2024-06-04 DIAGNOSIS — M17.0 BILATERAL PRIMARY OSTEOARTHRITIS OF KNEE: ICD-10-CM

## 2024-06-04 PROCEDURE — 99213 OFFICE O/P EST LOW 20 MIN: CPT | Mod: 25

## 2024-06-04 PROCEDURE — 20611 DRAIN/INJ JOINT/BURSA W/US: CPT | Mod: 50

## 2024-06-04 NOTE — HISTORY OF PRESENT ILLNESS
[de-identified] : The patient is a 78-year-old male here for a reevaluation of bilateral knee osteoarthritis.  He is having pain in each knee on a daily basis.  The severity waxes and wanes.  Of note he is status post cholecystectomy about 3 weeks ago.
Negative

## 2024-06-04 NOTE — PROCEDURE
[Large Joint Injection] : Large joint injection [Bilateral] : bilaterally of the [Knee] : knee [Alcohol] : alcohol [Synvisc-one (48mg)] : 48mg of Synvisc-one [Risks, benefits, alternatives discussed / Verbal consent obtained] : the risks benefits, and alternatives have been discussed, and verbal consent was obtained [All ultrasound images have been permanently captured and stored accordingly in our picture archiving and communication system] : All ultrasound images have been permanently captured and stored accordingly in our picture archiving and communication system [Visualization of the needle and placement of injection was performed without complication] : visualization of the needle and placement of injection was performed without complication

## 2024-06-04 NOTE — IMAGING
[de-identified] : Physical exam both knees: No effusion, no erythema, no ecchymosis.  There are large varicose veins noted over the anterior and lateral aspect of the right knee.  Full extension, flexion to 90 degrees bilaterally.  Range of motion assessed with the patient sitting up on the exam table.  Intact to light touch, today he is ambulating with a cane

## 2024-06-04 NOTE — DISCUSSION/SUMMARY
[de-identified] : Today I recommend a Synvisc 1 injection for each knee.  The patient agreed.  Both knees were prepped in the usual sterile fashion and a 2 cc lidocaine block was administered to each knee.  Both knees were then reprepped in the usual sterile fashion and 6cc of Synvisc 1 was injected into the superolateral aspect of each knee.  The puncture sites werecovered with a Band-Aids.  He tolerated the procedure well.  He was advised to use ice, Tylenol and rest his knees if he has any pain or soreness after the injections.  I will see him back in 6 months for further evaluation.

## 2024-06-14 ENCOUNTER — APPOINTMENT (OUTPATIENT)
Dept: INTERVENTIONAL RADIOLOGY/VASCULAR | Facility: CLINIC | Age: 79
End: 2024-06-14

## 2024-06-25 ENCOUNTER — APPOINTMENT (OUTPATIENT)
Dept: CARDIOLOGY | Facility: CLINIC | Age: 79
End: 2024-06-25

## 2024-06-25 PROCEDURE — 93294 REM INTERROG EVL PM/LDLS PM: CPT

## 2024-06-25 PROCEDURE — 93296 REM INTERROG EVL PM/IDS: CPT

## 2024-08-08 NOTE — DISCHARGE NOTE PROVIDER - HOSPITAL COURSE
Kelly Cohen Patient Age: 57 year old  MESSAGE: Interpreting service used: No    Insurance on file confirmed with caller: Yes    IM/FP- Orders- Order Request-      Type of order being requested: Labs-        Type of lab:  Routine Labs    Reason order is needed: Annual Complete Exam     PATIENT WOULD LIKE TO COME IN ON 8/9/2024 AND HAVE HER LABS DONE AS SHE LIVES A FAR DISTANCE AND WOULD NEED TO FAST.    Appointment on 8/9/2024    Is the patient currently having any symptoms? No- Route message to provider's clinical pool.    MESSAGE SENT WITH HIGH PRIORITY    Message read back to caller for accuracy: Yes       ALLERGIES:  Lidocaine  Current Outpatient Medications   Medication Sig Dispense Refill    losartan (COZAAR) 25 MG tablet TAKE 1 TABLET BY MOUTH EVERY DAY 30 tablet 5    rosuvastatin (CRESTOR) 20 MG tablet Take 1 tablet by mouth daily. 90 tablet 3    aspirin (ECOTRIN) 81 MG EC tablet Take 1 tablet by mouth daily. 30 tablet     phentermine 15 MG capsule Take 1 capsule by mouth every morning. Collaborating MD: Dr. Karma Angeles MD 30 capsule 0    Multiple Vitamin (MULTIVITAMIN ADULT PO) Take 1 tablet by mouth daily.       No current facility-administered medications for this visit.     PHARMACY to use: Please ask patient if needed            Pharmacy preference(s) on file:   [facility]/pharmacy #[id number] - [place], [place] - [address]  [address] [address]  Phone: [phone] Fax: [phone]      CALL BACK INFO: Ok to leave response (including medical information) on answering machine      PCP: Jeny Watson PA-C         INS: Payor: [employer] [place] / Plan: PPO [id number] / Product Type: PPO MISC   PATIENT ADDRESS:  [address] [place] [id number]     76 YR old male with PPM for SSS per EMR 2018 BiV PPM was attempted but despite multiple catheters and sheaths was unsuccessful. now scheduled for UPGRADE OF DDD PACEMAKER TO BIVENTRICULAR DEVICE ( CRT-P ) for complete LBBB. PMH significant for MI, CAD, s/p CABG, breast CA s/p mastectomy, paroxsymal afib, SSS, and diastolic heart failure. On 6/25, he electively underwent Replacement, biventricular pulse generator and left ventricular electrode lead, Implantation of biventricular permanent pacemaker as upgrade to existing pacemaker. His po course was 76 YR old male with PPM for SSS per EMR 2018 BiV PPM was attempted but despite multiple catheters and sheaths was unsuccessful. now scheduled for UPGRADE OF DDD PACEMAKER TO BIVENTRICULAR DEVICE ( CRT-P ) for complete LBBB. PMH significant for MI, CAD, s/p CABG, breast CA s/p mastectomy, paroxsymal afib, SSS, and diastolic heart failure. On 6/25, he electively underwent Replacement, biventricular pulse generator and left ventricular electrode lead, Implantation of biventricular permanent pacemaker as upgrade to existing pacemaker. His po course was uneventful and the patient was discharged home in stable condition on POD # 1.  He was treated empirically with antibiotics as per the recommendation of EP.

## 2024-08-12 ENCOUNTER — APPOINTMENT (OUTPATIENT)
Dept: ELECTROPHYSIOLOGY | Facility: CLINIC | Age: 79
End: 2024-08-12
Payer: MEDICARE

## 2024-08-12 VITALS
HEIGHT: 74 IN | DIASTOLIC BLOOD PRESSURE: 60 MMHG | WEIGHT: 315 LBS | BODY MASS INDEX: 40.43 KG/M2 | SYSTOLIC BLOOD PRESSURE: 140 MMHG | HEART RATE: 59 BPM

## 2024-08-12 DIAGNOSIS — I48.91 UNSPECIFIED ATRIAL FIBRILLATION: ICD-10-CM

## 2024-08-12 DIAGNOSIS — I10 ESSENTIAL (PRIMARY) HYPERTENSION: ICD-10-CM

## 2024-08-12 DIAGNOSIS — Z45.018 ENCOUNTER FOR ADJUSTMENT AND MANAGEMENT OF OTHER PART OF CARDIAC PACEMAKER: ICD-10-CM

## 2024-08-12 DIAGNOSIS — Z95.818 PRESENCE OF OTHER CARDIAC IMPLANTS AND GRAFTS: ICD-10-CM

## 2024-08-12 DIAGNOSIS — I49.5 SICK SINUS SYNDROME: ICD-10-CM

## 2024-08-12 PROCEDURE — 93000 ELECTROCARDIOGRAM COMPLETE: CPT | Mod: 59

## 2024-08-12 PROCEDURE — 93281 PM DEVICE PROGR EVAL MULTI: CPT

## 2024-08-12 PROCEDURE — G2211 COMPLEX E/M VISIT ADD ON: CPT

## 2024-08-12 PROCEDURE — 99214 OFFICE O/P EST MOD 30 MIN: CPT

## 2024-08-12 NOTE — HISTORY OF PRESENT ILLNESS
[de-identified] : Cardiologist: Dr. Handy Ophtho: Dr. GOMEZ ([facility]) Pulm: referral provided  77 yo M with history of SSS s/p DC PPM, CAD s/p CABG, LBBB. BiV PPM attempted but despite multiple catheters and sheaths, CS was unable to be cannulated at that time. Pt admitted 7/9-7/14 for abdominal pain --> recommended MRCP with possible ERCP but patient refused due to claustrophobia and refused ERCP bc he felt better and did not want any other procedures.   Pt was in the ER 10/23 (no admission) for lower extremity edema due to excess salt intake (ate pickles and ham).  He can walk on flat ground and up the stairs without dyspnea.  Patient had a BiV PPM upgrade with Dr. Danielle (6/24/22). Since Oct, AF has been persistent.  4/24/23 Pt went for OTIS prior to Watchman 3/14/23 and was found to have left atrial appendage clot. At that time he was not on any DOACs, so he was started on Eliquis 5mg PO BID. On 3/26/23, he went in for abdominal pain and was noted to have acute cholecystitis and E. Coli bacteremia. He had a percutaneous cholecystostomy 4/3 and cardiologist changed his OAC to Xarelto 15 mg.   8/7/23 Patient is here to discuss Watchman. He still has a gallbladder drain. He denies chest pain, dyspnea, palpitations, dizziness, lightheadedness, presyncope or syncope.   10/30/23 s/p Watchman 9/26/2023.   3/25/2024 Here for follow up after Watchman implant. Had TOIS 45 days after Watchman with no sharri-device leak or thrombus. Feels well but wants his gallbladder surgery so he can eat pasta. Denies any bleeding with aspirin and plavix. Will finish plavix tomorrow.   8/12/2024 Here for routine follow up. Had his gallbladder taken out. No issues. He denies chest pain, dyspnea, palpitations, dizziness, lightheadedness, presyncope or syncope.

## 2024-08-12 NOTE — PROCEDURE
[No] : not [Atrial Fibrillation] : atrial fibrillation [See Device Printout] : See device printout [CRT-P] : Cardiac resynchronization therapy pacemaker [DDDR] : DDDR [Longevity: ___ months] : The estimated remaining battery life is [unfilled] months [Threshold Testing Performed] : Threshold testing was performed [Sensing Amplitude ___mv] : sensing amplitude was [unfilled] mv [Lead Imp:  ___ohms] : lead impedance was [unfilled] ohms [___V @] : [unfilled] V [___ ms] : [unfilled] ms [Programmed for Longevity] : output reprogrammed for improved battery longevity [de-identified] : [place] [de-identified] : 5152 [de-identified] : [id number] [de-identified] : 6/24/2022 [de-identified] : 60 [de-identified] : ->99% CorVue stable.  1 HVR Episode on 06/27/2024 lasting 4 beats at 163 BPM. Pt on Remote Monitoring

## 2024-08-12 NOTE — CARDIOLOGY SUMMARY
[de-identified] : 8/12/2024 AFib, BiV paced (HR 59 bpm) 3/25/2024 AFib, BiV-paced (HR 60 bpm) 10/30/2023 AFib, BiV paced (HR 64 bpm) 8/7/2023 AFib, BiV paced (HR 60 bpm),  msec 4/24/2023 AFIB, BiV paced (HR 60 bpm),  msec 7/27/2022 A-paced, BiV paced (HR 60 bpm),  msec [de-identified] : 11/13/2023 LVEF 50-55%. Mild MR. Mild TR. Watchman ADDY closure device in position. No evidence of sharri-device leak or thrombus.  12/2022 EF 40-45% Mod dilated LA 5.4 cm. Mild MR. Mild TR.  9/1/21 EF 46%. Severe LAE.  4/1/21 EF 49%. Mild cLVH. Mild-mod MR. Mild TR. 1/14/20: EF 45-54% Mod Grade 2 DD. Mod eccentric MR. Mild TR. 8/20/18, Mod thickened mitral valve with mod restricted opening. Mild MAC. Mod MR. Mod LAE. Grade 1 DD. LVEF 55-75%.  [de-identified] : CRT-P (Dee) 7/27/2022 [de-identified] : Watchman 9/26/2023

## 2024-08-12 NOTE — ASSESSMENT
[FreeTextEntry1] : 77 yo M with history of AFib, SSS s/p DC PPM with dyspnea and cardiomyopathy s/p BiV PPM upgrade noted to have left atrial appendage clot prior to Watchman procedure s/p anticoagulation and Watchman 9/26/2023.  # SSS s/p DC PPM with cardiomyopathy s/p BiV PPM upgrade - Interrogation as above. Device reprogrammed as above.  - Patient denies any cardiovascular complaints.  - Remote monitor is set up and patient is transmitting.   # Persistent AFib since Oct 2022 with left atrial appendage thrombus noted on pre-Watchman OTIS in March 2023, now s/p Watchman 9/26/2023 - Rate controlled AF.  - s/p Watchman 9/26/23. s/p 6 mo DAPT.  No sharri-device leak. - Cont aspirin 325 mg indefinitely. Can interrupt for surgeries.   # Cholecystitis s/p gallbladder removal  # Mild cardiomyopathy (EF 40-45%) - Cont Metoprolol Tartrate 50 mg BID - Consider Entresto. Pt to discuss with cardiologist.  # HTN - BP well controlled - Cont Metoprolol Tartrate 50 mg BID - I have advised him to adhere to a 2g Na diet and to be careful with consuming salty meals. - Follow up with cardiologist  I have also advised the patient to go to the nearest emergency room if he experiences any chest pain, dyspnea, syncope, or has any other compelling symptoms.  Follow up in 6 mo

## 2024-08-12 NOTE — PHYSICAL EXAM
[General Appearance - Well Developed] : well developed [Normal Appearance] : normal appearance [Well Groomed] : well groomed [No Deformities] : no deformities [General Appearance - Well Nourished] : well nourished [General Appearance - In No Acute Distress] : no acute distress [Heart Rate And Rhythm] : heart rate and rhythm were normal [Heart Sounds] : normal S1 and S2 [Murmurs] : no murmurs present [Edema] : no peripheral edema present [] : no respiratory distress [Respiration, Rhythm And Depth] : normal respiratory rhythm and effort [Exaggerated Use Of Accessory Muscles For Inspiration] : no accessory muscle use [Auscultation Breath Sounds / Voice Sounds] : lungs were clear to auscultation bilaterally [Left Infraclavicular] : left infraclavicular area [Well-Healed] : well-healed [Erythema] : not erythematous [Warm] : not warm [Tender] : not tender [Abdomen Soft] : soft

## 2024-08-29 ENCOUNTER — APPOINTMENT (OUTPATIENT)
Dept: SURGERY | Facility: CLINIC | Age: 79
End: 2024-08-29

## 2024-09-16 ENCOUNTER — APPOINTMENT (OUTPATIENT)
Dept: SURGERY | Facility: CLINIC | Age: 79
End: 2024-09-16
Payer: MEDICARE

## 2024-09-16 VITALS
SYSTOLIC BLOOD PRESSURE: 154 MMHG | BODY MASS INDEX: 29.52 KG/M2 | TEMPERATURE: 97 F | HEART RATE: 69 BPM | WEIGHT: 230 LBS | DIASTOLIC BLOOD PRESSURE: 80 MMHG | HEIGHT: 74 IN | OXYGEN SATURATION: 98 %

## 2024-09-16 DIAGNOSIS — K80.10 CALCULUS OF GALLBLADDER WITH CHRONIC CHOLECYSTITIS W/OUT OBSTRUCTION: ICD-10-CM

## 2024-09-16 DIAGNOSIS — K42.9 UMBILICAL HERNIA W/OUT OBSTRUCTION OR GANGRENE: ICD-10-CM

## 2024-09-16 PROCEDURE — 99212 OFFICE O/P EST SF 10 MIN: CPT

## 2024-09-18 NOTE — HISTORY OF PRESENT ILLNESS
[de-identified] : The patient returns to the office with his wife for a final post-operative visit after his recent robotic cholecystectomy and primary repair of umbilical hernia on 5/15. He looks and feels well.

## 2024-09-18 NOTE — PHYSICAL EXAM
[de-identified] : Healthy for stated age, somewhat debilitated. Anicteric.  [de-identified] : Obese and protuberant but soft. Well-healing curvilinear incision in the periumbilical region. Well-healing laparoscopic port sites. They are clean and dry, with no evidence of hematoma or infection. No hernias noted.

## 2024-09-18 NOTE — ASSESSMENT
[FreeTextEntry1] : Mr. Tad Blanton is a 78-year-old male who returns to the office with his wife for a final post-operative visit after his recent robotic cholecystectomy and primary repair of umbilical hernia on 5/15.   No post-operative problems noted. He continues to do well.  He will return to the office as needed. They are happy with the assessment and plan. All of their questions were answered.

## 2024-09-18 NOTE — PHYSICAL EXAM
[de-identified] : Healthy for stated age, somewhat debilitated. Anicteric.  [de-identified] : Obese and protuberant but soft. Well-healing curvilinear incision in the periumbilical region. Well-healing laparoscopic port sites. They are clean and dry, with no evidence of hematoma or infection. No hernias noted.

## 2024-09-18 NOTE — HISTORY OF PRESENT ILLNESS
[de-identified] : The patient returns to the office with his wife for a final post-operative visit after his recent robotic cholecystectomy and primary repair of umbilical hernia on 5/15. He looks and feels well.

## 2024-11-11 ENCOUNTER — NON-APPOINTMENT (OUTPATIENT)
Age: 79
End: 2024-11-11

## 2024-11-11 ENCOUNTER — APPOINTMENT (OUTPATIENT)
Dept: CARDIOLOGY | Facility: CLINIC | Age: 79
End: 2024-11-11
Payer: MEDICARE

## 2024-11-11 PROCEDURE — 93294 REM INTERROG EVL PM/LDLS PM: CPT

## 2024-11-11 PROCEDURE — 93296 REM INTERROG EVL PM/IDS: CPT

## 2024-12-04 NOTE — ASU PREOP CHECKLIST - BETA DATE TIME
Quality 226: Preventive Care And Screening: Tobacco Use: Screening And Cessation Intervention: Patient screened for tobacco use and is an ex/non-smoker Quality 358: Patient-Centered Surgical Risk Assessment And Communication: A patient-specific risk assessment with a risk calculator was not completed or communicated to patient and/or family. Detail Level: Detailed 15-May-2024 06:00

## 2024-12-09 ENCOUNTER — APPOINTMENT (OUTPATIENT)
Dept: ORTHOPEDIC SURGERY | Facility: CLINIC | Age: 79
End: 2024-12-09
Payer: MEDICARE

## 2024-12-09 DIAGNOSIS — M17.0 BILATERAL PRIMARY OSTEOARTHRITIS OF KNEE: ICD-10-CM

## 2024-12-09 PROCEDURE — 20611 DRAIN/INJ JOINT/BURSA W/US: CPT | Mod: 50

## 2024-12-09 PROCEDURE — 99213 OFFICE O/P EST LOW 20 MIN: CPT | Mod: 25

## 2024-12-13 NOTE — ED ADULT TRIAGE NOTE - PAIN: PRESENCE, MLM
Your bone density shows osteopenia which is a thinning of the bones.  I recommend that you take vitamin D 1000 units daily, get an adequate intake of calcium daily.  Postmenopausal women should get 1500 mg of calcium per day.  This is about 4-5 servings per day of calcium rich foods such as dairy products or almond milk.  If you are not getting 1500 mg per day and would recommend adding on a calcium supplement.  Also doing weightbearing exercise is helpful.  We should repeat a DEXA scan 2 years.  
complains of pain/discomfort

## 2024-12-20 ENCOUNTER — INPATIENT (INPATIENT)
Facility: HOSPITAL | Age: 79
LOS: 2 days | Discharge: HOME HEALTH | DRG: 291 | End: 2024-12-23
Attending: INTERNAL MEDICINE | Admitting: STUDENT IN AN ORGANIZED HEALTH CARE EDUCATION/TRAINING PROGRAM
Payer: MEDICARE

## 2024-12-20 VITALS
SYSTOLIC BLOOD PRESSURE: 206 MMHG | TEMPERATURE: 98 F | HEIGHT: 75 IN | RESPIRATION RATE: 18 BRPM | OXYGEN SATURATION: 99 % | WEIGHT: 235.01 LBS | DIASTOLIC BLOOD PRESSURE: 88 MMHG | HEART RATE: 62 BPM

## 2024-12-20 DIAGNOSIS — Z95.818 PRESENCE OF OTHER CARDIAC IMPLANTS AND GRAFTS: Chronic | ICD-10-CM

## 2024-12-20 DIAGNOSIS — Z90.10 ACQUIRED ABSENCE OF UNSPECIFIED BREAST AND NIPPLE: Chronic | ICD-10-CM

## 2024-12-20 DIAGNOSIS — R07.9 CHEST PAIN, UNSPECIFIED: ICD-10-CM

## 2024-12-20 DIAGNOSIS — Z95.1 PRESENCE OF AORTOCORONARY BYPASS GRAFT: Chronic | ICD-10-CM

## 2024-12-20 DIAGNOSIS — Z95.0 PRESENCE OF CARDIAC PACEMAKER: Chronic | ICD-10-CM

## 2024-12-20 LAB
ALBUMIN SERPL ELPH-MCNC: 4.2 G/DL — SIGNIFICANT CHANGE UP (ref 3.5–5.2)
ALP SERPL-CCNC: 86 U/L — SIGNIFICANT CHANGE UP (ref 30–115)
ALT FLD-CCNC: 10 U/L — SIGNIFICANT CHANGE UP (ref 0–41)
ANION GAP SERPL CALC-SCNC: 13 MMOL/L — SIGNIFICANT CHANGE UP (ref 7–14)
AST SERPL-CCNC: 18 U/L — SIGNIFICANT CHANGE UP (ref 0–41)
BASOPHILS # BLD AUTO: 0.2 K/UL — SIGNIFICANT CHANGE UP (ref 0–0.2)
BASOPHILS NFR BLD AUTO: 2.7 % — HIGH (ref 0–1)
BILIRUB SERPL-MCNC: 0.6 MG/DL — SIGNIFICANT CHANGE UP (ref 0.2–1.2)
BUN SERPL-MCNC: 14 MG/DL — SIGNIFICANT CHANGE UP (ref 10–20)
CALCIUM SERPL-MCNC: 8.5 MG/DL — SIGNIFICANT CHANGE UP (ref 8.4–10.4)
CHLORIDE SERPL-SCNC: 102 MMOL/L — SIGNIFICANT CHANGE UP (ref 98–110)
CO2 SERPL-SCNC: 23 MMOL/L — SIGNIFICANT CHANGE UP (ref 17–32)
CREAT SERPL-MCNC: 0.8 MG/DL — SIGNIFICANT CHANGE UP (ref 0.7–1.5)
EGFR: 89 ML/MIN/1.73M2 — SIGNIFICANT CHANGE UP
EOSINOPHIL # BLD AUTO: 0.27 K/UL — SIGNIFICANT CHANGE UP (ref 0–0.7)
EOSINOPHIL NFR BLD AUTO: 3.6 % — SIGNIFICANT CHANGE UP (ref 0–8)
GLUCOSE SERPL-MCNC: 100 MG/DL — HIGH (ref 70–99)
HCT VFR BLD CALC: 32.5 % — LOW (ref 42–52)
HGB BLD-MCNC: 10 G/DL — LOW (ref 14–18)
LYMPHOCYTES # BLD AUTO: 0.86 K/UL — LOW (ref 1.2–3.4)
LYMPHOCYTES # BLD AUTO: 11.6 % — LOW (ref 20.5–51.1)
MCHC RBC-ENTMCNC: 22.4 PG — LOW (ref 27–31)
MCHC RBC-ENTMCNC: 30.8 G/DL — LOW (ref 32–37)
MCV RBC AUTO: 72.7 FL — LOW (ref 80–94)
MONOCYTES # BLD AUTO: 0.86 K/UL — HIGH (ref 0.1–0.6)
MONOCYTES NFR BLD AUTO: 11.6 % — HIGH (ref 1.7–9.3)
NEUTROPHILS # BLD AUTO: 4.9 K/UL — SIGNIFICANT CHANGE UP (ref 1.4–6.5)
NEUTROPHILS NFR BLD AUTO: 66.1 % — SIGNIFICANT CHANGE UP (ref 42.2–75.2)
NT-PROBNP SERPL-SCNC: 1570 PG/ML — HIGH (ref 0–300)
PLATELET # BLD AUTO: 236 K/UL — SIGNIFICANT CHANGE UP (ref 130–400)
PMV BLD: 10.2 FL — SIGNIFICANT CHANGE UP (ref 7.4–10.4)
POTASSIUM SERPL-MCNC: 4.1 MMOL/L — SIGNIFICANT CHANGE UP (ref 3.5–5)
POTASSIUM SERPL-SCNC: 4.1 MMOL/L — SIGNIFICANT CHANGE UP (ref 3.5–5)
PROT SERPL-MCNC: 6.9 G/DL — SIGNIFICANT CHANGE UP (ref 6–8)
RBC # BLD: 4.47 M/UL — LOW (ref 4.7–6.1)
RBC # FLD: 19 % — HIGH (ref 11.5–14.5)
SODIUM SERPL-SCNC: 138 MMOL/L — SIGNIFICANT CHANGE UP (ref 135–146)
TROPONIN T, HIGH SENSITIVITY RESULT: 27 NG/L — HIGH (ref 6–21)
TROPONIN T, HIGH SENSITIVITY RESULT: 27 NG/L — HIGH (ref 6–21)
WBC # BLD: 7.42 K/UL — SIGNIFICANT CHANGE UP (ref 4.8–10.8)
WBC # FLD AUTO: 7.42 K/UL — SIGNIFICANT CHANGE UP (ref 4.8–10.8)

## 2024-12-20 PROCEDURE — 97162 PT EVAL MOD COMPLEX 30 MIN: CPT | Mod: GP

## 2024-12-20 PROCEDURE — 83735 ASSAY OF MAGNESIUM: CPT

## 2024-12-20 PROCEDURE — 93306 TTE W/DOPPLER COMPLETE: CPT

## 2024-12-20 PROCEDURE — 93970 EXTREMITY STUDY: CPT | Mod: 26

## 2024-12-20 PROCEDURE — 84100 ASSAY OF PHOSPHORUS: CPT

## 2024-12-20 PROCEDURE — 70450 CT HEAD/BRAIN W/O DYE: CPT | Mod: 26,59,MC

## 2024-12-20 PROCEDURE — 70498 CT ANGIOGRAPHY NECK: CPT | Mod: 26,MC

## 2024-12-20 PROCEDURE — 36415 COLL VENOUS BLD VENIPUNCTURE: CPT

## 2024-12-20 PROCEDURE — 70496 CT ANGIOGRAPHY HEAD: CPT | Mod: 26,MC

## 2024-12-20 PROCEDURE — 99285 EMERGENCY DEPT VISIT HI MDM: CPT | Mod: FS

## 2024-12-20 PROCEDURE — 71045 X-RAY EXAM CHEST 1 VIEW: CPT | Mod: 26

## 2024-12-20 PROCEDURE — 85025 COMPLETE CBC W/AUTO DIFF WBC: CPT

## 2024-12-20 PROCEDURE — 99222 1ST HOSP IP/OBS MODERATE 55: CPT | Mod: GC

## 2024-12-20 PROCEDURE — 80053 COMPREHEN METABOLIC PANEL: CPT

## 2024-12-20 RX ORDER — SODIUM CHLORIDE 9 MG/ML
500 INJECTION, SOLUTION INTRAVENOUS ONCE
Refills: 0 | Status: DISCONTINUED | OUTPATIENT
Start: 2024-12-20 | End: 2024-12-20

## 2024-12-20 RX ORDER — LABETALOL HCL 300 MG/1
10 TABLET, FILM COATED ORAL ONCE
Refills: 0 | Status: COMPLETED | OUTPATIENT
Start: 2024-12-20 | End: 2024-12-20

## 2024-12-20 RX ORDER — MECLIZINE HYDROCHLORIDE 25 MG/1
25 TABLET ORAL ONCE
Refills: 0 | Status: COMPLETED | OUTPATIENT
Start: 2024-12-20 | End: 2024-12-20

## 2024-12-20 RX ORDER — METOPROLOL TARTRATE 50 MG
50 TABLET ORAL ONCE
Refills: 0 | Status: COMPLETED | OUTPATIENT
Start: 2024-12-20 | End: 2024-12-20

## 2024-12-20 RX ORDER — FUROSEMIDE 20 MG
20 TABLET ORAL ONCE
Refills: 0 | Status: COMPLETED | OUTPATIENT
Start: 2024-12-20 | End: 2024-12-20

## 2024-12-20 RX ADMIN — Medication 20 MILLIGRAM(S): at 19:00

## 2024-12-20 RX ADMIN — Medication 50 MILLIGRAM(S): at 19:00

## 2024-12-20 RX ADMIN — MECLIZINE HYDROCHLORIDE 25 MILLIGRAM(S): 25 TABLET ORAL at 19:00

## 2024-12-20 RX ADMIN — LABETALOL HCL 10 MILLIGRAM(S): 300 TABLET, FILM COATED ORAL at 20:43

## 2024-12-20 NOTE — ED ADULT NURSE NOTE - NSFALLHARMRISKINTERV_ED_ALL_ED
Assistance OOB with selected safe patient handling equipment if applicable/Assistance with ambulation/Communicate risk of Fall with Harm to all staff, patient, and family/Encourage patient to sit up slowly, dangle for a short time, stand at bedside before walking/Orthostatic vital signs/Provide visual cue: red socks, yellow wristband, yellow gown, etc/Reinforce activity limits and safety measures with patient and family/Bed in lowest position, wheels locked, appropriate side rails in place/Call bell, personal items and telephone in reach/Instruct patient to call for assistance before getting out of bed/chair/stretcher/Non-slip footwear applied when patient is off stretcher/[place] to call system/Physically safe environment - no spills, clutter or unnecessary equipment/Purposeful Proactive Rounding/Room/bathroom lighting operational, light cord in reach

## 2024-12-20 NOTE — ED PROVIDER NOTE - OBJECTIVE STATEMENT
79-year-old male past medical history HTN, HLD, HF, SSS s/p PPM, A-fib s/p Watchman, CAD, presents with elevated blood pressure (over 200 at home) with associated lightheadedness X 4 days.  Patient states lightheadedness is worse when going from seated to standing, positional.  Patient reports left-sided headache 3 days ago which since resolved.  Patient reports left-sided chest pain which resolved 2 days ago.  Denies fever, shortness of breath, abdominal pain, N/V, focal numbness/weakness.  Patient additionally reports increased swelling to bilateral lower extremities, left worse than right.  Patient states he has been eating noncompliant diet.

## 2024-12-20 NOTE — ED ADULT NURSE NOTE - CHIEF COMPLAINT QUOTE
Patient reports to ED for c/o lightheadedness & HTN that began x4 days ago. Patient has pacemaker. Hx HTN. EKG done in triage.

## 2024-12-20 NOTE — CONSULT NOTE ADULT - ATTENDING COMMENTS
Patient seen and examined and agree with above except as noted.  Patients history, notes, labs, imaging, vitals and meds reviewed personally.  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

## 2024-12-20 NOTE — ED PROVIDER NOTE - PHYSICAL EXAMINATION
Vital Signs: I have reviewed the initial vital signs.  CONSTITUTIONAL: Pt in no acute distress laying on stretcher.  SKIN: Skin exam is warm and dry, no acute rash.  HEAD: Normocephalic; atraumatic.  EYES: PERRL, EOM intact; conjunctiva and sclera clear.  ENT: No nasal discharge; airway clear.   NECK: Supple; FROM  CARD: S1, S2 normal; no murmurs, gallops, or rubs. Regular rate and rhythm.  RESP: CTAB. No wheezes, rales or rhonchi.  ABD: soft; non-distended; non-tender; no hepatosplenomegaly.  MSK: Normal ROM. +b/l LE pitting edema, R > L, venous stasis skin changes with varicose veins.  NEURO: Alert, oriented. Grossly unremarkable. No focal deficits. Finger to nose intact b/l. Pronator negative.

## 2024-12-20 NOTE — ED ADULT TRIAGE NOTE - CHIEF COMPLAINT QUOTE
Patient reports to ED for c/o lightheadedness & HTN that began x4 days ago. Hx HTN. EKG done in triage. Patient reports to ED for c/o lightheadedness & HTN that began x4 days ago. Patient has pacemaker. Hx HTN. EKG done in triage.

## 2024-12-20 NOTE — ED PROVIDER NOTE - NSICDXPASTMEDICALHX_GEN_ALL_CORE_FT
PAST MEDICAL HISTORY:  Atrial fibrillation     Breast cancer 2001    CAD (coronary artery disease)     Dyslipidemia     H/O cardiac murmur     Heart attack     Heart failure with improved ejection fraction (HFimpEF)     [place] (hard of hearing)     HTN (hypertension)     Osteoarthritis

## 2024-12-20 NOTE — ED PROVIDER NOTE - CARE PLAN
Principal Discharge DX:	Chest pain  Secondary Diagnosis:	Severe hypertension  Secondary Diagnosis:	Dizziness   1

## 2024-12-20 NOTE — ED PROVIDER NOTE - ATTENDING SHARED VISIT SELECTOR YES
Patient's mother was calling to possibly order a 2nd pair of glasses. Patient does have [facility] plan that resets Once every 12 months from the date of service. This brings up the fact that his glasses prescription expires on 12/30/21. Patient's parents will call us if they choose to order.    Yes

## 2024-12-20 NOTE — ED PROVIDER NOTE - CLINICAL SUMMARY MEDICAL DECISION MAKING FREE TEXT BOX
79-year-old male with history of hypertension hyperlipidemia sick sinus syndrome with a pacemaker A-fib with watchman coronary artery disease presenting with elevated blood pressure lightheadedness that is as positional.  Difficult blood pressure control while in the emergency department.  Remained symptomatic.  No chest pain shortness of breath.  EKG showing some mild pulmonary edema.  Mild elevation in BNP with lower extremity swelling.  Patient admitted for further blood pressure control

## 2024-12-20 NOTE — ED PROVIDER NOTE - ATTENDING APP SHARED VISIT CONTRIBUTION OF CARE
I saw and evaluated the patient on my own.  Briefly, I have the following impression and plan...  elevated BP with positional dizziness and 1 episode of chest pain. also has LLL swelling  eval for  - intracranial pathology  - DVT  -acs  hypertensive emergency    Please see MDM for further details.

## 2024-12-20 NOTE — CONSULT NOTE ADULT - SUBJECTIVE AND OBJECTIVE BOX
NEUROLOGY CONSULT    HPI:  78 yo M w/ PMHx of HTN, HLD, HF, SSS s/p PPM, A-fib s/p Watchman, CAD presents for HTN and lightheadedness for 4 days. SBP reading 200s associated with mild lightheadedness especially when he tries to stand up. Patient notices increased swelling in lower extremities. Denies headache, vision change, facial droop, weakness, numbness, difficulty speaking or walking. Patient reports left-sided chest pain which resolved 2 days ago. Denies fever, shortness of breath, abdominal pain, N/V. In the ED, CTH shows Mild-moderate chronic microvascular ischemic changes. No scute stroke. CTA shows Moderate atherosclerotic stenosis in the origin of the left ICA (50%). Focal severe atherosclerotic stenosis in the ostium of the right vertebral artery. Focal severe stenosis in the right P2 PCA. BP in the /88.      MEDICATIONS  Home Medications:  aspirin 325 mg oral tablet: 1 tab(s) orally once a day (15 May 2024 08:23)  atorvastatin 20 mg oral tablet: 1 tab(s) orally once a day (at bedtime) (15 May 2024 08:23)  furosemide 20 mg oral tablet: 1 tab(s) orally 2 times a day (15 May 2024 08:23)  metoprolol tartrate 50 mg oral tablet: 1 tab(s) orally 2 times a day (15 May 2024 08:23)    FAMILY HISTORY:  Family history of heart disease (Father, Mother)    Family history of lung cancer (Sibling)    SOCIAL HISTORY: negative for tobacco, alcohol, or ilicit drug use.    Allergies    No Known Allergies      Neurological Examination:  General:  Appearance is consistent with chronologic age.  Cognitive/Language:  Awake, alert, and oriented to person, place, time and date.  Recent and remote memory intact.  Fund of knowledge is appropriate.  Naming, repetition and comprehension intact. Nondysarthric.    Cranial Nerves  - Eyes: Visual fields full.  EOMI w/o nystagmus, skew or reported double vision.  PERRL.  No ptosis/weakness of eyelid closure.    - Face:  Facial sensation normal V1 - 3, no facial asymmetry.  - Ears/Nose/Throat:  Hearing grossly intact b/l to finger rub.  Palate elevates midline.  Tongue and uvula midline.   Motor exam: Normal tone and bulk. No tenderness, twitching, tremors or involuntary movements.            Upper extremity                  Bicep     Tricep     HG                                                 R      5/5        5/5          5/5                                                    L       5/5        5/5          5/5              Lower extremity                   HF        KE        DF         PF                                                  R     5/5       5/5       5/5       5/5                                               L      5/5      5-/5       5/5        5/5      L KE limited by L knee arthritis    Sensory examination:  Intact to light touch and pinprick, pain, temperature and vibration in all extremities.  Reflexes: 2+ b/l biceps, triceps, patella and achilles.  Plantar response downgoing b/l.  Cerebellum: FTN/HKS intact.  No dysmetria.    Gait narrow based and normal.    LABS:                        10.0   7.42  )-----------( 236      ( 20 Dec 2024 17:31 )             32.5     12-20    138  |  102  |  14  ----------------------------<  100[H]  4.1   |  23  |  0.8    Ca    8.5      20 Dec 2024 17:31    TPro  6.9  /  Alb  4.2  /  TBili  0.6  /  DBili  x   /  AST  18  /  ALT  10  /  AlkPhos  86  12-20    Hemoglobin A1C:   Vitamin B12     CAPILLARY BLOOD GLUCOSE      Urinalysis Basic - ( 20 Dec 2024 17:31 )    Color: x / Appearance: x / SG: x / pH: x  Gluc: 100 mg/dL / Ketone: x  / Bili: x / Urobili: x   Blood: x / Protein: x / Nitrite: x   Leuk Esterase: x / RBC: x / WBC x   Sq Epi: x / Non Sq Epi: x / Bacteria: x      RADIOLOGY, EKG AND ADDITIONAL TESTS:  CT HEAD: No acute intracranial pathology. Mild-moderate chronic microvascular ischemic changes.  CT HEAD/NECK: No large vessel occlusion, aneurysm, vascular malformation. Moderate atherosclerotic stenosis in the origin of the left ICA (50%). Focal severe atherosclerotic stenosis in the ostium of the right vertebral artery. Focal severe stenosis in the right P2 PCA.     NEUROLOGY CONSULT    HPI:  78 yo M w/ PMHx of HTN, HLD, HF, SSS s/p PPM, A-fib s/p Watchman, CAD presents for HTN and lightheadedness for 4 days. SBP reading 200s associated with mild lightheadedness especially when he tries to stand up. Patient notices increased swelling in lower extremities. Denies headache, vision change, facial droop, weakness, numbness, difficulty speaking or walking. Patient reports left-sided chest pain which resolved 2 days ago. Denies fever, shortness of breath, abdominal pain, N/V. In the ED, CTH shows Mild-moderate chronic microvascular ischemic changes. No acute stroke. CTA shows Moderate atherosclerotic stenosis in the origin of the left ICA (50%). Focal severe atherosclerotic stenosis in the ostium of the right vertebral artery. Focal severe stenosis in the right P2 PCA. BP in the /88.      MEDICATIONS  Home Medications:  aspirin 325 mg oral tablet: 1 tab(s) orally once a day (15 May 2024 08:23)  atorvastatin 20 mg oral tablet: 1 tab(s) orally once a day (at bedtime) (15 May 2024 08:23)  furosemide 20 mg oral tablet: 1 tab(s) orally 2 times a day (15 May 2024 08:23)  metoprolol tartrate 50 mg oral tablet: 1 tab(s) orally 2 times a day (15 May 2024 08:23)    FAMILY HISTORY:  Family history of heart disease (Father, Mother)    Family history of lung cancer (Sibling)    SOCIAL HISTORY: negative for tobacco, alcohol, or ilicit drug use.    Allergies    No Known Allergies      Neurological Examination:  General:  Appearance is consistent with chronologic age.  Cognitive/Language:  Awake, alert, and oriented to person, place, time and date.  Recent and remote memory intact.  Fund of knowledge is appropriate.  Naming, repetition and comprehension intact. Nondysarthric.    Cranial Nerves  - Eyes: Visual fields full.  EOMI w/o nystagmus, skew or reported double vision.  PERRL.  No ptosis/weakness of eyelid closure.    - Face:  Facial sensation normal V1 - 3, no facial asymmetry.  - Ears/Nose/Throat:  Hearing grossly intact b/l to finger rub.  Palate elevates midline.  Tongue and uvula midline.   Motor exam: Normal tone and bulk. No tenderness, twitching, tremors or involuntary movements.            Upper extremity                  Bicep     Tricep     HG                                                 R      5/5        5/5          5/5                                                    L       5/5        5/5          5/5              Lower extremity                   HF        KE        DF         PF                                                  R     5/5       5/5       5/5       5/5                                               L      5/5      5-/5       5/5        5/5      L KE limited by L knee arthritis    Sensory examination:  Intact to light touch and pinprick, pain, temperature and vibration in all extremities.  Reflexes: 2+ b/l biceps, triceps, patella and achilles.  Plantar response downgoing b/l.  Cerebellum: FTN/HKS intact.  No dysmetria.    Gait narrow based and normal.    LABS:                        10.0   7.42  )-----------( 236      ( 20 Dec 2024 17:31 )             32.5     12-20    138  |  102  |  14  ----------------------------<  100[H]  4.1   |  23  |  0.8    Ca    8.5      20 Dec 2024 17:31    TPro  6.9  /  Alb  4.2  /  TBili  0.6  /  DBili  x   /  AST  18  /  ALT  10  /  AlkPhos  86  12-20    Hemoglobin A1C:   Vitamin B12     CAPILLARY BLOOD GLUCOSE      Urinalysis Basic - ( 20 Dec 2024 17:31 )    Color: x / Appearance: x / SG: x / pH: x  Gluc: 100 mg/dL / Ketone: x  / Bili: x / Urobili: x   Blood: x / Protein: x / Nitrite: x   Leuk Esterase: x / RBC: x / WBC x   Sq Epi: x / Non Sq Epi: x / Bacteria: x      RADIOLOGY, EKG AND ADDITIONAL TESTS:  CT HEAD: No acute intracranial pathology. Mild-moderate chronic microvascular ischemic changes.  CT HEAD/NECK: No large vessel occlusion, aneurysm, vascular malformation. Moderate atherosclerotic stenosis in the origin of the left ICA (50%). Focal severe atherosclerotic stenosis in the ostium of the right vertebral artery. Focal severe stenosis in the right P2 PCA.

## 2024-12-20 NOTE — ED ADULT NURSE REASSESSMENT NOTE - NS ED NURSE REASSESS COMMENT FT1
received handoff from RN. Pt assessed at bedside. ptAXOx4, RR even and unlabored, no distress noted. hooked up to bedside cardiac monitor.

## 2024-12-20 NOTE — CONSULT NOTE ADULT - ASSESSMENT
78 yo M w/ PMHx of HTN, HLD, HF, SSS s/p PPM, A-fib s/p Watchman, CAD presents for HTN and lightheadedness for 4 days. No neurological symptoms or deficits at this time. Incidental findings of severe stenosis in R vert and R P2, moderate stenosis in L ICA.   80 yo M w/ PMHx of HTN, HLD, HF, SSS s/p PPM, A-fib s/p Watchman, CAD presents for HTN and lightheadedness for 4 days. No neurological symptoms or deficits at this time. Incidental findings of severe stenosis in R vert and R P2, moderate stenosis in L ICA.    Recommendations  - Outpatient follow up with Neurology 78 yo M w/ PMHx of HTN, HLD, HF, SSS s/p PPM, A-fib s/p Watchman, CAD presents for HTN and lightheadedness for 4 days. No neurological symptoms or deficits at this time. Incidental findings of severe stenosis in R vert and R P2, moderate stenosis in L ICA.    Recommendations  - Continue home Asprin and atorvastatin  - Outpatient follow up with Neurology    Pending attending attestation 78 yo M w/ PMHx of HTN, HLD, HF, SSS s/p PPM, A-fib s/p Watchman, CAD presents for HTN and lightheadedness for 4 days. No neurological symptoms or deficits at this time. Incidental findings of severe stenosis in R vert and R P2, moderate stenosis in L ICA. Lightheadedness is due to orthostatic BP changes.    Recommendations  - No further workup for dizziness    Case discussed with attending Dr. Torres.

## 2024-12-21 ENCOUNTER — RESULT REVIEW (OUTPATIENT)
Age: 79
End: 2024-12-21

## 2024-12-21 LAB
ALBUMIN SERPL ELPH-MCNC: 4.1 G/DL — SIGNIFICANT CHANGE UP (ref 3.5–5.2)
ALP SERPL-CCNC: 84 U/L — SIGNIFICANT CHANGE UP (ref 30–115)
ALT FLD-CCNC: 8 U/L — SIGNIFICANT CHANGE UP (ref 0–41)
ANION GAP SERPL CALC-SCNC: 13 MMOL/L — SIGNIFICANT CHANGE UP (ref 7–14)
AST SERPL-CCNC: 15 U/L — SIGNIFICANT CHANGE UP (ref 0–41)
BASOPHILS # BLD AUTO: 0.09 K/UL — SIGNIFICANT CHANGE UP (ref 0–0.2)
BASOPHILS NFR BLD AUTO: 1.2 % — HIGH (ref 0–1)
BILIRUB SERPL-MCNC: 0.7 MG/DL — SIGNIFICANT CHANGE UP (ref 0.2–1.2)
BUN SERPL-MCNC: 11 MG/DL — SIGNIFICANT CHANGE UP (ref 10–20)
CALCIUM SERPL-MCNC: 8.4 MG/DL — SIGNIFICANT CHANGE UP (ref 8.4–10.5)
CHLORIDE SERPL-SCNC: 104 MMOL/L — SIGNIFICANT CHANGE UP (ref 98–110)
CO2 SERPL-SCNC: 24 MMOL/L — SIGNIFICANT CHANGE UP (ref 17–32)
CREAT SERPL-MCNC: 0.7 MG/DL — SIGNIFICANT CHANGE UP (ref 0.7–1.5)
EGFR: 93 ML/MIN/1.73M2 — SIGNIFICANT CHANGE UP
EOSINOPHIL # BLD AUTO: 0.21 K/UL — SIGNIFICANT CHANGE UP (ref 0–0.7)
EOSINOPHIL NFR BLD AUTO: 2.7 % — SIGNIFICANT CHANGE UP (ref 0–8)
GLUCOSE SERPL-MCNC: 83 MG/DL — SIGNIFICANT CHANGE UP (ref 70–99)
HCT VFR BLD CALC: 31.7 % — LOW (ref 42–52)
HGB BLD-MCNC: 9.7 G/DL — LOW (ref 14–18)
IMM GRANULOCYTES NFR BLD AUTO: 0.4 % — HIGH (ref 0.1–0.3)
LYMPHOCYTES # BLD AUTO: 1.71 K/UL — SIGNIFICANT CHANGE UP (ref 1.2–3.4)
LYMPHOCYTES # BLD AUTO: 21.9 % — SIGNIFICANT CHANGE UP (ref 20.5–51.1)
MAGNESIUM SERPL-MCNC: 2.2 MG/DL — SIGNIFICANT CHANGE UP (ref 1.8–2.4)
MCHC RBC-ENTMCNC: 22.3 PG — LOW (ref 27–31)
MCHC RBC-ENTMCNC: 30.6 G/DL — LOW (ref 32–37)
MCV RBC AUTO: 72.9 FL — LOW (ref 80–94)
MONOCYTES # BLD AUTO: 0.94 K/UL — HIGH (ref 0.1–0.6)
MONOCYTES NFR BLD AUTO: 12 % — HIGH (ref 1.7–9.3)
NEUTROPHILS # BLD AUTO: 4.83 K/UL — SIGNIFICANT CHANGE UP (ref 1.4–6.5)
NEUTROPHILS NFR BLD AUTO: 61.8 % — SIGNIFICANT CHANGE UP (ref 42.2–75.2)
NRBC # BLD: 0 /100 WBCS — SIGNIFICANT CHANGE UP (ref 0–0)
PHOSPHATE SERPL-MCNC: 3.1 MG/DL — SIGNIFICANT CHANGE UP (ref 2.1–4.9)
PLATELET # BLD AUTO: 217 K/UL — SIGNIFICANT CHANGE UP (ref 130–400)
PMV BLD: 10.5 FL — HIGH (ref 7.4–10.4)
POTASSIUM SERPL-MCNC: 3.7 MMOL/L — SIGNIFICANT CHANGE UP (ref 3.5–5)
POTASSIUM SERPL-SCNC: 3.7 MMOL/L — SIGNIFICANT CHANGE UP (ref 3.5–5)
PROT SERPL-MCNC: 6.4 G/DL — SIGNIFICANT CHANGE UP (ref 6–8)
RBC # BLD: 4.35 M/UL — LOW (ref 4.7–6.1)
RBC # FLD: 19.1 % — HIGH (ref 11.5–14.5)
SODIUM SERPL-SCNC: 141 MMOL/L — SIGNIFICANT CHANGE UP (ref 135–146)
WBC # BLD: 7.81 K/UL — SIGNIFICANT CHANGE UP (ref 4.8–10.8)
WBC # FLD AUTO: 7.81 K/UL — SIGNIFICANT CHANGE UP (ref 4.8–10.8)

## 2024-12-21 PROCEDURE — 93306 TTE W/DOPPLER COMPLETE: CPT | Mod: 26

## 2024-12-21 PROCEDURE — 99222 1ST HOSP IP/OBS MODERATE 55: CPT

## 2024-12-21 RX ORDER — MAG HYDROX/ALUMINUM HYD/SIMETH 200-200-20
30 SUSPENSION, ORAL (FINAL DOSE FORM) ORAL EVERY 4 HOURS
Refills: 0 | Status: DISCONTINUED | OUTPATIENT
Start: 2024-12-21 | End: 2024-12-23

## 2024-12-21 RX ORDER — ACETAMINOPHEN 80 MG/.8ML
650 SOLUTION/ DROPS ORAL EVERY 6 HOURS
Refills: 0 | Status: DISCONTINUED | OUTPATIENT
Start: 2024-12-21 | End: 2024-12-23

## 2024-12-21 RX ORDER — CARVEDILOL 25 MG/1
12.5 TABLET, FILM COATED ORAL EVERY 12 HOURS
Refills: 0 | Status: DISCONTINUED | OUTPATIENT
Start: 2024-12-21 | End: 2024-12-22

## 2024-12-21 RX ORDER — FUROSEMIDE 20 MG
40 TABLET ORAL ONCE
Refills: 0 | Status: COMPLETED | OUTPATIENT
Start: 2024-12-21 | End: 2024-12-21

## 2024-12-21 RX ORDER — ENOXAPARIN SODIUM 60 MG/.6ML
40 INJECTION INTRAVENOUS; SUBCUTANEOUS EVERY 24 HOURS
Refills: 0 | Status: DISCONTINUED | OUTPATIENT
Start: 2024-12-21 | End: 2024-12-23

## 2024-12-21 RX ORDER — ASPIRIN 81 MG
325 TABLET, DELAYED RELEASE (ENTERIC COATED) ORAL DAILY
Refills: 0 | Status: DISCONTINUED | OUTPATIENT
Start: 2024-12-21 | End: 2024-12-23

## 2024-12-21 RX ORDER — FUROSEMIDE 20 MG
20 TABLET ORAL
Refills: 0 | Status: DISCONTINUED | OUTPATIENT
Start: 2024-12-22 | End: 2024-12-23

## 2024-12-21 RX ORDER — FUROSEMIDE 20 MG
20 TABLET ORAL
Refills: 0 | Status: DISCONTINUED | OUTPATIENT
Start: 2024-12-21 | End: 2024-12-21

## 2024-12-21 RX ORDER — ATORVASTATIN CALCIUM 40 MG/1
20 TABLET, FILM COATED ORAL AT BEDTIME
Refills: 0 | Status: DISCONTINUED | OUTPATIENT
Start: 2024-12-21 | End: 2024-12-23

## 2024-12-21 RX ORDER — METOPROLOL TARTRATE 50 MG
50 TABLET ORAL
Refills: 0 | Status: DISCONTINUED | OUTPATIENT
Start: 2024-12-21 | End: 2024-12-21

## 2024-12-21 RX ORDER — GINKGO BILOBA 40 MG
3 CAPSULE ORAL AT BEDTIME
Refills: 0 | Status: DISCONTINUED | OUTPATIENT
Start: 2024-12-21 | End: 2024-12-23

## 2024-12-21 RX ORDER — FUROSEMIDE 20 MG
40 TABLET ORAL ONCE
Refills: 0 | Status: DISCONTINUED | OUTPATIENT
Start: 2024-12-21 | End: 2024-12-21

## 2024-12-21 RX ADMIN — ATORVASTATIN CALCIUM 20 MILLIGRAM(S): 40 TABLET, FILM COATED ORAL at 21:40

## 2024-12-21 RX ADMIN — CARVEDILOL 12.5 MILLIGRAM(S): 25 TABLET, FILM COATED ORAL at 18:00

## 2024-12-21 RX ADMIN — Medication 40 MILLIGRAM(S): at 18:00

## 2024-12-21 RX ADMIN — Medication 50 MILLIGRAM(S): at 06:00

## 2024-12-21 RX ADMIN — Medication 325 MILLIGRAM(S): at 14:15

## 2024-12-21 RX ADMIN — Medication 20 MILLIGRAM(S): at 06:00

## 2024-12-21 NOTE — PHYSICAL THERAPY INITIAL EVALUATION ADULT - PERTINENT HX OF CURRENT PROBLEM, REHAB EVAL
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

## 2024-12-21 NOTE — H&P ADULT - ASSESSMENT
Mr. 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

## 2024-12-21 NOTE — H&P ADULT - HISTORY OF PRESENT ILLNESS
HISTORY OF PRESENT ILLNESS & PAST MEDICAL HISTORY  Mr. 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    Presents with elevated blood pressure (over 200 at home) with associated lightheadedness X 4 days.  Patient states lightheadedness is worse when going from seated to standing, positional.  Patient reports left-sided headache 3 days ago which since resolved.  Patient reports left-sided chest pain which resolved 2 days ago.  Denies fever, shortness of breath, abdominal pain, N/V, focal numbness/weakness.  Patient additionally reports increased swelling to bilateral lower extremities, left worse than right.  Patient states he has been eating noncompliant diet.    VITALS & PHYSICAL EXAMINATION  Vitals in the ED  T(F): 98.1, Max: 98.1 (16:20)  HR: 60 (60 - 62)  BP: 189/95 (189/95 - 211/93)  RR: 18 (18 - 18)  SpO2: 98% (98% - 99%)    CONSTITUTIONAL: Pt in no acute distress laying on stretcher.  HEAD: Normocephalic; atraumatic.  EYES:, EOM intact; conjunctiva and sclera clear.  CARD: S1, S2 normal; no murmurs, gallops, or rubs. Regular rate and rhythm.  RESP: CTAB. No wheezes, rales or rhonchi.  ABD: soft; non-distended; non-tender; no hepatosplenomegaly.  MSK: Normal ROM. +b/l LE pitting edema, R > L, venous stasis skin changes with varicose veins.  NEURO: Alert, oriented. Grossly unremarkable. No focal deficits. Finger to nose intact b/l. Pronator negative.    LABS                           10.0   7.42  )-----------( 236    MCV 72.7                 32.5     138  |  102  |  14  ----------------------------( 100      AG: x   4.1   |  23  |  0.8    Ca: 8.5   Phos: x    Mg: x     Protein, Total: 6.9 / Albumin: 4.2 /  T. Bili 0.6 / D. Bili x  /  AST 18 /  ALT 10 /  ALP 86      IMAGING HISTORY OF PRESENT ILLNESS & PAST MEDICAL HISTORY  Mr. 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    Presents with elevated blood pressure (over 200 at home) with associated lightheadedness X 4 days.  Patient states lightheadedness is worse when going from seated to standing, positional.  Patient reports left-sided headache 3 days ago which since resolved.  Patient reports left-sided chest pain which resolved 2 days ago.  Denies fever, shortness of breath, abdominal pain, N/V, focal numbness/weakness.  Patient additionally reports increased swelling to bilateral lower extremities, left worse than right.  Patient states he has been eating noncompliant diet.    VITALS & PHYSICAL EXAMINATION  Vitals in the ED  T(F): 98.1, Max: 98.1 (16:20)  HR: 60 (60 - 62)  BP: 189/95 (189/95 - 211/93)  RR: 18 (18 - 18)  SpO2: 98% (98% - 99%)    CONSTITUTIONAL: Pt in no acute distress laying on stretcher.  HEAD: Normocephalic; atraumatic.  EYES:, EOM intact; conjunctiva and sclera clear.  CARD: S1, S2 normal; no murmurs, gallops, or rubs. Regular rate and rhythm.  RESP: CTAB. No wheezes, rales or rhonchi.  ABD: soft; non-distended; non-tender; no hepatosplenomegaly.  MSK: Normal ROM. +b/l LE pitting edema, R > L, venous stasis skin changes with varicose veins.  NEURO: Alert, oriented. Grossly unremarkable. No focal deficits. Finger to nose intact b/l. Pronator negative.    LABS                           10.0   7.42  )-----------( 236    MCV 72.7                 32.5     138  |  102  |  14  ----------------------------( 100      AG: x   4.1   |  23  |  0.8    Ca: 8.5   Phos: x    Mg: x     Protein, Total: 6.9 / Albumin: 4.2 /  T. Bili 0.6 / D. Bili x  /  AST 18 /  ALT 10 /  ALP 86    Trop 27 >> 27    BNP 1570      IMAGING  CT stroke protocol: No large vessel occlusion, aneurysm, vascular malformation. Focal severe atherosclerotic stenosis in the ostium of the right vertebral artery.  ECG paced rhythm

## 2024-12-21 NOTE — PHYSICAL THERAPY INITIAL EVALUATION ADULT - GAIT PATTERN USED, PT EVAL
3-point gait Colchicine Counseling:  Patient counseled regarding adverse effects including but not limited to stomach upset (nausea, vomiting, stomach pain, or diarrhea).  Patient instructed to limit alcohol consumption while taking this medication.  Colchicine may reduce blood counts especially with prolonged use.  The patient understands that monitoring of kidney function and blood counts may be required, especially at baseline. The patient verbalized understanding of the proper use and possible adverse effects of colchicine.  All of the patient's questions and concerns were addressed.

## 2024-12-21 NOTE — H&P ADULT - ATTENDING COMMENTS
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    #HTN Urgency  #LE edema, possibly mild acute on chronic HFimpEF  #Lightheadedness likely due to orthostatic hypotension  Endorses poor diet compliance  On RA  - Change metoprolol 50 BID to coreg 12.5 BID  - Cont PO lasix 20 BID  - PT Eval  - Repeat echo  - Cardio eval  - Compression stockings/abdominal binder    #Severe stenosis in right vertebral artery  #Severe stenosis right PCA  Neuro eval appreciated  - No neuro intervention warranted, pt sxs likely due to orthostatic hypotension  - Cont ASA/statin    #Chronic Afib s/p Watchman Off AC  #SSS s/p CRT-P  #HTN  #HLD  - Cont home meds    DVT prophylaxis: enoxaparin    #Progress Note Handoff  Pending (specify): Monitor BP, echo  Family discussion: dw pt regarding plan of care  Disposition: GMF, from home

## 2024-12-21 NOTE — ED PEDIATRIC NURSE NOTE - PATIENT'S PREFERRED PRONOUN
Date of Service: 07/28/2020    Dear Dr. Basurto:    I had the pleasure of seeing Amber in consult in the Allergy [facility].  She is here with a history that on 07/07/2020 she woke up around 4:00 a.m. with swelling and numbness of her lips and difficulty moving her lip.  Her throat appeared red and swollen.  Arms were developing 'bumps', so were her knees, which turned red and itchy.  She had some difficulty breathing and talking.  She applied a new Chapstick the previous night, called [place] lip gloss.  She also ate cereal with almonds the previous night.  She has never had a history of food allergy, including for nuts.  There was no other new product used on her.  She took Cetirizine which resolved her symptoms.  The jessica lip gloss ingredients are Paraffinum Liquidum (which is mineral oil), Hydrogenated Polyisobutene, Oleyl Alcohol, Parfum, flavoring, natural dried flowers.  She was treated for an allergic reaction.  She was discharged on EpiPen also.    Last night she ate chili and she developed mild lip swelling. Honey also is noticed to bother her.     There is no history of asthma.  There is no history of recurrent sinopulmonary infection, but there is a history of eczema.  She has tried Cetirizine which has been helpful in controlling her symptoms.    Amber mentions she has rhinorrhea, nasal congestion, itchy nose, sneezing, watery eyes,swollen eyes in the last 4 months. Cigarette smoke, perfume and temperature change can affect her nasal symptoms.  Also of note, the patient mentions some weight loss because she could not eat because her mouth was swollen.    She is currently working at the [employer].  She does not smoke.  She has a pet dog at home.  She lives in an apartment with electric heating, carpeted karson, foam bedding, house built over basement and basement is dry.  Basement smells musty.    REVIEW OF SYSTEMS:  Positive for fatigue, watery eyes, decreased smell, chest pain, shortness of  breath, eczema, anxiety.    PHYSICAL EXAMINATION:  HEENT:  Mild pallor of the nasal mucosa, with swelling.  No sinus tenderness.  Throat is clear.  CHEST:  Clear to auscultation.  CARDIAC:  Normal sinus rhythm.  ABDOMEN:  Soft, no masses.  SKIN:  No rashes.    Skin prick testing is placed for environmental allergens, which included 14 allergens.  Positive reaction seen to different kinds of tree pollen and a mild positive reaction seen to Aspergillus mold.  Rest of the panel reacted negatively.  Skin prick test is placed for almond, which reacted negatively.    IMPRESSION:  1.  Acute allergic reaction after applying a jessica lip gloss, with no prior history of urticaria or angioedema.  2.  Allergic rhinitis, which is seasonal.  Pollen allergy syndrome to be ruled out.    PLAN:  Discussed about avoidance measures regarding the positively tested allergens.  She may continue the Cetirizine as it is keeping her symptoms under control.  Avoid that jessica lip gloss in future. I do not think it is food allergy causing her reaction. I will do more extensive testing if symptoms persist and I would like to see her in followup in 3 weeks.    Thank you for involving me in her care.        Dictated By: Muna Martinez MD  Signing Provider: MD ASHLEE Wilks/anibal ([id number])  DD: 07/28/2020 12:26:31 TD: 07/28/2020 12:39:30    Copy Sent To:      Him/He

## 2024-12-21 NOTE — PHYSICAL THERAPY INITIAL EVALUATION ADULT - GENERAL OBSERVATIONS, REHAB EVAL
13:10-13:35. chart reviewed. Pt received semi-orta at B/S, alert, oriented, able to follow one step instructions and agreeable to PT evaluation. [place] need increase volume, + IV, denies pain or discomfort, -ve orthostatic. BP supine 164/68 HR 79, sitting 174/70 HR 75, standing 168/78 HR 76. NAD.

## 2024-12-21 NOTE — PATIENT PROFILE ADULT - FALL HARM RISK - RISK INTERVENTIONS

## 2024-12-21 NOTE — ED ADULT NURSE REASSESSMENT NOTE - NS ED NURSE REASSESS COMMENT FT1
Patient assessed, Aox4 in no acute distress- patient resting in bed comfortably, denies CP at this time

## 2024-12-21 NOTE — ED PEDIATRIC NURSE NOTE - HISTORY OF COVID-19 VACCINATION
ICD-10-CM    1. Acute recurrent maxillary sinusitis  J01.01 doxycycline hyclate (VIBRAMYCIN) 100 MG capsule      Will give extended course of antibiotics due to failure of treatment with Augmentin x 7 days.  If symptoms do not improve with this course she will follow-up with ear nose and throat.    Rest.  Fluids. Tylenol or ibuprofen as needed for fever or pain.  Recheck in 10 days if symptoms have not improved, sooner if they worsen.  Decongestant as needed.    Red flag warning signs and when to go to the emergency room discussed.  Reviewed potential adverse reactions to medications.    SUBJECTIVE:   Antoinette Deleon is a 54 year old female presenting with a chief complaint of   Chief Complaint   Patient presents with    Sinus Problem     Ongoing sinus infection. Pt was seen 10/23 got 7 days Augmentin. Still feeling sinus pressure and burning in eyes this time.   .    Review of systems is negative except for as noted in the HPI.    OBJECTIVE  /82 (BP Location: Right arm, Patient Position: Chair, Cuff Size: Adult Large)   Pulse 87   Temp 97.3  F (36.3  C) (Tympanic)   Wt 100.7 kg (222 lb)   SpO2 97%   BMI 31.40 kg/m        GENERAL: Alert, mild distress  SKIN: skin is clear, no rash or abnormal pigmentation  HEAD: The head is normocephalic.   EYES: The eyes are normal. The conjunctivae and cornea normal.   NECK: The neck is supple and thyroid is normal, no masses; LYMPH NODES: No adenopathy  HENT: Severe tenderness over the maxillary and ethmoid sinuses, tympanic membranes have small amount of fluid present, nasal passages are swollen, pharynx is slightly red  LUNGS: The lung fields are clear to auscultation, no rales, rhonchi, wheezing or retractions  CV: Rhythm is regular. S1 and S2 are normal. No murmurs.  EXTREMITIES: Symmetric extremities no deformities    Alyssa Henson APRN, CNP  [facility] Provider    The use of Dragon/Clear Books dictation services may have been used to construct the  content in this note; any grammatical or spelling errors are non-intentional. Please contact the author of this note directly if you are in need of any clarification.        Vaccine status unknown

## 2024-12-21 NOTE — PHYSICAL THERAPY INITIAL EVALUATION ADULT - ADDITIONAL COMMENTS
Pt resides with wife/son in a private house using 4 steps to enter, can stay in 1st floor with access to bed/shower. Pt used to be independent with BADLs able to ambulate using cane at baseline.

## 2024-12-21 NOTE — ED PEDIATRIC NURSE NOTE - NSICDXPASTMEDICALHX_GEN_ALL_CORE_FT
PAST MEDICAL HISTORY:  Atrial fibrillation     Breast cancer 2001    CAD (coronary artery disease)     Dyslipidemia     H/O cardiac murmur     Heart attack     Heart failure with improved ejection fraction (HFimpEF)     [place] (hard of hearing)     HTN (hypertension)     Osteoarthritis

## 2024-12-22 LAB
ALBUMIN SERPL ELPH-MCNC: 4.1 G/DL — SIGNIFICANT CHANGE UP (ref 3.5–5.2)
ALP SERPL-CCNC: 84 U/L — SIGNIFICANT CHANGE UP (ref 30–115)
ALT FLD-CCNC: 8 U/L — SIGNIFICANT CHANGE UP (ref 0–41)
ANION GAP SERPL CALC-SCNC: 9 MMOL/L — SIGNIFICANT CHANGE UP (ref 7–14)
AST SERPL-CCNC: 16 U/L — SIGNIFICANT CHANGE UP (ref 0–41)
BASOPHILS # BLD AUTO: 0.08 K/UL — SIGNIFICANT CHANGE UP (ref 0–0.2)
BASOPHILS NFR BLD AUTO: 1 % — SIGNIFICANT CHANGE UP (ref 0–1)
BILIRUB SERPL-MCNC: 0.8 MG/DL — SIGNIFICANT CHANGE UP (ref 0.2–1.2)
BUN SERPL-MCNC: 13 MG/DL — SIGNIFICANT CHANGE UP (ref 10–20)
CALCIUM SERPL-MCNC: 8.4 MG/DL — SIGNIFICANT CHANGE UP (ref 8.4–10.5)
CHLORIDE SERPL-SCNC: 102 MMOL/L — SIGNIFICANT CHANGE UP (ref 98–110)
CO2 SERPL-SCNC: 27 MMOL/L — SIGNIFICANT CHANGE UP (ref 17–32)
CREAT SERPL-MCNC: 0.7 MG/DL — SIGNIFICANT CHANGE UP (ref 0.7–1.5)
EGFR: 93 ML/MIN/1.73M2 — SIGNIFICANT CHANGE UP
EOSINOPHIL # BLD AUTO: 0.35 K/UL — SIGNIFICANT CHANGE UP (ref 0–0.7)
EOSINOPHIL NFR BLD AUTO: 4.3 % — SIGNIFICANT CHANGE UP (ref 0–8)
GLUCOSE SERPL-MCNC: 100 MG/DL — HIGH (ref 70–99)
HCT VFR BLD CALC: 33.2 % — LOW (ref 42–52)
HGB BLD-MCNC: 9.9 G/DL — LOW (ref 14–18)
IMM GRANULOCYTES NFR BLD AUTO: 0.5 % — HIGH (ref 0.1–0.3)
LYMPHOCYTES # BLD AUTO: 1.53 K/UL — SIGNIFICANT CHANGE UP (ref 1.2–3.4)
LYMPHOCYTES # BLD AUTO: 18.6 % — LOW (ref 20.5–51.1)
MAGNESIUM SERPL-MCNC: 2.3 MG/DL — SIGNIFICANT CHANGE UP (ref 1.8–2.4)
MCHC RBC-ENTMCNC: 22 PG — LOW (ref 27–31)
MCHC RBC-ENTMCNC: 29.8 G/DL — LOW (ref 32–37)
MCV RBC AUTO: 73.8 FL — LOW (ref 80–94)
MONOCYTES # BLD AUTO: 0.88 K/UL — HIGH (ref 0.1–0.6)
MONOCYTES NFR BLD AUTO: 10.7 % — HIGH (ref 1.7–9.3)
NEUTROPHILS # BLD AUTO: 5.33 K/UL — SIGNIFICANT CHANGE UP (ref 1.4–6.5)
NEUTROPHILS NFR BLD AUTO: 64.9 % — SIGNIFICANT CHANGE UP (ref 42.2–75.2)
NRBC # BLD: 0 /100 WBCS — SIGNIFICANT CHANGE UP (ref 0–0)
PHOSPHATE SERPL-MCNC: 3.3 MG/DL — SIGNIFICANT CHANGE UP (ref 2.1–4.9)
PLATELET # BLD AUTO: 224 K/UL — SIGNIFICANT CHANGE UP (ref 130–400)
PMV BLD: 10.4 FL — SIGNIFICANT CHANGE UP (ref 7.4–10.4)
POTASSIUM SERPL-MCNC: 3.7 MMOL/L — SIGNIFICANT CHANGE UP (ref 3.5–5)
POTASSIUM SERPL-SCNC: 3.7 MMOL/L — SIGNIFICANT CHANGE UP (ref 3.5–5)
PROT SERPL-MCNC: 6.5 G/DL — SIGNIFICANT CHANGE UP (ref 6–8)
RBC # BLD: 4.5 M/UL — LOW (ref 4.7–6.1)
RBC # FLD: 19.2 % — HIGH (ref 11.5–14.5)
SODIUM SERPL-SCNC: 138 MMOL/L — SIGNIFICANT CHANGE UP (ref 135–146)
WBC # BLD: 8.21 K/UL — SIGNIFICANT CHANGE UP (ref 4.8–10.8)
WBC # FLD AUTO: 8.21 K/UL — SIGNIFICANT CHANGE UP (ref 4.8–10.8)

## 2024-12-22 PROCEDURE — 99232 SBSQ HOSP IP/OBS MODERATE 35: CPT

## 2024-12-22 RX ORDER — CARVEDILOL 25 MG/1
12.5 TABLET, FILM COATED ORAL EVERY 12 HOURS
Refills: 0 | Status: DISCONTINUED | OUTPATIENT
Start: 2024-12-22 | End: 2024-12-22

## 2024-12-22 RX ORDER — LOSARTAN POTASSIUM 100 MG/1
25 TABLET, FILM COATED ORAL DAILY
Refills: 0 | Status: DISCONTINUED | OUTPATIENT
Start: 2024-12-22 | End: 2024-12-23

## 2024-12-22 RX ORDER — CARVEDILOL 25 MG/1
25 TABLET, FILM COATED ORAL EVERY 12 HOURS
Refills: 0 | Status: DISCONTINUED | OUTPATIENT
Start: 2024-12-22 | End: 2024-12-23

## 2024-12-22 RX ADMIN — LOSARTAN POTASSIUM 25 MILLIGRAM(S): 100 TABLET, FILM COATED ORAL at 10:15

## 2024-12-22 RX ADMIN — Medication 20 MILLIGRAM(S): at 05:17

## 2024-12-22 RX ADMIN — ATORVASTATIN CALCIUM 20 MILLIGRAM(S): 40 TABLET, FILM COATED ORAL at 21:36

## 2024-12-22 RX ADMIN — Medication 20 MILLIGRAM(S): at 13:50

## 2024-12-22 RX ADMIN — CARVEDILOL 12.5 MILLIGRAM(S): 25 TABLET, FILM COATED ORAL at 17:26

## 2024-12-22 RX ADMIN — ENOXAPARIN SODIUM 40 MILLIGRAM(S): 60 INJECTION INTRAVENOUS; SUBCUTANEOUS at 05:17

## 2024-12-22 RX ADMIN — Medication 325 MILLIGRAM(S): at 13:50

## 2024-12-22 NOTE — CONSULT NOTE ADULT - SUBJECTIVE AND OBJECTIVE BOX
Date of Admission:    CHIEF COMPLAINT:    HISTORY OF PRESENT ILLNESS: Mr. 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    Presents with elevated blood pressure (over 200 at home) with associated lightheadedness X 4 days.  Patient states lightheadedness is worse when going from seated to standing, positional.  Patient reports left-sided headache 3 days ago which since resolved.  Patient reports left-sided chest pain which resolved 2 days ago.  Denies fever, shortness of breath, abdominal pain, N/V, focal numbness/weakness.  Patient additionally reports increased swelling to bilateral lower extremities, left worse than right.  Patient states he has been eating noncompliant diet.PMH below presented to the hospital for     PAST MEDICAL & SURGICAL HISTORY:  HTN (hypertension)      Heart attack      Breast cancer  2001      Osteoarthritis      CAD (coronary artery disease)      [place] (hard of hearing)      Dyslipidemia      Heart failure with improved ejection fraction (HFimpEF)      H/O cardiac murmur      Atrial fibrillation      S/P CABG x 5 2014      H/O mastectomy  right 2001      Presence of biventricular cardiac pacemaker      Presence of Watchman left atrial appendage closure device        HEALTH ISSUES - PROBLEM Dx:        FAMILY HISTORY:  Family history of heart disease (Father, Mother)    Family history of lung cancer (Sibling)      Allergies    No Known Allergies    Intolerances    	  Home Medications:  aspirin 325 mg oral tablet: 1 tab(s) orally once a day (21 Dec 2024 03:37)  atorvastatin 20 mg oral tablet: 1 tab(s) orally once a day (at bedtime) (21 Dec 2024 03:37)  furosemide 20 mg oral tablet: 1 tab(s) orally 2 times a day (21 Dec 2024 03:37)  metoprolol tartrate 50 mg oral tablet: 1 tab(s) orally 2 times a day (21 Dec 2024 03:37)    MEDICATIONS  (STANDING):  aspirin 325 milliGRAM(s) Oral daily  atorvastatin 20 milliGRAM(s) Oral at bedtime  carvedilol 12.5 milliGRAM(s) Oral every 12 hours  enoxaparin Injectable 40 milliGRAM(s) SubCutaneous every 24 hours  furosemide    Tablet 20 milliGRAM(s) Oral two times a day  losartan 25 milliGRAM(s) Oral daily    MEDICATIONS  (PRN):  acetaminophen     Tablet .. 650 milliGRAM(s) Oral every 6 hours PRN Temp greater or equal to 38C (100.4F), Mild Pain (1 - 3)  aluminum hydroxide/magnesium hydroxide/simethicone Suspension 30 milliLiter(s) Oral every 4 hours PRN Dyspepsia  melatonin 3 milliGRAM(s) Oral at bedtime PRN Insomnia      REVIEW OF SYSTEMS:  CONSTITUTIONAL: No fever, weight loss, or fatigue  CARDIOLOGY: PAtient denies chest pain, shortness of breath or syncopal episodes.   RESPIRATORY: denies shortness of breath, wheezing.   NEUROLOGICAL: NO weakness, no focal deficits to report.   GI: no BRBPR, no Vomiting, no diarrhea.         PHYSICAL EXAM:  T(C): 36.7 (12-22-24 @ 04:57), Max: 36.8 (12-21-24 @ 15:22)  HR: 60 (12-22-24 @ 10:18) (59 - 60)  BP: 155/67 (12-22-24 @ 10:18) (153/73 - 182/69)  RR: 18 (12-22-24 @ 10:18) (18 - 18)  SpO2: 97% (12-22-24 @ 04:57) (97% - 98%)  Wt(kg): --  I&O's Summary    21 Dec 2024 07:01  -  22 Dec 2024 07:00  --------------------------------------------------------  IN: 200 mL / OUT: 800 mL / NET: -600 mL      Daily     Daily     General Appearance: Normal	  Cardiovascular: Normal S1 S2, No JVD, No murmurs, No edema  Respiratory: Lungs clear to auscultation	  Psychiatry: A & O x 3, Mood & affect appropriate  Gastrointestinal:  Soft, Non-tender  Skin: No rashes, No ecchymoses, No cyanosis	  Neurologic: Non-focal  Extremities: Normal range of motion, No clubbing, cyanosis or edema  Vascular: Peripheral pulses palpable 2+ bilaterally        LABS:	 	                          9.9    8.21  )-----------( 224      ( 22 Dec 2024 09:01 )             33.2     12-22    138  |  102  |  13  ----------------------------<  100[H]  3.7   |  27  |  0.7    Ca    8.4      22 Dec 2024 09:01  Phos  3.3     12-22  Mg     2.3     12-22    TPro  6.5  /  Alb  4.1  /  TBili  0.8  /  DBili  x   /  AST  16  /  ALT  8   /  AlkPhos  84  12-22    ASSESSMENT/PLAN: 	    1) CAD (s/p CABG),       Stable      Continue present treatment.    2) HFimpEF ( EF 50-55% in Nov 2023 )      Agree with Changing metoprolol to carvedilol and adding losartan to control BP optimally.     Continue lasix.     3)Persistent atrial fibrillation s/p watchman procedure ( off AC ),     Metoprolol may be better for rate control.     We can try Carvedilol for now.    4) SSS (s/p CRT-P)      Stable.    He is stable cardiac point of view and follow up as out patient.

## 2024-12-22 NOTE — PROGRESS NOTE ADULT - SUBJECTIVE AND OBJECTIVE BOX
WAGNER SEGURA  79y, Male  Allergy: No Known Allergies    Hospital Day: 2d    Patient seen and examined. No acute events overnight    PMH/PSH:  PAST MEDICAL & SURGICAL HISTORY:  HTN (hypertension)      Heart attack      Breast cancer  2001      Osteoarthritis      CAD (coronary artery disease)      [place] (hard of hearing)      Dyslipidemia      Heart failure with improved ejection fraction (HFimpEF)      H/O cardiac murmur      Atrial fibrillation      S/P CABG x 5  2014      H/O mastectomy  right 2001      Presence of biventricular cardiac pacemaker      Presence of Watchman left atrial appendage closure device          VITALS:  T(F): 98.1 (12-22-24 @ 04:57), Max: 98.3 (12-21-24 @ 15:22)  HR: 60 (12-22-24 @ 10:18)  BP: 155/67 (12-22-24 @ 10:18) (153/73 - 182/69)  RR: 18 (12-22-24 @ 10:18)  SpO2: 97% (12-22-24 @ 04:57)    TESTS & MEASUREMENTS:  Weight (Kg): 106.6 (12-20-24 @ 16:20)  BMI (kg/m2): 29.4 (12-20)    12-21-24 @ 07:01  -  12-22-24 @ 07:00  --------------------------------------------------------  IN: 200 mL / OUT: 800 mL / NET: -600 mL                            9.9    8.21  )-----------( 224      ( 22 Dec 2024 09:01 )             33.2       12-22    138  |  102  |  13  ----------------------------<  100[H]  3.7   |  27  |  0.7    Ca    8.4      22 Dec 2024 09:01  Phos  3.3     12-22  Mg     2.3     12-22    TPro  6.5  /  Alb  4.1  /  TBili  0.8  /  DBili  x   /  AST  16  /  ALT  8   /  AlkPhos  84  12-22    LIVER FUNCTIONS - ( 22 Dec 2024 09:01 )  Alb: 4.1 g/dL / Pro: 6.5 g/dL / ALK PHOS: 84 U/L / ALT: 8 U/L / AST: 16 U/L / GGT: x                 Urinalysis Basic - ( 22 Dec 2024 09:01 )    Color: x / Appearance: x / SG: x / pH: x  Gluc: 100 mg/dL / Ketone: x  / Bili: x / Urobili: x   Blood: x / Protein: x / Nitrite: x   Leuk Esterase: x / RBC: x / WBC x   Sq Epi: x / Non Sq Epi: x / Bacteria: x        RADIOLOGY & ADDITIONAL TESTS:    RECENT DIAGNOSTIC ORDERS:      MEDICATIONS:  MEDICATIONS  (STANDING):  aspirin 325 milliGRAM(s) Oral daily  atorvastatin 20 milliGRAM(s) Oral at bedtime  carvedilol 12.5 milliGRAM(s) Oral every 12 hours  enoxaparin Injectable 40 milliGRAM(s) SubCutaneous every 24 hours  furosemide    Tablet 20 milliGRAM(s) Oral two times a day  losartan 25 milliGRAM(s) Oral daily    MEDICATIONS  (PRN):  acetaminophen     Tablet .. 650 milliGRAM(s) Oral every 6 hours PRN Temp greater or equal to 38C (100.4F), Mild Pain (1 - 3)  aluminum hydroxide/magnesium hydroxide/simethicone Suspension 30 milliLiter(s) Oral every 4 hours PRN Dyspepsia  melatonin 3 milliGRAM(s) Oral at bedtime PRN Insomnia      HOME MEDICATIONS:  aspirin 325 mg oral tablet (12-21)  atorvastatin 20 mg oral tablet (12-21)  furosemide 20 mg oral tablet (12-21)  metoprolol tartrate 50 mg oral tablet (12-21)      REVIEW OF SYSTEMS:  All other review of systems is negative unless indicated above.     PHYSICAL EXAM:  PHYSICAL EXAM:  GENERAL: NAD  HEAD:  Atraumatic, Normocephalic  NECK: Supple, No JVD  CHEST/LUNG: Clear to auscultation bilaterally; No wheeze  HEART: Regular rate and rhythm; No murmurs, rubs, or gallops  ABDOMEN: Soft, Nontender, Nondistended; Bowel sounds present  EXTREMITIES:  2+ Peripheral Pulses, No clubbing, cyanosis, or edema  SKIN: No rashes or lesions

## 2024-12-22 NOTE — PROGRESS NOTE ADULT - CONVERSATION DETAILS
GOC discussed with patient given multiple comorbidities. He would like to remain full code at this time. He hasn't thought about DNR/DNI. Informed him of CPR and mechanical ventilation when indicated. He will discuss with his wife for the future.

## 2024-12-22 NOTE — PROGRESS NOTE ADULT - ASSESSMENT
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    #HTN Urgency  #LE edema, possibly mild acute on chronic HFimpEF  #Lightheadedness likely due to orthostatic hypotension  Endorses poor diet compliance  On RA  Echo noted 52% EF, possible impaired RV function  sp 1 dose IV lasix  Cardio eval appreciated  BP improving. Pt endorses feeling better today. No dizziness while working with PT  - Changed metoprolol 50 BID to coreg 12.5 BID  - Cont PO lasix 20 BID  - Start losartan 25mg qD  - DC planning 24h, pt needs a new walker. 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

## 2024-12-23 ENCOUNTER — TRANSCRIPTION ENCOUNTER (OUTPATIENT)
Age: 79
End: 2024-12-23

## 2024-12-23 VITALS — SYSTOLIC BLOOD PRESSURE: 173 MMHG | HEART RATE: 60 BPM | DIASTOLIC BLOOD PRESSURE: 69 MMHG

## 2024-12-23 LAB
ALBUMIN SERPL ELPH-MCNC: 3.8 G/DL — SIGNIFICANT CHANGE UP (ref 3.5–5.2)
ALP SERPL-CCNC: 79 U/L — SIGNIFICANT CHANGE UP (ref 30–115)
ALT FLD-CCNC: 7 U/L — SIGNIFICANT CHANGE UP (ref 0–41)
ANION GAP SERPL CALC-SCNC: 12 MMOL/L — SIGNIFICANT CHANGE UP (ref 7–14)
AST SERPL-CCNC: 15 U/L — SIGNIFICANT CHANGE UP (ref 0–41)
BASOPHILS # BLD AUTO: 0.07 K/UL — SIGNIFICANT CHANGE UP (ref 0–0.2)
BASOPHILS NFR BLD AUTO: 0.8 % — SIGNIFICANT CHANGE UP (ref 0–1)
BILIRUB SERPL-MCNC: 0.8 MG/DL — SIGNIFICANT CHANGE UP (ref 0.2–1.2)
BUN SERPL-MCNC: 14 MG/DL — SIGNIFICANT CHANGE UP (ref 10–20)
CALCIUM SERPL-MCNC: 8.5 MG/DL — SIGNIFICANT CHANGE UP (ref 8.4–10.4)
CHLORIDE SERPL-SCNC: 102 MMOL/L — SIGNIFICANT CHANGE UP (ref 98–110)
CO2 SERPL-SCNC: 25 MMOL/L — SIGNIFICANT CHANGE UP (ref 17–32)
CREAT SERPL-MCNC: 0.6 MG/DL — LOW (ref 0.7–1.5)
EGFR: 98 ML/MIN/1.73M2 — SIGNIFICANT CHANGE UP
EOSINOPHIL # BLD AUTO: 0.38 K/UL — SIGNIFICANT CHANGE UP (ref 0–0.7)
EOSINOPHIL NFR BLD AUTO: 4.4 % — SIGNIFICANT CHANGE UP (ref 0–8)
GLUCOSE SERPL-MCNC: 102 MG/DL — HIGH (ref 70–99)
HCT VFR BLD CALC: 31.9 % — LOW (ref 42–52)
HGB BLD-MCNC: 9.9 G/DL — LOW (ref 14–18)
IMM GRANULOCYTES NFR BLD AUTO: 0.2 % — SIGNIFICANT CHANGE UP (ref 0.1–0.3)
LYMPHOCYTES # BLD AUTO: 1.48 K/UL — SIGNIFICANT CHANGE UP (ref 1.2–3.4)
LYMPHOCYTES # BLD AUTO: 17 % — LOW (ref 20.5–51.1)
MAGNESIUM SERPL-MCNC: 2.2 MG/DL — SIGNIFICANT CHANGE UP (ref 1.8–2.4)
MCHC RBC-ENTMCNC: 22.3 PG — LOW (ref 27–31)
MCHC RBC-ENTMCNC: 31 G/DL — LOW (ref 32–37)
MCV RBC AUTO: 71.8 FL — LOW (ref 80–94)
MONOCYTES # BLD AUTO: 0.8 K/UL — HIGH (ref 0.1–0.6)
MONOCYTES NFR BLD AUTO: 9.2 % — SIGNIFICANT CHANGE UP (ref 1.7–9.3)
NEUTROPHILS # BLD AUTO: 5.95 K/UL — SIGNIFICANT CHANGE UP (ref 1.4–6.5)
NEUTROPHILS NFR BLD AUTO: 68.4 % — SIGNIFICANT CHANGE UP (ref 42.2–75.2)
NRBC # BLD: 0 /100 WBCS — SIGNIFICANT CHANGE UP (ref 0–0)
PHOSPHATE SERPL-MCNC: 3.9 MG/DL — SIGNIFICANT CHANGE UP (ref 2.1–4.9)
PLATELET # BLD AUTO: 236 K/UL — SIGNIFICANT CHANGE UP (ref 130–400)
PMV BLD: 10.1 FL — SIGNIFICANT CHANGE UP (ref 7.4–10.4)
POTASSIUM SERPL-MCNC: 3.7 MMOL/L — SIGNIFICANT CHANGE UP (ref 3.5–5)
POTASSIUM SERPL-SCNC: 3.7 MMOL/L — SIGNIFICANT CHANGE UP (ref 3.5–5)
PROT SERPL-MCNC: 6.3 G/DL — SIGNIFICANT CHANGE UP (ref 6–8)
RBC # BLD: 4.44 M/UL — LOW (ref 4.7–6.1)
RBC # FLD: 18.8 % — HIGH (ref 11.5–14.5)
SODIUM SERPL-SCNC: 139 MMOL/L — SIGNIFICANT CHANGE UP (ref 135–146)
WBC # BLD: 8.7 K/UL — SIGNIFICANT CHANGE UP (ref 4.8–10.8)
WBC # FLD AUTO: 8.7 K/UL — SIGNIFICANT CHANGE UP (ref 4.8–10.8)

## 2024-12-23 PROCEDURE — 99239 HOSP IP/OBS DSCHRG MGMT >30: CPT

## 2024-12-23 RX ORDER — DIAZEPAM 5 MG
2 TABLET ORAL
Refills: 0 | Status: DISCONTINUED | OUTPATIENT
Start: 2024-12-23 | End: 2024-12-23

## 2024-12-23 RX ORDER — CHLORHEXIDINE GLUCONATE 1.2 MG/ML
1 RINSE ORAL
Refills: 0 | Status: DISCONTINUED | OUTPATIENT
Start: 2024-12-23 | End: 2024-12-23

## 2024-12-23 RX ORDER — CARVEDILOL 25 MG/1
1 TABLET, FILM COATED ORAL
Qty: 180 | Refills: 0
Start: 2024-12-23 | End: 2025-03-22

## 2024-12-23 RX ORDER — LOSARTAN POTASSIUM 100 MG/1
1 TABLET, FILM COATED ORAL
Qty: 90 | Refills: 0
Start: 2024-12-23 | End: 2025-03-22

## 2024-12-23 RX ADMIN — CHLORHEXIDINE GLUCONATE 1 APPLICATION(S): 1.2 RINSE ORAL at 13:06

## 2024-12-23 RX ADMIN — Medication 20 MILLIGRAM(S): at 05:09

## 2024-12-23 RX ADMIN — CARVEDILOL 25 MILLIGRAM(S): 25 TABLET, FILM COATED ORAL at 05:09

## 2024-12-23 RX ADMIN — Medication 325 MILLIGRAM(S): at 12:54

## 2024-12-23 RX ADMIN — CARVEDILOL 25 MILLIGRAM(S): 25 TABLET, FILM COATED ORAL at 17:51

## 2024-12-23 RX ADMIN — Medication 20 MILLIGRAM(S): at 13:02

## 2024-12-23 RX ADMIN — LOSARTAN POTASSIUM 25 MILLIGRAM(S): 100 TABLET, FILM COATED ORAL at 05:09

## 2024-12-23 RX ADMIN — ENOXAPARIN SODIUM 40 MILLIGRAM(S): 60 INJECTION INTRAVENOUS; SUBCUTANEOUS at 05:08

## 2024-12-23 NOTE — ADVANCED PRACTICE NURSE CONSULT - ASSESSMENT
History of Present Illness:   HISTORY OF PRESENT ILLNESS & PAST MEDICAL HISTORY  Mr. 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    Presents with elevated blood pressure (over 200 at home) with associated lightheadedness X 4 days.  Patient states lightheadedness is worse when going from seated to standing, positional.  Patient reports left-sided headache 3 days ago which since resolved.  Patient reports left-sided chest pain which resolved 2 days ago.  Denies fever, shortness of breath, abdominal pain, N/V, focal numbness/weakness.  Patient additionally reports increased swelling to bilateral lower extremities, left worse than right.  Patient states he has been eating noncompliant diet.       Allergies:  	No Known Allergies:     Home Medications:   * Patient Currently Takes Medications as of 15-May-2024 12:31 documented in Structured Notes  •?	Percocet 5 mg-325 mg oral tablet: 1 tab(s) orally every 8 hours as needed for  severe pain MDD: 3 tablets  •?	metroNIDAZOLE 500 mg oral tablet: 1 tab(s) orally 2 times a day  •?	cefpodoxime 200 mg oral tablet: 1 tab(s) orally 2 times a day  •?	atorvastatin 20 mg oral tablet: 1 tab(s) orally once a day (at bedtime)  •?	aspirin 325 mg oral tablet: 1 tab(s) orally once a day  •?	metoprolol tartrate 50 mg oral tablet: 1 tab(s) orally 2 times a day  •?	furosemide 20 mg oral tablet: 1 tab(s) orally 2 times a day    Patient received in bed, limited mobility, high risk for pressure injury development or progression.    Wound – Sacrum DTI entered in error – inappropriate documentation   Type & Location: Friction wound to left buttock  Tissue Description: Purple, blue in color   Wound Exudate: None   Wound Edge: intact  Gini wound Condition: intact      Bilateral heels intact at time of assessment.

## 2024-12-23 NOTE — DIETITIAN INITIAL EVALUATION ADULT - PERTINENT MEDS FT
MEDICATIONS  (STANDING):  aspirin 325 milliGRAM(s) Oral daily  atorvastatin 20 milliGRAM(s) Oral at bedtime  carvedilol 25 milliGRAM(s) Oral every 12 hours  chlorhexidine 2% Cloths 1 Application(s) Topical <User Schedule>  enoxaparin Injectable 40 milliGRAM(s) SubCutaneous every 24 hours  furosemide    Tablet 20 milliGRAM(s) Oral two times a day  losartan 25 milliGRAM(s) Oral daily    MEDICATIONS  (PRN):  acetaminophen     Tablet .. 650 milliGRAM(s) Oral every 6 hours PRN Temp greater or equal to 38C (100.4F), Mild Pain (1 - 3)  aluminum hydroxide/magnesium hydroxide/simethicone Suspension 30 milliLiter(s) Oral every 4 hours PRN Dyspepsia  diazepam    Tablet 2 milliGRAM(s) Oral two times a day PRN anxiety  melatonin 3 milliGRAM(s) Oral at bedtime PRN Insomnia

## 2024-12-23 NOTE — CHART NOTE - NSCHARTNOTEFT_GEN_A_CORE
LETTER OF MEDICAL NECESSITY  Patient is a 79-year-old male with a history of CAD s/p CABG, HFpEF, chronic atrial fibrillation s/p Watchman procedure (not on anticoagulation), sick sinus syndrome s/p CRT-P placement, hypertension, and dyslipidemia. The pt has a mobility limitation that significantly impairs the pts ability to participate in one or more MRADLs such as toileting, eating, dressing, and bathing in customary locations in the home. The pts home provides adequate access between rooms for the use of therolling walker.  The rolling walker will significantly improve the pts ability to participate in MRADLs and will be used on a regular basis in the home.

## 2024-12-23 NOTE — DIETITIAN INITIAL EVALUATION ADULT - NS FNS DIET ORDER
Diet, Regular:   DASH/TLC {Sodium & Cholesterol Restricted} (DASH)  2000mL Fluid Restriction ([id number]) (12-21-24 @ 03:40) [Active]

## 2024-12-23 NOTE — DISCHARGE NOTE PROVIDER - NSDCCPCAREPLAN_GEN_ALL_CORE_FT
PRINCIPAL DISCHARGE DIAGNOSIS  Diagnosis: Severe uncontrolled hypertension  Assessment and Plan of Treatment: You came to the hospital because you were experiencing high blood pressure, lightheadedness, headaches, and chest pain for the past few days. We did a CT scan of your head to make sure there were no serious problems, and it thankfully didn't show anything to worry about immediately, although it did show some minor changes from your medical history. We also did a scan of the blood vessels in your head and neck, which showed some narrowing.  The lightheadedness was due to your blood pressure dropping when you changed positions. We gave you medication through your IV to help with the swelling and started you on a new blood pressure medication. We also switched your metoprolol to carvedilol. We gave you compression stockings and an abdominal binder to help with the swelling in your legs. The neurology team evaluated you and determined that you don't need any procedures for the narrowed blood vessels in your head and neck right now. We continued your regular aspirin and cholesterol medication. You're feeling much better now, and your blood pressure is improving. You're ready to go home once we get you a new walker.      SECONDARY DISCHARGE DIAGNOSES  Diagnosis: Severe hypertension  Assessment and Plan of Treatment:     Diagnosis: Dizziness  Assessment and Plan of Treatment:

## 2024-12-23 NOTE — DIETITIAN INITIAL EVALUATION ADULT - NAME AND PHONE
Roxie [id number] or TEAMS    Nutrition Interventions: Meals, snacks, supplements; Nutrition Monitoring: Diet order, PO intake, weights, labs, NFPF, body composition, BM and tolerance to medical food supplements

## 2024-12-23 NOTE — DIETITIAN INITIAL EVALUATION ADULT - OTHER INFO
Pertinent Medical Information: Per H&P, pt is a 79-year-old male PMH CAD (s/p CABG), HFimpEF ( EF 50-55% in Nov 2023 ), chronic afib s/p watchman procedure ( off AC ), SSS (s/p CRT-P), cholecystectomy, HTN, and DLD. Presents of lightheadedness/dizziness.  # HTN Urgency  - Lightheadedness likely due to orthostatic hypotension  - BP improving  # LE edema, possibly mild acute on chronic HFimpEF    Pertinent Subjective Information: Pt reports having a good appetite; consumed >75% of breakfast today. Tolerating diet texture/consistency well. No nausea or vomiting reported.     Weight hx: #/100 KG -- checked May 2024. Current dosing weight is 106.6 KG. 6% unintentional weight gain in over 7 months compared to current dosing weight. Pt does not meet weight criteria for malnutrition at this time.

## 2024-12-23 NOTE — DISCHARGE NOTE PROVIDER - CARE PROVIDER_API CALL
Trevon Champagne J  Internal Medicine  [address] [facility] [address] [facility] [address]  [place] [address]  Phone: [phone]  Fax: [phone]  Established Patient  Follow Up Time: 2 weeks    Ghulam Torres  Neurology  [address] [place] [address], [address]  [place] [address]  Phone: [phone]  Fax: [phone]  Follow Up Time: 2 weeks

## 2024-12-23 NOTE — DISCHARGE NOTE NURSING/CASE MANAGEMENT/SOCIAL WORK - NSDCVIVACCINE_GEN_ALL_CORE_FT
Tdap; 22-Nov-2018 08:52; Benito Lovell); [employer]; [id number] (Exp. Date: 23-Oct-2020); IntraMuscular; Deltoid Right.; 0.5 milliLiter(s); VIS (VIS Published: 09-May-2013, VIS Presented: 22-Nov-2018);

## 2024-12-23 NOTE — DIETITIAN INITIAL EVALUATION ADULT - ORAL INTAKE PTA/DIET HISTORY
Addended by: LENCHO VILLALPANDO on: 10/16/2024 12:58 PM     Modules accepted: Level of Service    
Pt reports having a good appetite prior to admit; consumed 3 meals daily. Denies taking vitamin or mineral supplements. Denies drinking protein shake supplements. NKFA; denies any restrictive cultural or Christian food preferences. No chewing or swallowing difficulties noted with regular textured food.

## 2024-12-23 NOTE — DIETITIAN INITIAL EVALUATION ADULT - OTHER CALCULATIONS
Using ABW: ENERGY: 2104-5064 kcal/day (MSJ 1866.959*1-1.1 SF); PROTEIN: 117-138 g/day (1.1-1.3 g/kg); FLUID: 2000 mL fluid restriction -- with consideration for age, BMI, SDTI

## 2024-12-23 NOTE — DIETITIAN INITIAL EVALUATION ADULT - WEIGHT FOR BMI (LBS)
For part of this note, THEODORE Rowe acted as a scribe for Dr. Tam under his direct supervision and review.    Date of Injury: 6 months ago  Initial Treatment date: 3/15/2022  Date last seen: 7/25/2022  Mechanism: other  Occupation: cook- [employer]  Referred by: Alesia Laguerre NP    SUBJECTIVE:     The patient is a pleasant 20 year old female that presents to our office today for care and treatment of low back pain. Patient rates her pain today at 3/10 with 10 being the worst. Aysha states she feels a \"knot\" midline between her shoulder blades.  Notices the pain more during the day than at night.                OBJECTIVE FINDINGS:   Problem focus examination revealed:    Thoracic Spine Thoracic spine facet joint function is within normal limits except for her T8, T10 and T12 facet joints that exhibited limited passive range of motion and her segmental restriction with tenderness upon palpation. and Lumbar Spine Lumbar spine facet joint function is within normal limits except for her L1, L3 and L5 facet joints that exhibited limited passive range of motion and segmental restriction with tenderness upon palpation. SI joint function is within normal limits except for her right SI joint and left SI joint joint that exhibited limited passive range of motion and joint restrictions. The following muscles were examined for flexibility and tone at rest: left hip flexor muscle, right hip flexor muscle, left piriformis muscle, right piriformis muscle, left quadratus lumborum muscle, right quadratus lumborum muscle, left tensor fasciae latae muscle and right tensor fasciae latae muscle. These muscles were found to be within normal limits except for her left hip flexor muscle, right hip flexor muscle, left piriformis muscle, right piriformis muscle, left hamstring muscle, right hamstring muscle, left quadratus lumborum muscle and right quadratus lumborum muscle that exhibited limited flexibility and were hypertonic  at rest.    ORTHOPEDIC/NEUROLOGICAL TESTS:     Tenderness is noted over: Left Lumbar paraspinal muscles, Right Lumbar paraspinal muscles, Left Thoracolumbar paraspinal muscles and Right Thoracolumbar paraspinal muscles    Assessment:    Thoracic region somatic dysfunction, Lumbar region somatic dysfunction, Lumbar facet joint pain and Pelvic somatic dysfunction    Complicating factors:  deconditioning     Plan:    Patient was evaluated and then treated with manipulation to her Thoracic Spine, Lumbar Spine, Right S.I. Joint and Left S.I. Joint via diversified manipulation technique/Espinoza distraction technique to improve function and passive range of motion of facet joints. Patient also treated with contract/relax stretch to muscle noted as taut in objective findings to improve flexibility and decrease strain to spinal structures. Patient also treated with lumbar distraction times 5 minutes to stretch lumbar paraspinal muscle and centralize possible contained migration of lumbar intervertebral nucleus.    Patient recommended to apply ice for 15-20 minutes, 3-4x/day to reduce pain and inflammation.     Discussed and demonstrated self myofascial release over thoracic spine paraspinal muscles, anterior chest stretches to be performed throughout the day.    Rehabilitation/Modalities:  None performed    Goal of care is to improve muscular and skeletal function and provide symptom relief. Patient responded as expected to the treatment today. Patient is to return in4 week(s) for continued care and treatment.      Total treatment time was 15 minutes.    The documentation recorded by the scribe accurately and completely reflects the service(s) I personally performed and the decisions made by me.       891

## 2024-12-23 NOTE — DISCHARGE NOTE NURSING/CASE MANAGEMENT/SOCIAL WORK - PATIENT PORTAL LINK FT
You can access the FollowMyHealth Patient Portal offered by [facility] by registering at the following website: [email or web address]. By joining [facility]’s FollowMyHealth portal, you will also be able to view your health information using other applications (apps) compatible with our system.

## 2024-12-23 NOTE — DIETITIAN INITIAL EVALUATION ADULT - ADD RECOMMEND
1. ADD Prosource Gelatein Plus 2X/DAILY -- provides 320 kcal, 40g pro total  2. Continue with current diet order  3. Encourage PO intake     Low risk f/u

## 2024-12-23 NOTE — DIETITIAN INITIAL EVALUATION ADULT - NSFNSPHYEXAMSKINFT_GEN_A_CORE
Pressure Injury 1: Right:, sacrum, Suspected deep tissue injury    No wound care RN note documented this admit.

## 2024-12-23 NOTE — DISCHARGE NOTE PROVIDER - NSDCFUSCHEDAPPT_GEN_ALL_CORE_FT
Unknown, Doctor  [facility] PreAdmits  Scheduled Appointment: 01/11/2025    [facility] Physician [facility]  CATSCAN SI [address]  Scheduled Appointment: 01/11/2025    [facility]  CARDIOLOGY [address]  Scheduled Appointment: 02/10/2025

## 2024-12-23 NOTE — ADVANCED PRACTICE NURSE CONSULT - RECOMMEDATIONS
Cleanse wound with soap and water, pat dry.  Apply moisture barrier cream twice daily PRN for soiling - leave open to air.   Skin and incontinence care.  Pressure Injury Prevention.  Assess wound daily and inform primary provider of any changes.   Case discussed with primary RN.  Wound/ ostomy specialist to f/u as needed.   Other recommendations per Primary Team.  Simple: Patient demonstrates quick and easy understanding

## 2024-12-23 NOTE — DISCHARGE NOTE NURSING/CASE MANAGEMENT/SOCIAL WORK - FINANCIAL ASSISTANCE
[facility] provides services at a reduced cost to those who are determined to be eligible through [facility]’s financial assistance program. Information regarding [facility]’s financial assistance program can be found by going to [email or web address] or by calling [phone].

## 2024-12-23 NOTE — DIETITIAN INITIAL EVALUATION ADULT - NSICDXPASTMEDICALHX_GEN_ALL_CORE_FT
PAST MEDICAL HISTORY:  Atrial fibrillation     Breast cancer 2001    CAD (coronary artery disease)     Dyslipidemia     H/O cardiac murmur     Heart attack     Heart failure with improved ejection fraction (HFimpEF)     [place] (hard of hearing)     HTN (hypertension)     Osteoarthritis

## 2024-12-23 NOTE — DISCHARGE NOTE PROVIDER - CARE PROVIDERS DIRECT ADDRESSES
[email or web address]

## 2024-12-23 NOTE — DIETITIAN INITIAL EVALUATION ADULT - PERTINENT LABORATORY DATA
12-23    139  |  102  |  14  ----------------------------<  102[H]  3.7   |  25  |  0.6[L]    Ca    8.5      23 Dec 2024 08:11  Phos  3.9     12-23  Mg     2.2     12-23    TPro  6.3  /  Alb  3.8  /  TBili  0.8  /  DBili  x   /  AST  15  /  ALT  7   /  AlkPhos  79  12-23

## 2024-12-23 NOTE — DISCHARGE NOTE PROVIDER - NSDCMRMEDTOKEN_GEN_ALL_CORE_FT
aspirin 325 mg oral tablet: 1 tab(s) orally once a day  atorvastatin 20 mg oral tablet: 1 tab(s) orally once a day (at bedtime)  furosemide 20 mg oral tablet: 1 tab(s) orally 2 times a day  metoprolol tartrate 50 mg oral tablet: 1 tab(s) orally 2 times a day   aspirin 325 mg oral tablet: 1 tab(s) orally once a day  atorvastatin 20 mg oral tablet: 1 tab(s) orally once a day (at bedtime)  carvedilol 25 mg oral tablet: 1 tab(s) orally every 12 hours  furosemide 20 mg oral tablet: 1 tab(s) orally 2 times a day  losartan 25 mg oral tablet: 1 tab(s) orally once a day

## 2024-12-23 NOTE — DISCHARGE NOTE PROVIDER - HOSPITAL COURSE
This 79-year-old male with a history of CAD s/p CABG, HFpEF, chronic atrial fibrillation s/p Watchman procedure (not on anticoagulation), sick sinus syndrome s/p CRT-P placement, hypertension, and dyslipidemia presented with a four-day history of elevated blood pressure, lightheadedness (worse with positional changes), headache, and chest pain. He also reported increased bilateral lower extremity edema and acknowledged non-compliance with his diet.  CT of the head was negative for acute intracranial pathology but showed chronic microvascular ischemic changes.  CTA of the head and neck revealed moderate stenosis of the left ICA, severe stenosis of the right vertebral artery ostium, and severe stenosis of the right P2 PCA.  Echocardiogram showed an ejection fraction of 52% with possible impaired right ventricular function.  His symptoms were attributed to orthostatic hypotension and possibly mild acute on chronic HFpEF exacerbation.  He received a single dose of intravenous furosemide and was started on losartan.  His metoprolol was switched to carvedilol, and he was continued on oral furosemide.  He was also given compression stockings and an abdominal binder.  Neurology evaluated him and determined no acute intervention was warranted for the cerebrovascular stenosis.  His aspirin and statin were continued.  He is medically stable for discharge pending acquisition of a new walker.    #HTN Urgency  #LE edema, possibly mild acute on chronic HFimpEF  #Lightheadedness likely due to orthostatic hypotension  Endorses poor diet compliance  On RA  Echo noted 52% EF, possible impaired RV function  sp 1 dose IV lasix  BP improving. Pt endorses feeling better today. No dizziness while working with PT  - Changed metoprolol 50 BID to coreg 12.5 BID  - Cont PO lasix 20 BID  - Start losartan 25mg qD  - Compression stockings/abdominal binder    #Severe stenosis in right vertebral artery  #Severe stenosis right PCA  Neuro eval appreciated  - No neuro intervention warranted, pt sxs likely due to orthostatic hypotension  - Cont ASA/statin    #Chronic Afib s/p Watchman Off AC  #SSS s/p CRT-P  #HTN  #HLD  - Cont home meds  - cardio recs appreciated and f/u OP

## 2024-12-30 DIAGNOSIS — I16.0 HYPERTENSIVE URGENCY: ICD-10-CM

## 2024-12-30 DIAGNOSIS — Z91.119 PATIENT'S NONCOMPLIANCE WITH DIETARY REGIMEN DUE TO UNSPECIFIED REASON: ICD-10-CM

## 2024-12-30 DIAGNOSIS — Z85.3 PERSONAL HISTORY OF MALIGNANT NEOPLASM OF BREAST: ICD-10-CM

## 2024-12-30 DIAGNOSIS — I49.5 SICK SINUS SYNDROME: ICD-10-CM

## 2024-12-30 DIAGNOSIS — E78.5 HYPERLIPIDEMIA, UNSPECIFIED: ICD-10-CM

## 2024-12-30 DIAGNOSIS — I11.0 HYPERTENSIVE HEART DISEASE WITH HEART FAILURE: ICD-10-CM

## 2024-12-30 DIAGNOSIS — I65.01 OCCLUSION AND STENOSIS OF RIGHT VERTEBRAL ARTERY: ICD-10-CM

## 2024-12-30 DIAGNOSIS — I50.33 ACUTE ON CHRONIC DIASTOLIC (CONGESTIVE) HEART FAILURE: ICD-10-CM

## 2024-12-30 DIAGNOSIS — I65.22 OCCLUSION AND STENOSIS OF LEFT CAROTID ARTERY: ICD-10-CM

## 2024-12-30 DIAGNOSIS — I66.21 OCCLUSION AND STENOSIS OF RIGHT POSTERIOR CEREBRAL ARTERY: ICD-10-CM

## 2024-12-30 DIAGNOSIS — I25.2 OLD MYOCARDIAL INFARCTION: ICD-10-CM

## 2024-12-30 DIAGNOSIS — I48.19 OTHER PERSISTENT ATRIAL FIBRILLATION: ICD-10-CM

## 2024-12-30 DIAGNOSIS — I25.10 ATHEROSCLEROTIC HEART DISEASE OF NATIVE CORONARY ARTERY WITHOUT ANGINA PECTORIS: ICD-10-CM

## 2024-12-30 DIAGNOSIS — Z90.11 ACQUIRED ABSENCE OF RIGHT BREAST AND NIPPLE: ICD-10-CM

## 2024-12-30 DIAGNOSIS — I95.1 ORTHOSTATIC HYPOTENSION: ICD-10-CM

## 2024-12-30 DIAGNOSIS — Z95.0 PRESENCE OF CARDIAC PACEMAKER: ICD-10-CM

## 2024-12-30 DIAGNOSIS — Z95.818 PRESENCE OF OTHER CARDIAC IMPLANTS AND GRAFTS: ICD-10-CM

## 2024-12-30 DIAGNOSIS — Z79.899 OTHER LONG TERM (CURRENT) DRUG THERAPY: ICD-10-CM

## 2024-12-30 DIAGNOSIS — Z79.82 LONG TERM (CURRENT) USE OF ASPIRIN: ICD-10-CM

## 2024-12-30 DIAGNOSIS — Z95.1 PRESENCE OF AORTOCORONARY BYPASS GRAFT: ICD-10-CM

## 2025-02-10 ENCOUNTER — APPOINTMENT (OUTPATIENT)
Dept: CARDIOLOGY | Facility: CLINIC | Age: 80
End: 2025-02-10
Payer: MEDICARE

## 2025-02-10 ENCOUNTER — NON-APPOINTMENT (OUTPATIENT)
Age: 80
End: 2025-02-10

## 2025-02-10 PROCEDURE — 93296 REM INTERROG EVL PM/IDS: CPT

## 2025-02-10 PROCEDURE — 93294 REM INTERROG EVL PM/LDLS PM: CPT

## 2025-02-19 NOTE — ANESTHESIA FOLLOW-UP NOTE - NSEVALATION_GEN_ALL_CORE
Please call her and let her know she is due a follow up thyroid ultrasound and carotid ultrasound.  Orders are in chart.   No apparent complications or complaints regarding anesthesia care at this time

## 2025-03-20 ENCOUNTER — OUTPATIENT (OUTPATIENT)
Dept: OUTPATIENT SERVICES | Facility: HOSPITAL | Age: 80
LOS: 1 days | End: 2025-03-20
Payer: MEDICARE

## 2025-03-20 DIAGNOSIS — Z95.0 PRESENCE OF CARDIAC PACEMAKER: Chronic | ICD-10-CM

## 2025-03-20 DIAGNOSIS — Z90.10 ACQUIRED ABSENCE OF UNSPECIFIED BREAST AND NIPPLE: Chronic | ICD-10-CM

## 2025-03-20 DIAGNOSIS — Z95.818 PRESENCE OF OTHER CARDIAC IMPLANTS AND GRAFTS: Chronic | ICD-10-CM

## 2025-03-20 DIAGNOSIS — Z95.1 PRESENCE OF AORTOCORONARY BYPASS GRAFT: Chronic | ICD-10-CM

## 2025-03-20 DIAGNOSIS — Z00.8 ENCOUNTER FOR OTHER GENERAL EXAMINATION: ICD-10-CM

## 2025-03-20 DIAGNOSIS — K21.9 GASTRO-ESOPHAGEAL REFLUX DISEASE WITHOUT ESOPHAGITIS: ICD-10-CM

## 2025-03-20 PROCEDURE — 74177 CT ABD & PELVIS W/CONTRAST: CPT

## 2025-03-20 PROCEDURE — 74177 CT ABD & PELVIS W/CONTRAST: CPT | Mod: 26

## 2025-03-21 DIAGNOSIS — K21.9 GASTRO-ESOPHAGEAL REFLUX DISEASE WITHOUT ESOPHAGITIS: ICD-10-CM

## 2025-03-25 NOTE — CONSULT NOTE ADULT - ASSESSMENT
ASSESSMENT:  77yoM Hx of CAD s/p CABG, SSS s/p PPM, AF s/p atrial appendage occlusive device (still on AC for first 45 days), presenting with fevers/chills. CT abd/p shows percutaneous cholecystostomy tube in appropriate position. No gallbladder distension. Trace pericholecystic fluid without gallbladder wall edema or significant inflammatory changes.     Per IR, no issues with PCT tube. Abdominal exam reassuring, no abdominal pain, benign. On exam, patient is non-toxic appearing, no abdominal pain, umbilical hernia non painful, PCT tube in place no pus or purulent drainage, with negative Onofre's sign.     WBC 11.98 from 8.8. Lactate 2.5.    Patient was seen in march 2023 for cholecystitis, and IR placed PCT, given poor surgical candidate and multiple cardiac comorbidities.     PLAN:  As instructed by Dr. Watkins  - No surgical intervention. Patient has no gallbladder inflammation and PCT tube is in place  - Workup of fever per primary team   - Appreciate IR Recs        Above plan discussed and as instructed by Attending Surgeon Dr. Watkins.  10-20-23 @ 18:40  
Addended by: MARK BURTON on: 3/25/2025 08:53 AM     Modules accepted: Orders    
ASSESSMENT  This is a 77M w/ h/o CAD (s/p CABG), HFpEF, chronic afib (on Xarelto), SSS (s/p CRT-P), HTN, and DLD p/w fever and chills. He had cholecystostomy in Apr 2023 and has required multiple drainage exchanges.     IMPRESSION  #Sepsis on admission- Possible transient bacteremia (gram negative) from the biliary pathology  #Lactic acidosis  # CAD (s/p CABG), HFpEF, chronic afib, SSS (s/p CRT-P), HTN, and DLD  #Obesity BMI (kg/m2): 28.9, 28.7    RECOMMENDATIONS  -Follow up with the blood cultures. So far NGTD.  -Evaluated by surgery and IR, no intervention deemed necessary as symptoms have stabilized.   -No further need for Vancomycin.   -Can continue with IV cefepime 2 gram q 8 hours and Flagyl 500mg Q 8 hours for now while he remains in the hospital.   -Closer to discharge and if blood cultures remain negative, can be discharged on PO ciprofloxacin 500mg Q 12 hours and metronidazole 500mg BID for total of 5 days.     If any questions, please send a message or call on [employer] Teams  Please continue to update ID with any pertinent new laboratory or radiographic findings.    Oriana Salvador M.D  Infectious Diseases Attending  [facility]

## 2025-04-21 ENCOUNTER — APPOINTMENT (OUTPATIENT)
Dept: ELECTROPHYSIOLOGY | Facility: CLINIC | Age: 80
End: 2025-04-21
Payer: MEDICARE

## 2025-04-21 VITALS
SYSTOLIC BLOOD PRESSURE: 167 MMHG | HEIGHT: 74 IN | HEART RATE: 60 BPM | WEIGHT: 230 LBS | BODY MASS INDEX: 29.52 KG/M2 | DIASTOLIC BLOOD PRESSURE: 60 MMHG

## 2025-04-21 DIAGNOSIS — I48.0 PAROXYSMAL ATRIAL FIBRILLATION: ICD-10-CM

## 2025-04-21 DIAGNOSIS — I48.91 UNSPECIFIED ATRIAL FIBRILLATION: ICD-10-CM

## 2025-04-21 DIAGNOSIS — I49.5 SICK SINUS SYNDROME: ICD-10-CM

## 2025-04-21 DIAGNOSIS — Z95.818 PRESENCE OF OTHER CARDIAC IMPLANTS AND GRAFTS: ICD-10-CM

## 2025-04-21 DIAGNOSIS — Z45.018 ENCOUNTER FOR ADJUSTMENT AND MANAGEMENT OF OTHER PART OF CARDIAC PACEMAKER: ICD-10-CM

## 2025-04-21 DIAGNOSIS — I10 ESSENTIAL (PRIMARY) HYPERTENSION: ICD-10-CM

## 2025-04-21 PROCEDURE — 93281 PM DEVICE PROGR EVAL MULTI: CPT

## 2025-04-21 PROCEDURE — G2211 COMPLEX E/M VISIT ADD ON: CPT

## 2025-04-21 PROCEDURE — 99215 OFFICE O/P EST HI 40 MIN: CPT

## 2025-04-21 PROCEDURE — 93000 ELECTROCARDIOGRAM COMPLETE: CPT | Mod: 59

## 2025-05-03 NOTE — ED PROVIDER NOTE - PHYSICAL EXAMINATION
VITAL SIGNS: I have reviewed nursing notes and confirm.  CONSTITUTIONAL: Well-developed; well-nourished; in no acute distress.  SKIN: Skin exam is warm and dry, no acute rash.  HEAD: Normocephalic; atraumatic.  EYES: PERRL, EOM intact; conjunctiva and sclera clear.  ENT: No nasal discharge; airway clear.   NECK: Supple; non tender.  CARD: S1, S2 normal; no murmurs, gallops, or rubs. Regular rate and rhythm.  RESP: Clear to auscultation bilaterally. No wheezes, rales or rhonchi.  ABD: Normal bowel sounds; soft; non-distended; non-tender.   EXT: Normal ROM. No edema. +Mild left hip tenderness. +Painful ROM of left hip. Chronic leg edema.   LYMPH: No acute cervical adenopathy.  NEURO: Alert, oriented. Grossly unremarkable. No focal deficits.  PSYCH: Cooperative, appropriate. no

## 2025-05-08 ENCOUNTER — OUTPATIENT (OUTPATIENT)
Dept: OUTPATIENT SERVICES | Facility: HOSPITAL | Age: 80
LOS: 1 days | End: 2025-05-08
Payer: MEDICARE

## 2025-05-08 VITALS
SYSTOLIC BLOOD PRESSURE: 176 MMHG | HEART RATE: 84 BPM | WEIGHT: 230.6 LBS | DIASTOLIC BLOOD PRESSURE: 78 MMHG | HEIGHT: 74 IN | OXYGEN SATURATION: 96 % | RESPIRATION RATE: 15 BRPM | TEMPERATURE: 98 F

## 2025-05-08 DIAGNOSIS — Z95.0 PRESENCE OF CARDIAC PACEMAKER: Chronic | ICD-10-CM

## 2025-05-08 DIAGNOSIS — Z95.818 PRESENCE OF OTHER CARDIAC IMPLANTS AND GRAFTS: Chronic | ICD-10-CM

## 2025-05-08 DIAGNOSIS — Z90.10 ACQUIRED ABSENCE OF UNSPECIFIED BREAST AND NIPPLE: Chronic | ICD-10-CM

## 2025-05-08 DIAGNOSIS — Z01.818 ENCOUNTER FOR OTHER PREPROCEDURAL EXAMINATION: ICD-10-CM

## 2025-05-08 DIAGNOSIS — I48.19 OTHER PERSISTENT ATRIAL FIBRILLATION: ICD-10-CM

## 2025-05-08 DIAGNOSIS — Z95.1 PRESENCE OF AORTOCORONARY BYPASS GRAFT: Chronic | ICD-10-CM

## 2025-05-08 LAB
ALBUMIN SERPL ELPH-MCNC: 4.4 G/DL — SIGNIFICANT CHANGE UP (ref 3.5–5.2)
ALP SERPL-CCNC: 181 U/L — HIGH (ref 30–115)
ALT FLD-CCNC: 13 U/L — SIGNIFICANT CHANGE UP (ref 0–41)
ANION GAP SERPL CALC-SCNC: 13 MMOL/L — SIGNIFICANT CHANGE UP (ref 7–14)
APTT BLD: 34.9 SEC — SIGNIFICANT CHANGE UP (ref 27–39.2)
AST SERPL-CCNC: 17 U/L — SIGNIFICANT CHANGE UP (ref 0–41)
BILIRUB SERPL-MCNC: 0.7 MG/DL — SIGNIFICANT CHANGE UP (ref 0.2–1.2)
BUN SERPL-MCNC: 14 MG/DL — SIGNIFICANT CHANGE UP (ref 10–20)
CALCIUM SERPL-MCNC: 9 MG/DL — SIGNIFICANT CHANGE UP (ref 8.4–10.5)
CHLORIDE SERPL-SCNC: 101 MMOL/L — SIGNIFICANT CHANGE UP (ref 98–110)
CO2 SERPL-SCNC: 26 MMOL/L — SIGNIFICANT CHANGE UP (ref 17–32)
CREAT SERPL-MCNC: 0.8 MG/DL — SIGNIFICANT CHANGE UP (ref 0.7–1.5)
EGFR: 89 ML/MIN/1.73M2 — SIGNIFICANT CHANGE UP
EGFR: 89 ML/MIN/1.73M2 — SIGNIFICANT CHANGE UP
GLUCOSE SERPL-MCNC: 107 MG/DL — HIGH (ref 70–99)
HCT VFR BLD CALC: 35 % — LOW (ref 42–52)
HGB BLD-MCNC: 11.1 G/DL — LOW (ref 14–18)
INR BLD: 1.14 RATIO — SIGNIFICANT CHANGE UP (ref 0.65–1.3)
MCHC RBC-ENTMCNC: 25.2 PG — LOW (ref 27–31)
MCHC RBC-ENTMCNC: 31.7 G/DL — LOW (ref 32–37)
MCV RBC AUTO: 79.5 FL — LOW (ref 80–94)
NRBC BLD AUTO-RTO: 0 /100 WBCS — SIGNIFICANT CHANGE UP (ref 0–0)
PLATELET # BLD AUTO: 266 K/UL — SIGNIFICANT CHANGE UP (ref 130–400)
PMV BLD: 11 FL — HIGH (ref 7.4–10.4)
POTASSIUM SERPL-MCNC: 4.7 MMOL/L — SIGNIFICANT CHANGE UP (ref 3.5–5)
POTASSIUM SERPL-SCNC: 4.7 MMOL/L — SIGNIFICANT CHANGE UP (ref 3.5–5)
PROT SERPL-MCNC: 7.1 G/DL — SIGNIFICANT CHANGE UP (ref 6–8)
PROTHROM AB SERPL-ACNC: 13.5 SEC — HIGH (ref 9.95–12.87)
RBC # BLD: 4.4 M/UL — LOW (ref 4.7–6.1)
RBC # FLD: 15.2 % — HIGH (ref 11.5–14.5)
SODIUM SERPL-SCNC: 140 MMOL/L — SIGNIFICANT CHANGE UP (ref 135–146)
WBC # BLD: 8.41 K/UL — SIGNIFICANT CHANGE UP (ref 4.8–10.8)
WBC # FLD AUTO: 8.41 K/UL — SIGNIFICANT CHANGE UP (ref 4.8–10.8)

## 2025-05-08 PROCEDURE — 36415 COLL VENOUS BLD VENIPUNCTURE: CPT

## 2025-05-08 PROCEDURE — 99214 OFFICE O/P EST MOD 30 MIN: CPT | Mod: 25

## 2025-05-08 PROCEDURE — 80053 COMPREHEN METABOLIC PANEL: CPT

## 2025-05-08 PROCEDURE — 85027 COMPLETE CBC AUTOMATED: CPT

## 2025-05-08 PROCEDURE — 93010 ELECTROCARDIOGRAM REPORT: CPT

## 2025-05-08 PROCEDURE — 93005 ELECTROCARDIOGRAM TRACING: CPT

## 2025-05-08 PROCEDURE — 85730 THROMBOPLASTIN TIME PARTIAL: CPT

## 2025-05-08 PROCEDURE — 85610 PROTHROMBIN TIME: CPT

## 2025-05-08 NOTE — H&P PST ADULT - NSICDXPASTMEDICALHX_GEN_ALL_CORE_FT
PAST MEDICAL HISTORY:  Atrial fibrillation     Breast cancer 2001    CAD (coronary artery disease)     Dyslipidemia     H/O cardiac murmur     Heart attack     Heart failure with improved ejection fraction (HFimpEF)     [place] (hard of hearing)     HTN (hypertension)     Osteoarthritis
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
## 2025-05-08 NOTE — H&P PST ADULT - HISTORY OF PRESENT ILLNESS
79 year old male here for a "check up on my watchman" They want to make sure its ticking.  Watchman was placed last year. Patient hx of cabg and chronic  a fib, also low EF  Patient denies any issues lately .   Denies chest pain sob palp  denies recent uri or uti   FOS = 1  patient states exercise bike every day, and punching bag    Anesthesia Alert  NO--Difficult Airway  NO--History of neck surgery or radiation  NO--Limited ROM of neck  NO--History of Malignant hyperthermia  NO--Personal or family history of Pseudocholinesterase deficiency  NO--Prior Anesthesia Complication  NO--Latex Allergy  YES-Loose teeth - upper denture  NO--History of Rheumatoid Arthritis  NO--AUGUST  NO- BLEEDING RISK  NO--Other_____    As per patient, this is their complete medical and surgical history, including medications both prescribed or over the counter.  Patient verbalized understanding of instructions and was given the opportunity to ask questions and have them answered.    26.95 points  The higher the score (maximum 58.2), the higher the functional status.  6.05 METs    3 points  RCRI Score  15.0 %  Risk of major cardiac event

## 2025-05-09 DIAGNOSIS — I48.19 OTHER PERSISTENT ATRIAL FIBRILLATION: ICD-10-CM

## 2025-05-09 DIAGNOSIS — Z01.818 ENCOUNTER FOR OTHER PREPROCEDURAL EXAMINATION: ICD-10-CM

## 2025-05-15 ENCOUNTER — RESULT REVIEW (OUTPATIENT)
Age: 80
End: 2025-05-15

## 2025-05-15 ENCOUNTER — OUTPATIENT (OUTPATIENT)
Dept: OUTPATIENT SERVICES | Facility: HOSPITAL | Age: 80
LOS: 1 days | End: 2025-05-15
Payer: MEDICARE

## 2025-05-15 VITALS
OXYGEN SATURATION: 98 % | HEART RATE: 60 BPM | HEIGHT: 74 IN | DIASTOLIC BLOOD PRESSURE: 74 MMHG | WEIGHT: 229.94 LBS | SYSTOLIC BLOOD PRESSURE: 160 MMHG | RESPIRATION RATE: 16 BRPM

## 2025-05-15 DIAGNOSIS — Z95.1 PRESENCE OF AORTOCORONARY BYPASS GRAFT: Chronic | ICD-10-CM

## 2025-05-15 DIAGNOSIS — Z95.0 PRESENCE OF CARDIAC PACEMAKER: Chronic | ICD-10-CM

## 2025-05-15 DIAGNOSIS — Z90.10 ACQUIRED ABSENCE OF UNSPECIFIED BREAST AND NIPPLE: Chronic | ICD-10-CM

## 2025-05-15 DIAGNOSIS — Z95.818 PRESENCE OF OTHER CARDIAC IMPLANTS AND GRAFTS: Chronic | ICD-10-CM

## 2025-05-15 DIAGNOSIS — I48.19 OTHER PERSISTENT ATRIAL FIBRILLATION: ICD-10-CM

## 2025-05-15 PROCEDURE — 93320 DOPPLER ECHO COMPLETE: CPT

## 2025-05-15 PROCEDURE — 93320 DOPPLER ECHO COMPLETE: CPT | Mod: 26

## 2025-05-15 PROCEDURE — 93312 ECHO TRANSESOPHAGEAL: CPT | Mod: 26,XU

## 2025-05-15 PROCEDURE — 93325 DOPPLER ECHO COLOR FLOW MAPG: CPT | Mod: 26

## 2025-05-15 PROCEDURE — 93325 DOPPLER ECHO COLOR FLOW MAPG: CPT

## 2025-05-15 PROCEDURE — 93312 ECHO TRANSESOPHAGEAL: CPT
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
## 2025-05-15 NOTE — ASU PATIENT PROFILE, ADULT - ALCOHOL USE HISTORY SINGLE SELECT
"Chief Complaint   Patient presents with     Well Child     3 year       Initial Pulse 118  Temp 99.1  F (37.3  C) (Tympanic)  Ht 3' 5.26\" (1.048 m)  Wt 36 lb 6.4 oz (16.5 kg)  SpO2 100%  BMI 15.03 kg/m2 Estimated body mass index is 15.03 kg/(m^2) as calculated from the following:    Height as of this encounter: 3' 5.26\" (1.048 m).    Weight as of this encounter: 36 lb 6.4 oz (16.5 kg).  Medication Reconciliation: complete   Alcira Davidson MA      "
never

## 2025-05-15 NOTE — CHART NOTE - NSCHARTNOTEFT_GEN_A_CORE
POST OPERATIVE PROCEDURAL DOCUMENTATION  PRE-OP DIAGNOSIS: 1 yr post watchman evaluation     PROCEDURE: Transesophageal echocardiogram    Primary Physician:  Dr. Mar  Assistant: Dr. KYLE Mcpherson    ANESTHESIA TYPE  [  ] General Anesthesia  [ x ] Conscious Sedation  [  ] Local/Regional    CONDITION  [  ] Critical  [  ] Serious  [  ] Fair  [ x ] Good    SPECIMENS REMOVED (IF APPLICABLE): N/A    IMPLANTS (IF APPLICABLE): None    ESTIMATED BLOOD LOSS: None    COMPLICATIONS: None      FINDINGS:    After risks and benefits of procedures were explained, informed consent was obtained and placed in chart. Refer to Anesthesia note for sedation details.  The OTIS probe was passed into the esophagus without difficulty.  Transesophageal images were obtained.  The OTIS probe was removed without difficulty and examined.  There was no evidence for bleeding.  The patient tolerated the procedure well without any immediate OTIS-related complications.      Preliminary Findings:    ADDY: Watchman device is well-seat with no sharri-device leak.  LA: severely enlarged  LV: LVEF 55%  MV: mild MR, no evidence of MS.   AV: mild AI, no evidence of AS.   RA: normal   TV: mild TR.   PV: trace PI.   IAS: no PFO. No R-> L shunt.   Aorta:  simple atheroma of desc aorta    DIAGNOSIS/IMPRESSION:   Watchman device is well-seat with no sharri-device leak.    PLAN OF CARE:  d/c home today   f/u with cardiologist (Dr. Ojeda). POST OPERATIVE PROCEDURAL DOCUMENTATION  PRE-OP DIAGNOSIS: 1 yr post watchman evaluation     PROCEDURE: Transesophageal echocardiogram    Primary Physician:  Dr. Mar  Assistant: Dr. KYLE Mcpherson    ANESTHESIA TYPE  [  ] General Anesthesia  [ x ] Conscious Sedation  [  ] Local/Regional    CONDITION  [  ] Critical  [  ] Serious  [  ] Fair  [ x ] Good    SPECIMENS REMOVED (IF APPLICABLE): N/A    IMPLANTS (IF APPLICABLE): None    ESTIMATED BLOOD LOSS: None    COMPLICATIONS: None      FINDINGS:    After risks and benefits of procedures were explained, informed consent was obtained and placed in chart. Refer to Anesthesia note for sedation details.  The OTIS probe was passed into the esophagus without difficulty.  Transesophageal images were obtained.  The OTIS probe was removed without difficulty and examined.  There was no evidence for bleeding.  The patient tolerated the procedure well without any immediate OTIS-related complications.      Preliminary Findings:    ADDY: Watchman device is well-seat with no sharri-device leak.  LA: severely enlarged  LV: LVEF 50%, pacing lead noted   RV: pacing lead noted   MV: mild MR, no evidence of MS.   AV: mild AI, no evidence of AS.   RA: normal   TV: mild TR.   PV: trace PI.   IAS: no PFO. No R-> L shunt.   Aorta:  simple atheroma of desc aorta    DIAGNOSIS/IMPRESSION:   Watchman device is well-seat with no sharri-device leak.    PLAN OF CARE:  d/c home today   f/u with cardiologist (Dr. Ojeda). POST OPERATIVE PROCEDURAL DOCUMENTATION  PRE-OP DIAGNOSIS: 1 yr post watchman evaluation     PROCEDURE: Transesophageal echocardiogram    Primary Physician:  Dr. Mar  Assistant: Dr. KYLE Mcpherson    ANESTHESIA TYPE  [  ] General Anesthesia  [ x ] Conscious Sedation  [  ] Local/Regional    CONDITION  [  ] Critical  [  ] Serious  [  ] Fair  [ x ] Good    SPECIMENS REMOVED (IF APPLICABLE): N/A    IMPLANTS (IF APPLICABLE): None    ESTIMATED BLOOD LOSS: None    COMPLICATIONS: None      FINDINGS:    After risks and benefits of procedures were explained, informed consent was obtained and placed in chart. Refer to Anesthesia note for sedation details.  The OTIS probe was passed into the esophagus without difficulty.  Transesophageal images were obtained.  The OTIS probe was removed without difficulty and examined.  There was no evidence for bleeding.  The patient tolerated the procedure well without any immediate OTIS-related complications.      Preliminary Findings:    ADDY: Watchman device is well-seat with no sharri-device leak.  LA: severely enlarged  LV: LVEF 50%  MV: mild to mod MR, no evidence of MS.   AV: mild AI, no evidence of AS.   RA: severely enlarged, pacer lead noted  TV: mild-mod TR.   PV: trace PI.   IAS: no PFO. No R-> L shunt.   Aorta: no simple atheroma of desc aorta    DIAGNOSIS/IMPRESSION:   Watchman device is well-seat with no sharri-device leak.    PLAN OF CARE:  d/c home today   f/u with cardiologist (Dr. Ojeda). POST OPERATIVE PROCEDURAL DOCUMENTATION:    PRE-OP DIAGNOSIS: 1 yr post watchman evaluation     PROCEDURE: Transesophageal echocardiogram    Primary Physician:  Dr. Mar  Assistant: Dr. KYLE Mcpherson    ANESTHESIA TYPE  [  ] General Anesthesia  [ x ] Conscious Sedation  [  ] Local/Regional    CONDITION  [  ] Critical  [  ] Serious  [  ] Fair  [ x ] Good    SPECIMENS REMOVED (IF APPLICABLE): N/A    IMPLANTS (IF APPLICABLE): None    ESTIMATED BLOOD LOSS: None    COMPLICATIONS: None      FINDINGS:    After risks and benefits of procedures were explained, informed consent was obtained and placed in chart. Refer to Anesthesia note for sedation details.  The OTIS probe was passed into the esophagus without difficulty.  Transesophageal images were obtained.  The OTIS probe was removed without difficulty and examined.  There was no evidence for bleeding.  The patient tolerated the procedure well without any immediate OTIS-related complications.      Preliminary Findings:    ADDY: Watchman device is well-seat with no sharri-device leak.  LA: severely enlarged  LV: LVEF 50%  MV: mild to mod MR, no evidence of MS.   AV: mild AI, no evidence of AS.   RA:  enlarged, pacer lead noted  TV: mild-mod TR.   PV: trace PI.   IAS: no ASD. No shunt by color.   Aorta: no atheroma of desc aorta    DIAGNOSIS/IMPRESSION:   Watchman device is well-seat with no sharri-device leak.    PLAN OF CARE:  d/c home today   f/u with cardiologist (Dr. Ojeda).

## 2025-05-15 NOTE — PACU DISCHARGE NOTE - COMMENTS
S/P OTIS  BP   141/60  HR   60  RR   16  SAT   100%
I was present during the key portion of the procedure.

## 2025-05-15 NOTE — ASU PATIENT PROFILE, ADULT - FALL HARM RISK - HARM RISK INTERVENTIONS

## 2025-05-15 NOTE — ASU PATIENT PROFILE, ADULT - NSICDXPASTMEDICALHX_GEN_ALL_CORE_FT
PAST MEDICAL HISTORY:  Atrial fibrillation     Breast cancer 2001    CAD (coronary artery disease)     Dyslipidemia     H/O cardiac murmur     Heart attack     Heart failure with improved ejection fraction (HFimpEF)     [place] (hard of hearing)     HTN (hypertension)     Osteoarthritis

## 2025-05-16 DIAGNOSIS — I48.19 OTHER PERSISTENT ATRIAL FIBRILLATION: ICD-10-CM

## 2025-06-09 ENCOUNTER — APPOINTMENT (OUTPATIENT)
Dept: ORTHOPEDIC SURGERY | Facility: CLINIC | Age: 80
End: 2025-06-09
Payer: MEDICARE

## 2025-06-09 PROCEDURE — 99213 OFFICE O/P EST LOW 20 MIN: CPT | Mod: 25

## 2025-06-09 PROCEDURE — 20610 DRAIN/INJ JOINT/BURSA W/O US: CPT | Mod: 50

## 2025-07-21 ENCOUNTER — APPOINTMENT (OUTPATIENT)
Dept: CARDIOLOGY | Facility: CLINIC | Age: 80
End: 2025-07-21
Payer: MEDICARE

## 2025-07-21 ENCOUNTER — NON-APPOINTMENT (OUTPATIENT)
Age: 80
End: 2025-07-21

## 2025-07-21 PROCEDURE — 93296 REM INTERROG EVL PM/IDS: CPT

## 2025-07-21 PROCEDURE — 93294 REM INTERROG EVL PM/LDLS PM: CPT

## 2025-07-24 ENCOUNTER — EMERGENCY (EMERGENCY)
Facility: HOSPITAL | Age: 80
LOS: 0 days | Discharge: HOME | End: 2025-07-24
Attending: EMERGENCY MEDICINE
Payer: MEDICARE

## 2025-07-24 VITALS
DIASTOLIC BLOOD PRESSURE: 64 MMHG | HEIGHT: 75 IN | TEMPERATURE: 98 F | SYSTOLIC BLOOD PRESSURE: 184 MMHG | RESPIRATION RATE: 18 BRPM | HEART RATE: 60 BPM | OXYGEN SATURATION: 99 % | WEIGHT: 315 LBS

## 2025-07-24 DIAGNOSIS — I25.10 ATHEROSCLEROTIC HEART DISEASE OF NATIVE CORONARY ARTERY WITHOUT ANGINA PECTORIS: ICD-10-CM

## 2025-07-24 DIAGNOSIS — G50.0 TRIGEMINAL NEURALGIA: ICD-10-CM

## 2025-07-24 DIAGNOSIS — Z95.0 PRESENCE OF CARDIAC PACEMAKER: Chronic | ICD-10-CM

## 2025-07-24 DIAGNOSIS — Z90.10 ACQUIRED ABSENCE OF UNSPECIFIED BREAST AND NIPPLE: Chronic | ICD-10-CM

## 2025-07-24 DIAGNOSIS — Z95.818 PRESENCE OF OTHER CARDIAC IMPLANTS AND GRAFTS: Chronic | ICD-10-CM

## 2025-07-24 DIAGNOSIS — Z79.82 LONG TERM (CURRENT) USE OF ASPIRIN: ICD-10-CM

## 2025-07-24 DIAGNOSIS — Z95.1 PRESENCE OF AORTOCORONARY BYPASS GRAFT: Chronic | ICD-10-CM

## 2025-07-24 DIAGNOSIS — E78.5 HYPERLIPIDEMIA, UNSPECIFIED: ICD-10-CM

## 2025-07-24 DIAGNOSIS — Z95.1 PRESENCE OF AORTOCORONARY BYPASS GRAFT: ICD-10-CM

## 2025-07-24 DIAGNOSIS — I48.91 UNSPECIFIED ATRIAL FIBRILLATION: ICD-10-CM

## 2025-07-24 DIAGNOSIS — I10 ESSENTIAL (PRIMARY) HYPERTENSION: ICD-10-CM

## 2025-07-24 PROCEDURE — 70450 CT HEAD/BRAIN W/O DYE: CPT

## 2025-07-24 PROCEDURE — 99284 EMERGENCY DEPT VISIT MOD MDM: CPT | Mod: FS

## 2025-07-24 PROCEDURE — 99284 EMERGENCY DEPT VISIT MOD MDM: CPT | Mod: 25

## 2025-07-24 PROCEDURE — 70450 CT HEAD/BRAIN W/O DYE: CPT | Mod: 26

## 2025-07-24 RX ORDER — ACETAMINOPHEN 500 MG/5ML
650 LIQUID (ML) ORAL ONCE
Refills: 0 | Status: COMPLETED | OUTPATIENT
Start: 2025-07-24 | End: 2025-07-24

## 2025-07-24 RX ORDER — GABAPENTIN 400 MG/1
1 CAPSULE ORAL
Qty: 21 | Refills: 0
Start: 2025-07-24 | End: 2025-07-30

## 2025-07-24 RX ADMIN — Medication 650 MILLIGRAM(S): at 14:11

## 2025-07-24 NOTE — ED ADULT NURSE NOTE - NSFALLUNIVINTERV_ED_ALL_ED
Bed/Stretcher in lowest position, wheels locked, appropriate side rails in place/Call bell, personal items and telephone in reach/Instruct patient to call for assistance before getting out of bed/chair/stretcher/Non-slip footwear applied when patient is off stretcher/[place] to call system/Physically safe environment - no spills, clutter or unnecessary equipment/Purposeful proactive rounding/Room/bathroom lighting operational, light cord in reach
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
## 2025-07-24 NOTE — ED PROVIDER NOTE - CLINICAL SUMMARY MEDICAL DECISION MAKING FREE TEXT BOX
79-year-old male past medical history as documented complaining of 1 year of left facial pain and paresthesias.  Neuroexam nonfocal.  Remainder of exam normal including ENT exam.  CT head with no acute pathology.  Patient prescribed gabapentin and instructed to follow-up with his PMD and given return instructions.

## 2025-07-24 NOTE — ED PROVIDER NOTE - OBJECTIVE STATEMENT
Patient is an 79 year old male with Patient is an 79 year old male with pmhx of cad s/p cabg, afib s/p watchman, htn, hld, on asa presents for evaluation of intermittent left facial numbness and pain. He denies any fever, chills, cough, sore throat, n/v, chest pain, shortness of breath, abdominal pain, or urinary complaints. No weakness/ facial droop/ slurred speech.

## 2025-07-24 NOTE — ED PROVIDER NOTE - PHYSICAL EXAMINATION
As Follows:  CONST: Well appearing in NAD  EYES: PERRL, EOMI, Sclera and conjunctiva clear.   ENT: No nasal discharge. Oropharynx normal appearing, no erythema / exudates. Uvula midline. Airway intact.   CARD: No murmurs, rubs, or gallops; Normal rate and rhythm  RESP: BS Equal B/L, No wheezes, rhonchi or rales. No distress or accessory breathing  GI: Soft, non-tender, non-distended.  SKIN: Warm, dry, no acute rashes. MMM  NEURO: Alert and Oriented, No focal deficits. Strength and sensation intact. Ambulates with cane. Finger to nose intact. No pronator drift. No facial droop. No leg or arm drop.

## 2025-07-24 NOTE — ED PROVIDER NOTE - PATIENT PORTAL LINK FT
You can access the FollowMyHealth Patient Portal offered by [facility] by registering at the following website: [email or web address]. By joining [facility]’s FollowMyHealth portal, you will also be able to view your health information using other applications (apps) compatible with our system.

## 2025-07-24 NOTE — ED ADULT NURSE NOTE - HOW OFTEN DO YOU HAVE SIX OR MORE DRINKS ON ONE OCCASION?
I spoke with Gustavo gunderson as he contacted me via the hospital .  He continues to complain of catheter associated pain that is very difficult for him to tolerate despite Tramadol.  He reports that Norco had been more successful for him earlier.  He would also like the catheter to be removed.  I reiterated the need to try to keep the catheter in place until the end of the month.  Due to the relatively new anastomosis, endoscopic surgical options are still limited.  In order to try to facilitate him tolerating the catheter, I am prescribing 50 Norco tablets as well as topical lidocaine ointment.  He agrees to try these interventions.   
Less than Monthly

## 2025-07-24 NOTE — ED ADULT TRIAGE NOTE - GLASGOW COMA SCALE: BEST MOTOR RESPONSE, MLM
Normal rate, regular rhythm.  Heart sounds S1, S2.  No murmurs, rubs or gallops.
(M6) obeys commands

## 2025-07-24 NOTE — ED PROVIDER NOTE - ATTENDING CONTRIBUTION TO CARE
79-year-old male past medical history significant for hypertension, dyslipidemia, CAD, status post CABG, A-fib status post watchman, not currently on anticoagulation, complaining of left facial pain and paresthesias x 1 year.  Symptoms are intermittent and with no modifying factors.  No headache.  No vision or speech changes.  No extremity paresthesias or motor weakness.  No dizziness or ataxia.  No chest pain or shortness of breath.  No palpitations.  No ear pain, tinnitus or ear discharge.  Patient reports occasional decreased hearing in the left ear.  PMD–Mahi. Vitals noted.  CONSTITUTIONAL: Well-appearing; well-nourished; in no apparent distress.   HEAD: Normocephalic; atraumatic.   EYES: PERRL; EOM intact. Conjunctiva normal B/L.   ENT: Normal pharynx with no tonsillar hypertrophy. MMM. TMs normal. B/L.   NECK: Supple; non-tender; no cervical lymphadenopathy.   CHEST: Normal chest excursion with respiration.   CARDIOVASCULAR: Normal S1, S2; no murmurs, rubs, or gallops.   RESPIRATORY: Normal chest excursion with respiration; breath sounds clear and equal bilaterally; no wheezes, rhonchi, or rales.  GI/: Normal bowel sounds; non-distended; non-tender.  BACK: No evidence of trauma or deformity. Non-tender to palpation. No CVA tenderness.   EXT: Normal ROM in all four extremities; non-tender to palpation; distal pulses are normal. No leg edema B/L.   SKIN: Normal for age and race; warm; dry; good turgor.  NEURO: A & O x 4; CN 2-12 intact. No facial droop.  Normal speech.  Normal sensation to light touch of the face.  5/5 motor strength bilateral upper and bilateral lower extremities.  Normal finger-to-nose bilaterally.

## 2025-08-06 ENCOUNTER — EMERGENCY (EMERGENCY)
Facility: HOSPITAL | Age: 80
LOS: 0 days | Discharge: HOME | End: 2025-08-06
Attending: STUDENT IN AN ORGANIZED HEALTH CARE EDUCATION/TRAINING PROGRAM
Payer: MEDICARE

## 2025-08-06 VITALS
WEIGHT: 220.02 LBS | OXYGEN SATURATION: 99 % | TEMPERATURE: 98 F | DIASTOLIC BLOOD PRESSURE: 67 MMHG | SYSTOLIC BLOOD PRESSURE: 137 MMHG | HEART RATE: 69 BPM | RESPIRATION RATE: 20 BRPM | HEIGHT: 74 IN

## 2025-08-06 VITALS
DIASTOLIC BLOOD PRESSURE: 66 MMHG | HEART RATE: 60 BPM | SYSTOLIC BLOOD PRESSURE: 151 MMHG | RESPIRATION RATE: 21 BRPM | OXYGEN SATURATION: 98 %

## 2025-08-06 DIAGNOSIS — Z95.0 PRESENCE OF CARDIAC PACEMAKER: Chronic | ICD-10-CM

## 2025-08-06 DIAGNOSIS — Z95.1 PRESENCE OF AORTOCORONARY BYPASS GRAFT: Chronic | ICD-10-CM

## 2025-08-06 DIAGNOSIS — Z90.10 ACQUIRED ABSENCE OF UNSPECIFIED BREAST AND NIPPLE: Chronic | ICD-10-CM

## 2025-08-06 DIAGNOSIS — Z95.818 PRESENCE OF OTHER CARDIAC IMPLANTS AND GRAFTS: Chronic | ICD-10-CM

## 2025-08-06 PROCEDURE — 99284 EMERGENCY DEPT VISIT MOD MDM: CPT | Mod: 25

## 2025-08-06 PROCEDURE — 70450 CT HEAD/BRAIN W/O DYE: CPT

## 2025-08-06 PROCEDURE — 72125 CT NECK SPINE W/O DYE: CPT | Mod: 26

## 2025-08-06 PROCEDURE — 82962 GLUCOSE BLOOD TEST: CPT

## 2025-08-06 PROCEDURE — 71045 X-RAY EXAM CHEST 1 VIEW: CPT | Mod: 26

## 2025-08-06 PROCEDURE — 72125 CT NECK SPINE W/O DYE: CPT

## 2025-08-06 PROCEDURE — 90715 TDAP VACCINE 7 YRS/> IM: CPT

## 2025-08-06 PROCEDURE — 99284 EMERGENCY DEPT VISIT MOD MDM: CPT | Mod: FS

## 2025-08-06 PROCEDURE — 71045 X-RAY EXAM CHEST 1 VIEW: CPT

## 2025-08-06 PROCEDURE — 90471 IMMUNIZATION ADMIN: CPT

## 2025-08-06 PROCEDURE — 70450 CT HEAD/BRAIN W/O DYE: CPT | Mod: 26

## 2025-08-06 PROCEDURE — 36000 PLACE NEEDLE IN VEIN: CPT

## 2025-09-15 ENCOUNTER — APPOINTMENT (OUTPATIENT)
Dept: ORTHOPEDIC SURGERY | Facility: CLINIC | Age: 80
End: 2025-09-15
Payer: MEDICARE

## 2025-09-15 DIAGNOSIS — M17.0 BILATERAL PRIMARY OSTEOARTHRITIS OF KNEE: ICD-10-CM

## 2025-09-15 PROCEDURE — 99213 OFFICE O/P EST LOW 20 MIN: CPT | Mod: 25

## 2025-09-15 PROCEDURE — 20611 DRAIN/INJ JOINT/BURSA W/US: CPT | Mod: 50
